# Patient Record
Sex: FEMALE | Race: BLACK OR AFRICAN AMERICAN | NOT HISPANIC OR LATINO | Employment: OTHER | URBAN - NONMETROPOLITAN AREA
[De-identification: names, ages, dates, MRNs, and addresses within clinical notes are randomized per-mention and may not be internally consistent; named-entity substitution may affect disease eponyms.]

---

## 2020-07-02 ENCOUNTER — LAB REQUISITION (OUTPATIENT)
Dept: LAB | Facility: HOSPITAL | Age: 70
End: 2020-07-02

## 2020-07-02 DIAGNOSIS — H44.30: ICD-10-CM

## 2020-07-02 DIAGNOSIS — I10 ESSENTIAL (PRIMARY) HYPERTENSION: ICD-10-CM

## 2020-07-02 DIAGNOSIS — E11.9 TYPE 2 DIABETES MELLITUS WITHOUT COMPLICATIONS (HCC): ICD-10-CM

## 2020-07-02 DIAGNOSIS — F41.1 GENERALIZED ANXIETY DISORDER: ICD-10-CM

## 2020-07-02 LAB
ALBUMIN SERPL-MCNC: 2.6 G/DL (ref 3.5–5.2)
ALBUMIN/GLOB SERPL: 0.4 G/DL
ALP SERPL-CCNC: 157 U/L (ref 39–117)
ALT SERPL W P-5'-P-CCNC: 165 U/L (ref 1–33)
ANION GAP SERPL CALCULATED.3IONS-SCNC: 9 MMOL/L (ref 5–15)
AST SERPL-CCNC: 209 U/L (ref 1–32)
BASOPHILS # BLD AUTO: 0.02 10*3/MM3 (ref 0–0.2)
BASOPHILS NFR BLD AUTO: 0.4 % (ref 0–1.5)
BILIRUB SERPL-MCNC: 0.8 MG/DL (ref 0.2–1.2)
BUN SERPL-MCNC: 12 MG/DL (ref 8–23)
BUN/CREAT SERPL: 13.3 (ref 7–25)
CALCIUM SPEC-SCNC: 9.4 MG/DL (ref 8.6–10.5)
CHLORIDE SERPL-SCNC: 105 MMOL/L (ref 98–107)
CHOLEST SERPL-MCNC: 118 MG/DL (ref 0–200)
CO2 SERPL-SCNC: 23 MMOL/L (ref 22–29)
CREAT SERPL-MCNC: 0.9 MG/DL (ref 0.57–1)
DEPRECATED RDW RBC AUTO: 52.6 FL (ref 37–54)
EOSINOPHIL # BLD AUTO: 0.11 10*3/MM3 (ref 0–0.4)
EOSINOPHIL NFR BLD AUTO: 2.2 % (ref 0.3–6.2)
ERYTHROCYTE [DISTWIDTH] IN BLOOD BY AUTOMATED COUNT: 14.8 % (ref 12.3–15.4)
GFR SERPL CREATININE-BSD FRML MDRD: 62 ML/MIN/1.73
GFR SERPL CREATININE-BSD FRML MDRD: 75 ML/MIN/1.73
GLOBULIN UR ELPH-MCNC: 5.9 GM/DL
GLUCOSE SERPL-MCNC: 158 MG/DL (ref 65–99)
HBA1C MFR BLD: 8.3 % (ref 4.8–5.6)
HCT VFR BLD AUTO: 43.1 % (ref 34–46.6)
HDLC SERPL-MCNC: 47 MG/DL (ref 40–60)
HGB BLD-MCNC: 13.9 G/DL (ref 12–15.9)
IMM GRANULOCYTES # BLD AUTO: 0 10*3/MM3 (ref 0–0.05)
IMM GRANULOCYTES NFR BLD AUTO: 0 % (ref 0–0.5)
LARGE PLATELETS: NORMAL
LDLC SERPL CALC-MCNC: 56 MG/DL (ref 0–100)
LDLC/HDLC SERPL: 1.2 {RATIO}
LYMPHOCYTES # BLD AUTO: 1.83 10*3/MM3 (ref 0.7–3.1)
LYMPHOCYTES NFR BLD AUTO: 36.6 % (ref 19.6–45.3)
MCH RBC QN AUTO: 30.8 PG (ref 26.6–33)
MCHC RBC AUTO-ENTMCNC: 32.3 G/DL (ref 31.5–35.7)
MCV RBC AUTO: 95.4 FL (ref 79–97)
MONOCYTES # BLD AUTO: 0.45 10*3/MM3 (ref 0.1–0.9)
MONOCYTES NFR BLD AUTO: 9 % (ref 5–12)
NEUTROPHILS NFR BLD AUTO: 2.59 10*3/MM3 (ref 1.7–7)
NEUTROPHILS NFR BLD AUTO: 51.8 % (ref 42.7–76)
NRBC BLD AUTO-RTO: 0 /100 WBC (ref 0–0.2)
PLATELET # BLD AUTO: 81 10*3/MM3 (ref 140–450)
PMV BLD AUTO: 11.5 FL (ref 6–12)
POTASSIUM SERPL-SCNC: 3.5 MMOL/L (ref 3.5–5.2)
PROT SERPL-MCNC: 8.5 G/DL (ref 6–8.5)
RBC # BLD AUTO: 4.52 10*6/MM3 (ref 3.77–5.28)
RBC MORPH BLD: NORMAL
SMALL PLATELETS BLD QL SMEAR: NORMAL
SODIUM SERPL-SCNC: 137 MMOL/L (ref 136–145)
TRIGL SERPL-MCNC: 73 MG/DL (ref 0–150)
TSH SERPL DL<=0.05 MIU/L-ACNC: 1.3 UIU/ML (ref 0.27–4.2)
VLDLC SERPL-MCNC: 14.6 MG/DL
WBC # BLD AUTO: 5 10*3/MM3 (ref 3.4–10.8)
WBC MORPH BLD: NORMAL

## 2020-07-02 PROCEDURE — 85007 BL SMEAR W/DIFF WBC COUNT: CPT | Performed by: PHYSICAL MEDICINE & REHABILITATION

## 2020-07-02 PROCEDURE — 83036 HEMOGLOBIN GLYCOSYLATED A1C: CPT | Performed by: PHYSICAL MEDICINE & REHABILITATION

## 2020-07-02 PROCEDURE — 85025 COMPLETE CBC W/AUTO DIFF WBC: CPT | Performed by: PHYSICAL MEDICINE & REHABILITATION

## 2020-07-02 PROCEDURE — 84443 ASSAY THYROID STIM HORMONE: CPT | Performed by: PHYSICAL MEDICINE & REHABILITATION

## 2020-07-02 PROCEDURE — 80061 LIPID PANEL: CPT | Performed by: PHYSICAL MEDICINE & REHABILITATION

## 2020-07-02 PROCEDURE — 80053 COMPREHEN METABOLIC PANEL: CPT | Performed by: PHYSICAL MEDICINE & REHABILITATION

## 2020-07-23 ENCOUNTER — LAB REQUISITION (OUTPATIENT)
Dept: LAB | Facility: HOSPITAL | Age: 70
End: 2020-07-23

## 2020-07-23 DIAGNOSIS — I10 ESSENTIAL (PRIMARY) HYPERTENSION: ICD-10-CM

## 2020-07-23 DIAGNOSIS — E11.36 TYPE 2 DIABETES MELLITUS WITH DIABETIC CATARACT (HCC): ICD-10-CM

## 2020-07-23 LAB
ALBUMIN SERPL-MCNC: 2.6 G/DL (ref 3.5–5.2)
ALBUMIN/GLOB SERPL: 0.5 G/DL
ALP SERPL-CCNC: 145 U/L (ref 39–117)
ALT SERPL W P-5'-P-CCNC: 165 U/L (ref 1–33)
ANION GAP SERPL CALCULATED.3IONS-SCNC: 7 MMOL/L (ref 5–15)
ANISOCYTOSIS BLD QL: NORMAL
AST SERPL-CCNC: 248 U/L (ref 1–32)
BASOPHILS # BLD AUTO: 0.03 10*3/MM3 (ref 0–0.2)
BASOPHILS NFR BLD AUTO: 0.8 % (ref 0–1.5)
BILIRUB SERPL-MCNC: 0.8 MG/DL (ref 0–1.2)
BUN SERPL-MCNC: 10 MG/DL (ref 8–23)
BUN/CREAT SERPL: 10.9 (ref 7–25)
CALCIUM SPEC-SCNC: 9.1 MG/DL (ref 8.6–10.5)
CHLORIDE SERPL-SCNC: 106 MMOL/L (ref 98–107)
CO2 SERPL-SCNC: 26 MMOL/L (ref 22–29)
CREAT SERPL-MCNC: 0.92 MG/DL (ref 0.57–1)
DEPRECATED RDW RBC AUTO: 53.4 FL (ref 37–54)
EOSINOPHIL # BLD AUTO: 0.09 10*3/MM3 (ref 0–0.4)
EOSINOPHIL NFR BLD AUTO: 2.4 % (ref 0.3–6.2)
ERYTHROCYTE [DISTWIDTH] IN BLOOD BY AUTOMATED COUNT: 14.8 % (ref 12.3–15.4)
GFR SERPL CREATININE-BSD FRML MDRD: 61 ML/MIN/1.73
GFR SERPL CREATININE-BSD FRML MDRD: 74 ML/MIN/1.73
GLOBULIN UR ELPH-MCNC: 5.6 GM/DL
GLUCOSE SERPL-MCNC: 147 MG/DL (ref 65–99)
HCT VFR BLD AUTO: 42.3 % (ref 34–46.6)
HGB BLD-MCNC: 13.6 G/DL (ref 12–15.9)
IMM GRANULOCYTES # BLD AUTO: 0.01 10*3/MM3 (ref 0–0.05)
IMM GRANULOCYTES NFR BLD AUTO: 0.3 % (ref 0–0.5)
LYMPHOCYTES # BLD AUTO: 1.29 10*3/MM3 (ref 0.7–3.1)
LYMPHOCYTES NFR BLD AUTO: 33.8 % (ref 19.6–45.3)
MCH RBC QN AUTO: 31.4 PG (ref 26.6–33)
MCHC RBC AUTO-ENTMCNC: 32.2 G/DL (ref 31.5–35.7)
MCV RBC AUTO: 97.7 FL (ref 79–97)
MONOCYTES # BLD AUTO: 0.38 10*3/MM3 (ref 0.1–0.9)
MONOCYTES NFR BLD AUTO: 9.9 % (ref 5–12)
NEUTROPHILS NFR BLD AUTO: 2.02 10*3/MM3 (ref 1.7–7)
NEUTROPHILS NFR BLD AUTO: 52.8 % (ref 42.7–76)
NRBC BLD AUTO-RTO: 0 /100 WBC (ref 0–0.2)
PLATELET # BLD AUTO: 73 10*3/MM3 (ref 140–450)
PMV BLD AUTO: 11.6 FL (ref 6–12)
POTASSIUM SERPL-SCNC: 3.4 MMOL/L (ref 3.5–5.2)
PROT SERPL-MCNC: 8.2 G/DL (ref 6–8.5)
RBC # BLD AUTO: 4.33 10*6/MM3 (ref 3.77–5.28)
RBC MORPH BLD: NORMAL
SMALL PLATELETS BLD QL SMEAR: NORMAL
SODIUM SERPL-SCNC: 139 MMOL/L (ref 136–145)
WBC # BLD AUTO: 3.82 10*3/MM3 (ref 3.4–10.8)
WBC MORPH BLD: NORMAL

## 2020-07-23 PROCEDURE — 85007 BL SMEAR W/DIFF WBC COUNT: CPT | Performed by: PHYSICAL MEDICINE & REHABILITATION

## 2020-07-23 PROCEDURE — 85025 COMPLETE CBC W/AUTO DIFF WBC: CPT | Performed by: PHYSICAL MEDICINE & REHABILITATION

## 2020-07-23 PROCEDURE — 80053 COMPREHEN METABOLIC PANEL: CPT | Performed by: PHYSICAL MEDICINE & REHABILITATION

## 2020-08-28 ENCOUNTER — ANESTHESIA EVENT (OUTPATIENT)
Dept: PERIOP | Facility: HOSPITAL | Age: 70
End: 2020-08-28

## 2020-08-28 ENCOUNTER — APPOINTMENT (OUTPATIENT)
Dept: CT IMAGING | Facility: HOSPITAL | Age: 70
End: 2020-08-28

## 2020-08-28 ENCOUNTER — HOSPITAL ENCOUNTER (INPATIENT)
Facility: HOSPITAL | Age: 70
LOS: 6 days | End: 2020-09-03
Attending: FAMILY MEDICINE | Admitting: INTERNAL MEDICINE

## 2020-08-28 ENCOUNTER — ANESTHESIA (OUTPATIENT)
Dept: PERIOP | Facility: HOSPITAL | Age: 70
End: 2020-08-28

## 2020-08-28 DIAGNOSIS — K92.2 ACUTE GI BLEEDING: Primary | ICD-10-CM

## 2020-08-28 DIAGNOSIS — Z74.09 IMPAIRED FUNCTIONAL MOBILITY, BALANCE, GAIT, AND ENDURANCE: ICD-10-CM

## 2020-08-28 DIAGNOSIS — Z74.09 IMPAIRED MOBILITY AND ACTIVITIES OF DAILY LIVING: ICD-10-CM

## 2020-08-28 DIAGNOSIS — K92.2 GI BLEED: ICD-10-CM

## 2020-08-28 DIAGNOSIS — Z78.9 IMPAIRED MOBILITY AND ACTIVITIES OF DAILY LIVING: ICD-10-CM

## 2020-08-28 PROBLEM — R41.82 ALTERED MENTAL STATUS, UNSPECIFIED: Status: ACTIVE | Noted: 2020-08-28

## 2020-08-28 LAB
ABO GROUP BLD: NORMAL
ALBUMIN SERPL-MCNC: 2.3 G/DL (ref 3.5–5.2)
ALBUMIN/GLOB SERPL: 0.5 G/DL
ALP SERPL-CCNC: 138 U/L (ref 39–117)
ALT SERPL W P-5'-P-CCNC: 124 U/L (ref 1–33)
ANION GAP SERPL CALCULATED.3IONS-SCNC: 16 MMOL/L (ref 5–15)
AST SERPL-CCNC: 142 U/L (ref 1–32)
BASOPHILS # BLD AUTO: 0.05 10*3/MM3 (ref 0–0.2)
BASOPHILS NFR BLD AUTO: 0.5 % (ref 0–1.5)
BILIRUB SERPL-MCNC: 0.7 MG/DL (ref 0–1.2)
BLD GP AB SCN SERPL QL: NEGATIVE
BUN SERPL-MCNC: 22 MG/DL (ref 8–23)
BUN/CREAT SERPL: 21.6 (ref 7–25)
CALCIUM SPEC-SCNC: 9 MG/DL (ref 8.6–10.5)
CHLORIDE SERPL-SCNC: 114 MMOL/L (ref 98–107)
CO2 SERPL-SCNC: 17 MMOL/L (ref 22–29)
CREAT SERPL-MCNC: 1.02 MG/DL (ref 0.57–1)
D-LACTATE SERPL-SCNC: 5.2 MMOL/L (ref 0.5–2)
D-LACTATE SERPL-SCNC: 6.8 MMOL/L (ref 0.5–2)
DEPRECATED RDW RBC AUTO: 55.1 FL (ref 37–54)
EOSINOPHIL # BLD AUTO: 0.02 10*3/MM3 (ref 0–0.4)
EOSINOPHIL NFR BLD AUTO: 0.2 % (ref 0.3–6.2)
ERYTHROCYTE [DISTWIDTH] IN BLOOD BY AUTOMATED COUNT: 15.4 % (ref 12.3–15.4)
GFR SERPL CREATININE-BSD FRML MDRD: 54 ML/MIN/1.73
GFR SERPL CREATININE-BSD FRML MDRD: 65 ML/MIN/1.73
GLOBULIN UR ELPH-MCNC: 5.1 GM/DL
GLUCOSE BLDC GLUCOMTR-MCNC: 158 MG/DL (ref 70–130)
GLUCOSE SERPL-MCNC: 171 MG/DL (ref 65–99)
HBA1C MFR BLD: 7.8 % (ref 4.8–5.6)
HCT VFR BLD AUTO: 30.6 % (ref 34–46.6)
HCT VFR BLD AUTO: 33.7 % (ref 34–46.6)
HGB BLD-MCNC: 10.9 G/DL (ref 12–15.9)
HGB BLD-MCNC: 9.9 G/DL (ref 12–15.9)
HOLD SPECIMEN: NORMAL
HOLD SPECIMEN: NORMAL
IMM GRANULOCYTES # BLD AUTO: 0.03 10*3/MM3 (ref 0–0.05)
IMM GRANULOCYTES NFR BLD AUTO: 0.3 % (ref 0–0.5)
INR PPP: 1.43 (ref 0.8–1.2)
LACTATE HOLD SPECIMEN: NORMAL
LYMPHOCYTES # BLD AUTO: 1.11 10*3/MM3 (ref 0.7–3.1)
LYMPHOCYTES NFR BLD AUTO: 12 % (ref 19.6–45.3)
Lab: NORMAL
MCH RBC QN AUTO: 31.9 PG (ref 26.6–33)
MCHC RBC AUTO-ENTMCNC: 32.3 G/DL (ref 31.5–35.7)
MCV RBC AUTO: 98.5 FL (ref 79–97)
MONOCYTES # BLD AUTO: 0.5 10*3/MM3 (ref 0.1–0.9)
MONOCYTES NFR BLD AUTO: 5.4 % (ref 5–12)
NEUTROPHILS NFR BLD AUTO: 7.53 10*3/MM3 (ref 1.7–7)
NEUTROPHILS NFR BLD AUTO: 81.6 % (ref 42.7–76)
NRBC BLD AUTO-RTO: 0 /100 WBC (ref 0–0.2)
PLATELET # BLD AUTO: 100 10*3/MM3 (ref 140–450)
PMV BLD AUTO: 11.8 FL (ref 6–12)
POTASSIUM SERPL-SCNC: 4.4 MMOL/L (ref 3.5–5.2)
PROT SERPL-MCNC: 7.4 G/DL (ref 6–8.5)
PROTHROMBIN TIME: 18.2 SECONDS (ref 11.1–15.3)
RBC # BLD AUTO: 3.42 10*6/MM3 (ref 3.77–5.28)
RH BLD: NEGATIVE
SODIUM SERPL-SCNC: 147 MMOL/L (ref 136–145)
T&S EXPIRATION DATE: NORMAL
WBC # BLD AUTO: 9.24 10*3/MM3 (ref 3.4–10.8)
WHOLE BLOOD HOLD SPECIMEN: NORMAL
WHOLE BLOOD HOLD SPECIMEN: NORMAL

## 2020-08-28 PROCEDURE — 86923 COMPATIBILITY TEST ELECTRIC: CPT

## 2020-08-28 PROCEDURE — 83036 HEMOGLOBIN GLYCOSYLATED A1C: CPT | Performed by: INTERNAL MEDICINE

## 2020-08-28 PROCEDURE — 88305 TISSUE EXAM BY PATHOLOGIST: CPT

## 2020-08-28 PROCEDURE — 85014 HEMATOCRIT: CPT | Performed by: FAMILY MEDICINE

## 2020-08-28 PROCEDURE — 99285 EMERGENCY DEPT VISIT HI MDM: CPT

## 2020-08-28 PROCEDURE — 86901 BLOOD TYPING SEROLOGIC RH(D): CPT

## 2020-08-28 PROCEDURE — 85018 HEMOGLOBIN: CPT | Performed by: FAMILY MEDICINE

## 2020-08-28 PROCEDURE — 25010000002 PIPERACILLIN SOD-TAZOBACTAM PER 1 G: Performed by: INTERNAL MEDICINE

## 2020-08-28 PROCEDURE — G0378 HOSPITAL OBSERVATION PER HR: HCPCS

## 2020-08-28 PROCEDURE — 86901 BLOOD TYPING SEROLOGIC RH(D): CPT | Performed by: FAMILY MEDICINE

## 2020-08-28 PROCEDURE — 0DB98ZX EXCISION OF DUODENUM, VIA NATURAL OR ARTIFICIAL OPENING ENDOSCOPIC, DIAGNOSTIC: ICD-10-PCS | Performed by: INTERNAL MEDICINE

## 2020-08-28 PROCEDURE — 87635 SARS-COV-2 COVID-19 AMP PRB: CPT | Performed by: FAMILY MEDICINE

## 2020-08-28 PROCEDURE — 86850 RBC ANTIBODY SCREEN: CPT | Performed by: FAMILY MEDICINE

## 2020-08-28 PROCEDURE — 86900 BLOOD TYPING SEROLOGIC ABO: CPT

## 2020-08-28 PROCEDURE — 85610 PROTHROMBIN TIME: CPT | Performed by: FAMILY MEDICINE

## 2020-08-28 PROCEDURE — 82962 GLUCOSE BLOOD TEST: CPT

## 2020-08-28 PROCEDURE — 43239 EGD BIOPSY SINGLE/MULTIPLE: CPT | Performed by: INTERNAL MEDICINE

## 2020-08-28 PROCEDURE — 36415 COLL VENOUS BLD VENIPUNCTURE: CPT | Performed by: INTERNAL MEDICINE

## 2020-08-28 PROCEDURE — 83605 ASSAY OF LACTIC ACID: CPT | Performed by: INTERNAL MEDICINE

## 2020-08-28 PROCEDURE — 25010000002 PHENYLEPHRINE PER 1 ML: Performed by: NURSE ANESTHETIST, CERTIFIED REGISTERED

## 2020-08-28 PROCEDURE — 87040 BLOOD CULTURE FOR BACTERIA: CPT | Performed by: FAMILY MEDICINE

## 2020-08-28 PROCEDURE — 80053 COMPREHEN METABOLIC PANEL: CPT | Performed by: FAMILY MEDICINE

## 2020-08-28 PROCEDURE — 25010000002 PIPERACILLIN SOD-TAZOBACTAM PER 1 G: Performed by: FAMILY MEDICINE

## 2020-08-28 PROCEDURE — 99222 1ST HOSP IP/OBS MODERATE 55: CPT | Performed by: INTERNAL MEDICINE

## 2020-08-28 PROCEDURE — 86900 BLOOD TYPING SEROLOGIC ABO: CPT | Performed by: FAMILY MEDICINE

## 2020-08-28 PROCEDURE — 70450 CT HEAD/BRAIN W/O DYE: CPT

## 2020-08-28 PROCEDURE — 85025 COMPLETE CBC W/AUTO DIFF WBC: CPT | Performed by: FAMILY MEDICINE

## 2020-08-28 PROCEDURE — 25010000002 PROPOFOL 10 MG/ML EMULSION: Performed by: NURSE ANESTHETIST, CERTIFIED REGISTERED

## 2020-08-28 PROCEDURE — 83605 ASSAY OF LACTIC ACID: CPT | Performed by: FAMILY MEDICINE

## 2020-08-28 RX ORDER — LIDOCAINE HYDROCHLORIDE 20 MG/ML
INJECTION, SOLUTION EPIDURAL; INFILTRATION; INTRACAUDAL; PERINEURAL AS NEEDED
Status: DISCONTINUED | OUTPATIENT
Start: 2020-08-28 | End: 2020-08-28 | Stop reason: SURG

## 2020-08-28 RX ORDER — SODIUM CHLORIDE 0.9 % (FLUSH) 0.9 %
10 SYRINGE (ML) INJECTION EVERY 12 HOURS SCHEDULED
Status: DISCONTINUED | OUTPATIENT
Start: 2020-08-28 | End: 2020-09-03 | Stop reason: HOSPADM

## 2020-08-28 RX ORDER — ACETAMINOPHEN 325 MG/1
650 TABLET ORAL EVERY 4 HOURS PRN
Status: DISCONTINUED | OUTPATIENT
Start: 2020-08-28 | End: 2020-09-03 | Stop reason: HOSPADM

## 2020-08-28 RX ORDER — ONDANSETRON 2 MG/ML
4 INJECTION INTRAMUSCULAR; INTRAVENOUS EVERY 6 HOURS PRN
Status: DISCONTINUED | OUTPATIENT
Start: 2020-08-28 | End: 2020-09-03 | Stop reason: HOSPADM

## 2020-08-28 RX ORDER — PANTOPRAZOLE SODIUM 40 MG/10ML
40 INJECTION, POWDER, LYOPHILIZED, FOR SOLUTION INTRAVENOUS
Status: DISCONTINUED | OUTPATIENT
Start: 2020-08-29 | End: 2020-08-31

## 2020-08-28 RX ORDER — QUETIAPINE FUMARATE 50 MG/1
50 TABLET, FILM COATED ORAL DAILY
COMMUNITY
End: 2020-09-03 | Stop reason: HOSPADM

## 2020-08-28 RX ORDER — PANTOPRAZOLE SODIUM 40 MG/10ML
80 INJECTION, POWDER, LYOPHILIZED, FOR SOLUTION INTRAVENOUS ONCE
Status: COMPLETED | OUTPATIENT
Start: 2020-08-28 | End: 2020-08-28

## 2020-08-28 RX ORDER — DEXTROSE AND SODIUM CHLORIDE 5; .45 G/100ML; G/100ML
30 INJECTION, SOLUTION INTRAVENOUS CONTINUOUS PRN
Status: CANCELLED | OUTPATIENT
Start: 2020-08-28

## 2020-08-28 RX ORDER — ESCITALOPRAM OXALATE 20 MG/1
20 TABLET ORAL DAILY
COMMUNITY
End: 2020-09-24 | Stop reason: HOSPADM

## 2020-08-28 RX ORDER — VASOPRESSIN 20 U/ML
INJECTION PARENTERAL AS NEEDED
Status: DISCONTINUED | OUTPATIENT
Start: 2020-08-28 | End: 2020-08-28 | Stop reason: SURG

## 2020-08-28 RX ORDER — PROPOFOL 10 MG/ML
VIAL (ML) INTRAVENOUS AS NEEDED
Status: DISCONTINUED | OUTPATIENT
Start: 2020-08-28 | End: 2020-08-28 | Stop reason: SURG

## 2020-08-28 RX ORDER — DEXTROSE MONOHYDRATE 25 G/50ML
25 INJECTION, SOLUTION INTRAVENOUS
Status: DISCONTINUED | OUTPATIENT
Start: 2020-08-28 | End: 2020-09-03 | Stop reason: HOSPADM

## 2020-08-28 RX ORDER — NALOXONE HCL 0.4 MG/ML
0.4 VIAL (ML) INJECTION
Status: DISCONTINUED | OUTPATIENT
Start: 2020-08-28 | End: 2020-09-03 | Stop reason: HOSPADM

## 2020-08-28 RX ORDER — MORPHINE SULFATE 2 MG/ML
1 INJECTION, SOLUTION INTRAMUSCULAR; INTRAVENOUS EVERY 4 HOURS PRN
Status: DISCONTINUED | OUTPATIENT
Start: 2020-08-28 | End: 2020-09-03 | Stop reason: HOSPADM

## 2020-08-28 RX ORDER — SODIUM CHLORIDE 0.9 % (FLUSH) 0.9 %
10 SYRINGE (ML) INJECTION AS NEEDED
Status: DISCONTINUED | OUTPATIENT
Start: 2020-08-28 | End: 2020-09-03 | Stop reason: HOSPADM

## 2020-08-28 RX ORDER — NICOTINE POLACRILEX 4 MG
15 LOZENGE BUCCAL
Status: DISCONTINUED | OUTPATIENT
Start: 2020-08-28 | End: 2020-09-03 | Stop reason: HOSPADM

## 2020-08-28 RX ORDER — DEXTROSE AND SODIUM CHLORIDE 5; .45 G/100ML; G/100ML
30 INJECTION, SOLUTION INTRAVENOUS CONTINUOUS PRN
Status: DISCONTINUED | OUTPATIENT
Start: 2020-08-28 | End: 2020-09-03 | Stop reason: HOSPADM

## 2020-08-28 RX ORDER — SODIUM CHLORIDE 9 MG/ML
150 INJECTION, SOLUTION INTRAVENOUS CONTINUOUS
Status: DISCONTINUED | OUTPATIENT
Start: 2020-08-28 | End: 2020-08-31

## 2020-08-28 RX ORDER — SODIUM CHLORIDE 9 MG/ML
INJECTION, SOLUTION INTRAVENOUS CONTINUOUS PRN
Status: DISCONTINUED | OUTPATIENT
Start: 2020-08-28 | End: 2020-08-28 | Stop reason: SURG

## 2020-08-28 RX ORDER — EMPAGLIFLOZIN 10 MG/1
10 TABLET, FILM COATED ORAL DAILY
COMMUNITY
End: 2020-09-24 | Stop reason: HOSPADM

## 2020-08-28 RX ORDER — GLIPIZIDE AND METFORMIN HCL 2.5; 5 MG/1; MG/1
1 TABLET, FILM COATED ORAL
Status: ON HOLD | COMMUNITY
End: 2021-01-01

## 2020-08-28 RX ADMIN — PHENYLEPHRINE HYDROCHLORIDE 200 MCG: 10 INJECTION INTRAVENOUS at 17:37

## 2020-08-28 RX ADMIN — PHENYLEPHRINE HYDROCHLORIDE 100 MCG: 10 INJECTION INTRAVENOUS at 17:30

## 2020-08-28 RX ADMIN — POLYETHYLENE GLYCOL 3350, SODIUM SULFATE ANHYDROUS, SODIUM BICARBONATE, SODIUM CHLORIDE, POTASSIUM CHLORIDE 4000 ML: 236; 22.74; 6.74; 5.86; 2.97 POWDER, FOR SOLUTION ORAL at 19:19

## 2020-08-28 RX ADMIN — PROPOFOL 50 MG: 10 INJECTION, EMULSION INTRAVENOUS at 17:25

## 2020-08-28 RX ADMIN — PHENYLEPHRINE HYDROCHLORIDE 200 MCG: 10 INJECTION INTRAVENOUS at 17:35

## 2020-08-28 RX ADMIN — SODIUM CHLORIDE: 900 INJECTION, SOLUTION INTRAVENOUS at 17:22

## 2020-08-28 RX ADMIN — LIDOCAINE HYDROCHLORIDE 100 MG: 20 INJECTION, SOLUTION EPIDURAL; INFILTRATION; INTRACAUDAL; PERINEURAL at 17:25

## 2020-08-28 RX ADMIN — PROPOFOL 50 MG: 10 INJECTION, EMULSION INTRAVENOUS at 17:29

## 2020-08-28 RX ADMIN — PHENYLEPHRINE HYDROCHLORIDE 100 MCG: 10 INJECTION INTRAVENOUS at 17:33

## 2020-08-28 RX ADMIN — PHENYLEPHRINE HYDROCHLORIDE 200 MCG: 10 INJECTION INTRAVENOUS at 17:39

## 2020-08-28 RX ADMIN — PHENYLEPHRINE HYDROCHLORIDE 100 MCG: 10 INJECTION INTRAVENOUS at 17:31

## 2020-08-28 RX ADMIN — PIPERACILLIN SODIUM AND TAZOBACTAM SODIUM 3.38 G: 3; .375 INJECTION, POWDER, LYOPHILIZED, FOR SOLUTION INTRAVENOUS at 21:53

## 2020-08-28 RX ADMIN — PANTOPRAZOLE SODIUM 80 MG: 40 INJECTION, POWDER, FOR SOLUTION INTRAVENOUS at 16:30

## 2020-08-28 RX ADMIN — SODIUM CHLORIDE 150 ML/HR: 9 INJECTION, SOLUTION INTRAVENOUS at 21:53

## 2020-08-28 RX ADMIN — SODIUM CHLORIDE 150 ML/HR: 9 INJECTION, SOLUTION INTRAVENOUS at 19:19

## 2020-08-28 RX ADMIN — PHENYLEPHRINE HYDROCHLORIDE 100 MCG: 10 INJECTION INTRAVENOUS at 17:28

## 2020-08-28 RX ADMIN — VASOPRESSIN 2 UNITS: 20 INJECTION INTRAVENOUS at 17:42

## 2020-08-28 NOTE — ANESTHESIA PREPROCEDURE EVALUATION
Anesthesia Evaluation     no history of anesthetic complications:  NPO Solid Status: Waived due to emergency  NPO Liquid Status: Waived due to emergency           Airway   Dental      Pulmonary    (+) decreased breath sounds,   (-) COPD, asthma, sleep apnea, not a smoker  Cardiovascular   Exercise tolerance: poor (<4 METS)    Rhythm: regular  Rate: abnormal    (+) hypertension, dysrhythmias Tachycardia,   (-) angina, cardiac stents, CABG, DVT, hyperlipidemia      Neuro/Psych  (+) dementia,     (-) seizures, TIA, CVA, psychiatric history  GI/Hepatic/Renal/Endo    (+)  GI bleeding upper active bleeding, diabetes mellitus ([id number]) type 2 poorly controlled,   (-) GERD, hepatitis, liver disease, no renal disease, no thyroid disorder    Musculoskeletal     (+) arthralgias,   Abdominal    Substance History   (-) alcohol use, drug use     OB/GYN          Other   arthritis,      (-) history of cancer  ROS/Med Hx Other: hgb dropped from 13.6 to 10.9  Lives at home,  visits regularly  Found down in a pool of blood  Blind and currently nonverbal      Phys Exam Other: NG in place and not bleed seen   believes it is a lower GI bleed over an upper                Anesthesia Plan    ASA 4 - emergent     general and MAC   (Ioana sandy is  and discussed case over the phone at 4:20pm)  intravenous induction     Anesthetic plan, all risks, benefits, and alternatives have been provided, discussed and informed consent has been obtained with: patient.

## 2020-08-28 NOTE — ANESTHESIA POSTPROCEDURE EVALUATION
Patient: Natacha Gandhi    Procedure Summary     Date:  08/28/20 Room / Location:  [facility] OR 06 / [facility] OR    Anesthesia Start:  1722 Anesthesia Stop:  1746    Procedure:  ESOPHAGOGASTRODUODENOSCOPY (N/A ) Diagnosis:       GI bleed      (GI bleed [K92.2])    Surgeon:  Ge Gorman MD Provider:  Saw Schneider MD    Anesthesia Type:  general, MAC ASA Status:  4 - Emergent          Anesthesia Type: general, MAC    Vitals  No vitals data found for the desired time range.          Post Anesthesia Care and Evaluation    Patient location during evaluation: ICU  Patient participation: complete - patient cannot participate  Level of consciousness: obtunded/minimal responses  Pain score: 0  Pain management: adequate  Airway patency: patent  Anesthetic complications: No anesthetic complications  PONV Status: none  Cardiovascular status: acceptable  Respiratory status: acceptable, face mask and spontaneous ventilation  Hydration status: acceptable

## 2020-08-29 ENCOUNTER — ANESTHESIA (OUTPATIENT)
Dept: PERIOP | Facility: HOSPITAL | Age: 70
End: 2020-08-29

## 2020-08-29 ENCOUNTER — ANESTHESIA EVENT (OUTPATIENT)
Dept: PERIOP | Facility: HOSPITAL | Age: 70
End: 2020-08-29

## 2020-08-29 LAB
ANION GAP SERPL CALCULATED.3IONS-SCNC: 12 MMOL/L (ref 5–15)
BASOPHILS # BLD AUTO: 0.03 10*3/MM3 (ref 0–0.2)
BASOPHILS NFR BLD AUTO: 0.3 % (ref 0–1.5)
BILIRUB UR QL STRIP: NEGATIVE
BUN SERPL-MCNC: 23 MG/DL (ref 8–23)
BUN/CREAT SERPL: 22.8 (ref 7–25)
CALCIUM SPEC-SCNC: 8 MG/DL (ref 8.6–10.5)
CHLORIDE SERPL-SCNC: 119 MMOL/L (ref 98–107)
CLARITY UR: CLEAR
CO2 SERPL-SCNC: 19 MMOL/L (ref 22–29)
COLOR UR: YELLOW
CREAT SERPL-MCNC: 1.01 MG/DL (ref 0.57–1)
DEPRECATED RDW RBC AUTO: 55.7 FL (ref 37–54)
EOSINOPHIL # BLD AUTO: 0.01 10*3/MM3 (ref 0–0.4)
EOSINOPHIL NFR BLD AUTO: 0.1 % (ref 0.3–6.2)
ERYTHROCYTE [DISTWIDTH] IN BLOOD BY AUTOMATED COUNT: 15.7 % (ref 12.3–15.4)
GFR SERPL CREATININE-BSD FRML MDRD: 66 ML/MIN/1.73
GLUCOSE BLDC GLUCOMTR-MCNC: 119 MG/DL (ref 70–130)
GLUCOSE BLDC GLUCOMTR-MCNC: 134 MG/DL (ref 70–130)
GLUCOSE BLDC GLUCOMTR-MCNC: 136 MG/DL (ref 70–130)
GLUCOSE BLDC GLUCOMTR-MCNC: 141 MG/DL (ref 70–130)
GLUCOSE SERPL-MCNC: 142 MG/DL (ref 65–99)
GLUCOSE UR STRIP-MCNC: NEGATIVE MG/DL
HCT VFR BLD AUTO: 26.1 % (ref 34–46.6)
HGB BLD-MCNC: 8.5 G/DL (ref 12–15.9)
HGB UR QL STRIP.AUTO: NEGATIVE
IMM GRANULOCYTES # BLD AUTO: 0.04 10*3/MM3 (ref 0–0.05)
IMM GRANULOCYTES NFR BLD AUTO: 0.4 % (ref 0–0.5)
KETONES UR QL STRIP: NEGATIVE
LEUKOCYTE ESTERASE UR QL STRIP.AUTO: NEGATIVE
LYMPHOCYTES # BLD AUTO: 2.11 10*3/MM3 (ref 0.7–3.1)
LYMPHOCYTES NFR BLD AUTO: 19.4 % (ref 19.6–45.3)
MCH RBC QN AUTO: 32.1 PG (ref 26.6–33)
MCHC RBC AUTO-ENTMCNC: 32.6 G/DL (ref 31.5–35.7)
MCV RBC AUTO: 98.5 FL (ref 79–97)
MONOCYTES # BLD AUTO: 0.75 10*3/MM3 (ref 0.1–0.9)
MONOCYTES NFR BLD AUTO: 6.9 % (ref 5–12)
NEUTROPHILS NFR BLD AUTO: 7.92 10*3/MM3 (ref 1.7–7)
NEUTROPHILS NFR BLD AUTO: 72.9 % (ref 42.7–76)
NITRITE UR QL STRIP: NEGATIVE
NRBC BLD AUTO-RTO: 0 /100 WBC (ref 0–0.2)
PH UR STRIP.AUTO: 7 [PH] (ref 5–9)
PLATELET # BLD AUTO: 79 10*3/MM3 (ref 140–450)
PMV BLD AUTO: 12.1 FL (ref 6–12)
POTASSIUM SERPL-SCNC: 4.2 MMOL/L (ref 3.5–5.2)
PROT UR QL STRIP: NEGATIVE
RBC # BLD AUTO: 2.65 10*6/MM3 (ref 3.77–5.28)
SARS-COV-2 N GENE RESP QL NAA+PROBE: NOT DETECTED
SODIUM SERPL-SCNC: 150 MMOL/L (ref 136–145)
SP GR UR STRIP: 1.02 (ref 1–1.03)
UROBILINOGEN UR QL STRIP: NORMAL
WBC # BLD AUTO: 10.86 10*3/MM3 (ref 3.4–10.8)

## 2020-08-29 PROCEDURE — 25010000002 PIPERACILLIN SOD-TAZOBACTAM PER 1 G: Performed by: INTERNAL MEDICINE

## 2020-08-29 PROCEDURE — 81003 URINALYSIS AUTO W/O SCOPE: CPT | Performed by: INTERNAL MEDICINE

## 2020-08-29 PROCEDURE — 88305 TISSUE EXAM BY PATHOLOGIST: CPT

## 2020-08-29 PROCEDURE — 85025 COMPLETE CBC W/AUTO DIFF WBC: CPT | Performed by: INTERNAL MEDICINE

## 2020-08-29 PROCEDURE — 25010000002 PROPOFOL 10 MG/ML EMULSION: Performed by: NURSE ANESTHETIST, CERTIFIED REGISTERED

## 2020-08-29 PROCEDURE — 0DBC8ZX EXCISION OF ILEOCECAL VALVE, VIA NATURAL OR ARTIFICIAL OPENING ENDOSCOPIC, DIAGNOSTIC: ICD-10-PCS | Performed by: INTERNAL MEDICINE

## 2020-08-29 PROCEDURE — G0378 HOSPITAL OBSERVATION PER HR: HCPCS

## 2020-08-29 PROCEDURE — 80048 BASIC METABOLIC PNL TOTAL CA: CPT | Performed by: INTERNAL MEDICINE

## 2020-08-29 PROCEDURE — 82962 GLUCOSE BLOOD TEST: CPT

## 2020-08-29 PROCEDURE — 45380 COLONOSCOPY AND BIOPSY: CPT | Performed by: INTERNAL MEDICINE

## 2020-08-29 PROCEDURE — 0DBK8ZX EXCISION OF ASCENDING COLON, VIA NATURAL OR ARTIFICIAL OPENING ENDOSCOPIC, DIAGNOSTIC: ICD-10-PCS | Performed by: INTERNAL MEDICINE

## 2020-08-29 PROCEDURE — 45385 COLONOSCOPY W/LESION REMOVAL: CPT | Performed by: INTERNAL MEDICINE

## 2020-08-29 RX ORDER — MEPERIDINE HYDROCHLORIDE 25 MG/ML
12.5 INJECTION INTRAMUSCULAR; INTRAVENOUS; SUBCUTANEOUS
Status: DISCONTINUED | OUTPATIENT
Start: 2020-08-29 | End: 2020-08-29 | Stop reason: HOSPADM

## 2020-08-29 RX ORDER — PROPOFOL 10 MG/ML
VIAL (ML) INTRAVENOUS AS NEEDED
Status: DISCONTINUED | OUTPATIENT
Start: 2020-08-29 | End: 2020-08-29 | Stop reason: SURG

## 2020-08-29 RX ADMIN — PROPOFOL 20 MG: 10 INJECTION, EMULSION INTRAVENOUS at 13:40

## 2020-08-29 RX ADMIN — PIPERACILLIN SODIUM AND TAZOBACTAM SODIUM 3.38 G: 3; .375 INJECTION, POWDER, LYOPHILIZED, FOR SOLUTION INTRAVENOUS at 21:07

## 2020-08-29 RX ADMIN — PIPERACILLIN SODIUM AND TAZOBACTAM SODIUM 3.38 G: 3; .375 INJECTION, POWDER, LYOPHILIZED, FOR SOLUTION INTRAVENOUS at 06:22

## 2020-08-29 RX ADMIN — PANTOPRAZOLE SODIUM 40 MG: 40 INJECTION, POWDER, FOR SOLUTION INTRAVENOUS at 06:46

## 2020-08-29 RX ADMIN — SODIUM CHLORIDE 150 ML/HR: 9 INJECTION, SOLUTION INTRAVENOUS at 21:07

## 2020-08-29 RX ADMIN — SODIUM CHLORIDE, PRESERVATIVE FREE 10 ML: 5 INJECTION INTRAVENOUS at 21:04

## 2020-08-29 RX ADMIN — PROPOFOL 50 MG: 10 INJECTION, EMULSION INTRAVENOUS at 13:38

## 2020-08-29 RX ADMIN — SODIUM CHLORIDE, PRESERVATIVE FREE 10 ML: 5 INJECTION INTRAVENOUS at 09:22

## 2020-08-29 RX ADMIN — SODIUM CHLORIDE 150 ML/HR: 9 INJECTION, SOLUTION INTRAVENOUS at 04:41

## 2020-08-29 NOTE — ANESTHESIA POSTPROCEDURE EVALUATION
Patient: Natacha Gandhi    Procedure Summary     Date:  08/29/20 Room / Location:  [facility] OR 06 / [facility] OR    Anesthesia Start:  1333 Anesthesia Stop:  1356    Procedure:  COLONOSCOPY (N/A ) Diagnosis:       Acute GI bleeding      (Acute GI bleeding [K92.2])    Surgeon:  Ge Gorman MD Provider:  Saw Schneider MD    Anesthesia Type:  MAC ASA Status:  3 - Emergent          Anesthesia Type: MAC    Vitals  No vitals data found for the desired time range.          Post Anesthesia Care and Evaluation    Patient location during evaluation: ICU  Patient participation: waiting for patient participation  Level of consciousness: responsive to verbal stimuli (at baseline)  Pain management: adequate  Airway patency: patent  Anesthetic complications: No anesthetic complications    Cardiovascular status: acceptable  Respiratory status: acceptable  Hydration status: acceptable

## 2020-08-29 NOTE — ANESTHESIA PREPROCEDURE EVALUATION
Anesthesia Evaluation     no history of anesthetic complications:  NPO Solid Status: > 8 hours  NPO Liquid Status: > 8 hours           Airway   Dental          Pulmonary    (+) decreased breath sounds,   (-) COPD, asthma, sleep apnea, not a smoker  Cardiovascular   Exercise tolerance: poor (<4 METS)    Rhythm: regular  Rate: normal    (+) hypertension, dysrhythmias Tachycardia,   (-) angina, cardiac stents, CABG, DVT, hyperlipidemia      Neuro/Psych  (+) dementia,     (-) seizures, TIA, CVA, psychiatric history  GI/Hepatic/Renal/Endo    (+)  GI bleeding lower , diabetes mellitus ([id number]) type 2 well controlled,   (-) GERD, hepatitis, liver disease, no renal disease, no thyroid disorder    Musculoskeletal     (+) arthralgias,   Abdominal    Substance History   (-) alcohol use, drug use     OB/GYN          Other   arthritis,      (-) history of cancer  ROS/Med Hx Other: hgb dropped from 13.6 to 10.9 to 8.5  Lives at home,  visits regularly  Found down in a pool of blood  Blind and currently nonverbal      Phys Exam Other: NG in place and not bleed seen   believes it is a lower GI bleed over an upper                  Anesthesia Plan    ASA 3 - emergent     MAC   (Ioana sandy is  and discussed case over the phone at 4:20pm for EGC  Brother in the room and he is aware of risks)  intravenous induction     Anesthetic plan, all risks, benefits, and alternatives have been provided, discussed and informed consent has been obtained with: sibling.

## 2020-08-30 ENCOUNTER — APPOINTMENT (OUTPATIENT)
Dept: MRI IMAGING | Facility: HOSPITAL | Age: 70
End: 2020-08-30

## 2020-08-30 LAB
ANION GAP SERPL CALCULATED.3IONS-SCNC: 9 MMOL/L (ref 5–15)
BASOPHILS # BLD AUTO: 0.04 10*3/MM3 (ref 0–0.2)
BASOPHILS # BLD AUTO: 0.05 10*3/MM3 (ref 0–0.2)
BASOPHILS NFR BLD AUTO: 0.6 % (ref 0–1.5)
BASOPHILS NFR BLD AUTO: 0.7 % (ref 0–1.5)
BUN SERPL-MCNC: 13 MG/DL (ref 8–23)
BUN/CREAT SERPL: 16 (ref 7–25)
CALCIUM SPEC-SCNC: 7.7 MG/DL (ref 8.6–10.5)
CHLORIDE SERPL-SCNC: 118 MMOL/L (ref 98–107)
CO2 SERPL-SCNC: 20 MMOL/L (ref 22–29)
CREAT SERPL-MCNC: 0.81 MG/DL (ref 0.57–1)
DEPRECATED RDW RBC AUTO: 53.1 FL (ref 37–54)
DEPRECATED RDW RBC AUTO: 56.2 FL (ref 37–54)
EOSINOPHIL # BLD AUTO: 0.06 10*3/MM3 (ref 0–0.4)
EOSINOPHIL # BLD AUTO: 0.08 10*3/MM3 (ref 0–0.4)
EOSINOPHIL NFR BLD AUTO: 0.8 % (ref 0.3–6.2)
EOSINOPHIL NFR BLD AUTO: 1.1 % (ref 0.3–6.2)
ERYTHROCYTE [DISTWIDTH] IN BLOOD BY AUTOMATED COUNT: 15.3 % (ref 12.3–15.4)
ERYTHROCYTE [DISTWIDTH] IN BLOOD BY AUTOMATED COUNT: 15.7 % (ref 12.3–15.4)
GFR SERPL CREATININE-BSD FRML MDRD: 85 ML/MIN/1.73
GLUCOSE BLDC GLUCOMTR-MCNC: 103 MG/DL (ref 70–130)
GLUCOSE BLDC GLUCOMTR-MCNC: 109 MG/DL (ref 70–130)
GLUCOSE BLDC GLUCOMTR-MCNC: 121 MG/DL (ref 70–130)
GLUCOSE BLDC GLUCOMTR-MCNC: 134 MG/DL (ref 70–130)
GLUCOSE SERPL-MCNC: 133 MG/DL (ref 65–99)
HCT VFR BLD AUTO: 23.9 % (ref 34–46.6)
HCT VFR BLD AUTO: 27.6 % (ref 34–46.6)
HGB BLD-MCNC: 7.9 G/DL (ref 12–15.9)
HGB BLD-MCNC: 9.3 G/DL (ref 12–15.9)
IMM GRANULOCYTES # BLD AUTO: 0.03 10*3/MM3 (ref 0–0.05)
IMM GRANULOCYTES # BLD AUTO: 0.04 10*3/MM3 (ref 0–0.05)
IMM GRANULOCYTES NFR BLD AUTO: 0.4 % (ref 0–0.5)
IMM GRANULOCYTES NFR BLD AUTO: 0.6 % (ref 0–0.5)
LYMPHOCYTES # BLD AUTO: 1.92 10*3/MM3 (ref 0.7–3.1)
LYMPHOCYTES # BLD AUTO: 2.28 10*3/MM3 (ref 0.7–3.1)
LYMPHOCYTES NFR BLD AUTO: 26.1 % (ref 19.6–45.3)
LYMPHOCYTES NFR BLD AUTO: 32.1 % (ref 19.6–45.3)
MCH RBC QN AUTO: 32.5 PG (ref 26.6–33)
MCH RBC QN AUTO: 32.5 PG (ref 26.6–33)
MCHC RBC AUTO-ENTMCNC: 33.1 G/DL (ref 31.5–35.7)
MCHC RBC AUTO-ENTMCNC: 33.7 G/DL (ref 31.5–35.7)
MCV RBC AUTO: 96.5 FL (ref 79–97)
MCV RBC AUTO: 98.4 FL (ref 79–97)
MONOCYTES # BLD AUTO: 0.47 10*3/MM3 (ref 0.1–0.9)
MONOCYTES # BLD AUTO: 0.49 10*3/MM3 (ref 0.1–0.9)
MONOCYTES NFR BLD AUTO: 6.4 % (ref 5–12)
MONOCYTES NFR BLD AUTO: 6.9 % (ref 5–12)
NEUTROPHILS NFR BLD AUTO: 4.18 10*3/MM3 (ref 1.7–7)
NEUTROPHILS NFR BLD AUTO: 4.83 10*3/MM3 (ref 1.7–7)
NEUTROPHILS NFR BLD AUTO: 58.7 % (ref 42.7–76)
NEUTROPHILS NFR BLD AUTO: 65.6 % (ref 42.7–76)
NRBC BLD AUTO-RTO: 0 /100 WBC (ref 0–0.2)
NRBC BLD AUTO-RTO: 0 /100 WBC (ref 0–0.2)
PLATELET # BLD AUTO: 66 10*3/MM3 (ref 140–450)
PLATELET # BLD AUTO: 88 10*3/MM3 (ref 140–450)
PMV BLD AUTO: 10.9 FL (ref 6–12)
PMV BLD AUTO: 11.8 FL (ref 6–12)
POTASSIUM SERPL-SCNC: 3.2 MMOL/L (ref 3.5–5.2)
POTASSIUM SERPL-SCNC: 3.3 MMOL/L (ref 3.5–5.2)
RBC # BLD AUTO: 2.43 10*6/MM3 (ref 3.77–5.28)
RBC # BLD AUTO: 2.86 10*6/MM3 (ref 3.77–5.28)
RBC MORPH BLD: NORMAL
SMALL PLATELETS BLD QL SMEAR: NORMAL
SODIUM SERPL-SCNC: 147 MMOL/L (ref 136–145)
WBC # BLD AUTO: 7.11 10*3/MM3 (ref 3.4–10.8)
WBC # BLD AUTO: 7.36 10*3/MM3 (ref 3.4–10.8)
WBC MORPH BLD: NORMAL

## 2020-08-30 PROCEDURE — 99232 SBSQ HOSP IP/OBS MODERATE 35: CPT | Performed by: INTERNAL MEDICINE

## 2020-08-30 PROCEDURE — 25010000002 PIPERACILLIN SOD-TAZOBACTAM PER 1 G: Performed by: INTERNAL MEDICINE

## 2020-08-30 PROCEDURE — 82962 GLUCOSE BLOOD TEST: CPT

## 2020-08-30 PROCEDURE — 70551 MRI BRAIN STEM W/O DYE: CPT

## 2020-08-30 PROCEDURE — 25010000003 POTASSIUM CHLORIDE 10 MEQ/100ML SOLUTION: Performed by: HOSPITALIST

## 2020-08-30 PROCEDURE — 80048 BASIC METABOLIC PNL TOTAL CA: CPT | Performed by: INTERNAL MEDICINE

## 2020-08-30 PROCEDURE — 85007 BL SMEAR W/DIFF WBC COUNT: CPT | Performed by: INTERNAL MEDICINE

## 2020-08-30 PROCEDURE — 85025 COMPLETE CBC W/AUTO DIFF WBC: CPT | Performed by: INTERNAL MEDICINE

## 2020-08-30 PROCEDURE — 84132 ASSAY OF SERUM POTASSIUM: CPT | Performed by: FAMILY MEDICINE

## 2020-08-30 RX ORDER — POTASSIUM CHLORIDE 1.5 G/1.77G
40 POWDER, FOR SOLUTION ORAL AS NEEDED
Status: DISCONTINUED | OUTPATIENT
Start: 2020-08-30 | End: 2020-08-31 | Stop reason: SDUPTHER

## 2020-08-30 RX ORDER — POTASSIUM CHLORIDE 750 MG/1
40 CAPSULE, EXTENDED RELEASE ORAL AS NEEDED
Status: DISCONTINUED | OUTPATIENT
Start: 2020-08-30 | End: 2020-08-31 | Stop reason: SDUPTHER

## 2020-08-30 RX ORDER — POTASSIUM CHLORIDE 7.45 MG/ML
10 INJECTION INTRAVENOUS
Status: DISCONTINUED | OUTPATIENT
Start: 2020-08-30 | End: 2020-08-31 | Stop reason: SDUPTHER

## 2020-08-30 RX ADMIN — POTASSIUM CHLORIDE 10 MEQ: 7.46 INJECTION, SOLUTION INTRAVENOUS at 17:31

## 2020-08-30 RX ADMIN — POTASSIUM CHLORIDE 10 MEQ: 7.46 INJECTION, SOLUTION INTRAVENOUS at 08:47

## 2020-08-30 RX ADMIN — POTASSIUM CHLORIDE 10 MEQ: 7.46 INJECTION, SOLUTION INTRAVENOUS at 18:44

## 2020-08-30 RX ADMIN — POTASSIUM CHLORIDE 10 MEQ: 7.46 INJECTION, SOLUTION INTRAVENOUS at 06:33

## 2020-08-30 RX ADMIN — PIPERACILLIN SODIUM AND TAZOBACTAM SODIUM 3.38 G: 3; .375 INJECTION, POWDER, LYOPHILIZED, FOR SOLUTION INTRAVENOUS at 05:36

## 2020-08-30 RX ADMIN — PIPERACILLIN SODIUM AND TAZOBACTAM SODIUM 3.38 G: 3; .375 INJECTION, POWDER, LYOPHILIZED, FOR SOLUTION INTRAVENOUS at 21:43

## 2020-08-30 RX ADMIN — POTASSIUM CHLORIDE 10 MEQ: 7.46 INJECTION, SOLUTION INTRAVENOUS at 07:49

## 2020-08-30 RX ADMIN — SODIUM CHLORIDE 150 ML/HR: 9 INJECTION, SOLUTION INTRAVENOUS at 12:20

## 2020-08-30 RX ADMIN — PIPERACILLIN SODIUM AND TAZOBACTAM SODIUM 3.38 G: 3; .375 INJECTION, POWDER, LYOPHILIZED, FOR SOLUTION INTRAVENOUS at 14:33

## 2020-08-30 RX ADMIN — SODIUM CHLORIDE 150 ML/HR: 9 INJECTION, SOLUTION INTRAVENOUS at 19:51

## 2020-08-30 RX ADMIN — PANTOPRAZOLE SODIUM 40 MG: 40 INJECTION, POWDER, FOR SOLUTION INTRAVENOUS at 17:27

## 2020-08-30 RX ADMIN — SODIUM CHLORIDE 150 ML/HR: 9 INJECTION, SOLUTION INTRAVENOUS at 04:30

## 2020-08-30 RX ADMIN — SODIUM CHLORIDE, PRESERVATIVE FREE 10 ML: 5 INJECTION INTRAVENOUS at 20:18

## 2020-08-30 RX ADMIN — SODIUM CHLORIDE 150 ML/HR: 9 INJECTION, SOLUTION INTRAVENOUS at 06:34

## 2020-08-30 RX ADMIN — POTASSIUM CHLORIDE 10 MEQ: 7.46 INJECTION, SOLUTION INTRAVENOUS at 11:29

## 2020-08-30 RX ADMIN — PANTOPRAZOLE SODIUM 40 MG: 40 INJECTION, POWDER, FOR SOLUTION INTRAVENOUS at 06:34

## 2020-08-31 LAB
ANION GAP SERPL CALCULATED.3IONS-SCNC: 7 MMOL/L (ref 5–15)
BASOPHILS # BLD AUTO: 0.04 10*3/MM3 (ref 0–0.2)
BASOPHILS NFR BLD AUTO: 0.6 % (ref 0–1.5)
BH BB BLOOD EXPIRATION DATE: NORMAL
BH BB BLOOD EXPIRATION DATE: NORMAL
BH BB BLOOD TYPE BARCODE: 600
BH BB BLOOD TYPE BARCODE: NORMAL
BH BB DISPENSE STATUS: NORMAL
BH BB DISPENSE STATUS: NORMAL
BH BB PRODUCT CODE: NORMAL
BH BB PRODUCT CODE: NORMAL
BH BB UNIT NUMBER: NORMAL
BH BB UNIT NUMBER: NORMAL
BUN SERPL-MCNC: 9 MG/DL (ref 8–23)
BUN/CREAT SERPL: 12.2 (ref 7–25)
CALCIUM SPEC-SCNC: 8 MG/DL (ref 8.6–10.5)
CHLORIDE SERPL-SCNC: 111 MMOL/L (ref 98–107)
CO2 SERPL-SCNC: 22 MMOL/L (ref 22–29)
CREAT SERPL-MCNC: 0.74 MG/DL (ref 0.57–1)
CROSSMATCH INTERPRETATION: NORMAL
CROSSMATCH INTERPRETATION: NORMAL
DEPRECATED RDW RBC AUTO: 51.9 FL (ref 37–54)
EOSINOPHIL # BLD AUTO: 0.1 10*3/MM3 (ref 0–0.4)
EOSINOPHIL NFR BLD AUTO: 1.6 % (ref 0.3–6.2)
ERYTHROCYTE [DISTWIDTH] IN BLOOD BY AUTOMATED COUNT: 15.2 % (ref 12.3–15.4)
GFR SERPL CREATININE-BSD FRML MDRD: 95 ML/MIN/1.73
GLUCOSE BLDC GLUCOMTR-MCNC: 102 MG/DL (ref 70–130)
GLUCOSE BLDC GLUCOMTR-MCNC: 176 MG/DL (ref 70–130)
GLUCOSE BLDC GLUCOMTR-MCNC: 217 MG/DL (ref 70–130)
GLUCOSE SERPL-MCNC: 108 MG/DL (ref 65–99)
HCT VFR BLD AUTO: 24.8 % (ref 34–46.6)
HGB BLD-MCNC: 8.3 G/DL (ref 12–15.9)
IMM GRANULOCYTES # BLD AUTO: 0.04 10*3/MM3 (ref 0–0.05)
IMM GRANULOCYTES NFR BLD AUTO: 0.6 % (ref 0–0.5)
LYMPHOCYTES # BLD AUTO: 1.93 10*3/MM3 (ref 0.7–3.1)
LYMPHOCYTES NFR BLD AUTO: 30.8 % (ref 19.6–45.3)
MCH RBC QN AUTO: 32.2 PG (ref 26.6–33)
MCHC RBC AUTO-ENTMCNC: 33.5 G/DL (ref 31.5–35.7)
MCV RBC AUTO: 96.1 FL (ref 79–97)
MONOCYTES # BLD AUTO: 0.42 10*3/MM3 (ref 0.1–0.9)
MONOCYTES NFR BLD AUTO: 6.7 % (ref 5–12)
NEUTROPHILS NFR BLD AUTO: 3.74 10*3/MM3 (ref 1.7–7)
NEUTROPHILS NFR BLD AUTO: 59.7 % (ref 42.7–76)
NRBC BLD AUTO-RTO: 0 /100 WBC (ref 0–0.2)
PLATELET # BLD AUTO: 75 10*3/MM3 (ref 140–450)
PMV BLD AUTO: 11.5 FL (ref 6–12)
POTASSIUM SERPL-SCNC: 3.1 MMOL/L (ref 3.5–5.2)
POTASSIUM SERPL-SCNC: 3.8 MMOL/L (ref 3.5–5.2)
RBC # BLD AUTO: 2.58 10*6/MM3 (ref 3.77–5.28)
SODIUM SERPL-SCNC: 140 MMOL/L (ref 136–145)
UNIT  ABO: NORMAL
UNIT  ABO: NORMAL
UNIT  RH: NORMAL
UNIT  RH: NORMAL
WBC # BLD AUTO: 6.27 10*3/MM3 (ref 3.4–10.8)

## 2020-08-31 PROCEDURE — 82962 GLUCOSE BLOOD TEST: CPT

## 2020-08-31 PROCEDURE — 84132 ASSAY OF SERUM POTASSIUM: CPT | Performed by: FAMILY MEDICINE

## 2020-08-31 PROCEDURE — 97166 OT EVAL MOD COMPLEX 45 MIN: CPT

## 2020-08-31 PROCEDURE — 80048 BASIC METABOLIC PNL TOTAL CA: CPT | Performed by: INTERNAL MEDICINE

## 2020-08-31 PROCEDURE — 25010000002 PIPERACILLIN SOD-TAZOBACTAM PER 1 G: Performed by: INTERNAL MEDICINE

## 2020-08-31 PROCEDURE — 85025 COMPLETE CBC W/AUTO DIFF WBC: CPT | Performed by: INTERNAL MEDICINE

## 2020-08-31 PROCEDURE — 97162 PT EVAL MOD COMPLEX 30 MIN: CPT

## 2020-08-31 PROCEDURE — 99232 SBSQ HOSP IP/OBS MODERATE 35: CPT | Performed by: INTERNAL MEDICINE

## 2020-08-31 RX ORDER — POTASSIUM CHLORIDE 1.5 G/1.77G
40 POWDER, FOR SOLUTION ORAL AS NEEDED
Status: DISCONTINUED | OUTPATIENT
Start: 2020-08-31 | End: 2020-09-03 | Stop reason: HOSPADM

## 2020-08-31 RX ORDER — QUETIAPINE FUMARATE 25 MG/1
50 TABLET, FILM COATED ORAL DAILY
Status: DISCONTINUED | OUTPATIENT
Start: 2020-08-31 | End: 2020-09-02

## 2020-08-31 RX ORDER — POTASSIUM CHLORIDE 750 MG/1
40 CAPSULE, EXTENDED RELEASE ORAL AS NEEDED
Status: DISCONTINUED | OUTPATIENT
Start: 2020-08-31 | End: 2020-09-03 | Stop reason: HOSPADM

## 2020-08-31 RX ORDER — ESCITALOPRAM OXALATE 10 MG/1
20 TABLET ORAL DAILY
Status: DISCONTINUED | OUTPATIENT
Start: 2020-08-31 | End: 2020-09-03 | Stop reason: HOSPADM

## 2020-08-31 RX ORDER — PANTOPRAZOLE SODIUM 40 MG/1
40 TABLET, DELAYED RELEASE ORAL
Status: DISCONTINUED | OUTPATIENT
Start: 2020-09-01 | End: 2020-09-03 | Stop reason: HOSPADM

## 2020-08-31 RX ORDER — POTASSIUM CHLORIDE 7.45 MG/ML
10 INJECTION INTRAVENOUS
Status: DISCONTINUED | OUTPATIENT
Start: 2020-08-31 | End: 2020-09-03 | Stop reason: HOSPADM

## 2020-08-31 RX ADMIN — POTASSIUM CHLORIDE 40 MEQ: 1.5 POWDER, FOR SOLUTION ORAL at 05:35

## 2020-08-31 RX ADMIN — PIPERACILLIN SODIUM AND TAZOBACTAM SODIUM 3.38 G: 3; .375 INJECTION, POWDER, LYOPHILIZED, FOR SOLUTION INTRAVENOUS at 15:15

## 2020-08-31 RX ADMIN — QUETIAPINE FUMARATE 50 MG: 25 TABLET ORAL at 15:15

## 2020-08-31 RX ADMIN — SODIUM CHLORIDE, PRESERVATIVE FREE 10 ML: 5 INJECTION INTRAVENOUS at 08:45

## 2020-08-31 RX ADMIN — PIPERACILLIN SODIUM AND TAZOBACTAM SODIUM 3.38 G: 3; .375 INJECTION, POWDER, LYOPHILIZED, FOR SOLUTION INTRAVENOUS at 05:30

## 2020-08-31 RX ADMIN — SODIUM CHLORIDE 150 ML/HR: 9 INJECTION, SOLUTION INTRAVENOUS at 10:26

## 2020-08-31 RX ADMIN — ESCITALOPRAM OXALATE 20 MG: 10 TABLET ORAL at 15:15

## 2020-08-31 RX ADMIN — PANTOPRAZOLE SODIUM 40 MG: 40 INJECTION, POWDER, FOR SOLUTION INTRAVENOUS at 10:13

## 2020-08-31 RX ADMIN — POTASSIUM CHLORIDE 40 MEQ: 1.5 POWDER, FOR SOLUTION ORAL at 10:24

## 2020-08-31 RX ADMIN — SODIUM CHLORIDE 150 ML/HR: 9 INJECTION, SOLUTION INTRAVENOUS at 03:03

## 2020-08-31 RX ADMIN — SODIUM CHLORIDE, PRESERVATIVE FREE 10 ML: 5 INJECTION INTRAVENOUS at 20:31

## 2020-09-01 ENCOUNTER — APPOINTMENT (OUTPATIENT)
Dept: ONCOLOGY | Facility: CLINIC | Age: 70
End: 2020-09-01

## 2020-09-01 ENCOUNTER — APPOINTMENT (OUTPATIENT)
Dept: ONCOLOGY | Facility: HOSPITAL | Age: 70
End: 2020-09-01

## 2020-09-01 LAB
ANION GAP SERPL CALCULATED.3IONS-SCNC: 7 MMOL/L (ref 5–15)
BASOPHILS # BLD AUTO: 0.03 10*3/MM3 (ref 0–0.2)
BASOPHILS NFR BLD AUTO: 0.6 % (ref 0–1.5)
BUN SERPL-MCNC: 13 MG/DL (ref 8–23)
BUN/CREAT SERPL: 15.1 (ref 7–25)
CALCIUM SPEC-SCNC: 8.3 MG/DL (ref 8.6–10.5)
CHLORIDE SERPL-SCNC: 112 MMOL/L (ref 98–107)
CO2 SERPL-SCNC: 22 MMOL/L (ref 22–29)
CREAT SERPL-MCNC: 0.86 MG/DL (ref 0.57–1)
DEPRECATED RDW RBC AUTO: 54 FL (ref 37–54)
EOSINOPHIL # BLD AUTO: 0.08 10*3/MM3 (ref 0–0.4)
EOSINOPHIL NFR BLD AUTO: 1.5 % (ref 0.3–6.2)
ERYTHROCYTE [DISTWIDTH] IN BLOOD BY AUTOMATED COUNT: 15.2 % (ref 12.3–15.4)
GFR SERPL CREATININE-BSD FRML MDRD: 80 ML/MIN/1.73
GLUCOSE BLDC GLUCOMTR-MCNC: 128 MG/DL (ref 70–130)
GLUCOSE BLDC GLUCOMTR-MCNC: 143 MG/DL (ref 70–130)
GLUCOSE BLDC GLUCOMTR-MCNC: 201 MG/DL (ref 70–130)
GLUCOSE SERPL-MCNC: 152 MG/DL (ref 65–99)
HCT VFR BLD AUTO: 24.5 % (ref 34–46.6)
HGB BLD-MCNC: 7.8 G/DL (ref 12–15.9)
IMM GRANULOCYTES # BLD AUTO: 0.02 10*3/MM3 (ref 0–0.05)
IMM GRANULOCYTES NFR BLD AUTO: 0.4 % (ref 0–0.5)
LYMPHOCYTES # BLD AUTO: 1.59 10*3/MM3 (ref 0.7–3.1)
LYMPHOCYTES NFR BLD AUTO: 30.6 % (ref 19.6–45.3)
MCH RBC QN AUTO: 31.5 PG (ref 26.6–33)
MCHC RBC AUTO-ENTMCNC: 31.8 G/DL (ref 31.5–35.7)
MCV RBC AUTO: 98.8 FL (ref 79–97)
MONOCYTES # BLD AUTO: 0.35 10*3/MM3 (ref 0.1–0.9)
MONOCYTES NFR BLD AUTO: 6.7 % (ref 5–12)
NEUTROPHILS NFR BLD AUTO: 3.12 10*3/MM3 (ref 1.7–7)
NEUTROPHILS NFR BLD AUTO: 60.2 % (ref 42.7–76)
NRBC BLD AUTO-RTO: 0 /100 WBC (ref 0–0.2)
PLATELET # BLD AUTO: 79 10*3/MM3 (ref 140–450)
PMV BLD AUTO: 11.6 FL (ref 6–12)
POTASSIUM SERPL-SCNC: 3.2 MMOL/L (ref 3.5–5.2)
RBC # BLD AUTO: 2.48 10*6/MM3 (ref 3.77–5.28)
RBC MORPH BLD: NORMAL
SMALL PLATELETS BLD QL SMEAR: NORMAL
SODIUM SERPL-SCNC: 141 MMOL/L (ref 136–145)
WBC # BLD AUTO: 5.19 10*3/MM3 (ref 3.4–10.8)
WBC MORPH BLD: NORMAL

## 2020-09-01 PROCEDURE — 80048 BASIC METABOLIC PNL TOTAL CA: CPT | Performed by: INTERNAL MEDICINE

## 2020-09-01 PROCEDURE — 85025 COMPLETE CBC W/AUTO DIFF WBC: CPT | Performed by: INTERNAL MEDICINE

## 2020-09-01 PROCEDURE — 99232 SBSQ HOSP IP/OBS MODERATE 35: CPT | Performed by: INTERNAL MEDICINE

## 2020-09-01 PROCEDURE — 85007 BL SMEAR W/DIFF WBC COUNT: CPT | Performed by: INTERNAL MEDICINE

## 2020-09-01 PROCEDURE — 82962 GLUCOSE BLOOD TEST: CPT

## 2020-09-01 RX ADMIN — POTASSIUM CHLORIDE 40 MEQ: 1.5 POWDER, FOR SOLUTION ORAL at 08:15

## 2020-09-01 RX ADMIN — QUETIAPINE FUMARATE 50 MG: 25 TABLET ORAL at 08:07

## 2020-09-01 RX ADMIN — ESCITALOPRAM OXALATE 20 MG: 10 TABLET ORAL at 08:07

## 2020-09-01 RX ADMIN — SODIUM CHLORIDE, PRESERVATIVE FREE 10 ML: 5 INJECTION INTRAVENOUS at 08:08

## 2020-09-01 RX ADMIN — PANTOPRAZOLE SODIUM 40 MG: 40 TABLET, DELAYED RELEASE ORAL at 06:46

## 2020-09-01 RX ADMIN — POTASSIUM CHLORIDE 40 MEQ: 1.5 POWDER, FOR SOLUTION ORAL at 13:22

## 2020-09-01 NOTE — PROGRESS NOTES
[facility] Medicine Services  INPATIENT PROGRESS NOTE    Length of Stay: 4  Date of Admission: 8/28/2020  Primary Care Physician: Alisia Ramirez APRN    Subjective   Chief Complaint: Diarrhea.    HPI:    She is a 70 y.o. female who lives alone in assisted living arrangement, legally blind with history of type 2 diabetes mellitus, osteoarthritis and suspected schizophrenia or bipolar disorder who was admitted for GI bleed.     She is status post EGD and colonoscopy by Dr. Gorman with the following results.     Colonoscopy:   - Erythematous mucosa at the ileocecal valve. Biopsied.  - A single non-bleeding colonic angioectasia.  - Diverticulosis in the sigmoid colon and in the descending colon.  - Non-bleeding internal hemorrhoids.  - One 10 mm polyp in the ascending colon, removed with a cold snare. Resected and retrieved.    EGD:  - Acute gastritis.  - A few duodenal polyps. Biopsied.    9/1/2020: Patient states she has had increased diarrhea overnight.  Potassium is 3.2 this a.m.    Review of Systems   Constitutional: Negative for activity change and fatigue.   HENT: Negative for ear pain and sore throat.    Eyes: Negative for pain and discharge.   Respiratory: Negative for cough and shortness of breath.    Cardiovascular: Negative for chest pain and palpitations.   Gastrointestinal: Positive for diarrhea. Negative for abdominal pain and nausea.   Endocrine: Negative for cold intolerance and heat intolerance.   Genitourinary: Negative for difficulty urinating and dysuria.   Musculoskeletal: Negative for arthralgias and gait problem.   Skin: Negative for color change and rash.   Neurological: Negative for dizziness and weakness.   Psychiatric/Behavioral: Negative for agitation and confusion.        Objective    Temp:  [96.7 °F (35.9 °C)-97.6 °F (36.4 °C)] 97.1 °F (36.2 °C)  Heart Rate:  [] 88  Resp:  [18-20] 18  BP: ()/(55-69) 114/66    Physical Exam      Constitutional: She appears well-developed and well-nourished.   HENT:   Head: Normocephalic and atraumatic.   Eyes: Pupils are equal, round, and reactive to light. EOM are normal.   Patient is blind.    Neck: Normal range of motion. Neck supple.   Cardiovascular: Normal rate and regular rhythm.   Pulmonary/Chest: Effort normal and breath sounds normal.   Abdominal: Soft. Bowel sounds are normal.   Musculoskeletal: Normal range of motion.   Neurological: She is alert.   Skin: Skin is warm and dry.   Psychiatric: She has a normal mood and affect. Her behavior is normal.       Results Review:  I have reviewed the labs, radiology results, and diagnostic studies.    Laboratory Data:   Results from last 7 days   Lab Units 09/01/20  0537 08/31/20  1608 08/31/20  0349  08/30/20  0319  08/28/20  1314   SODIUM mmol/L 141  --  140  --  147*   < > 147*   POTASSIUM mmol/L 3.2* 3.8 3.1*   < > 3.2*   < > 4.4   CHLORIDE mmol/L 112*  --  111*  --  118*   < > 114*   CO2 mmol/L 22.0  --  22.0  --  20.0*   < > 17.0*   BUN mg/dL 13  --  9  --  13   < > 22   CREATININE mg/dL 0.86  --  0.74  --  0.81   < > 1.02*   GLUCOSE mg/dL 152*  --  108*  --  133*   < > 171*   CALCIUM mg/dL 8.3*  --  8.0*  --  7.7*   < > 9.0   BILIRUBIN mg/dL  --   --   --   --   --   --  0.7   ALK PHOS U/L  --   --   --   --   --   --  138*   ALT (SGPT) U/L  --   --   --   --   --   --  124*   AST (SGOT) U/L  --   --   --   --   --   --  142*   ANION GAP mmol/L 7.0  --  7.0  --  9.0   < > 16.0*    < > = values in this interval not displayed.     Estimated Creatinine Clearance: 62.4 mL/min (by C-G formula based on SCr of 0.86 mg/dL).          Results from last 7 days   Lab Units 09/01/20  0537 08/31/20  0349 08/30/20  1629 08/30/20  0319 08/29/20  0603   WBC 10*3/mm3 5.19 6.27 7.36 7.11 10.86*   HEMOGLOBIN g/dL 7.8* 8.3* 9.3* 7.9* 8.5*   HEMATOCRIT % 24.5* 24.8* 27.6* 23.9* 26.1*   PLATELETS 10*3/mm3 79* 75* 88* 66* 79*     Results from last 7 days   Lab Units  08/28/20  1314   INR  1.43*       Culture Data:   No results found for: BLOODCX  No results found for: URINECX  No results found for: RESPCX  No results found for: WOUNDCX  No results found for: STOOLCX  No components found for: BODYFLD    Radiology Data:   Imaging Results (Last 24 Hours)     ** No results found for the last 24 hours. **          I have reviewed the patient's current medications.     Assessment/Plan     Active Hospital Problems    Diagnosis POA   • Acute GI bleeding [K92.2] Yes   • Altered mental status, unspecified [R41.82] Yes       Plan:    1.  Acute blood loss anemia (secondary to GI bleed): Patient's hemoglobin is stable.  EGD showed acute gastritis and colonoscopy showed erythematous mucosa at ileocecal valve and some diverticulosis. Continue PPI, monitor hemoglobin and transfuse if the need arises.  GI is following.   2.  Acute metabolic encephalopathy: Resolved as patient's mental status is back to baseline.  3.  Hypokalemia: Replete potassium and monitor levels.  4.  Chronic thrombocytopenia: Continue to monitor and consult hematologist if the need arises.  She follows up with Dr. Baltazar as outpatient.  5.  Type 2 diabetes mellitus: Stable.  Continue Accu-Cheks and sliding scale insulin.  6. History of bipolar disorder/schizophrenia: Continue home medications.  7.  Deconditioning: Consult PT and OT.      Discharge Planning: I expect patient to be discharged to home in 1-2 days.      This document has been electronically signed by CLAIRE Tan on September 1, 2020 15:15

## 2020-09-01 NOTE — PLAN OF CARE
Problem: Patient Care Overview  Goal: Plan of Care Review  Outcome: Ongoing (interventions implemented as appropriate)  Flowsheets (Taken 9/1/2020 0807)  Plan of Care Reviewed With: patient  Note:   Pts vss. Alert and oriented but delusional and paranoid- c/o hearing people talk about her and walls in her apartment hearing things ie dogs barking. Brother Junior requested case management consult r/t dc placement and her living alone as well as POA inquiry. Incontinent of bowel and bladder. Potassium protocol lab draw at 1700 refused per pt. Sn was able to bathe pts back side and change linens but she refused top change and gown change. Will continue to monitor.

## 2020-09-01 NOTE — SIGNIFICANT NOTE
09/01/20 1300   Rehab Treatment   Reason Treatment Not Performed other (see comments)  (Pt confused and agitated this date. Nsg deferred tx @ this time.)

## 2020-09-01 NOTE — SIGNIFICANT NOTE
09/01/20 1547   Rehab Treatment   Discipline physical therapy assistant   Reason Treatment Not Performed patient/family declined treatment  (pt declined gt/eob/oob to chair-nsg made aware)

## 2020-09-01 NOTE — PLAN OF CARE
Problem: Patient Care Overview  Goal: Plan of Care Review  Outcome: Ongoing (interventions implemented as appropriate)  Flowsheets  Taken 9/1/2020 0025  Progress: no change  Outcome Summary: Pt answering orientation questions correctly, but continues to be confused at times, yelling out, and agitated at times. Pt states she has no pain at this time. Will continue to monitor.  Taken 8/31/2020 2030  Plan of Care Reviewed With: patient

## 2020-09-02 PROBLEM — F20.0 SCHIZOPHRENIA, PARANOID TYPE (HCC): Status: ACTIVE | Noted: 2020-09-02

## 2020-09-02 LAB
ANION GAP SERPL CALCULATED.3IONS-SCNC: 10 MMOL/L (ref 5–15)
BACTERIA SPEC AEROBE CULT: NORMAL
BACTERIA SPEC AEROBE CULT: NORMAL
BASOPHILS # BLD AUTO: 0.02 10*3/MM3 (ref 0–0.2)
BASOPHILS NFR BLD AUTO: 0.3 % (ref 0–1.5)
BUN SERPL-MCNC: 9 MG/DL (ref 8–23)
BUN/CREAT SERPL: 11.8 (ref 7–25)
CALCIUM SPEC-SCNC: 8.3 MG/DL (ref 8.6–10.5)
CHLORIDE SERPL-SCNC: 106 MMOL/L (ref 98–107)
CO2 SERPL-SCNC: 24 MMOL/L (ref 22–29)
CREAT SERPL-MCNC: 0.76 MG/DL (ref 0.57–1)
DEPRECATED RDW RBC AUTO: 52.9 FL (ref 37–54)
EOSINOPHIL # BLD AUTO: 0.1 10*3/MM3 (ref 0–0.4)
EOSINOPHIL NFR BLD AUTO: 1.6 % (ref 0.3–6.2)
ERYTHROCYTE [DISTWIDTH] IN BLOOD BY AUTOMATED COUNT: 15.6 % (ref 12.3–15.4)
GFR SERPL CREATININE-BSD FRML MDRD: 92 ML/MIN/1.73
GLUCOSE BLDC GLUCOMTR-MCNC: 132 MG/DL (ref 70–130)
GLUCOSE BLDC GLUCOMTR-MCNC: 174 MG/DL (ref 70–130)
GLUCOSE BLDC GLUCOMTR-MCNC: 178 MG/DL (ref 70–130)
GLUCOSE SERPL-MCNC: 147 MG/DL (ref 65–99)
HCT VFR BLD AUTO: 26.5 % (ref 34–46.6)
HGB BLD-MCNC: 8.7 G/DL (ref 12–15.9)
IMM GRANULOCYTES # BLD AUTO: 0.02 10*3/MM3 (ref 0–0.05)
IMM GRANULOCYTES NFR BLD AUTO: 0.3 % (ref 0–0.5)
LAB AP CASE REPORT: NORMAL
LAB AP CASE REPORT: NORMAL
LYMPHOCYTES # BLD AUTO: 2.16 10*3/MM3 (ref 0.7–3.1)
LYMPHOCYTES NFR BLD AUTO: 34.7 % (ref 19.6–45.3)
MCH RBC QN AUTO: 32.2 PG (ref 26.6–33)
MCHC RBC AUTO-ENTMCNC: 32.8 G/DL (ref 31.5–35.7)
MCV RBC AUTO: 98.1 FL (ref 79–97)
MONOCYTES # BLD AUTO: 0.41 10*3/MM3 (ref 0.1–0.9)
MONOCYTES NFR BLD AUTO: 6.6 % (ref 5–12)
NEUTROPHILS NFR BLD AUTO: 3.52 10*3/MM3 (ref 1.7–7)
NEUTROPHILS NFR BLD AUTO: 56.5 % (ref 42.7–76)
NRBC BLD AUTO-RTO: 0 /100 WBC (ref 0–0.2)
PATH REPORT.FINAL DX SPEC: NORMAL
PATH REPORT.FINAL DX SPEC: NORMAL
PLATELET # BLD AUTO: 110 10*3/MM3 (ref 140–450)
PMV BLD AUTO: 11.2 FL (ref 6–12)
POTASSIUM SERPL-SCNC: 3.7 MMOL/L (ref 3.5–5.2)
RBC # BLD AUTO: 2.7 10*6/MM3 (ref 3.77–5.28)
SODIUM SERPL-SCNC: 140 MMOL/L (ref 136–145)
WBC # BLD AUTO: 6.23 10*3/MM3 (ref 3.4–10.8)

## 2020-09-02 PROCEDURE — 97110 THERAPEUTIC EXERCISES: CPT

## 2020-09-02 PROCEDURE — 80048 BASIC METABOLIC PNL TOTAL CA: CPT | Performed by: INTERNAL MEDICINE

## 2020-09-02 PROCEDURE — 97116 GAIT TRAINING THERAPY: CPT

## 2020-09-02 PROCEDURE — 97535 SELF CARE MNGMENT TRAINING: CPT

## 2020-09-02 PROCEDURE — 82962 GLUCOSE BLOOD TEST: CPT

## 2020-09-02 PROCEDURE — 85025 COMPLETE CBC W/AUTO DIFF WBC: CPT | Performed by: INTERNAL MEDICINE

## 2020-09-02 PROCEDURE — 99222 1ST HOSP IP/OBS MODERATE 55: CPT | Performed by: PSYCHIATRY & NEUROLOGY

## 2020-09-02 PROCEDURE — 99232 SBSQ HOSP IP/OBS MODERATE 35: CPT | Performed by: INTERNAL MEDICINE

## 2020-09-02 PROCEDURE — 97530 THERAPEUTIC ACTIVITIES: CPT

## 2020-09-02 RX ORDER — RISPERIDONE 1 MG/1
1 TABLET ORAL NIGHTLY
Status: DISCONTINUED | OUTPATIENT
Start: 2020-09-02 | End: 2020-09-03 | Stop reason: HOSPADM

## 2020-09-02 RX ORDER — RISPERIDONE 1 MG/1
1 TABLET ORAL NIGHTLY PRN
Status: DISCONTINUED | OUTPATIENT
Start: 2020-09-02 | End: 2020-09-03 | Stop reason: HOSPADM

## 2020-09-02 RX ADMIN — SODIUM CHLORIDE, PRESERVATIVE FREE 10 ML: 5 INJECTION INTRAVENOUS at 21:35

## 2020-09-02 RX ADMIN — ESCITALOPRAM OXALATE 20 MG: 10 TABLET ORAL at 08:38

## 2020-09-02 RX ADMIN — SODIUM CHLORIDE, PRESERVATIVE FREE 10 ML: 5 INJECTION INTRAVENOUS at 08:41

## 2020-09-02 RX ADMIN — QUETIAPINE FUMARATE 50 MG: 25 TABLET ORAL at 08:39

## 2020-09-02 RX ADMIN — RISPERIDONE 1 MG: 1 TABLET ORAL at 21:35

## 2020-09-02 RX ADMIN — PANTOPRAZOLE SODIUM 40 MG: 40 TABLET, DELAYED RELEASE ORAL at 05:50

## 2020-09-02 NOTE — THERAPY TREATMENT NOTE
Acute Care - Occupational Therapy Treatment Note  [facility]     Patient Name: Natacha Gandhi  : 1950  MRN: [id number]  Today's Date: 2020  Onset of Illness/Injury or Date of Surgery: 20  Date of Referral to OT: 20       Admit Date: 2020       ICD-10-CM ICD-9-CM   1. Acute GI bleeding K92.2 578.9   2. GI bleed K92.2 578.9   3. Impaired mobility and activities of daily living Z74.09 V49.89    Z78.9    4. Impaired functional mobility, balance, gait, and endurance Z74.09 V49.89     Patient Active Problem List   Diagnosis   • Acute GI bleeding   • Altered mental status, unspecified     History reviewed. No pertinent past medical history.  History reviewed. No pertinent surgical history.    Therapy Treatment    Rehabilitation Treatment Summary     Row Name 20 09             Treatment Time/Intention    Discipline  occupational therapy assistant  -BB      Document Type  therapy note (daily note)  -BB      Subjective Information  no complaints  -BB      Mode of Treatment  individual therapy;occupational therapy  -BB      Total Minutes, Occupational Therapy Treatment  40  -BB      Therapy Frequency (OT Eval)  other (see comments) 5-7 days per week  -BB      Patient Effort  good  -BB      Existing Precautions/Restrictions  fall  -BB      Recorded by [BB] Mare Reed COTA/L 20 1111      Row Name 20 0900             Vital Signs    Pretreatment Heart Rate (beats/min)  100  -BB      Posttreatment Heart Rate (beats/min)  102  -BB      Pre SpO2 (%)  98  -BB      O2 Delivery Pre Treatment  room air  -BB      Post SpO2 (%)  97  -BB      O2 Delivery Post Treatment  room air  -BB      Pre Patient Position  -- long sitting  -BB      Post Patient Position  -- long sitting  -BB      Recorded by [BB] Mare Reed COTA/L 20 1111      Row Name 20 0900             Cognitive Assessment/Intervention- PT/OT    Orientation Status (Cognition)  oriented  to;person;place;situation  -BB      Cognitive Function (Cognitive)  safety deficit;memory deficit  -BB      Safety Deficit (Cognitive)  moderate deficit  -BB      Personal Safety Interventions  fall prevention program maintained;gait belt;nonskid shoes/slippers when out of bed;supervised activity  -BB      Recorded by [BB] Mare Reed COTA/L 09/02/20 1111      Row Name 09/02/20 0900             Bed Mobility Assessment/Treatment    Rolling Left [place] (Bed Mobility)  contact guard  -BB      Rolling Right [place] (Bed Mobility)  contact guard  -BB      Assistive Device (Bed Mobility)  bed rails  -BB      Comment (Bed Mobility)  -- Pt defers EOB/OOB this am  -BB      Recorded by [BB] Mare Reed COTA/L 09/02/20 1111      Row Name 09/02/20 0900             Bathing Assessment/Intervention    Bathing [place] Level  upper body;lower body;set up;supervision;verbal cues  -BB      Bathing Position  sitting up in bed  -BB      Recorded by [BB] Mare Reed COTA/L 09/02/20 1111      Row Name 09/02/20 0900             Upper Body Dressing Assessment/Training    Upper Body Dressing [place] Level  doff;don;contact guard assist;verbal cues  -BB      Upper Body Dressing Position  sitting up in bed  -BB      Recorded by [BB] Mare Reed COTA/L 09/02/20 1111      Row Name 09/02/20 0900             Lower Body Dressing Assessment/Training    Lower Body Dressing [place] Level  doff;don;socks;supervision;set up  -BB      Lower Body Dressing Position  sitting up in bed  -BB      Recorded by [BB] Mare Reed COTA/L 09/02/20 1111      Row Name 09/02/20 0900             Grooming Assessment/Training    [place] Level (Grooming)  wash face, hands;set up;supervision  -BB      Grooming Position  sitting up in bed  -BB      Recorded by [BB] Mare Reed COTA/L 09/02/20 1111      Row Name 09/02/20 0900             Positioning and Restraints    Pre-Treatment Position   in bed  -BB      Post Treatment Position  bed  -BB      In Bed  fowlers;encouraged to call for assist;call light within reach;exit alarm on  -BB      Recorded by [BB] Mare Reed COTA/L 09/02/20 1111      Row Name 09/02/20 0900             Pain Scale: Numbers Pre/Post-Treatment    Pain Scale: Numbers, Pretreatment  0/10 - no pain  -BB      Pain Scale: Numbers, Post-Treatment  0/10 - no pain  -BB      Recorded by [BB] Mare Reed COTA/L 09/02/20 1111      Row Name 09/02/20 0900             Plan of Care Review    Plan of Care Reviewed With  patient  -BB      Progress  no change  -BB      Outcome Summary  Pt defers EOB/OOB this am. Pt completed ADL while sitting up in bed. Pt supervision for UB/LB ADL  Pt with  all needs within reach. Pt may beneift from continued OT services.  -BB      Recorded by [BB] Mare Reed COTA/L 09/02/20 1111      Row Name 09/02/20 0900             Outcome Summary/Treatment Plan (OT)    Daily Summary of Progress (OT)  progress toward functional goals is good  -BB      Plan for Continued Treatment (OT)  continue OT POC  -BB      Anticipated Discharge Disposition (OT)  anticipate therapy at next level of care  -BB      Recorded by [MELECIO] Mare Reed COTA/WINSTON 09/02/20 1111        User Key  (r) = Recorded By, (t) = Taken By, (c) = Cosigned By    Initials Name Effective Dates Discipline    BB Mare Reed COTA/L 03/07/18 -  OT           Rehab Goal Summary     Row Name 09/02/20 0900             Occupational Therapy Goals    Transfer Goal Selection (OT)  transfer, OT goal 1  -BB      Bathing Goal Selection (OT)  bathing, OT goal 1  -BB      Dressing Goal Selection (OT)  dressing, OT goal 1  -BB      Toileting Goal Selection (OT)  toileting, OT goal 1  -BB      Strength Goal Selection (OT)  strength, OT goal 1  -BB      Activity Tolerance Goal Selection (OT)  activity tolerance, OT goal 1  -BB         Transfer Goal 1 (OT)    Activity/Assistive Device (Transfer  Goal 1, OT)  transfers, all  -BB      [place] Level/Cues Needed (Transfer Goal 1, OT)  standby assist;verbal cues required;tactile cues required;set-up required  -BB      Time Frame (Transfer Goal 1, OT)  long term goal (LTG);by discharge  -BB      Progress/Outcome (Transfer Goal 1, OT)  goal not met  -BB         Bathing Goal 1 (OT)    Activity/Assistive Device (Bathing Goal 1, OT)  lower body bathing  -BB      [place] Level/Cues Needed (Bathing Goal 1, OT)  contact guard assist;verbal cues required;tactile cues required;set-up required  -BB      Time Frame (Bathing Goal 1, OT)  long term goal (LTG);by discharge  -BB      Progress/Outcomes (Bathing Goal 1, OT)  goal met  -BB         Dressing Goal 1 (OT)    Activity/Assistive Device (Dressing Goal 1, OT)  lower body dressing  -BB      [place]/Cues Needed (Dressing Goal 1, OT)  contact guard assist;verbal cues required;tactile cues required;set-up required  -BB      Time Frame (Dressing Goal 1, OT)  long term goal (LTG);by discharge  -BB      Progress/Outcome (Dressing Goal 1, OT)  goal not met  -BB         Toileting Goal 1 (OT)    Activity/Device (Toileting Goal 1, OT)  toileting skills, all  -BB      [place] Level/Cues Needed (Toileting Goal 1, OT)  contact guard assist;verbal cues required;tactile cues required;set-up required  -BB      Time Frame (Toileting Goal 1, OT)  long term goal (LTG);by discharge  -BB      Progress/Outcome (Toileting Goal 1, OT)  goal not met  -BB         Strength Goal 1 (OT)    Strength Goal 1 (OT)  Pt to demo compliance/understanding of HEP to build strength  -BB      Time Frame (Strength Goal 1, OT)  long term goal (LTG);by discharge  -BB      Progress/Outcome (Strength Goal 1, OT)  goal not met  -BB          Activity Tolerance Goal 1 (OT)    Activity Level (Endurance Goal 1, OT)  -- 15 min functional activity with proper EC  -BB      Time Frame (Activity Tolerance Goal 1, OT)  long term goal (LTG);by discharge  -BB       Progress/Outcome (Activity Tolerance Goal 1, OT)  goal not met  -MELECIO        User Key  (r) = Recorded By, (t) = Taken By, (c) = Cosigned By    Initials Name Provider Type Discipline    Mare Westbrook COTA/L Occupational Therapy Assistant OT        Occupational Therapy Education                 Title: PT OT SLP Therapies (In Progress)     Topic: Occupational Therapy (In Progress)     Point: ADL training (In Progress)     Description:   Instruct learner(s) on proper safety adaptation and remediation techniques during self care or transfers.   Instruct in proper use of assistive devices.              Learning Progress Summary           Patient Acceptance, E, NR by MELECIO at 9/2/2020 1112                   Point: Home exercise program (Not Started)     Description:   Instruct learner(s) on appropriate technique for monitoring, assisting and/or progressing therapeutic exercises/activities.              Learner Progress:   Not documented in this visit.          Point: Precautions (Done)     Description:   Instruct learner(s) on prescribed precautions during self-care and functional transfers.              Learning Progress Summary           Patient Acceptance, E, VU by  at 8/31/2020 1435    Comment:  Pt educated on role of OT, d/c recs, and POC                   Point: Body mechanics (Not Started)     Description:   Instruct learner(s) on proper positioning and spine alignment during self-care, functional mobility activities and/or exercises.              Learner Progress:   Not documented in this visit.                      User Key     Initials Effective Dates Name Provider Type Discipline    MELECIO 03/07/18 -  Mare Reed COTA/L Occupational Therapy Assistant JAUN ARAUJO 09/10/19 -  Diego Pelaez OT Occupational Therapist OT                OT Recommendation and Plan  Outcome Summary/Treatment Plan (OT)  Daily Summary of Progress (OT): progress toward functional goals is good  Plan for Continued Treatment  (OT): continue OT POC  Anticipated Discharge Disposition (OT): anticipate therapy at next level of care  Therapy Frequency (OT Eval): other (see comments)(5-7 days per week)  Daily Summary of Progress (OT): progress toward functional goals is good  Plan of Care Review  Plan of Care Reviewed With: patient  Plan of Care Reviewed With: patient  Outcome Summary: Pt defers EOB/OOB this am. Pt completed ADL while sitting up in bed. Pt supervision for UB/LB ADL  Pt with  all needs within reach. Pt may beneift from continued OT services.  Outcome Measures     Row Name 09/02/20 0900 08/31/20 1430          How much help from another is currently needed...    Putting on and taking off regular lower body clothing?  2  -BB  2  -JH     Bathing (including washing, rinsing, and drying)  3  -BB  2  -JH     Toileting (which includes using toilet bed pan or urinal)  2  -BB  2  -JH     Putting on and taking off regular upper body clothing  3  -BB  3  -JH     Taking care of personal grooming (such as brushing teeth)  3  -BB  3  -JH     Eating meals  4  -BB  4  -JH     AM-PAC 6 Clicks Score (OT)  17  -BB  16  -JH        Functional Assessment    Outcome Measure Options  --  AM-PAC 6 Clicks Daily Activity (OT)  -       User Key  (r) = Recorded By, (t) = Taken By, (c) = Cosigned By    Initials Name Provider Type    Mare Westbrook COTA/L Occupational Therapy Assistant     Diego Pelaez, OT Occupational Therapist           Time Calculation:   Time Calculation- OT     Row Name 09/02/20 1113             Time Calculation- OT    OT Start Time  0900  -      OT Stop Time  0940  -      OT Time Calculation (min)  40 min  -      Total Timed Code Minutes- OT  40 minute(s)  -      OT Received On  09/02/20  -        User Key  (r) = Recorded By, (t) = Taken By, (c) = Cosigned By    Initials Name Provider Type    Mare Westbrook COTA/L Occupational Therapy Assistant        Therapy Charges for Today     Code Description  Service Date Service Provider Modifiers Qty    [id number] HC OT SELF CARE/MGMT/TRAIN EA 15 MIN 9/2/2020 Mare Reed, JOSE/L  3               JOSE Lares/WINSTON  9/2/2020

## 2020-09-02 NOTE — CONSULTS
"Inpatient Consult to Psychiatry    9/2/2020    Referring Provider: CLAIRE Dick  Reason for Consultation: paranoia    Source of History:  chart review, the patient and patient's brother    HPI:    Patient is a 70 y.o. female who presents with paranoia. Onset of symptoms was gradual starting several months ago.  Symptoms have been present on an increasingly more frequent basis. Symptoms are associated with anxiety, insomnia and auditory hallucinations and paranoia.  Symptoms are aggravated by medication non-compliance.   Patient's symptoms occur in the context of long history of treatment by [facility] for mental health issues.    She notes that she was brought to the ED ambulance due to \"passing out.\"  Patient was apparently found unresponsive on the floor of her room surrounded by bloody stool.  She was found by her [facility] .  Her brother reports she has been going to [facility] for years and has had a  for years.    Patient notes that people are after her and she hears voices of \"real human beings.\"  Her brother who is present reports that she has been medication non-compliant b/c she believes there are people out to kill her.  She is also hearing voices as well.  She endorse the belief that there are individuals who are trying to kill her.  She denies medication compliance.  When asked what medications she is taking, she notes \"diet pills\" and then changes to medications for her diabetes.  Her brother reports that she has been worse in the last 6-7 months.    Nursing reports poor sleep last night with constant responding to auditory hallucinations and paranoia.  She thought there was someone outside her window.    Psychiatric Review Of Systems:  anxiety, hallucinations and Pertinent items are noted in HPI.    History  Past psychiatric history:    Psychiatric Hospitalizations: Patient has had no prior hospitalizations.    Suicide Attempts: Patient has had no prior suicide " "attempts.    Prior Treatment and Medications Tried: She has lexapro 20mg daily and seroquel 50mg qhs on PTA med list and she states that she is taking it.    History of violence or legal issues: Denies DUI, drug charges, assaults.  She notes charge for bad checks in the past.    Social History:    Substance Abuse: She denies any A&D history.    Marriages: once for \"pretty good while.\"  Current Relationships:   Children: one son    Abuse/Trauma: she denies    Education: 11th grade   Occupation: on disability  Living Situation: alone    Firearms Access: none per brother    Social History     Socioeconomic History   • Marital status: Legally      Spouse name: Not on file   • Number of children: Not on file   • Years of education: Not on file   • Highest education level: Not on file   Tobacco Use   • Smoking status: Former Smoker   • Smokeless tobacco: Never Used   • Tobacco comment: \" a little bit a long time ago\"   Substance and Sexual Activity   • Alcohol use: Not Currently         Family History:    Family History   Problem Relation Age of Onset   • Dementia Mother    • Suicide Attempts Neg Hx        Further details: family history of ETOH use.    Past Medical and Surgical History:    History reviewed. No pertinent past medical history.  History reviewed. No pertinent surgical history.  Allergies:  Patient has no known allergies.  Medications Prior to Admission   Medication Sig Dispense Refill Last Dose   • Empagliflozin (Jardiance) 10 MG tablet Take 10 mg by mouth Daily.   Past Week at Unknown time   • escitalopram (LEXAPRO) 20 MG tablet Take 20 mg by mouth Daily.   Past Week at Unknown time   • glipiZIDE-metFORMIN (METAGLIP) 2.5-500 MG per tablet Take 1 tablet by mouth 2 (Two) Times a Day Before Meals.   Past Week at Unknown time   • QUEtiapine (SEROquel) 50 MG tablet Take 50 mg by mouth Daily.   Past Week at Unknown time       Medical Review Of Systems:  Reviewed review of systems from  [place], " "CLAIRE's progress note from today.    Agree with ROS as noted with any relevant updates:    Constitutional: Negative for activity change and fatigue.   HENT: Negative for ear pain and sore throat.    Eyes: Negative for pain and discharge.   Respiratory: Negative for cough and shortness of breath.    Cardiovascular: Negative for chest pain and palpitations.   Gastrointestinal: Positive for diarrhea. Negative for abdominal pain and nausea.   Endocrine: Negative for cold intolerance and heat intolerance.   Genitourinary: Negative for difficulty urinating and dysuria.   Musculoskeletal: Negative for arthralgias and gait problem.   Skin: Negative for color change and rash.   Neurological: Negative for dizziness and weakness.   Psychiatric/Behavioral: Negative for agitation and confusion.     All other systems reviewed and are negative.    Objective     Vital Signs    Temp:  [97.9 °F (36.6 °C)-98.1 °F (36.7 °C)] 97.9 °F (36.6 °C)  Heart Rate:  [79-94] 92  Resp:  [18] 18  BP: (114-145)/(60-81) 114/60    Physical Exam:   General Appearance: alert, appears stated age and cooperative,  Hygiene:   good  Gait & Station: deferred  Musculoskeletal: No tremors or abnormal involuntary movements    Mental Status Exam:   Cooperation:  Cooperative  Eye Contact:  legally blind, looked in my direction when I spoke  Psychomotor Behavior:  Appropriate  Mood: \"Fine\"  Affect:  constricted  Speech:  Normal  Thought Process:  Coherent  Associations: Goal Directed  Thought Content:     Paranoid   Suicidal:  None   Homicidal:  None   Hallucinations:  Auditory   Delusion:  Paranoid  Cognitive Functioning:   Consciousness: awake and alert   Orientation:  Person, Place, Time and Situation   Attention: normal Concentration: Normal   Language:  Intact Vocabulary: Average   Short Term Memory: Intact   Long Term Memory: Intact   Fund of Knowledge: Below Average  Reliability:  adequate  Insight:  limited  Judgement:  Impaired  Impulse Control:  " diminished    Diagnostic Data:    Lab Results (last 72 hours)     Procedure Component Value Units Date/Time    Blood Culture - Blood, Arm, Right [[id number]] Collected:  08/28/20 1535    Specimen:  Blood from Arm, Right Updated:  09/02/20 1515     Blood Culture No growth at 5 days    POC Glucose Once [[id number]]  (Abnormal) Collected:  09/02/20 1222    Specimen:  Blood Updated:  09/02/20 1406     Glucose 174 mg/dL      Comment: : [id number] DAYA CHRYSTALMeter ID: [id number]       POC Glucose Once [[id number]]  (Abnormal) Collected:  09/02/20 0740    Specimen:  Blood Updated:  09/02/20 1405     Glucose 132 mg/dL      Comment: : [id number] DAYA CHRYSTALMeter ID: [id number]       Tissue Pathology Exam [[id number]] Collected:  08/29/20 1351    Specimen:  Tissue from Large Intestine; Tissue from Large Intestine, Right / Ascending Colon Updated:  09/02/20 0935     Case Report --     Surgical Pathology Report                         Case: [id number]                                  Authorizing Provider:  Ge Gorman MD      Collected:           08/29/2020 01:51 PM          Ordering Location:     [facility]             Received:            08/31/2020 07:20 AM                                 [place]                                                              Pathologist:           Miguel Angel Maurice MD                                                           Specimens:   1) - Large Intestine, ileocecal valve bx                                                            2) - Large Intestine, Right / Ascending Colon, polyp; cold snare                            Final Diagnosis --     See Scanned Report        Tissue Pathology Exam [[id number]] Collected:  08/28/20 1729    Specimen:  Tissue from Small Intestine, Duodenum Updated:  09/02/20 0927     Case Report --     Surgical Pathology Report                         Case: [id number]                                  Authorizing Provider:   Ge Gorman MD      Collected:           08/28/2020 05:29 PM          Ordering Location:     [facility]             Received:            08/31/2020 07:17 AM                                 [place]                                                              Pathologist:           Miguel Angel Maurice MD                                                           Specimen:    Small Intestine, Duodenum, bx                                                               Final Diagnosis --     See Scanned Report        Basic Metabolic Panel [[id number]]  (Abnormal) Collected:  09/02/20 0546    Specimen:  Blood Updated:  09/02/20 0636     Glucose 147 mg/dL      BUN 9 mg/dL      Creatinine 0.76 mg/dL      Sodium 140 mmol/L      Potassium 3.7 mmol/L      Chloride 106 mmol/L      CO2 24.0 mmol/L      Calcium 8.3 mg/dL      eGFR  African Amer 92 mL/min/1.73      BUN/Creatinine Ratio 11.8     Anion Gap 10.0 mmol/L     Narrative:       GFR Normal >60  Chronic Kidney Disease <60  Kidney Failure <15      CBC & Differential [[id number]] Collected:  09/02/20 0546    Specimen:  Blood Updated:  09/02/20 0618    Narrative:       The following orders were created for panel order CBC & Differential.  Procedure                               Abnormality         Status                     ---------                               -----------         ------                     CBC Auto Differential[[id number]]        Abnormal            Final result                 Please view results for these tests on the individual orders.    CBC Auto Differential [[id number]]  (Abnormal) Collected:  09/02/20 0546    Specimen:  Blood Updated:  09/02/20 0618     WBC 6.23 10*3/mm3      RBC 2.70 10*6/mm3      Hemoglobin 8.7 g/dL      Hematocrit 26.5 %      MCV 98.1 fL      MCH 32.2 pg      MCHC 32.8 g/dL      RDW 15.6 %      RDW-SD 52.9 fl      MPV 11.2 fL      Platelets 110 10*3/mm3      Neutrophil % 56.5 %      Lymphocyte % 34.7 %      Monocyte % 6.6  %      Eosinophil % 1.6 %      Basophil % 0.3 %      Immature Grans % 0.3 %      Neutrophils, Absolute 3.52 10*3/mm3      Lymphocytes, Absolute 2.16 10*3/mm3      Monocytes, Absolute 0.41 10*3/mm3      Eosinophils, Absolute 0.10 10*3/mm3      Basophils, Absolute 0.02 10*3/mm3      Immature Grans, Absolute 0.02 10*3/mm3      nRBC 0.0 /100 WBC     POC Glucose Once [[id number]]  (Normal) Collected:  09/01/20 1723    Specimen:  Blood Updated:  09/01/20 1751     Glucose 128 mg/dL      Comment: : [id number] DAYA CHRYSTALMeter ID: [id number]       POC Glucose Once [[id number]]  (Abnormal) Collected:  09/01/20 1149    Specimen:  Blood Updated:  09/01/20 1731     Glucose 201 mg/dL      Comment: : [id number] DAYA CHRYSTALMeter ID: [id number]       POC Glucose Once [[id number]]  (Abnormal) Collected:  09/01/20 0804    Specimen:  Blood Updated:  09/01/20 1731     Glucose 143 mg/dL      Comment: : [id number] DAYA CHRYSTALMeter ID: [id number]       Blood Culture - Blood, Arm, Right [[id number]] Collected:  08/28/20 1535    Specimen:  Blood from Arm, Right Updated:  09/01/20 1615     Blood Culture No growth at 4 days    Scan Slide [[id number]] Collected:  09/01/20 0537    Specimen:  Blood Updated:  09/01/20 0656     RBC Morphology Normal     WBC Morphology Normal     Platelet Estimate Decreased    Basic Metabolic Panel [[id number]]  (Abnormal) Collected:  09/01/20 0537    Specimen:  Blood Updated:  09/01/20 0654     Glucose 152 mg/dL      BUN 13 mg/dL      Creatinine 0.86 mg/dL      Sodium 141 mmol/L      Potassium 3.2 mmol/L      Chloride 112 mmol/L      CO2 22.0 mmol/L      Calcium 8.3 mg/dL      eGFR  African Amer 80 mL/min/1.73      BUN/Creatinine Ratio 15.1     Anion Gap 7.0 mmol/L     Narrative:       GFR Normal >60  Chronic Kidney Disease <60  Kidney Failure <15      CBC & Differential [[id number]] Collected:  09/01/20 0537    Specimen:  Blood Updated:  09/01/20 0633    Narrative:       The  following orders were created for panel order CBC & Differential.  Procedure                               Abnormality         Status                     ---------                               -----------         ------                     CBC Auto Differential[[id number]]        Abnormal            Final result                 Please view results for these tests on the individual orders.    CBC Auto Differential [[id number]]  (Abnormal) Collected:  09/01/20 0537    Specimen:  Blood Updated:  09/01/20 0633     WBC 5.19 10*3/mm3      RBC 2.48 10*6/mm3      Hemoglobin 7.8 g/dL      Hematocrit 24.5 %      MCV 98.8 fL      MCH 31.5 pg      MCHC 31.8 g/dL      RDW 15.2 %      RDW-SD 54.0 fl      MPV 11.6 fL      Platelets 79 10*3/mm3      Neutrophil % 60.2 %      Lymphocyte % 30.6 %      Monocyte % 6.7 %      Eosinophil % 1.5 %      Basophil % 0.6 %      Immature Grans % 0.4 %      Neutrophils, Absolute 3.12 10*3/mm3      Lymphocytes, Absolute 1.59 10*3/mm3      Monocytes, Absolute 0.35 10*3/mm3      Eosinophils, Absolute 0.08 10*3/mm3      Basophils, Absolute 0.03 10*3/mm3      Immature Grans, Absolute 0.02 10*3/mm3      nRBC 0.0 /100 WBC     POC Glucose Once [[id number]]  (Abnormal) Collected:  08/31/20 1923    Specimen:  Blood Updated:  08/31/20 1947     Glucose 176 mg/dL      Comment: Result Not ConfirmedOperator: [id number] OUMOU PRAKASH ANNMeter ID: [id number]       POC Glucose Once [[id number]]  (Abnormal) Collected:  08/31/20 1701    Specimen:  Blood Updated:  08/31/20 1831     Glucose 217 mg/dL      Comment: RN NotifiedOperator: [id number] OUMOU PRAKASH ANNMeter ID: [id number]       Potassium [[id number]]  (Normal) Collected:  08/31/20 1608    Specimen:  Blood Updated:  08/31/20 1638     Potassium 3.8 mmol/L     POC Glucose Once [[id number]]  (Normal) Collected:  08/31/20 0531    Specimen:  Blood Updated:  08/31/20 0550     Glucose 102 mg/dL      Comment: : [id number] BENJAMIN RAMIREZFERMeter ID: [id number]        CBC & Differential [[id number]] Collected:  08/31/20 0349    Specimen:  Blood Updated:  08/31/20 0443    Narrative:       The following orders were created for panel order CBC & Differential.  Procedure                               Abnormality         Status                     ---------                               -----------         ------                     CBC Auto Differential[[id number]]        Abnormal            Final result                 Please view results for these tests on the individual orders.    CBC Auto Differential [[id number]]  (Abnormal) Collected:  08/31/20 0349    Specimen:  Blood Updated:  08/31/20 0443     WBC 6.27 10*3/mm3      RBC 2.58 10*6/mm3      Hemoglobin 8.3 g/dL      Hematocrit 24.8 %      MCV 96.1 fL      MCH 32.2 pg      MCHC 33.5 g/dL      RDW 15.2 %      RDW-SD 51.9 fl      MPV 11.5 fL      Platelets 75 10*3/mm3      Neutrophil % 59.7 %      Lymphocyte % 30.8 %      Monocyte % 6.7 %      Eosinophil % 1.6 %      Basophil % 0.6 %      Immature Grans % 0.6 %      Neutrophils, Absolute 3.74 10*3/mm3      Lymphocytes, Absolute 1.93 10*3/mm3      Monocytes, Absolute 0.42 10*3/mm3      Eosinophils, Absolute 0.10 10*3/mm3      Basophils, Absolute 0.04 10*3/mm3      Immature Grans, Absolute 0.04 10*3/mm3      nRBC 0.0 /100 WBC     Basic Metabolic Panel [[id number]]  (Abnormal) Collected:  08/31/20 0349    Specimen:  Blood Updated:  08/31/20 0436     Glucose 108 mg/dL      BUN 9 mg/dL      Creatinine 0.74 mg/dL      Sodium 140 mmol/L      Potassium 3.1 mmol/L      Chloride 111 mmol/L      CO2 22.0 mmol/L      Calcium 8.0 mg/dL      eGFR  African Amer 95 mL/min/1.73      BUN/Creatinine Ratio 12.2     Anion Gap 7.0 mmol/L     Narrative:       GFR Normal >60  Chronic Kidney Disease <60  Kidney Failure <15      POC Glucose Once [[id number]]  (Normal) Collected:  08/30/20 2020    Specimen:  Blood Updated:  08/30/20 2040     Glucose 103 mg/dL      Comment: : [id number] DESTINEE  BRITTANYAMeter ID: [id number]       POC Glucose Once [[id number]]  (Normal) Collected:  08/30/20 1721    Specimen:  Blood Updated:  08/30/20 1733     Glucose 109 mg/dL      Comment: : [id number] STANTON Dinh ID: [id number]       Potassium [[id number]]  (Abnormal) Collected:  08/30/20 1629    Specimen:  Blood Updated:  08/30/20 1643     Potassium 3.3 mmol/L     CBC & Differential [[id number]] Collected:  08/30/20 1629    Specimen:  Blood Updated:  08/30/20 1636    Narrative:       The following orders were created for panel order CBC & Differential.  Procedure                               Abnormality         Status                     ---------                               -----------         ------                     CBC Auto Differential[[id number]]        Abnormal            Final result                 Please view results for these tests on the individual orders.    CBC Auto Differential [[id number]]  (Abnormal) Collected:  08/30/20 1629    Specimen:  Blood Updated:  08/30/20 1636     WBC 7.36 10*3/mm3      RBC 2.86 10*6/mm3      Hemoglobin 9.3 g/dL      Hematocrit 27.6 %      MCV 96.5 fL      MCH 32.5 pg      MCHC 33.7 g/dL      RDW 15.3 %      RDW-SD 53.1 fl      MPV 10.9 fL      Platelets 88 10*3/mm3      Neutrophil % 65.6 %      Lymphocyte % 26.1 %      Monocyte % 6.4 %      Eosinophil % 0.8 %      Basophil % 0.7 %      Immature Grans % 0.4 %      Neutrophils, Absolute 4.83 10*3/mm3      Lymphocytes, Absolute 1.92 10*3/mm3      Monocytes, Absolute 0.47 10*3/mm3      Eosinophils, Absolute 0.06 10*3/mm3      Basophils, Absolute 0.05 10*3/mm3      Immature Grans, Absolute 0.03 10*3/mm3      nRBC 0.0 /100 WBC         Imaging Results (Last 72 Hours)     ** No results found for the last 72 hours. **          Assessment/Plan       Acute GI bleeding    Altered mental status, unspecified    Schizophrenia, paranoid type (CMS/[facility])      Recommendations:    Based on the history of long term treatment at  [facility] with case management and her present psychosis, her likely diagnosis is schizophrenia.    Will start risperdal 1mg qhs and 1mg qhs prn for paranoia and psychosis.    Patient may need admission to the [facility] once medically cleared.  Will assess based on progression on the medical floor.    Thank you for allowing me to participate in the care of this patient.  Please contact me with any further questions or concerns.    Family Consultation Note  --Total Time: 10 minutes  --Participants: Patient's brother, myself   --Participation: Active  --Contributions: Contributed significantly to discussion  --Focus of Encounter: History and treatment planning  --Intervention Type: Supportive and educational  --Notes: I provided reflective listening, supportive therapy, reflection, and allowed them to express emotions (e.g. Frustration, anxiety) in the course of the interaction.  Discussed patient's history, treatment considerations, disposition planning.    --DX: as above  --Plan: as above.   --Reactions: Positive reaction    Ellie Raza MD  09/02/20  15:42

## 2020-09-02 NOTE — THERAPY TREATMENT NOTE
"Acute Care - Physical Therapy Treatment Note  [facility]     Patient Name: Natacha Gandhi  : 1950  MRN: [id number]  Today's Date: 2020  Onset of Illness/Injury or Date of Surgery: 20          Admit Date: 2020    Visit Dx:    ICD-10-CM ICD-9-CM   1. Acute GI bleeding K92.2 578.9   2. GI bleed K92.2 578.9   3. Impaired mobility and activities of daily living Z74.09 V49.89    Z78.9    4. Impaired functional mobility, balance, gait, and endurance Z74.09 V49.89     Patient Active Problem List   Diagnosis   • Acute GI bleeding   • Altered mental status, unspecified       Therapy Treatment    Rehabilitation Treatment Summary     Row Name 20 14220 0900          Treatment Time/Intention    Discipline  physical therapy assistant  -TA  occupational therapy assistant  -BB     Document Type  therapy note (daily note)  -TA  therapy note (daily note)  -BB     Subjective Information  complains of \"voices\"  -TA  no complaints  -BB     Mode of Treatment  co-treatment;occupational therapy;physical therapy  -TA  individual therapy;occupational therapy  -BB     Therapy Frequency (PT Clinical Impression)  other (see comments) 6-11x/wk  -TA  --     Total Minutes, Occupational Therapy Treatment  --  40  -BB     Therapy Frequency (OT Eval)  --  other (see comments) 5-7 days per week  -BB     Patient Effort  --  good  -BB     Existing Precautions/Restrictions  fall  -TA  fall  -BB     Recorded by [TA] Maude Aguilar, KEIRA 20 1533 [BB] Mare Reed, JOSE/L 20 1111     Row Name 20 14220 0900          Vital Signs    Pre Systolic BP Rehab  131  -TA  --     Pre Treatment Diastolic BP  61  -TA  --     Post Systolic BP Rehab  90  -TA  --     Post Treatment Diastolic BP  54  -TA  --     Pretreatment Heart Rate (beats/min)  97  -TA  100  -BB     Posttreatment Heart Rate (beats/min)  106  -TA  102  -BB     Pre SpO2 (%)  94  -TA  98  -BB     O2 Delivery Pre Treatment  room air  " -TA  room air  -BB     Post SpO2 (%)  93  -TA  97  -BB     O2 Delivery Post Treatment  room air  -TA  room air  -BB     Pre Patient Position  Supine  -TA  -- long sitting  -BB     Intra Patient Position  Sitting  -TA  --     Post Patient Position  Supine  -TA  -- long sitting  -BB     Recorded by [TA] Maude Aguilar, PTA 09/02/20 1533 [BB] Mare Reed COTA/L 09/02/20 1111     Row Name 09/02/20 1420 09/02/20 0900          Cognitive Assessment/Intervention- PT/OT    Orientation Status (Cognition)  person;place;situation able to state correct year but went through the months from Jan-Sept  -TA  oriented to;person;place;situation  -BB     Cognitive Function (Cognitive)  --  safety deficit;memory deficit  -BB     Safety Deficit (Cognitive)  --  moderate deficit  -BB     Personal Safety Interventions  fall prevention program maintained;nonskid shoes/slippers when out of bed;supervised activity  -TA  fall prevention program maintained;gait belt;nonskid shoes/slippers when out of bed;supervised activity  -BB     Recorded by [TA] Maude Aguilar, PTA 09/02/20 1533 [BB] Mare Reed COTA/L 09/02/20 1111     Row Name 09/02/20 1420             Safety Issues, Functional Mobility    Impairments Affecting Function (Mobility)  balance;cognition;coordination;motor control;visual/perceptual;strength  -TA      Recorded by [TA] Maude Aguilar, PTA 09/02/20 1533      Row Name 09/02/20 1420 09/02/20 0900          Bed Mobility Assessment/Treatment    Bed Mobility Assessment/Treatment  sit-supine;supine-sit;scooting/bridging  -TA  --     Rolling Left [place] (Bed Mobility)  --  contact guard  -BB     Rolling Right [place] (Bed Mobility)  --  contact guard  -BB     Scooting/Bridging [place] (Bed Mobility)  contact guard  -TA  --     Supine-Sit [place] (Bed Mobility)  contact guard  -TA  --     Sit-Supine [place] (Bed Mobility)  contact guard  -TA  --     Assistive Device (Bed Mobility)  head of  bed elevated  -TA  bed rails  -BB     Comment (Bed Mobility)  pt sat @ EOB  15 minutes with SBA post gait   -TA  -- Pt defers EOB/OOB this am  -BB     Recorded by [TA] Maude Aguilar, PTA 09/02/20 1533 [BB] Mare Reed COTA/L 09/02/20 1111     Row Name 09/02/20 1420             Transfer Assessment/Treatment    Transfer Assessment/Treatment  sit-stand transfer;stand-sit transfer  -TA      Recorded by [TA] Maude Aguilar, PTA 09/02/20 1533      Row Name 09/02/20 1420             Sit-Stand Transfer    Sit-Stand [place] (Transfers)  contact guard  -TA      Assistive Device (Sit-Stand Transfers)  -- HHA  -TA      Recorded by [TA] Maude Aguilar, PTA 09/02/20 1533      Row Name 09/02/20 1420             Stand-Sit Transfer    Stand-Sit [place] (Transfers)  contact guard  -TA      Recorded by [TA] Maude Aguilar, PTA 09/02/20 1533      Row Name 09/02/20 1420             Gait/Stairs Assessment/Training    Gait/Stairs Assessment/Training  gait/ambulation independence;distance ambulated  -TA      [place] Level (Gait)  minimum assist (75% patient effort);1 person assist  -TA      Assistive Device (Gait)  other (see comments) HHA  -TA      Distance in Feet (Gait)  20`  -TA      Deviations/Abnormal Patterns (Gait)  base of support, narrow;stride length decreased  -TA      Recorded by [TA] Maude Aguilar, PTA 09/02/20 1533      Row Name 09/02/20 0900             Bathing Assessment/Intervention    Bathing [place] Level  upper body;lower body;set up;supervision;verbal cues  -BB      Bathing Position  sitting up in bed  -BB      Recorded by [BB] Mare Reed COTA/L 09/02/20 1111      Row Name 09/02/20 0900             Upper Body Dressing Assessment/Training    Upper Body Dressing [place] Level  doff;don;contact guard assist;verbal cues  -BB      Upper Body Dressing Position  sitting up in bed  -BB      Recorded by [BB] Mare Reed COTA/L 09/02/20 1111      Row Name 09/02/20 0900              Lower Body Dressing Assessment/Training    Lower Body Dressing [place] Level  doff;don;socks;supervision;set up  -BB      Lower Body Dressing Position  sitting up in bed  -BB      Recorded by [BB] Mare Reed COTA/L 09/02/20 1111      Row Name 09/02/20 0900             Grooming Assessment/Training    [place] Level (Grooming)  wash face, hands;set up;supervision  -BB      Grooming Position  sitting up in bed  -BB      Recorded by [BB] Mare Reed COTA/L 09/02/20 1111      Row Name 09/02/20 1420             Therapeutic Exercise    Lower Extremity (Therapeutic Exercise)  LAQ (long arc quad), bilateral  -TA      Lower Extremity Range of Motion (Therapeutic Exercise)  hip abduction/adduction, bilateral;ankle dorsiflexion/plantar flexion, bilateral  -TA      Exercise Type (Therapeutic Exercise)  AROM (active range of motion)  -TA      Position (Therapeutic Exercise)  seated  -TA      Sets/Reps (Therapeutic Exercise)  15  -TA      Expected Outcome (Therapeutic Exercise)  facilitate normal movement patterns;improve functional stability;improve motor control;increase active range of motion  -TA      Recorded by [TA] Maude Aguilar, PTA 09/02/20 1533      Row Name 09/02/20 1420 09/02/20 0900          Positioning and Restraints    Pre-Treatment Position  in bed  -TA  in bed  -BB     Post Treatment Position  bed  -TA  bed  -BB     In Bed  supine;call light within reach;exit alarm on  -TA  fowlers;encouraged to call for assist;call light within reach;exit alarm on  -BB     Recorded by [TA] Maude Aguilar, PTA 09/02/20 1533 [BB] Mare Reed COTA/L 09/02/20 1111     Row Name 09/02/20 1420 09/02/20 0900          Pain Scale: Numbers Pre/Post-Treatment    Pain Scale: Numbers, Pretreatment  0/10 - no pain  -TA  0/10 - no pain  -BB     Pain Scale: Numbers, Post-Treatment  0/10 - no pain  -TA  0/10 - no pain  -BB     Recorded by [TA] Maude Aguilar, PTA 09/02/20 1533 [BB] Brianna  JOSE Gillespie/L 09/02/20 1111     Row Name 09/02/20 1420             Sensory Assessment/Intervention    Sensory General Assessment  no sensation deficits identified  -TA      Recorded by [TA] Maude Aguilar, PTA 09/02/20 1533      Row Name 09/02/20 0900             Plan of Care Review    Plan of Care Reviewed With  patient  -BB      Progress  no change  -BB      Outcome Summary  Pt defers EOB/OOB this am. Pt completed ADL while sitting up in bed. Pt supervision for UB/LB ADL  Pt with  all needs within reach. Pt may beneift from continued OT services.  -BB      Recorded by [BB] Mare Reed COTA/L 09/02/20 1111      Row Name 09/02/20 0900             Outcome Summary/Treatment Plan (OT)    Daily Summary of Progress (OT)  progress toward functional goals is good  -BB      Plan for Continued Treatment (OT)  continue OT POC  -BB      Anticipated Discharge Disposition (OT)  anticipate therapy at next level of care  -BB      Recorded by [BB] Mare Reed COTA/L 09/02/20 1111      Row Name 09/02/20 1420             Outcome Summary/Treatment Plan (PT)    Daily Summary of Progress (PT)  progress towards functional goals is fair  -TA      Plan for Continued Treatment (PT)  continue  -TA      Anticipated Discharge Disposition (PT)  home with 24/7 care;home with home health  -TA      Recorded by [TA] Maude Aguilar, PTA 09/02/20 1533        User Key  (r) = Recorded By, (t) = Taken By, (c) = Cosigned By    Initials Name Effective Dates Discipline    TA Maude Aguilar, PTA 03/07/18 -  PT    BB Mare Reed COTA/L 03/07/18 -  OT               Rehab Goal Summary     Row Name 09/02/20 1420 09/02/20 0900          Transfer Goal 1 (PT)    Activity/Assistive Device (Transfer Goal 1, PT)  sit-to-stand/stand-to-sit  -TA  --     [place] Level/Cues Needed (Transfer Goal 1, PT)  independent  -TA  --     Time Frame (Transfer Goal 1, PT)  long term goal (LTG);by discharge  -TA  --     Progress/Outcome (Transfer  Goal 1, PT)  goal not met  -TA  --        Gait Training Goal 1 (PT)    Activity/Assistive Device (Gait Training Goal 1, PT)  gait (walking locomotion);decrease fall risk;improve balance and speed  -TA  --     [place] Level (Gait Training Goal 1, PT)  independent  -TA  --     Distance (Gait Goal 1, PT)  150'  -TA  --     Time Frame (Gait Training Goal 1, PT)  long term goal (LTG);by discharge  -TA  --     Barriers (Gait Training Goal 1, PT)  significant visual deficit; poor safety awareness in unfamiliar environments  -TA  --     Progress/Outcome (Gait Training Goal 1, PT)  goal not met  -TA  --        Stairs Goal 1 (PT)    Activity/Assistive Device (Stairs Goal 1, PT)  ascending stairs;descending stairs  -TA  --     [place] Level/Cues Needed (Stairs Goal 1, PT)  supervision required  -TA  --     Number of Stairs (Stairs Goal 1, PT)  1  -TA  --     Time Frame (Stairs Goal 1, PT)  long term goal (LTG);by discharge  -TA  --     Barriers (Stairs Goal 1, PT)  significant visual deficit; poor safety awareness in unfamiliar environments  -TA  --     Progress/Outcome (Stairs Goal 1, PT)  goal not met  -TA  --        Occupational Therapy Goals    Transfer Goal Selection (OT)  --  transfer, OT goal 1  -BB     Bathing Goal Selection (OT)  --  bathing, OT goal 1  -BB     Dressing Goal Selection (OT)  --  dressing, OT goal 1  -BB     Toileting Goal Selection (OT)  --  toileting, OT goal 1  -BB     Strength Goal Selection (OT)  --  strength, OT goal 1  -BB     Activity Tolerance Goal Selection (OT)  --  activity tolerance, OT goal 1  -BB        Transfer Goal 1 (OT)    Activity/Assistive Device (Transfer Goal 1, OT)  --  transfers, all  -BB     [place] Level/Cues Needed (Transfer Goal 1, OT)  --  standby assist;verbal cues required;tactile cues required;set-up required  -BB     Time Frame (Transfer Goal 1, OT)  --  long term goal (LTG);by discharge  -BB     Progress/Outcome (Transfer Goal 1, OT)  --  goal not met   -BB        Bathing Goal 1 (OT)    Activity/Assistive Device (Bathing Goal 1, OT)  --  lower body bathing  -BB     [place] Level/Cues Needed (Bathing Goal 1, OT)  --  contact guard assist;verbal cues required;tactile cues required;set-up required  -BB     Time Frame (Bathing Goal 1, OT)  --  long term goal (LTG);by discharge  -BB     Progress/Outcomes (Bathing Goal 1, OT)  --  goal met  -BB        Dressing Goal 1 (OT)    Activity/Assistive Device (Dressing Goal 1, OT)  --  lower body dressing  -BB     [place]/Cues Needed (Dressing Goal 1, OT)  --  contact guard assist;verbal cues required;tactile cues required;set-up required  -BB     Time Frame (Dressing Goal 1, OT)  --  long term goal (LTG);by discharge  -BB     Progress/Outcome (Dressing Goal 1, OT)  --  goal not met  -BB        Toileting Goal 1 (OT)    Activity/Device (Toileting Goal 1, OT)  --  toileting skills, all  -BB     [place] Level/Cues Needed (Toileting Goal 1, OT)  --  contact guard assist;verbal cues required;tactile cues required;set-up required  -BB     Time Frame (Toileting Goal 1, OT)  --  long term goal (LTG);by discharge  -BB     Progress/Outcome (Toileting Goal 1, OT)  --  goal not met  -BB        Strength Goal 1 (OT)    Strength Goal 1 (OT)  --  Pt to demo compliance/understanding of HEP to build strength  -BB     Time Frame (Strength Goal 1, OT)  --  long term goal (LTG);by discharge  -BB     Progress/Outcome (Strength Goal 1, OT)  --  goal not met  -BB         Activity Tolerance Goal 1 (OT)    Activity Level (Endurance Goal 1, OT)  --  -- 15 min functional activity with proper EC  -BB     Time Frame (Activity Tolerance Goal 1, OT)  --  long term goal (LTG);by discharge  -BB     Progress/Outcome (Activity Tolerance Goal 1, OT)  --  goal not met  -BB       User Key  (r) = Recorded By, (t) = Taken By, (c) = Cosigned By    Initials Name Provider Type Discipline    Maude Marsh, PTA Physical Therapy Assistant PT    BB  Mare Reed COTA/L Occupational Therapy Assistant OT          Physical Therapy Education                 Title: PT OT SLP Therapies (In Progress)     Topic: Physical Therapy (In Progress)     Point: Mobility training (Done)     Description:   Instruct learner(s) on safety and technique for assisting patient out of bed, chair or wheelchair.  Instruct in the proper use of assistive devices, such as walker, crutches, cane or brace.              Patient Friendly Description:   It's important to get you on your feet again, but we need to do so in a way that is safe for you. Falling has serious consequences, and your personal safety is the most important thing of all.        When it's time to get out of bed, one of us or a family member will sit next to you on the bed to give you support.     If your doctor or nurse tells you to use a walker, crutches, a cane, or a brace, be sure you use it every time you get out of bed, even if you think you don't need it.    Learning Progress Summary           Patient Acceptance, E, VU by KERRY at 8/31/2020 1543    Comment:  PT POC and prognosis; safety prec/awareness                   Point: Home exercise program (Not Started)     Description:   Instruct learner(s) on appropriate technique for monitoring, assisting and/or progressing patient with therapeutic exercises and activities.              Learner Progress:   Not documented in this visit.          Point: Body mechanics (Not Started)     Description:   Instruct learner(s) on proper positioning and spine alignment for patient and/or caregiver during mobility tasks and/or exercises.              Learner Progress:   Not documented in this visit.          Point: Precautions (Done)     Description:   Instruct learner(s) on prescribed precautions during mobility and gait tasks              Learning Progress Summary           Patient Acceptance, E, VU by KW at 8/31/2020 1543    Comment:  PT POC and prognosis; safety prec/awareness                                User Key     Initials Effective Dates Name Provider Type Discipline    KW 08/09/20 -  Jeet Mortensen, PT Physical Therapist PT                PT Recommendation and Plan  Anticipated Discharge Disposition (PT): home with 24/7 care, home with home health  Therapy Frequency (PT Clinical Impression): other (see comments)(6-11x/wk)  Outcome Summary/Treatment Plan (PT)  Daily Summary of Progress (PT): progress towards functional goals is fair  Plan for Continued Treatment (PT): continue  Anticipated Discharge Disposition (PT): home with 24/7 care, home with home health  Outcome Summary: pt sup<>sit with SBA, sit<>stand with CGA, pt ambulated 20` with HHA (min assist) of 1. pt would benefit from 24/7 care & continued PT services @ d/C  Outcome Measures     Row Name 09/02/20 1500 09/02/20 0900 08/31/20 1430       How much help from another person do you currently need...    Turning from your back to your side while in flat bed without using bedrails?  3  -TA  --  --    Moving from lying on back to sitting on the side of a flat bed without bedrails?  3  -TA  --  --    Moving to and from a bed to a chair (including a wheelchair)?  3  -TA  --  --    Standing up from a chair using your arms (e.g., wheelchair, bedside chair)?  3  -TA  --  --    Climbing 3-5 steps with a railing?  3  -TA  --  --    To walk in hospital room?  3  -TA  --  --    AM-PAC 6 Clicks Score (PT)  18  -TA  --  --       How much help from another is currently needed...    Putting on and taking off regular lower body clothing?  --  2  -BB  2  -JH    Bathing (including washing, rinsing, and drying)  --  3  -BB  2  -JH    Toileting (which includes using toilet bed pan or urinal)  --  2  -BB  2  -JH    Putting on and taking off regular upper body clothing  --  3  -BB  3  -JH    Taking care of personal grooming (such as brushing teeth)  --  3  -BB  3  -JH    Eating meals  --  4  -BB  4  -JH    AM-PAC 6 Clicks Score (OT)  --  17  -BB   16  -       Functional Assessment    Outcome Measure Options  AM-PAC 6 Clicks Basic Mobility (PT)  -TA  --  AM-PAC 6 Clicks Daily Activity (OT)  -      User Key  (r) = Recorded By, (t) = Taken By, (c) = Cosigned By    Initials Name Provider Type    Maude Marsh PTA Physical Therapy Assistant     Mare Reed, GARCIA/L Occupational Therapy Assistant     Diego Pelaez, OT Occupational Therapist         Time Calculation:   PT Charges     Row Name 09/02/20 1536             Time Calculation    Start Time  1420  -TA      Stop Time  1458  -TA      Time Calculation (min)  38 min  -TA      PT Received On  09/02/20  -TA         Time Calculation- PT    Total Timed Code Minutes- PT  38 minute(s)  -TA        User Key  (r) = Recorded By, (t) = Taken By, (c) = Cosigned By    Initials Name Provider Type    Maude Marsh PTA Physical Therapy Assistant        Therapy Charges for Today     Code Description Service Date Service Provider Modifiers Qty    45824673234 HC GAIT TRAINING EA 15 MIN 9/2/2020 Maude Aguilar, PTA GP 1    99591138560 HC PT THERAPEUTIC ACT EA 15 MIN 9/2/2020 Maude Aguilar, PTA GP 1    43800528008 HC PT THER PROC EA 15 MIN 9/2/2020 Maude Aguilar, PTA GP 1          PT G-Codes  Outcome Measure Options: AM-PAC 6 Clicks Basic Mobility (PT)  AM-PAC 6 Clicks Score (PT): 18  AM-PAC 6 Clicks Score (OT): 17    Maude Aguilar PTA  9/2/2020

## 2020-09-02 NOTE — PLAN OF CARE
Problem: Patient Care Overview  Goal: Plan of Care Review  Outcome: Ongoing (interventions implemented as appropriate)  Flowsheets  Taken 9/2/2020 1112  Progress: no change  Plan of Care Reviewed With: patient  Taken 9/2/2020 0900  Outcome Summary: Pt defers EOB/OOB this am. Pt completed ADL while sitting up in bed. Pt supervision for UB/LB ADL  Pt with  all needs within reach. Pt may beneift from continued OT services.

## 2020-09-02 NOTE — PLAN OF CARE
"  Problem: Patient Care Overview  Goal: Plan of Care Review  Outcome: Ongoing (interventions implemented as appropriate)  Flowsheets (Taken 9/2/2020 1112 by Mare Reed, JOSE/L)  Plan of Care Reviewed With: patient  Note:   Pt vss. Voiding, stool more formed today-incontinent of both. Alert and oriented but continuously complains of \"hearing voices and animals in the room.\" in patient psych eval completed this day-brother at bedside for eval. Will continue to monitor.     "

## 2020-09-02 NOTE — PROGRESS NOTES
SUBJECTIVE:   9/1/2020  Chief Complaint:     Subjective      Patient is awake and alert today.  No further rectal bleeding or melena.  No GI complaints.  Tolerating diet.  Awaiting placement.  WBC 5.19.  Hemoglobin 7.8    History:  History reviewed. No pertinent past medical history.  History reviewed. No pertinent surgical history.  Family History   Family history unknown: Yes     Social History     Tobacco Use   • Smoking status: Unknown If Ever Smoked   • Smokeless tobacco: Never Used   Substance Use Topics   • Alcohol use: Not Currently   • Drug use: Not on file     Medications Prior to Admission   Medication Sig Dispense Refill Last Dose   • Empagliflozin (Jardiance) 10 MG tablet Take 10 mg by mouth Daily.   Past Week at Unknown time   • escitalopram (LEXAPRO) 20 MG tablet Take 20 mg by mouth Daily.   Past Week at Unknown time   • glipiZIDE-metFORMIN (METAGLIP) 2.5-500 MG per tablet Take 1 tablet by mouth 2 (Two) Times a Day Before Meals.   Past Week at Unknown time   • QUEtiapine (SEROquel) 50 MG tablet Take 50 mg by mouth Daily.   Past Week at Unknown time     Allergies:  Patient has no known allergies.     CURRENT MEDICATIONS/OBJECTIVE/VS/PE:     Current Medications:     Current Facility-Administered Medications   Medication Dose Route Frequency Provider Last Rate Last Dose   • acetaminophen (TYLENOL) tablet 650 mg  650 mg Oral Q4H PRN Harish Chawla MD       • dextrose (D50W) 25 g/ 50mL Intravenous Solution 25 g  25 g Intravenous Q15 Min PRN Harish Chawla MD       • dextrose (GLUTOSE) oral gel 15 g  15 g Oral Q15 Min PRN Harish Chawla MD       • dextrose 5 % and sodium chloride 0.45 % infusion  30 mL/hr Intravenous Continuous PRN Harish Chawla MD       • escitalopram (LEXAPRO) tablet 20 mg  20 mg Oral Daily Harish Chawla MD   20 mg at 09/01/20 0807   • glucagon (human recombinant) (GLUCAGEN DIAGNOSTIC) injection 1 mg  1 mg Subcutaneous Q15 Min PRN Harish Chawla MD        • morphine injection 1 mg  1 mg Intravenous Q4H PRN Harish Chawla MD        And   • naloxone (NARCAN) injection 0.4 mg  0.4 mg Intravenous Q5 Min PRN Harish Chawla MD       • ondansetron (ZOFRAN) injection 4 mg  4 mg Intravenous Q6H PRN Harish Chawla MD       • pantoprazole (PROTONIX) EC tablet 40 mg  40 mg Oral Q AM Harish Chawla MD   40 mg at 09/01/20 0646   • potassium chloride (MICRO-K) CR capsule 40 mEq  40 mEq Oral PRN Harish Chawla MD        Or   • potassium chloride (KLOR-CON) packet 40 mEq  40 mEq Oral PRN Harish Chawla MD   40 mEq at 09/01/20 1322    Or   • potassium chloride 10 mEq in 100 mL IVPB  10 mEq Intravenous Q1H PRN Harish Chawla MD       • QUEtiapine (SEROquel) tablet 50 mg  50 mg Oral Daily Harish Chawla MD   50 mg at 09/01/20 0807   • sodium chloride 0.9 % flush 10 mL  10 mL Intravenous PRN Harish Chawla MD       • sodium chloride 0.9 % flush 10 mL  10 mL Intravenous Q12H Harish Chawla MD   10 mL at 09/01/20 0808   • sodium chloride 0.9 % flush 10 mL  10 mL Intravenous PRN Harish Chawla MD           Objective     Review of Systems:   Review of Systems   Constitutional: Negative for chills, fatigue, fever and unexpected weight change.   HENT: Negative for congestion, ear discharge, hearing loss, nosebleeds and sore throat.    Eyes: Negative for pain, discharge and redness.   Respiratory: Negative for cough, chest tightness, shortness of breath and wheezing.    Cardiovascular: Negative for chest pain and palpitations.   Gastrointestinal: Negative for abdominal distention, abdominal pain, blood in stool, constipation, diarrhea, nausea and vomiting.   Endocrine: Negative for cold intolerance, polydipsia, polyphagia and polyuria.   Genitourinary: Negative for dysuria, flank pain, frequency, hematuria and urgency.   Musculoskeletal: Negative for arthralgias, back pain, joint swelling and myalgias.   Skin: Negative for color  change, pallor and rash.   Neurological: Negative for tremors, seizures, syncope, weakness and headaches.   Hematological: Negative for adenopathy. Does not bruise/bleed easily.   Psychiatric/Behavioral: Negative for behavioral problems, confusion, dysphoric mood, hallucinations and suicidal ideas. The patient is not nervous/anxious.        Physical Exam:   Temp:  [97.1 °F (36.2 °C)-98.1 °F (36.7 °C)] 98.1 °F (36.7 °C)  Heart Rate:  [79-98] 83  Resp:  [18-20] 18  BP: (114-145)/(65-81) 137/81     Physical Exam:  General Appearance:    Alert, cooperative, in no acute distress   Head:    Normocephalic, without obvious abnormality, atraumatic   Eyes:            Lids and lashes normal, conjunctivae and sclerae normal, no   icterus, no pallor, corneas clear, PERRLA   Ears:    Ears appear intact with no abnormalities noted   Throat:   No oral lesions, no thrush, oral mucosa moist   Neck:   No adenopathy, supple, trachea midline, no thyromegaly, no     carotid bruit, no JVD   Back:     No kyphosis present, no scoliosis present, no skin lesions,       erythema or scars, no tenderness to percussion or                   palpation,   range of motion normal   Lungs:     Clear to auscultation,respirations regular, even and                   unlabored    Heart:    Regular rhythm and normal rate, normal S1 and S2, no            murmur, no gallop, no rub, no click   Breast Exam:    Deferred   Abdomen:     Normal bowel sounds, no masses, no organomegaly, soft        nontender, nondistended, no guarding, no rebound                 tenderness   Genitalia:    Deferred   Extremities:   Moves all extremities well, no edema, no cyanosis, no              redness   Pulses:   Pulses palpable and equal bilaterally   Skin:   No bleeding, bruising or rash   Lymph nodes:   No palpable adenopathy   Neurologic:   Cranial nerves 2 - 12 grossly intact, sensation intact, DTR        present and equal bilaterally      Results Review:     Lab Results  (last 24 hours)     Procedure Component Value Units Date/Time    POC Glucose Once [[id number]]  (Normal) Collected:  09/01/20 1723    Specimen:  Blood Updated:  09/01/20 1751     Glucose 128 mg/dL      Comment: : [id number] DAYA CHRYSTALMeter ID: [id number]       POC Glucose Once [[id number]]  (Abnormal) Collected:  09/01/20 1149    Specimen:  Blood Updated:  09/01/20 1731     Glucose 201 mg/dL      Comment: : [id number] DAYA CHRYSTALMeter ID: [id number]       POC Glucose Once [[id number]]  (Abnormal) Collected:  09/01/20 0804    Specimen:  Blood Updated:  09/01/20 1731     Glucose 143 mg/dL      Comment: : [id number] DAYA CHRYSTALMeter ID: [id number]       Blood Culture - Blood, Arm, Right [[id number]] Collected:  08/28/20 1535    Specimen:  Blood from Arm, Right Updated:  09/01/20 1615     Blood Culture No growth at 4 days    Blood Culture - Blood, Arm, Right [[id number]] Collected:  08/28/20 1535    Specimen:  Blood from Arm, Right Updated:  09/01/20 1515     Blood Culture No growth at 4 days    Scan Slide [[id number]] Collected:  09/01/20 0537    Specimen:  Blood Updated:  09/01/20 0656     RBC Morphology Normal     WBC Morphology Normal     Platelet Estimate Decreased    Basic Metabolic Panel [[id number]]  (Abnormal) Collected:  09/01/20 0537    Specimen:  Blood Updated:  09/01/20 0654     Glucose 152 mg/dL      BUN 13 mg/dL      Creatinine 0.86 mg/dL      Sodium 141 mmol/L      Potassium 3.2 mmol/L      Chloride 112 mmol/L      CO2 22.0 mmol/L      Calcium 8.3 mg/dL      eGFR  African Amer 80 mL/min/1.73      BUN/Creatinine Ratio 15.1     Anion Gap 7.0 mmol/L     Narrative:       GFR Normal >60  Chronic Kidney Disease <60  Kidney Failure <15      CBC & Differential [[id number]] Collected:  09/01/20 0537    Specimen:  Blood Updated:  09/01/20 0633    Narrative:       The following orders were created for panel order CBC & Differential.  Procedure                                Abnormality         Status                     ---------                               -----------         ------                     CBC Auto Differential[[id number]]        Abnormal            Final result                 Please view results for these tests on the individual orders.    CBC Auto Differential [[id number]]  (Abnormal) Collected:  09/01/20 0537    Specimen:  Blood Updated:  09/01/20 0633     WBC 5.19 10*3/mm3      RBC 2.48 10*6/mm3      Hemoglobin 7.8 g/dL      Hematocrit 24.5 %      MCV 98.8 fL      MCH 31.5 pg      MCHC 31.8 g/dL      RDW 15.2 %      RDW-SD 54.0 fl      MPV 11.6 fL      Platelets 79 10*3/mm3      Neutrophil % 60.2 %      Lymphocyte % 30.6 %      Monocyte % 6.7 %      Eosinophil % 1.5 %      Basophil % 0.6 %      Immature Grans % 0.4 %      Neutrophils, Absolute 3.12 10*3/mm3      Lymphocytes, Absolute 1.59 10*3/mm3      Monocytes, Absolute 0.35 10*3/mm3      Eosinophils, Absolute 0.08 10*3/mm3      Basophils, Absolute 0.03 10*3/mm3      Immature Grans, Absolute 0.02 10*3/mm3      nRBC 0.0 /100 WBC            I reviewed the patient's new clinical results.  I reviewed the patient's new imaging results and agree with the interpretation.     ASSESSMENT/PLAN:   ASSESSMENT: 1.  GI bleeding, resolved. likely due to diverticular or hemorrhoidal hemorrhage.  Could also be due to AVMs.  2.  Acute posthemorrhagic anemia, likely due to redistribution.  No evidence of continued overt GI hemorrhage.  3.  Change in mental status, improving.  PLAN: 1.  Continue PPI  2. Continue p.o. diet  3.  Follow H&H closely and transfuse as needed  4.  Hemorrhoidal banding if rectal bleeding recurs  5.  High-fiber diet upon discharge  6.  Iron supplements upon discharge  7.  Okay to DC in a.m. from GI standpoint if she continues to improve  The risks, benefits, and alternatives of this procedure have been discussed with the patient or the responsible party- the patient understands and agrees to proceed.              Ge Gorman MD  09/01/20  21:54

## 2020-09-02 NOTE — PROGRESS NOTES
"        SUBJECTIVE:   9/2/2020  Chief Complaint:     Subjective      Patient is awake and alert today.  No further rectal bleeding or melena.  No GI complaints.  Tolerating diet.  Awaiting placement.  WBC 6.23.  Hemoglobin 8.7    History:  History reviewed. No pertinent past medical history.  History reviewed. No pertinent surgical history.  Family History   Problem Relation Age of Onset   • Dementia Mother    • Suicide Attempts Neg Hx      Social History     Tobacco Use   • Smoking status: Former Smoker   • Smokeless tobacco: Never Used   • Tobacco comment: \" a little bit a long time ago\"   Substance Use Topics   • Alcohol use: Not Currently   • Drug use: Not on file     Medications Prior to Admission   Medication Sig Dispense Refill Last Dose   • Empagliflozin (Jardiance) 10 MG tablet Take 10 mg by mouth Daily.   Past Week at Unknown time   • escitalopram (LEXAPRO) 20 MG tablet Take 20 mg by mouth Daily.   Past Week at Unknown time   • glipiZIDE-metFORMIN (METAGLIP) 2.5-500 MG per tablet Take 1 tablet by mouth 2 (Two) Times a Day Before Meals.   Past Week at Unknown time   • QUEtiapine (SEROquel) 50 MG tablet Take 50 mg by mouth Daily.   Past Week at Unknown time     Allergies:  Patient has no known allergies.     CURRENT MEDICATIONS/OBJECTIVE/VS/PE:     Current Medications:     Current Facility-Administered Medications   Medication Dose Route Frequency Provider Last Rate Last Dose   • acetaminophen (TYLENOL) tablet 650 mg  650 mg Oral Q4H PRN Harish Chawla MD       • dextrose (D50W) 25 g/ 50mL Intravenous Solution 25 g  25 g Intravenous Q15 Min PRN Harish Chawla MD       • dextrose (GLUTOSE) oral gel 15 g  15 g Oral Q15 Min PRN Harish Chawla MD       • dextrose 5 % and sodium chloride 0.45 % infusion  30 mL/hr Intravenous Continuous PRN Harish Chawla MD       • escitalopram (LEXAPRO) tablet 20 mg  20 mg Oral Daily Harish Chawla MD   20 mg at 09/02/20 0838   • glucagon (human " recombinant) (GLUCAGEN DIAGNOSTIC) injection 1 mg  1 mg Subcutaneous Q15 Min PRN Harish Chawla MD       • morphine injection 1 mg  1 mg Intravenous Q4H PRN Harish Chawla MD        And   • naloxone (NARCAN) injection 0.4 mg  0.4 mg Intravenous Q5 Min PRN Harish Chawla MD       • ondansetron (ZOFRAN) injection 4 mg  4 mg Intravenous Q6H PRN Harish Chawla MD       • pantoprazole (PROTONIX) EC tablet 40 mg  40 mg Oral Q AM Harish Chawla MD   40 mg at 09/02/20 0550   • potassium chloride (MICRO-K) CR capsule 40 mEq  40 mEq Oral PRN Harish Chawla MD        Or   • potassium chloride (KLOR-CON) packet 40 mEq  40 mEq Oral PRN Harish Chawla MD   40 mEq at 09/01/20 1322    Or   • potassium chloride 10 mEq in 100 mL IVPB  10 mEq Intravenous Q1H PRN Harish Chawla MD       • QUEtiapine (SEROquel) tablet 50 mg  50 mg Oral Daily Harish Chawla MD   50 mg at 09/02/20 0839   • sodium chloride 0.9 % flush 10 mL  10 mL Intravenous PRN Harish Chawla MD       • sodium chloride 0.9 % flush 10 mL  10 mL Intravenous Q12H Harish Chawla MD   10 mL at 09/02/20 0841   • sodium chloride 0.9 % flush 10 mL  10 mL Intravenous PRN Harish Chawla MD           Objective     Review of Systems:   Review of Systems   Constitutional: Negative for chills, fatigue, fever and unexpected weight change.   HENT: Negative for congestion, ear discharge, hearing loss, nosebleeds and sore throat.    Eyes: Negative for pain, discharge and redness.   Respiratory: Negative for cough, chest tightness, shortness of breath and wheezing.    Cardiovascular: Negative for chest pain and palpitations.   Gastrointestinal: Negative for abdominal distention, abdominal pain, blood in stool, constipation, diarrhea, nausea and vomiting.   Endocrine: Negative for cold intolerance, polydipsia, polyphagia and polyuria.   Genitourinary: Negative for dysuria, flank pain, frequency, hematuria and urgency.      Musculoskeletal: Negative for arthralgias, back pain, joint swelling and myalgias.   Skin: Negative for color change, pallor and rash.   Neurological: Negative for tremors, seizures, syncope, weakness and headaches.   Hematological: Negative for adenopathy. Does not bruise/bleed easily.   Psychiatric/Behavioral: Negative for behavioral problems, confusion, dysphoric mood, hallucinations and suicidal ideas. The patient is not nervous/anxious.        Physical Exam:   Temp:  [97.9 °F (36.6 °C)] 97.9 °F (36.6 °C)  Heart Rate:  [81-94] 84  Resp:  [18] 18  BP: (114-145)/(60-81) 114/60     Physical Exam:  General Appearance:    Alert, cooperative, in no acute distress   Head:    Normocephalic, without obvious abnormality, atraumatic   Eyes:            Lids and lashes normal, conjunctivae and sclerae normal, no   icterus, no pallor, corneas clear, PERRLA   Ears:    Ears appear intact with no abnormalities noted   Throat:   No oral lesions, no thrush, oral mucosa moist   Neck:   No adenopathy, supple, trachea midline, no thyromegaly, no     carotid bruit, no JVD   Back:     No kyphosis present, no scoliosis present, no skin lesions,       erythema or scars, no tenderness to percussion or                   palpation,   range of motion normal   Lungs:     Clear to auscultation,respirations regular, even and                   unlabored    Heart:    Regular rhythm and normal rate, normal S1 and S2, no            murmur, no gallop, no rub, no click   Breast Exam:    Deferred   Abdomen:     Normal bowel sounds, no masses, no organomegaly, soft        nontender, nondistended, no guarding, no rebound                 tenderness   Genitalia:    Deferred   Extremities:   Moves all extremities well, no edema, no cyanosis, no              redness   Pulses:   Pulses palpable and equal bilaterally   Skin:   No bleeding, bruising or rash   Lymph nodes:   No palpable adenopathy   Neurologic:   Cranial nerves 2 - 12 grossly intact,  sensation intact, DTR        present and equal bilaterally      Results Review:     Lab Results (last 24 hours)     Procedure Component Value Units Date/Time    Blood Culture - Blood, Arm, Right [[id number]] Collected:  08/28/20 1535    Specimen:  Blood from Arm, Right Updated:  09/02/20 1615     Blood Culture No growth at 5 days    Blood Culture - Blood, Arm, Right [[id number]] Collected:  08/28/20 1535    Specimen:  Blood from Arm, Right Updated:  09/02/20 1515     Blood Culture No growth at 5 days    POC Glucose Once [[id number]]  (Abnormal) Collected:  09/02/20 1222    Specimen:  Blood Updated:  09/02/20 1406     Glucose 174 mg/dL      Comment: : [id number] DAYA CHRYSTALMeter ID: [id number]       POC Glucose Once [[id number]]  (Abnormal) Collected:  09/02/20 0740    Specimen:  Blood Updated:  09/02/20 1405     Glucose 132 mg/dL      Comment: : [id number] DAYA CHRYSTALMeter ID: [id number]       Tissue Pathology Exam [[id number]] Collected:  08/29/20 1351    Specimen:  Tissue from Large Intestine; Tissue from Large Intestine, Right / Ascending Colon Updated:  09/02/20 0935     Case Report --     Surgical Pathology Report                         Case: [id number]                                  Authorizing Provider:  Ge Gorman MD      Collected:           08/29/2020 01:51 PM          Ordering Location:     [facility]             Received:            08/31/2020 07:20 AM                                 [place]                                                              Pathologist:           Miguel Angel Maurice MD                                                           Specimens:   1) - Large Intestine, ileocecal valve bx                                                            2) - Large Intestine, Right / Ascending Colon, polyp; cold snare                            Final Diagnosis --     See Scanned Report        Tissue Pathology Exam [[id number]] Collected:  08/28/20 2804     Specimen:  Tissue from Small Intestine, Duodenum Updated:  09/02/20 0927     Case Report --     Surgical Pathology Report                         Case: [id number]                                  Authorizing Provider:  Ge Gorman MD      Collected:           08/28/2020 05:29 PM          Ordering Location:     [facility]             Received:            08/31/2020 07:17 AM                                 [place]                                                              Pathologist:           Miguel Angel Maurice MD                                                           Specimen:    Small Intestine, Duodenum, bx                                                               Final Diagnosis --     See Scanned Report        Basic Metabolic Panel [[id number]]  (Abnormal) Collected:  09/02/20 0546    Specimen:  Blood Updated:  09/02/20 0636     Glucose 147 mg/dL      BUN 9 mg/dL      Creatinine 0.76 mg/dL      Sodium 140 mmol/L      Potassium 3.7 mmol/L      Chloride 106 mmol/L      CO2 24.0 mmol/L      Calcium 8.3 mg/dL      eGFR  African Amer 92 mL/min/1.73      BUN/Creatinine Ratio 11.8     Anion Gap 10.0 mmol/L     Narrative:       GFR Normal >60  Chronic Kidney Disease <60  Kidney Failure <15      CBC & Differential [[id number]] Collected:  09/02/20 0546    Specimen:  Blood Updated:  09/02/20 0618    Narrative:       The following orders were created for panel order CBC & Differential.  Procedure                               Abnormality         Status                     ---------                               -----------         ------                     CBC Auto Differential[[id number]]        Abnormal            Final result                 Please view results for these tests on the individual orders.    CBC Auto Differential [[id number]]  (Abnormal) Collected:  09/02/20 0546    Specimen:  Blood Updated:  09/02/20 0618     WBC 6.23 10*3/mm3      RBC 2.70 10*6/mm3      Hemoglobin 8.7 g/dL       Hematocrit 26.5 %      MCV 98.1 fL      MCH 32.2 pg      MCHC 32.8 g/dL      RDW 15.6 %      RDW-SD 52.9 fl      MPV 11.2 fL      Platelets 110 10*3/mm3      Neutrophil % 56.5 %      Lymphocyte % 34.7 %      Monocyte % 6.6 %      Eosinophil % 1.6 %      Basophil % 0.3 %      Immature Grans % 0.3 %      Neutrophils, Absolute 3.52 10*3/mm3      Lymphocytes, Absolute 2.16 10*3/mm3      Monocytes, Absolute 0.41 10*3/mm3      Eosinophils, Absolute 0.10 10*3/mm3      Basophils, Absolute 0.02 10*3/mm3      Immature Grans, Absolute 0.02 10*3/mm3      nRBC 0.0 /100 WBC     POC Glucose Once [[id number]]  (Normal) Collected:  09/01/20 1723    Specimen:  Blood Updated:  09/01/20 1751     Glucose 128 mg/dL      Comment: : [id number] DAYA CHRYSTALMeter ID: [id number]       POC Glucose Once [[id number]]  (Abnormal) Collected:  09/01/20 1149    Specimen:  Blood Updated:  09/01/20 1731     Glucose 201 mg/dL      Comment: : [id number] DAYA CHRYSTALMeter ID: [id number]       POC Glucose Once [[id number]]  (Abnormal) Collected:  09/01/20 0804    Specimen:  Blood Updated:  09/01/20 1731     Glucose 143 mg/dL      Comment: : [id number] DAYA CHRYSTALMeter ID: [id number]              I reviewed the patient's new clinical results.  I reviewed the patient's new imaging results and agree with the interpretation.     ASSESSMENT/PLAN:   ASSESSMENT: 1.  GI bleeding, resolved. likely due to diverticular or hemorrhoidal hemorrhage.  Could also be due to AVMs.  2.  Acute posthemorrhagic anemia, improving.  likely due to redistribution.  No evidence of continued overt GI hemorrhage.  3.  Change in mental status, improving.  PLAN: 1.  Continue PPI  2. Continue p.o. diet  3.  Follow H&H closely and transfuse as needed  4.  Hemorrhoidal banding if rectal bleeding recurs  5.  High-fiber diet upon discharge  6.  Iron supplements upon discharge  7.  Okay to DC from GI standpoint if she continues to improve  The risks,  benefits, and alternatives of this procedure have been discussed with the patient or the responsible party- the patient understands and agrees to proceed.         eG Gorman MD  09/02/20  16:47

## 2020-09-02 NOTE — PROGRESS NOTES
[facility] Medicine Services  INPATIENT PROGRESS NOTE    Length of Stay: 5  Date of Admission: 8/28/2020  Primary Care Physician: Alisia Ramirez APRN    Subjective   Chief Complaint: Diarrhea.    HPI:    She is a 70 y.o. blind female who lives alone in assisted living arrangement, legally blind with history of type 2 diabetes mellitus, osteoarthritis and suspected schizophrenia or bipolar disorder who was admitted for GI bleed.     She is status post EGD and colonoscopy by Dr. Gorman with the following results.     Colonoscopy:   - Erythematous mucosa at the ileocecal valve. Biopsied.  - A single non-bleeding colonic angioectasia.  - Diverticulosis in the sigmoid colon and in the descending colon.  - Non-bleeding internal hemorrhoids.  - One 10 mm polyp in the ascending colon, removed with a cold snare. Resected and retrieved.    EGD:  - Acute gastritis.  - A few duodenal polyps. Biopsied.    9/1/2020: Patient states she has had increased diarrhea overnight.  Potassium is 3.2 this a.m.    9/2/2020: Family and nursing staff have concerns about the patient's having some delusions and paranoia.  Patient lives home alone and is currently incontinent of bowel and bladder.    Review of Systems   Constitutional: Negative for activity change and fatigue.   HENT: Negative for ear pain and sore throat.    Eyes: Negative for pain and discharge.   Respiratory: Negative for cough and shortness of breath.    Cardiovascular: Negative for chest pain and palpitations.   Gastrointestinal: Positive for diarrhea. Negative for abdominal pain and nausea.   Endocrine: Negative for cold intolerance and heat intolerance.   Genitourinary: Negative for difficulty urinating and dysuria.   Musculoskeletal: Negative for arthralgias and gait problem.   Skin: Negative for color change and rash.   Neurological: Negative for dizziness and weakness.   Psychiatric/Behavioral: Negative for agitation and  confusion.        Objective    Temp:  [97.1 °F (36.2 °C)-98.1 °F (36.7 °C)] 97.9 °F (36.6 °C)  Heart Rate:  [79-94] 81  Resp:  [18] 18  BP: (114-145)/(66-81) 145/81    Physical Exam   Constitutional: She appears well-developed and well-nourished.   HENT:   Head: Normocephalic and atraumatic.   Eyes: Pupils are equal, round, and reactive to light. EOM are normal.   Patient is blind.    Neck: Normal range of motion. Neck supple.   Cardiovascular: Normal rate and regular rhythm.   Pulmonary/Chest: Effort normal and breath sounds normal.   Abdominal: Soft. Bowel sounds are normal.   Musculoskeletal: Normal range of motion.   Neurological: She is alert.   Skin: Skin is warm and dry.   Psychiatric: She has a normal mood and affect. Her behavior is normal.       Results Review:  I have reviewed the labs, radiology results, and diagnostic studies.    Laboratory Data:   Results from last 7 days   Lab Units 09/02/20  0546 09/01/20  0537 08/31/20  1608 08/31/20  0349  08/28/20  1314   SODIUM mmol/L 140 141  --  140   < > 147*   POTASSIUM mmol/L 3.7 3.2* 3.8 3.1*   < > 4.4   CHLORIDE mmol/L 106 112*  --  111*   < > 114*   CO2 mmol/L 24.0 22.0  --  22.0   < > 17.0*   BUN mg/dL 9 13  --  9   < > 22   CREATININE mg/dL 0.76 0.86  --  0.74   < > 1.02*   GLUCOSE mg/dL 147* 152*  --  108*   < > 171*   CALCIUM mg/dL 8.3* 8.3*  --  8.0*   < > 9.0   BILIRUBIN mg/dL  --   --   --   --   --  0.7   ALK PHOS U/L  --   --   --   --   --  138*   ALT (SGPT) U/L  --   --   --   --   --  124*   AST (SGOT) U/L  --   --   --   --   --  142*   ANION GAP mmol/L 10.0 7.0  --  7.0   < > 16.0*    < > = values in this interval not displayed.     Estimated Creatinine Clearance: 66 mL/min (by C-G formula based on SCr of 0.76 mg/dL).          Results from last 7 days   Lab Units 09/02/20  0546 09/01/20  0537 08/31/20  0349 08/30/20  1629 08/30/20  0319   WBC 10*3/mm3 6.23 5.19 6.27 7.36 7.11   HEMOGLOBIN g/dL 8.7* 7.8* 8.3* 9.3* 7.9*   HEMATOCRIT % 26.5*  24.5* 24.8* 27.6* 23.9*   PLATELETS 10*3/mm3 110* 79* 75* 88* 66*     Results from last 7 days   Lab Units 08/28/20  1314   INR  1.43*       Culture Data:   No results found for: BLOODCX  No results found for: URINECX  No results found for: RESPCX  No results found for: WOUNDCX  No results found for: STOOLCX  No components found for: BODYFLD    Radiology Data:   Imaging Results (Last 24 Hours)     ** No results found for the last 24 hours. **          I have reviewed the patient's current medications.     Assessment/Plan     Active Hospital Problems    Diagnosis POA   • Acute GI bleeding [K92.2] Yes   • Altered mental status, unspecified [R41.82] Yes       Plan:    1.  Acute blood loss anemia (secondary to GI bleed): Patient's hemoglobin is stable.  EGD showed acute gastritis and colonoscopy showed erythematous mucosa at ileocecal valve and some diverticulosis. Continue PPI, monitor hemoglobin and transfuse if the need arises.  GI is following.   2.  Acute metabolic encephalopathy: Resolved as patient's mental status is back to baseline.  3.  Hypokalemia: Replete potassium and monitor levels.  4.  Chronic thrombocytopenia: Continue to monitor and consult hematologist if the need arises.  She follows up with Dr. Baltazar as outpatient.  5.  Type 2 diabetes mellitus: Stable.  Continue Accu-Cheks and sliding scale insulin.  6. History of bipolar disorder/schizophrenia: Continue home medications.  Psychiatry consulted secondary to reports of delusions and paranoia.  7.  Deconditioning: Consult PT and OT.      Discharge Planning: I expect patient to be discharged to home in 1-2 days.      This document has been electronically signed by CLAIRE Tan on September 2, 2020 11:12

## 2020-09-02 NOTE — PLAN OF CARE
Problem: Patient Care Overview  Goal: Plan of Care Review  Outcome: Ongoing (interventions implemented as appropriate)  Flowsheets (Taken 9/2/2020 1420)  Outcome Summary: pt sup<>sit with SBA, sit<>stand with CGA, pt ambulated 20` with HHA (min assist) of 1. pt would benefit from 24/7 care & continued PT services @ d/C

## 2020-09-03 ENCOUNTER — HOSPITAL ENCOUNTER (INPATIENT)
Facility: HOSPITAL | Age: 70
LOS: 21 days | Discharge: HOME | End: 2020-09-24
Attending: PSYCHIATRY & NEUROLOGY | Admitting: PSYCHIATRY & NEUROLOGY

## 2020-09-03 VITALS
BODY MASS INDEX: 20.76 KG/M2 | TEMPERATURE: 98.9 F | DIASTOLIC BLOOD PRESSURE: 56 MMHG | SYSTOLIC BLOOD PRESSURE: 111 MMHG | HEIGHT: 68 IN | HEART RATE: 82 BPM | WEIGHT: 137 LBS | RESPIRATION RATE: 18 BRPM | OXYGEN SATURATION: 96 %

## 2020-09-03 DIAGNOSIS — Z78.9 IMPAIRED MOBILITY AND ACTIVITIES OF DAILY LIVING: ICD-10-CM

## 2020-09-03 DIAGNOSIS — R13.11 ORAL PHASE DYSPHAGIA: ICD-10-CM

## 2020-09-03 DIAGNOSIS — Z74.09 IMPAIRED MOBILITY AND ACTIVITIES OF DAILY LIVING: ICD-10-CM

## 2020-09-03 DIAGNOSIS — Z74.09 IMPAIRED FUNCTIONAL MOBILITY, BALANCE, GAIT, AND ENDURANCE: ICD-10-CM

## 2020-09-03 PROBLEM — F29 PSYCHOSIS (HCC): Status: ACTIVE | Noted: 2020-09-03

## 2020-09-03 LAB
ANION GAP SERPL CALCULATED.3IONS-SCNC: 7 MMOL/L (ref 5–15)
BASOPHILS # BLD AUTO: 0.01 10*3/MM3 (ref 0–0.2)
BASOPHILS NFR BLD AUTO: 0.2 % (ref 0–1.5)
BUN SERPL-MCNC: 15 MG/DL (ref 8–23)
BUN/CREAT SERPL: 16.5 (ref 7–25)
CALCIUM SPEC-SCNC: 8.5 MG/DL (ref 8.6–10.5)
CHLORIDE SERPL-SCNC: 107 MMOL/L (ref 98–107)
CO2 SERPL-SCNC: 26 MMOL/L (ref 22–29)
CREAT SERPL-MCNC: 0.91 MG/DL (ref 0.57–1)
DEPRECATED RDW RBC AUTO: 54.8 FL (ref 37–54)
EOSINOPHIL # BLD AUTO: 0.09 10*3/MM3 (ref 0–0.4)
EOSINOPHIL NFR BLD AUTO: 2.2 % (ref 0.3–6.2)
ERYTHROCYTE [DISTWIDTH] IN BLOOD BY AUTOMATED COUNT: 15.8 % (ref 12.3–15.4)
GFR SERPL CREATININE-BSD FRML MDRD: 75 ML/MIN/1.73
GLUCOSE BLDC GLUCOMTR-MCNC: 167 MG/DL (ref 70–130)
GLUCOSE BLDC GLUCOMTR-MCNC: 182 MG/DL (ref 70–130)
GLUCOSE SERPL-MCNC: 135 MG/DL (ref 65–99)
HCT VFR BLD AUTO: 23 % (ref 34–46.6)
HGB BLD-MCNC: 7.5 G/DL (ref 12–15.9)
IMM GRANULOCYTES # BLD AUTO: 0.01 10*3/MM3 (ref 0–0.05)
IMM GRANULOCYTES NFR BLD AUTO: 0.2 % (ref 0–0.5)
LYMPHOCYTES # BLD AUTO: 1.46 10*3/MM3 (ref 0.7–3.1)
LYMPHOCYTES NFR BLD AUTO: 35 % (ref 19.6–45.3)
MCH RBC QN AUTO: 32.2 PG (ref 26.6–33)
MCHC RBC AUTO-ENTMCNC: 32.6 G/DL (ref 31.5–35.7)
MCV RBC AUTO: 98.7 FL (ref 79–97)
MONOCYTES # BLD AUTO: 0.34 10*3/MM3 (ref 0.1–0.9)
MONOCYTES NFR BLD AUTO: 8.2 % (ref 5–12)
NEUTROPHILS NFR BLD AUTO: 2.26 10*3/MM3 (ref 1.7–7)
NEUTROPHILS NFR BLD AUTO: 54.2 % (ref 42.7–76)
NRBC BLD AUTO-RTO: 0 /100 WBC (ref 0–0.2)
PLATELET # BLD AUTO: 90 10*3/MM3 (ref 140–450)
PMV BLD AUTO: 11 FL (ref 6–12)
POTASSIUM SERPL-SCNC: 3.9 MMOL/L (ref 3.5–5.2)
RBC # BLD AUTO: 2.33 10*6/MM3 (ref 3.77–5.28)
SODIUM SERPL-SCNC: 140 MMOL/L (ref 136–145)
WBC # BLD AUTO: 4.17 10*3/MM3 (ref 3.4–10.8)

## 2020-09-03 PROCEDURE — 99232 SBSQ HOSP IP/OBS MODERATE 35: CPT | Performed by: INTERNAL MEDICINE

## 2020-09-03 PROCEDURE — 99232 SBSQ HOSP IP/OBS MODERATE 35: CPT | Performed by: PSYCHIATRY & NEUROLOGY

## 2020-09-03 PROCEDURE — 80048 BASIC METABOLIC PNL TOTAL CA: CPT | Performed by: INTERNAL MEDICINE

## 2020-09-03 PROCEDURE — 82962 GLUCOSE BLOOD TEST: CPT

## 2020-09-03 PROCEDURE — 97535 SELF CARE MNGMENT TRAINING: CPT

## 2020-09-03 PROCEDURE — 85025 COMPLETE CBC W/AUTO DIFF WBC: CPT | Performed by: INTERNAL MEDICINE

## 2020-09-03 RX ORDER — ALUMINA, MAGNESIA, AND SIMETHICONE 2400; 2400; 240 MG/30ML; MG/30ML; MG/30ML
15 SUSPENSION ORAL EVERY 6 HOURS PRN
Status: DISCONTINUED | OUTPATIENT
Start: 2020-09-03 | End: 2020-09-24 | Stop reason: HOSPADM

## 2020-09-03 RX ORDER — RISPERIDONE 1 MG/1
1 TABLET ORAL NIGHTLY PRN
Start: 2020-09-03 | End: 2020-09-24 | Stop reason: HOSPADM

## 2020-09-03 RX ORDER — LOPERAMIDE HYDROCHLORIDE 2 MG/1
2 CAPSULE ORAL
Status: DISCONTINUED | OUTPATIENT
Start: 2020-09-03 | End: 2020-09-24 | Stop reason: HOSPADM

## 2020-09-03 RX ORDER — RISPERIDONE 1 MG/1
1 TABLET ORAL NIGHTLY
Start: 2020-09-03 | End: 2020-09-24 | Stop reason: HOSPADM

## 2020-09-03 RX ORDER — CLONIDINE HYDROCHLORIDE 0.1 MG/1
0.1 TABLET ORAL EVERY 6 HOURS PRN
Status: DISCONTINUED | OUTPATIENT
Start: 2020-09-03 | End: 2020-09-24 | Stop reason: HOSPADM

## 2020-09-03 RX ORDER — PANTOPRAZOLE SODIUM 40 MG/1
40 TABLET, DELAYED RELEASE ORAL DAILY
Qty: 30 TABLET | Refills: 0 | Status: ON HOLD | OUTPATIENT
Start: 2020-09-03 | End: 2020-09-24 | Stop reason: SDUPTHER

## 2020-09-03 RX ORDER — FERROUS SULFATE 325(65) MG
325 TABLET ORAL
Qty: 30 TABLET | Refills: 0 | Status: ON HOLD | OUTPATIENT
Start: 2020-09-03 | End: 2020-09-24 | Stop reason: SDUPTHER

## 2020-09-03 RX ADMIN — SODIUM CHLORIDE, PRESERVATIVE FREE 10 ML: 5 INJECTION INTRAVENOUS at 08:05

## 2020-09-03 RX ADMIN — PANTOPRAZOLE SODIUM 40 MG: 40 TABLET, DELAYED RELEASE ORAL at 05:06

## 2020-09-03 RX ADMIN — ESCITALOPRAM OXALATE 20 MG: 10 TABLET ORAL at 08:04

## 2020-09-03 NOTE — THERAPY TREATMENT NOTE
Acute Care - Occupational Therapy Treatment Note  [facility]     Patient Name: Natacha Gandhi  : 1950  MRN: [id number]  Today's Date: 9/3/2020  Onset of Illness/Injury or Date of Surgery: 20  Date of Referral to OT: 20       Admit Date: 2020       ICD-10-CM ICD-9-CM   1. Acute GI bleeding K92.2 578.9   2. GI bleed K92.2 578.9   3. Impaired mobility and activities of daily living Z74.09 V49.89    Z78.9    4. Impaired functional mobility, balance, gait, and endurance Z74.09 V49.89     Patient Active Problem List   Diagnosis   • Acute GI bleeding   • Altered mental status, unspecified   • Schizophrenia, paranoid type (CMS/HCC)     History reviewed. No pertinent past medical history.  History reviewed. No pertinent surgical history.    Therapy Treatment    Rehabilitation Treatment Summary     Row Name 20 0755             Treatment Time/Intention    Discipline  occupational therapy assistant  -BB      Document Type  therapy note (daily note)  -BB      Subjective Information  complains of voices in her room last night  -BB      Mode of Treatment  individual therapy;occupational therapy  -BB      Total Minutes, Occupational Therapy Treatment  41  -BB      Therapy Frequency (OT Eval)  other (see comments) 5-7 days per week  -BB      Patient Effort  good  -BB      Existing Precautions/Restrictions  fall  -BB      Recorded by [BB] Mare Reed COTA/L 20 1024      Row Name 20 0755             Vital Signs    Pretreatment Heart Rate (beats/min)  98  -BB      Posttreatment Heart Rate (beats/min)  99  -BB      Pre SpO2 (%)  93  -BB      O2 Delivery Pre Treatment  room air  -BB      Post SpO2 (%)  93  -BB      O2 Delivery Post Treatment  room air  -BB      Pre Patient Position  Supine  -BB      Post Patient Position  Supine  -BB      Recorded by [BB] Mare Reed COTA/L 20 1024      Row Name 20 0755             Cognitive Assessment/Intervention- PT/OT     Orientation Status (Cognition)  oriented to;person;situation  -BB      Personal Safety Interventions  fall prevention program maintained;gait belt;nonskid shoes/slippers when out of bed;supervised activity  -BB      Recorded by [BB] Mare Reed COTA/L 09/03/20 1024      Row Name 09/03/20 0755             Bed Mobility Assessment/Treatment    Scooting/Bridging [place] (Bed Mobility)  contact guard  -BB      Supine-Sit [place] (Bed Mobility)  contact guard  -BB      Assistive Device (Bed Mobility)  bed rails;head of bed elevated  -BB      Recorded by [BB] Mare Reed COTA/L 09/03/20 1024      Row Name 09/03/20 0755             Bathing Assessment/Intervention    Bathing [place] Level  upper body;set up;lower body;supervision  -BB      Bathing Position  sitting up in bed  -BB      Recorded by [BB] Mare Reed COTA/L 09/03/20 1029      Row Name 09/03/20 0755             Upper Body Dressing Assessment/Training    Upper Body Dressing [place] Level  doff;don;verbal cues;nonverbal cues (demo/gesture);contact guard assist  -BB      Upper Body Dressing Position  sitting up in bed  -BB      Recorded by [BB] Mare Reed COTA/L 09/03/20 1029      Row Name 09/03/20 0755             Lower Body Dressing Assessment/Training    Lower Body Dressing [place] Level  doff;don;socks;supervision  -BB      Lower Body Dressing Position  sitting up in bed  -BB      Recorded by [BB] Mare Reed COTA/L 09/03/20 1029      Row Name 09/03/20 0755             Grooming Assessment/Training    [place] Level (Grooming)  wash face, hands;set up;supervision  -BB      Grooming Position  edge of bed sitting  -BB      Recorded by [BB] Mare Reed COTA/L 09/03/20 1029      Row Name 09/03/20 0755             Static Sitting Balance    Level of [place] (Unsupported Sitting, Static Balance)  supervision  -BB      Sitting Position (Unsupported Sitting, Static Balance)  sitting  on edge of bed  -BB      Time Able to Maintain Position (Unsupported Sitting, Static Balance)  1 to 2 minutes  -BB      Recorded by [BB] Mare Reed COTA/L 09/03/20 1029      Row Name 09/03/20 0755             Positioning and Restraints    Pre-Treatment Position  in bed  -BB      Post Treatment Position  bed  -BB      In Bed  call light within reach;encouraged to call for assist;exit alarm on;fowlers  -BB      Recorded by [BB] Mare Reed COTA/L 09/03/20 1029      Row Name 09/03/20 0755             Pain Scale: Numbers Pre/Post-Treatment    Pain Scale: Numbers, Pretreatment  0/10 - no pain  -BB      Pain Scale: Numbers, Post-Treatment  0/10 - no pain  -BB      Recorded by [BB] Mare Reed COTA/L 09/03/20 1029      Row Name 09/03/20 0755             Plan of Care Review    Plan of Care Reviewed With  patient  -BB      Progress  no change  -BB      Outcome Summary  Pt supervision for ADL with verbal cues. Pt required set up for breakfast tray. All needs within reach this am.  -BB      Recorded by [BB] Mare Reed COTA/L 09/03/20 1029      Row Name 09/03/20 0755             Outcome Summary/Treatment Plan (OT)    Daily Summary of Progress (OT)  progress toward functional goals as expected  -BB      Plan for Continued Treatment (OT)  continue OT POC  -BB      Anticipated Discharge Disposition (OT)  anticipate therapy at next level of care  -BB      Recorded by [MELECIO] Mare Reed COTA/L 09/03/20 1029        User Key  (r) = Recorded By, (t) = Taken By, (c) = Cosigned By    Initials Name Effective Dates Discipline    BB Mare Reed COTA/L 03/07/18 -  OT           Rehab Goal Summary     Row Name 09/03/20 0755             Occupational Therapy Goals    Transfer Goal Selection (OT)  transfer, OT goal 1  -BB      Bathing Goal Selection (OT)  bathing, OT goal 1  -BB      Dressing Goal Selection (OT)  dressing, OT goal 1  -BB      Toileting Goal Selection (OT)  toileting, OT goal 1   -BB      Strength Goal Selection (OT)  strength, OT goal 1  -BB      Activity Tolerance Goal Selection (OT)  activity tolerance, OT goal 1  -BB         Transfer Goal 1 (OT)    Activity/Assistive Device (Transfer Goal 1, OT)  transfers, all  -BB      [place] Level/Cues Needed (Transfer Goal 1, OT)  standby assist;verbal cues required;tactile cues required;set-up required  -BB      Time Frame (Transfer Goal 1, OT)  long term goal (LTG);by discharge  -BB      Progress/Outcome (Transfer Goal 1, OT)  goal not met  -BB         Bathing Goal 1 (OT)    Activity/Assistive Device (Bathing Goal 1, OT)  lower body bathing  -BB      [place] Level/Cues Needed (Bathing Goal 1, OT)  contact guard assist;verbal cues required;tactile cues required;set-up required  -BB      Time Frame (Bathing Goal 1, OT)  long term goal (LTG);by discharge  -BB      Progress/Outcomes (Bathing Goal 1, OT)  goal met  -BB         Dressing Goal 1 (OT)    Activity/Assistive Device (Dressing Goal 1, OT)  lower body dressing  -BB      [place]/Cues Needed (Dressing Goal 1, OT)  contact guard assist;verbal cues required;tactile cues required;set-up required  -BB      Time Frame (Dressing Goal 1, OT)  long term goal (LTG);by discharge  -BB      Progress/Outcome (Dressing Goal 1, OT)  goal not met  -BB         Toileting Goal 1 (OT)    Activity/Device (Toileting Goal 1, OT)  toileting skills, all  -BB      [place] Level/Cues Needed (Toileting Goal 1, OT)  contact guard assist;verbal cues required;tactile cues required;set-up required  -BB      Time Frame (Toileting Goal 1, OT)  long term goal (LTG);by discharge  -BB      Progress/Outcome (Toileting Goal 1, OT)  goal not met  -BB         Strength Goal 1 (OT)    Strength Goal 1 (OT)  Pt to demo compliance/understanding of HEP to build strength  -BB      Time Frame (Strength Goal 1, OT)  long term goal (LTG);by discharge  -BB      Progress/Outcome (Strength Goal 1, OT)  goal not met  -BB           Activity Tolerance Goal 1 (OT)    Activity Level (Endurance Goal 1, OT)  -- 15 min functional activity with proper EC  -BB      Time Frame (Activity Tolerance Goal 1, OT)  long term goal (LTG);by discharge  -BB      Progress/Outcome (Activity Tolerance Goal 1, OT)  goal met  -BB        User Key  (r) = Recorded By, (t) = Taken By, (c) = Cosigned By    Initials Name Provider Type Discipline    Mare Westbrook COTA/L Occupational Therapy Assistant OT        Occupational Therapy Education                 Title: PT OT SLP Therapies (Resolved)     Topic: Occupational Therapy (Resolved)     Point: ADL training (Resolved)     Description:   Instruct learner(s) on proper safety adaptation and remediation techniques during self care or transfers.   Instruct in proper use of assistive devices.              Learning Progress Summary           Patient Acceptance, E, NR by  at 9/2/2020 1112                   Point: Home exercise program (Resolved)     Description:   Instruct learner(s) on appropriate technique for monitoring, assisting and/or progressing therapeutic exercises/activities.              Learner Progress:   Not documented in this visit.          Point: Precautions (Resolved)     Description:   Instruct learner(s) on prescribed precautions during self-care and functional transfers.              Learning Progress Summary           Patient Acceptance, E, VU by  at 8/31/2020 1435    Comment:  Pt educated on role of OT, d/c recs, and POC                   Point: Body mechanics (Resolved)     Description:   Instruct learner(s) on proper positioning and spine alignment during self-care, functional mobility activities and/or exercises.              Learner Progress:   Not documented in this visit.                      User Key     Initials Effective Dates Name Provider Type Discipline     03/07/18 -  Mare Reed COTA/L Occupational Therapy Assistant OT     09/10/19 -  Diego Pelaez OT Occupational  Therapist OT                OT Recommendation and Plan  Outcome Summary/Treatment Plan (OT)  Daily Summary of Progress (OT): progress toward functional goals as expected  Plan for Continued Treatment (OT): continue OT POC  Anticipated Discharge Disposition (OT): anticipate therapy at next level of care  Therapy Frequency (OT Eval): other (see comments)(5-7 days per week)  Daily Summary of Progress (OT): progress toward functional goals as expected  Plan of Care Review  Plan of Care Reviewed With: patient  Plan of Care Reviewed With: patient  Outcome Summary: Pt supervision for ADL with verbal cues. Pt required set up for breakfast tray. All needs within reach this am.  Outcome Measures     Row Name 09/03/20 0755 09/02/20 1500 09/02/20 0900       How much help from another person do you currently need...    Turning from your back to your side while in flat bed without using bedrails?  --  3  -TA  --    Moving from lying on back to sitting on the side of a flat bed without bedrails?  --  3  -TA  --    Moving to and from a bed to a chair (including a wheelchair)?  --  3  -TA  --    Standing up from a chair using your arms (e.g., wheelchair, bedside chair)?  --  3  -TA  --    Climbing 3-5 steps with a railing?  --  3  -TA  --    To walk in hospital room?  --  3  -TA  --    AM-PAC 6 Clicks Score (PT)  --  18  -TA  --       How much help from another is currently needed...    Putting on and taking off regular lower body clothing?  2  -BB  --  2  -BB    Bathing (including washing, rinsing, and drying)  3  -BB  --  3  -BB    Toileting (which includes using toilet bed pan or urinal)  2  -BB  --  2  -BB    Putting on and taking off regular upper body clothing  3  -BB  --  3  -BB    Taking care of personal grooming (such as brushing teeth)  3  -BB  --  3  -BB    Eating meals  4  -BB  --  4  -BB    AM-PAC 6 Clicks Score (OT)  17  -BB  --  17  -BB       Functional Assessment    Outcome Measure Options  --  AM-PAC 6 Clicks Basic  Mobility (PT)  -TA  --    Row Name 08/31/20 1430             How much help from another is currently needed...    Putting on and taking off regular lower body clothing?  2  -JH      Bathing (including washing, rinsing, and drying)  2  -JH      Toileting (which includes using toilet bed pan or urinal)  2  -JH      Putting on and taking off regular upper body clothing  3  -JH      Taking care of personal grooming (such as brushing teeth)  3  -JH      Eating meals  4  -      AM-[facility] 6 Clicks Score (OT)  16  -         Functional Assessment    Outcome Measure Options  AM-PAC 6 Clicks Daily Activity (OT)  -        User Key  (r) = Recorded By, (t) = Taken By, (c) = Cosigned By    Initials Name Provider Type    TA Maude Aguilar, KEIRA Physical Therapy Assistant    Mare Westbrook COTA/L Occupational Therapy Assistant     Diego Pelaez OT Occupational Therapist           Time Calculation:   Time Calculation- OT     Row Name 09/03/20 1031             Time Calculation- OT    OT Start Time  0755  -BB      OT Stop Time  0836  -      OT Time Calculation (min)  41 min  -      Total Timed Code Minutes- OT  41 minute(s)  -BB      OT Received On  09/03/20  -        User Key  (r) = Recorded By, (t) = Taken By, (c) = Cosigned By    Initials Name Provider Type    Mare Westbrook COTA/L Occupational Therapy Assistant        Therapy Charges for Today     Code Description Service Date Service Provider Modifiers Qty    08374565556 HC OT SELF CARE/MGMT/TRAIN EA 15 MIN 9/2/2020 Mare Reed COTA/L GO 3    09153139101 HC OT SELF CARE/MGMT/TRAIN EA 15 MIN 9/3/2020 Mare Reed COTA/L GO 3               NARDA Lares  9/3/2020

## 2020-09-03 NOTE — NURSING NOTE
Spoke with CLAIRE Bledsoe regarding Hgb of 7.5.  He stated that since patient has no signs of an active bleed and is asymptomatic, will proceed with [facility] admission as planned.  Stated he will follow-up with patient and has spoken with Dr. Hanson regarding this.

## 2020-09-03 NOTE — PROGRESS NOTES
"                                  Psychiatry & Behavioral Health Inpatient Consult Progress Note                                      9/3/2020    Legal: Vol per family    Referring Provider: THERESA Dick    Reason for Consultation & CC: Gross Psychosis       Subjective --  Ms. Natacha Gandhi is a 70 y.o. female who was seen on the 3E unit.      Pt pleasant.  Legally blind.  Psychotically disorganized.  Fewer behaviors overnight.  Benefiting from starting of Risperdal.  Per staff, less yelling, disorganization, though still confused and illogical.      Pt denies any HA/SA/MA.          Objective   Objective --      Vital Signs:  Temp:  [96.9 °F (36.1 °C)-98.9 °F (37.2 °C)] 98.9 °F (37.2 °C)  Heart Rate:  [] 82  Resp:  [18-20] 18  BP: (106-121)/(53-64) 111/56    Physical Exam:   General Appearance: alert, appears stated age and cooperative,  Hygiene:   good  Gait & Station: deferred  Musculoskeletal: No tremors or abnormal involuntary movements     Mental Status Exam:   Cooperation:  Cooperative  Eye Contact:  legally blind, looked in my direction when I spoke  Psychomotor Behavior:  Appropriate  Mood: \"Fine\"  Affect:  constricted  Speech:  Normal  Thought Process:  Coherent  Associations: Goal Directed  Thought Content:                Paranoid              Suicidal:  None              Homicidal:  None              Hallucinations:  Auditory              Delusion:  Paranoid  Cognitive Functioning:              Consciousness: awake and alert              Orientation:  Person, Place, Time and Situation              Attention: normal         Concentration: Normal              Language:  Intact        Vocabulary: Average              Short Term Memory: Intact              Long Term Memory: Intact              Fund of Knowledge: Below Average  Reliability:  adequate  Insight:  limited  Judgement:  Impaired  Impulse Control:  diminished      Diagnostic Data --  Lab Results (last 24 hours)     Procedure Component Value " Units Date/Time    POC Glucose Once [[id number]]  (Abnormal) Collected:  09/03/20 1112    Specimen:  Blood Updated:  09/03/20 1204     Glucose 182 mg/dL      Comment: : [id number] LADY Cantu ID: [id number]       Basic Metabolic Panel [[id number]]  (Abnormal) Collected:  09/03/20 0532    Specimen:  Blood Updated:  09/03/20 0614     Glucose 135 mg/dL      BUN 15 mg/dL      Creatinine 0.91 mg/dL      Sodium 140 mmol/L      Potassium 3.9 mmol/L      Chloride 107 mmol/L      CO2 26.0 mmol/L      Calcium 8.5 mg/dL      eGFR  African Amer 75 mL/min/1.73      BUN/Creatinine Ratio 16.5     Anion Gap 7.0 mmol/L     Narrative:       GFR Normal >60  Chronic Kidney Disease <60  Kidney Failure <15      CBC & Differential [[id number]] Collected:  09/03/20 0532    Specimen:  Blood Updated:  09/03/20 0601    Narrative:       The following orders were created for panel order CBC & Differential.  Procedure                               Abnormality         Status                     ---------                               -----------         ------                     CBC Auto Differential[[id number]]        Abnormal            Final result                 Please view results for these tests on the individual orders.    CBC Auto Differential [[id number]]  (Abnormal) Collected:  09/03/20 0532    Specimen:  Blood Updated:  09/03/20 0601     WBC 4.17 10*3/mm3      RBC 2.33 10*6/mm3      Hemoglobin 7.5 g/dL      Hematocrit 23.0 %      MCV 98.7 fL      MCH 32.2 pg      MCHC 32.6 g/dL      RDW 15.8 %      RDW-SD 54.8 fl      MPV 11.0 fL      Platelets 90 10*3/mm3      Neutrophil % 54.2 %      Lymphocyte % 35.0 %      Monocyte % 8.2 %      Eosinophil % 2.2 %      Basophil % 0.2 %      Immature Grans % 0.2 %      Neutrophils, Absolute 2.26 10*3/mm3      Lymphocytes, Absolute 1.46 10*3/mm3      Monocytes, Absolute 0.34 10*3/mm3      Eosinophils, Absolute 0.09 10*3/mm3      Basophils, Absolute 0.01 10*3/mm3      Immature Grans, Absolute  0.01 10*3/mm3      nRBC 0.0 /100 WBC     POC Glucose Once [[id number]]  (Abnormal) Collected:  09/02/20 1615    Specimen:  Blood Updated:  09/02/20 1924     Glucose 178 mg/dL      Comment: Result Not ConfirmedOperator: [id number] NUBIA Monroy ID: [id number]       Blood Culture - Blood, Arm, Right [[id number]] Collected:  08/28/20 1535    Specimen:  Blood from Arm, Right Updated:  09/02/20 1615     Blood Culture No growth at 5 days    Blood Culture - Blood, Arm, Right [[id number]] Collected:  08/28/20 1535    Specimen:  Blood from Arm, Right Updated:  09/02/20 1515     Blood Culture No growth at 5 days          Imaging Results (Last 24 Hours)     ** No results found for the last 24 hours. **            Medications:   Scheduled Meds:  escitalopram 20 mg Oral Daily   pantoprazole 40 mg Oral Q AM   risperiDONE 1 mg Oral Nightly   sodium chloride 10 mL Intravenous Q12H     Continuous Infusions:  dextrose 5 % and sodium chloride 0.45 % 30 mL/hr     PRN Meds:.•  acetaminophen  •  dextrose  •  dextrose  •  dextrose 5 % and sodium chloride 0.45 %  •  glucagon (human recombinant)  •  Morphine **AND** naloxone  •  ondansetron  •  potassium chloride **OR** potassium chloride **OR** potassium chloride  •  risperiDONE **AND** risperiDONE  •  sodium chloride  •  sodium chloride      Assessment:     Acute GI bleeding    Altered mental status, unspecified    Schizophrenia, paranoid type (CMS/HCC)         DIAGNOSTIC IMPRESSION: Ongoing psychosis, just having initiated pharmacotherapy.  Will benefit from transition to [facility] to further stabilized psychosis, given severity of recent symptoms.       Treatment Plan & Recommendations:  --Cont Risperdal 1mg qhs and 1mg qhs prn for paranoia and psychosis.  --Transfer to [facility]      --> Impression and recommendations discussed with nursing staff and primary team.      Psychiatry sign off.  Thank you for this consult.  Please contact me with any further questions or concerns.     Tyrel DESIR  Justin VERMA MD  Psychiatry & Behavioral Sciences  09/03/20 @ 14:15  Dictated using Dragon.

## 2020-09-03 NOTE — CONSULTS
Adult Nutrition  Assessment    Patient Name:  Natacha Gandhi  YOB: 1950  MRN: [id number]  Admit Date:  8/28/2020    Assessment Date:  9/3/2020    Comments: Follow-up to check nutrition. Meal round at lunch to check intake. Noted lasagna and salad. Due to blindness, ask pt if needed assistance. Pt stated does not need assistance. Ask pt's RN how Ms Gaffney is doing with meals. Ate 100% breakfast. States pt has adapted well to disability--intake is usually good. RN stated that she would check on pt again. Wt 137 lb (bed scale)--trend down continues. Glucose 182/132. Supplementing diet with Boost Glucose Control TID.                         Electronically signed by:  Winsome Bond RD  09/03/20 15:57

## 2020-09-03 NOTE — SIGNIFICANT NOTE
09/03/20 1125   Rehab Treatment   Discipline physical therapy assistant   Reason Treatment Not Performed patient/family declined treatment  (pt requests that PTA return after 1 pm)

## 2020-09-03 NOTE — NURSING NOTE
Patient arrived to the unit with security.  Admitted for psychosis.  She is alert and oriented at this time and follows all commands.  She was transferred from  after GI bleed.  Long history of psychosis

## 2020-09-03 NOTE — DISCHARGE SUMMARY
[facility] Medicine Services  DISCHARGE SUMMARY       Date of Admission: 8/28/2020  Date of Discharge:  9/3/2020  Primary Care Physician: Alisia Ramirez APRN    Presenting Problem/History of Present Illness:  Acute GI bleeding [K92.2]     Final Discharge Diagnoses:    Acute GI bleeding    Altered mental status, unspecified    Schizophrenia, paranoid type (CMS/[facility])      Consults:   Consults     Date and Time Order Name Status Description    9/2/2020 1812 Inpatient Psychiatrist Consult      9/2/2020 0932 Inpatient Psychiatrist Consult Completed           Procedures Performed: Procedure(s):  COLONOSCOPY                Pertinent Test Results:   Ct Head Without Contrast    Result Date: 8/28/2020  PROCEDURE: CT head without contrast REASON FOR EXAM: Decreased alertness This exam was performed according to our departmental dose-optimization program, which includes automated exposure control, adjustment of the mA and/or kV according to patient size and/or use of iterative reconstruction technique. FINDINGS: Axial computer tomography sequential imaging of the head was performed from the vertex to the base of the skull. .Sagittal and coronal reformation was performed . The skull vault is intact. Paranasal sinuses and bilateral mastoid air cells are well aerated. Cerebral and cerebellar parenchymal are normal. Ventricular system and subarachnoid spaces are normal.     Normal CT of the head. Electronically signed by:  Chino Lacy MD  8/28/2020 2:31 PM CDT Workstation: [id number]    Mri Brain Without Contrast    Result Date: 8/30/2020  EXAM: MR BRAIN WITHOUT IV CONTRAST COMPARISONS: CT head 8/28/2020 HISTORY: Encephalopathy, GI hemorrhage, TECHNIQUE: Multiplanar, multisequence MR images were obtained of the brain without the use of intravenous contrast. FINDINGS: No intracranial hemorrhage, mass, mass effect, or midline shift. Brain parenchyma is normal in signal and  "configuration. No restricted diffusion. CSF-containing spaces are symmetric and appropriate in size. Osseous structures are within normal limits. Orbits are unremarkable. Paranasal sinuses and mastoid air cells are clear.     No acute or significant chronic intracranial process. Electronically signed by:  Alba Moon MD  8/30/2020 10:46 AM CDT Workstation: [id number]      HPI/Hospital Course:  The patient is a 70 y.o. female with a history of schizophrenia and DMII who presented to [facility] with bloody stool. She was initiated on PPI and GI was consulted.  EGD revealed gastritis and colonoscopy revealed non-bleeding colonic angioectasia,  Non-bleeding hemorrhoids, diverticulosis, and 10mm poly which was resected.  She demonstrated psychotic symptoms.  CT and MRI of the brain were negative.  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  She was recommended risperdal and admission to [facility]. She has been cleared by GI for discharge.  She will be transferred to Behav[facility].    Condition on Discharge:  Stable    Physical Exam on Discharge:  /56 (BP Location: Right arm, Patient Position: Lying)   Pulse 82   Temp 98.9 °F (37.2 °C) (Oral)   Resp 18   Ht 172.7 cm (68\")   Wt 62.1 kg (137 lb)   LMP  (LMP Unknown)   SpO2 96%   Breastfeeding No   BMI 21.01 kg/m²   Physical Exam   Constitutional: She appears well-developed and well-nourished. No distress.   HENT:   Head: Normocephalic and atraumatic.   Eyes: Conjunctivae are normal.   Cardiovascular: Normal rate, regular rhythm and intact distal pulses.   Pulmonary/Chest: Effort normal and breath sounds normal. No respiratory distress.   Abdominal: Soft. Bowel sounds are normal. She exhibits no distension. There is no tenderness.   Musculoskeletal: She exhibits no edema or deformity.   Neurological: She is alert.   Skin: Skin is warm and dry. She is not diaphoretic.   Vitals reviewed.        Discharge " Disposition:  Psychiatric Hospital or Unit ([facility] [place], [facility] by [facility])    Discharge Medications:     Discharge Medications      New Medications      Instructions Start Date   ferrous sulfate 325 (65 FE) MG tablet  Commonly known as:  FerrouSul   325 mg, Oral, Daily With Breakfast      pantoprazole 40 MG EC tablet  Commonly known as:  PROTONIX   40 mg, Oral, Daily      risperiDONE 1 MG tablet  Commonly known as:  risperDAL   1 mg, Oral, Nightly      risperiDONE 1 MG tablet  Commonly known as:  risperDAL   1 mg, Oral, Nightly PRN         Continue These Medications      Instructions Start Date   escitalopram 20 MG tablet  Commonly known as:  LEXAPRO   20 mg, Oral, Daily      glipiZIDE-metFORMIN 2.5-500 MG per tablet  Commonly known as:  METAGLIP   1 tablet, Oral, 2 Times Daily Before Meals      Jardiance 10 MG tablet  Generic drug:  Empagliflozin   10 mg, Oral, Daily         Stop These Medications    QUEtiapine 50 MG tablet  Commonly known as:  SEROquel            Discharge Diet:   Diet Instructions     Diet: Regular, Consistent Carbohydrate; Thin      Discharge Diet:   Regular  Consistent Carbohydrate       Fluid Consistency:  Thin          Activity at Discharge:   Activity Instructions     Activity as Tolerated            Follow-up Appointments:   Will need PCP and GI follow-ups at discharge  Future Appointments   Date Time Provider Department Center   9/10/2020  8:15 AM Vijay Han MD [facility]   9/10/2020 11:15 AM NURSE  ISIDRO  CLAIRE Mary  09/03/20  14:06    Time: Discharge planning and teaching took greater than 30 minutes.

## 2020-09-03 NOTE — PLAN OF CARE
Pt still w/ hallucinations but less anxious and apprehensive this shift. Tolerated about 3rd of her supper tray. Slept well. VSS, incont. of stool and urine x 1 this shift.   Problem: Patient Care Overview  Goal: Plan of Care Review  Outcome: Ongoing (interventions implemented as appropriate)

## 2020-09-04 PROBLEM — E11.9 DMII (DIABETES MELLITUS, TYPE 2) (HCC): Status: ACTIVE | Noted: 2020-09-04

## 2020-09-04 PROBLEM — D62 ACUTE BLOOD LOSS ANEMIA: Status: ACTIVE | Noted: 2020-09-04

## 2020-09-04 PROBLEM — F20.0 ACUTE EXACERBATION OF CHRONIC PARANOID SCHIZOPHRENIA (HCC): Status: ACTIVE | Noted: 2020-09-03

## 2020-09-04 LAB — GLUCOSE BLDC GLUCOMTR-MCNC: 172 MG/DL (ref 70–130)

## 2020-09-04 PROCEDURE — 97162 PT EVAL MOD COMPLEX 30 MIN: CPT

## 2020-09-04 PROCEDURE — 82962 GLUCOSE BLOOD TEST: CPT

## 2020-09-04 PROCEDURE — 99223 1ST HOSP IP/OBS HIGH 75: CPT | Performed by: PSYCHIATRY & NEUROLOGY

## 2020-09-04 PROCEDURE — 97166 OT EVAL MOD COMPLEX 45 MIN: CPT

## 2020-09-04 PROCEDURE — 63710000001 INSULIN ASPART PER 5 UNITS: Performed by: NURSE PRACTITIONER

## 2020-09-04 RX ORDER — DEXTROSE MONOHYDRATE 25 G/50ML
25 INJECTION, SOLUTION INTRAVENOUS
Status: DISCONTINUED | OUTPATIENT
Start: 2020-09-04 | End: 2020-09-24 | Stop reason: HOSPADM

## 2020-09-04 RX ORDER — FERROUS SULFATE TAB EC 324 MG (65 MG FE EQUIVALENT) 324 (65 FE) MG
324 TABLET DELAYED RESPONSE ORAL
Status: DISCONTINUED | OUTPATIENT
Start: 2020-09-04 | End: 2020-09-24 | Stop reason: HOSPADM

## 2020-09-04 RX ORDER — NICOTINE POLACRILEX 4 MG
15 LOZENGE BUCCAL
Status: DISCONTINUED | OUTPATIENT
Start: 2020-09-04 | End: 2020-09-24 | Stop reason: HOSPADM

## 2020-09-04 RX ORDER — RISPERIDONE 1 MG/1
1 TABLET ORAL NIGHTLY
Status: DISCONTINUED | OUTPATIENT
Start: 2020-09-04 | End: 2020-09-05

## 2020-09-04 RX ORDER — GLIPIZIDE 5 MG/1
2.5 TABLET ORAL
Status: DISCONTINUED | OUTPATIENT
Start: 2020-09-04 | End: 2020-09-10

## 2020-09-04 RX ORDER — LORAZEPAM 1 MG/1
1 TABLET ORAL EVERY 8 HOURS PRN
Status: ACTIVE | OUTPATIENT
Start: 2020-09-04 | End: 2020-09-14

## 2020-09-04 RX ORDER — RISPERIDONE 0.25 MG/1
0.25 TABLET ORAL DAILY
Status: DISCONTINUED | OUTPATIENT
Start: 2020-09-05 | End: 2020-09-07

## 2020-09-04 RX ORDER — LORAZEPAM 2 MG/ML
1 INJECTION INTRAMUSCULAR EVERY 8 HOURS PRN
Status: ACTIVE | OUTPATIENT
Start: 2020-09-04 | End: 2020-09-14

## 2020-09-04 RX ORDER — ESCITALOPRAM OXALATE 10 MG/1
10 TABLET ORAL DAILY
Status: DISCONTINUED | OUTPATIENT
Start: 2020-09-04 | End: 2020-09-24 | Stop reason: HOSPADM

## 2020-09-04 RX ORDER — PANTOPRAZOLE SODIUM 40 MG/1
40 TABLET, DELAYED RELEASE ORAL
Status: DISCONTINUED | OUTPATIENT
Start: 2020-09-04 | End: 2020-09-24 | Stop reason: HOSPADM

## 2020-09-04 RX ADMIN — RISPERIDONE 1 MG: 1 TABLET ORAL at 20:17

## 2020-09-04 RX ADMIN — Medication 2.5 MG: at 17:55

## 2020-09-04 RX ADMIN — FERROUS SULFATE TAB EC 324 MG (65 MG FE EQUIVALENT) 324 MG: 324 (65 FE) TABLET DELAYED RESPONSE at 08:10

## 2020-09-04 RX ADMIN — ESCITALOPRAM OXALATE 10 MG: 10 TABLET ORAL at 08:10

## 2020-09-04 RX ADMIN — METFORMIN HYDROCHLORIDE 500 MG: 500 TABLET ORAL at 17:55

## 2020-09-04 RX ADMIN — PANTOPRAZOLE SODIUM 40 MG: 40 TABLET, DELAYED RELEASE ORAL at 08:10

## 2020-09-04 NOTE — PROGRESS NOTES
"        SUBJECTIVE:   9/3/2020  Chief Complaint:     Subjective      Patient is awake and alert today.  No further rectal bleeding or melena.  No GI complaints.  Tolerating diet.  Awaiting placement.  WBC 4.17.  Hemoglobin 7.5    History:  Past Medical History:   Diagnosis Date   • Depression    • Psychiatric illness    • Schizoaffective disorder (CMS/HCC)      History reviewed. No pertinent surgical history.  Family History   Problem Relation Age of Onset   • Dementia Mother    • No Known Problems Father    • No Known Problems Sister    • No Known Problems Brother    • No Known Problems Maternal Aunt    • No Known Problems Paternal Aunt    • No Known Problems Maternal Uncle    • No Known Problems Paternal Uncle    • No Known Problems Maternal Grandfather    • No Known Problems Maternal Grandmother    • No Known Problems Paternal Grandfather    • No Known Problems Paternal Grandmother    • No Known Problems Cousin    • No Known Problems Other    • Suicide Attempts Neg Hx    • ADD / ADHD Neg Hx    • Alcohol abuse Neg Hx    • Anxiety disorder Neg Hx    • Bipolar disorder Neg Hx    • Depression Neg Hx    • Drug abuse Neg Hx    • OCD Neg Hx    • Paranoid behavior Neg Hx    • Schizophrenia Neg Hx    • Seizures Neg Hx    • Self-Injurious Behavior  Neg Hx      Social History     Tobacco Use   • Smoking status: Former Smoker   • Smokeless tobacco: Never Used   • Tobacco comment: \" a little bit a long time ago\"   Substance Use Topics   • Alcohol use: Not Currently   • Drug use: Never     Medications Prior to Admission   Medication Sig Dispense Refill Last Dose   • Empagliflozin (Jardiance) 10 MG tablet Take 10 mg by mouth Daily.   9/3/2020 at Unknown time   • escitalopram (LEXAPRO) 20 MG tablet Take 20 mg by mouth Daily.   9/3/2020 at Unknown time   • ferrous sulfate (FerrouSul) 325 (65 FE) MG tablet Take 1 tablet by mouth Daily With Breakfast. 30 tablet 0 9/3/2020 at Unknown time   • glipiZIDE-metFORMIN (METAGLIP) 2.5-500 " MG per tablet Take 1 tablet by mouth 2 (Two) Times a Day Before Meals.   9/3/2020 at Unknown time   • pantoprazole (PROTONIX) 40 MG EC tablet Take 1 tablet by mouth Daily. 30 tablet 0 9/3/2020 at Unknown time   • risperiDONE (risperDAL) 1 MG tablet Take 1 tablet by mouth Every Night.   9/2/2020 at Unknown time   • risperiDONE (risperDAL) 1 MG tablet Take 1 tablet by mouth At Night As Needed (severe paranoia or agitation).   9/2/2020 at Unknown time     Allergies:  Patient has no known allergies.     CURRENT MEDICATIONS/OBJECTIVE/VS/PE:     Current Medications:     No current facility-administered medications for this encounter.      No current outpatient medications on file.     Facility-Administered Medications Ordered in Other Encounters   Medication Dose Route Frequency Provider Last Rate Last Dose   • aluminum-magnesium hydroxide-simethicone (MAALOX MAX) 400-400-40 MG/5ML suspension 15 mL  15 mL Oral Q6H PRN Tyrel Anderson II, MD       • cloNIDine (CATAPRES) tablet 0.1 mg  0.1 mg Oral Q6H PRN Tyrel Anderson II, MD       • loperamide (IMODIUM) capsule 2 mg  2 mg Oral Q2H PRN Tyrel Anderson II, MD       • magnesium hydroxide (MILK OF MAGNESIA) suspension 2400 mg/10mL 10 mL  10 mL Oral Daily PRN Tyrel Anderson II, MD           Objective     Review of Systems:   Review of Systems   Constitutional: Negative for chills, fatigue, fever and unexpected weight change.   HENT: Negative for congestion, ear discharge, hearing loss, nosebleeds and sore throat.    Eyes: Negative for pain, discharge and redness.   Respiratory: Negative for cough, chest tightness, shortness of breath and wheezing.    Cardiovascular: Negative for chest pain and palpitations.   Gastrointestinal: Negative for abdominal distention, abdominal pain, blood in stool, constipation, diarrhea, nausea and vomiting.   Endocrine: Negative for cold intolerance, polydipsia, polyphagia and polyuria.   Genitourinary: Negative for  dysuria, flank pain, frequency, hematuria and urgency.   Musculoskeletal: Negative for arthralgias, back pain, joint swelling and myalgias.   Skin: Negative for color change, pallor and rash.   Neurological: Negative for tremors, seizures, syncope, weakness and headaches.   Hematological: Negative for adenopathy. Does not bruise/bleed easily.   Psychiatric/Behavioral: Negative for behavioral problems, confusion, dysphoric mood, hallucinations and suicidal ideas. The patient is not nervous/anxious.        Physical Exam:   Temp:  [96.7 °F (35.9 °C)-99.4 °F (37.4 °C)] 96.7 °F (35.9 °C)  Heart Rate:  [] 95  Resp:  [18-20] 18  BP: (107-145)/(56-67) 114/60     Physical Exam:  General Appearance:    Alert, cooperative, in no acute distress   Head:    Normocephalic, without obvious abnormality, atraumatic   Eyes:            Lids and lashes normal, conjunctivae and sclerae normal, no   icterus, no pallor, corneas clear, PERRLA   Ears:    Ears appear intact with no abnormalities noted   Throat:   No oral lesions, no thrush, oral mucosa moist   Neck:   No adenopathy, supple, trachea midline, no thyromegaly, no     carotid bruit, no JVD   Back:     No kyphosis present, no scoliosis present, no skin lesions,       erythema or scars, no tenderness to percussion or                   palpation,   range of motion normal   Lungs:     Clear to auscultation,respirations regular, even and                   unlabored    Heart:    Regular rhythm and normal rate, normal S1 and S2, no            murmur, no gallop, no rub, no click   Breast Exam:    Deferred   Abdomen:     Normal bowel sounds, no masses, no organomegaly, soft        nontender, nondistended, no guarding, no rebound                 tenderness   Genitalia:    Deferred   Extremities:   Moves all extremities well, no edema, no cyanosis, no              redness   Pulses:   Pulses palpable and equal bilaterally   Skin:   No bleeding, bruising or rash   Lymph nodes:   No  palpable adenopathy   Neurologic:   Cranial nerves 2 - 12 grossly intact, sensation intact, DTR        present and equal bilaterally      Results Review:     Lab Results (last 24 hours)     Procedure Component Value Units Date/Time    POC Glucose Once [[id number]]  (Abnormal) Collected:  09/03/20 1623    Specimen:  Blood Updated:  09/03/20 1634     Glucose 167 mg/dL      Comment: : [id number] LADY SIMENTALMeter ID: [id number]       POC Glucose Once [[id number]]  (Abnormal) Collected:  09/03/20 1112    Specimen:  Blood Updated:  09/03/20 1204     Glucose 182 mg/dL      Comment: : [id number] LADY SIMENTALMeter ID: [id number]       Basic Metabolic Panel [[id number]]  (Abnormal) Collected:  09/03/20 0532    Specimen:  Blood Updated:  09/03/20 0614     Glucose 135 mg/dL      BUN 15 mg/dL      Creatinine 0.91 mg/dL      Sodium 140 mmol/L      Potassium 3.9 mmol/L      Chloride 107 mmol/L      CO2 26.0 mmol/L      Calcium 8.5 mg/dL      eGFR  African Amer 75 mL/min/1.73      BUN/Creatinine Ratio 16.5     Anion Gap 7.0 mmol/L     Narrative:       GFR Normal >60  Chronic Kidney Disease <60  Kidney Failure <15      CBC & Differential [[id number]] Collected:  09/03/20 0532    Specimen:  Blood Updated:  09/03/20 0601    Narrative:       The following orders were created for panel order CBC & Differential.  Procedure                               Abnormality         Status                     ---------                               -----------         ------                     CBC Auto Differential[[id number]]        Abnormal            Final result                 Please view results for these tests on the individual orders.    CBC Auto Differential [[id number]]  (Abnormal) Collected:  09/03/20 0532    Specimen:  Blood Updated:  09/03/20 0601     WBC 4.17 10*3/mm3      RBC 2.33 10*6/mm3      Hemoglobin 7.5 g/dL      Hematocrit 23.0 %      MCV 98.7 fL      MCH 32.2 pg      MCHC 32.6 g/dL      RDW 15.8 %      RDW-SD 54.8  fl      MPV 11.0 fL      Platelets 90 10*3/mm3      Neutrophil % 54.2 %      Lymphocyte % 35.0 %      Monocyte % 8.2 %      Eosinophil % 2.2 %      Basophil % 0.2 %      Immature Grans % 0.2 %      Neutrophils, Absolute 2.26 10*3/mm3      Lymphocytes, Absolute 1.46 10*3/mm3      Monocytes, Absolute 0.34 10*3/mm3      Eosinophils, Absolute 0.09 10*3/mm3      Basophils, Absolute 0.01 10*3/mm3      Immature Grans, Absolute 0.01 10*3/mm3      nRBC 0.0 /100 WBC     POC Glucose Once [[id number]]  (Abnormal) Collected:  09/02/20 1615    Specimen:  Blood Updated:  09/02/20 1924     Glucose 178 mg/dL      Comment: Result Not ConfirmedOperator: [id number] NUBIA DASHMeter ID: [id number]              I reviewed the patient's new clinical results.  I reviewed the patient's new imaging results and agree with the interpretation.     ASSESSMENT/PLAN:   ASSESSMENT: 1.  GI bleeding, resolved. likely due to diverticular or hemorrhoidal hemorrhage.  Could also be due to AVMs.  2.  Acute posthemorrhagic anemia, improving.  likely due to redistribution.  No evidence of continued overt GI hemorrhage.  3.  Change in mental status, improving.  PLAN: 1.  Continue PPI  2. Continue p.o. diet  3.  Follow H&H closely and transfuse as needed  4.  Hemorrhoidal banding if rectal bleeding recurs  5.  High-fiber diet upon discharge  6.  Iron supplements upon discharge  7.  Okay to DC in AM from GI standpoint if she continues to improve  The risks, benefits, and alternatives of this procedure have been discussed with the patient or the responsible party- the patient understands and agrees to proceed.         Ge Gorman MD  09/03/20  23:25

## 2020-09-04 NOTE — PLAN OF CARE
Problem: Patient Care Overview  Goal: Plan of Care Review  Flowsheets (Taken 9/4/2020 1410)  Progress: no change  Plan of Care Reviewed With: patient  Patient Agreement with Plan of Care: agrees  Outcome Summary: Pt has been calm and cooperative with staff. She is taking meds as ordered. Staff assisting with ambulation due to visual disturbance. She is able to make wants and needs known.

## 2020-09-04 NOTE — NURSING NOTE
"Behavior   Note any precipitants to event or behavior   Describe level and action of any aggressive behavior or speech and associated interventions.     Anxiety: Patient denies at this time  Depression: Patient denies at this time  Pain  0  AVH   denies  S/I   no  Plan  no  H/I   no  Plan  no    Affect   euthymic/normal      Note: Pt sitting on bedside during the time of assessment. She is pleasant, friendly, and cooperative with staff. She states she was brought in because she was found unresponsive in her floor. She states \"I can hear people talking out there, asking for water but Im not crazy\". Pt states she can see shadows and light but wasn't able to see me standing in her room. She took her medications as ordered but did fuss about having to take them.       Intervention    PRN medication utilized:  no    Instructed in medication usage and effects  Medications administered as ordered  Encouraged to verbalize needs      Response    Verbalized understanding   Did patient take medications as ordered yes   Did patient interact with assessment?  yes     Plan    Will monitor for safety  Will monitor every 15 minutes as ordered  Will evaluate and promote the plan of care    Last BM:  unknown date  (Please chart in I/O as well)    "

## 2020-09-04 NOTE — PLAN OF CARE
Problem: Patient Care Overview  Goal: Plan of Care Review  Outcome: Ongoing (interventions implemented as appropriate)  Flowsheets (Taken 9/4/2020 6972)  Progress: no change  Plan of Care Reviewed With: patient  Outcome Summary: initial assessment  Note:   Will add soft ground diet restriction.  Encourage intake.  Honor food preferences.

## 2020-09-04 NOTE — THERAPY EVALUATION
Acute Care - Occupational Therapy Initial Evaluation  [facility]     Patient Name: Natacha Gandhi  : 1950  MRN: [id number]  Today's Date: 2020  Onset of Illness/Injury or Date of Surgery: 20  Date of Referral to OT: 20  Referring Physician: Dr. Anderson    Admit Date: 9/3/2020       ICD-10-CM ICD-9-CM   1. Impaired mobility and activities of daily living Z74.09 V49.89    Z78.9      Patient Active Problem List   Diagnosis   • Acute GI bleeding   • Altered mental status, unspecified   • Schizophrenia, paranoid type (CMS/HCC)   • Psychosis (CMS/HCC)     Past Medical History:   Diagnosis Date   • Depression    • Psychiatric illness    • Schizoaffective disorder (CMS/HCC)      History reviewed. No pertinent surgical history.       OT ASSESSMENT FLOWSHEET (last 12 hours)      Occupational Therapy Evaluation     Row Name 20 0936                   OT Evaluation Time/Intention    Subjective Information  no complaints  -AS        Document Type  evaluation  -AS        Mode of Treatment  co-treatment;occupational therapy;physical therapy  -AS        Total Evaluation Minutes, Occupational Therapy  30  -AS        Patient Effort  excellent  -AS           General Information    Patient Profile Reviewed?  yes  -AS        Onset of Illness/Injury or Date of Surgery  20  -AS        Referring Physician  Dr. Anderson  -AS        Patient Observations  alert;cooperative;agree to therapy  -AS        Patient/Family Observations  no family present  -AS        Prior Level of Function  independent:;all household mobility;ADL's (D) for cooking and driving  -AS        Existing Precautions/Restrictions  fall  -AS        Limitations/Impairments  visual legally blind  -AS        Risks Reviewed  patient:;LOB;nausea/vomiting;dizziness;increased discomfort;change in vital signs;increased drainage;lines disloged  -AS        Benefits Reviewed  patient:;improve function;increase independence;increase strength;increase  balance;decrease pain;decrease risk of DVT;improve skin integrity;increase knowledge  -AS           Relationship/Environment    Lives With  alone  -AS           Resource/Environmental Concerns    Current Living Arrangements  home/apartment/condo  -AS           Home Main Entrance    Number of Stairs, Main Entrance  one  -AS           Cognitive Assessment/Intervention- PT/OT    Orientation Status (Cognition)  oriented x 4  -AS        Follows Commands (Cognition)  WNL  -AS        Cognitive Function (Cognitive)  safety deficit  -AS        Personal Safety Interventions  supervised activity;nonskid shoes/slippers when out of bed;fall prevention program maintained  -AS           Safety Issues, Functional Mobility    Safety Issues Affecting Function (Mobility)  insight into deficits/self awareness;judgment;awareness of need for assistance;safety precaution awareness;safety precautions follow-through/compliance  -AS        Impairments Affecting Function (Mobility)  balance;cognition;endurance/activity tolerance  -AS           Bed Mobility Assessment/Treatment    Comment (Bed Mobility)  NT; rec'd seated EOB  -AS           Functional Mobility    Functional Mobility- Ind. Level  minimum assist (75% patient effort);2 person assist required  -AS        Functional Mobility- Device  other (see comments) Hand held assist  -AS        Functional Mobility- Safety Issues  balance decreased during turns  -AS           Transfer Assessment/Treatment    Transfer Assessment/Treatment  sit-stand transfer;stand-sit transfer;toilet transfer  -AS           Sit-Stand Transfer    Sit-Stand [place] (Transfers)  contact guard  -AS        Assistive Device (Sit-Stand Transfers)  other (see comments) HH assist  -AS           Stand-Sit Transfer    Stand-Sit [place] (Transfers)  contact guard  -AS        Assistive Device (Stand-Sit Transfers)  other (see comments) HH assist  -AS           Toilet Transfer    Type (Toilet Transfer)   sit-stand;stand-sit  -AS        [place] Level (Toilet Transfer)  contact guard HH assist  -AS           ADL Assessment/Intervention    BADL Assessment/Intervention  toileting;lower body dressing  -AS           Lower Body Dressing Assessment/Training    Lower Body Dressing [place] Level  don;pants/bottoms;minimum assist (75% patient effort) x4  -AS        Lower Body Dressing Position  edge of bed sitting  -AS           Toileting Assessment/Training    [place] Level (Toileting)  contact guard assist  -AS        Toileting Position  unsupported sitting;unsupported standing  -AS           BADL Safety/Performance    Impairments, BADL Safety/Performance  balance;endurance/activity tolerance;cognition  -AS           General ROM    GENERAL ROM COMMENTS  BUE WFL  -AS           MMT (Manual Muscle Testing)    General MMT Comments  BUE grossly 4+/5 throughout  -AS           Positioning and Restraints    Pre-Treatment Position  in bed  -AS        Post Treatment Position  bed  -AS        In Bed  sitting EOB;encouraged to call for assist;exit alarm on notified RN  -AS           Pain Scale: Numbers Pre/Post-Treatment    Pain Scale: Numbers, Pretreatment  0/10 - no pain  -AS        Pain Scale: Numbers, Post-Treatment  0/10 - no pain  -AS           Plan of Care Review    Plan of Care Reviewed With  patient  -AS           Clinical Impression (OT)    Date of Referral to OT  09/04/20  -AS        OT Diagnosis  impaire mobility and ADLs  -AS        Patient/Family Goals Statement (OT Eval)  return to PLOF  -AS        Criteria for Skilled Therapeutic Interventions Met (OT Eval)  yes;treatment indicated  -AS        Rehab Potential (OT Eval)  good, to achieve stated therapy goals  -AS        Therapy Frequency (OT Eval)  other (see comments) 6-7 days/wk  -AS        Predicted Duration of Therapy Intervention (Therapy Eval)  until goals met or d/c from facility  -AS        Care Plan Review (OT)  evaluation/treatment results  reviewed;care plan/treatment goals reviewed;risks/benefits reviewed;current/potential barriers reviewed;patient/other agree to care plan  -AS        Anticipated Discharge Disposition (OT)  skilled nursing facility;home with 24/7 care  -AS           Planned OT Interventions    Planned Therapy Interventions (OT Eval)  activity tolerance training;BADL retraining;neuromuscular control/coordination retraining;functional balance retraining;patient/caregiver education/training;ROM/therapeutic exercise;strengthening exercise;transfer/mobility retraining;occupation/activity based interventions  -AS           OT Goals    Transfer Goal Selection (OT)  transfer, OT goal 1  -AS        Bathing Goal Selection (OT)  bathing, OT goal 1  -AS        Dressing Goal Selection (OT)  dressing, OT goal 1  -AS        Toileting Goal Selection (OT)  toileting, OT goal 1  -AS        Balance Goal Selection (OT)  balance, OT goal 1  -AS        Additional Documentation  Balance Goal Selection (OT) (Row)  -AS           Transfer Goal 1 (OT)    Activity/Assistive Device (Transfer Goal 1, OT)  sit-to-stand/stand-to-sit;bed-to-chair/chair-to-bed;toilet  -AS        [place] Level/Cues Needed (Transfer Goal 1, OT)  standby assist;verbal cues required;tactile cues required;set-up required  -AS        Time Frame (Transfer Goal 1, OT)  long term goal (LTG);by discharge  -AS        Progress/Outcome (Transfer Goal 1, OT)  goal not met  -AS           Bathing Goal 1 (OT)    Activity/Assistive Device (Bathing Goal 1, OT)  lower body bathing  -AS        [place] Level/Cues Needed (Bathing Goal 1, OT)  verbal cues required;tactile cues required;set-up required;standby assist  -AS        Time Frame (Bathing Goal 1, OT)  long term goal (LTG);by discharge  -AS        Progress/Outcomes (Bathing Goal 1, OT)  goal not met  -AS           Dressing Goal 1 (OT)    Activity/Assistive Device (Dressing Goal 1, OT)  lower body dressing  -AS        [place]/Cues  Needed (Dressing Goal 1, OT)  verbal cues required;tactile cues required;set-up required;standby assist  -AS        Time Frame (Dressing Goal 1, OT)  long term goal (LTG);by discharge  -AS        Progress/Outcome (Dressing Goal 1, OT)  goal not met  -AS           Toileting Goal 1 (OT)    Activity/Device (Toileting Goal 1, OT)  toileting skills, all  -AS        [place] Level/Cues Needed (Toileting Goal 1, OT)  standby assist;verbal cues required;tactile cues required;set-up required  -AS        Time Frame (Toileting Goal 1, OT)  long term goal (LTG);by discharge  -AS        Progress/Outcome (Toileting Goal 1, OT)  goal not met  -AS           Balance Goal 1 (OT)    Activity/Assistive Device (Balance Goal 1, OT)  standing, dynamic  -AS        [place] Level/Cues Needed (Balance Goal 1, OT)  standby assist  -AS        Time Frame (Balance Goal 1, OT)  long term goal (LTG);by discharge  -AS        Progress/Outcomes (Balance Goal 1, OT)  goal not met  -AS           Living Environment    Home Accessibility  stairs to enter home  -AS          User Key  (r) = Recorded By, (t) = Taken By, (c) = Cosigned By    Initials Name Effective Dates    AS Unique Garcia OT 07/05/20 -          Occupational Therapy Education                 Title: PT OT SLP Therapies (In Progress)     Topic: Occupational Therapy (In Progress)     Point: ADL training (Done)     Description:   Instruct learner(s) on proper safety adaptation and remediation techniques during self care or transfers.   Instruct in proper use of assistive devices.              Learning Progress Summary           Patient Acceptance, E, VU,NR by AS at 9/4/2020 1200    Comment:  role of OT, OT POC, ADL training, fall preacutions, bed mobility, transfer training                   Point: Home exercise program (Not Started)     Description:   Instruct learner(s) on appropriate technique for monitoring, assisting and/or progressing therapeutic exercises/activities.               Learner Progress:   Not documented in this visit.          Point: Precautions (Done)     Description:   Instruct learner(s) on prescribed precautions during self-care and functional transfers.              Learning Progress Summary           Patient Acceptance, E, VU,NR by AS at 9/4/2020 4692    Comment:  role of OT, OT POC, ADL training, fall preacutions, bed mobility, transfer training                   Point: Body mechanics (Not Started)     Description:   Instruct learner(s) on proper positioning and spine alignment during self-care, functional mobility activities and/or exercises.              Learner Progress:   Not documented in this visit.                      User Key     Initials Effective Dates Name Provider Type Discipline    AS 07/05/20 -  Unique Garcia OT Occupational Therapist OT                  OT Recommendation and Plan  Outcome Summary/Treatment Plan (OT)  Anticipated Discharge Disposition (OT): skilled nursing facility, home with 24/7 care  Planned Therapy Interventions (OT Eval): activity tolerance training, BADL retraining, neuromuscular control/coordination retraining, functional balance retraining, patient/caregiver education/training, ROM/therapeutic exercise, strengthening exercise, transfer/mobility retraining, occupation/activity based interventions  Therapy Frequency (OT Eval): other (see comments)(6-7 days/wk)  Plan of Care Review  Plan of Care Reviewed With: patient  Plan of Care Reviewed With: patient  Outcome Summary: OT eval completed; co-eval with PT. Pt pleasant and cooperative; A&Ox4. Pt performed toilet transfer with CGA and toileting tasks with min A. Pt performed LBD with CGA.  Pt perform amb with min A (HH A) x2 person due to visual deficits and unsteadiness. Pt presents with decreased balance and mobility limiting independence in ADLs. Pt is legally blind which also poses as barrier especially in unfamiliar environments. Pt would benefit from continued skilled OT  services to address deficits and promote return to highest level of functioning. Rec 24/7 at d/c; may require SNF if pt does not have 24/7 as she lives alone.    Outcome Measures     Row Name 09/04/20 0936             How much help from another is currently needed...    Putting on and taking off regular lower body clothing?  3  -AS      Bathing (including washing, rinsing, and drying)  3  -AS      Toileting (which includes using toilet bed pan or urinal)  3  -AS      Putting on and taking off regular upper body clothing  3  -AS      Taking care of personal grooming (such as brushing teeth)  3  -AS      Eating meals  4  -AS      AM-PAC 6 Clicks Score (OT)  19  -AS         Functional Assessment    Outcome Measure Options  AM-PAC 6 Clicks Daily Activity (OT)  -AS        User Key  (r) = Recorded By, (t) = Taken By, (c) = Cosigned By    Initials Name Provider Type    AS Unique Garcia OT Occupational Therapist          Time Calculation:   Time Calculation- OT     Row Name 09/04/20 1211             Time Calculation- OT    OT Start Time  0936  -AS      OT Stop Time  1006  -AS      OT Time Calculation (min)  30 min  -AS      OT Received On  09/04/20  -AS      OT Goal Re-Cert Due Date  09/17/20  -AS        User Key  (r) = Recorded By, (t) = Taken By, (c) = Cosigned By    Initials Name Provider Type    AS Unique Garcia OT Occupational Therapist        Therapy Charges for Today     Code Description Service Date Service Provider Modifiers Qty    [id number]  OT EVAL MOD COMPLEXITY 2 9/4/2020 Unique Garcia OT GO 1               Unique Garcia OT  9/4/2020

## 2020-09-04 NOTE — PLAN OF CARE
"LCSW met with pt 1:1 and completed psychosocial assessment and PGDRS. Pt presented to her room, dressed in hospital scrubs, alert and oriented x4. Mood is fair, somewhat suspicious, affect is congruent. Pt makes limited eye contact, reports being legally blind. Pt is pleasant and cooperative, engages fairly well in conversation. Re: reason for admission, pt stated \"they found me passed out at my apartment and brought me in.\" pt reportedly was on the medical floor for several days  due to a GI bleed. When asked what led to her psychiatric admission, pt could not give a logical answer. Per notes, pt has a long hx of psychosis and non compliance with medications. Per family, pt has been followed by [facility] for case management, therapy and med management for many years. Pt does acknowledge her involvement with [facility], states that she has someone help her with meals, shopping, and around the house ,as well as med management. Pt denies non compliance with meds, but also only wants to focus on needing medications for her Diabetes. Pt eventually acknowledges that she has been having increased issues at home \"with the voices.\" pt notes that she hears voices \"that other people dont\" and that they have been bothering her with what they say. Pt notes that she does not understand why they have worsened recently. Pt reports most recently hearing them this morning. Pt denies past hospitalizations, denies previous trauma, denies substance abuse hx. Pt is agreeable to tx and willing to engage in individual and group therapy. Plan will be to have MD continue med management. Will follow up and coordinate care with family and [facility]. Tx to meet and develop tx plan. LCSW to follow up accordingly.    Mental Status Exam:    Hygiene:   fair  Cooperation:  Cooperative  Eye Contact:  Downcast  Psychomotor Behavior:  Appropriate  Affect:  Blunted  Speech:  Normal  Thought Progress:  Circum  Thought Content:  Normal  Suicidal:  " "None  Homicidal:  None  Hallucinations:  Auditory  Delusion:  Paranoid  Memory:  Intact  Orientation:  Person, Place, Time and Situation  Reliability:  fair  Insight:  Fair  Judgement:  Fair  Impulse Control:  Fair    Goals for treatment: \"To get my meds straight\"    Prior Hospitalizations / Dates    1.Denies    Childhood History: None provided    Suicide Attempts: Denies    Alcohol: does not drink,  Cannabis: does not use, Methamphetamine: does not use, Opiate: does not use, Cocaine: does not use and Synthetic: does not use    Sexual: heterosexual, Marital Status: single, Living situation: alone and Occupation: on permanent disability    History of physical abuse: no, History of sexual abuse: no and History of verbal/emotional abuse: no    11th grade    Access to firearms: Denies    Past Medical History:   Diagnosis Date    Depression     Psychiatric illness     Schizoaffective disorder (CMS/[facility])              Problem: Overarching Goals (Adult)  Goal: Adheres to Safety Considerations for Self and Others  Intervention: Develop and Maintain Individualized Safety Plan  Flowsheets (Taken 9/4/2020 0807)  Safety Measures: clinical history reviewed; suicide assessment completed  Q4 Suicidal Intent without Specific Plan: no  Previous Attempt to Harm Others: no  Q1 Wished to be Dead (Past Month): no  Q5 Suicide Intent with Specific Plan: no  Q2 Suicidal Thoughts (Past Month): no  Feels Like Hurting Others: no  Q6 Suicide Behavior (Lifetime): no  Q3 Suicidal Thought Method : no  Level of Risk per Screen: screen negative  Within the past 3 Months?: no  Goal: Optimized Coping Skills in Response to Life Stressors  Intervention: Promote Effective Coping Strategies  Flowsheets (Taken 9/4/2020 0807)  Supportive Measures: active listening utilized; verbalization of feelings encouraged; counseling provided; positive reinforcement provided; problem solving facilitated; decision-making supported; goal setting facilitated; self-care " encouraged; self-reflection promoted; self-responsibility promoted  Goal: Develops/Participates in Therapeutic [place] to Support Successful Transition  Intervention: Foster Therapeutic [place]  Flowsheets (Taken 9/4/2020 0807)  Trust Relationship/Rapport: care explained; thoughts/feelings acknowledged; choices provided; emotional support provided; empathic listening provided; questions answered; questions encouraged; reassurance provided  Intervention: Mutually Develop Transition Plan  Flowsheets (Taken 9/4/2020 0807)  Transition Support: community resources reviewed; crisis management plan promoted; follow-up care discussed     Problem: Cognitive Impairment (Psychotic Signs/Symptoms) (Adult)  Intervention: Promote Thought Clarity and Organization  Flowsheets (Taken 9/4/2020 0807)  Mutually Determined Action Steps (Promote Thought Clarity/Organization): participates in memory training; participates in problem resolution; remains focused during activity; follows step-by-step instructions     Problem: Sensory Perception Impairment (Psychotic Signs/Symptoms) (Adult)  Intervention: Minimize and Manage Sensory Impairment  Flowsheets (Taken 9/4/2020 0807)  Mutually Determined Action Steps (Minimize/Manage Sensory Impairment): adheres to medication regimen; shares insight re: need for meds  Sensory Stimulation Regulation: quiet environment promoted     Problem: Psychomotor Movement Impairment (Psychotic Signs/Symptoms) (Adult)  Intervention: Manage Psychomotor Movement  Flowsheets (Taken 9/4/2020 0807)  Mutually Determined Action Steps (Manage Psychomotor Movement): adheres to medication regimen; exhibits decrease in agitation     Problem: Mental State/Mood Impairment (Psychotic Signs/Symptoms) (Adult)  Intervention: Optimize Mental State and Mood  Flowsheets (Taken 9/4/2020 0807)  Mutually Determined Action Steps (Optimize Mental State/Mood): acknowledges progress; identifies thought distortion; identifies personal  treatment goal; verbalizes increased insight     Problem: Gratification/Engagement Impairment (Psychotic Signs/Symptoms) (Adult)  Intervention: Facilitate Re-Engagement and Participation  Flowsheets (Taken 9/4/2020 0807)  Mutually Determined Action Steps (Facilitate Re-Engagement/Participation): identifies symptoms triggers; verbalizes gratifying activity; verbalizes personal treatment goal; identifies future-oriented goal  Supportive Measures: active listening utilized; verbalization of feelings encouraged; counseling provided; positive reinforcement provided; problem solving facilitated; decision-making supported; goal setting facilitated; self-care encouraged; self-reflection promoted; self-responsibility promoted     Problem: Social/Occupational/Functional Impairment (Psychotic Signs/Symptoms) (Adult)  Intervention: Promote Social, Occupational and Functional Ability  Flowsheets (Taken 9/4/2020 0807)  Mutually Determined Action Steps (Promote Social/Occupational/Functional Ability): identifies personal strengths; identifies support resources  Trust Relationship/Rapport: care explained; thoughts/feelings acknowledged; choices provided; emotional support provided; empathic listening provided; questions answered; questions encouraged; reassurance provided

## 2020-09-04 NOTE — THERAPY EVALUATION
Patient Name: Natacha Gandhi  : 1950    MRN: [id number]                              Today's Date: 2020       Admit Date: 9/3/2020    Visit Dx:     ICD-10-CM ICD-9-CM   1. Impaired mobility and activities of daily living Z74.09 V49.89    Z78.9    2. Impaired functional mobility, balance, gait, and endurance Z74.09 V49.89     Patient Active Problem List   Diagnosis   • Acute GI bleeding   • Altered mental status, unspecified   • Schizophrenia, paranoid type (CMS/HCC)   • Psychosis (CMS/HCC)     Past Medical History:   Diagnosis Date   • Depression    • Psychiatric illness    • Schizoaffective disorder (CMS/HCC)      History reviewed. No pertinent surgical history.  General Information     Row Name 20 1328          PT Evaluation Time/Intention    Document Type  evaluation  -KW     Mode of Treatment  co-treatment;occupational therapy;physical therapy  -KW     Row Name 20 1328          General Information    Patient Profile Reviewed?  yes  -KW     Prior Level of Function  independent:;all household mobility;ADL's dependent for cooking and driving  -KW     Existing Precautions/Restrictions  fall blind  -KW     Barriers to Rehab  visual deficit  -KW     Row Name 20 1328          Relationship/Environment    Lives With  alone  -KW     Row Name 20 1328          Resource/Environmental Concerns    Current Living Arrangements  home/apartment/condo  -KW     Row Name 20 1328          Home Main Entrance    Number of Stairs, Main Entrance  one  -KW     Stair Railings, Main Entrance  none  -KW     Row Name 20 1328          Stairs Within Home, Primary    Number of Stairs, Within Home, Primary  none  -KW     Row Name 20 1328          Cognitive Assessment/Intervention- PT/OT    Orientation Status (Cognition)  oriented x 4  -KW     Personal Safety Interventions  fall prevention program maintained;gait belt;nonskid shoes/slippers when out of bed;supervised activity  -KW     Row Name  09/04/20 1328          Safety Issues, Functional Mobility    Safety Issues Affecting Function (Mobility)  impulsivity;safety precautions follow-through/compliance;safety precaution awareness;insight into deficits/self awareness;awareness of need for assistance  -KW     Impairments Affecting Function (Mobility)  balance;cognition;endurance/activity tolerance  -KW       User Key  (r) = Recorded By, (t) = Taken By, (c) = Cosigned By    Initials Name Provider Type    KW Melina Neves PT Physical Therapist        Mobility     Row Name 09/04/20 1329          Bed Mobility Assessment/Treatment    Comment (Bed Mobility)  Pt sitting EOB at beginning of eval and requesting to stay sitting EOB at end of session  -KW     Row Name 09/04/20 1329          Sit-Stand Transfer    Sit-Stand [place] (Transfers)  contact guard  -KW     Assistive Device (Sit-Stand Transfers)  -- B HHA  -KW     Row Name 09/04/20 1329          Gait/Stairs Assessment/Training    Gait/Stairs Assessment/Training  gait/ambulation independence;distance ambulated  -KW     [place] Level (Gait)  minimum assist (75% patient effort);2 person assist  -KW     Assistive Device (Gait)  -- B HHA  -KW     Distance in Feet (Gait)  200ft, 100ft  -KW     Deviations/Abnormal Patterns (Gait)  gait speed decreased;stride length decreased;base of support, narrow  -KW       User Key  (r) = Recorded By, (t) = Taken By, (c) = Cosigned By    Initials Name Provider Type    KW Melina Neves PT Physical Therapist        Obj/Interventions     Row Name 09/04/20 1331          General ROM    GENERAL ROM COMMENTS  BLE AROM WFL   -KW     Row Name 09/04/20 1331          MMT (Manual Muscle Testing)    General MMT Comments  BLE grossly 4+/5  -KW     Row Name 09/04/20 1331          Static Sitting Balance    Level of [place] (Unsupported Sitting, Static Balance)  conditional independence  -KW     Sitting Position (Unsupported Sitting, Static Balance)  sitting on edge of bed  -KW      Time Able to Maintain Position (Unsupported Sitting, Static Balance)  more than 5 minutes  -KW     Row Name 09/04/20 1331          Static Standing Balance    Level of [place] (Supported Standing, Static Balance)  contact guard assist  -KW     Time Able to Maintain Position (Supported Standing, Static Balance)  1 to 2 minutes  -KW     Row Name 09/04/20 1331          Sensory Assessment/Intervention    Sensory General Assessment  no sensation deficits identified BLE light touch assessed  -KW       User Key  (r) = Recorded By, (t) = Taken By, (c) = Cosigned By    Initials Name Provider Type    Melina Valladares, PT Physical Therapist        Goals/Plan     [facility] Name 09/04/20 1333          Bed Mobility Goal 1 (PT)    Activity/Assistive Device (Bed Mobility Goal 1, PT)  sit to supine;supine to sit  -KW     [place] Level/Cues Needed (Bed Mobility Goal 1, PT)  independent  -KW     Time Frame (Bed Mobility Goal 1, PT)  3 days  -KW     Progress/Outcomes (Bed Mobility Goal 1, PT)  goal not met  -KW     [facility] Name 09/04/20 1333          Transfer Goal 1 (PT)    Activity/Assistive Device (Transfer Goal 1, PT)  sit-to-stand/stand-to-sit  -KW     [place] Level/Cues Needed (Transfer Goal 1, PT)  supervision required  -KW     Time Frame (Transfer Goal 1, PT)  3 days  -KW     Progress/Outcome (Transfer Goal 1, PT)  goal not met  -KW     [facility] Name 09/04/20 1333          Gait Training Goal 1 (PT)    Activity/Assistive Device (Gait Training Goal 1, PT)  gait (walking locomotion) LRAD PRN  -KW     [place] Level (Gait Training Goal 1, PT)  supervision required  -KW     Distance (Gait Goal 1, PT)  100ft or more  -KW     Time Frame (Gait Training Goal 1, PT)  5 days  -KW     Progress/Outcome (Gait Training Goal 1, PT)  goal not met  -KW     [facility] Name 09/04/20 1333          Stairs Goal 1 (PT)    Activity/Assistive Device (Stairs Goal 1, PT)  ascending stairs;descending stairs  -KW     [place] Level/Cues Needed (Stairs  Goal 1, PT)  standby assist  -KW     Number of Stairs (Stairs Goal 1, PT)  1 or more  -KW     Time Frame (Stairs Goal 1, PT)  by discharge  -KW     Progress/Outcome (Stairs Goal 1, PT)  goal not met  -KW       User Key  (r) = Recorded By, (t) = Taken By, (c) = Cosigned By    Initials Name Provider Type    Melina Valladares, PT Physical Therapist        Clinical Impression     Row Name 09/04/20 1332          Pain Scale: Numbers Pre/Post-Treatment    Pain Scale: Numbers, Pretreatment  0/10 - no pain  -KW     Pain Scale: Numbers, Post-Treatment  0/10 - no pain  -KW     Row Name 09/04/20 1332          Plan of Care Review    Plan of Care Reviewed With  patient  -KW     Row Name 09/04/20 1332          Physical Therapy Clinical Impression    Criteria for Skilled Interventions Met (PT Clinical Impression)  yes;treatment indicated  -KW     Rehab Potential (PT Clinical Summary)  good, to achieve stated therapy goals  -KW     Row Name 09/04/20 1332          Positioning and Restraints    Pre-Treatment Position  in bed  -KW     Post Treatment Position  bed  -KW     In Bed  notified nsg;sitting EOB;call light within reach;encouraged to call for assist;exit alarm on;side rails up x2  -KW       User Key  (r) = Recorded By, (t) = Taken By, (c) = Cosigned By    Initials Name Provider Type    Melnia Valladares, PT Physical Therapist        Outcome Measures     Row Name 09/04/20 9183          How much help from another person do you currently need...    Turning from your back to your side while in flat bed without using bedrails?  3  -KW     Moving from lying on back to sitting on the side of a flat bed without bedrails?  3  -KW     Moving to and from a bed to a chair (including a wheelchair)?  3  -KW     Standing up from a chair using your arms (e.g., wheelchair, bedside chair)?  3  -KW     Climbing 3-5 steps with a railing?  3  -KW     To walk in hospital room?  3  -KW     AM-PAC 6 Clicks Score (PT)  18  -KW     Row Name 09/04/20 2806           Functional Assessment    Outcome Measure Options  AM-PAC 6 Clicks Basic Mobility (PT)  -KERRY       User Key  (r) = Recorded By, (t) = Taken By, (c) = Cosigned By    Initials Name Provider Type    Melina Valladares PT Physical Therapist        Physical Therapy Education                 Title: PT OT SLP Therapies (In Progress)     Topic: Physical Therapy (In Progress)     Point: Mobility training (Done)     Description:   Instruct learner(s) on safety and technique for assisting patient out of bed, chair or wheelchair.  Instruct in the proper use of assistive devices, such as walker, crutches, cane or brace.              Patient Friendly Description:   It's important to get you on your feet again, but we need to do so in a way that is safe for you. Falling has serious consequences, and your personal safety is the most important thing of all.        When it's time to get out of bed, one of us or a family member will sit next to you on the bed to give you support.     If your doctor or nurse tells you to use a walker, crutches, a cane, or a brace, be sure you use it every time you get out of bed, even if you think you don't need it.    Learning Progress Summary           Patient Acceptance, E, VU by KERRY at 9/4/2020 8737    Comment:  Role of PT, POC, use of gait belt                   Point: Home exercise program (Not Started)     Description:   Instruct learner(s) on appropriate technique for monitoring, assisting and/or progressing patient with therapeutic exercises and activities.              Learner Progress:   Not documented in this visit.          Point: Body mechanics (Not Started)     Description:   Instruct learner(s) on proper positioning and spine alignment for patient and/or caregiver during mobility tasks and/or exercises.              Learner Progress:   Not documented in this visit.          Point: Precautions (Done)     Description:   Instruct learner(s) on prescribed precautions during mobility  and gait tasks              Learning Progress Summary           Patient Acceptance, E, VU by KERRY at 9/4/2020 1334    Comment:  Role of PT, POC, use of gait belt                               User Key     Initials Effective Dates Name Provider Type Discipline     08/09/20 -  Melina Neves PT Physical Therapist PT              PT Recommendation and Plan  Planned Therapy Interventions (PT Eval): balance training, bed mobility training, gait training, home exercise program, patient/family education, strengthening, stair training, transfer training  Outcome Summary/Treatment Plan (PT)  Anticipated Discharge Disposition (PT): home with 24/7 care, home with home health, skilled nursing facility  Plan of Care Reviewed With: patient  Outcome Summary: PT evaluation completed as co-eval with OT. Pt pleasant and agreeable to eval. Pt A&Ox4. Pt performing sit<>stand with CGA and HHA. Pt ambulating 200ft and 100ft with CGA and HHA for direction as vision limiting at times and contributing to unsteadiness. Pt would benefit from further skilled PT to increase functional mobility, endurance, strength, balance, saftey, and to progress towards PLOF. Upon d/c from acute care, recommend 24/7 care at d/t; pt may require SNF if pt does not have 24/7 available as pt currently lives alone.     Time Calculation:   PT Charges     Row Name 09/04/20 1340             Time Calculation    Start Time  0926  -KW      Stop Time  1005  -KW      Time Calculation (min)  39 min  -KW      PT Received On  09/04/20  -KW      PT Goal Re-Cert Due Date  09/17/20  -KW        User Key  (r) = Recorded By, (t) = Taken By, (c) = Cosigned By    Initials Name Provider Type    Melina Valladares PT Physical Therapist        Therapy Charges for Today     Code Description Service Date Service Provider Modifiers Qty    01134594958 HC PT EVAL MOD COMPLEXITY 3 9/4/2020 Melina Neves, MARLINE GP 1          PT G-Codes  Outcome Measure Options: AM-PAC 6 Clicks Basic Mobility  (PT)  AM-PAC 6 Clicks Score (PT): 18  AM-PAC 6 Clicks Score (OT): 19    Melina Neves, PT  9/4/2020

## 2020-09-04 NOTE — PLAN OF CARE
Problem: Patient Care Overview  Goal: Plan of Care Review  Outcome: Ongoing (interventions implemented as appropriate)  Flowsheets (Taken 9/4/2020 6866)  Plan of Care Reviewed With: patient  Outcome Summary: PT evaluation completed as co-eval with OT. Pt pleasant and agreeable to eval. Pt A&Ox4. Pt performing sit<>stand with CGA and HHA. Pt ambulating 200ft and 100ft with CGA and HHA for direction as vision limiting at times and contributing to unsteadiness. Pt would benefit from further skilled PT to increase functional mobility, endurance, strength, balance, saftey, and to progress towards PLOF. Upon d/c from acute care, recommend 24/7 care at d/t; pt may require SNF if pt does not have 24/7 available as pt currently lives alone.

## 2020-09-04 NOTE — CONSULTS
Adult Nutrition  Assessment    Patient Name:  Natacha Gandhi  YOB: 1950  MRN: [id number]  Admit Date:  9/3/2020    Assessment Date:  9/4/2020    Comments:  Pt noted to be blind but to have adapted well and not need assistance.  Pt reports pretty good appetite.  Indicated the meals got cold.  Indicates some foods don't go down including beef if it is too big of a piece.  Willing to receive soft food with ground meat.  Voiced food preferences.  Blood glucose is elevated.  Glucotrol prescribed.  Hgb, Hct are elevated.  Ferrous Sulfate prescribed.  Will add soft ground diet restriction and honor food preferences.           Reason for Assessment     Row Name 09/04/20 1416          Reason for Assessment    Reason For Assessment  identified at risk by screening criteria     Identified At Risk by Screening Criteria  difficulty chewing/swallowing             Labs/Tests/Procedures/Meds     Row Name 09/04/20 1417          Labs/Procedures/Meds    Lab Results Reviewed  reviewed, pertinent        Medications    Pertinent Medications Reviewed  reviewed, pertinent           Estimated/Assessed Needs     Row Name 09/04/20 1418          Calculation Measurements    Weight Used For Calculations  55.5 kg (122 lb 5.7 oz)        Estimated/Assessed Needs    Additional Documentation  Calorie Requirements (Group);Fluid Requirements (Group);Lindsay-St. Jeor Equation (Group);Protein Requirements (Group)        Calorie Requirements    Estimated Calorie Requirement (kcal/day)  1370        Lindsay-St. Jeor Equation    RMR (Lindsay-St. Jeor Equation)  1054.125        Protein Requirements    Weight Used For Protein Calculations  55.5 kg (122 lb 5.7 oz)     Est Protein Requirement Amount (gms/kg)  1.2 gm protein     Estimated Protein Requirements (gms/day)  66.6        Fluid Requirements    Estimated Fluid Requirements (mL/day)  1387     RDA Method (mL)  1387         Nutrition Prescription Ordered     Row Name 09/04/20 1422           Nutrition Prescription PO    Current PO Diet  Regular     Common Modifiers  Cardiac;Consistent Carbohydrate         Evaluation of Received Nutrient/Fluid Intake     Row Name 09/04/20 1423          PO Evaluation    Number of Meals  6     % PO Intake  100% - 4x, 755 - 1x, 50% - 1x               Electronically signed by:  Suzette Steiner RD  09/04/20 14:25

## 2020-09-04 NOTE — PLAN OF CARE
Problem: Patient Care Overview  Goal: Plan of Care Review  Outcome: Ongoing (interventions implemented as appropriate)  Flowsheets (Taken 9/4/2020 0312)  Progress: no change  Patient Agreement with Plan of Care: agrees  Outcome Summary: No changes this shift. No behaviors noted. Appeared to sleep well.

## 2020-09-04 NOTE — PLAN OF CARE
Problem: Patient Care Overview  Goal: Plan of Care Review  Flowsheets (Taken 9/4/2020 120)  Plan of Care Reviewed With: patient  Outcome Summary: OT eval completed; co-eval with PT. Pt pleasant and cooperative; A&Ox4. Pt performed toilet transfer with CGA and toileting tasks with min A. Pt performed LBD with CGA.  Pt perform amb with min A (HH A) x2 person due to visual deficits and unsteadiness. Pt presents with decreased balance and mobility limiting independence in ADLs. Pt is legally blind which also poses as barrier especially in unfamiliar environments. Pt would benefit from continued skilled OT services to address deficits and promote return to highest level of functioning. Rec 24/7 at d/c; may require SNF if pt does not have 24/7 as she lives alone.

## 2020-09-04 NOTE — CONSULTS
[facility] Medicine Consult  Inpatient Hospitalist Consult  Consult performed by: Cas Houston APRN  Consult ordered by: Tyrel Anderson II, MD          Date of Admission: 9/3/2020  Date of Consult: 09/04/20    Primary Care Physician: Alisia Ramirez APRN  Referring Physician:  Tyrel Anderson*      Chief Complaint/Reason for Consultation: Medical co-management, psychosis    HPI: This is a 70 year old female with a history of schizophrenia and DMII who is admitted to [facility] for psychosis.  She was recently admitted for GI bleeding, likely secondary to bleeding diverticulum.  She reports no further bleeding episodes.     Past Medical History:   Past Medical History:   Diagnosis Date   • Depression    • Psychiatric illness    • Schizoaffective disorder (CMS/HCC)        Past Surgical History: History reviewed. No pertinent surgical history.    Family History:   Family History   Problem Relation Age of Onset   • Dementia Mother    • No Known Problems Father    • No Known Problems Sister    • No Known Problems Brother    • No Known Problems Maternal Aunt    • No Known Problems Paternal Aunt    • No Known Problems Maternal Uncle    • No Known Problems Paternal Uncle    • No Known Problems Maternal Grandfather    • No Known Problems Maternal Grandmother    • No Known Problems Paternal Grandfather    • No Known Problems Paternal Grandmother    • No Known Problems Cousin    • No Known Problems Other    • Suicide Attempts Neg Hx    • ADD / ADHD Neg Hx    • Alcohol abuse Neg Hx    • Anxiety disorder Neg Hx    • Bipolar disorder Neg Hx    • Depression Neg Hx    • Drug abuse Neg Hx    • OCD Neg Hx    • Paranoid behavior Neg Hx    • Schizophrenia Neg Hx    • Seizures Neg Hx    • Self-Injurious Behavior  Neg Hx        Social History:   Social History     Socioeconomic History   • Marital status: Legally      Spouse name: Not on file   • Number of  "children: Not on file   • Years of education: Not on file   • Highest education level: Not on file   Tobacco Use   • Smoking status: Former Smoker   • Smokeless tobacco: Never Used   • Tobacco comment: \" a little bit a long time ago\"   Substance and Sexual Activity   • Alcohol use: Not Currently   • Drug use: Never   • Sexual activity: Not Currently       Allergies: No Known Allergies    Medications:   Current Facility-Administered Medications:   •  aluminum-magnesium hydroxide-simethicone (MAALOX MAX) 400-400-40 MG/5ML suspension 15 mL, 15 mL, Oral, Q6H PRN, Tyrel Anderson II, MD  •  cloNIDine (CATAPRES) tablet 0.1 mg, 0.1 mg, Oral, Q6H PRN, Tyrel Anderson II, MD  •  dextrose (D50W) 25 g/ 50mL Intravenous Solution 25 g, 25 g, Intravenous, Q15 Min PRN, Cas Houston APRN  •  dextrose (GLUTOSE) oral gel 15 g, 15 g, Oral, Q15 Min PRN, Cas Houston APRN  •  escitalopram (LEXAPRO) tablet 10 mg, 10 mg, Oral, Daily, Tyrel Anderson II, MD, 10 mg at 09/04/20 0810  •  ferrous sulfate EC tablet 324 mg, 324 mg, Oral, Daily With Breakfast, Tyrel Anderson II, MD, 324 mg at 09/04/20 0810  •  glipizide (GLUCOTROL) 2.5 mg, metFORMIN (GLUCOPHAGE) 500 mg for METAGLIP 2.5-250, , Oral, BID Justin FRANKS Ronald Reagan II, MD  •  glucagon (human recombinant) (GLUCAGEN DIAGNOSTIC) injection 1 mg, 1 mg, Subcutaneous, Q15 Min PRN, Cas Houston APRN  •  insulin aspart (novoLOG) injection 0-7 Units, 0-7 Units, Subcutaneous, TID Rosemarie FRANKS Mark Andrew, APRN  •  loperamide (IMODIUM) capsule 2 mg, 2 mg, Oral, Q2H PRN, Tyrel Anderson II, MD  •  LORazepam (ATIVAN) tablet 1 mg, 1 mg, Oral, Q8H PRN **OR** LORazepam (ATIVAN) injection 1 mg, 1 mg, Intramuscular, Q8H PRN, Tyrel Anderson II, MD  •  magnesium hydroxide (MILK OF MAGNESIA) suspension 2400 mg/10mL 10 mL, 10 mL, Oral, Daily PRN, Tyrel Anderson II, MD  •  pantoprazole (PROTONIX) EC tablet 40 mg, 40 mg, " Oral, Q AM, Tyrel Anderson II, MD, 40 mg at 09/04/20 0810  •  risperiDONE (risperDAL) tablet 1 mg, 1 mg, Oral, Nightly, Tyrel Anderson II, MD    Review of Systems:  Review of Systems   Constitutional: Negative for appetite change, chills, fatigue and fever.   HENT: Negative for congestion.    Eyes: Negative for visual disturbance.        Reports legal blindness   Respiratory: Negative for cough, chest tightness, shortness of breath and wheezing.    Cardiovascular: Negative for chest pain, palpitations and leg swelling.   Gastrointestinal: Negative for abdominal distention, abdominal pain, blood in stool, diarrhea, nausea and vomiting.   Genitourinary: Negative for difficulty urinating and dysuria.   Musculoskeletal: Negative for arthralgias, back pain, myalgias and neck pain.   Skin: Negative for rash and wound.   Neurological: Negative for dizziness, syncope, weakness, light-headedness, numbness and headaches.   All other systems reviewed and are negative.     Otherwise complete ROS is negative except as mentioned above.    Physical Exam:   Temp:  [96.7 °F (35.9 °C)-99.4 °F (37.4 °C)] 98.3 °F (36.8 °C)  Heart Rate:  [82-95] 82  Resp:  [18] 18  BP: (114-145)/(58-67) 120/58  Physical Exam   Constitutional: She is oriented to person, place, and time. She appears well-developed and well-nourished.   HENT:   Head: Normocephalic and atraumatic.   Eyes: Conjunctivae and EOM are normal.   Neck: Normal range of motion. Neck supple.   Cardiovascular: Normal rate, regular rhythm, normal heart sounds and intact distal pulses. Exam reveals no gallop and no friction rub.   No murmur heard.  Pulmonary/Chest: Effort normal and breath sounds normal. No respiratory distress. She has no wheezes. She has no rales.   Abdominal: Soft. Bowel sounds are normal. She exhibits no distension. There is no tenderness.   Musculoskeletal: Normal range of motion. She exhibits no edema.   Neurological: She is alert and oriented to  person, place, and time. She displays no atrophy and no tremor. She exhibits normal muscle tone. She displays no seizure activity. Coordination and gait normal.   CN I: Sense of smell intact  CN II: Reports being legally blind  CN III,IV,VI: extraocular movements intact  CN V: [place] strength and sensation in all three divisions intact  CN VII: Smile and eyelid closure symmetrical  CN VIII: Hearing intact  CN IX and X: Voice and palate movement intact  CN XI: Shoulder shrug intact  CN XII: Tongue protrusion and movement intact     Skin: Skin is warm and dry. No erythema.         Results Reviewed:  I have personally reviewed current lab, radiology, and data and agree with results.  Lab Results (last 24 hours)     ** No results found for the last 24 hours. **        Imaging Results (Last 24 Hours)     ** No results found for the last 24 hours. **          Assessment:    Psychosis (CMS/[facility])    Acute blood loss anemia    DMII (diabetes mellitus, type 2) (CMS/[facility])            Recommendations:  1. Psychiatric management per primary team  2. Recent GI bleeding, most likely secondary to bleeding diverticulum which has resolved.  Continue PPI. Continue PO ferrous sulfate.  Monitor for recurrent bleeding.  H&H in AM.  3. Glucose control: metformin-glipizide, SSI 0-7 units  4. Will continue to follow for now    I discussed the patients findings and my recommendations with: Dr. Justin Houston, APRSHANI  09/04/20  13:56

## 2020-09-05 LAB
25(OH)D3 SERPL-MCNC: 11.5 NG/ML (ref 30–100)
FOLATE SERPL-MCNC: 9.86 NG/ML (ref 4.78–24.2)
GLUCOSE BLDC GLUCOMTR-MCNC: 148 MG/DL (ref 70–130)
GLUCOSE BLDC GLUCOMTR-MCNC: 212 MG/DL (ref 70–130)
GLUCOSE BLDC GLUCOMTR-MCNC: 224 MG/DL (ref 70–130)
GLUCOSE BLDC GLUCOMTR-MCNC: 62 MG/DL (ref 70–130)
GLUCOSE BLDC GLUCOMTR-MCNC: 86 MG/DL (ref 70–130)
HCT VFR BLD AUTO: 23.3 % (ref 34–46.6)
HGB BLD-MCNC: 7.5 G/DL (ref 12–15.9)
HIV1+2 AB SER QL: NORMAL
RPR SER QL: NORMAL
TSH SERPL DL<=0.05 MIU/L-ACNC: 1.03 UIU/ML (ref 0.27–4.2)
VIT B12 BLD-MCNC: 1716 PG/ML (ref 211–946)

## 2020-09-05 PROCEDURE — 82607 VITAMIN B-12: CPT | Performed by: PSYCHIATRY & NEUROLOGY

## 2020-09-05 PROCEDURE — 99232 SBSQ HOSP IP/OBS MODERATE 35: CPT | Performed by: PSYCHIATRY & NEUROLOGY

## 2020-09-05 PROCEDURE — G0432 EIA HIV-1/HIV-2 SCREEN: HCPCS | Performed by: PSYCHIATRY & NEUROLOGY

## 2020-09-05 PROCEDURE — 97110 THERAPEUTIC EXERCISES: CPT

## 2020-09-05 PROCEDURE — 82746 ASSAY OF FOLIC ACID SERUM: CPT | Performed by: PSYCHIATRY & NEUROLOGY

## 2020-09-05 PROCEDURE — 85018 HEMOGLOBIN: CPT | Performed by: NURSE PRACTITIONER

## 2020-09-05 PROCEDURE — 97116 GAIT TRAINING THERAPY: CPT

## 2020-09-05 PROCEDURE — 84443 ASSAY THYROID STIM HORMONE: CPT | Performed by: PSYCHIATRY & NEUROLOGY

## 2020-09-05 PROCEDURE — 82962 GLUCOSE BLOOD TEST: CPT

## 2020-09-05 PROCEDURE — 86592 SYPHILIS TEST NON-TREP QUAL: CPT | Performed by: PSYCHIATRY & NEUROLOGY

## 2020-09-05 PROCEDURE — 82306 VITAMIN D 25 HYDROXY: CPT | Performed by: PSYCHIATRY & NEUROLOGY

## 2020-09-05 PROCEDURE — 85014 HEMATOCRIT: CPT | Performed by: NURSE PRACTITIONER

## 2020-09-05 RX ADMIN — PANTOPRAZOLE SODIUM 40 MG: 40 TABLET, DELAYED RELEASE ORAL at 08:54

## 2020-09-05 RX ADMIN — DEXTROSE 15 G: 15 GEL ORAL at 07:14

## 2020-09-05 RX ADMIN — RISPERIDONE 1.5 MG: 1 TABLET ORAL at 20:22

## 2020-09-05 RX ADMIN — Medication 2.5 MG: at 17:15

## 2020-09-05 RX ADMIN — RISPERIDONE 0.25 MG: 0.25 TABLET ORAL at 08:54

## 2020-09-05 RX ADMIN — ESCITALOPRAM OXALATE 10 MG: 10 TABLET ORAL at 08:55

## 2020-09-05 RX ADMIN — FERROUS SULFATE TAB EC 324 MG (65 MG FE EQUIVALENT) 324 MG: 324 (65 FE) TABLET DELAYED RESPONSE at 08:55

## 2020-09-05 RX ADMIN — METFORMIN HYDROCHLORIDE 500 MG: 500 TABLET ORAL at 17:15

## 2020-09-05 NOTE — PROGRESS NOTES
[facility] Medicine Services  INPATIENT PROGRESS NOTE    Length of Stay: 2  Date of Admission: 9/3/2020  Primary Care Physician: Alisia Ramirez APRN    Subjective   Chief Complaint: Medical co-management, psychosis  HPI:  70 year old female with a history of schizophrenia and DMII who is admitted to [facility] for psychosis.  She was recently admitted for GI bleeding, likely secondary to bleeding diverticulum.  She reports no further bleeding episodes. H&H is stable    Review of Systems   Constitutional: Negative for chills and fever.   Respiratory: Negative for shortness of breath.    Cardiovascular: Negative for chest pain and palpitations.   Gastrointestinal: Negative for abdominal pain, blood in stool, diarrhea, nausea and vomiting.        All pertinent negatives and positives are as above. All other systems have been reviewed and are negative unless otherwise stated.     Objective    Temp:  [98.2 °F (36.8 °C)-99.8 °F (37.7 °C)] 98.2 °F (36.8 °C)  Heart Rate:  [79-90] 90  Resp:  [18] 18  BP: (113-132)/(52-60) 113/52    Physical Exam   Constitutional: She appears well-developed and well-nourished. No distress.   HENT:   Head: Normocephalic and atraumatic.   Eyes: Conjunctivae are normal.   Cardiovascular: Normal rate and regular rhythm.   Pulmonary/Chest: Effort normal and breath sounds normal. No respiratory distress.   Abdominal: Soft. Bowel sounds are normal. She exhibits no distension. There is no tenderness.   Musculoskeletal: She exhibits no edema.   Neurological: She is alert.   Skin: Skin is warm and dry. She is not diaphoretic.   Vitals reviewed.        Results Review:  I have reviewed the labs, radiology results, and diagnostic studies.    Laboratory Data:   Results from last 7 days   Lab Units 09/03/20  0532 09/02/20  0546 09/01/20  0537   SODIUM mmol/L 140 140 141   POTASSIUM mmol/L 3.9 3.7 3.2*   CHLORIDE mmol/L 107 106 112*   CO2 mmol/L 26.0 24.0 22.0   BUN  mg/dL 15 9 13   CREATININE mg/dL 0.91 0.76 0.86   GLUCOSE mg/dL 135* 147* 152*   CALCIUM mg/dL 8.5* 8.3* 8.3*   ANION GAP mmol/L 7.0 10.0 7.0     Estimated Creatinine Clearance: 51.8 mL/min (by C-G formula based on SCr of 0.91 mg/dL).          Results from last 7 days   Lab Units 09/05/20  0818 09/03/20  0532 09/02/20  0546 09/01/20  0537 08/31/20  0349 08/30/20  1629   WBC 10*3/mm3  --  4.17 6.23 5.19 6.27 7.36   HEMOGLOBIN g/dL 7.5* 7.5* 8.7* 7.8* 8.3* 9.3*   HEMATOCRIT % 23.3* 23.0* 26.5* 24.5* 24.8* 27.6*   PLATELETS 10*3/mm3  --  90* 110* 79* 75* 88*           Culture Data:   No results found for: BLOODCX  No results found for: URINECX  No results found for: RESPCX  No results found for: WOUNDCX  No results found for: STOOLCX  No components found for: BODYFLD    Radiology Data:   Imaging Results (Last 24 Hours)     ** No results found for the last 24 hours. **          I have reviewed the patient's current medications.     Assessment/Plan     Active Hospital Problems    Diagnosis   • **Acute exacerbation of chronic paranoid schizophrenia (CMS/[facility])   • Acute blood loss anemia     Due to recent GI bleed     • DMII (diabetes mellitus, type 2) (CMS/[facility])   • Schizophrenia, paranoid type (CMS/[facility])       Plan:     H&H stable, no indication for transfusion  Continue PPI  Continue ferrous sulfate  Glucose control: metformin-glipizide, SSI 0-7 units    Will sign off.  Please do not hesitate to call with questions or changes in the patients condition. Thank you.          This document has been electronically signed by CLAIRE Lama on September 5, 2020 14:32

## 2020-09-05 NOTE — NURSING NOTE
Behavior   Note any precipitants to event or behavior   Describe level and action of any aggressive behavior or speech and associated interventions.     Anxiety: Patient denies at this time  Depression: Patient denies at this time  Pain      AVH   denies  S/I   no  Plan  no  H/I   no  Plan  no    Affect   euthymic/normal      Note: Pt in bed at the time of assessment. She is pleasant, laughing, and cooperative with staff. She denies avh,si, hi. She is taking meds as ordered. Noted to have a blood sugar of 62 this am. Asymptomatic. She states this is odd for her to have low blood sugar but she hasnt been eating sweets like she does at home. Dextrose gel given with a result of 86. Will continue to monitor.      Intervention    PRN medication utilized:  no    Instructed in medication usage and effects  Medications administered as ordered  Encouraged to verbalize needs      Response    Verbalized understanding   Did patient take medications as ordered yes   Did patient interact with assessment?  yes     Plan    Will monitor for safety  Will monitor every 15 minutes as ordered  Will evaluate and promote the plan of care    Last BM:  unknown date  (Please chart in I/O as well)

## 2020-09-05 NOTE — PLAN OF CARE
Problem: Patient Care Overview  Goal: Plan of Care Review  Outcome: Ongoing (interventions implemented as appropriate)  Flowsheets (Taken 9/5/2020 0253)  Progress: improving  Plan of Care Reviewed With: patient  Patient Agreement with Plan of Care: agrees  Outcome Summary: No falls, slept throughut the night, no behaviors noted.

## 2020-09-05 NOTE — PROGRESS NOTES
"Psychiatry Progress Note   9/5/2020      HD: #2  Legal: Vol    Chief Complaint: Gross Psychosis, Severe Hallucinations and Gross Disorganization      Subjective --  Ms. Natacha Gandhi is a 70 y.o. female who was seen on the Geriatric unit.      Patient is pleasant this morning.  She engaged well.  She denies any active hallucinations.  No overt responding to internal stimuli.  Disorganization is improving.  Overall psychosis is less severe and less impairing than before, still constant.  Improved with medicines and hospital stay.  She denies any headaches, stomachaches, or muscle aches.  Denies feeling dizzy or lightheaded..    No behavioral issues overnight.  Initially reluctant with medicine but took scheduled medications overnight.        Objective   Objective --      Vital Signs:  Temp:  [98.3 °F (36.8 °C)-99.8 °F (37.7 °C)] 99.8 °F (37.7 °C)  Heart Rate:  [79-82] 79  Resp:  [18] 18  BP: (120-132)/(58-60) 132/60    Patient Vitals for the past 24 hrs:   BP Temp Temp src Pulse Resp SpO2   09/04/20 2100 132/60 99.8 °F (37.7 °C) Tympanic 79 18 99 %   09/04/20 0900 120/58 98.3 °F (36.8 °C) Tympanic 82 18 97 %        Physical Exam:   -General Appearance:  normal general appearance and in no apparent distress  -Hygiene:  Adequate   -Gait & Station:  Blank multiple: Deferred, in bed  -Musculoskeletal:  No tremors or abnormal involuntary movements and No Cog [place] or Rigidity and No atrophy noted  -Pulm: unlaboured    Mental Status Exam:   --Cooperation:  Cooperative  --Eye Contact:  Non-sustained  --Psychomotor Behavior:  Slow  --Mood:  \"All right\"  --Affect:  blunted  --Speech:  Slow and Soft  --Thought Process:  Dyscognitive, Sluggish and Disorganized  --Associations: Circumstantial  --Themes:  None overt  --Thought Content:                --Mood congruent              --Suicidal:  Denies               --Homicidal:  Denies              --Hallucinations:  Auditory              --Delusion:  Cannot rule out " Paranoia  --Cognitive Functioning:              -Consciousness: awake and alert              -Orientation:  Person, Place and Time              -Attention:  Distractible                         -Concentration:  Distractible              -Language:  Average based on interaction; Intact              -Vocabulary:  Below Average based on interaction; Word Finding Difficulties              -Short Term Memory: Deficits              -Long Term Memory: Intact              -Fund of Knowledge:  Below Average based on interaction              -Abstraction:  [place]  --Reliability:  fair  --Insight:  Diminished  --Judgment:  Impaired  --Impulse Control:  Impaired      Lab Results (last 24 hours)     Procedure Component Value Units Date/Time    POC Glucose Once [[id number]]  (Abnormal) Collected:  09/05/20 0708    Specimen:  Blood Updated:  09/05/20 0719     Glucose 62 mg/dL      Comment: : [id number] ERIC CHAMBERLAINMeter ID: [id number]       POC Glucose Once [[id number]]  (Abnormal) Collected:  09/04/20 1652    Specimen:  Blood Updated:  09/04/20 1706     Glucose 172 mg/dL      Comment: RN NotifiedOperator: [id number] FLORENTINO ALISONMeter ID: [id number]             Imaging Results (Last 24 Hours)     ** No results found for the last 24 hours. **            Medications:   Scheduled Meds:  escitalopram 10 mg Oral Daily   ferrous sulfate 324 mg Oral Daily With Breakfast   glipizide 2.5 mg Oral BID AC   And      metFORMIN 500 mg Oral BID AC   insulin aspart 0-7 Units Subcutaneous TID AC   pantoprazole 40 mg Oral Q AM   risperiDONE 0.25 mg Oral Daily   risperiDONE 1 mg Oral Nightly     Continuous Infusions:   PRN Meds:.•  aluminum-magnesium hydroxide-simethicone  •  cloNIDine  •  dextrose  •  dextrose  •  glucagon (human recombinant)  •  loperamide  •  LORazepam **OR** LORazepam  •  magnesium hydroxide      Assessment:     Acute exacerbation of chronic paranoid schizophrenia (CMS/HCC)    Schizophrenia, paranoid type  (CMS/[facility])    Acute blood loss anemia    DMII (diabetes mellitus, type 2) (CMS/[facility])      --> IMPRESSION: Ongoing psychosis, slowly improving.  Tolerating medications, will plan to further optimize anti-dopaminergic therapy given psychosis.      Treatment Plan:  1) Will continue care for the patient on the behavioral health unit at [facility] to ensure patient safety given severity of presenting symptoms, need for further improvement and maintenance of improvement prior to discharge.    2) Will continue to provide treatment with the unit milieu, activities, therapies and psychopharmacological management.    3) Patient to be placed on or continued on  Q15 minute checks  and Fall precautions.    4) Pertinent Concurrent Non-Psychiatric Medical Issues:   --Type 2 diabetes: Continue glipizide and Metformin twice daily, sliding scale insulin  -GERD/acute blood loss anemia: Continue Protonix and ferrous sulfate    5) Will order following labs: None    6) Behavioral Health Treatment Planning:  --Increase Risperdal to 0.25 mg daily as well as 1.5 mg nightly for psychosis    7) Psychotherapy provided: Supportive, for less than 10 minutes.    --> Dispostion Planning: To home versus [facility] after further improvement stabilization    Treatment plan and medication risks and benefits discussed with: Patient and Treatment Team.    Tyrel Anderson II, MD  09/05/20 @ 07:40  Dictated using Dragon.

## 2020-09-05 NOTE — PLAN OF CARE
Problem: Patient Care Overview  Goal: Plan of Care Review  Flowsheets (Taken 9/5/2020 7743)  Progress: improving  Plan of Care Reviewed With: patient  Patient Agreement with Plan of Care: agrees

## 2020-09-05 NOTE — H&P
"Psychiatric & Behavioral Health History & Physical  9/4/2020    --> Source of History: chart review and the patient; staff    --> Chief Complaint: Gross Psychosis and Gross Disorganization      History of Present Illness:  Ms. Natacha Gandhi is a 70 y.o. female with a concurrent neuropsychiatric history notable for schizophrenia.    Seen on the hospitalist service for psychosis by Dr. BOYLE:  Patient is a 70 y.o. female who presents with paranoia. Onset of symptoms was gradual starting several months ago.  Symptoms have been present on an increasingly more frequent basis. Symptoms are associated with anxiety, insomnia and auditory hallucinations and paranoia.  Symptoms are aggravated by medication non-compliance.   Patient's symptoms occur in the context of long history of treatment by [facility] for mental health issues.     She notes that she was brought to the ED ambulance due to \"passing out.\"  Patient was apparently found unresponsive on the floor of her room surrounded by bloody stool.  She was found by her [facility] .  Her brother reports she has been going to [facility] for years and has had a  for years.     Patient notes that people are after her and she hears voices of \"real human beings.\"  Her brother who is present reports that she has been medication non-compliant b/c she believes there are people out to kill her.  She is also hearing voices as well.  She endorse the belief that there are individuals who are trying to kill her.  She denies medication compliance.  When asked what medications she is taking, she notes \"diet pills\" and then changes to medications for her diabetes.  Her brother reports that she has been worse in the last 6-7 months.     Nursing reports poor sleep last night with constant responding to auditory hallucinations and paranoia.  She thought there was someone outside her window.      Today, pt is pleasant.  Remains disorganized.  Fewer behaviors overnight.  " "Benefiting from starting of Risperdal.  Per staff, less yelling, disorganization, though still confused and illogical.        Presents with psychosis. Onset of symptoms was gradual starting some days ago.  Symptoms have been present on an increasingly more frequent basis. Symptoms are associated with agitation, irritability and medical illness.  Symptoms are aggravated by problems with health.   Symptoms improve with Rispderal.  Patient's symptom severity is severe.  Patient's symptoms occur in the context of chronic illness.    Per LEEANN Birmingham's note:  Pt presented to her room, dressed in hospital scrubs, alert and oriented x4. Mood is fair, somewhat suspicious, affect is congruent. Pt makes limited eye contact, reports being legally blind. Pt is pleasant and cooperative, engages fairly well in conversation. Re: reason for admission, pt stated \"they found me passed out at my apartment and brought me in.\" pt reportedly was on the medical floor for several days  due to a GI bleed. When asked what led to her psychiatric admission, pt could not give a logical answer. Per notes, pt has a long hx of psychosis and non compliance with medications. Per family, pt has been followed by [facility] for case management, therapy and med management for many years. Pt does acknowledge her involvement with [facility], states that she has someone help her with meals, shopping, and around the house ,as well as med management. Pt denies non compliance with meds, but also only wants to focus on needing medications for her Diabetes. Pt eventually acknowledges that she has been having increased issues at home \"with the voices.\" pt notes that she hears voices \"that other people dont\" and that they have been bothering her with what they say. Pt notes that she does not understand why they have worsened recently. Pt reports most recently hearing them this morning. Pt denies past hospitalizations, denies previous trauma, denies substance abuse " "hx. Pt is agreeable to tx and willing to engage in individual and group therapy.      Psychiatric Review Of Systems:  -Disorganization, psychosis      Concurrent Psychiatric History:  --Past neuropsychiatric history: psychosis, schizophrenia    --Psychiatric Hospitalizations:   Denies any prior hospitalizations.    --Suicide Attempts:   Denies any prior suicide attempts.    --Firearm Access: denies    --Prior Treatment and Medications Tried:   --She has lexapro 20mg daily and seroquel 50mg qhs on PTA med list and she states that she is taking it.    --History of violence or legal issues:   Reports significant legal charges regarding hx bad checks.  Denies current pending legal charges.    -Abuse/Trauma/Neglect/Exploitation: denied      Substance Use:   --Nicotine: denies   --Caffeine: denies   --EtOH: denies   --THC: denies   --Illicits: denies      Social History:  --> Marriages: once for \"pretty good while.\"  Current Relationships:   Children: one son     Abuse/Trauma: she denies     Education: 11th grade   Occupation: on disability  Living Situation: alone    Social History     Socioeconomic History   • Marital status: Legally      Spouse name: Not on file   • Number of children: Not on file   • Years of education: Not on file   • Highest education level: Not on file   Tobacco Use   • Smoking status: Former Smoker   • Smokeless tobacco: Never Used   • Tobacco comment: \" a little bit a long time ago\"   Substance and Sexual Activity   • Alcohol use: Not Currently   • Drug use: Never   • Sexual activity: Not Currently         Family History:  Family History   Problem Relation Age of Onset   • Dementia Mother    • No Known Problems Father    • No Known Problems Sister    • No Known Problems Brother    • No Known Problems Maternal Aunt    • No Known Problems Paternal Aunt    • No Known Problems Maternal Uncle    • No Known Problems Paternal Uncle    • No Known Problems Maternal Grandfather    • No " Known Problems Maternal Grandmother    • No Known Problems Paternal Grandfather    • No Known Problems Paternal Grandmother    • No Known Problems Cousin    • No Known Problems Other    • Suicide Attempts Neg Hx    • ADD / ADHD Neg Hx    • Alcohol abuse Neg Hx    • Anxiety disorder Neg Hx    • Bipolar disorder Neg Hx    • Depression Neg Hx    • Drug abuse Neg Hx    • OCD Neg Hx    • Paranoid behavior Neg Hx    • Schizophrenia Neg Hx    • Seizures Neg Hx    • Self-Injurious Behavior  Neg Hx      -->Further details: Family Suicides: denied      Past Medical and Surgical History:  Past Medical History:   Diagnosis Date   • Depression    • Psychiatric illness    • Schizoaffective disorder (CMS/HCC)      --> Seizure Hx: denied  --> Head Injury w/ LOC: denied      Past Surgical History:   Procedure Laterality Date   • COLONOSCOPY N/A 8/29/2020    Procedure: COLONOSCOPY;  Surgeon: Ge Gorman MD;  Location: [facility];  Service: Gastroenterology;  Laterality: N/A;   • ENDOSCOPY N/A 8/28/2020    Procedure: ESOPHAGOGASTRODUODENOSCOPY;  Surgeon: Ge Gorman MD;  Location: [facility];  Service: Gastroenterology;  Laterality: N/A;       Allergies:  Patient has no known allergies.    Medications Prior to Admission   Medication Sig Dispense Refill Last Dose   • Empagliflozin (Jardiance) 10 MG tablet Take 10 mg by mouth Daily.   9/3/2020 at Unknown time   • escitalopram (LEXAPRO) 20 MG tablet Take 20 mg by mouth Daily.   9/3/2020 at Unknown time   • ferrous sulfate (FerrouSul) 325 (65 FE) MG tablet Take 1 tablet by mouth Daily With Breakfast. 30 tablet 0 9/3/2020 at Unknown time   • glipiZIDE-metFORMIN (METAGLIP) 2.5-500 MG per tablet Take 1 tablet by mouth 2 (Two) Times a Day Before Meals.   9/3/2020 at Unknown time   • pantoprazole (PROTONIX) 40 MG EC tablet Take 1 tablet by mouth Daily. 30 tablet 0 9/3/2020 at Unknown time   • risperiDONE (risperDAL) 1 MG tablet Take 1 tablet by mouth Every Night.   9/3/2020 at  "Unknown time   • risperiDONE (risperDAL) 1 MG tablet Take 1 tablet by mouth At Night As Needed (severe paranoia or agitation).   Unknown at Unknown time     --> Reviewed; Risperdal started on hospital service      Medical Review Of Systems:  Reviewed review of systems from NP Rosemarie note from today.. Reviewed with additions made:  Constitutional: Negative for appetite change, chills, fatigue and fever.   HENT: Negative for congestion.    Eyes: Negative for visual disturbance.        Reports legal blindness   Respiratory: Negative for cough, chest tightness, shortness of breath and wheezing.    Cardiovascular: Negative for chest pain, palpitations and leg swelling.   Gastrointestinal: Negative for abdominal distention, abdominal pain, blood in stool, diarrhea, nausea and vomiting.   Genitourinary: Negative for difficulty urinating and dysuria.   Musculoskeletal: Negative for arthralgias, back pain, myalgias and neck pain.   Skin: Negative for rash and wound.   Neurological: Negative for dizziness, syncope, weakness, light-headedness, numbness and headaches.   Psychiatric: Psychosis; negative for HI; others as above.         Objective   Objective --    Vital Signs:  Temp:  [98.3 °F (36.8 °C)] 98.3 °F (36.8 °C)  Heart Rate:  [82] 82  Resp:  [18] 18  BP: (120)/(58) 120/58    Physical Exam:   -General Appearance:  normal general appearance and in no apparent distress  -Hygiene:  Adequate   -Gait & Station:  Blank multiple: Deferred, in bed  -Musculoskeletal:  No tremors or abnormal involuntary movements and No Cog Axis or Rigidity and No atrophy noted  -Pulm: unlaboured    Mental Status Exam:   --Cooperation:  Cooperative  --Eye Contact:  Non-sustained  --Psychomotor Behavior:  Slow  --Mood:  \"OK\"  --Affect:  blunted  --Speech:  Slow and Soft  --Thought Process:  Dyscognitive, Sluggish and Disorganized  --Associations: Circumstantial  --Themes:  None overt  --Thought Content:     --Mood congruent   --Suicidal:  " Denies    --Homicidal:  Denies   --Hallucinations:  Auditory   --Delusion:  Cannot rule out Paranoia  --Cognitive Functioning:   -Consciousness: awake and alert   -Orientation:  Person, Place and Time   -Attention:  Distractible    -Concentration:  Distractible   -Language:  Average based on interaction; Intact   -Vocabulary:  Below Average based on interaction; Word Finding Difficulties   -Short Term Memory: Deficits   -Long Term Memory: Intact   -Fund of Knowledge:  Below Average based on interaction   -Abstraction:  [place]  --Reliability:  fair  --Insight:  Diminished  --Judgment:  Impaired  --Impulse Control:  Impaired      Diagnostic Data:      --> Lab Work  Recent Results (from the past 72 hour(s))   Basic Metabolic Panel    Collection Time: 09/02/20  5:46 AM   Result Value Ref Range    Glucose 147 (H) 65 - 99 mg/dL    BUN 9 8 - 23 mg/dL    Creatinine 0.76 0.57 - 1.00 mg/dL    Sodium 140 136 - 145 mmol/L    Potassium 3.7 3.5 - 5.2 mmol/L    Chloride 106 98 - 107 mmol/L    CO2 24.0 22.0 - 29.0 mmol/L    Calcium 8.3 (L) 8.6 - 10.5 mg/dL    eGFR  African Amer 92 >60 mL/min/1.73    BUN/Creatinine Ratio 11.8 7.0 - 25.0    Anion Gap 10.0 5.0 - 15.0 mmol/L   CBC Auto Differential    Collection Time: 09/02/20  5:46 AM   Result Value Ref Range    WBC 6.23 3.40 - 10.80 10*3/mm3    RBC 2.70 (L) 3.77 - 5.28 10*6/mm3    Hemoglobin 8.7 (L) 12.0 - 15.9 g/dL    Hematocrit 26.5 (L) 34.0 - 46.6 %    MCV 98.1 (H) 79.0 - 97.0 fL    MCH 32.2 26.6 - 33.0 pg    MCHC 32.8 31.5 - 35.7 g/dL    RDW 15.6 (H) 12.3 - 15.4 %    RDW-SD 52.9 37.0 - 54.0 fl    MPV 11.2 6.0 - 12.0 fL    Platelets 110 (L) 140 - 450 10*3/mm3    Neutrophil % 56.5 42.7 - 76.0 %    Lymphocyte % 34.7 19.6 - 45.3 %    Monocyte % 6.6 5.0 - 12.0 %    Eosinophil % 1.6 0.3 - 6.2 %    Basophil % 0.3 0.0 - 1.5 %    Immature Grans % 0.3 0.0 - 0.5 %    Neutrophils, Absolute 3.52 1.70 - 7.00 10*3/mm3    Lymphocytes, Absolute 2.16 0.70 - 3.10 10*3/mm3    Monocytes, Absolute  0.41 0.10 - 0.90 10*3/mm3    Eosinophils, Absolute 0.10 0.00 - 0.40 10*3/mm3    Basophils, Absolute 0.02 0.00 - 0.20 10*3/mm3    Immature Grans, Absolute 0.02 0.00 - 0.05 10*3/mm3    nRBC 0.0 0.0 - 0.2 /100 WBC   POC Glucose Once    Collection Time: 09/02/20  7:40 AM   Result Value Ref Range    Glucose 132 (H) 70 - 130 mg/dL   POC Glucose Once    Collection Time: 09/02/20 12:22 PM   Result Value Ref Range    Glucose 174 (H) 70 - 130 mg/dL   POC Glucose Once    Collection Time: 09/02/20  4:15 PM   Result Value Ref Range    Glucose 178 (H) 70 - 130 mg/dL   Basic Metabolic Panel    Collection Time: 09/03/20  5:32 AM   Result Value Ref Range    Glucose 135 (H) 65 - 99 mg/dL    BUN 15 8 - 23 mg/dL    Creatinine 0.91 0.57 - 1.00 mg/dL    Sodium 140 136 - 145 mmol/L    Potassium 3.9 3.5 - 5.2 mmol/L    Chloride 107 98 - 107 mmol/L    CO2 26.0 22.0 - 29.0 mmol/L    Calcium 8.5 (L) 8.6 - 10.5 mg/dL    eGFR  African Amer 75 >60 mL/min/1.73    BUN/Creatinine Ratio 16.5 7.0 - 25.0    Anion Gap 7.0 5.0 - 15.0 mmol/L   CBC Auto Differential    Collection Time: 09/03/20  5:32 AM   Result Value Ref Range    WBC 4.17 3.40 - 10.80 10*3/mm3    RBC 2.33 (L) 3.77 - 5.28 10*6/mm3    Hemoglobin 7.5 (L) 12.0 - 15.9 g/dL    Hematocrit 23.0 (L) 34.0 - 46.6 %    MCV 98.7 (H) 79.0 - 97.0 fL    MCH 32.2 26.6 - 33.0 pg    MCHC 32.6 31.5 - 35.7 g/dL    RDW 15.8 (H) 12.3 - 15.4 %    RDW-SD 54.8 (H) 37.0 - 54.0 fl    MPV 11.0 6.0 - 12.0 fL    Platelets 90 (L) 140 - 450 10*3/mm3    Neutrophil % 54.2 42.7 - 76.0 %    Lymphocyte % 35.0 19.6 - 45.3 %    Monocyte % 8.2 5.0 - 12.0 %    Eosinophil % 2.2 0.3 - 6.2 %    Basophil % 0.2 0.0 - 1.5 %    Immature Grans % 0.2 0.0 - 0.5 %    Neutrophils, Absolute 2.26 1.70 - 7.00 10*3/mm3    Lymphocytes, Absolute 1.46 0.70 - 3.10 10*3/mm3    Monocytes, Absolute 0.34 0.10 - 0.90 10*3/mm3    Eosinophils, Absolute 0.09 0.00 - 0.40 10*3/mm3    Basophils, Absolute 0.01 0.00 - 0.20 10*3/mm3    Immature Grans, Absolute  0.01 0.00 - 0.05 10*3/mm3    nRBC 0.0 0.0 - 0.2 /100 WBC   POC Glucose Once    Collection Time: 09/03/20 11:12 AM   Result Value Ref Range    Glucose 182 (H) 70 - 130 mg/dL   POC Glucose Once    Collection Time: 09/03/20  4:23 PM   Result Value Ref Range    Glucose 167 (H) 70 - 130 mg/dL   POC Glucose Once    Collection Time: 09/04/20  4:52 PM   Result Value Ref Range    Glucose 172 (H) 70 - 130 mg/dL       Ct Head Without Contrast    Result Date: 8/28/2020  Narrative: PROCEDURE: CT head without contrast REASON FOR EXAM: Decreased alertness This exam was performed according to our departmental dose-optimization program, which includes automated exposure control, adjustment of the mA and/or kV according to patient size and/or use of iterative reconstruction technique. FINDINGS: Axial computer tomography sequential imaging of the head was performed from the vertex to the base of the skull. .Sagittal and coronal reformation was performed . The skull vault is intact. Paranasal sinuses and bilateral mastoid air cells are well aerated. Cerebral and cerebellar parenchymal are normal. Ventricular system and subarachnoid spaces are normal.     Impression: Normal CT of the head. Electronically signed by:  Chino Lacy MD  8/28/2020 2:31 PM CDT Workstation: [id number]    Mri Brain Without Contrast    Result Date: 8/30/2020  Narrative: EXAM: MR BRAIN WITHOUT IV CONTRAST COMPARISONS: CT head 8/28/2020 HISTORY: Encephalopathy, GI hemorrhage, TECHNIQUE: Multiplanar, multisequence MR images were obtained of the brain without the use of intravenous contrast. FINDINGS: No intracranial hemorrhage, mass, mass effect, or midline shift. Brain parenchyma is normal in signal and configuration. No restricted diffusion. CSF-containing spaces are symmetric and appropriate in size. Osseous structures are within normal limits. Orbits are unremarkable. Paranasal sinuses and mastoid air cells are clear.     Impression: No acute or significant  chronic intracranial process. Electronically signed by:  Alba Moon MD  8/30/2020 10:46 AM CDT Workstation: [id number]      No results found for: GLUF     Lab Results   Component Value Date    HGBA1C 7.80 (H) 08/28/2020       Lab Results   Component Value Date    CHOL 118 07/02/2020    TRIG 73 07/02/2020    HDL 47 07/02/2020    LDL 56 07/02/2020    VLDL 14.6 07/02/2020    LDLHDL 1.20 07/02/2020        TSH   Date Value Ref Range Status   07/02/2020 1.300 0.270 - 4.200 uIU/mL Final       No results found for: HKXN60IH, PPIEQDQS60, FOLATE    No results found for: IRON, TIBC, FERRITIN      Assessment/Plan   --Patient Strengths: ability for insight, communication skills, supportive family/friends   --Patient Barriers: low self esteem, marital/family conflict      --Diagnostic Impression: Ms. Gandhi  is a 70 y.o. female admitted for gross psychosis, constituting an imminent risk of harm to self, given the severity of her presenting symptoms, necessitating further inpt stabilization and treatment.     Moving forward, will plan to optimize anti d2 therapy to target disorganization and hallucinations.            Assessment:  --  Acute exacerbation of chronic paranoid schizophrenia (CMS/HCC)    Schizophrenia, paranoid type (CMS/HCC)    Acute blood loss anemia    DMII (diabetes mellitus, type 2) (CMS/HCC)        Treatment Plan:  1) Will admit patient to the behavioral health unit at [facility], geriatric behavioral health unit, as a voluntary admission to ensure patient safety given imminent risk status and need for emergent inpatient stabilization and treatment.    2) Patient will be provided treatment with the unit milieu, activities, therapies and psychopharmacological management.    3) Patient placed on  Q15 minute checks and Fall precautions.    4) Hospitalist consulted for assistance in management of medical comorbidities.    5) Will order following labs:   --RPR, HIV, TSH, Folate, B12,  Vitamin D to evaluate for any contributing etiologies.   --[place] lipids & glucose to establish baseline for any anti-d2 agent therapy    6) Will restart patient on the following psychiatric home meds:   --Risperdal started on hospitalist service.  Cont Lexapro.     7) Will make the following medication changes, and proceed with the following treatment planning:   --Increase Risperdal to 1mg qHS & 0.25mg daily for psychosis  --Cont Lexapro, 10 mg    8) Will begin discharge planning as appropriate for patient.    9) Psychotherapy provided: supportive for < 10 min.     All questions answered for the patient.  Treatment plan and medication risks and benefits discussed with: Patient and tx team.      Estimated Length of Stay: 1 week  Prognosis: fair    Tyrel Anderson II, MD  09/04/20 @ 19:41  Dictated using Dragon.

## 2020-09-05 NOTE — NURSING NOTE
"Behavior   Note any precipitants to event or behavior   Describe level and action of any aggressive behavior or speech and associated interventions.     Anxiety: Patient denies at this time  Depression: Patient denies at this time  Pain  0  AVH   denies  S/I   no  Plan  no  H/I   no  Plan  no    Affect   euthymic/normal      Note: Pt sitting on bedside during the time of assessment. She is pleasant, friendly, and cooperative with staff. She was medication compliant, no behaviors noted she denies AVH,, When I tool her the night medication she stated\"more pills\" but did take them, Able to make needs known. Will continue to monitor, no falls.      Intervention    PRN medication utilized:  no    Instructed in medication usage and effects  Medications administered as ordered  Encouraged to verbalize needs      Response    Verbalized understanding   Did patient take medications as ordered yes   Did patient interact with assessment?  yes     Plan    Will monitor for safety  Will monitor every 15 minutes as ordered  Will evaluate and promote the plan of care    Last BM:  unknown date  (Please chart in I/O as well)    "

## 2020-09-05 NOTE — THERAPY TREATMENT NOTE
Acute Care - Physical Therapy Treatment Note  [facility] [place]     Patient Name: Natacha Gandhi  : 1950  MRN: [id number]  Today's Date: 2020  Onset of Illness/Injury or Date of Surgery: 20     Referring Physician: Dr. Anderson    Admit Date: 9/3/2020    Visit Dx:    ICD-10-CM ICD-9-CM   1. Impaired mobility and activities of daily living Z74.09 V49.89    Z78.9    2. Impaired functional mobility, balance, gait, and endurance Z74.09 V49.89     Patient Active Problem List   Diagnosis   • Acute GI bleeding   • Altered mental status, unspecified   • Schizophrenia, paranoid type (CMS/HCC)   • Acute exacerbation of chronic paranoid schizophrenia (CMS/HCC)   • Acute blood loss anemia   • DMII (diabetes mellitus, type 2) (CMS/HCC)       Therapy Treatment    Rehabilitation Treatment Summary     Row Name 2035             Treatment Time/Intention    Discipline  physical therapy assistant  -CA      Document Type  therapy note (daily note)  -CA      Subjective Information  no complaints  -CA      Patient Effort  excellent  -CA      Recorded by [CA] Jt Frausto, PTA 20 1030      Row Name 2035             Vital Signs    Pre Patient Position  Supine  -CA      Intra Patient Position  Standing  -CA      Post Patient Position  Supine  -CA      Recorded by [CA] Jt Frausto, PTA 20 1337      Row Name 20             Cognitive Assessment/Intervention- PT/OT    Orientation Status (Cognition)  oriented x 4  -CA      Follows Commands (Cognition)  WNL  -CA      Cognitive Function (Cognitive)  safety deficit  -CA      Memory Deficit (Cognitive)  moderate deficit  -CA      Personal Safety Interventions  fall prevention program maintained;gait belt;muscle strengthening facilitated;nonskid shoes/slippers when out of bed  -CA      Recorded by [CA] Jt Frausto, PTA 20 1337      Row Name 2035             Bed Mobility Assessment/Treatment    Bed Mobility Assessment/Treatment   supine-sit;sit-supine  -CA      Supine-Sit [place] (Bed Mobility)  supervision;contact guard  -CA      Sit-Supine [place] (Bed Mobility)  supervision  -CA      Recorded by [CA] Jt Frausto, PTA 09/05/20 1337      Row Name 09/05/20 0935             Transfer Assessment/Treatment    Transfer Assessment/Treatment  sit-stand transfer;stand-sit transfer  -CA      Recorded by [CA] Jt Frausto, PTA 09/05/20 1337      Row Name 09/05/20 0935             Sit-Stand Transfer    Sit-Stand [place] (Transfers)  contact guard  -CA      Assistive Device (Sit-Stand Transfers)  other (see comments) HHA  -CA      Recorded by [CA] Jt Frausto, PTA 09/05/20 1337      Row Name 09/05/20 0935             Stand-Sit Transfer    Stand-Sit [place] (Transfers)  contact guard  -CA      Assistive Device (Stand-Sit Transfers)  other (see comments) HHA  -CA      Recorded by [CA] Jt Frausto, PTA 09/05/20 1337      Row Name 09/05/20 0935             Gait/Stairs Assessment/Training    Gait/Stairs Assessment/Training  gait/ambulation assistive device  -CA      [place] Level (Gait)  contact guard  -CA      Assistive Device (Gait)  other (see comments) HHA  -CA      Distance in Feet (Gait)  200'  -CA      Pattern (Gait)  step-through  -CA      Deviations/Abnormal Patterns (Gait)  gait speed decreased;base of support, narrow  -CA      Recorded by [CA] Jt Frausto, PTA 09/05/20 1337      Row Name 09/05/20 0935             Therapeutic Exercise    Therapeutic Exercise  seated, lower extremities  -CA      Recorded by [CA] Jt Frausto, PTA 09/05/20 1337      Row Name 09/05/20 0935             Lower Extremity Seated Therapeutic Exercise    Performed, Seated Lower Extremity (Therapeutic Exercise)  hip flexion/extension;ankle dorsiflexion/plantarflexion;LAQ (long arc quad), knee extension  -CA      Exercise Type, Seated Lower Extremity (Therapeutic Exercise)  AROM (active range of motion)  -CA      Sets/Reps Detail, Seated  Lower Extremity (Therapeutic Exercise)  15x1  -CA      Recorded by [CA] Jt Frausto, PTA 09/05/20 1337      Row Name 09/05/20 0935             Positioning and Restraints    Pre-Treatment Position  in bed  -CA      Post Treatment Position  bed  -CA      In Bed  supine;encouraged to call for assist  -CA      Recorded by [CA] Jt Frausto, PTA 09/05/20 1337      Row Name 09/05/20 0935             Pain Scale: Numbers Pre/Post-Treatment    Pain Scale: Numbers, Pretreatment  0/10 - no pain  -CA      Pain Scale: Numbers, Post-Treatment  0/10 - no pain  -CA      Recorded by [CA] Jt Frausto, PTA 09/05/20 1337        User Key  (r) = Recorded By, (t) = Taken By, (c) = Cosigned By    Initials Name Effective Dates Discipline    CA Jt Frausto, PTA 03/07/18 -  PT               Rehab Goal Summary     Row Name 09/05/20 0935             Bed Mobility Goal 1 (PT)    Activity/Assistive Device (Bed Mobility Goal 1, PT)  sit to supine;supine to sit  -CA      [place] Level/Cues Needed (Bed Mobility Goal 1, PT)  independent  -CA      Time Frame (Bed Mobility Goal 1, PT)  3 days  -CA      Progress/Outcomes (Bed Mobility Goal 1, PT)  goal not met  -CA         Transfer Goal 1 (PT)    Activity/Assistive Device (Transfer Goal 1, PT)  sit-to-stand/stand-to-sit  -CA      [place] Level/Cues Needed (Transfer Goal 1, PT)  supervision required  -CA      Time Frame (Transfer Goal 1, PT)  3 days  -CA      Progress/Outcome (Transfer Goal 1, PT)  goal not met  -CA         Gait Training Goal 1 (PT)    Activity/Assistive Device (Gait Training Goal 1, PT)  gait (walking locomotion) LRAD PRN  -CA      [place] Level (Gait Training Goal 1, PT)  supervision required  -CA      Distance (Gait Goal 1, PT)  100ft or more  -CA      Time Frame (Gait Training Goal 1, PT)  5 days  -CA      Progress/Outcome (Gait Training Goal 1, PT)  goal not met  -CA         Stairs Goal 1 (PT)    Activity/Assistive Device (Stairs Goal 1, PT)  ascending  stairs;descending stairs  -CA      [place] Level/Cues Needed (Stairs Goal 1, PT)  standby assist  -CA      Number of Stairs (Stairs Goal 1, PT)  1 or more  -CA      Time Frame (Stairs Goal 1, PT)  by discharge  -CA      Progress/Outcome (Stairs Goal 1, PT)  goal not met  -CA        User Key  (r) = Recorded By, (t) = Taken By, (c) = Cosigned By    Initials Name Provider Type Discipline    CA Jt Frausto, PTA Physical Therapy Assistant PT          Physical Therapy Education                 Title: PT OT SLP Therapies (In Progress)     Topic: Physical Therapy (In Progress)     Point: Mobility training (Done)     Description:   Instruct learner(s) on safety and technique for assisting patient out of bed, chair or wheelchair.  Instruct in the proper use of assistive devices, such as walker, crutches, cane or brace.              Patient Friendly Description:   It's important to get you on your feet again, but we need to do so in a way that is safe for you. Falling has serious consequences, and your personal safety is the most important thing of all.        When it's time to get out of bed, one of us or a family member will sit next to you on the bed to give you support.     If your doctor or nurse tells you to use a walker, crutches, a cane, or a brace, be sure you use it every time you get out of bed, even if you think you don't need it.    Learning Progress Summary           Patient Acceptance, E, VU by KERRY at 9/4/2020 7343    Comment:  Role of PT, POC, use of gait belt                   Point: Home exercise program (Not Started)     Description:   Instruct learner(s) on appropriate technique for monitoring, assisting and/or progressing patient with therapeutic exercises and activities.              Learner Progress:   Not documented in this visit.          Point: Body mechanics (Not Started)     Description:   Instruct learner(s) on proper positioning and spine alignment for patient and/or caregiver during  mobility tasks and/or exercises.              Learner Progress:   Not documented in this visit.          Point: Precautions (Done)     Description:   Instruct learner(s) on prescribed precautions during mobility and gait tasks              Learning Progress Summary           Patient Acceptance, OC VU by KERRY at 9/4/2020 7651    Comment:  Role of PT, POC, use of gait belt                               User Key     Initials Effective Dates Name Provider Type Discipline    KERRY 08/09/20 -  Melina Neves, PT Physical Therapist PT                PT Recommendation and Plan     Plan of Care Reviewed With: patient  Progress: improving  Outcome Summary: pt. had sup./CGA bed mobility and sit to stand with CGA.  pt. amb. 200' CGA with HHA.  pt. performed seated B LE therex to work on strengthning.  Outcome Measures     Row Name 09/05/20 0935 09/04/20 0936          How much help from another person do you currently need...    Turning from your back to your side while in flat bed without using bedrails?  3  -CA  --     Moving from lying on back to sitting on the side of a flat bed without bedrails?  3  -CA  --     Moving to and from a bed to a chair (including a wheelchair)?  3  -CA  --     Standing up from a chair using your arms (e.g., wheelchair, bedside chair)?  3  -CA  --     Climbing 3-5 steps with a railing?  3  -CA  --     To walk in hospital room?  3  -CA  --     AM-PAC 6 Clicks Score (PT)  18  -CA  --        How much help from another is currently needed...    Putting on and taking off regular lower body clothing?  --  3  -AS     Bathing (including washing, rinsing, and drying)  --  3  -AS     Toileting (which includes using toilet bed pan or urinal)  --  3  -AS     Putting on and taking off regular upper body clothing  --  3  -AS     Taking care of personal grooming (such as brushing teeth)  --  3  -AS     Eating meals  --  4  -AS     AM-PAC 6 Clicks Score (OT)  --  19  -AS        Functional Assessment    Outcome Measure  Options  AM-PAC 6 Clicks Basic Mobility (PT)  -CA  AM-PAC 6 Clicks Daily Activity (OT)  -AS       User Key  (r) = Recorded By, (t) = Taken By, (c) = Cosigned By    Initials Name Provider Type    Jt Ventura PTA Physical Therapy Assistant    AS Unique Garcia OT Occupational Therapist         Time Calculation:   PT Charges     Row Name 09/05/20 1030             Time Calculation    Start Time  0935  -CA      Stop Time  0959  -CA      Time Calculation (min)  24 min  -CA      PT Received On  09/05/20  -CA         Time Calculation- PT    Total Timed Code Minutes- PT  24 minute(s)  -CA        User Key  (r) = Recorded By, (t) = Taken By, (c) = Cosigned By    Initials Name Provider Type    Jt Ventura PTA Physical Therapy Assistant        Therapy Charges for Today     Code Description Service Date Service Provider Modifiers Qty    06515546134 HC GAIT TRAINING EA 15 MIN 9/5/2020 Jt Frausto, PTA GP 1    27717036485 HC PT THER PROC EA 15 MIN 9/5/2020 Jt Frausto PTA GP 1          PT G-Codes  Outcome Measure Options: AM-PAC 6 Clicks Basic Mobility (PT)  AM-PAC 6 Clicks Score (PT): 18  AM-PAC 6 Clicks Score (OT): 19    Jt Frausto PTA  9/5/2020

## 2020-09-05 NOTE — PLAN OF CARE
Problem: Patient Care Overview  Goal: Plan of Care Review  Outcome: Ongoing (interventions implemented as appropriate)  Flowsheets (Taken 9/5/2020 1340)  Progress: improving  Plan of Care Reviewed With: patient  Patient Agreement with Plan of Care: agrees  Outcome Summary: pt. had sup./CGA bed mobility and sit to stand with CGA.  pt. amb. 200' CGA with HHA.  pt. performed seated B LE therex to work on strengthning.

## 2020-09-06 LAB
GLUCOSE BLDC GLUCOMTR-MCNC: 115 MG/DL (ref 70–130)
GLUCOSE BLDC GLUCOMTR-MCNC: 149 MG/DL (ref 70–130)
GLUCOSE BLDC GLUCOMTR-MCNC: 153 MG/DL (ref 70–130)
GLUCOSE BLDC GLUCOMTR-MCNC: 180 MG/DL (ref 70–130)

## 2020-09-06 PROCEDURE — 82962 GLUCOSE BLOOD TEST: CPT

## 2020-09-06 PROCEDURE — 99232 SBSQ HOSP IP/OBS MODERATE 35: CPT | Performed by: PSYCHIATRY & NEUROLOGY

## 2020-09-06 RX ADMIN — PANTOPRAZOLE SODIUM 40 MG: 40 TABLET, DELAYED RELEASE ORAL at 08:22

## 2020-09-06 RX ADMIN — RISPERIDONE 1.5 MG: 1 TABLET ORAL at 20:20

## 2020-09-06 RX ADMIN — RISPERIDONE 0.25 MG: 0.25 TABLET ORAL at 08:22

## 2020-09-06 RX ADMIN — FERROUS SULFATE TAB EC 324 MG (65 MG FE EQUIVALENT) 324 MG: 324 (65 FE) TABLET DELAYED RESPONSE at 08:21

## 2020-09-06 RX ADMIN — ESCITALOPRAM OXALATE 10 MG: 10 TABLET ORAL at 08:21

## 2020-09-06 RX ADMIN — METFORMIN HYDROCHLORIDE 500 MG: 500 TABLET ORAL at 08:21

## 2020-09-06 RX ADMIN — Medication 2.5 MG: at 08:22

## 2020-09-06 RX ADMIN — Medication 2.5 MG: at 17:10

## 2020-09-06 RX ADMIN — METFORMIN HYDROCHLORIDE 500 MG: 500 TABLET ORAL at 17:10

## 2020-09-06 NOTE — NURSING NOTE
"Behavior   Note any precipitants to event or behavior   Describe level and action of any aggressive behavior or speech and associated interventions.     Anxiety: Patient denies at this time  Depression: Patient denies at this time  Pain  0  AVH   denies  S/I   no  Plan  no  H/I   no  Plan  no    Affect   euthymic/normal      Note: Pt sitting on bedside during the time of assessment. She is pleasant, friendly, and cooperative with staff. She was medication compliant, no behaviors noted she denies AVH,, When I took her the night medication she stated\"more pills again, I take pills all the time\" but did take them, Able to make needs known. Will continue to monitor, no falls.      Intervention    PRN medication utilized:  no    Instructed in medication usage and effects  Medications administered as ordered  Encouraged to verbalize needs      Response    Verbalized understanding   Did patient take medications as ordered yes   Did patient interact with assessment?  yes     Plan    Will monitor for safety  Will monitor every 15 minutes as ordered  Will evaluate and promote the plan of care    Last BM:  unknown date  (Please chart in I/O as well)    "

## 2020-09-06 NOTE — PLAN OF CARE
Problem: Patient Care Overview  Goal: Plan of Care Review  Outcome: Ongoing (interventions implemented as appropriate)  Flowsheets (Taken 9/6/2020 0253)  Plan of Care Reviewed With: patient  Patient Agreement with Plan of Care: agrees  Outcome Summary: Patient slept throughout the night, no falls, medication compliant, denies AV

## 2020-09-06 NOTE — PROGRESS NOTES
"Psychiatry Progress Note   9/6/2020      HD: #3  Legal: Vol    Chief Complaint: Gross Psychosis, Severe Hallucinations and Gross Disorganization      Subjective --  Ms. Natacha Gandhi is a 70 y.o. female who was seen on the Geriatric unit.      Patient is pleasant this morning.  She engaged well.  She denies any active hallucinations.      Not overtly responding to internal stimuli.      Disorganization is improving.  Psychosis improving: less severe and less impairing than before, less constant today.      Improved with medicines and hospital stay.  She denies any headaches, stomachaches, or muscle aches.  Denies feeling dizzy or lightheaded..    No behavioral issues overnight.           Objective   Objective --      Vital Signs:  Temp:  [98.4 °F (36.9 °C)-99.2 °F (37.3 °C)] 99.2 °F (37.3 °C)  Heart Rate:  [81-86] 86  Resp:  [18] 18  BP: (102-105)/(60-62) 102/62    Patient Vitals for the past 24 hrs:   BP Temp Temp src Pulse Resp SpO2   09/06/20 0700 102/62 99.2 °F (37.3 °C) Tympanic 86 18 96 %   09/05/20 1900 105/60 98.4 °F (36.9 °C) Tympanic 81 18 98 %        Physical Exam:   -General Appearance:  normal general appearance and in no apparent distress  -Hygiene:  Adequate   -Gait & Station:  Blank multiple: Deferred, in bed  -Musculoskeletal:  No tremors or abnormal involuntary movements and No Cog [place] or Rigidity and No atrophy noted  -Pulm: unlaboured    Mental Status Exam:   --Cooperation:  Cooperative  --Eye Contact:  Non-sustained (legally blind)  --Psychomotor Behavior:  Slow   --Mood:  \"Good\"  --Affect:  blunted  --Speech:  Slow and Soft  --Thought Process:  Dyscognitive, Sluggish and Disorganized  --Associations: Circumstantial  --Themes:  None overt  --Thought Content:                --Mood congruent              --Suicidal:  Denies               --Homicidal:  Denies              --Hallucinations:  Denies but cannot r/o Auditory              --Delusion:  None overt  --Cognitive Functioning:          "     -Consciousness: awake and alert              -Orientation:  Person, Place and Time              -Attention:  Distractible                         -Concentration:  Distractible              -Language:  Average based on interaction; Intact              -Vocabulary:  Below Average based on interaction; Word Finding Difficulties              -Short Term Memory: Deficits              -Long Term Memory: Intact              -Fund of Knowledge:  Below Average based on interaction              -Abstraction:  [place]  --Reliability:  fair  --Insight:  Diminished  --Judgment:  Impaired  --Impulse Control:  Impaired      Lab Results (last 24 hours)     Procedure Component Value Units Date/Time    POC Glucose Once [[id number]]  (Abnormal) Collected:  09/06/20 1646    Specimen:  Blood Updated:  09/06/20 1703     Glucose 149 mg/dL      Comment: RN NotifiedOperator: [id number] JAYMIE FRAZIERLINMeter ID: [id number]       POC Glucose Once [[id number]]  (Abnormal) Collected:  09/06/20 1130    Specimen:  Blood Updated:  09/06/20 1145     Glucose 153 mg/dL      Comment: RN NotifiedOperator: [id number] JAYMIE LARAYTLINMeter ID: [id number]       POC Glucose Once [[id number]]  (Normal) Collected:  09/06/20 0629    Specimen:  Blood Updated:  09/06/20 0700     Glucose 115 mg/dL      Comment: RN NotifiedOperator: [id number] MARY Bourgeois ID: [id number]       RPR [[id number]]  (Normal) Collected:  09/05/20 0818    Specimen:  Blood Updated:  09/05/20 2034     RPR Non-Reactive    POC Glucose Once [[id number]]  (Abnormal) Collected:  09/05/20 1957    Specimen:  Blood Updated:  09/05/20 2010     Glucose 224 mg/dL      Comment: RN NotifiedOperator: [id number] MARY MORENOMetchucky ID: [id number]             Imaging Results (Last 24 Hours)     ** No results found for the last 24 hours. **            Medications:   Scheduled Meds:    escitalopram 10 mg Oral Daily   ferrous sulfate 324 mg Oral Daily With Breakfast   glipizide 2.5 mg Oral BID AC    And      metFORMIN 500 mg Oral BID AC   insulin aspart 0-7 Units Subcutaneous TID AC   pantoprazole 40 mg Oral Q AM   risperiDONE 0.25 mg Oral Daily   risperiDONE 1.5 mg Oral Nightly     Continuous Infusions:   PRN Meds:.•  aluminum-magnesium hydroxide-simethicone  •  cloNIDine  •  dextrose  •  dextrose  •  glucagon (human recombinant)  •  loperamide  •  LORazepam **OR** LORazepam  •  magnesium hydroxide      Assessment:     Acute exacerbation of chronic paranoid schizophrenia (CMS/HCC)    Schizophrenia, paranoid type (CMS/HCC)    Acute blood loss anemia    DMII (diabetes mellitus, type 2) (CMS/HCC)      --> IMPRESSION: Ongoing psychosis, slowly improving.  Tolerating medications, will plan to further optimize anti-dopaminergic therapy given psychosis.      Treatment Plan:  1) Will continue care for the patient on the behavioral health unit at [facility] to ensure patient safety given severity of presenting symptoms, need for further improvement and maintenance of improvement prior to discharge.    2) Will continue to provide treatment with the unit milieu, activities, therapies and psychopharmacological management.    3) Patient to be placed on or continued on  Q15 minute checks  and Fall precautions.    4) Pertinent Concurrent Non-Psychiatric Medical Issues:   --Type 2 diabetes: Continue glipizide and Metformin twice daily, sliding scale insulin  -GERD/acute blood loss anemia: Continue Protonix and ferrous sulfate    5) Will order following labs: None    6) Behavioral Health Treatment Planning:  --Cont Risperdal 0.25 mg daily as well as 1.5 mg nightly for psychosis   --First full dosing today    7) Psychotherapy provided: Supportive, for less than 10 minutes.    --> Dispostion Planning: To home [facility] after further improvement stabilization    Treatment plan and medication risks and benefits discussed with: Patient and Treatment Team.    Tyrel Anderson II, MD  09/06/20 @ 17:20  Dictated  using Dragon.

## 2020-09-06 NOTE — PLAN OF CARE
"  Problem: Patient Care Overview  Goal: Plan of Care Review  Outcome: Ongoing (interventions implemented as appropriate)  Flowsheets (Taken 9/6/2020 1619)  Progress: improving  Plan of Care Reviewed With: patient  Patient Agreement with Plan of Care: agrees  Outcome Summary: pt is alert and oriented x 3, calm and compliant.  pt has been withdrawn to room but states she \"likes it that way.\"  pt has been asking for needs and allows assistance from staff.  pt denies any problems, denies avh and pain, when asked if she hears voices pt states,\"yeah, those girls coming in here checking on me,\" then chuckles, and when clarifying that pt is not hearing any other voices, pt states,\"no nothing else today.\"     "

## 2020-09-06 NOTE — NURSING NOTE
Behavior   Note any precipitants to event or behavior   Describe level and action of any aggressive behavior or speech and associated interventions.     Anxiety: Patient denies at this time  Depression: Patient denies at this time  Pain  0  AVH   no  S/I   no  Plan  no  H/I   no  Plan  no    Affect   blunted      Note:pt seen in personal room, pleasant, alert.  Pt likes to remain in personal room, does not interact with peers.  Pt is accepting of assistance from staff.  Pt has taken medication whole in water and feeds self with meal setup.  Pt has been able to go to bathroom with assist and has not been incontinent this shift.  Pt denies all problems including pain.      Intervention    PRN medication utilized:  no    Instructed in medication usage and effects  Medications administered as ordered  Encouraged to verbalize needs      Response    Verbalized understanding   Did patient take medications as ordered yes   Did patient interact with assessment?  yes     Plan    Will monitor for safety  Will monitor every 15 minutes as ordered  Will evaluate and promote the plan of care    Last BM:  unknown date  (Please chart in I/O as well)

## 2020-09-07 LAB
GLUCOSE BLDC GLUCOMTR-MCNC: 105 MG/DL (ref 70–130)
GLUCOSE BLDC GLUCOMTR-MCNC: 142 MG/DL (ref 70–130)
GLUCOSE BLDC GLUCOMTR-MCNC: 76 MG/DL (ref 70–130)
GLUCOSE BLDC GLUCOMTR-MCNC: 77 MG/DL (ref 70–130)

## 2020-09-07 PROCEDURE — 97110 THERAPEUTIC EXERCISES: CPT

## 2020-09-07 PROCEDURE — 99232 SBSQ HOSP IP/OBS MODERATE 35: CPT | Performed by: PSYCHIATRY & NEUROLOGY

## 2020-09-07 PROCEDURE — 82962 GLUCOSE BLOOD TEST: CPT

## 2020-09-07 RX ORDER — RISPERIDONE 0.5 MG/1
0.5 TABLET ORAL DAILY
Status: DISCONTINUED | OUTPATIENT
Start: 2020-09-08 | End: 2020-09-10

## 2020-09-07 RX ADMIN — Medication 2.5 MG: at 17:05

## 2020-09-07 RX ADMIN — RISPERIDONE 1.5 MG: 1 TABLET ORAL at 20:14

## 2020-09-07 RX ADMIN — METFORMIN HYDROCHLORIDE 500 MG: 500 TABLET ORAL at 08:18

## 2020-09-07 RX ADMIN — Medication 2.5 MG: at 08:30

## 2020-09-07 RX ADMIN — METFORMIN HYDROCHLORIDE 500 MG: 500 TABLET ORAL at 17:05

## 2020-09-07 RX ADMIN — FERROUS SULFATE TAB EC 324 MG (65 MG FE EQUIVALENT) 324 MG: 324 (65 FE) TABLET DELAYED RESPONSE at 08:18

## 2020-09-07 RX ADMIN — PANTOPRAZOLE SODIUM 40 MG: 40 TABLET, DELAYED RELEASE ORAL at 08:18

## 2020-09-07 NOTE — PLAN OF CARE
Problem: Patient Care Overview  Goal: Plan of Care Review  Outcome: Ongoing (interventions implemented as appropriate)  Flowsheets (Taken 9/7/2020 0257)  Progress: improving  Plan of Care Reviewed With: patient  Patient Agreement with Plan of Care: agrees  Outcome Summary: Denies SI/HI/AVH, slept through the night

## 2020-09-07 NOTE — PROGRESS NOTES
"Psychiatry Progress Note   9/7/2020      HD: #4  Legal: Vol    Chief Complaint: Gross Psychosis, Severe Hallucinations and Gross Disorganization      Subjective --  Ms. Natacha Gandhi is a 70 y.o. female who was seen on the Geriatric unit.      Patient is pleasant this morning.  She engaged well.  She denies any active hallucinations.      Not responding to internal stimuli.  No overt paranoia.    Disorganization continues to improve.  Psychosis improving: less severe and less impairing than before, less constant today.      Improved with medicines and hospital stay.  She denies any headaches, stomachaches, or muscle aches.  Denies feeling dizzy or lightheaded..    No behavioral issues overnight.   Tolerating medications well.        Objective   Objective --      Vital Signs:  Temp:  [98.6 °F (37 °C)-98.9 °F (37.2 °C)] 98.6 °F (37 °C)  Heart Rate:  [83-86] 86  Resp:  [16-18] 16  BP: ()/(56-57) 92/56    Patient Vitals for the past 24 hrs:   BP Temp Temp src Pulse Resp SpO2   09/07/20 0700 92/56 98.6 °F (37 °C) Tympanic 86 16 96 %   09/06/20 1930 117/57 98.9 °F (37.2 °C) Tympanic 83 18 97 %        Physical Exam:   -General Appearance:  normal general appearance and in no apparent distress  -Hygiene:  Adequate   -Gait & Station:  Blank multiple: Deferred, in bed  -Musculoskeletal:  No tremors or abnormal involuntary movements and No Cog Center or Rigidity and No atrophy noted  -Pulm: unlaboured    Mental Status Exam:   --Cooperation:  Cooperative  --Eye Contact:  Non-sustained (legally blind)  --Psychomotor Behavior:  Slow   --Mood:  \"Good\"  --Affect:  blunted, improving  --Speech:  Slow and Soft  --Thought Process:   Less sluggish and more organized  --Associations:  More goal-directed today  --Themes:  None overt  --Thought Content:                --Mood congruent              --Suicidal:  Denies               --Homicidal:  Denies              --Hallucinations:  Denies and none overt              --Delusion: "  None overt  --Cognitive Functioning:              -Consciousness: awake and alert              -Orientation:  Person, Place and Time              -Attention:  Distractible                         -Concentration:  Distractible              -Language:  Average based on interaction; Intact              -Vocabulary:  Below Average based on interaction; Word Finding Difficulties              -Short Term Memory: Deficits              -Long Term Memory: Intact              -Fund of Knowledge:  Below Average based on interaction              -Abstraction:  [place]  --Reliability:  fair  --Insight:  Diminished  --Judgment:  Impaired and improving  --Impulse Control:  Impaired and improving      Lab Results (last 24 hours)     Procedure Component Value Units Date/Time    POC Glucose Once [[id number]]  (Normal) Collected:  09/07/20 1627    Specimen:  Blood Updated:  09/07/20 1645     Glucose 105 mg/dL      Comment: : [id number] FLORENTINO LEACHSONMeter ID: [id number]       POC Glucose Once [[id number]]  (Normal) Collected:  09/07/20 1119    Specimen:  Blood Updated:  09/07/20 1145     Glucose 76 mg/dL      Comment: RN NotifiedOperator: [id number] NOY AMBERMeter ID: [id number]       POC Glucose Once [[id number]]  (Normal) Collected:  09/07/20 0545    Specimen:  Blood Updated:  09/07/20 0602     Glucose 77 mg/dL      Comment: : [id number] SHALOM CORBETTMeter ID: [id number]       POC Glucose Once [[id number]]  (Abnormal) Collected:  09/06/20 1933    Specimen:  Blood Updated:  09/06/20 1959     Glucose 180 mg/dL      Comment: RN NotifiedOperator: [id number] SHALOM CORBETTMeter ID: [id number]             Imaging Results (Last 24 Hours)     ** No results found for the last 24 hours. **            Medications:   Scheduled Meds:    escitalopram 10 mg Oral Daily   ferrous sulfate 324 mg Oral Daily With Breakfast   glipizide 2.5 mg Oral BID AC   And      metFORMIN 500 mg Oral BID AC   insulin aspart 0-7 Units  Subcutaneous TID AC   pantoprazole 40 mg Oral Q AM   risperiDONE 0.25 mg Oral Daily   risperiDONE 1.5 mg Oral Nightly     Continuous Infusions:   PRN Meds:.•  aluminum-magnesium hydroxide-simethicone  •  cloNIDine  •  dextrose  •  dextrose  •  glucagon (human recombinant)  •  loperamide  •  LORazepam **OR** LORazepam  •  magnesium hydroxide      Assessment:     Acute exacerbation of chronic paranoid schizophrenia (CMS/HCC)    Schizophrenia, paranoid type (CMS/HCC)    Acute blood loss anemia    DMII (diabetes mellitus, type 2) (CMS/HCC)      --> IMPRESSION: Ongoing psychosis, slowly improving.  Tolerating medications.      Treatment Plan:  1) Will continue care for the patient on the behavioral health unit at [facility] to ensure patient safety given severity of presenting symptoms, need for further improvement and maintenance of improvement prior to discharge.    2) Will continue to provide treatment with the unit milieu, activities, therapies and psychopharmacological management.    3) Patient to be placed on or continued on  Q15 minute checks  and Fall precautions.    4) Pertinent Concurrent Non-Psychiatric Medical Issues:   --Type 2 diabetes: Continue glipizide and Metformin twice daily, sliding scale insulin  -GERD/acute blood loss anemia: Continue Protonix and ferrous sulfate    5) Will order following labs: None    6) Behavioral Health Treatment Planning:  --Increase risperidone to 0.5 mg daily  -Continue Risperdal 1.5 mg nightly for psychosis    7) Psychotherapy provided: Supportive, for less than 10 minutes.    --> Dispostion Planning: To home versus [facility] after further improvement stabilization, likely in the next 2 to 3 days, pending planning.    Treatment plan and medication risks and benefits discussed with: Patient and Treatment Team.    Tyrel Anderson II, MD  09/07/20 @ 17:51  Dictated using Dragon.

## 2020-09-07 NOTE — PLAN OF CARE
Problem: Patient Care Overview  Goal: Plan of Care Review  Flowsheets (Taken 9/7/2020 6528)  Progress: no change  Plan of Care Reviewed With: patient  Patient Agreement with Plan of Care: agrees  Outcome Summary: Pt has been compliant and on task. Compliant with taking medication.

## 2020-09-07 NOTE — SIGNIFICANT NOTE
"   09/07/20 1441   Rehab Treatment   Discipline physical therapy assistant   Reason Treatment Not Performed patient/family declined treatment  (Pt declined PT after much motivation stating \" I dont feel right today. Maybe tomorrow\")     "

## 2020-09-07 NOTE — NURSING NOTE
Behavior   Note any precipitants to event or behavior   Describe level and action of any aggressive behavior or speech and associated interventions.     Anxiety: Patient denies at this time  Depression: Patient denies at this time  Pain  0  AVH   denies  S/I   no  Plan  no  H/I   no  Plan  no    Affect   euthymic/normal      Note: Pt sitting on bedside during the time of assessment. She is pleasant, friendly, and cooperative with staff. She was medication compliant, no behaviors noted she denies AVH. Will continue to monitor, no falls.      Intervention    PRN medication utilized:  no    Instructed in medication usage and effects  Medications administered as ordered  Encouraged to verbalize needs      Response    Verbalized understanding   Did patient take medications as ordered yes   Did patient interact with assessment?  yes     Plan    Will monitor for safety  Will monitor every 15 minutes as ordered  Will evaluate and promote the plan of care    Last BM:  unknown date  (Please chart in I/O as well)

## 2020-09-07 NOTE — PLAN OF CARE
Problem: Patient Care Overview  Goal: Plan of Care Review  Outcome: Ongoing (interventions implemented as appropriate)  Flowsheets  Taken 9/7/2020 1131  Progress: improving  Plan of Care Reviewed With: patient  Taken 9/7/2020 0870  Outcome Summary: Pt supervision for supine<>sit EOB. While sitting EOB pt completed B UE exercises with good tolerance. Pt CGA for sit<>stand with HHA. Pt with no complaints this am.

## 2020-09-07 NOTE — THERAPY TREATMENT NOTE
Acute Care - Occupational Therapy Treatment Note  [facility]     Patient Name: Natacha Gandhi  : 1950  MRN: [id number]  Today's Date: 2020  Onset of Illness/Injury or Date of Surgery: 20  Date of Referral to OT: 20  Referring Physician: Dr. Anderson    Admit Date: 9/3/2020       ICD-10-CM ICD-9-CM   1. Impaired mobility and activities of daily living Z74.09 V49.89    Z78.9    2. Impaired functional mobility, balance, gait, and endurance Z74.09 V49.89     Patient Active Problem List   Diagnosis   • Acute GI bleeding   • Altered mental status, unspecified   • Schizophrenia, paranoid type (CMS/HCC)   • Acute exacerbation of chronic paranoid schizophrenia (CMS/HCC)   • Acute blood loss anemia   • DMII (diabetes mellitus, type 2) (CMS/HCC)     Past Medical History:   Diagnosis Date   • Depression    • Psychiatric illness    • Schizoaffective disorder (CMS/HCC)      Past Surgical History:   Procedure Laterality Date   • COLONOSCOPY N/A 2020    Procedure: COLONOSCOPY;  Surgeon: Ge Gorman MD;  Location: [facility];  Service: Gastroenterology;  Laterality: N/A;   • ENDOSCOPY N/A 2020    Procedure: ESOPHAGOGASTRODUODENOSCOPY;  Surgeon: Ge Gorman MD;  Location: [facility];  Service: Gastroenterology;  Laterality: N/A;       Therapy Treatment    Rehabilitation Treatment Summary     Row Name 20 0846             Treatment Time/Intention    Discipline  occupational therapy assistant  -BB      Document Type  therapy note (daily note)  -BB      Subjective Information  no complaints  -BB      Mode of Treatment  individual therapy;occupational therapy  -BB      Patient/Family Observations  no family present  -BB      Total Minutes, Occupational Therapy Treatment  24  -BB      Therapy Frequency (OT Eval)  other (see comments) 5-7 days per week  -BB      Patient Effort  excellent  -BB      Existing Precautions/Restrictions  fall blind  -BB      Recorded by [BB] Brianna  ARI GillespieA/L 09/07/20 1128      Row Name 09/07/20 0846             Vital Signs    Pre Patient Position  Supine  -BB      Post Patient Position  Supine  -BB      Recorded by [BB] Mare Reed COTA/L 09/07/20 1128      Row Name 09/07/20 0846             Cognitive Assessment/Intervention- PT/OT    Orientation Status (Cognition)  oriented x 4  -BB      Follows Commands (Cognition)  WNL  -BB      Personal Safety Interventions  fall prevention program maintained;gait belt;muscle strengthening facilitated;nonskid shoes/slippers when out of bed  -BB      Recorded by [BB] Mare Reed COTA/L 09/07/20 1128      Row Name 09/07/20 0846             Bed Mobility Assessment/Treatment    Supine-Sit [place] (Bed Mobility)  supervision  -BB      Sit-Supine [place] (Bed Mobility)  supervision  -BB      Assistive Device (Bed Mobility)  bed rails  -BB      Recorded by [BB] Mare Reed COTA/L 09/07/20 1128      Row Name 09/07/20 0846             Sit-Stand Transfer    Sit-Stand [place] (Transfers)  contact guard  -BB      Assistive Device (Sit-Stand Transfers)  other (see comments) HHA  -BB      Recorded by [BB] Mare Reed COTA/L 09/07/20 1128      Row Name 09/07/20 0846             Stand-Sit Transfer    Stand-Sit [place] (Transfers)  contact guard  -BB      Assistive Device (Stand-Sit Transfers)  other (see comments) HHA  -BB      Recorded by [BB] Mare Reed COTA/L 09/07/20 1128      Row Name 09/07/20 0846             Therapeutic Exercise    Upper Extremity Range of Motion (Therapeutic Exercise)  shoulder flexion/extension, bilateral;shoulder horizontal abduction/adduction, bilateral;elbow flexion/extension, bilateral;forearm supination/pronation, bilateral;wrist flexion/extension, bilateral B UE chest press  -BB      Hand (Therapeutic Exercise)  finger flexion/extension, bilateral  -BB      Exercise Type (Therapeutic Exercise)  AROM (active range of motion)  -BB       Position (Therapeutic Exercise)  seated  -BB      Sets/Reps (Therapeutic Exercise)  2x15  -BB      Expected Outcome (Therapeutic Exercise)  improve functional tolerance, self-care activity;improve performance, transfer skills  -BB      Recorded by [BB] Mare Reed COTA/L 09/07/20 1128      Row Name 09/07/20 0846             Positioning and Restraints    Pre-Treatment Position  in bed  -BB      Post Treatment Position  bed  -BB      In Bed  supine;call light within reach;encouraged to call for assist;exit alarm on  -BB      Recorded by [BB] Mare Reed COTA/L 09/07/20 1128      Row Name 09/07/20 0846             Pain Scale: Numbers Pre/Post-Treatment    Pain Scale: Numbers, Pretreatment  0/10 - no pain  -BB      Pain Scale: Numbers, Post-Treatment  0/10 - no pain  -BB      Recorded by [BB] Mare Reed COTA/L 09/07/20 1128      Row Name 09/07/20 0846             Plan of Care Review    Plan of Care Reviewed With  patient  -BB      Progress  improving  -BB      Outcome Summary  Pt supervision for supine<>sit EOB. While sitting EOB pt completed B UE exercises with good tolerance. Pt CGA for sit<>stand with HHA. Pt with no complaints this am.  -BB      Recorded by [MELECIO] Mare Reed COTA/L 09/07/20 1128      Row Name 09/07/20 0846             Outcome Summary/Treatment Plan (OT)    Daily Summary of Progress (OT)  progress toward functional goals as expected  -BB      Plan for Continued Treatment (OT)  continue OT POC  -BB      Anticipated Discharge Disposition (OT)  home with 24/7 care;home with assist;skilled nursing facility  -BB      Recorded by [BB] Mare Reed COTA/L 09/07/20 1128        User Key  (r) = Recorded By, (t) = Taken By, (c) = Cosigned By    Initials Name Effective Dates Discipline    BB Mare Reed COTA/L 03/07/18 -  OT           Rehab Goal Summary     Row Name 09/07/20 0846             Occupational Therapy Goals    Transfer Goal Selection (OT)  transfer,  OT goal 1  -BB      Bathing Goal Selection (OT)  bathing, OT goal 1  -BB      Dressing Goal Selection (OT)  dressing, OT goal 1  -BB      Toileting Goal Selection (OT)  toileting, OT goal 1  -BB      Balance Goal Selection (OT)  balance, OT goal 1  -BB         Transfer Goal 1 (OT)    Activity/Assistive Device (Transfer Goal 1, OT)  sit-to-stand/stand-to-sit;bed-to-chair/chair-to-bed;toilet  -BB      [place] Level/Cues Needed (Transfer Goal 1, OT)  standby assist;verbal cues required;tactile cues required;set-up required  -BB      Time Frame (Transfer Goal 1, OT)  long term goal (LTG);by discharge  -BB      Progress/Outcome (Transfer Goal 1, OT)  goal not met  -BB         Bathing Goal 1 (OT)    Activity/Assistive Device (Bathing Goal 1, OT)  lower body bathing  -BB      [place] Level/Cues Needed (Bathing Goal 1, OT)  verbal cues required;tactile cues required;set-up required;standby assist  -BB      Time Frame (Bathing Goal 1, OT)  long term goal (LTG);by discharge  -BB      Progress/Outcomes (Bathing Goal 1, OT)  goal not met  -BB         Dressing Goal 1 (OT)    Activity/Assistive Device (Dressing Goal 1, OT)  lower body dressing  -BB      [place]/Cues Needed (Dressing Goal 1, OT)  verbal cues required;tactile cues required;set-up required;standby assist  -BB      Time Frame (Dressing Goal 1, OT)  long term goal (LTG);by discharge  -BB      Progress/Outcome (Dressing Goal 1, OT)  goal not met  -BB         Toileting Goal 1 (OT)    Activity/Device (Toileting Goal 1, OT)  toileting skills, all  -BB      [place] Level/Cues Needed (Toileting Goal 1, OT)  standby assist;verbal cues required;tactile cues required;set-up required  -BB      Time Frame (Toileting Goal 1, OT)  long term goal (LTG);by discharge  -BB      Progress/Outcome (Toileting Goal 1, OT)  goal not met  -BB         Balance Goal 1 (OT)    Activity/Assistive Device (Balance Goal 1, OT)  standing, dynamic  -BB      [place] Level/Cues  Needed (Balance Goal 1, OT)  standby assist  -BB      Time Frame (Balance Goal 1, OT)  long term goal (LTG);by discharge  -BB      Progress/Outcomes (Balance Goal 1, OT)  goal not met  -BB        User Key  (r) = Recorded By, (t) = Taken By, (c) = Cosigned By    Initials Name Provider Type Discipline    Mare Westbrook COTA/L Occupational Therapy Assistant OT        Occupational Therapy Education                 Title: PT OT SLP Therapies (In Progress)     Topic: Occupational Therapy (In Progress)     Point: ADL training (Done)     Description:   Instruct learner(s) on proper safety adaptation and remediation techniques during self care or transfers.   Instruct in proper use of assistive devices.              Learning Progress Summary           Patient Acceptance, E, VU,NR by AS at 9/4/2020 1205    Comment:  role of OT, OT POC, ADL training, fall preacutions, bed mobility, transfer training                   Point: Home exercise program (Not Started)     Description:   Instruct learner(s) on appropriate technique for monitoring, assisting and/or progressing therapeutic exercises/activities.              Learner Progress:   Not documented in this visit.          Point: Precautions (Done)     Description:   Instruct learner(s) on prescribed precautions during self-care and functional transfers.              Learning Progress Summary           Patient Acceptance, E, VU,NR by AS at 9/4/2020 1205    Comment:  role of OT, OT POC, ADL training, fall preacutions, bed mobility, transfer training                   Point: Body mechanics (In Progress)     Description:   Instruct learner(s) on proper positioning and spine alignment during self-care, functional mobility activities and/or exercises.              Learning Progress Summary           Patient Acceptance, E, NR by MELECIO at 9/7/2020 1131                               User Key     Initials Effective Dates Name Provider Type Discipline    MELECIO 03/07/18 -  Brianna  NARDA Gillespie Occupational Therapy Assistant OT    AS 07/05/20 -  Unique Garcia OT Occupational Therapist OT                OT Recommendation and Plan  Outcome Summary/Treatment Plan (OT)  Daily Summary of Progress (OT): progress toward functional goals as expected  Plan for Continued Treatment (OT): continue OT POC  Anticipated Discharge Disposition (OT): home with 24/7 care, home with assist, skilled nursing facility  Therapy Frequency (OT Eval): other (see comments)(5-7 days per week)  Daily Summary of Progress (OT): progress toward functional goals as expected  Plan of Care Review  Plan of Care Reviewed With: patient  Plan of Care Reviewed With: patient  Outcome Summary: Pt supervision for supine<>sit EOB. While sitting EOB pt completed B UE exercises with good tolerance. Pt CGA for sit<>stand with HHA. Pt with no complaints this am.  Outcome Measures     Row Name 09/07/20 0846 09/05/20 0935          How much help from another person do you currently need...    Turning from your back to your side while in flat bed without using bedrails?  --  3  -CA     Moving from lying on back to sitting on the side of a flat bed without bedrails?  --  3  -CA     Moving to and from a bed to a chair (including a wheelchair)?  --  3  -CA     Standing up from a chair using your arms (e.g., wheelchair, bedside chair)?  --  3  -CA     Climbing 3-5 steps with a railing?  --  3  -CA     To walk in hospital room?  --  3  -CA     AM-PAC 6 Clicks Score (PT)  --  18  -CA        How much help from another is currently needed...    Putting on and taking off regular lower body clothing?  3  -BB  --     Bathing (including washing, rinsing, and drying)  3  -BB  --     Toileting (which includes using toilet bed pan or urinal)  3  -BB  --     Putting on and taking off regular upper body clothing  3  -BB  --     Taking care of personal grooming (such as brushing teeth)  3  -BB  --     Eating meals  4  -BB  --     AM-PAC 6 Clicks Score  (OT)  19  -BB  --        Functional Assessment    Outcome Measure Options  --  AM-PAC 6 Clicks Basic Mobility (PT)  -CA       User Key  (r) = Recorded By, (t) = Taken By, (c) = Cosigned By    Initials Name Provider Type    CA Jt Frausto, PTA Physical Therapy Assistant    Mare Westbrook COTA/L Occupational Therapy Assistant           Time Calculation:   Time Calculation- OT     Row Name 09/07/20 1132             Time Calculation- OT    OT Start Time  0846  -BB      OT Stop Time  0910  -BB      OT Time Calculation (min)  24 min  -BB      Total Timed Code Minutes- OT  24 minute(s)  -BB      OT Received On  09/07/20  -BB        User Key  (r) = Recorded By, (t) = Taken By, (c) = Cosigned By    Initials Name Provider Type    Mare Westbrook COTA/L Occupational Therapy Assistant        Therapy Charges for Today     Code Description Service Date Service Provider Modifiers Qty    12065708115 HC OT THER PROC EA 15 MIN 9/7/2020 Mare Reed COTA/L GO 2               NARDA Lares  9/7/2020

## 2020-09-07 NOTE — NURSING NOTE
Behavior   Note any precipitants to event or behavior   Describe level and action of any aggressive behavior or speech and associated interventions.     Anxiety: Patient denies at this time  Depression: Patient denies at this time  Pain  0  AVH   no  S/I   no  Plan  no  H/I   no  Plan  no    Affect   euthymic/normal      Note:Pt is alert, oriented x3 verbal and ambulatory with assistance. Took medication as scheduled. Reports she is feeling good this morning but still doesn't know why she has to be in the hospital. Pt laughing and in a good mood. Denies any needs at this time. Will continue to monitor and provide a safe environment.       Intervention    PRN medication utilized:  no    Instructed in medication usage and effects  Medications administered as ordered  Encouraged to verbalize needs      Response    Verbalized understanding   Did patient take medications as ordered yes   Did patient interact with assessment?  yes     Plan    Will monitor for safety  Will monitor every 15 minutes as ordered  Will evaluate and promote the plan of care    Last BM:  unknown date  (Please chart in I/O as well)

## 2020-09-08 LAB
GLUCOSE BLDC GLUCOMTR-MCNC: 153 MG/DL (ref 70–130)
GLUCOSE BLDC GLUCOMTR-MCNC: 164 MG/DL (ref 70–130)
GLUCOSE BLDC GLUCOMTR-MCNC: 164 MG/DL (ref 70–130)
GLUCOSE BLDC GLUCOMTR-MCNC: 97 MG/DL (ref 70–130)

## 2020-09-08 PROCEDURE — 97535 SELF CARE MNGMENT TRAINING: CPT

## 2020-09-08 PROCEDURE — 99232 SBSQ HOSP IP/OBS MODERATE 35: CPT | Performed by: PSYCHIATRY & NEUROLOGY

## 2020-09-08 PROCEDURE — 97110 THERAPEUTIC EXERCISES: CPT

## 2020-09-08 PROCEDURE — 82962 GLUCOSE BLOOD TEST: CPT

## 2020-09-08 RX ADMIN — RISPERIDONE 1.5 MG: 1 TABLET ORAL at 20:35

## 2020-09-08 RX ADMIN — RISPERIDONE 0.5 MG: 0.5 TABLET ORAL at 09:01

## 2020-09-08 RX ADMIN — METFORMIN HYDROCHLORIDE 500 MG: 500 TABLET ORAL at 18:05

## 2020-09-08 RX ADMIN — PANTOPRAZOLE SODIUM 40 MG: 40 TABLET, DELAYED RELEASE ORAL at 06:23

## 2020-09-08 RX ADMIN — Medication 2.5 MG: at 18:05

## 2020-09-08 RX ADMIN — Medication 2.5 MG: at 09:00

## 2020-09-08 RX ADMIN — FERROUS SULFATE TAB EC 324 MG (65 MG FE EQUIVALENT) 324 MG: 324 (65 FE) TABLET DELAYED RESPONSE at 09:01

## 2020-09-08 RX ADMIN — ESCITALOPRAM OXALATE 10 MG: 10 TABLET ORAL at 09:01

## 2020-09-08 RX ADMIN — METFORMIN HYDROCHLORIDE 500 MG: 500 TABLET ORAL at 09:00

## 2020-09-08 NOTE — SIGNIFICANT NOTE
09/08/20 1444   Rehab Treatment   Discipline physical therapy assistant   Reason Treatment Not Performed patient/family declined treatment  (Pt declined tx stating she has done enough therapy today and wants to rest.  Pt is okay with checking back tomorrow.)

## 2020-09-08 NOTE — PLAN OF CARE
Problem: Patient Care Overview  Goal: Plan of Care Review  Outcome: Ongoing (interventions implemented as appropriate)  Flowsheets  Taken 9/8/2020 4367  Progress: improving  Outcome Summary: Patient is alert and pleasant this morning. Patient answers questions appropriately. Patient took medications independently and without difficulty. Patient up to bathroom with assist x 1.  Taken 9/8/2020 1300  Plan of Care Reviewed With: patient  Patient Agreement with Plan of Care: agrees

## 2020-09-08 NOTE — PLAN OF CARE
Problem: Patient Care Overview  Goal: Plan of Care Review  Outcome: Ongoing (interventions implemented as appropriate)  Flowsheets  Taken 9/8/2020 1047  Progress: improving  Plan of Care Reviewed With: patient  Taken 9/8/2020 0814  Outcome Summary: Pt CGA for bed<>toilet with HHA. Pt states she had a abath last night and defers ADL this am. Pt completed B UE exercises while sitting EOB. Pt tolerated OT tx well this date.

## 2020-09-08 NOTE — NURSING NOTE
Patient is alert and pleasant this morning. Patient answers questions appropriately. Patient took medications independently and without difficulty. Patient up to bathroom with assist x 1.

## 2020-09-08 NOTE — THERAPY TREATMENT NOTE
Acute Care - Occupational Therapy Treatment Note  [facility]     Patient Name: Natacha Gandhi  : 1950  MRN: [id number]  Today's Date: 2020  Onset of Illness/Injury or Date of Surgery: 20  Date of Referral to OT: 20  Referring Physician: Dr. Anderson    Admit Date: 9/3/2020       ICD-10-CM ICD-9-CM   1. Impaired mobility and activities of daily living Z74.09 V49.89    Z78.9    2. Impaired functional mobility, balance, gait, and endurance Z74.09 V49.89     Patient Active Problem List   Diagnosis   • Acute GI bleeding   • Altered mental status, unspecified   • Schizophrenia, paranoid type (CMS/HCC)   • Acute exacerbation of chronic paranoid schizophrenia (CMS/HCC)   • Acute blood loss anemia   • DMII (diabetes mellitus, type 2) (CMS/HCC)     Past Medical History:   Diagnosis Date   • Depression    • Psychiatric illness    • Schizoaffective disorder (CMS/HCC)      Past Surgical History:   Procedure Laterality Date   • COLONOSCOPY N/A 2020    Procedure: COLONOSCOPY;  Surgeon: Ge Gorman MD;  Location: [facility];  Service: Gastroenterology;  Laterality: N/A;   • ENDOSCOPY N/A 2020    Procedure: ESOPHAGOGASTRODUODENOSCOPY;  Surgeon: Ge Gorman MD;  Location: [facility];  Service: Gastroenterology;  Laterality: N/A;       Therapy Treatment    Rehabilitation Treatment Summary     Row Name 20 0814             Treatment Time/Intention    Discipline  occupational therapy assistant  -BB      Document Type  therapy note (daily note)  -BB      Subjective Information  no complaints  -BB      Mode of Treatment  individual therapy;occupational therapy  -BB      Patient/Family Observations  no family present,pt supine in bed  -BB      Total Minutes, Occupational Therapy Treatment  24  -BB      Therapy Frequency (OT Eval)  other (see comments) 5-7 days per week  -BB      Patient Effort  excellent  -BB      Existing Precautions/Restrictions  fall  -BB      Recorded by [BB]  Mare Reed GARCIA/L 09/08/20 1045      Row Name 09/08/20 0814             Vital Signs    Pre Patient Position  Supine  -BB      Post Patient Position  Supine  -BB      Recorded by [BB] Mare Reed GARCIA/L 09/08/20 1045      Row Name 09/08/20 0814             Cognitive Assessment/Intervention- PT/OT    Orientation Status (Cognition)  oriented x 4  -BB      Follows Commands (Cognition)  WNL  -BB      Personal Safety Interventions  fall prevention program maintained;gait belt;muscle strengthening facilitated;nonskid shoes/slippers when out of bed  -BB      Recorded by [BB] Mare Reed COTA/L 09/08/20 1045      Row Name 09/08/20 0814             Bed Mobility Assessment/Treatment    Supine-Sit [place] (Bed Mobility)  independent  -BB      Sit-Supine [place] (Bed Mobility)  independent  -BB      Assistive Device (Bed Mobility)  bed rails  -BB      Recorded by [BB] Mare Reed COTA/L 09/08/20 1045      Row Name 09/08/20 0814             Sit-Stand Transfer    Sit-Stand [place] (Transfers)  contact guard  -BB      Assistive Device (Sit-Stand Transfers)  other (see comments) HHA  -BB      Recorded by [BB] Mare Reed COTA/L 09/08/20 1045      Row Name 09/08/20 0814             Stand-Sit Transfer    Stand-Sit [place] (Transfers)  contact guard  -BB      Assistive Device (Stand-Sit Transfers)  other (see comments) HH assist  -BB      Recorded by [BB] Mare Reed COTA/L 09/08/20 1045      Row Name 09/08/20 0814             Toilet Transfer    Type (Toilet Transfer)  sit-stand;stand-sit  -BB      [place] Level (Toilet Transfer)  contact guard HH assist  -BB      Recorded by [BB] Mare Reed COTA/L 09/08/20 1045      Row Name 09/08/20 0814             ADL Assessment/Intervention    Additional Documentation  -- Pt states she had a bath last night  -BB      Recorded by [BB] Mare Reed COTA/L 09/08/20 1045      Row Name 09/08/20 0814              Grooming Assessment/Training    [place] Level (Grooming)  wash face, hands;independent  -BB      Grooming Position  sitting up in bed  -BB      Recorded by [BB] Mare Reed COTA/L 09/08/20 1045      Row Name 09/08/20 0814             Toileting Assessment/Training    [place] Level (Toileting)  contact guard assist  -BB      Toileting Position  unsupported sitting;supported standing  -BB      Recorded by [BB] Mare Reed COTA/L 09/08/20 1045      Row Name 09/08/20 0814             Therapeutic Exercise    Upper Extremity Range of Motion (Therapeutic Exercise)  shoulder flexion/extension, bilateral;shoulder horizontal abduction/adduction, bilateral;elbow flexion/extension, bilateral;forearm supination/pronation, bilateral;wrist flexion/extension, bilateral  -BB      Hand (Therapeutic Exercise)  finger flexion/extension, bilateral  -BB      Exercise Type (Therapeutic Exercise)  AROM (active range of motion)  -BB      Position (Therapeutic Exercise)  seated  -BB      Sets/Reps (Therapeutic Exercise)  1x20  -BB      Expected Outcome (Therapeutic Exercise)  improve functional stability;improve object manipulation, self-care activity;improve performance, transfer skills  -BB      Recorded by [BB] Mare Reed COTA/L 09/08/20 1045      Row Name 09/08/20 0814             Static Sitting Balance    Level of [place] (Unsupported Sitting, Static Balance)  independent  -BB      Sitting Position (Unsupported Sitting, Static Balance)  sitting on edge of bed  -BB      Time Able to Maintain Position (Unsupported Sitting, Static Balance)  more than 5 minutes  -BB      Recorded by [BB] Mare Reed COTA/L 09/08/20 1045      Row Name 09/08/20 0814             Static Standing Balance    Level of [place] (Supported Standing, Static Balance)  contact guard assist  -BB      Time Able to Maintain Position (Supported Standing, Static Balance)  1 to 2 minutes  -BB      Recorded by [BB]  Mare Reed COTA/L 09/08/20 1046      Row Name 09/08/20 0814             Positioning and Restraints    Pre-Treatment Position  in bed  -BB      Post Treatment Position  bed  -BB      In Bed  supine;call light within reach;encouraged to call for assist;exit alarm on  -BB      Recorded by [BB] Mare Reed COTA/L 09/08/20 1045      Row Name 09/08/20 0814             Pain Scale: Numbers Pre/Post-Treatment    Pain Scale: Numbers, Pretreatment  0/10 - no pain  -BB      Pain Scale: Numbers, Post-Treatment  0/10 - no pain  -BB      Recorded by [BB] Mare Reed COTA/L 09/08/20 1045      Row Name 09/08/20 0814             Plan of Care Review    Plan of Care Reviewed With  patient  -BB      Progress  improving  -BB      Outcome Summary  Pt CGA for bed<>toilet with HHA. Pt states she had a abath last night and defers ADL this am. Pt completed B UE exercises while sitting EOB. Pt tolerated OT tx well this date.  -BB      Recorded by [BB] Mare Reed COTA/L 09/08/20 1045      Row Name 09/08/20 0814             Outcome Summary/Treatment Plan (OT)    Daily Summary of Progress (OT)  progress toward functional goals is good  -BB      Plan for Continued Treatment (OT)  continue OT POC  -BB      Anticipated Discharge Disposition (OT)  home with 24/7 care;home with assist;skilled nursing facility  -BB      Recorded by [BB] Mare Reed COTA/WINSTON 09/08/20 1045        User Key  (r) = Recorded By, (t) = Taken By, (c) = Cosigned By    Initials Name Effective Dates Discipline    BB Mare Reed COTA/L 03/07/18 -  OT           Rehab Goal Summary     Row Name 09/08/20 0814             Occupational Therapy Goals    Transfer Goal Selection (OT)  transfer, OT goal 1  -BB      Bathing Goal Selection (OT)  bathing, OT goal 1  -BB      Dressing Goal Selection (OT)  dressing, OT goal 1  -BB      Toileting Goal Selection (OT)  toileting, OT goal 1  -BB      Balance Goal Selection (OT)  balance, OT goal  1  -BB         Transfer Goal 1 (OT)    Activity/Assistive Device (Transfer Goal 1, OT)  sit-to-stand/stand-to-sit;bed-to-chair/chair-to-bed;toilet  -BB      [place] Level/Cues Needed (Transfer Goal 1, OT)  standby assist;verbal cues required;tactile cues required;set-up required  -BB      Time Frame (Transfer Goal 1, OT)  long term goal (LTG);by discharge  -BB      Progress/Outcome (Transfer Goal 1, OT)  goal not met  -BB         Bathing Goal 1 (OT)    Activity/Assistive Device (Bathing Goal 1, OT)  lower body bathing  -BB      [place] Level/Cues Needed (Bathing Goal 1, OT)  verbal cues required;tactile cues required;set-up required;standby assist  -BB      Time Frame (Bathing Goal 1, OT)  long term goal (LTG);by discharge  -BB      Progress/Outcomes (Bathing Goal 1, OT)  goal not met  -BB         Dressing Goal 1 (OT)    Activity/Assistive Device (Dressing Goal 1, OT)  lower body dressing  -BB      [place]/Cues Needed (Dressing Goal 1, OT)  verbal cues required;tactile cues required;set-up required;standby assist  -BB      Time Frame (Dressing Goal 1, OT)  long term goal (LTG);by discharge  -BB      Progress/Outcome (Dressing Goal 1, OT)  goal not met  -BB         Toileting Goal 1 (OT)    Activity/Device (Toileting Goal 1, OT)  toileting skills, all  -BB      [place] Level/Cues Needed (Toileting Goal 1, OT)  standby assist;verbal cues required;tactile cues required;set-up required  -BB      Time Frame (Toileting Goal 1, OT)  long term goal (LTG);by discharge  -BB      Progress/Outcome (Toileting Goal 1, OT)  goal not met  -BB         Balance Goal 1 (OT)    Activity/Assistive Device (Balance Goal 1, OT)  standing, dynamic  -BB      [place] Level/Cues Needed (Balance Goal 1, OT)  standby assist  -BB      Time Frame (Balance Goal 1, OT)  long term goal (LTG);by discharge  -BB      Progress/Outcomes (Balance Goal 1, OT)  goal not met  -BB        User Key  (r) = Recorded By, (t) = Taken By, (c) =  Cosigned By    Initials Name Provider Type Discipline    Mare Westrbook COTA/L Occupational Therapy Assistant OT        Occupational Therapy Education                 Title: PT OT SLP Therapies (In Progress)     Topic: Occupational Therapy (In Progress)     Point: ADL training (Done)     Description:   Instruct learner(s) on proper safety adaptation and remediation techniques during self care or transfers.   Instruct in proper use of assistive devices.              Learning Progress Summary           Patient Acceptance, E, VU by BB at 9/8/2020 1047    Acceptance, E, VU,NR by AS at 9/4/2020 1205    Comment:  role of OT, OT POC, ADL training, fall preacutions, bed mobility, transfer training                   Point: Home exercise program (Not Started)     Description:   Instruct learner(s) on appropriate technique for monitoring, assisting and/or progressing therapeutic exercises/activities.              Learner Progress:   Not documented in this visit.          Point: Precautions (Done)     Description:   Instruct learner(s) on prescribed precautions during self-care and functional transfers.              Learning Progress Summary           Patient Acceptance, E, VU,NR by AS at 9/4/2020 1205    Comment:  role of OT, OT POC, ADL training, fall preacutions, bed mobility, transfer training                   Point: Body mechanics (Done)     Description:   Instruct learner(s) on proper positioning and spine alignment during self-care, functional mobility activities and/or exercises.              Learning Progress Summary           Patient Acceptance, E, VU by BB at 9/8/2020 1047    Acceptance, E, NR by BB at 9/7/2020 1131                               User Key     Initials Effective Dates Name Provider Type Discipline    MELECIO 03/07/18 -  Mare Reed COTA/L Occupational Therapy Assistant OT    AS 07/05/20 -  Unique Garcia OT Occupational Therapist OT                OT Recommendation and Plan  Outcome  Summary/Treatment Plan (OT)  Daily Summary of Progress (OT): progress toward functional goals is good  Plan for Continued Treatment (OT): continue OT POC  Anticipated Discharge Disposition (OT): home with 24/7 care, home with assist, skilled nursing facility  Therapy Frequency (OT Eval): other (see comments)(5-7 days per week)  Daily Summary of Progress (OT): progress toward functional goals is good  Plan of Care Review  Plan of Care Reviewed With: patient  Plan of Care Reviewed With: patient  Outcome Summary: Pt CGA for bed<>toilet with HHA. Pt states she had a abath last night and defers ADL this am. Pt completed B UE exercises while sitting EOB. Pt tolerated OT tx well this date.  Outcome Measures     Row Name 09/08/20 0814 09/07/20 0846          How much help from another is currently needed...    Putting on and taking off regular lower body clothing?  3  -BB  3  -BB     Bathing (including washing, rinsing, and drying)  3  -BB  3  -BB     Toileting (which includes using toilet bed pan or urinal)  3  -BB  3  -BB     Putting on and taking off regular upper body clothing  3  -BB  3  -BB     Taking care of personal grooming (such as brushing teeth)  4  -BB  3  -BB     Eating meals  4  -BB  4  -BB     AM-PAC 6 Clicks Score (OT)  20  -BB  19  -BB       User Key  (r) = Recorded By, (t) = Taken By, (c) = Cosigned By    Initials Name Provider Type    Mare Westbrook COTA/L Occupational Therapy Assistant           Time Calculation:   Time Calculation- OT     Row Name 09/08/20 1048             Time Calculation- OT    OT Start Time  0814  -BB      OT Stop Time  0838  -BB      OT Time Calculation (min)  24 min  -BB      Total Timed Code Minutes- OT  24 minute(s)  -BB      OT Received On  09/08/20  -BB        User Key  (r) = Recorded By, (t) = Taken By, (c) = Cosigned By    Initials Name Provider Type    Mare Westbrook COTA/L Occupational Therapy Assistant        Therapy Charges for Today     Code Description  Service Date Service Provider Modifiers Qty    83994301840 HC OT THER PROC EA 15 MIN 9/7/2020 Mare Reed GARCIA/L GO 2    22339378372 HC OT SELF CARE/MGMT/TRAIN EA 15 MIN 9/8/2020 Mare Reed COTA/L GO 1    93086012999 HC OT THER PROC EA 15 MIN 9/8/2020 Mare Reed, GARCIA/L GO 1               JOSE Lares/WINSTON  9/8/2020

## 2020-09-08 NOTE — PROGRESS NOTES
9/8/2020    Chief Complaint: Acute exacerbation of chronic paranoid schizophrenia.    Subjective:  Patient is a 70 y.o. female who was seen in her room today on the [facility]. Pt reports hearing men's voices from behind the wall. Pt reports she is having difficultly sleeping due to the voices and the popping noises. Pt reports she has been up most of the night one man was talking about her yellow socks and wants her out of the room. Pt denies SI/HI/AVH thoughts or action. Pt reports she is ready to go home she does remember how she ended up here. Pt is able to verbalize date, year, time, and place. Pt reports she plans to go how after discharge.  Objective     Vital Signs    Temp:  [98.4 °F (36.9 °C)-98.6 °F (37 °C)] 98.6 °F (37 °C)  Heart Rate:  [89-90] 89  Resp:  [16-18] 18  BP: (108-113)/(53-55) 108/53    Physical Exam:   General Appearance: alert, appears stated age and cooperative,  Hygiene:   good  Gait & Station: Currently Lying in bed  Musculoskeletal: None    Mental Status Exam:   Cooperation:  Suspicious  Eye Contact:  Downcast  Psychomotor Behavior:  Appropriate  Mood: Worried  Affect:  mood-congruent  Speech:  Normal  Thought Process:  Coherent  Associations: Disorganized  Thought Content:     Normal   Suicidal:  None   Homicidal:  None   Hallucinations:  Auditory   Delusion:  None  Cognitive Functioning:   Orientation:  Person, Place, Time and Situation  Reliability:  fair  Insight:  Fair  Judgement:  Fair  Impulse Control:  Fair    Lab Results (last 24 hours)     Procedure Component Value Units Date/Time    POC Glucose Once [[id number]]  (Abnormal) Collected:  09/08/20 1204    Specimen:  Blood Updated:  09/08/20 1217     Glucose 164 mg/dL      Comment: RN NotifiedOperator: [id number] BEBETO CAITLINMeter ID: [id number]       POC Glucose Once [[id number]]  (Normal) Collected:  09/08/20 0545    Specimen:  Blood Updated:  09/08/20 0602     Glucose 97 mg/dL      Comment: RN NotifiedOperator: [id number] SHALOM  CHELLEMeter ID: [id number]       POC Glucose Once [[id number]]  (Abnormal) Collected:  09/07/20 1936    Specimen:  Blood Updated:  09/07/20 1951     Glucose 142 mg/dL      Comment: RN NotifiedOperator: [id number] SHALOM CORBETTMeter ID: [id number]       POC Glucose Once [[id number]]  (Normal) Collected:  09/07/20 1627    Specimen:  Blood Updated:  09/07/20 1645     Glucose 105 mg/dL      Comment: : [id number] GOOD ALISONMeter ID: [id number]           Imaging Results (Last 24 Hours)     ** No results found for the last 24 hours. **          Medicine:   Current Facility-Administered Medications:   •  aluminum-magnesium hydroxide-simethicone (MAALOX MAX) 400-400-40 MG/5ML suspension 15 mL, 15 mL, Oral, Q6H PRN, Tyrel Anderson II, MD  •  cloNIDine (CATAPRES) tablet 0.1 mg, 0.1 mg, Oral, Q6H PRN, Tyrel Anderson II, MD  •  dextrose (D50W) 25 g/ 50mL Intravenous Solution 25 g, 25 g, Intravenous, Q15 Min PRN, Cas Houston APRN  •  dextrose (GLUTOSE) oral gel 15 g, 15 g, Oral, Q15 Min PRN, Cas Houston APRN, 15 g at 09/05/20 0714  •  escitalopram (LEXAPRO) tablet 10 mg, 10 mg, Oral, Daily, Tyrel Anderson II, MD, 10 mg at 09/08/20 0901  •  ferrous sulfate EC tablet 324 mg, 324 mg, Oral, Daily With Breakfast, Tyrel Anderson II, MD, 324 mg at 09/08/20 0901  •  glipizide (GLUCOTROL) half tablet 2.5 mg, 2.5 mg, Oral, BID AC, 2.5 mg at 09/08/20 0900 **AND** metFORMIN (GLUCOPHAGE) tablet 500 mg, 500 mg, Oral, BID AC, Tyrel Anderson II, MD, 500 mg at 09/08/20 0900  •  glucagon (human recombinant) (GLUCAGEN DIAGNOSTIC) injection 1 mg, 1 mg, Subcutaneous, Q15 Min PRN, Cas Houston APRN  •  insulin aspart (novoLOG) injection 0-7 Units, 0-7 Units, Subcutaneous, TID AC, Cas Houston APRN  •  loperamide (IMODIUM) capsule 2 mg, 2 mg, Oral, Q2H PRN, Tyrel Anderson II, MD  •  LORazepam (ATIVAN) tablet 1 mg, 1 mg, Oral, Q8H PRN **OR**  LORazepam (ATIVAN) injection 1 mg, 1 mg, Intramuscular, Q8H PRN, Tyrel Anderson II, MD  •  magnesium hydroxide (MILK OF MAGNESIA) suspension 2400 mg/10mL 10 mL, 10 mL, Oral, Daily PRN, Tyrel Anderson II, MD  •  pantoprazole (PROTONIX) EC tablet 40 mg, 40 mg, Oral, Q AM, Tyrel Anderson II, MD, 40 mg at 09/08/20 0623  •  risperiDONE (risperDAL) tablet 0.5 mg, 0.5 mg, Oral, Daily, Tyrel Anderson II, MD, 0.5 mg at 09/08/20 0901  •  risperiDONE (risperDAL) tablet 1.5 mg, 1.5 mg, Oral, Nightly, Tyrel Anderson II, MD, 1.5 mg at 09/07/20 2014    Diagnoses/Assessment:     Acute exacerbation of chronic paranoid schizophrenia (CMS/HCC)    Schizophrenia, paranoid type (CMS/HCC)    Acute blood loss anemia    DMII (diabetes mellitus, type 2) (CMS/HCC)      Treatment Plan:    1) Will continue care for the patient on the behavioral health unit at [facility] to ensure patient safety.  2) Will continue to provide treatment with the unit milieu, activities, therapies and psychopharmacological management.  3) Patient to be placed on or continued on  Q15 minute checks  and Fall precautions.  4) Pertinent medical issues: Hearing voices  5) Will order following labs: None  6) Will make the following medication changes:  --Cont Risperdal 0.5mg Q day  -- Continue Risperdal 1.5mg po Q hs.  --Cont lexapro 10 mg daily   7) Will continue discharge planning as appropriate for patient.  8) Psychotherapy provided for less than 16 minutes.    Treatment plan and medication risks and benefits discussed with: Patient    CLAIRE Yen  09/08/20  13:13

## 2020-09-08 NOTE — PLAN OF CARE
Problem: Patient Care Overview  Goal: Plan of Care Review  Outcome: Ongoing (interventions implemented as appropriate)  Flowsheets (Taken 9/8/2020 9088)  Progress: improving  Plan of Care Reviewed With: patient  Patient Agreement with Plan of Care: agrees  Outcome Summary: Has slept throughout the nightm medication compliant, able to make needs known and no falls

## 2020-09-09 LAB
GLUCOSE BLDC GLUCOMTR-MCNC: 117 MG/DL (ref 70–130)
GLUCOSE BLDC GLUCOMTR-MCNC: 58 MG/DL (ref 70–130)
GLUCOSE BLDC GLUCOMTR-MCNC: 68 MG/DL (ref 70–130)
GLUCOSE BLDC GLUCOMTR-MCNC: 73 MG/DL (ref 70–130)
GLUCOSE BLDC GLUCOMTR-MCNC: 97 MG/DL (ref 70–130)

## 2020-09-09 PROCEDURE — 82962 GLUCOSE BLOOD TEST: CPT

## 2020-09-09 PROCEDURE — 97535 SELF CARE MNGMENT TRAINING: CPT

## 2020-09-09 PROCEDURE — 99232 SBSQ HOSP IP/OBS MODERATE 35: CPT | Performed by: PSYCHIATRY & NEUROLOGY

## 2020-09-09 PROCEDURE — 97110 THERAPEUTIC EXERCISES: CPT

## 2020-09-09 RX ADMIN — FERROUS SULFATE TAB EC 324 MG (65 MG FE EQUIVALENT) 324 MG: 324 (65 FE) TABLET DELAYED RESPONSE at 08:49

## 2020-09-09 RX ADMIN — ESCITALOPRAM OXALATE 10 MG: 10 TABLET ORAL at 08:49

## 2020-09-09 RX ADMIN — Medication 2.5 MG: at 08:49

## 2020-09-09 RX ADMIN — METFORMIN HYDROCHLORIDE 500 MG: 500 TABLET ORAL at 08:49

## 2020-09-09 RX ADMIN — PANTOPRAZOLE SODIUM 40 MG: 40 TABLET, DELAYED RELEASE ORAL at 05:31

## 2020-09-09 RX ADMIN — RISPERIDONE 0.5 MG: 0.5 TABLET ORAL at 08:49

## 2020-09-09 RX ADMIN — RISPERIDONE 1.5 MG: 1 TABLET ORAL at 20:26

## 2020-09-09 RX ADMIN — METFORMIN HYDROCHLORIDE 500 MG: 500 TABLET ORAL at 17:12

## 2020-09-09 NOTE — THERAPY TREATMENT NOTE
Acute Care - Occupational Therapy Treatment Note  [facility]     Patient Name: Natacha Gandhi  : 1950  MRN: [id number]  Today's Date: 2020  Onset of Illness/Injury or Date of Surgery: 20  Date of Referral to OT: 20  Referring Physician: Dr. Anderson    Admit Date: 9/3/2020       ICD-10-CM ICD-9-CM   1. Impaired mobility and activities of daily living Z74.09 V49.89    Z78.9    2. Impaired functional mobility, balance, gait, and endurance Z74.09 V49.89     Patient Active Problem List   Diagnosis   • Acute GI bleeding   • Altered mental status, unspecified   • Schizophrenia, paranoid type (CMS/HCC)   • Acute exacerbation of chronic paranoid schizophrenia (CMS/HCC)   • Acute blood loss anemia   • DMII (diabetes mellitus, type 2) (CMS/HCC)     Past Medical History:   Diagnosis Date   • Depression    • Psychiatric illness    • Schizoaffective disorder (CMS/HCC)      Past Surgical History:   Procedure Laterality Date   • COLONOSCOPY N/A 2020    Procedure: COLONOSCOPY;  Surgeon: Ge Gorman MD;  Location: [facility];  Service: Gastroenterology;  Laterality: N/A;   • ENDOSCOPY N/A 2020    Procedure: ESOPHAGOGASTRODUODENOSCOPY;  Surgeon: Ge Gorman MD;  Location: [facility];  Service: Gastroenterology;  Laterality: N/A;       Therapy Treatment    Rehabilitation Treatment Summary     Row Name 20 0803             Treatment Time/Intention    Discipline  occupational therapy assistant  -BB      Document Type  therapy note (daily note)  -BB      Subjective Information  no complaints  -BB      Mode of Treatment  individual therapy;occupational therapy  -BB      Patient/Family Observations  no family present  -BB      Total Minutes, Occupational Therapy Treatment  25  -BB      Therapy Frequency (OT Eval)  other (see comments) 5-7 days per week  -BB      Patient Effort  excellent  -BB      Existing Precautions/Restrictions  fall  -BB      Recorded by [BB] Mare Reed,  GARCIA/L 09/09/20 1059      Row Name 09/09/20 0803             Vital Signs    Pre Patient Position  Sitting  -BB      Post Patient Position  Sitting  -BB      Recorded by [BB] Mare Reed COTA/L 09/09/20 1059      Row Name 09/09/20 0803             Cognitive Assessment/Intervention- PT/OT    Orientation Status (Cognition)  oriented x 4  -BB      Follows Commands (Cognition)  WNL  -BB      Personal Safety Interventions  fall prevention program maintained;gait belt;nonskid shoes/slippers when out of bed;supervised activity  -BB      Recorded by [BB] Mare Reed COTA/L 09/09/20 1059      Row Name 09/09/20 0803             Sit-Stand Transfer    Sit-Stand [place] (Transfers)  contact guard  -BB      Assistive Device (Sit-Stand Transfers)  other (see comments) HHA  -BB      Recorded by [BB] Mare Reed COTA/L 09/09/20 1059      Row Name 09/09/20 0803             Stand-Sit Transfer    Stand-Sit [place] (Transfers)  contact guard  -BB      Assistive Device (Stand-Sit Transfers)  other (see comments) HHA  -BB      Recorded by [BB] Mare Reed COTA/L 09/09/20 1059      Row Name 09/09/20 0803             Toilet Transfer    Type (Toilet Transfer)  sit-stand;stand-sit  -BB      [place] Level (Toilet Transfer)  contact guard HHA  -BB      Recorded by [BB] Mare Reed COTA/L 09/09/20 1059      Row Name 09/09/20 0803             Bathing Assessment/Intervention    Bathing [place] Level  lower body;supervision  -BB      Bathing Position  edge of bed sitting  -BB      Recorded by [BB] Mare Reed COTA/L 09/09/20 1059      Row Name 09/09/20 0803             Lower Body Dressing Assessment/Training    Lower Body Dressing [place] Level  doff;don;pants/bottoms;minimum assist (75% patient effort);socks;supervision  -BB      Lower Body Dressing Position  edge of bed sitting  -BB      Recorded by [BB] Mare Reed COTA/L 09/09/20 1059      Row Name 09/09/20  0803             Grooming Assessment/Training    [place] Level (Grooming)  wash face, hands;independent  -BB      Grooming Position  edge of bed sitting  -BB      Recorded by [BB] Mare Reed COTA/L 09/09/20 1059      Row Name 09/09/20 0803             Toileting Assessment/Training    [place] Level (Toileting)  adjust/manage clothing;perform perineal hygiene;contact guard assist  -BB      Toileting Position  unsupported sitting;supported standing  -BB      Recorded by [BB] Mare Reed COTA/L 09/09/20 1059      Row Name 09/09/20 0803             Static Sitting Balance    Level of [place] (Unsupported Sitting, Static Balance)  independent  -BB      Sitting Position (Unsupported Sitting, Static Balance)  sitting on edge of bed  -BB      Time Able to Maintain Position (Unsupported Sitting, Static Balance)  more than 5 minutes  -BB      Recorded by [BB] Mare Reed COTA/L 09/09/20 1059      Row Name 09/09/20 0803             Positioning and Restraints    Pre-Treatment Position  in bed  -BB      Post Treatment Position  bed  -BB      In Bed  sitting EOB;call light within reach;encouraged to call for assist;exit alarm on  -BB      Recorded by [BB] Mare Reed COTA/L 09/09/20 1059      Row Name 09/09/20 0803             Pain Scale: Numbers Pre/Post-Treatment    Pain Scale: Numbers, Pretreatment  0/10 - no pain  -BB      Pain Scale: Numbers, Post-Treatment  0/10 - no pain  -BB      Recorded by [BB] Mare Reed COTA/L 09/09/20 1059      Row Name 09/09/20 0803             Plan of Care Review    Plan of Care Reviewed With  patient  -BB      Progress  improving  -BB      Outcome Summary  Pt sitting EOB doffing/donning pants with Min A and supervision for doffing/donning socks. Pt is Independnent for grooming tasks. Pt CGA for bed<>toilet t/f with HHA.No complaints this am.  -BB      Recorded by [BB] Mare Reed COTA/L 09/09/20 1059      Row Name 09/09/20 0803              Outcome Summary/Treatment Plan (OT)    Daily Summary of Progress (OT)  progress toward functional goals is good  -BB      Plan for Continued Treatment (OT)  continue OT POC  -BB      Anticipated Discharge Disposition (OT)  home with 24/7 care;home with assist;home with home health  -BB      Recorded by [MELECIO] Mare Reed COTA/L 09/09/20 1059        User Key  (r) = Recorded By, (t) = Taken By, (c) = Cosigned By    Initials Name Effective Dates Discipline    BB Mare Reed COTA/L 03/07/18 -  OT           Rehab Goal Summary     Row Name 09/09/20 0803             Occupational Therapy Goals    Transfer Goal Selection (OT)  transfer, OT goal 1  -BB      Bathing Goal Selection (OT)  bathing, OT goal 1  -BB      Dressing Goal Selection (OT)  dressing, OT goal 1  -BB      Toileting Goal Selection (OT)  toileting, OT goal 1  -BB      Balance Goal Selection (OT)  balance, OT goal 1  -BB         Transfer Goal 1 (OT)    Activity/Assistive Device (Transfer Goal 1, OT)  sit-to-stand/stand-to-sit;bed-to-chair/chair-to-bed;toilet  -BB      [place] Level/Cues Needed (Transfer Goal 1, OT)  standby assist;verbal cues required;tactile cues required;set-up required  -BB      Time Frame (Transfer Goal 1, OT)  long term goal (LTG);by discharge  -BB      Progress/Outcome (Transfer Goal 1, OT)  goal not met  -BB         Bathing Goal 1 (OT)    Activity/Assistive Device (Bathing Goal 1, OT)  lower body bathing  -BB      [place] Level/Cues Needed (Bathing Goal 1, OT)  verbal cues required;tactile cues required;set-up required;standby assist  -BB      Time Frame (Bathing Goal 1, OT)  long term goal (LTG);by discharge  -BB      Progress/Outcomes (Bathing Goal 1, OT)  goal met  -BB         Dressing Goal 1 (OT)    Activity/Assistive Device (Dressing Goal 1, OT)  lower body dressing  -BB      [place]/Cues Needed (Dressing Goal 1, OT)  verbal cues required;tactile cues required;set-up required;standby assist   -BB      Time Frame (Dressing Goal 1, OT)  long term goal (LTG);by discharge  -BB      Progress/Outcome (Dressing Goal 1, OT)  goal not met  -BB         Toileting Goal 1 (OT)    Activity/Device (Toileting Goal 1, OT)  toileting skills, all  -BB      [place] Level/Cues Needed (Toileting Goal 1, OT)  standby assist;verbal cues required;tactile cues required;set-up required  -BB      Time Frame (Toileting Goal 1, OT)  long term goal (LTG);by discharge  -BB      Progress/Outcome (Toileting Goal 1, OT)  goal not met  -BB         Balance Goal 1 (OT)    Activity/Assistive Device (Balance Goal 1, OT)  standing, dynamic  -BB      [place] Level/Cues Needed (Balance Goal 1, OT)  standby assist  -BB      Time Frame (Balance Goal 1, OT)  long term goal (LTG);by discharge  -BB      Progress/Outcomes (Balance Goal 1, OT)  goal not met  -BB        User Key  (r) = Recorded By, (t) = Taken By, (c) = Cosigned By    Initials Name Provider Type Discipline    Mare Westbrook COTA/L Occupational Therapy Assistant OT        Occupational Therapy Education                 Title: PT OT SLP Therapies (In Progress)     Topic: Occupational Therapy (In Progress)     Point: ADL training (Done)     Description:   Instruct learner(s) on proper safety adaptation and remediation techniques during self care or transfers.   Instruct in proper use of assistive devices.              Learning Progress Summary           Patient Acceptance, E, VU by BB at 9/9/2020 1101    Acceptance, E, VU by BB at 9/8/2020 1047    Acceptance, E, VU,NR by AS at 9/4/2020 1205    Comment:  role of OT, OT POC, ADL training, fall preacutions, bed mobility, transfer training                   Point: Home exercise program (Not Started)     Description:   Instruct learner(s) on appropriate technique for monitoring, assisting and/or progressing therapeutic exercises/activities.              Learner Progress:   Not documented in this visit.          Point:  Precautions (Done)     Description:   Instruct learner(s) on prescribed precautions during self-care and functional transfers.              Learning Progress Summary           Patient Acceptance, E,TB, VU by KM at 9/9/2020 0424    Acceptance, E, VU,NR by AS at 9/4/2020 1205    Comment:  role of OT, OT POC, ADL training, fall preacutions, bed mobility, transfer training                   Point: Body mechanics (Done)     Description:   Instruct learner(s) on proper positioning and spine alignment during self-care, functional mobility activities and/or exercises.              Learning Progress Summary           Patient Acceptance, E, VU by BB at 9/8/2020 1047    Acceptance, E, NR by BB at 9/7/2020 1131                               User Key     Initials Effective Dates Name Provider Type Discipline     10/17/16 -  Madison Irwin, RN Registered Nurse Nurse    MELECIO 03/07/18 -  Mare Reed COTA/L Occupational Therapy Assistant OT    AS 07/05/20 -  Unique Garcia, OT Occupational Therapist OT                OT Recommendation and Plan  Outcome Summary/Treatment Plan (OT)  Daily Summary of Progress (OT): progress toward functional goals is good  Plan for Continued Treatment (OT): continue OT POC  Anticipated Discharge Disposition (OT): home with 24/7 care, home with assist, home with home health  Therapy Frequency (OT Eval): other (see comments)(5-7 days per week)  Daily Summary of Progress (OT): progress toward functional goals is good  Plan of Care Review  Plan of Care Reviewed With: patient  Plan of Care Reviewed With: patient  Outcome Summary: Pt sitting EOB doffing/donning pants with Min A and supervision for doffing/donning socks. Pt is Independnent for grooming tasks. Pt CGA for bed<>toilet t/f with HHA.No complaints this am.  Outcome Measures     Row Name 09/09/20 0803 09/08/20 0814 09/07/20 0846       How much help from another is currently needed...    Putting on and taking off regular lower body  clothing?  3  -BB  3  -BB  3  -BB    Bathing (including washing, rinsing, and drying)  3  -BB  3  -BB  3  -BB    Toileting (which includes using toilet bed pan or urinal)  3  -BB  3  -BB  3  -BB    Putting on and taking off regular upper body clothing  3  -BB  3  -BB  3  -BB    Taking care of personal grooming (such as brushing teeth)  4  -BB  4  -BB  3  -BB    Eating meals  4  -BB  4  -BB  4  -BB    AM-PAC 6 Clicks Score (OT)  20  -BB  20  -BB  19  -BB      User Key  (r) = Recorded By, (t) = Taken By, (c) = Cosigned By    Initials Name Provider Type    Mare Westbrook COTA/L Occupational Therapy Assistant           Time Calculation:   Time Calculation- OT     Row Name 09/09/20 1102             Time Calculation- OT    OT Start Time  0803  -BB      OT Stop Time  0828  -BB      OT Time Calculation (min)  25 min  -BB      Total Timed Code Minutes- OT  25 minute(s)  -BB      OT Received On  09/09/20  -BB        User Key  (r) = Recorded By, (t) = Taken By, (c) = Cosigned By    Initials Name Provider Type    Mare Westbrook COTA/L Occupational Therapy Assistant        Therapy Charges for Today     Code Description Service Date Service Provider Modifiers Qty    11887480112 HC OT SELF CARE/MGMT/TRAIN EA 15 MIN 9/8/2020 Mare Reed COTA/L GO 1    46174143243 HC OT THER PROC EA 15 MIN 9/8/2020 Mare Reed COTA/L GO 1    14729979424 HC OT SELF CARE/MGMT/TRAIN EA 15 MIN 9/9/2020 Mare Reed COTA/L GO 2               NARDA Lares  9/9/2020

## 2020-09-09 NOTE — NURSING NOTE
Behavior   Note any precipitants to event or behavior   Describe level and action of any aggressive behavior or speech and associated interventions.     Anxiety: Patient denies at this time  Depression: Patient denies at this time  Pain  0  AVH   no  S/I   no  Plan  no  H/I   no  Plan  no    Affect   euthymic/normal      Note: Pt is alert, oriented et cooperative at this time. She denies any S/I, H/I, or hallucinations et says that she doesn't want people to think that she's crazy et that she passed out at home et hit her head. She says that's why she is here. Pt is cooperative with medication administration et care. She prefers to take her pills one at a time et needs cueing as to where objects are near her et will often stick out her hand for staff to hand her items. Will monitor.       Intervention    PRN medication utilized:  no    Instructed in medication usage and effects  Medications administered as ordered  Encouraged to verbalize needs      Response    Verbalized understanding   Did patient take medications as ordered yes   Did patient interact with assessment?  yes     Plan    Will monitor for safety  Will monitor every 15 minutes as ordered  Will evaluate and promote the plan of care    Last BM:  unknown date  (Please chart in I/O as well)

## 2020-09-09 NOTE — THERAPY TREATMENT NOTE
Acute Care - Physical Therapy Treatment Note  [facility]     Patient Name: Natacha Gandhi  : 1950  MRN: [id number]  Today's Date: 2020  Onset of Illness/Injury or Date of Surgery: 20     Referring Physician: Dr. Anderson    Admit Date: 9/3/2020    Visit Dx:    ICD-10-CM ICD-9-CM   1. Impaired mobility and activities of daily living Z74.09 V49.89    Z78.9    2. Impaired functional mobility, balance, gait, and endurance Z74.09 V49.89     Patient Active Problem List   Diagnosis   • Acute GI bleeding   • Altered mental status, unspecified   • Schizophrenia, paranoid type (CMS/HCC)   • Acute exacerbation of chronic paranoid schizophrenia (CMS/HCC)   • Acute blood loss anemia   • DMII (diabetes mellitus, type 2) (CMS/HCC)       Therapy Treatment    Rehabilitation Treatment Summary     Row Name 20 1031 20 0803          Treatment Time/Intention    Discipline  physical therapy assistant  -TW  occupational therapy assistant  -BB     Document Type  therapy note (daily note)  -TW  therapy note (daily note)  -BB     Subjective Information  no complaints  -TW  no complaints  -BB     Mode of Treatment  physical therapy;individual therapy  -TW  individual therapy;occupational therapy  -BB     Patient/Family Observations  No family present.  -TW  no family present  -BB     Total Minutes, Occupational Therapy Treatment  --  25  -BB     Therapy Frequency (OT Eval)  --  other (see comments) 5-7 days per week  -BB     Patient Effort  fair  -TW  excellent  -BB     Existing Precautions/Restrictions  fall  -TW  fall  -BB     Recorded by [TW] Remigio Mi PTA 20 160 [BB] Mare Reed COTA/L 20 1059     Row Name 20 1031 20 0803          Vital Signs    Pre Patient Position  Supine  -TW  Sitting  -BB     Intra Patient Position  Supine  -TW  --     Post Patient Position  Supine  -TW  Sitting  -BB     Recorded by [TW] Remigio Mi PTA 20 160 [BB] Brianna  ARI GillespieA/L 09/09/20 1059     Row Name 09/09/20 1031 09/09/20 0803          Cognitive Assessment/Intervention- PT/OT    Orientation Status (Cognition)  unable/difficult to assess Pt was too lethargic to answer questions clearly.  -TW  oriented x 4  -BB     Follows Commands (Cognition)  --  WNL  -BB     Personal Safety Interventions  --  fall prevention program maintained;gait belt;nonskid shoes/slippers when out of bed;supervised activity  -BB     Recorded by [TW] Remigio Mi, PTA 09/09/20 1607 [BB] Mare Reed COTA/L 09/09/20 1059     Row Name 09/09/20 1031             Bed Mobility Assessment/Treatment    Comment (Bed Mobility)  Not performed due to pt too lethargic to follow commands well.  -TW      Recorded by [TW] Remigio Mi, PTA 09/09/20 1607      Row Name 09/09/20 0803             Sit-Stand Transfer    Sit-Stand Drew (Transfers)  contact guard  -BB      Assistive Device (Sit-Stand Transfers)  other (see comments) HHA  -BB      Recorded by [BB] Mare Reed COTA/L 09/09/20 1059      Row Name 09/09/20 0803             Stand-Sit Transfer    Stand-Sit Drew (Transfers)  contact guard  -BB      Assistive Device (Stand-Sit Transfers)  other (see comments) HHA  -BB      Recorded by [BB] Mare Reed COTA/L 09/09/20 1059      Row Name 09/09/20 0803             Toilet Transfer    Type (Toilet Transfer)  sit-stand;stand-sit  -BB      Drew Level (Toilet Transfer)  contact guard HHA  -BB      Recorded by [BB] Mare Reed COTA/L 09/09/20 1059      Row Name 09/09/20 0803             Bathing Assessment/Intervention    Bathing Drew Level  lower body;supervision  -BB      Bathing Position  edge of bed sitting  -BB      Recorded by [BB] Mare Reed COTA/L 09/09/20 1059      Row Name 09/09/20 0803             Lower Body Dressing Assessment/Training    Lower Body Dressing Drew Level  doff;don;pants/bottoms;minimum assist (75%  patient effort);socks;supervision  -BB      Lower Body Dressing Position  edge of bed sitting  -BB      Recorded by [BB] Mare Reed COTA/L 09/09/20 1059      Row Name 09/09/20 0803             Grooming Assessment/Training    [place] Level (Grooming)  wash face, hands;independent  -BB      Grooming Position  edge of bed sitting  -BB      Recorded by [BB] Mare Reed COTA/L 09/09/20 1059      Row Name 09/09/20 0803             Toileting Assessment/Training    [place] Level (Toileting)  adjust/manage clothing;perform perineal hygiene;contact guard assist  -BB      Toileting Position  unsupported sitting;supported standing  -BB      Recorded by [BB] Mare Reed COTA/L 09/09/20 1059      Row Name 09/09/20 1031             Therapeutic Exercise    Lower Extremity (Therapeutic Exercise)  gastroc stretch, bilateral;hamstring stretch, bilateral;heel slides, bilateral  -TW      Lower Extremity Range of Motion (Therapeutic Exercise)  hip flexion/extension, bilateral;hip abduction/adduction, bilateral;hip internal/external rotation, bilateral;ankle dorsiflexion/plantar flexion, bilateral  -TW      Exercise Type (Therapeutic Exercise)  PROM (passive range of motion);AAROM (active assistive range of motion)  -TW      Position (Therapeutic Exercise)  supine  -TW      Sets/Reps (Therapeutic Exercise)  1/20  -TW      Comment (Therapeutic Exercise)  Pt when instructed would assist with LE movement but would quickly fall back asleep and require VCs to assist with movement/ex's again.   -TW      Recorded by [TW] Remigio Mi, KEIRA 09/09/20 1607      Row Name 09/09/20 0803             Static Sitting Balance    Level of [place] (Unsupported Sitting, Static Balance)  independent  -BB      Sitting Position (Unsupported Sitting, Static Balance)  sitting on edge of bed  -BB      Time Able to Maintain Position (Unsupported Sitting, Static Balance)  more than 5 minutes  -BB      Recorded by [BB]  Mare Reed COTA/L 09/09/20 1059      Row Name 09/09/20 1031 09/09/20 0803          Positioning and Restraints    Pre-Treatment Position  in bed  -TW  in bed  -BB     Post Treatment Position  bed  -TW  bed  -BB     In Bed  supine;call light within reach;encouraged to call for assist;exit alarm on  -TW  sitting EOB;call light within reach;encouraged to call for assist;exit alarm on  -BB     Recorded by [TW] Remigio Mi, PTA 09/09/20 1607 [BB] Mare Reed COTA/L 09/09/20 1059     Row Name 09/09/20 0803             Pain Scale: Numbers Pre/Post-Treatment    Pain Scale: Numbers, Pretreatment  0/10 - no pain  -BB      Pain Scale: Numbers, Post-Treatment  0/10 - no pain  -BB      Recorded by [BB] Mare Reed COTA/L 09/09/20 1059      Row Name 09/09/20 0803             Plan of Care Review    Plan of Care Reviewed With  patient  -BB      Progress  improving  -BB      Outcome Summary  Pt sitting EOB doffing/donning pants with Min A and supervision for doffing/donning socks. Pt is Independnent for grooming tasks. Pt CGA for bed<>toilet t/f with HHA.No complaints this am.  -BB      Recorded by [BB] Mare Reed COTA/L 09/09/20 1059      Row Name 09/09/20 0803             Outcome Summary/Treatment Plan (OT)    Daily Summary of Progress (OT)  progress toward functional goals is good  -BB      Plan for Continued Treatment (OT)  continue OT POC  -BB      Anticipated Discharge Disposition (OT)  home with 24/7 care;home with assist;home with home health  -BB      Recorded by [BB] Mare Reed COTA/L 09/09/20 1059      Row Name 09/09/20 1031             Outcome Summary/Treatment Plan (PT)    Daily Summary of Progress (PT)  progress toward functional goals is gradual  -TW      Plan for Continued Treatment (PT)  Cont  -TW      Anticipated Discharge Disposition (PT)  anticipate therapy at next level of care  -TW      Recorded by [TW] Remigio Mi, KEIRA 09/09/20 1607        User Key   (r) = Recorded By, (t) = Taken By, (c) = Cosigned By    Initials Name Effective Dates Discipline    TW Remigio Mi, KEIRA 03/07/18 -  PT    BB Mare Reed, JOSE/L 03/07/18 -  OT               Rehab Goal Summary     Row Name 09/09/20 1031 09/09/20 0803          Bed Mobility Goal 1 (PT)    Activity/Assistive Device (Bed Mobility Goal 1, PT)  sit to supine;supine to sit  -TW  --     [place] Level/Cues Needed (Bed Mobility Goal 1, PT)  independent  -TW  --     Time Frame (Bed Mobility Goal 1, PT)  3 days  -TW  --     Progress/Outcomes (Bed Mobility Goal 1, PT)  goal not met  -TW  --        Transfer Goal 1 (PT)    Activity/Assistive Device (Transfer Goal 1, PT)  sit-to-stand/stand-to-sit  -TW  --     [place]as Level/Cues Needed (Transfer Goal 1, PT)  supervision required  -TW  --     Time Frame (Transfer Goal 1, PT)  3 days  -TW  --     Progress/Outcome (Transfer Goal 1, PT)  goal not met  -TW  --        Gait Training Goal 1 (PT)    Activity/Assistive Device (Gait Training Goal 1, PT)  gait (walking locomotion) LRAD PRN  -TW  --     Lucas Level (Gait Training Goal 1, PT)  supervision required  -TW  --     Distance (Gait Goal 1, PT)  100ft or more  -TW  --     Time Frame (Gait Training Goal 1, PT)  5 days  -TW  --     Progress/Outcome (Gait Training Goal 1, PT)  goal not met  -TW  --        Stairs Goal 1 (PT)    Activity/Assistive Device (Stairs Goal 1, PT)  ascending stairs;descending stairs  -TW  --     Lucas Level/Cues Needed (Stairs Goal 1, PT)  standby assist  -TW  --     Number of Stairs (Stairs Goal 1, PT)  1 or more  -TW  --     Time Frame (Stairs Goal 1, PT)  by discharge  -TW  --     Progress/Outcome (Stairs Goal 1, PT)  goal not met  -TW  --        Occupational Therapy Goals    Transfer Goal Selection (OT)  --  transfer, OT goal 1  -BB     Bathing Goal Selection (OT)  --  bathing, OT goal 1  -BB     Dressing Goal Selection (OT)  --  dressing, OT goal 1  -BB     Toileting  Goal Selection (OT)  --  toileting, OT goal 1  -BB     Balance Goal Selection (OT)  --  balance, OT goal 1  -BB        Transfer Goal 1 (OT)    Activity/Assistive Device (Transfer Goal 1, OT)  --  sit-to-stand/stand-to-sit;bed-to-chair/chair-to-bed;toilet  -BB     [place] Level/Cues Needed (Transfer Goal 1, OT)  --  standby assist;verbal cues required;tactile cues required;set-up required  -BB     Time Frame (Transfer Goal 1, OT)  --  long term goal (LTG);by discharge  -BB     Progress/Outcome (Transfer Goal 1, OT)  --  goal not met  -BB        Bathing Goal 1 (OT)    Activity/Assistive Device (Bathing Goal 1, OT)  --  lower body bathing  -BB     [place] Level/Cues Needed (Bathing Goal 1, OT)  --  verbal cues required;tactile cues required;set-up required;standby assist  -BB     Time Frame (Bathing Goal 1, OT)  --  long term goal (LTG);by discharge  -BB     Progress/Outcomes (Bathing Goal 1, OT)  --  goal met  -BB        Dressing Goal 1 (OT)    Activity/Assistive Device (Dressing Goal 1, OT)  --  lower body dressing  -BB     [place]/Cues Needed (Dressing Goal 1, OT)  --  verbal cues required;tactile cues required;set-up required;standby assist  -BB     Time Frame (Dressing Goal 1, OT)  --  long term goal (LTG);by discharge  -BB     Progress/Outcome (Dressing Goal 1, OT)  --  goal not met  -BB        Toileting Goal 1 (OT)    Activity/Device (Toileting Goal 1, OT)  --  toileting skills, all  -BB     [place] Level/Cues Needed (Toileting Goal 1, OT)  --  standby assist;verbal cues required;tactile cues required;set-up required  -BB     Time Frame (Toileting Goal 1, OT)  --  long term goal (LTG);by discharge  -BB     Progress/Outcome (Toileting Goal 1, OT)  --  goal not met  -BB        Balance Goal 1 (OT)    Activity/Assistive Device (Balance Goal 1, OT)  --  standing, dynamic  -BB     [place] Level/Cues Needed (Balance Goal 1, OT)  --  standby assist  -BB     Time Frame (Balance Goal 1, OT)  --   long term goal (LTG);by discharge  -BB     Progress/Outcomes (Balance Goal 1, OT)  --  goal not met  -BB       User Key  (r) = Recorded By, (t) = Taken By, (c) = Cosigned By    Initials Name Provider Type Discipline    TW Remigio Mi, PTA Physical Therapy Assistant PT    BB Mare Reed, JOSE/L Occupational Therapy Assistant OT          Physical Therapy Education                 Title: PT OT SLP Therapies (In Progress)     Topic: Physical Therapy (In Progress)     Point: Mobility training (Done)     Description:   Instruct learner(s) on safety and technique for assisting patient out of bed, chair or wheelchair.  Instruct in the proper use of assistive devices, such as walker, crutches, cane or brace.              Patient Friendly Description:   It's important to get you on your feet again, but we need to do so in a way that is safe for you. Falling has serious consequences, and your personal safety is the most important thing of all.        When it's time to get out of bed, one of us or a family member will sit next to you on the bed to give you support.     If your doctor or nurse tells you to use a walker, crutches, a cane, or a brace, be sure you use it every time you get out of bed, even if you think you don't need it.    Learning Progress Summary           Patient Acceptance, BALDEMAR RENAE by KERRY at 9/4/2020 4286    Comment:  Role of PT, POC, use of gait belt                   Point: Home exercise program (Not Started)     Description:   Instruct learner(s) on appropriate technique for monitoring, assisting and/or progressing patient with therapeutic exercises and activities.              Learner Progress:   Not documented in this visit.          Point: Body mechanics (Not Started)     Description:   Instruct learner(s) on proper positioning and spine alignment for patient and/or caregiver during mobility tasks and/or exercises.              Learner Progress:   Not documented in this visit.           Point: Precautions (Done)     Description:   Instruct learner(s) on prescribed precautions during mobility and gait tasks              Learning Progress Summary           Patient Acceptance, E,TB, VU by PRAMOD at 9/9/2020 0424    Acceptance, E, VU by KERRY at 9/4/2020 1334    Comment:  Role of PT, POC, use of gait belt                               User Key     Initials Effective Dates Name Provider Type Discipline    PRAMOD 10/17/16 -  Madison Irwin RN Registered Nurse Nurse    KERRY 08/09/20 -  Melina Neves, PT Physical Therapist PT                PT Recommendation and Plan  Anticipated Discharge Disposition (PT): anticipate therapy at next level of care  Outcome Summary/Treatment Plan (PT)  Daily Summary of Progress (PT): progress toward functional goals is gradual  Plan for Continued Treatment (PT): Cont  Anticipated Discharge Disposition (PT): anticipate therapy at next level of care  Plan of Care Reviewed With: patient  Progress: no change  Outcome Summary: Pt very lethargic but easily awakened. Pt would perform AAROM with B LE but would quickly fall asleep during cont reps. Pt would cont awaken to name but fall asleep again after just a few reps of ex's. Pt would cont to benefit from therapy upon DC.  Outcome Measures     Row Name 09/09/20 1031 09/09/20 0803 09/08/20 0814       How much help from another person do you currently need...    Turning from your back to your side while in flat bed without using bedrails?  3  -TW  --  --    Moving from lying on back to sitting on the side of a flat bed without bedrails?  3  -TW  --  --    Moving to and from a bed to a chair (including a wheelchair)?  3  -TW  --  --    Standing up from a chair using your arms (e.g., wheelchair, bedside chair)?  3  -TW  --  --    Climbing 3-5 steps with a railing?  3  -TW  --  --    To walk in hospital room?  3  -TW  --  --    AM-PAC 6 Clicks Score (PT)  18  -TW  --  --       How much help from another is currently needed...    Putting on and  taking off regular lower body clothing?  --  3  -BB  3  -BB    Bathing (including washing, rinsing, and drying)  --  3  -BB  3  -BB    Toileting (which includes using toilet bed pan or urinal)  --  3  -BB  3  -BB    Putting on and taking off regular upper body clothing  --  3  -BB  3  -BB    Taking care of personal grooming (such as brushing teeth)  --  4  -BB  4  -BB    Eating meals  --  4  -BB  4  -BB    AM-PAC 6 Clicks Score (OT)  --  20  -BB  20  -BB    Row Name 09/07/20 0846             How much help from another is currently needed...    Putting on and taking off regular lower body clothing?  3  -BB      Bathing (including washing, rinsing, and drying)  3  -BB      Toileting (which includes using toilet bed pan or urinal)  3  -BB      Putting on and taking off regular upper body clothing  3  -BB      Taking care of personal grooming (such as brushing teeth)  3  -BB      Eating meals  4  -BB      AM-PAC 6 Clicks Score (OT)  19  -BB        User Key  (r) = Recorded By, (t) = Taken By, (c) = Cosigned By    Initials Name Provider Type    TW Remigio Mi PTA Physical Therapy Assistant    BB Mare Reed COTA/L Occupational Therapy Assistant         Time Calculation:   PT Charges     Row Name 09/09/20 1610             Time Calculation    Start Time  1031  -TW      Stop Time  1047  -TW      Time Calculation (min)  16 min  -TW      PT Received On  09/09/20  -TW      PT Goal Re-Cert Due Date  09/17/20  -TW         Time Calculation- PT    Total Timed Code Minutes- PT  16 minute(s)  -TW        User Key  (r) = Recorded By, (t) = Taken By, (c) = Cosigned By    Initials Name Provider Type    TW Remigio Mi PTA Physical Therapy Assistant        Therapy Charges for Today     Code Description Service Date Service Provider Modifiers Qty    [id number] HC PT THER PROC EA 15 MIN 9/9/2020 Remigio Mi PTA GP 1          PT G-Codes  Outcome Measure Options: AM-PAC 6 Clicks Basic Mobility (PT)  AM-PAC  6 Clicks Score (PT): 18  AM-PAC 6 Clicks Score (OT): 20    Remigio Mi, PTA  9/9/2020

## 2020-09-09 NOTE — CONSULTS
Adult Nutrition  Assessment    Patient Name:  Natacha Gandhi  YOB: 1950  MRN: [id number]  Admit Date:  9/3/2020    Assessment Date:  9/9/2020    Comments:  RN requested a divided plate to assist pt with eating as pt is blind.  Will contact Food and Nutrition.  Intake 100% - 5x, 75% - 1x, 0% - 2x plus 100% for 1 snack.  Blood glucose has been low and elevated.  Glucophage, sliding scale novolog prescribed.  Will maintain pt on prescribed diet.    Reason for Assessment     Row Name 09/09/20 1845          Reason for Assessment    Reason For Assessment  follow-up protocol             Labs/Tests/Procedures/Meds     Row Name 09/09/20 1845          Labs/Procedures/Meds    Lab Results Reviewed  reviewed, pertinent        Medications    Pertinent Medications Reviewed  reviewed, pertinent             Nutrition Prescription Ordered     Row Name 09/09/20 1845          Nutrition Prescription PO    Current PO Diet  Soft Texture     Texture  Ground     Common Modifiers  Cardiac;Consistent Carbohydrate         Evaluation of Received Nutrient/Fluid Intake     Row Name 09/09/20 1846          PO Evaluation    Number of Meals  8 plus 1 snack     % PO Intake  100% - 5x, 75% - 1x, 0% - 2x plus 100% for 1 snack               Electronically signed by:  Suzette Steiner RD  09/09/20 18:48

## 2020-09-09 NOTE — PLAN OF CARE
Problem: Patient Care Overview  Goal: Plan of Care Review  Outcome: Ongoing (interventions implemented as appropriate)  Flowsheets  Taken 9/9/2020 1101  Progress: improving  Plan of Care Reviewed With: patient  Taken 9/9/2020 0803  Outcome Summary: Pt sitting EOB doffing/donning pants with Min A and supervision for doffing/donning socks. Pt is Independnent for grooming tasks. Pt CGA for bed<>toilet t/f with HHA.No complaints this am.

## 2020-09-09 NOTE — NURSING NOTE
Behavior   Note any precipitants to event or behavior   Describe level and action of any aggressive behavior or speech and associated interventions.     Anxiety: Patient denies at this time  Depression: Patient denies at this time  Pain  0  AVH   denies  S/I   no  Plan  no  H/I   no  Plan  no    Affect   euthymic/normal      Note: Pt sitting on bedside during the time of assessment. She is pleasant, friendly, and cooperative with staff. She was medication compliant, no behaviors noted. She is assist x1 up to the bathroom. BM this evening. Pt alert. VSS. No c/o unrelieved pain or altered comfort this shift.   Intervention    PRN medication utilized:  no    Instructed in medication usage and effects  Medications administered as ordered  Encouraged to verbalize needs      Response    Verbalized understanding   Did patient take medications as ordered yes   Did patient interact with assessment?  yes     Plan    Will monitor for safety  Will monitor every 15 minutes as ordered  Will evaluate and promote the plan of care    Last BM:  unknown date  (Please chart in I/O as well)

## 2020-09-09 NOTE — PLAN OF CARE
Problem: Patient Care Overview  Goal: Plan of Care Review  Outcome: Ongoing (interventions implemented as appropriate)  Flowsheets  Taken 9/8/2020 1734 by Lilian Alexandre RN  Progress: improving  Taken 9/8/2020 2300 by Madison Irwin, RN  Plan of Care Reviewed With: patient  Patient Agreement with Plan of Care: agrees  Taken 9/9/2020 2708 by Madison Irwin, RN  Outcome Summary: Pt sitting on bedside during the time of assessment. She is pleasant, friendly, and cooperative with staff. She was medication compliant, no behaviors noted. She is assist x1 up to the bathroom. BM this evening. Pt alert. VSS. No c/o unrelieved pain or altered comfort this shift.

## 2020-09-09 NOTE — NURSING NOTE
Pts blood sugar noted to be 58 before supper with pt refusing to eat due to having to eat in dinning area. Pt agreed to eat if assisted with meal in her room. She ate approx. 60% et drank 2 oj's et one apple juice. Blood sugar rechecked approx. 1 hr after et noted to be 68.Diabetic meds held. Per staff, pt was c/o headache before supper. Pt became tearful while eating supper et listening to linus, et said that she misses her family et that most of her family are .

## 2020-09-09 NOTE — PLAN OF CARE
Problem: Patient Care Overview  Goal: Plan of Care Review  Outcome: Ongoing (interventions implemented as appropriate)  Flowsheets (Taken 9/9/2020 1031)  Progress: no change  Plan of Care Reviewed With: patient  Outcome Summary: Pt very lethargic but easily awakened. Pt would perform AAROM with B LE but would quickly fall asleep during cont reps. Pt would cont awaken to name but fall asleep again after just a few reps of ex's. Pt would cont to benefit from therapy upon DC.

## 2020-09-09 NOTE — PLAN OF CARE
Problem: Patient Care Overview  Goal: Plan of Care Review  Outcome: Ongoing (interventions implemented as appropriate)  Flowsheets (Taken 9/9/2020 1527)  Progress: no change  Plan of Care Reviewed With: patient  Patient Agreement with Plan of Care: agrees  Outcome Summary: Pt is alert et pleasant. Has been cooperative with medication administration et care this day. Denies any discomfort. Denies any S/I or hallucinations.

## 2020-09-09 NOTE — PROGRESS NOTES
"9/9/2020    Chief Complaint: psychosis and hallucinations    Subjective:  Patient is a 70 y.o. female that is inpatient on the [facility] today she is seen in her room, she was eating breakfast. Pt was appropriate, she did complain about her food being cold and no coffee, signee heated up food and she said \"now don't forget my coffee\"  She was laughing.    Pt is cooperative with treatment, she is taking meds as ordered. Pt worked with therapy today.    Pt reports that she does sleep well, although there are times that she is hearing a dell voice. She has not reported that today and I wonder if it is due to a peer that was discharged yesterday that wondered in the rooms often  Otherwise, pt is doing well, no AE reported with medications.   Objective     Vital Signs    Temp:  [98.2 °F (36.8 °C)-99.7 °F (37.6 °C)] 98.2 °F (36.8 °C)  Heart Rate:  [86-88] 86  Resp:  [18] 18  BP: ()/(57-60) 100/57    Physical Exam:   General Appearance: alert, appears stated age and cooperative,  Hygiene:   fair  Gait & Station: Needs Assistance  Musculoskeletal: No tremors or abnormal involuntary movements    Mental Status Exam:   Cooperation:  Cooperative  Eye Contact:  blind  Psychomotor Behavior:  Slow  Mood: \"Fine\"  Affect:  mood-congruent  Speech:  Normal  Thought Process:  Coherent and Appropriately abstract  Associations: Goal Directed  Thought Content:     Mood congruent   Suicidal:  None   Homicidal:  None   Hallucinations:  Auditory   Delusion:  None  Cognitive Functioning:   Consciousness: awake and alert  Reliability:  fair  Insight:  Fair  Judgement:  Impaired  Impulse Control:  Impaired    Lab Results (last 24 hours)     Procedure Component Value Units Date/Time    POC Glucose Once [[id number]]  (Normal) Collected:  09/09/20 1225    Specimen:  Blood Updated:  09/09/20 1252     Glucose 97 mg/dL      Comment: : [id number] GREGORY SELBYRUPALILYMeter ID: [id number]       POC Glucose Once [[id number]]  (Normal) Collected: "  09/09/20 0610    Specimen:  Blood Updated:  09/09/20 0623     Glucose 73 mg/dL      Comment: : [id number] SHALOM CORBETTMeter ID: [id number]       POC Glucose Once [[id number]]  (Abnormal) Collected:  09/08/20 1929    Specimen:  Blood Updated:  09/08/20 2003     Glucose 153 mg/dL      Comment: Result Not ConfirmedOperator: [id number] SHALOM CORBETTMeter ID: [id number]       POC Glucose Once [[id number]]  (Abnormal) Collected:  09/08/20 1650    Specimen:  Blood Updated:  09/08/20 1715     Glucose 164 mg/dL      Comment: RN NotifiedOperator: [id number] JACKSON Rickser ID: [id number]           Imaging Results (Last 24 Hours)     ** No results found for the last 24 hours. **          Medicine:   Current Facility-Administered Medications:   •  aluminum-magnesium hydroxide-simethicone (MAALOX MAX) 400-400-40 MG/5ML suspension 15 mL, 15 mL, Oral, Q6H PRN, Tyrel Anderson II, MD  •  cloNIDine (CATAPRES) tablet 0.1 mg, 0.1 mg, Oral, Q6H PRN, Tyrel Anderson II, MD  •  dextrose (D50W) 25 g/ 50mL Intravenous Solution 25 g, 25 g, Intravenous, Q15 Min PRN, Cas Houston APRN  •  dextrose (GLUTOSE) oral gel 15 g, 15 g, Oral, Q15 Min PRN, Cas Houston APRN, 15 g at 09/05/20 0714  •  escitalopram (LEXAPRO) tablet 10 mg, 10 mg, Oral, Daily, Tyrel Anderson II, MD, 10 mg at 09/09/20 0849  •  ferrous sulfate EC tablet 324 mg, 324 mg, Oral, Daily With Breakfast, Tyrel Anderson II, MD, 324 mg at 09/09/20 0849  •  glipizide (GLUCOTROL) half tablet 2.5 mg, 2.5 mg, Oral, BID AC, 2.5 mg at 09/09/20 0849 **AND** metFORMIN (GLUCOPHAGE) tablet 500 mg, 500 mg, Oral, BID AC, Justin, Tyrel Abhijit II, MD, 500 mg at 09/09/20 0849  •  glucagon (human recombinant) (GLUCAGEN DIAGNOSTIC) injection 1 mg, 1 mg, Subcutaneous, Q15 Min PRN, Cas Houston, CLAIRE  •  insulin aspart (novoLOG) injection 0-7 Units, 0-7 Units, Subcutaneous, TID Rosemarie FRANKS Mark Andrew, CLAIRE  •   loperamide (IMODIUM) capsule 2 mg, 2 mg, Oral, Q2H PRN, Tyrel Anderson II, MD  •  LORazepam (ATIVAN) tablet 1 mg, 1 mg, Oral, Q8H PRN **OR** LORazepam (ATIVAN) injection 1 mg, 1 mg, Intramuscular, Q8H PRN, Tyrel Anderson II, MD  •  magnesium hydroxide (MILK OF MAGNESIA) suspension 2400 mg/10mL 10 mL, 10 mL, Oral, Daily PRN, Tyrel Anderson II, MD  •  pantoprazole (PROTONIX) EC tablet 40 mg, 40 mg, Oral, Q AM, Tyrel Anderson II, MD, 40 mg at 09/09/20 0531  •  risperiDONE (risperDAL) tablet 0.5 mg, 0.5 mg, Oral, Daily, Tyrel Anderson II, MD, 0.5 mg at 09/09/20 0849  •  risperiDONE (risperDAL) tablet 1.5 mg, 1.5 mg, Oral, Nightly, Tyrel Anderson II, MD, 1.5 mg at 09/08/20 2035    Diagnoses/Assessment:     Acute exacerbation of chronic paranoid schizophrenia (CMS/HCC)    Schizophrenia, paranoid type (CMS/HCC)    Acute blood loss anemia    DMII (diabetes mellitus, type 2) (CMS/HCC)      Treatment Plan:    1) Will continue care for the patient on the behavioral health unit at [facility] to ensure patient safety.  2) Will continue to provide treatment with the unit milieu, activities, therapies and psychopharmacological management.  3) Patient to be placed on or continued on  Q15 minute checks  and Fall precautions.  4) Pertinent medical issues:   --Type 2 diabetes: Continue glipizide and Metformin twice daily, sliding scale insulin  -GERD/acute blood loss anemia: Continue Protonix and ferrous sulfate  5) Will order following labs: none  6) Will make the following medication changes:   --Cont Risperdal 0.5mg Q day  -- Continue Risperdal 1.5mg po Q hs.  --Cont lexapro 10 mg daily   7) Will continue discharge planning as appropriate for patient.  8) Psychotherapy provided for less than 16 minutes.    Treatment plan and medication risks and benefits discussed with: Patient and treatment team    Elena Dave CLAIRE  09/09/20  13:56

## 2020-09-10 LAB
GLUCOSE BLDC GLUCOMTR-MCNC: 241 MG/DL (ref 70–130)
GLUCOSE BLDC GLUCOMTR-MCNC: 265 MG/DL (ref 70–130)
GLUCOSE BLDC GLUCOMTR-MCNC: 272 MG/DL (ref 70–130)
GLUCOSE BLDC GLUCOMTR-MCNC: 282 MG/DL (ref 70–130)
GLUCOSE BLDC GLUCOMTR-MCNC: 76 MG/DL (ref 70–130)

## 2020-09-10 PROCEDURE — 97110 THERAPEUTIC EXERCISES: CPT

## 2020-09-10 PROCEDURE — 99233 SBSQ HOSP IP/OBS HIGH 50: CPT | Performed by: PSYCHIATRY & NEUROLOGY

## 2020-09-10 PROCEDURE — 82962 GLUCOSE BLOOD TEST: CPT

## 2020-09-10 PROCEDURE — 97535 SELF CARE MNGMENT TRAINING: CPT

## 2020-09-10 PROCEDURE — 97116 GAIT TRAINING THERAPY: CPT

## 2020-09-10 RX ORDER — GLIPIZIDE 5 MG/1
5 TABLET ORAL
Status: DISCONTINUED | OUTPATIENT
Start: 2020-09-11 | End: 2020-09-14

## 2020-09-10 RX ORDER — HALOPERIDOL 2 MG/1
2 TABLET ORAL 2 TIMES DAILY
Status: DISCONTINUED | OUTPATIENT
Start: 2020-09-10 | End: 2020-09-14

## 2020-09-10 RX ADMIN — HALOPERIDOL 2 MG: 2 TABLET ORAL at 20:53

## 2020-09-10 RX ADMIN — PANTOPRAZOLE SODIUM 40 MG: 40 TABLET, DELAYED RELEASE ORAL at 06:07

## 2020-09-10 RX ADMIN — ESCITALOPRAM OXALATE 10 MG: 10 TABLET ORAL at 08:07

## 2020-09-10 RX ADMIN — Medication 2.5 MG: at 07:22

## 2020-09-10 RX ADMIN — RISPERIDONE 0.5 MG: 0.5 TABLET ORAL at 08:06

## 2020-09-10 RX ADMIN — FERROUS SULFATE TAB EC 324 MG (65 MG FE EQUIVALENT) 324 MG: 324 (65 FE) TABLET DELAYED RESPONSE at 07:23

## 2020-09-10 RX ADMIN — METFORMIN HYDROCHLORIDE 500 MG: 500 TABLET ORAL at 16:55

## 2020-09-10 RX ADMIN — METFORMIN HYDROCHLORIDE 500 MG: 500 TABLET ORAL at 07:22

## 2020-09-10 RX ADMIN — Medication 2.5 MG: at 16:55

## 2020-09-10 NOTE — THERAPY TREATMENT NOTE
Acute Care - Physical Therapy Treatment Note  [facility]     Patient Name: Natacha Gandhi  : 1950  MRN: [id number]  Today's Date: 9/10/2020  Onset of Illness/Injury or Date of Surgery: 20     Referring Physician: Dr. Anderson    Admit Date: 9/3/2020    Visit Dx:    ICD-10-CM ICD-9-CM   1. Impaired mobility and activities of daily living Z74.09 V49.89    Z78.9    2. Impaired functional mobility, balance, gait, and endurance Z74.09 V49.89     Patient Active Problem List   Diagnosis   • Acute GI bleeding   • Altered mental status, unspecified   • Schizophrenia, paranoid type (CMS/HCC)   • Acute exacerbation of chronic paranoid schizophrenia (CMS/HCC)   • Acute blood loss anemia   • DMII (diabetes mellitus, type 2) (CMS/HCC)       Therapy Treatment    Rehabilitation Treatment Summary     Row Name 09/10/20 1008             Treatment Time/Intention    Discipline  physical therapy assistant  -JA      Document Type  therapy note (daily note)  -JA      Subjective Information  no complaints  -JA      Mode of Treatment  physical therapy;individual therapy  -JA      Patient/Family Observations  Pt. supine no family present   -JA      Patient Effort  adequate  -JA      Existing Precautions/Restrictions  fall legally blind   -JA2      Recorded by [JA] Juan Miguel Pryor, PTA 09/10/20 1033  [JA2] Juan Miguel Pryor, PTA 09/10/20 1040      Row Name 09/10/20 1008             Vital Signs    Pre Systolic BP Rehab  104  -JA      Pre Treatment Diastolic BP  50  -JA      Post Systolic BP Rehab  110  -JA      Post Treatment Diastolic BP  56  -JA      Pretreatment Heart Rate (beats/min)  94  -JA      Posttreatment Heart Rate (beats/min)  95  -JA      Pre Patient Position  Supine  -JA      Intra Patient Position  Standing  -JA      Post Patient Position  Supine  -JA      Recorded by [JA] Juan Miguel Pryor, PTA 09/10/20 1040      Row Name 09/10/20 1008             Cognitive Assessment/Intervention- PT/OT    Orientation  Status (Cognition)  oriented to;person  -JA      Follows Commands (Cognition)  initiation impaired;physical/tactile prompts required;repetition of directions required;verbal cues/prompting required  -JA      Recorded by [JA] Juan Miguel Pryor, PTA 09/10/20 1040      Row Name 09/10/20 1008             Bed Mobility Assessment/Treatment    Supine-Sit [place] (Bed Mobility)  conditional independence  -JA      Sit-Supine [place] (Bed Mobility)  conditional independence  -JA      Comment (Bed Mobility)  HOB up & bedrails sup-sit, HOB down with bedrail sit-sup  -JA      Recorded by [JA] Juan Miguel Pryor, PTA 09/10/20 1040      Row Name 09/10/20 1008             Sit-Stand Transfer    Sit-Stand [place] (Transfers)  contact guard  -JA      Assistive Device (Sit-Stand Transfers)  other (see comments) HHA   -JA      Recorded by [JA] Juan Miguel Pryor, PTA 09/10/20 1040      Row Name 09/10/20 1008             Stand-Sit Transfer    Stand-Sit [place] (Transfers)  contact guard  -JA      Assistive Device (Stand-Sit Transfers)  other (see comments) HHA  -JA      Recorded by [JA] Juan Miguel Pryor, PTA 09/10/20 1040      Row Name 09/10/20 1008             Gait/Stairs Assessment/Training    [place] Level (Gait)  contact guard  -JA      Assistive Device (Gait)  other (see comments) HHA  -JA      Distance in Feet (Gait)  150'  -JA      Pattern (Gait)  step-through  -JA      Deviations/Abnormal Patterns (Gait)  base of support, narrow;gait speed decreased;stride length decreased  -JA      Comment (Gait/Stairs)  Pt. required encouragement to perform gt. pt. cont'd to report had amb. earlier in a.m. with nsg.   -JA      Recorded by [JA] Juan Miguel Pryor, PTA 09/10/20 1040      Row Name 09/10/20 1008             Lower Extremity Seated Therapeutic Exercise    Performed, Seated Lower Extremity (Therapeutic Exercise)  hip flexion/extension;hip abduction/adduction;LAQ (long arc quad), knee extension  -JA       Exercise Type, Seated Lower Extremity (Therapeutic Exercise)  AROM (active range of motion);isometric contraction, static  -JA      Sets/Reps Detail, Seated Lower Extremity (Therapeutic Exercise)  x20  -JA      Recorded by [JA] Juan Miguel Pryor, PTA 09/10/20 1040      Row Name 09/10/20 1008             Static Sitting Balance    Level of [place] (Unsupported Sitting, Static Balance)  independent  -JA      Sitting Position (Unsupported Sitting, Static Balance)  sitting on edge of bed  -JA      Time Able to Maintain Position (Unsupported Sitting, Static Balance)  2 to 3 minutes  -JA      Recorded by [JA] Juan Miguel Pryor, PTA 09/10/20 1040      Row Name 09/10/20 1008             Static Standing Balance    Level of [place] (Supported Standing, Static Balance)  contact guard assist  -JA      Recorded by [ANNA] Juan Miguel Pryor, PTA 09/10/20 1040      Row Name 09/10/20 1008             Positioning and Restraints    Pre-Treatment Position  in bed  -JA      Post Treatment Position  bed  -JA      In Bed  supine;call light within reach;encouraged to call for assist;exit alarm on  -JA      Recorded by [JA] Juan Miguel Pryor, PTA 09/10/20 1040      Row Name 09/10/20 1008             Pain Scale: Numbers Pre/Post-Treatment    Pain Scale: Numbers, Pretreatment  0/10 - no pain  -JA      Pain Scale: Numbers, Post-Treatment  0/10 - no pain  -JA      Recorded by [JA] Juan Miguel Pryor, PTA 09/10/20 1040      Row Name 09/10/20 1008             Outcome Summary/Treatment Plan (PT)    Daily Summary of Progress (PT)  progress toward functional goals as expected  -JA      Plan for Continued Treatment (PT)  Cont. to encourage mobility   -JA      Anticipated Discharge Disposition (PT)  anticipate therapy at next level of care  -JA2      Recorded by [JA] Juan Miguel Pryor, PTA 09/10/20 1040  [JA2] Juan Miguel Pryor, [facility] 09/10/20 1033        User Key  (r) = Recorded By, (t) = Taken By, (c) = Cosigned By    Initials  Name Effective Dates Discipline    Juan Miguel Horvath PTA 03/07/18 -  PT               Rehab Goal Summary     Row Name 09/10/20 1008             Bed Mobility Goal 1 (PT)    Activity/Assistive Device (Bed Mobility Goal 1, PT)  sit to supine;supine to sit  -JA      [place] Level/Cues Needed (Bed Mobility Goal 1, PT)  independent  -JA      Time Frame (Bed Mobility Goal 1, PT)  3 days  -JA      Progress/Outcomes (Bed Mobility Goal 1, PT)  goal not met  -JA         Transfer Goal 1 (PT)    Activity/Assistive Device (Transfer Goal 1, PT)  sit-to-stand/stand-to-sit  -JA      [place] Level/Cues Needed (Transfer Goal 1, PT)  supervision required  -JA      Time Frame (Transfer Goal 1, PT)  3 days  -JA      Progress/Outcome (Transfer Goal 1, PT)  goal not met  -JA         Gait Training Goal 1 (PT)    Activity/Assistive Device (Gait Training Goal 1, PT)  gait (walking locomotion) LRAD PRN  -JA      [place] Level (Gait Training Goal 1, PT)  supervision required  -JA      Distance (Gait Goal 1, PT)  100ft or more  -JA      Time Frame (Gait Training Goal 1, PT)  5 days  -JA      Progress/Outcome (Gait Training Goal 1, PT)  goal not met  -JA         Stairs Goal 1 (PT)    Activity/Assistive Device (Stairs Goal 1, PT)  ascending stairs;descending stairs  -JA      [place] Level/Cues Needed (Stairs Goal 1, PT)  standby assist  -JA      Number of Stairs (Stairs Goal 1, PT)  1 or more  -JA      Time Frame (Stairs Goal 1, PT)  by discharge  -JA      Progress/Outcome (Stairs Goal 1, PT)  goal not met  -JA        User Key  (r) = Recorded By, (t) = Taken By, (c) = Cosigned By    Initials Name Provider Type Discipline    Juan Miguel Horvath, KEIRA Physical Therapy Assistant PT          Physical Therapy Education                 Title: PT OT SLP Therapies (In Progress)     Topic: Physical Therapy (In Progress)     Point: Mobility training (Done)     Description:   Instruct learner(s) on safety and technique for  assisting patient out of bed, chair or wheelchair.  Instruct in the proper use of assistive devices, such as walker, crutches, cane or brace.              Patient Friendly Description:   It's important to get you on your feet again, but we need to do so in a way that is safe for you. Falling has serious consequences, and your personal safety is the most important thing of all.        When it's time to get out of bed, one of us or a family member will sit next to you on the bed to give you support.     If your doctor or nurse tells you to use a walker, crutches, a cane, or a brace, be sure you use it every time you get out of bed, even if you think you don't need it.    Learning Progress Summary           Patient Acceptance, E, VU by KERRY at 9/4/2020 1334    Comment:  Role of PT, POC, use of gait belt                   Point: Home exercise program (Not Started)     Description:   Instruct learner(s) on appropriate technique for monitoring, assisting and/or progressing patient with therapeutic exercises and activities.              Learner Progress:   Not documented in this visit.          Point: Body mechanics (Not Started)     Description:   Instruct learner(s) on proper positioning and spine alignment for patient and/or caregiver during mobility tasks and/or exercises.              Learner Progress:   Not documented in this visit.          Point: Precautions (Done)     Description:   Instruct learner(s) on prescribed precautions during mobility and gait tasks              Learning Progress Summary           Patient Acceptance, E,TB, VU by PRAMOD at 9/9/2020 0424    Acceptance, E, VU by KERRY at 9/4/2020 1334    Comment:  Role of PT, POC, use of gait belt                               User Key     Initials Effective Dates Name Provider Type Discipline    PRAMOD 10/17/16 -  Madison Irwin, RN Registered Nurse Nurse    KERRY 08/09/20 -  Melina Neves PT Physical Therapist PT                PT Recommendation and Plan  Anticipated  Discharge Disposition (PT): anticipate therapy at next level of care  Outcome Summary/Treatment Plan (PT)  Daily Summary of Progress (PT): progress toward functional goals as expected  Plan for Continued Treatment (PT): Cont. to encourage mobility   Anticipated Discharge Disposition (PT): anticipate therapy at next level of care  Plan of Care Reviewed With: patient  Progress: improving  Outcome Summary: Pt. asleep upon arrival, but with nsg. assist pt. aroused quickly and agreeable to treatment. Pt. intially not agreeable to gt, but with encouragement pt. agreeable this a.m. Pt. transferred sup-sit-sup cond. independent, sit-stand-sit CGA HHA, amb. 190' w/o AD CGA HHA at R, pt. performed x20 reps seated LE therex at EOB this a.m. Cont. to mobilize as pt. able  Outcome Measures     Row Name 09/10/20 1008 09/09/20 1031 09/09/20 0803       How much help from another person do you currently need...    Turning from your back to your side while in flat bed without using bedrails?  3  -JA  3  -TW  --    Moving from lying on back to sitting on the side of a flat bed without bedrails?  3  -JA  3  -TW  --    Moving to and from a bed to a chair (including a wheelchair)?  3  -JA  3  -TW  --    Standing up from a chair using your arms (e.g., wheelchair, bedside chair)?  3  -JA  3  -TW  --    Climbing 3-5 steps with a railing?  3  -JA  3  -TW  --    To walk in hospital room?  3  -JA  3  -TW  --    AM-PAC 6 Clicks Score (PT)  18  -JA  18  -TW  --       How much help from another is currently needed...    Putting on and taking off regular lower body clothing?  --  --  3  -BB    Bathing (including washing, rinsing, and drying)  --  --  3  -BB    Toileting (which includes using toilet bed pan or urinal)  --  --  3  -BB    Putting on and taking off regular upper body clothing  --  --  3  -BB    Taking care of personal grooming (such as brushing teeth)  --  --  4  -BB    Eating meals  --  --  4  -BB    AM-PAC 6 Clicks Score (OT)  --   --  20  -BB       Functional Assessment    Outcome Measure Options  AM-PAC 6 Clicks Basic Mobility (PT)  -ANNA  --  --    Row Name 09/08/20 0814             How much help from another is currently needed...    Putting on and taking off regular lower body clothing?  3  -BB      Bathing (including washing, rinsing, and drying)  3  -BB      Toileting (which includes using toilet bed pan or urinal)  3  -BB      Putting on and taking off regular upper body clothing  3  -BB      Taking care of personal grooming (such as brushing teeth)  4  -BB      Eating meals  4  -BB      AM-PAC 6 Clicks Score (OT)  20  -BB        User Key  (r) = Recorded By, (t) = Taken By, (c) = Cosigned By    Initials Name Provider Type    Juan Miguel Horvath PTA Physical Therapy Assistant    TW Remigio Mi, KEIRA Physical Therapy Assistant    BB Mare Reed COTA/L Occupational Therapy Assistant         Time Calculation:   PT Charges     Row Name 09/10/20 1043             Time Calculation    Start Time  1008  -JA      Stop Time  1032  -JA      Time Calculation (min)  24 min  -JA         Time Calculation- PT    Total Timed Code Minutes- PT  24 minute(s)  -ANNA        User Key  (r) = Recorded By, (t) = Taken By, (c) = Cosigned By    Initials Name Provider Type    Juan Miguel Horvath PTA Physical Therapy Assistant        Therapy Charges for Today     Code Description Service Date Service Provider Modifiers Qty    04044509021 HC GAIT TRAINING EA 15 MIN 9/10/2020 Juan Miguel Pryor, PTA GP 1    [id number] HC PT THER PROC EA 15 MIN 9/10/2020 Juan Miguel Pryor PTA GP 1          PT G-Codes  Outcome Measure Options: AM-PAC 6 Clicks Basic Mobility (PT)  AM-PAC 6 Clicks Score (PT): 18  AM-PAC 6 Clicks Score (OT): 20    Juan Miguel Pryor PTA  9/10/2020

## 2020-09-10 NOTE — NURSING NOTE
"Pts blood sugar noted to be 272 before supper with pt refusing to take insulin. Dr. BOYLE here et visited with pt. Pt insisted on not taking insulin et says that she has her own way of getting it down. Dr. BOYLE informed her that diet would have to be changed if she continued to refuse insulin. Pt stated \"I don't eat that stuff anyway\". Pt says that she is ok with diet change et will not take insulin. New orders received for diet change.   "

## 2020-09-10 NOTE — THERAPY TREATMENT NOTE
Acute Care - Occupational Therapy Treatment Note  [facility]     Patient Name: Natacha Gandhi  : 1950  MRN: [id number]  Today's Date: 9/10/2020  Onset of Illness/Injury or Date of Surgery: 20  Date of Referral to OT: 20  Referring Physician: Dr. Anderson    Admit Date: 9/3/2020       ICD-10-CM ICD-9-CM   1. Impaired mobility and activities of daily living Z74.09 V49.89    Z78.9    2. Impaired functional mobility, balance, gait, and endurance Z74.09 V49.89     Patient Active Problem List   Diagnosis   • Acute GI bleeding   • Altered mental status, unspecified   • Schizophrenia, paranoid type (CMS/HCC)   • Acute exacerbation of chronic paranoid schizophrenia (CMS/HCC)   • Acute blood loss anemia   • DMII (diabetes mellitus, type 2) (CMS/HCC)     Past Medical History:   Diagnosis Date   • Depression    • Psychiatric illness    • Schizoaffective disorder (CMS/HCC)      Past Surgical History:   Procedure Laterality Date   • COLONOSCOPY N/A 2020    Procedure: COLONOSCOPY;  Surgeon: Ge Gorman MD;  Location: [facility];  Service: Gastroenterology;  Laterality: N/A;   • ENDOSCOPY N/A 2020    Procedure: ESOPHAGOGASTRODUODENOSCOPY;  Surgeon: Ge Gorman MD;  Location: [facility];  Service: Gastroenterology;  Laterality: N/A;       Therapy Treatment    Rehabilitation Treatment Summary     Row Name 09/10/20 1105 09/10/20 1008          Treatment Time/Intention    Discipline  occupational therapy assistant  -BB  physical therapy assistant  -JA     Document Type  therapy note (daily note)  -BB  therapy note (daily note)  -JA     Subjective Information  no complaints  -BB  no complaints  -JA     Mode of Treatment  individual therapy;occupational therapy  -BB  physical therapy;individual therapy  -JA     Patient/Family Observations  pt supine no family present  -BB  Pt. supine no family present   -JA     Total Minutes, Occupational Therapy Treatment  25  -BB  --     Therapy Frequency  (OT Eval)  other (see comments) 5-7 days per week  -BB  --     Patient Effort  good  -BB  adequate  -JA     Existing Precautions/Restrictions  fall  -BB  fall legally blind   -JA2     Recorded by [BB] Mare Reed GARCIA/L 09/10/20 1335 [JA] Juan Miguel Pryor, PTA 09/10/20 1033  [JA2] Juan Miguel Pryor, PTA 09/10/20 1040     Row Name 09/10/20 1105 09/10/20 1008          Vital Signs    Pre Systolic BP Rehab  --  104  -JA     Pre Treatment Diastolic BP  --  50  -JA     Post Systolic BP Rehab  --  110  -JA     Post Treatment Diastolic BP  --  56  -JA     Pretreatment Heart Rate (beats/min)  93  -BB  94  -JA     Posttreatment Heart Rate (beats/min)  94  -BB  95  -JA     Pre SpO2 (%)  94  -BB  --     O2 Delivery Pre Treatment  room air  -BB  --     Post SpO2 (%)  93  -BB  --     O2 Delivery Post Treatment  room air  -BB  --     Pre Patient Position  Supine  -BB  Supine  -JA     Intra Patient Position  --  Standing  -JA     Post Patient Position  Supine  -BB  Supine  -JA     Recorded by [BB] Mare Reed GARCIA/L 09/10/20 1335 [JA] Juan Miguel Pryor, PTA 09/10/20 1040     Row Name 09/10/20 1105 09/10/20 1008          Cognitive Assessment/Intervention- PT/OT    Orientation Status (Cognition)  oriented to;person;place  -BB  oriented to;person  -JA     Follows Commands (Cognition)  --  initiation impaired;physical/tactile prompts required;repetition of directions required;verbal cues/prompting required  -JA     Recorded by [BB] Mare Reed GARCIA/L 09/10/20 1335 [JA] Juan Miguel Pryor, PTA 09/10/20 1040     Row Name 09/10/20 1105 09/10/20 1008          Bed Mobility Assessment/Treatment    Supine-Sit [place] (Bed Mobility)  conditional independence  -BB  conditional independence  -JA     Sit-Supine [place] (Bed Mobility)  conditional independence  -BB  conditional independence  -JA     Assistive Device (Bed Mobility)  bed rails  -BB  --     Comment (Bed Mobility)  --  HOB up & bedrails  sup-sit, HOB down with bedrail sit-sup  -JA     Recorded by [BB] Mare Reed GARCIA/L 09/10/20 1335 [JA] Juan Miguel Pryor, PTA 09/10/20 1040     Row Name 09/10/20 1105 09/10/20 1008          Sit-Stand Transfer    Sit-Stand [place] (Transfers)  contact guard  -BB  contact guard  -JA     Assistive Device (Sit-Stand Transfers)  other (see comments) HHA  -BB  other (see comments) HHA   -JA     Recorded by [BB] Mare Reed GARCIA/L 09/10/20 1335 [JA] Juan Miguel Pryor, PTA 09/10/20 1040     Row Name 09/10/20 1105 09/10/20 1008          Stand-Sit Transfer    Stand-Sit [place] (Transfers)  contact guard  -BB  contact guard  -JA     Assistive Device (Stand-Sit Transfers)  other (see comments) HHA  -BB  other (see comments) HHA  -JA     Recorded by [BB] Mare Reed GARCIA/L 09/10/20 1335 [JA] Juan Miguel Pryor, PTA 09/10/20 1040     Row Name 09/10/20 1105             Toilet Transfer    Type (Toilet Transfer)  sit-stand;stand-sit  -BB      [place] Level (Toilet Transfer)  contact guard HHA  -BB      Recorded by [BB] Mare Reed GARCIA/L 09/10/20 1335      Row Name 09/10/20 1008             Gait/Stairs Assessment/Training    [place] Level (Gait)  contact guard  -JA      Assistive Device (Gait)  other (see comments) HHA  -JA      Distance in Feet (Gait)  150'  -JA      Pattern (Gait)  step-through  -JA      Deviations/Abnormal Patterns (Gait)  base of support, narrow;gait speed decreased;stride length decreased  -JA      Comment (Gait/Stairs)  Pt. required encouragement to perform gt. pt. cont'd to report had amb. earlier in a.m. with nsg.   -JA      Recorded by [JA] Juan Miguel Pryor, PTA 09/10/20 1040      Row Name 09/10/20 1105             Lower Body Dressing Assessment/Training    Lower Body Dressing [place] Level  don;socks;supervision  -BB      Lower Body Dressing Position  edge of bed sitting  -BB      Recorded by [BB] Mare Reed COTA/L 09/10/20 0892       Row Name 09/10/20 1105             Grooming Assessment/Training    [place] Level (Grooming)  wash face, hands;independent  -BB      Grooming Position  edge of bed sitting  -BB      Recorded by [BB] Mare Reed COTA/L 09/10/20 1335      Row Name 09/10/20 1105             Toileting Assessment/Training    [place] Level (Toileting)  adjust/manage clothing;perform perineal hygiene;contact guard assist  -BB      Toileting Position  unsupported sitting  -BB      Recorded by [BB] Mare Reed COTA/L 09/10/20 1335      Row Name 09/10/20 1008             Lower Extremity Seated Therapeutic Exercise    Performed, Seated Lower Extremity (Therapeutic Exercise)  hip flexion/extension;hip abduction/adduction;LAQ (long arc quad), knee extension  -JA      Exercise Type, Seated Lower Extremity (Therapeutic Exercise)  AROM (active range of motion);isometric contraction, static  -JA      Sets/Reps Detail, Seated Lower Extremity (Therapeutic Exercise)  x20  -JA      Recorded by [JA] Juan Miguel Pryor PTA 09/10/20 1040      Row Name 09/10/20 1105             Therapeutic Exercise    Upper Extremity Range of Motion (Therapeutic Exercise)  shoulder flexion/extension, bilateral;elbow flexion/extension, bilateral;wrist flexion/extension, bilateral  -BB      Exercise Type (Therapeutic Exercise)  AROM (active range of motion)  -BB      Position (Therapeutic Exercise)  seated  -BB      Sets/Reps (Therapeutic Exercise)  1x20  -BB      Expected Outcome (Therapeutic Exercise)  improve performance, transfer skills;improve performance, gait skills;improve functional tolerance, self-care activity  -BB      Recorded by [BB] Mare Reed COTA/L 09/10/20 1335      Row Name 09/10/20 1105 09/10/20 1008          Static Sitting Balance    Level of [place] (Unsupported Sitting, Static Balance)  independent  -BB  independent  -JA     Sitting Position (Unsupported Sitting, Static Balance)  sitting on edge of bed   -BB  sitting on edge of bed  -JA     Time Able to Maintain Position (Unsupported Sitting, Static Balance)  more than 5 minutes  -BB  2 to 3 minutes  -JA     Recorded by [BB] Mare Reed COTA/[place] 09/10/20 1335 [JA] Juan Miguel Pryor, PTA 09/10/20 1040     Row Name 09/10/20 1008             Static Standing Balance    Level of [place] (Supported Standing, Static Balance)  contact guard assist  -JA      Recorded by [JA] Juan Miguel Pryor, PTA 09/10/20 1040      Row Name 09/10/20 1105 09/10/20 1008          Positioning and Restraints    Pre-Treatment Position  in bed  -BB  in bed  -JA     Post Treatment Position  bed  -BB  bed  -JA     In Bed  supine;call light within reach;encouraged to call for assist;exit alarm on  -BB  supine;call light within reach;encouraged to call for assist;exit alarm on  -JA     Recorded by [BB] Mare Reed COTA/[place] 09/10/20 1335 [JA] Juan Miguel Pryor, PTA 09/10/20 1040     Row Name 09/10/20 1105 09/10/20 1008          Pain Scale: Numbers Pre/Post-Treatment    Pain Scale: Numbers, Pretreatment  0/10 - no pain  -BB  0/10 - no pain  -JA     Pain Scale: Numbers, Post-Treatment  0/10 - no pain  -BB  0/10 - no pain  -JA     Recorded by [BB] Mare Reed COTA/[place]INSTON 09/10/20 1335 [JA] Juan Miguel Pryor, PTA 09/10/20 1040     Row Name 09/10/20 1105             Plan of Care Review    Plan of Care Reviewed With  patient  -BB      Progress  improving  -BB      Outcome Summary  Pt CGA for bed<>toilet t/f with HHA. while sitting EOB pt performed B UE exercises with good tolerance. No new goals met this tx.  -BB      Recorded by [BB] Mare Reed COTA/L 09/10/20 1335      Row Name 09/10/20 1105             Outcome Summary/Treatment Plan (OT)    Daily Summary of Progress (OT)  progress toward functional goals is good  -BB      Plan for Continued Treatment (OT)  continue OP POC  -BB      Anticipated Discharge Disposition (OT)  anticipate therapy at next level of care   -BB      Recorded by [BB] Mare Reed COTA/L 09/10/20 1335      Row Name 09/10/20 1008             Outcome Summary/Treatment Plan (PT)    Daily Summary of Progress (PT)  progress toward functional goals as expected  -JA      Plan for Continued Treatment (PT)  Cont. to encourage mobility   -JA      Anticipated Discharge Disposition (PT)  anticipate therapy at next level of care  -JA2      Recorded by [JA] Juan Miguel Pryor, PTA 09/10/20 1040  [JA2] Juan Miguel Pryor, PTA 09/10/20 1033        User Key  (r) = Recorded By, (t) = Taken By, (c) = Cosigned By    Initials Name Effective Dates Discipline    JA Juan Miguel Pryor, PTA 03/07/18 -  PT    BB Mare Reed COTA/L 03/07/18 -  OT           Rehab Goal Summary     Row Name 09/10/20 1105 09/10/20 1008          Bed Mobility Goal 1 (PT)    Activity/Assistive Device (Bed Mobility Goal 1, PT)  --  sit to supine;supine to sit  -JA     [place] Level/Cues Needed (Bed Mobility Goal 1, PT)  --  independent  -JA     Time Frame (Bed Mobility Goal 1, PT)  --  3 days  -JA     Progress/Outcomes (Bed Mobility Goal 1, PT)  --  goal not met  -JA        Transfer Goal 1 (PT)    Activity/Assistive Device (Transfer Goal 1, PT)  --  sit-to-stand/stand-to-sit  -JA     [place] Level/Cues Needed (Transfer Goal 1, PT)  --  supervision required  -JA     Time Frame (Transfer Goal 1, PT)  --  3 days  -JA     Progress/Outcome (Transfer Goal 1, PT)  --  goal not met  -JA        Gait Training Goal 1 (PT)    Activity/Assistive Device (Gait Training Goal 1, PT)  --  gait (walking locomotion) LRAD PRN  -JA     [place] Level (Gait Training Goal 1, PT)  --  supervision required  -JA     Distance (Gait Goal 1, PT)  --  100ft or more  -JA     Time Frame (Gait Training Goal 1, PT)  --  5 days  -JA     Progress/Outcome (Gait Training Goal 1, PT)  --  goal not met  -JA        Stairs Goal 1 (PT)    Activity/Assistive Device (Stairs Goal 1, PT)  --  ascending  stairs;descending stairs  -     [place] Level/Cues Needed (Stairs Goal 1, PT)  --  standby assist  -     Number of Stairs (Stairs Goal 1, PT)  --  1 or more  -     Time Frame (Stairs Goal 1, PT)  --  by discharge  -     Progress/Outcome (Stairs Goal 1, PT)  --  goal not met  -        Occupational Therapy Goals    Transfer Goal Selection (OT)  transfer, OT goal 1  -BB  --     Bathing Goal Selection (OT)  bathing, OT goal 1  -BB  --     Dressing Goal Selection (OT)  dressing, OT goal 1  -BB  --     Toileting Goal Selection (OT)  toileting, OT goal 1  -BB  --     Balance Goal Selection (OT)  balance, OT goal 1  -BB  --        Transfer Goal 1 (OT)    Activity/Assistive Device (Transfer Goal 1, OT)  sit-to-stand/stand-to-sit;bed-to-chair/chair-to-bed;toilet  -BB  --     [place] Level/Cues Needed (Transfer Goal 1, OT)  standby assist;verbal cues required;tactile cues required;set-up required  -BB  --     Time Frame (Transfer Goal 1, OT)  long term goal (LTG);by discharge  -BB  --     Progress/Outcome (Transfer Goal 1, OT)  goal not met  -BB  --        Bathing Goal 1 (OT)    Activity/Assistive Device (Bathing Goal 1, OT)  lower body bathing  -BB  --     [place] Level/Cues Needed (Bathing Goal 1, OT)  verbal cues required;tactile cues required;set-up required;standby assist  -BB  --     Time Frame (Bathing Goal 1, OT)  long term goal (LTG);by discharge  -BB  --     Progress/Outcomes (Bathing Goal 1, OT)  goal met  -BB  --        Dressing Goal 1 (OT)    Activity/Assistive Device (Dressing Goal 1, OT)  lower body dressing  -BB  --     [place]/Cues Needed (Dressing Goal 1, OT)  verbal cues required;tactile cues required;set-up required;standby assist  -BB  --     Time Frame (Dressing Goal 1, OT)  long term goal (LTG);by discharge  -BB  --     Progress/Outcome (Dressing Goal 1, OT)  goal not met  -BB  --        Toileting Goal 1 (OT)    Activity/Device (Toileting Goal 1, OT)  toileting skills, all   -BB  --     [place] Level/Cues Needed (Toileting Goal 1, OT)  standby assist;verbal cues required;tactile cues required;set-up required  -BB  --     Time Frame (Toileting Goal 1, OT)  long term goal (LTG);by discharge  -BB  --     Progress/Outcome (Toileting Goal 1, OT)  goal not met  -BB  --        Balance Goal 1 (OT)    Activity/Assistive Device (Balance Goal 1, OT)  standing, dynamic  -BB  --     [place] Level/Cues Needed (Balance Goal 1, OT)  standby assist  -BB  --     Time Frame (Balance Goal 1, OT)  long term goal (LTG);by discharge  -BB  --     Progress/Outcomes (Balance Goal 1, OT)  goal not met  -BB  --       User Key  (r) = Recorded By, (t) = Taken By, (c) = Cosigned By    Initials Name Provider Type Discipline    Juan Miguel Horvath, KEIRA Physical Therapy Assistant PT    Mare Westbrook, JOSE/L Occupational Therapy Assistant OT        Occupational Therapy Education                 Title: PT OT SLP Therapies (In Progress)     Topic: Occupational Therapy (In Progress)     Point: ADL training (Done)     Description:   Instruct learner(s) on proper safety adaptation and remediation techniques during self care or transfers.   Instruct in proper use of assistive devices.              Learning Progress Summary           Patient Acceptance, E, VU by BB at 9/10/2020 1336    Acceptance, E, VU by BB at 9/9/2020 1101    Acceptance, E, VU by BB at 9/8/2020 1047    Acceptance, E, VU,NR by AS at 9/4/2020 1205    Comment:  role of OT, OT POC, ADL training, fall preacutions, bed mobility, transfer training                   Point: Home exercise program (Not Started)     Description:   Instruct learner(s) on appropriate technique for monitoring, assisting and/or progressing therapeutic exercises/activities.              Learner Progress:   Not documented in this visit.          Point: Precautions (Done)     Description:   Instruct learner(s) on prescribed precautions during self-care and functional  transfers.              Learning Progress Summary           Patient Acceptance, E,TB, VU by KM at 9/9/2020 0424    Acceptance, E, VU,NR by AS at 9/4/2020 1205    Comment:  role of OT, OT POC, ADL training, fall preacutions, bed mobility, transfer training                   Point: Body mechanics (Done)     Description:   Instruct learner(s) on proper positioning and spine alignment during self-care, functional mobility activities and/or exercises.              Learning Progress Summary           Patient Acceptance, E, VU by BB at 9/8/2020 1047    Acceptance, E, NR by BB at 9/7/2020 1131                               User Key     Initials Effective Dates Name Provider Type Discipline    KM 10/17/16 -  Madison Irwin, RN Registered Nurse Nurse    BB 03/07/18 -  Mare Reed COTA/L Occupational Therapy Assistant OT    AS 07/05/20 -  Unique Garcia OT Occupational Therapist OT                OT Recommendation and Plan  Outcome Summary/Treatment Plan (OT)  Daily Summary of Progress (OT): progress toward functional goals is good  Plan for Continued Treatment (OT): continue OP POC  Anticipated Discharge Disposition (OT): anticipate therapy at next level of care  Therapy Frequency (OT Eval): other (see comments)(5-7 days per week)  Daily Summary of Progress (OT): progress toward functional goals is good  Plan of Care Review  Plan of Care Reviewed With: patient  Plan of Care Reviewed With: patient  Outcome Summary: Pt CGA for bed<>toilet t/f with HHA. while sitting EOB pt performed B UE exercises with good tolerance. No new goals met this tx.  Outcome Measures     Row Name 09/10/20 1105 09/10/20 1008 09/09/20 1031       How much help from another person do you currently need...    Turning from your back to your side while in flat bed without using bedrails?  3  -BB  3  -JA  3  -TW    Moving from lying on back to sitting on the side of a flat bed without bedrails?  3  -BB  3  -JA  3  -TW    Moving to and from a bed  to a chair (including a wheelchair)?  3  -BB  3  -JA  3  -TW    Standing up from a chair using your arms (e.g., wheelchair, bedside chair)?  3  -BB  3  -JA  3  -TW    Climbing 3-5 steps with a railing?  3  -BB  3  -JA  3  -TW    To walk in hospital room?  3  -BB  3  -JA  3  -TW    AM-PAC 6 Clicks Score (PT)  18  -BB  18  -JA  18  -TW       Functional Assessment    Outcome Measure Options  --  AM-PAC 6 Clicks Basic Mobility (PT)  -JA  --    Row Name 09/09/20 0803 09/08/20 0814          How much help from another is currently needed...    Putting on and taking off regular lower body clothing?  3  -BB  3  -BB     Bathing (including washing, rinsing, and drying)  3  -BB  3  -BB     Toileting (which includes using toilet bed pan or urinal)  3  -BB  3  -BB     Putting on and taking off regular upper body clothing  3  -BB  3  -BB     Taking care of personal grooming (such as brushing teeth)  4  -BB  4  -BB     Eating meals  4  -BB  4  -BB     AM-PAC 6 Clicks Score (OT)  20  -BB  20  -BB       User Key  (r) = Recorded By, (t) = Taken By, (c) = Cosigned By    Initials Name Provider Type    Juan Miguel Horvath, PTA Physical Therapy Assistant    TW Remigio Mi, PTA Physical Therapy Assistant    BB Mare Reed COTA/L Occupational Therapy Assistant           Time Calculation:   Time Calculation- OT     Row Name 09/10/20 1337             Time Calculation- OT    OT Start Time  1105  -BB      OT Stop Time  1130  -BB      OT Time Calculation (min)  25 min  -BB      Total Timed Code Minutes- OT  25 minute(s)  -BB      OT Received On  09/10/20  -BB        User Key  (r) = Recorded By, (t) = Taken By, (c) = Cosigned By    Initials Name Provider Type    Mare Westbrook COTA/L Occupational Therapy Assistant        Therapy Charges for Today     Code Description Service Date Service Provider Modifiers Qty    47225044689 HC OT SELF CARE/MGMT/TRAIN EA 15 MIN 9/9/2020 Mare Reed COTA/L GO 2    [id number]  HC OT SELF CARE/MGMT/TRAIN EA 15 MIN 9/10/2020 Mare Reed, JOSE/L GO 1    90328220043 HC OT THER PROC EA 15 MIN 9/10/2020 Mare Reed COTA/L GO 1               JOSE Lares/WINSTON  9/10/2020

## 2020-09-10 NOTE — PLAN OF CARE
Problem: Patient Care Overview  Goal: Plan of Care Review  Outcome: Ongoing (interventions implemented as appropriate)  Flowsheets (Taken 9/10/2020 1008)  Progress: improving  Plan of Care Reviewed With: patient  Patient Agreement with Plan of Care: agrees  Outcome Summary: Pt. asleep upon arrival, but with nsg. assist pt. aroused quickly and agreeable to treatment. Pt. intially not agreeable to gt, but with encouragement pt. agreeable this a.m. Pt. transferred sup-sit-sup cond. independent, sit-stand-sit CGA HHA, amb. 190' w/o AD CGA HHA at R, pt. performed x20 reps seated LE therex at EOB this a.m. Cont. to mobilize as pt. able

## 2020-09-10 NOTE — PROGRESS NOTES
"Psychiatry Progress Note    9/10/2020    Legal Status: Voluntary    Chief Complaint: psychosis    Subjective:  Patient is a 70 y.o. female who was hospitalized for psychosis.    Patient's paranoia has been improving but she has been refusing her sliding scale insulin today despite her blood sugar being in the high 200's.  She states the blood sugar will come down and she does not need any insulin.  In reviewing her blood sugars, they have been much more elevated today than they have been previously.  Per nurse Andres she has been out of her room and has been eating more today.  In discussions with the patient, she complains that the blood sugar is elevated for various reason that are irrational but does complain that her food is the problem.  She agrees to taking a low carb diet without bread, pasta or rice.  She does not express any other delusional or paranoid thoughts today.    Patient has been compliant with her oral medications.    Objective     Vital Signs    Vitals:    09/08/20 2100 09/09/20 0900 09/09/20 1900 09/10/20 0700   BP: 98/60 100/57 124/60 98/52   BP Location: Left arm Right arm Left arm Right arm   Patient Position: Lying Lying Lying Lying   Pulse: 88 86 92 88   Resp: 18 18 16 18   Temp: 99.7 °F (37.6 °C) 98.2 °F (36.8 °C) 97.1 °F (36.2 °C) 98.2 °F (36.8 °C)   TempSrc: Tympanic Tympanic Tympanic Tympanic   SpO2: 97% 96% 97% 95%   Weight:       Height:           Physical Exam:   General Appearance: alert, appears stated age and cooperative,  Hygiene:   fair  Gait & Station: needs assistance  Musculoskeletal: No tremors or abnormal involuntary movements    Mental Status Exam:   Cooperation:  Cooperative  Eye Contact:  blind  Psychomotor Behavior:  Appropriate  Mood: \"Fine\"  Affect:  mood-congruent  Speech:  Normal  Thought Process:  some lack of linear thinking  Associations: Circumstantial  Thought Content:     Mood congruent   Suicidal:  None   Homicidal:  None   Hallucinations:  Not demonstrated " today   Delusion:  None expressed  Cognitive Functioning:   Consciousness: awake and alert  Reliability:  fair  Insight:  limited  Judgement:  Impaired  Impulse Control:  Impaired    Lab Results:  Lab Results (last 24 hours)     Procedure Component Value Units Date/Time    POC Glucose Once [[id number]]  (Abnormal) Collected:  09/10/20 1641    Specimen:  Blood Updated:  09/10/20 1658     Glucose 272 mg/dL     POC Glucose Once [[id number]]  (Abnormal) Collected:  09/10/20 1325    Specimen:  Blood Updated:  09/10/20 1348     Glucose 241 mg/dL     POC Glucose Once [[id number]]  (Abnormal) Collected:  09/10/20 1130    Specimen:  Blood Updated:  09/10/20 1145     Glucose 265 mg/dL     POC Glucose Once [[id number]]  (Normal) Collected:  09/10/20 0602    Specimen:  Blood Updated:  09/10/20 0623     Glucose 76 mg/dL     POC Glucose Once [[id number]]  (Normal) Collected:  09/09/20 1924    Specimen:  Blood Updated:  09/09/20 1948     Glucose 117 mg/dL     POC Glucose Once [[id number]]  (Abnormal) Collected:  09/09/20 1819    Specimen:  Blood Updated:  09/09/20 1830     Glucose 68 mg/dL     POC Glucose Once [[id number]]  (Abnormal) Collected:  09/09/20 1714    Specimen:  Blood Updated:  09/09/20 1745     Glucose 58 mg/dL           Radiology Results:  Imaging Results (Last 24 Hours)     ** No results found for the last 24 hours. **          Medicine:   Current Facility-Administered Medications:   •  aluminum-magnesium hydroxide-simethicone (MAALOX MAX) 400-400-40 MG/5ML suspension 15 mL, 15 mL, Oral, Q6H PRN, Tyrel Anderson II, MD  •  cloNIDine (CATAPRES) tablet 0.1 mg, 0.1 mg, Oral, Q6H PRN, Tyrel Anderson II, MD  •  dextrose (D50W) 25 g/ 50mL Intravenous Solution 25 g, 25 g, Intravenous, Q15 Min PRN, Cas Houston APRN  •  dextrose (GLUTOSE) oral gel 15 g, 15 g, Oral, Q15 Min PRN, Cas Houston APRN, 15 g at 09/05/20 0714  •  escitalopram (LEXAPRO) tablet 10 mg, 10 mg, Oral, Daily, Tyrel Anderson  Abhijit VERMA MD, 10 mg at 09/10/20 0807  •  ferrous sulfate EC tablet 324 mg, 324 mg, Oral, Daily With Breakfast, Tyrel Anderson II, MD, 324 mg at 09/10/20 0723  •  glipizide (GLUCOTROL) half tablet 2.5 mg, 2.5 mg, Oral, BID AC, 2.5 mg at 09/10/20 1655 **AND** metFORMIN (GLUCOPHAGE) tablet 500 mg, 500 mg, Oral, BID AC, Tyrel Anderson II, MD, 500 mg at 09/10/20 1655  •  glucagon (human recombinant) (GLUCAGEN DIAGNOSTIC) injection 1 mg, 1 mg, Subcutaneous, Q15 Min PRN, Cas Houston APRN  •  insulin aspart (novoLOG) injection 0-7 Units, 0-7 Units, Subcutaneous, TID TINY, Cas Houston APRN, Stopped at 09/10/20 1227  •  loperamide (IMODIUM) capsule 2 mg, 2 mg, Oral, Q2H PRN, Tyrel Anderson II, MD  •  LORazepam (ATIVAN) tablet 1 mg, 1 mg, Oral, Q8H PRN **OR** LORazepam (ATIVAN) injection 1 mg, 1 mg, Intramuscular, Q8H PRN, Tyrel Anderson II, MD  •  magnesium hydroxide (MILK OF MAGNESIA) suspension 2400 mg/10mL 10 mL, 10 mL, Oral, Daily PRN, Tyrel Anderson II, MD  •  pantoprazole (PROTONIX) EC tablet 40 mg, 40 mg, Oral, Q AM, Tyrel Anderson II, MD, 40 mg at 09/10/20 0607  •  risperiDONE (risperDAL) tablet 0.5 mg, 0.5 mg, Oral, Daily, Tyrel Anderson II, MD, 0.5 mg at 09/10/20 0806  •  risperiDONE (risperDAL) tablet 1.5 mg, 1.5 mg, Oral, Nightly, Tyrel Anderson II, MD, 1.5 mg at 09/09/20 2026    Diagnoses/Assessment:     Acute exacerbation of chronic paranoid schizophrenia (CMS/HCC)    Schizophrenia, paranoid type (CMS/HCC)    Acute blood loss anemia    DMII (diabetes mellitus, type 2) (CMS/[facility])    Impression: psychosis interfering with diabetes management leading to worsening blood sugar control.    Treatment Plan:    1) Will continue care for the patient on the behavioral health unit at [facility] to ensure patient safety.  2) Will continue to provide treatment with the unit milieu, activities, therapies and psychopharmacological  management.  3) Patient to be placed on or continued on  Q15 minute checks  and Fall precautions.  4) Pertinent medical issues:   --Type 2 diabetes: Change to low carb diet.  Increase glipizide to 5mg bid and Metformin to 850mg twice daily, will continue to offer sliding scale insulin  -GERD/acute blood loss anemia: Continue Protonix and ferrous sulfate  5) Will order following labs: reviewed  6) Will make the following medication changes:   --Stop risperdal due to elevated blood sugar  --Start haldol 2mg bid.  --Cont lexapro 10 mg daily   7) Will continue discharge planning as appropriate for patient.    Treatment plan and medication risks and benefits discussed with: Patient and Treatment Team    Ellie Raza MD  09/10/20 at 17:12

## 2020-09-10 NOTE — NURSING NOTE
Pt became agitated when informed that it was time for her insulin. She stated multiple times that she did not want or need any insulin et that if she began to take it here she would have to take at home et she didn't want to do that. Dr. BOYLE spoke to pt et agreed for blood sugar to be rechecked in one hr et if greater than 300 further orders would be needed. Blood sugar rechecked et noted to be 241. Pt is currently in common area eating lunch.

## 2020-09-10 NOTE — PROGRESS NOTES
LCSW called and spoke with pt's brother re: status and progress towards tx goals. Brother voiced concern re: pt returning home. Brother notes that pt does have a  and  that visits her weekly, but he is concerned about the lack of help she has at home. Brother voiced interest in pt going to a facility, likely SNF at time of dc. Brother reports pt is her own guardian and he understands that she makes her own decisions. Brother says he is agreeable to filing for guardianship if needed and recommended. LCSW to discuss with treatment team and call brother back tomorrow am.

## 2020-09-10 NOTE — PLAN OF CARE
Problem: Patient Care Overview  Goal: Plan of Care Review  Outcome: Ongoing (interventions implemented as appropriate)  Flowsheets  Taken 9/10/2020 1336  Progress: no change  Plan of Care Reviewed With: patient  Patient Agreement with Plan of Care: agrees  Taken 9/10/2020 1105  Outcome Summary: Pt CGA for bed<>toilet t/f with HHA. while sitting EOB pt performed B UE exercises with good tolerance. No new goals met this tx.
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
## 2020-09-10 NOTE — PLAN OF CARE
Problem: Patient Care Overview  Goal: Plan of Care Review  Outcome: Ongoing (interventions implemented as appropriate)  Flowsheets (Taken 9/9/2020 1904)  Progress: no change  Plan of Care Reviewed With: other (see comments) (RN)  Outcome Summary: Intake 100% - 5x, 75% - 1x, 0% - 2x plus 100% for 1 snack.  Note:   Maintain pt on prescribed diet.  Encourage intake.

## 2020-09-10 NOTE — PLAN OF CARE
Problem: Patient Care Overview  Goal: Plan of Care Review  Outcome: Ongoing (interventions implemented as appropriate)  Flowsheets  Taken 9/9/2020 1904 by Suzette Steiner RD  Progress: no change  Plan of Care Reviewed With: other (see comments) (RN)  Taken 9/9/2020 1525 by Alisia Campos, RN  Patient Agreement with Plan of Care: agrees  Taken 9/10/2020 0339 by Madison Irwin RN  Outcome Summary: Pt is alert, oriented et cooperative at this time. She denies any S/I, H/I, or hallucinations. She told nursing staff that she doesn't like to eat in the dining room or in front of people and if she is forced she will not eat. Will continue to monitor. Pt asleep this evening but woke up to take evening medications.

## 2020-09-10 NOTE — NURSING NOTE
Behavior   Note any precipitants to event or behavior   Describe level and action of any aggressive behavior or speech and associated interventions.     Anxiety: Patient denies at this time  Depression: Patient denies at this time  Pain  0  AVH   no  S/I   no  Plan  no  H/I   no  Plan  no    Affect   euthymic/normal      Note: Pt is alert, oriented et cooperative at this time. She denies any S/I, H/I, or hallucinations. She told nursing staff that she doesn't like to eat in the dining room or in front of people and if she is forced she will not eat. Will continue to monitor. Pt asleep this evening but woke up to take evening medications.       Intervention    PRN medication utilized:  no    Instructed in medication usage and effects  Medications administered as ordered  Encouraged to verbalize needs      Response    Verbalized understanding   Did patient take medications as ordered yes   Did patient interact with assessment?  yes     Plan    Will monitor for safety  Will monitor every 15 minutes as ordered  Will evaluate and promote the plan of care    Last BM:  unknown date  (Please chart in I/O as well)

## 2020-09-11 LAB
GLUCOSE BLDC GLUCOMTR-MCNC: 142 MG/DL (ref 70–130)
GLUCOSE BLDC GLUCOMTR-MCNC: 176 MG/DL (ref 70–130)
GLUCOSE BLDC GLUCOMTR-MCNC: 180 MG/DL (ref 70–130)
GLUCOSE BLDC GLUCOMTR-MCNC: 83 MG/DL (ref 70–130)

## 2020-09-11 PROCEDURE — 97116 GAIT TRAINING THERAPY: CPT

## 2020-09-11 PROCEDURE — 97530 THERAPEUTIC ACTIVITIES: CPT

## 2020-09-11 PROCEDURE — 97110 THERAPEUTIC EXERCISES: CPT

## 2020-09-11 PROCEDURE — 99232 SBSQ HOSP IP/OBS MODERATE 35: CPT | Performed by: PSYCHIATRY & NEUROLOGY

## 2020-09-11 PROCEDURE — 82962 GLUCOSE BLOOD TEST: CPT

## 2020-09-11 PROCEDURE — 97535 SELF CARE MNGMENT TRAINING: CPT

## 2020-09-11 RX ADMIN — GLIPIZIDE 5 MG: 5 TABLET ORAL at 17:19

## 2020-09-11 RX ADMIN — HALOPERIDOL 2 MG: 2 TABLET ORAL at 20:26

## 2020-09-11 RX ADMIN — METFORMIN HYDROCHLORIDE 850 MG: 850 TABLET ORAL at 08:13

## 2020-09-11 RX ADMIN — GLIPIZIDE 5 MG: 5 TABLET ORAL at 08:13

## 2020-09-11 RX ADMIN — METFORMIN HYDROCHLORIDE 850 MG: 850 TABLET ORAL at 17:19

## 2020-09-11 RX ADMIN — ESCITALOPRAM OXALATE 10 MG: 10 TABLET ORAL at 08:13

## 2020-09-11 RX ADMIN — HALOPERIDOL 2 MG: 2 TABLET ORAL at 08:13

## 2020-09-11 RX ADMIN — FERROUS SULFATE TAB EC 324 MG (65 MG FE EQUIVALENT) 324 MG: 324 (65 FE) TABLET DELAYED RESPONSE at 08:13

## 2020-09-11 RX ADMIN — PANTOPRAZOLE SODIUM 40 MG: 40 TABLET, DELAYED RELEASE ORAL at 08:13

## 2020-09-11 NOTE — PLAN OF CARE
Problem: Patient Care Overview  Goal: Plan of Care Review  Outcome: Ongoing (interventions implemented as appropriate)  Flowsheets  Taken 9/10/2020 1508 by Alisia Campos, RN  Progress: no change  Taken 9/11/2020 0103 by Madison Irwin, RN  Plan of Care Reviewed With: patient  Patient Agreement with Plan of Care: agrees  Taken 9/11/2020 0411 by Madison Irwin, RN  Outcome Summary: Pt is alert and oriented. She c/o hearing voices coming from bathroom but nursing explained she is hearing an echo from nursing station and dining area.  She is cooperative with medication administration this evening. She denies any S/I, H/I or hallucinations.  Pt is able to recall recent happenings et conversations in full detail.   No behaviors reports. Will monitor.  No c/o unrelieved pain or altered comfort this shift. Pt resting well over night.

## 2020-09-11 NOTE — PLAN OF CARE
Problem: Patient Care Overview  Goal: Plan of Care Review  Outcome: Ongoing (interventions implemented as appropriate)  Flowsheets (Taken 9/11/2020 1440)  Progress: improving  Plan of Care Reviewed With: patient  Patient Agreement with Plan of Care: agrees

## 2020-09-11 NOTE — NURSING NOTE
Behavior   Note any precipitants to event or behavior   Describe level and action of any aggressive behavior or speech and associated interventions.     Anxiety: Excess Worry  Depression: insomnia  Pain  0  AVH   no  S/I   no  Plan  no  H/I   no  Plan  no    Affect   euthymic/normal      Note: Patient in room on assessment. She is alert and oriented. She is calm, cooperative and compliant with medications. Encouraged open communication with staff. Patient agreeable to makes needs known. No acute S/S of distress.       Intervention    PRN medication utilized:  no    Instructed in medication usage and effects  Medications administered as ordered  Encouraged to verbalize needs      Response    Verbalized understanding   Did patient take medications as ordered yes   Did patient interact with assessment?  yes     Plan    Will monitor for safety  Will monitor every 15 minutes as ordered  Will evaluate and promote the plan of care    Last BM:  unknown date  (Please chart in I/O as well)

## 2020-09-11 NOTE — PLAN OF CARE
Problem: Patient Care Overview  Goal: Plan of Care Review  Outcome: Ongoing (interventions implemented as appropriate)  Flowsheets (Taken 9/11/2020 7979)  Outcome Summary: Pt SBA for UB/LB bathing, SBA UB dressing and CGA LB dressing/toileting 2* to standing. Pt VSS and pt pleasant entire tx.

## 2020-09-11 NOTE — PLAN OF CARE
Problem: Patient Care Overview  Goal: Plan of Care Review  Outcome: Ongoing (interventions implemented as appropriate)  Flowsheets (Taken 9/11/2020 1015)  Progress: improving  Plan of Care Reviewed With: patient  Patient Agreement with Plan of Care: agrees  Outcome Summary: Pt. with good effort this a.m. Pt. transferred sup-sit-sup independent, sit-stand-sit CGA, Pt. amb. 200' with CGA HHAx1 with assist with direction due to visual deficits, pt. performed x20 reps seated LE therex EOB. Cont. to mobilize

## 2020-09-11 NOTE — NURSING NOTE
Behavior   Note any precipitants to event or behavior   Describe level and action of any aggressive behavior or speech and associated interventions.     Anxiety: Excess Worry  Depression: insomnia  Pain  0  AVH   no  S/I   no  Plan  no  H/I   no  Plan  no    Affect   euthymic/normal      Note: Pt is alert and oriented. She c/o hearing voices coming from bathroom but nursing explained she is hearing an echo from nursing station and dining area.  She is cooperative with medication administration this evening. She denies any S/I, H/I or hallucinations.  Pt is able to recall recent happenings et conversations in full detail.   No behaviors reports. Will monitor.  No c/o unrelieved pain or altered comfort this shift. Pt resting well over night.   Intervention    PRN medication utilized:  no    Instructed in medication usage and effects  Medications administered as ordered  Encouraged to verbalize needs      Response    Verbalized understanding   Did patient take medications as ordered yes   Did patient interact with assessment?  yes     Plan    Will monitor for safety  Will monitor every 15 minutes as ordered  Will evaluate and promote the plan of care    Last BM:  unknown date  (Please chart in I/O as well)

## 2020-09-11 NOTE — THERAPY TREATMENT NOTE
Acute Care - Physical Therapy Treatment Note  [facility]     Patient Name: Natacha Gandhi  : 1950  MRN: [id number]  Today's Date: 2020  Onset of Illness/Injury or Date of Surgery: 20     Referring Physician: Dr. Anderson    Admit Date: 9/3/2020    Visit Dx:    ICD-10-CM ICD-9-CM   1. Impaired mobility and activities of daily living Z74.09 V49.89    Z78.9    2. Impaired functional mobility, balance, gait, and endurance Z74.09 V49.89     Patient Active Problem List   Diagnosis   • Acute GI bleeding   • Altered mental status, unspecified   • Schizophrenia, paranoid type (CMS/HCC)   • Acute exacerbation of chronic paranoid schizophrenia (CMS/HCC)   • Acute blood loss anemia   • DMII (diabetes mellitus, type 2) (CMS/HCC)       Therapy Treatment    Rehabilitation Treatment Summary     Row Name 20 1015             Treatment Time/Intention    Discipline  physical therapy assistant  -JA      Document Type  therapy note (daily note)  -JA      Subjective Information  no complaints  -JA      Mode of Treatment  physical therapy;individual therapy  -JA      Patient/Family Observations  Pt. sitting EOB   -JA      Therapy Frequency (PT Clinical Impression)  other (see comments) 6-11x/wk  -JA      Patient Effort  good  -JA      Existing Precautions/Restrictions  fall legally blind   -JA      Recorded by [JA] Juan Miguel Pryor, PTA 20 1252      Row Name 20 1015             Vital Signs    Pre Systolic BP Rehab  141  -JA      Pre Treatment Diastolic BP  62  -JA      Pretreatment Heart Rate (beats/min)  87  -JA      Pre Patient Position  Sitting  -JA      Intra Patient Position  Standing  -JA      Post Patient Position  Supine  -JA      Recorded by [JA] Juan Miguel Pryor, PTA 20 1252      Row Name 20 1015             Cognitive Assessment/Intervention- PT/OT    Orientation Status (Cognition)  oriented to;person  -JA      Follows Commands (Cognition)  initiation  impaired;physical/tactile prompts required;repetition of directions required;verbal cues/prompting required  -JA      Recorded by [JA] Juan Miguel Pryor, PTA 09/11/20 1252      Row Name 09/11/20 1015             Bed Mobility Assessment/Treatment    Supine-Sit [place] (Bed Mobility)  conditional independence  -JA      Sit-Supine [place] (Bed Mobility)  conditional independence  -JA      Recorded by [JA] Juan Miguel Pryor, PTA 09/11/20 1252      Row Name 09/11/20 1015             Sit-Stand Transfer    Sit-Stand [place] (Transfers)  contact guard  -JA      Assistive Device (Sit-Stand Transfers)  other (see comments) HHA   -JA      Recorded by [JA] Juan Miguel Pryor, PTA 09/11/20 1252      Row Name 09/11/20 1015             Stand-Sit Transfer    Stand-Sit [place] (Transfers)  contact guard  -JA      Assistive Device (Stand-Sit Transfers)  other (see comments) HHA  -JA      Recorded by [JA] Juan Miguel Pryor, PTA 09/11/20 1252      Row Name 09/11/20 1015             Gait/Stairs Assessment/Training    [place] Level (Gait)  contact guard  -JA      Assistive Device (Gait)  other (see comments) HHA  -JA      Distance in Feet (Gait)  200'  -JA      Pattern (Gait)  step-through  -JA      Deviations/Abnormal Patterns (Gait)  base of support, narrow;gait speed decreased;stride length decreased  -JA      Comment (Gait/Stairs)  constant v.c.'s for direction due to lack of vision   -JA      Recorded by [JA] Juan Miguel Pryor, PTA 09/11/20 1252      Row Name 09/11/20 1015             Lower Extremity Seated Therapeutic Exercise    Performed, Seated Lower Extremity (Therapeutic Exercise)  hip flexion/extension;hip abduction/adduction;ankle dorsiflexion/plantarflexion;LAQ (long arc quad), knee extension  -JA      Exercise Type, Seated Lower Extremity (Therapeutic Exercise)  AROM (active range of motion)  -JA      Sets/Reps Detail, Seated Lower Extremity (Therapeutic Exercise)  x20  -JA      Recorded  by [JA] Juan Miguel Pryor, PTA 09/11/20 1252      Row Name 09/11/20 1015             Positioning and Restraints    Pre-Treatment Position  in bed  -JA      Post Treatment Position  bed  -JA      Recorded by [JA] Juan Miguel Pryor, PTA 09/11/20 1252      Row Name 09/11/20 1015             Pain Scale: Numbers Pre/Post-Treatment    Pain Scale: Numbers, Pretreatment  0/10 - no pain  -JA      Pain Scale: Numbers, Post-Treatment  0/10 - no pain  -JA      Recorded by [JA] Juan Miguel Pryor, PTA 09/11/20 1252      Row Name 09/11/20 1015             Outcome Summary/Treatment Plan (PT)    Daily Summary of Progress (PT)  progress toward functional goals as expected  -      Plan for Continued Treatment (PT)  Cont. to encourage mobility   -JA      Anticipated Discharge Disposition (PT)  anticipate therapy at next level of care  -JA      Recorded by [ANNA] Juan Miguel Pryor, [facility] 09/11/20 1252        User Key  (r) = Recorded By, (t) = Taken By, (c) = Cosigned By    Initials Name Effective Dates Discipline     Juan Miguel Pryor, PTA 03/07/18 -  PT               Rehab Goal Summary     Row Name 09/11/20 1015             Bed Mobility Goal 1 (PT)    Activity/Assistive Device (Bed Mobility Goal 1, PT)  sit to supine;supine to sit  -      [place] Level/Cues Needed (Bed Mobility Goal 1, PT)  independent  -      Time Frame (Bed Mobility Goal 1, PT)  3 days  -JA      Progress/Outcomes (Bed Mobility Goal 1, PT)  (S) goal met  -JA         Transfer Goal 1 (PT)    Activity/Assistive Device (Transfer Goal 1, PT)  sit-to-stand/stand-to-sit  -JA      [place] Level/Cues Needed (Transfer Goal 1, PT)  supervision required  -JA      Time Frame (Transfer Goal 1, PT)  3 days  -JA      Progress/Outcome (Transfer Goal 1, PT)  goal not met  -JA         Gait Training Goal 1 (PT)    Activity/Assistive Device (Gait Training Goal 1, PT)  gait (walking locomotion) LRAD PRN  -JA      [place] Level (Gait Training Goal 1, PT)   supervision required  -JA      Distance (Gait Goal 1, PT)  100ft or more  -JA      Time Frame (Gait Training Goal 1, PT)  5 days  -JA      Progress/Outcome (Gait Training Goal 1, PT)  goal not met  -JA         Stairs Goal 1 (PT)    Activity/Assistive Device (Stairs Goal 1, PT)  ascending stairs;descending stairs  -JA      [place] Level/Cues Needed (Stairs Goal 1, PT)  standby assist  -JA      Number of Stairs (Stairs Goal 1, PT)  1 or more  -JA      Time Frame (Stairs Goal 1, PT)  by discharge  -JA      Progress/Outcome (Stairs Goal 1, PT)  goal not met  -JA        User Key  (r) = Recorded By, (t) = Taken By, (c) = Cosigned By    Initials Name Provider Type Discipline    Juan Miguel Horvath PTA Physical Therapy Assistant PT          Physical Therapy Education                 Title: PT OT SLP Therapies (In Progress)     Topic: Physical Therapy (In Progress)     Point: Mobility training (Done)     Description:   Instruct learner(s) on safety and technique for assisting patient out of bed, chair or wheelchair.  Instruct in the proper use of assistive devices, such as walker, crutches, cane or brace.              Patient Friendly Description:   It's important to get you on your feet again, but we need to do so in a way that is safe for you. Falling has serious consequences, and your personal safety is the most important thing of all.        When it's time to get out of bed, one of us or a family member will sit next to you on the bed to give you support.     If your doctor or nurse tells you to use a walker, crutches, a cane, or a brace, be sure you use it every time you get out of bed, even if you think you don't need it.    Learning Progress Summary           Patient Acceptance, E, VU by KERRY at 9/4/2020 3368    Comment:  Role of PT, POC, use of gait belt                   Point: Home exercise program (Not Started)     Description:   Instruct learner(s) on appropriate technique for monitoring, assisting and/or  progressing patient with therapeutic exercises and activities.              Learner Progress:   Not documented in this visit.          Point: Body mechanics (Not Started)     Description:   Instruct learner(s) on proper positioning and spine alignment for patient and/or caregiver during mobility tasks and/or exercises.              Learner Progress:   Not documented in this visit.          Point: Precautions (Done)     Description:   Instruct learner(s) on prescribed precautions during mobility and gait tasks              Learning Progress Summary           Patient Acceptance, E,TB, VU by PRAMOD at 9/9/2020 0424    Acceptance, E, VU by KERRY at 9/4/2020 1334    Comment:  Role of PT, POC, use of gait belt                               User Key     Initials Effective Dates Name Provider Type Discipline    PRAMOD 10/17/16 -  Madison Irwin, RN Registered Nurse Nurse    KERRY 08/09/20 -  Melina Neves, PT Physical Therapist PT                PT Recommendation and Plan  Anticipated Discharge Disposition (PT): anticipate therapy at next level of care  Therapy Frequency (PT Clinical Impression): other (see comments)(6-11x/wk)  Outcome Summary/Treatment Plan (PT)  Daily Summary of Progress (PT): progress toward functional goals as expected  Plan for Continued Treatment (PT): Cont. to encourage mobility   Anticipated Discharge Disposition (PT): anticipate therapy at next level of care  Plan of Care Reviewed With: patient  Progress: improving  Outcome Summary: Pt. with good effort this a.m. Pt. transferred sup-sit-sup independent, sit-stand-sit CGA, Pt. amb. 200' with CGA HHAx1 with assist with direction due to visual deficits, pt. performed x20 reps seated LE therex EOB. Cont. to mobilize  Outcome Measures     Row Name 09/11/20 1015 09/10/20 1105 09/10/20 1008       How much help from another person do you currently need...    Turning from your back to your side while in flat bed without using bedrails?  4  -JA  3  -BB  3  -JA    Moving  from lying on back to sitting on the side of a flat bed without bedrails?  4  -JA  3  -BB  3  -JA    Moving to and from a bed to a chair (including a wheelchair)?  3  -JA  3  -BB  3  -JA    Standing up from a chair using your arms (e.g., wheelchair, bedside chair)?  3  -JA  3  -BB  3  -JA    Climbing 3-5 steps with a railing?  3  -JA  3  -BB  3  -JA    To walk in hospital room?  3  -JA  3  -BB  3  -JA    AM-PAC 6 Clicks Score (PT)  20  -JA  18  -BB  18  -JA       Functional Assessment    Outcome Measure Options  AM-PAC 6 Clicks Basic Mobility (PT)  -  --  AM-PAC 6 Clicks Basic Mobility (PT)  -    Row Name 09/09/20 1031 09/09/20 0803          How much help from another person do you currently need...    Turning from your back to your side while in flat bed without using bedrails?  3  -TW  --     Moving from lying on back to sitting on the side of a flat bed without bedrails?  3  -TW  --     Moving to and from a bed to a chair (including a wheelchair)?  3  -TW  --     Standing up from a chair using your arms (e.g., wheelchair, bedside chair)?  3  -TW  --     Climbing 3-5 steps with a railing?  3  -TW  --     To walk in hospital room?  3  -TW  --     AM-PAC 6 Clicks Score (PT)  18  -TW  --        How much help from another is currently needed...    Putting on and taking off regular lower body clothing?  --  3  -BB     Bathing (including washing, rinsing, and drying)  --  3  -BB     Toileting (which includes using toilet bed pan or urinal)  --  3  -BB     Putting on and taking off regular upper body clothing  --  3  -BB     Taking care of personal grooming (such as brushing teeth)  --  4  -BB     Eating meals  --  4  -BB     AM-PAC 6 Clicks Score (OT)  --  20  -BB       User Key  (r) = Recorded By, (t) = Taken By, (c) = Cosigned By    Initials Name Provider Type    Juan Miguel Horvath, PTA Physical Therapy Assistant    TW Remigio Mi, PTA Physical Therapy Assistant    BB Mare Reed, JOSE/L  Occupational Therapy Assistant         Time Calculation:   PT Charges     Row Name 09/11/20 1301             Time Calculation    Start Time  1015  -ANNA      Stop Time  1055  -ANNA      Time Calculation (min)  40 min  -ANNA         Time Calculation- PT    Total Timed Code Minutes- PT  40 minute(s)  -ANNA        User Key  (r) = Recorded By, (t) = Taken By, (c) = Cosigned By    Initials Name Provider Type    Juan Miguel Horvath PTA Physical Therapy Assistant        Therapy Charges for Today     Code Description Service Date Service Provider Modifiers Qty    92172109280 HC GAIT TRAINING EA 15 MIN 9/10/2020 Juan Miguel Pryor, PTA GP 1    20682101978 HC PT THER PROC EA 15 MIN 9/10/2020 Juan Miguel Pryor, PTA GP 1    36348128533 HC GAIT TRAINING EA 15 MIN 9/11/2020 Juan Miguel Pryor, PTA GP 1    13488980124 HC PT THERAPEUTIC ACT EA 15 MIN 9/11/2020 Juan Miguel Pryor, PTA GP 1    38945710829 HC PT THER PROC EA 15 MIN 9/11/2020 Juan Miguel Pryor, PTA GP 1          PT G-Codes  Outcome Measure Options: AM-PAC 6 Clicks Basic Mobility (PT)  AM-PAC 6 Clicks Score (PT): 20  AM-PAC 6 Clicks Score (OT): 20    Juan Miguel Pryor PTA  9/11/2020

## 2020-09-11 NOTE — THERAPY TREATMENT NOTE
Acute Care - Occupational Therapy Treatment Note  [facility]     Patient Name: Natacha Gandhi  : 1950  MRN: [id number]  Today's Date: 2020  Onset of Illness/Injury or Date of Surgery: 20  Date of Referral to OT: 20  Referring Physician: Dr. Anderson    Admit Date: 9/3/2020       ICD-10-CM ICD-9-CM   1. Impaired mobility and activities of daily living Z74.09 V49.89    Z78.9    2. Impaired functional mobility, balance, gait, and endurance Z74.09 V49.89     Patient Active Problem List   Diagnosis   • Acute GI bleeding   • Altered mental status, unspecified   • Schizophrenia, paranoid type (CMS/HCC)   • Acute exacerbation of chronic paranoid schizophrenia (CMS/HCC)   • Acute blood loss anemia   • DMII (diabetes mellitus, type 2) (CMS/HCC)     Past Medical History:   Diagnosis Date   • Depression    • Psychiatric illness    • Schizoaffective disorder (CMS/HCC)      Past Surgical History:   Procedure Laterality Date   • COLONOSCOPY N/A 2020    Procedure: COLONOSCOPY;  Surgeon: Ge Gorman MD;  Location: [facility];  Service: Gastroenterology;  Laterality: N/A;   • ENDOSCOPY N/A 2020    Procedure: ESOPHAGOGASTRODUODENOSCOPY;  Surgeon: Ge Gorman MD;  Location: [facility];  Service: Gastroenterology;  Laterality: N/A;       Therapy Treatment    Rehabilitation Treatment Summary     Row Name 20 1335 20 1015          Treatment Time/Intention    Discipline  occupational therapy assistant  -TO  physical therapy assistant  -ANNA     Document Type  therapy note (daily note)  -TO  therapy note (daily note)  -JA     Subjective Information  --  no complaints  -JA     Mode of Treatment  individual therapy;occupational therapy  -TO  physical therapy;individual therapy  -JA     Patient/Family Observations  --  Pt. sitting EOB   -JA     Therapy Frequency (PT Clinical Impression)  --  other (see comments) 6-11x/wk  -JA     Therapy Frequency (OT Eval)  other (see comments) 5-7  days per week  -TO  --     Patient Effort  excellent  -TO  good  -JA     Existing Precautions/Restrictions  fall  -TO  fall legally blind   -JA     Recorded by [TO] Neri Ramirez COTA/L 09/11/20 1504 [JA] Juan Miguel Pryor, PTA 09/11/20 1252     Row Name 09/11/20 1335 09/11/20 1015          Vital Signs    Pre Systolic BP Rehab  122  -TO  141  -JA     Pre Treatment Diastolic BP  64  -TO  62  -JA     Pretreatment Heart Rate (beats/min)  88  -TO  87  -JA     Intratreatment Heart Rate (beats/min)  93  -TO  --     Pre SpO2 (%)  98  -TO  --     O2 Delivery Pre Treatment  room air  -TO  --     Post SpO2 (%)  96  -TO  --     O2 Delivery Post Treatment  room air  -TO  --     Pre Patient Position  Sitting  -TO  Sitting  -JA     Intra Patient Position  Sitting  -TO  Standing  -JA     Post Patient Position  Sitting  -TO  Supine  -JA     Recorded by [TO] Nrei Ramirez COTA/L 09/11/20 1504 [JA] Juan Miguel Pryor, PTA 09/11/20 1252     Row Name 09/11/20 1335 09/11/20 1015          Cognitive Assessment/Intervention- PT/OT    Orientation Status (Cognition)  oriented x 4  -TO  oriented to;person  -JA     Follows Commands (Cognition)  WNL  -TO  initiation impaired;physical/tactile prompts required;repetition of directions required;verbal cues/prompting required  -JA     Recorded by [TO] Neri Ramirez COTA/L 09/11/20 1504 [JA] Juan Miguel Pryor, PTA 09/11/20 1252     Row Name 09/11/20 1335 09/11/20 1015          Bed Mobility Assessment/Treatment    Supine-Sit [place] (Bed Mobility)  conditional independence  -TO  conditional independence  -JA     Sit-Supine [place] (Bed Mobility)  conditional independence  -TO  conditional independence  -JA     Recorded by [TO] Neri Ramirez GARCIA/L 09/11/20 1504 [JA] Juan Miguel Pryor, PTA 09/11/20 1252     Row Name 09/11/20 1335             Functional Mobility    Functional Mobility- Ind. Level  contact guard assist HHA 2* to vision  -TO      Functional Mobility-Distance  (Feet)  40  -TO      Functional Mobility- Safety Issues  -- decreased vision  -TO      Recorded by [TO] Neri Ramirez GARCIA/L 09/11/20 1504      Row Name 09/11/20 1335             Transfer Assessment/Treatment    Transfer Assessment/Treatment  bed-chair transfer;toilet transfer  -TO      Recorded by [TO] Neri Ramirez GARCIA/L 09/11/20 1504      Row Name 09/11/20 1335 09/11/20 1015          Sit-Stand Transfer    Sit-Stand [place] (Transfers)  conditional independence  -TO  contact guard  -JA     Assistive Device (Sit-Stand Transfers)  other (see comments) HHA  -TO  other (see comments) HHA   -JA     Recorded by [TO] Neri Ramirez GARCIA/L 09/11/20 1504 [JA] Juan Miguel Pryor, PTA 09/11/20 1252     Row Name 09/11/20 1335 09/11/20 1015          Stand-Sit Transfer    Stand-Sit [place] (Transfers)  contact guard;conditional independence  -TO  contact guard  -JA     Assistive Device (Stand-Sit Transfers)  other (see comments) HH assist  -TO  other (see comments) HHA  -JA     Recorded by [TO] Neri Ramirez GARCIA/L 09/11/20 1504 [JA] Juan Miguel Pryor, PTA 09/11/20 1252     Row Name 09/11/20 1335             Toilet Transfer    Type (Toilet Transfer)  sit-stand;stand-sit  -TO      [place] Level (Toilet Transfer)  contact guard HHA  -TO      Assistive Device (Toilet Transfer)  -- VCS for orientation to position in room  -TO      Recorded by [TO] Neri Ramirez GARCIA/L 09/11/20 1504      Row Name 09/11/20 1015             Gait/Stairs Assessment/Training    [place] Level (Gait)  contact guard  -JA      Assistive Device (Gait)  other (see comments) HHA  -JA      Distance in Feet (Gait)  200'  -JA      Pattern (Gait)  step-through  -JA      Deviations/Abnormal Patterns (Gait)  base of support, narrow;gait speed decreased;stride length decreased  -JA      Comment (Gait/Stairs)  constant v.c.'s for direction due to lack of vision   -JA      Recorded by [JA] Juan Miguel Pryor, PTA 09/11/20 1253       Row Name 09/11/20 1335             ADL Assessment/Intervention    BADL Assessment/Intervention  grooming  -TO      Recorded by [TO] Neri Ramirez GARCIA/L 09/11/20 1504      Row Name 09/11/20 1335             Bathing Assessment/Intervention    Bathing [place] Level  bathing skills;lower body;upper body;standby assist  -TO      Bathing Position  edge of bed sitting  -TO      Recorded by [TO] Neri Ramirez GARCIA/L 09/11/20 1504      Row Name 09/11/20 1335             Upper Body Dressing Assessment/Training    Upper Body Dressing [place] Level  upper body dressing skills;doff;don;pull-over garment;set up  -TO      Recorded by [TO] Neri Ramirez GARCIA/L 09/11/20 1504      Row Name 09/11/20 1335             Lower Body Dressing Assessment/Training    Lower Body Dressing [place] Level  lower body dressing skills;doff;don;pants/bottoms;socks;supervision;contact guard assist  -TO      Recorded by [TO] Neri Ramirez GARCIA/L 09/11/20 1504      Row Name 09/11/20 1335             Grooming Assessment/Training    [place] Level (Grooming)  grooming skills;hair care, combing/brushing;supervision  -TO      Grooming Position  edge of bed sitting  -TO      Recorded by [TO] Neri Ramirez GARCIA/L 09/11/20 1504      Row Name 09/11/20 1335             Toileting Assessment/Training    [place] Level (Toileting)  contact guard assist;set up to mariah pants  -TO      Toileting Position  other (see comments) toilet in room  -TO      Recorded by [TO] Neri Ramirez COTA/L 09/11/20 1504      Row Name 09/11/20 1335             BADL Safety/Performance    Impairments, BADL Safety/Performance  visual/perceptual  -TO      Recorded by [TO] Neri Ramirez GARCIA/L 09/11/20 1504      Row Name 09/11/20 1015             Lower Extremity Seated Therapeutic Exercise    Performed, Seated Lower Extremity (Therapeutic Exercise)  hip flexion/extension;hip abduction/adduction;ankle dorsiflexion/plantarflexion;LAQ (long arc quad),  knee extension  -JA      Exercise Type, Seated Lower Extremity (Therapeutic Exercise)  AROM (active range of motion)  -JA      Sets/Reps Detail, Seated Lower Extremity (Therapeutic Exercise)  x20  -JA      Recorded by [JA] Juan Miguel Pryor, PTA 09/11/20 1252      Row Name 09/11/20 1335             Static Sitting Balance    Level of [place] (Unsupported Sitting, Static Balance)  independent  -TO      Sitting Position (Unsupported Sitting, Static Balance)  sitting on edge of bed  -TO      Time Able to Maintain Position (Unsupported Sitting, Static Balance)  other (see comments) > 1 hour  -TO      Recorded by [TO] Neri Ramirez COTA/L 09/11/20 1504      Row Name 09/11/20 1335 09/11/20 1015          Positioning and Restraints    Pre-Treatment Position  sitting in chair/recliner  -TO  in bed  -JA     Post Treatment Position  bed  -TO  bed  -JA     In Bed  exit alarm on;encouraged to call for assist  -TO  --     Recorded by [TO] Neri Ramirez GARCIA/L 09/11/20 1504 [JA] Juan Miguel Pryor, PTA 09/11/20 1252     Row Name 09/11/20 1335 09/11/20 1015          Pain Scale: Numbers Pre/Post-Treatment    Pain Scale: Numbers, Pretreatment  0/10 - no pain  -TO  0/10 - no pain  -JA     Pain Scale: Numbers, Post-Treatment  0/10 - no pain  -TO  0/10 - no pain  -JA     Recorded by [TO] Neri Ramirez COTA/L 09/11/20 1504 [JA] Juan Miguel Pryor, PTA 09/11/20 1252     Row Name 09/11/20 1335             Outcome Summary/Treatment Plan (OT)    Daily Summary of Progress (OT)  progress toward functional goals as expected  -TO      Barriers to Overall Progress (OT)  visual deficits:balance  -TO      Anticipated Discharge Disposition (OT)  anticipate therapy at next level of care  -TO      Recorded by [TO] Neri Ramirez GARCIA/L 09/11/20 1504      Row Name 09/11/20 1015             Outcome Summary/Treatment Plan (PT)    Daily Summary of Progress (PT)  progress toward functional goals as expected  -JA      Plan for Continued  Treatment (PT)  Cont. to encourage mobility   -JA      Anticipated Discharge Disposition (PT)  anticipate therapy at next level of care  -JA      Recorded by [JA] Juan Miguel Pryor, PTA 09/11/20 1252        User Key  (r) = Recorded By, (t) = Taken By, (c) = Cosigned By    Initials Name Effective Dates Discipline    Juan Miguel Horvath, PTA 03/07/18 -  PT    TO Neri Ramirez GARCIA/L 03/07/18 -  OT           Rehab Goal Summary     Row Name 09/11/20 1335 09/11/20 1015          Bed Mobility Goal 1 (PT)    Activity/Assistive Device (Bed Mobility Goal 1, PT)  --  sit to supine;supine to sit  -JA     [place] Level/Cues Needed (Bed Mobility Goal 1, PT)  --  independent  -JA     Time Frame (Bed Mobility Goal 1, PT)  --  3 days  -JA     Progress/Outcomes (Bed Mobility Goal 1, PT)  --  (S) goal met  -JA        Transfer Goal 1 (PT)    Activity/Assistive Device (Transfer Goal 1, PT)  --  sit-to-stand/stand-to-sit  -JA     [place] Level/Cues Needed (Transfer Goal 1, PT)  --  supervision required  -JA     Time Frame (Transfer Goal 1, PT)  --  3 days  -JA     Progress/Outcome (Transfer Goal 1, PT)  --  goal not met  -JA        Gait Training Goal 1 (PT)    Activity/Assistive Device (Gait Training Goal 1, PT)  --  gait (walking locomotion) LRAD PRN  -JA     [place] Level (Gait Training Goal 1, PT)  --  supervision required  -JA     Distance (Gait Goal 1, PT)  --  100ft or more  -JA     Time Frame (Gait Training Goal 1, PT)  --  5 days  -JA     Progress/Outcome (Gait Training Goal 1, PT)  --  goal not met  -JA        Stairs Goal 1 (PT)    Activity/Assistive Device (Stairs Goal 1, PT)  --  ascending stairs;descending stairs  -JA     [place] Level/Cues Needed (Stairs Goal 1, PT)  --  standby assist  -JA     Number of Stairs (Stairs Goal 1, PT)  --  1 or more  -JA     Time Frame (Stairs Goal 1, PT)  --  by discharge  -JA     Progress/Outcome (Stairs Goal 1, PT)  --  goal not met  -JA        Occupational Therapy  Goals    Transfer Goal Selection (OT)  transfer, OT goal 1  -TO  --     Bathing Goal Selection (OT)  bathing, OT goal 1  -TO  --     Dressing Goal Selection (OT)  dressing, OT goal 1  -TO  --     Toileting Goal Selection (OT)  toileting, OT goal 1  -TO  --     Balance Goal Selection (OT)  balance, OT goal 1  -TO  --        Transfer Goal 1 (OT)    Activity/Assistive Device (Transfer Goal 1, OT)  sit-to-stand/stand-to-sit;bed-to-chair/chair-to-bed;toilet  -TO  --     [place] Level/Cues Needed (Transfer Goal 1, OT)  standby assist;verbal cues required;tactile cues required;set-up required  -TO  --     Time Frame (Transfer Goal 1, OT)  long term goal (LTG);by discharge  -TO  --     Progress/Outcome (Transfer Goal 1, OT)  (S) goal met  -TO  --        Bathing Goal 1 (OT)    Activity/Assistive Device (Bathing Goal 1, OT)  lower body bathing  -TO  --     [place] Level/Cues Needed (Bathing Goal 1, OT)  verbal cues required;tactile cues required;set-up required;standby assist  -TO  --     Time Frame (Bathing Goal 1, OT)  long term goal (LTG);by discharge  -TO  --     Progress/Outcomes (Bathing Goal 1, OT)  goal met  -TO  --        Dressing Goal 1 (OT)    Activity/Assistive Device (Dressing Goal 1, OT)  lower body dressing  -TO  --     [place]/Cues Needed (Dressing Goal 1, OT)  verbal cues required;tactile cues required;set-up required;standby assist  -TO  --     Time Frame (Dressing Goal 1, OT)  long term goal (LTG);by discharge  -TO  --     Progress/Outcome (Dressing Goal 1, OT)  goal not met  -TO  --        Toileting Goal 1 (OT)    Activity/Device (Toileting Goal 1, OT)  toileting skills, all  -TO  --     [place] Level/Cues Needed (Toileting Goal 1, OT)  standby assist;verbal cues required;tactile cues required;set-up required  -TO  --     Time Frame (Toileting Goal 1, OT)  long term goal (LTG);by discharge  -TO  --     Progress/Outcome (Toileting Goal 1, OT)  goal partially met  -TO  --        Balance  Goal 1 (OT)    Activity/Assistive Device (Balance Goal 1, OT)  standing, dynamic  -TO  --     [place] Level/Cues Needed (Balance Goal 1, OT)  standby assist  -TO  --     Time Frame (Balance Goal 1, OT)  long term goal (LTG);by discharge  -TO  --     Progress/Outcomes (Balance Goal 1, OT)  goal not met  -TO  --       User Key  (r) = Recorded By, (t) = Taken By, (c) = Cosigned By    Initials Name Provider Type Discipline    Juan Miguel Horvath, PTA Physical Therapy Assistant PT    TO Neri Ramirez COTA/L Occupational Therapy Assistant OT        Occupational Therapy Education                 Title: PT OT SLP Therapies (In Progress)     Topic: Occupational Therapy (In Progress)     Point: ADL training (Done)     Description:   Instruct learner(s) on proper safety adaptation and remediation techniques during self care or transfers.   Instruct in proper use of assistive devices.              Learning Progress Summary           Patient Acceptance, E, VU by BB at 9/10/2020 1336    Acceptance, E, VU by BB at 9/9/2020 1101    Acceptance, E, VU by BB at 9/8/2020 1047    Acceptance, E, VU,NR by AS at 9/4/2020 1205    Comment:  role of OT, OT POC, ADL training, fall preacutions, bed mobility, transfer training                   Point: Home exercise program (Not Started)     Description:   Instruct learner(s) on appropriate technique for monitoring, assisting and/or progressing therapeutic exercises/activities.              Learner Progress:   Not documented in this visit.          Point: Precautions (Done)     Description:   Instruct learner(s) on prescribed precautions during self-care and functional transfers.              Learning Progress Summary           Patient Acceptance, E,TB, VU by KM at 9/9/2020 0424    Acceptance, E, VU,NR by AS at 9/4/2020 1205    Comment:  role of OT, OT POC, ADL training, fall preacutions, bed mobility, transfer training                   Point: Body mechanics (Done)     Description:    Instruct learner(s) on proper positioning and spine alignment during self-care, functional mobility activities and/or exercises.              Learning Progress Summary           Patient Acceptance, E, VU by BB at 9/8/2020 1047    Acceptance, E, NR by BB at 9/7/2020 1131                               User Key     Initials Effective Dates Name Provider Type Discipline     10/17/16 -  Madison Irwin, RN Registered Nurse Nurse    MELECIO 03/07/18 -  Mare Reed COTA/L Occupational Therapy Assistant OT    AS 07/05/20 -  Unique Garcia, OT Occupational Therapist OT                OT Recommendation and Plan  Outcome Summary/Treatment Plan (OT)  Daily Summary of Progress (OT): progress toward functional goals as expected  Barriers to Overall Progress (OT): visual deficits:balance  Anticipated Discharge Disposition (OT): anticipate therapy at next level of care  Therapy Frequency (OT Eval): other (see comments)(5-7 days per week)  Daily Summary of Progress (OT): progress toward functional goals as expected  Outcome Summary: Pt SBA for UB/LB bathing, SBA UB dressing and CGA LB dressing/toileting 2* to standing. Pt VSS and pt pleasant entire tx.  Outcome Measures     Row Name 09/11/20 1335 09/11/20 1015 09/10/20 1105       How much help from another person do you currently need...    Turning from your back to your side while in flat bed without using bedrails?  --  4  -JA  3  -BB    Moving from lying on back to sitting on the side of a flat bed without bedrails?  --  4  -JA  3  -BB    Moving to and from a bed to a chair (including a wheelchair)?  --  3  -JA  3  -BB    Standing up from a chair using your arms (e.g., wheelchair, bedside chair)?  --  3  -JA  3  -BB    Climbing 3-5 steps with a railing?  --  3  -JA  3  -BB    To walk in hospital room?  --  3  -JA  3  -BB    AM-PAC 6 Clicks Score (PT)  --  20  -JA  18  -BB       How much help from another is currently needed...    Putting on and taking off regular lower  body clothing?  4  -TO  --  --    Bathing (including washing, rinsing, and drying)  3  -TO  --  --    Toileting (which includes using toilet bed pan or urinal)  3  -TO  --  --    Putting on and taking off regular upper body clothing  4  -TO  --  --    Taking care of personal grooming (such as brushing teeth)  3  -TO  --  --    Eating meals  4  -TO  --  --    AM-PAC 6 Clicks Score (OT)  21  -TO  --  --       Functional Assessment    Outcome Measure Options  --  AM-[facility] 6 Clicks Basic Mobility (PT)  -  --    Row Name 09/10/20 1008 09/09/20 1031 09/09/20 0803       How much help from another person do you currently need...    Turning from your back to your side while in flat bed without using bedrails?  3  -  3  -TW  --    Moving from lying on back to sitting on the side of a flat bed without bedrails?  3  -  3  -TW  --    Moving to and from a bed to a chair (including a wheelchair)?  3  -  3  -TW  --    Standing up from a chair using your arms (e.g., wheelchair, bedside chair)?  3  -  3  -TW  --    Climbing 3-5 steps with a railing?  3  -  3  -TW  --    To walk in hospital room?  3  -  3  -TW  --    AM-PAC 6 Clicks Score (PT)  18  -JA  18  -TW  --       How much help from another is currently needed...    Putting on and taking off regular lower body clothing?  --  --  3  -BB    Bathing (including washing, rinsing, and drying)  --  --  3  -BB    Toileting (which includes using toilet bed pan or urinal)  --  --  3  -BB    Putting on and taking off regular upper body clothing  --  --  3  -BB    Taking care of personal grooming (such as brushing teeth)  --  --  4  -BB    Eating meals  --  --  4  -BB    AM-PAC 6 Clicks Score (OT)  --  --  20  -BB       Functional Assessment    Outcome Measure Options  AM-[facility] 6 Clicks Basic Mobility (PT)  -ANNA  --  --      User Key  (r) = Recorded By, (t) = Taken By, (c) = Cosigned By    Initials Name Provider Type    Juan Miguel Horvath, PTA Physical Therapy Assistant    TW  Remigio Mi, PTA Physical Therapy Assistant    BB Mare Reed, GARCIA/L Occupational Therapy Assistant    TO Neri Ramirez COTA/L Occupational Therapy Assistant           Time Calculation:   Time Calculation- OT     Row Name 09/11/20 1511             Time Calculation- OT    OT Start Time  1335  -TO      OT Stop Time  1445  -TO      OT Time Calculation (min)  70 min  -TO      Total Timed Code Minutes- OT  70 minute(s)  -TO      OT Received On  09/11/20  -TO        User Key  (r) = Recorded By, (t) = Taken By, (c) = Cosigned By    Initials Name Provider Type    TO Neri Ramirez COTA/L Occupational Therapy Assistant        Therapy Charges for Today     Code Description Service Date Service Provider Modifiers Qty    [id number] HC OT SELF CARE/MGMT/TRAIN EA 15 MIN 9/11/2020 Neri Ramirez COTA/L GO 5               NARDA Zambrano  9/11/2020

## 2020-09-11 NOTE — PLAN OF CARE
Problem: Patient Care Overview  Goal: Plan of Care Review  9/11/2020 0424 by Madison Irwin, RN  Flowsheets (Taken 9/11/2020 0411)  Outcome Summary: Pt is alert and oriented. She c/o hearing voices coming from bathroom but nursing explained she is hearing an echo from nursing station and dining area.  She is cooperative with medication administration this evening. She denies any S/I, H/I or hallucinations.  Pt is able to recall recent happenings et conversations in full detail.   No behaviors reports. Will monitor.  No c/o unrelieved pain or altered comfort this shift. Pt resting well over night.

## 2020-09-12 LAB
GLUCOSE BLDC GLUCOMTR-MCNC: 138 MG/DL (ref 70–130)
GLUCOSE BLDC GLUCOMTR-MCNC: 184 MG/DL (ref 70–130)
GLUCOSE BLDC GLUCOMTR-MCNC: 90 MG/DL (ref 70–130)

## 2020-09-12 PROCEDURE — 97116 GAIT TRAINING THERAPY: CPT

## 2020-09-12 PROCEDURE — 99233 SBSQ HOSP IP/OBS HIGH 50: CPT | Performed by: PSYCHIATRY & NEUROLOGY

## 2020-09-12 PROCEDURE — 82962 GLUCOSE BLOOD TEST: CPT

## 2020-09-12 PROCEDURE — 97530 THERAPEUTIC ACTIVITIES: CPT

## 2020-09-12 RX ADMIN — FERROUS SULFATE TAB EC 324 MG (65 MG FE EQUIVALENT) 324 MG: 324 (65 FE) TABLET DELAYED RESPONSE at 09:06

## 2020-09-12 RX ADMIN — GLIPIZIDE 5 MG: 5 TABLET ORAL at 17:49

## 2020-09-12 RX ADMIN — ESCITALOPRAM OXALATE 10 MG: 10 TABLET ORAL at 09:06

## 2020-09-12 RX ADMIN — HALOPERIDOL 2 MG: 2 TABLET ORAL at 20:07

## 2020-09-12 RX ADMIN — METFORMIN HYDROCHLORIDE 850 MG: 850 TABLET ORAL at 09:06

## 2020-09-12 RX ADMIN — HALOPERIDOL 2 MG: 2 TABLET ORAL at 09:06

## 2020-09-12 RX ADMIN — METFORMIN HYDROCHLORIDE 850 MG: 850 TABLET ORAL at 17:49

## 2020-09-12 RX ADMIN — GLIPIZIDE 5 MG: 5 TABLET ORAL at 09:06

## 2020-09-12 RX ADMIN — PANTOPRAZOLE SODIUM 40 MG: 40 TABLET, DELAYED RELEASE ORAL at 06:40

## 2020-09-12 NOTE — PROGRESS NOTES
"Psychiatry Progress Note    9/12/2020    Legal Status: Voluntary    Chief Complaint: psychosis    Subjective:  Patient is a 70 y.o. female who was hospitalized for psychosis.    Patient had AVH issues last night and became upset and demanded the phone to call the .    She is able to superficially engage in discussion about placement.  She would like to go home.  She notes that she has home instead that comes in to make her meals.  She notes that she has packaged meals that she microwaves on days that home instead is not coming into the home.  She is not able to clearly report how she manages her meds but it appears that she takes them from the bottle herself.    Blood glucose levels stable.    From nurse Li's note:  Patient appears irritable and is raising voice stating \"give me the phone, I'm calling the  right now these people wont quit talking and let me rest\", while pointing across the room to a non existing person. Patient does not elaborate on what the voices are saying, but repeatedly states \"Im tired of it make it stop, the  is going to come\".    Objective     Vital Signs    Vitals:    09/11/20 0215 09/11/20 0753 09/11/20 2100 09/12/20 0747   BP: 107/57 101/58 130/61 131/61   BP Location: Right arm Left arm Right arm Right arm   Patient Position: Lying Lying Sitting Sitting   Pulse: 93 88 95 92   Resp: 18 18 18 18   Temp: 97.5 °F (36.4 °C) 97.2 °F (36.2 °C) 99 °F (37.2 °C) 98.3 °F (36.8 °C)   TempSrc: Tympanic Tympanic Tympanic Tympanic   SpO2: 98% 96% 99% 98%   Weight:       Height:           Physical Exam:   General Appearance: alert, appears stated age and cooperative,  Hygiene:   fair  Gait & Station: needs assistance  Musculoskeletal: No tremors or abnormal involuntary movements    Mental Status Exam:   Cooperation:  Cooperative  Eye Contact:  blind  Psychomotor Behavior:  Appropriate  Mood: \"Fine\"  Affect:  mood-congruent  Speech:  Normal  Thought Process:  some lack of linear " thinking  Associations: Circumstantial  Thought Content:     Mood congruent   Suicidal:  None   Homicidal:  None   Hallucinations:  Auditory and Visual last night   Delusion:  Paranoid last night  Cognitive Functioning:   Consciousness: awake and alert  Reliability:  fair  Insight:  limited  Judgement:  Impaired  Impulse Control:  Impaired    Lab Results:  Lab Results (last 24 hours)     Procedure Component Value Units Date/Time    POC Glucose Once [[id number]]  (Abnormal) Collected: 09/12/20 1202    Specimen: Blood Updated: 09/12/20 1214     Glucose 184 mg/dL     POC Glucose Once [[id number]]  (Normal) Collected: 09/12/20 0643    Specimen: Blood Updated: 09/12/20 0701     Glucose 90 mg/dL     POC Glucose Once [[id number]]  (Abnormal) Collected: 09/11/20 1929    Specimen: Blood Updated: 09/11/20 1945     Glucose 142 mg/dL     POC Glucose Once [[id number]]  (Abnormal) Collected: 09/11/20 1629    Specimen: Blood Updated: 09/11/20 1654     Glucose 180 mg/dL           Radiology Results:  Imaging Results (Last 24 Hours)     ** No results found for the last 24 hours. **          Medicine:   Current Facility-Administered Medications:   •  aluminum-magnesium hydroxide-simethicone (MAALOX MAX) 400-400-40 MG/5ML suspension 15 mL, 15 mL, Oral, Q6H PRN, Tyrel Anderson II, MD  •  cloNIDine (CATAPRES) tablet 0.1 mg, 0.1 mg, Oral, Q6H PRN, Tyrel Anderson II, MD  •  dextrose (D50W) 25 g/ 50mL Intravenous Solution 25 g, 25 g, Intravenous, Q15 Min PRN, Cas Houston APRN  •  dextrose (GLUTOSE) oral gel 15 g, 15 g, Oral, Q15 Min PRN, Cas Houston APRN, 15 g at 09/05/20 0714  •  escitalopram (LEXAPRO) tablet 10 mg, 10 mg, Oral, Daily, Tyrel Anderson II, MD, 10 mg at 09/12/20 0906  •  ferrous sulfate EC tablet 324 mg, 324 mg, Oral, Daily With Breakfast, Tyrel Anderson II, MD, 324 mg at 09/12/20 0906  •  glipizide (GLUCOTROL) tablet 5 mg, 5 mg, Oral, BID AC, 5 mg at 09/12/20 0906 **AND**  metFORMIN (GLUCOPHAGE) tablet 850 mg, 850 mg, Oral, BID TINY, Ellie Raza MD, 850 mg at 09/12/20 0906  •  glucagon (human recombinant) (GLUCAGEN DIAGNOSTIC) injection 1 mg, 1 mg, Subcutaneous, Q15 Min PRN, Cas Houston APRN  •  haloperidol (HALDOL) tablet 2 mg, 2 mg, Oral, BID, Ellie Raza MD, 2 mg at 09/12/20 0906  •  insulin aspart (novoLOG) injection 0-7 Units, 0-7 Units, Subcutaneous, TID Rosemarie FRANKS Mark Andrew, APRN, Stopped at 09/10/20 1227  •  loperamide (IMODIUM) capsule 2 mg, 2 mg, Oral, Q2H PRN, Tyrel Anderson II, MD  •  LORazepam (ATIVAN) tablet 1 mg, 1 mg, Oral, Q8H PRN **OR** LORazepam (ATIVAN) injection 1 mg, 1 mg, Intramuscular, Q8H PRN, Tyrel Anderson II, MD  •  magnesium hydroxide (MILK OF MAGNESIA) suspension 2400 mg/10mL 10 mL, 10 mL, Oral, Daily PRN, Tyrel Anderson II, MD  •  pantoprazole (PROTONIX) EC tablet 40 mg, 40 mg, Oral, Q AM, Tyrel Anderson II, MD, 40 mg at 09/12/20 0640    Diagnoses/Assessment:     Acute exacerbation of chronic paranoid schizophrenia (CMS/HCC)    Schizophrenia, paranoid type (CMS/HCC)    Acute blood loss anemia    DMII (diabetes mellitus, type 2) (CMS/HCC)    Impression: Worsening paranoia and psychosis.    Treatment Plan:    1) Will continue care for the patient on the behavioral health unit at [facility] to ensure patient safety.  2) Will continue to provide treatment with the unit milieu, activities, therapies and psychopharmacological management.  3) Patient to be placed on or continued on  Q15 minute checks  and Fall precautions.  4) Pertinent medical issues:   --Type 2 diabetes: Cont low carb diet.  Cont glipizide 5mg bid and Metformin 850mg twice daily, will continue to offer sliding scale insulin  -GERD/acute blood loss anemia: Continue Protonix and ferrous sulfate  5) Will order following labs: reviewed  6) Will make the following medication changes:   --Stop haldol and restart  risperal at 2mg qpm dinner.  --Cont lexapro 10 mg daily   7) Will continue discharge planning as appropriate for patient.    Treatment plan and medication risks and benefits discussed with: Patient and Treatment Team    Ellie Raza MD  09/12/20 at 12:20 CDT

## 2020-09-12 NOTE — PLAN OF CARE
Problem: Patient Care Overview  Goal: Plan of Care Review  Outcome: Ongoing, Progressing  Flowsheets (Taken 9/12/2020 0953)  Progress: improving  Plan of Care Reviewed With: patient  Patient Agreement with Plan of Care: agrees  Outcome Summary: Pt. CGA for transfers, independent with bed mobility, amb. 400' with HHA with constant v.c.'s for direction due to legally blind. Cont. to mobilize

## 2020-09-12 NOTE — NURSING NOTE
"Behavior   Note any precipitants to event or behavior   Describe level and action of any aggressive behavior or speech and associated interventions.     Anxiety: Excess Worry and Restless/Edgy  Depression: difficulty concentrating  Pain  0  AVH   yes   S/I   no  Plan  no  H/I   no  Plan  no    Affect   increased in intensity      Note: Patient sitting in bed at time of assessment. Patient appears irritable and is raising voice stating \"give me the phone, I'm calling the  right now these people wont quit talking and let me rest\", while pointing across the room to a non existing person. Patient does not elaborate on what the voices are saying, but repeatedly states \"Im tired of it make it stop, the  is going to come\". Patient calmed down and nurse was able to assist her to bathroom and change brief. Patient took scheduled HS med without difficulty. Repositioned in bed and will continue to monitor.       Intervention    PRN medication utilized:  no    Instructed in medication usage and effects  Medications administered as ordered  Encouraged to verbalize needs      Response    Verbalized understanding   Did patient take medications as ordered yes   Did patient interact with assessment?  yes     Plan    Will monitor for safety  Will monitor every 15 minutes as ordered  Will evaluate and promote the plan of care    Last BM:  unknown date  (Please chart in I/O as well)  "

## 2020-09-12 NOTE — THERAPY TREATMENT NOTE
Acute Care - Physical Therapy Treatment Note  [facility]     Patient Name: Natacha Gandhi  : 1950  MRN: [id number]  Today's Date: 2020   Onset of Illness/Injury or Date of Surgery: 20       PT Assessment (last 12 hours)      PT Evaluation and Treatment     Row Name 20 0953          Physical Therapy Time and Intention    Subjective Information  no complaints  -     Document Type  therapy note (daily note)  -     Mode of Treatment  physical therapy;individual therapy  -     Patient Effort  good  -     Row Name 20 0953          General Information    Existing Precautions/Restrictions  fall legally blind   -     Row Name 2053          Cognition    Orientation Status (Cognition)  oriented to;person  -     Follows Commands (Cognition)  initiation impaired;physical/tactile prompts required;repetition of directions required;verbal cues/prompting required  -[facility] Name 20 09          Pain Scale: Numbers Pre/Post-Treatment    Pretreatment Pain Rating  0/10 - no pain  -     Posttreatment Pain Rating  0/10 - no pain  -[facility] Name 2053          Bed Mobility    Scooting/Bridging [place] (Bed Mobility)  independent  -     Supine-Sit [place] (Bed Mobility)  independent  -     Sit-Supine [place] (Bed Mobility)  independent  -     Comment (Bed Mobility)  HOB down no bedrails  -     Row Name 2053          Transfers    Sit-Stand [place] (Transfers)  contact guard  -     Stand-Sit [place] (Transfers)  contact [facility]  -     [place] Level (Toilet Transfer)  contact guard  -     Assistive Device (Toilet Transfer)  other (see comments) A  -     Row Name 2053          Sit-Stand Transfer    Assistive Device (Sit-Stand Transfers)  other (see comments) [facility] Name 2053          Stand-Sit Transfer    Assistive Device (Stand-Sit Transfers)  other (see comments) [facility]  Name 09/12/20 0953          Toilet Transfer    Type (Toilet Transfer)  sit-stand;stand-sit  -JA     Row Name 09/12/20 0953          Gait/Stairs (Locomotion)    [place] Level (Gait)  contact guard  -     Assistive Device (Gait)  other (see comments) HHA  -JA     Distance in Feet (Gait)  400'  -JA     Pattern (Gait)  step-through  -JA     Deviations/Abnormal Patterns (Gait)  base of support, narrow;gait speed decreased;stride length decreased  -JA     Comment (Gait/Stairs)  constant v.c.'s for direction, pt. very guarded with gt.   -JA     Row Name 09/12/20 0953          Vital Signs    Pre Systolic BP Rehab  131  -JA     Pre Treatment Diastolic BP  60  -JA     Pretreatment Heart Rate (beats/min)  93  -JA     Pre Patient Position  Supine  -JA     Intra Patient Position  Standing  -JA     Post Patient Position  Supine  -JA     Row Name 09/12/20 0953          Bed Mobility Goal 1 (PT)    Activity/Assistive Device (Bed Mobility Goal 1, PT)  sit to supine;supine to sit  -JA     [place] Level/Cues Needed (Bed Mobility Goal 1, PT)  independent  -JA     Time Frame (Bed Mobility Goal 1, PT)  3 days  -JA     Progress/Outcomes (Bed Mobility Goal 1, PT)  (S) goal met  -     Row Name 09/12/20 0953          Transfer Goal 1 (PT)    Activity/Assistive Device (Transfer Goal 1, PT)  sit-to-stand/stand-to-sit  -JA     [place] Level/Cues Needed (Transfer Goal 1, PT)  supervision required  -JA     Time Frame (Transfer Goal 1, PT)  3 days  -JA     Progress/Outcome (Transfer Goal 1, PT)  goal not met  -     Row Name 09/12/20 0953          Gait Training Goal 1 (PT)    Activity/Assistive Device (Gait Training Goal 1, PT)  gait (walking locomotion) LRAD PRN  -JA     [place] Level (Gait Training Goal 1, PT)  supervision required  -JA     Distance (Gait Training Goal 1, PT)  100ft or more  -JA     Time Frame (Gait Training Goal 1, PT)  5 days  -JA     Progress/Outcome (Gait Training Goal 1, PT)  goal not met  -     Row  Name 09/12/20 0953          Stairs Goal 1 (PT)    Activity/Assistive Device (Stairs Goal 1, PT)  ascending stairs;descending stairs  -JA     [place] Level/Cues Needed (Stairs Goal 1, PT)  standby assist  -JA     Number of Stairs (Stairs Goal 1, PT)  1 or more  -     Time Frame (Stairs Goal 1, PT)  by discharge  -JA     Progress/Outcome (Stairs Goal 1, PT)  goal not met  -JA       User Key  (r) = Recorded By, (t) = Taken By, (c) = Cosigned By    Initials Name Provider Type    Juan Miguel Horvath, PTA Physical Therapy Assistant        Physical Therapy Education                 Title: PT OT SLP Therapies (In Progress)     Topic: Physical Therapy (In Progress)     Point: Mobility training (Done)     Learning Progress Summary           Patient Acceptance, E, VU by KW at 9/4/2020 1334    Comment: Role of PT, POC, use of gait belt                   Point: Home exercise program (Not Started)     Learner Progress:  Not documented in this visit.          Point: Body mechanics (Not Started)     Learner Progress:  Not documented in this visit.          Point: Precautions (Done)     Learning Progress Summary           Patient Acceptance, E,TB, VU by PRAMOD at 9/9/2020 0424    Acceptance, E, VU by KERRY at 9/4/2020 1334    Comment: Role of PT, POC, use of gait belt                               User Key     Initials Effective Dates Name Provider Type Discipline    PRAMOD 10/17/16 -  Madison Irwin, RN Registered Nurse Nurse    KERRY 08/09/20 -  Melina Neves, MARLINE Physical Therapist PT              PT Recommendation and Plan  Anticipated Discharge Disposition (PT): anticipate therapy at next level of care  Therapy Frequency (PT): other (see comments)(6-11x/wk)  Progress Summary (PT)  Progress Toward Functional Goals (PT): progress toward functional goals as expected  Plan of Care Reviewed With: patient  Progress: improving  Outcome Summary: Pt. CGA for transfers, independent with bed mobility, amb. 400' with HHA with constant v.c.'s  for direction due to legally blind. Cont. to mobilize  Outcome Measures     Row Name 09/12/20 0953 09/11/20 1335 09/11/20 1015       How much help from another person do you currently need...    Turning from your back to your side while in flat bed without using bedrails?  4  -JA  --  4  -JA    Moving from lying on back to sitting on the side of a flat bed without bedrails?  4  -JA  --  4  -JA    Moving to and from a bed to a chair (including a wheelchair)?  3  -JA  --  3  -JA    Standing up from a chair using your arms (e.g., wheelchair, bedside chair)?  3  -JA  --  3  -JA    Climbing 3-5 steps with a railing?  3  -JA  --  3  -JA    To walk in hospital room?  3  -JA  --  3  -JA    AM-PAC 6 Clicks Score (PT)  20  -JA  --  20  -JA       How much help from another is currently needed...    Putting on and taking off regular lower body clothing?  --  4  -TO  --    Bathing (including washing, rinsing, and drying)  --  3  -TO  --    Toileting (which includes using toilet bed pan or urinal)  --  3  -TO  --    Putting on and taking off regular upper body clothing  --  4  -TO  --    Taking care of personal grooming (such as brushing teeth)  --  3  -TO  --    Eating meals  --  4  -TO  --    AM-PAC 6 Clicks Score (OT)  --  21  -TO  --       Functional Assessment    Outcome Measure Options  AM-PAC 6 Clicks Basic Mobility (PT)  -JA  --  AM-PAC 6 Clicks Basic Mobility (PT)  -JA    Row Name 09/10/20 1105 09/10/20 1008          How much help from another person do you currently need...    Turning from your back to your side while in flat bed without using bedrails?  3  -BB  3  -JA     Moving from lying on back to sitting on the side of a flat bed without bedrails?  3  -BB  3  -JA     Moving to and from a bed to a chair (including a wheelchair)?  3  -BB  3  -JA     Standing up from a chair using your arms (e.g., wheelchair, bedside chair)?  3  -BB  3  -JA     Climbing 3-5 steps with a railing?  3  -BB  3  -JA     To walk in hospital  room?  3  -BB  3  -JA     AM-PAC 6 Clicks Score (PT)  18  -BB  18  -JA        Functional Assessment    Outcome Measure Options  --  AM-PAC 6 Clicks Basic Mobility (PT)  -JA       User Key  (r) = Recorded By, (t) = Taken By, (c) = Cosigned By    Initials Name Provider Type    Juan Miguel Horvath PTA Physical Therapy Assistant    Mare Westbrook, GARCIA/L Occupational Therapy Assistant    TO Neri Ramirez GARCIA/L Occupational Therapy Assistant           Time Calculation:   PT Charges     Row Name 09/12/20 1152             Time Calculation    Start Time  0953  -ANNA      Stop Time  1025  -ANNA      Time Calculation (min)  32 min  -ANNA         Time Calculation- PT    Total Timed Code Minutes- PT  32 minute(s)  -ANNA        User Key  (r) = Recorded By, (t) = Taken By, (c) = Cosigned By    Initials Name Provider Type    Juan Miguel Horvath PTA Physical Therapy Assistant        Therapy Charges for Today     Code Description Service Date Service Provider Modifiers Qty    11832641019 HC GAIT TRAINING EA 15 MIN 9/11/2020 Juan Miguel Pryor, PTA GP 1    98033841853 HC PT THERAPEUTIC ACT EA 15 MIN 9/11/2020 Juan Miguel Pryor, PTA GP 1    49853816394 HC PT THER PROC EA 15 MIN 9/11/2020 Juan Miguel Pryor, PTA GP 1    01562309021 HC GAIT TRAINING EA 15 MIN 9/12/2020 Juan Miguel Pryor, PTA GP 1    97780256692 HC PT THERAPEUTIC ACT EA 15 MIN 9/12/2020 Juan Miguel Pryor, PTA GP 1          PT G-Codes  Outcome Measure Options: AM-PAC 6 Clicks Basic Mobility (PT)  AM-PAC 6 Clicks Score (PT): 20  AM-PAC 6 Clicks Score (OT): 21    Juan Miguel Pryor PTA  9/12/2020

## 2020-09-12 NOTE — PLAN OF CARE
Problem: Patient Care Overview  Goal: Plan of Care Review  9/12/2020 0540 by Monisha Jefferson, RN  Outcome: Ongoing, Progressing  Flowsheets  Taken 9/12/2020 0540 by Monisha Jefferson, RN  Progress: improving  Outcome Summary: No behaviors noted this shift. Patient rested well.  Taken 9/11/2020 2144 by Monisha Jefferson, RN  Plan of Care Reviewed With: (RN)   other (see comments)   patient  Taken 9/11/2020 1440 by Jenna Snowden RN  Patient Agreement with Plan of Care: agrees   Goal Outcome Evaluation:  Plan of Care Reviewed With: other (see comments), patient(RN)  Progress: improving  Outcome Summary: No behaviors noted this shift. Patient rested well.

## 2020-09-12 NOTE — PROGRESS NOTES
"Psychiatry Progress Note    9/11/2020    Legal Status: Voluntary    Chief Complaint: psychosis    Subjective:  Patient is a 70 y.o. female who was hospitalized for psychosis.    Patient's paranoia has been improving but she cont to refuse her sliding scale insulin.  She has been compliant with all oral meds including the haldol.    She is able to superficially engage in discussion about placement.  She would like to go home but did not become upset when told that brother recommended that she go to a nursing home.    Blood glucose levels improved over yesterday.    Objective     Vital Signs    Vitals:    09/10/20 0700 09/11/20 0215 09/11/20 0753 09/11/20 2100   BP: 98/52 107/57 101/58 130/61   BP Location: Right arm Right arm Left arm Right arm   Patient Position: Lying Lying Lying Sitting   Pulse: 88 93 88 95   Resp: 18 18 18 18   Temp: 98.2 °F (36.8 °C) 97.5 °F (36.4 °C) 97.2 °F (36.2 °C) 99 °F (37.2 °C)   TempSrc: Tympanic Tympanic Tympanic Tympanic   SpO2: 95% 98% 96% 99%   Weight:       Height:           Physical Exam:   General Appearance: alert, appears stated age and cooperative,  Hygiene:   fair  Gait & Station: needs assistance  Musculoskeletal: No tremors or abnormal involuntary movements    Mental Status Exam:   Cooperation:  Cooperative  Eye Contact:  blind  Psychomotor Behavior:  Appropriate  Mood: \"Fine\"  Affect:  mood-congruent  Speech:  Normal  Thought Process:  some lack of linear thinking  Associations: Circumstantial  Thought Content:     Mood congruent   Suicidal:  None   Homicidal:  None   Hallucinations:  Not demonstrated today   Delusion:  None expressed  Cognitive Functioning:   Consciousness: awake and alert  Reliability:  fair  Insight:  limited  Judgement:  Impaired  Impulse Control:  Impaired    Lab Results:  Lab Results (last 24 hours)     Procedure Component Value Units Date/Time    POC Glucose Once [[id number]]  (Abnormal) Collected:  09/11/20 1929    Specimen:  Blood Updated:  09/11/20 " 1945     Glucose 142 mg/dL     POC Glucose Once [[id number]]  (Abnormal) Collected:  09/11/20 1629    Specimen:  Blood Updated:  09/11/20 1654     Glucose 180 mg/dL     POC Glucose Once [[id number]]  (Abnormal) Collected:  09/11/20 1125    Specimen:  Blood Updated:  09/11/20 1147     Glucose 176 mg/dL     POC Glucose Once [[id number]]  (Normal) Collected:  09/11/20 0620    Specimen:  Blood Updated:  09/11/20 0636     Glucose 83 mg/dL           Radiology Results:  Imaging Results (Last 24 Hours)     ** No results found for the last 24 hours. **          Medicine:   Current Facility-Administered Medications:   •  aluminum-magnesium hydroxide-simethicone (MAALOX MAX) 400-400-40 MG/5ML suspension 15 mL, 15 mL, Oral, Q6H PRN, Tyrel Anderson II, MD  •  cloNIDine (CATAPRES) tablet 0.1 mg, 0.1 mg, Oral, Q6H PRN, Tyrel Anderson II, MD  •  dextrose (D50W) 25 g/ 50mL Intravenous Solution 25 g, 25 g, Intravenous, Q15 Min PRN, Cas Houston APRN  •  dextrose (GLUTOSE) oral gel 15 g, 15 g, Oral, Q15 Min PRN, Cas Houston APRN, 15 g at 09/05/20 0714  •  escitalopram (LEXAPRO) tablet 10 mg, 10 mg, Oral, Daily, Tyrel Anderson II, MD, 10 mg at 09/11/20 0813  •  ferrous sulfate EC tablet 324 mg, 324 mg, Oral, Daily With Breakfast, Tyrel Anderson II, MD, 324 mg at 09/11/20 0813  •  glipizide (GLUCOTROL) tablet 5 mg, 5 mg, Oral, BID AC, 5 mg at 09/11/20 1719 **AND** metFORMIN (GLUCOPHAGE) tablet 850 mg, 850 mg, Oral, BID AC, Ellie Raza MD, 850 mg at 09/11/20 1719  •  glucagon (human recombinant) (GLUCAGEN DIAGNOSTIC) injection 1 mg, 1 mg, Subcutaneous, Q15 Min PRN, Cas Houston APRN  •  haloperidol (HALDOL) tablet 2 mg, 2 mg, Oral, BID, Ellie Raza MD, 2 mg at 09/11/20 2026  •  insulin aspart (novoLOG) injection 0-7 Units, 0-7 Units, Subcutaneous, TID AC, Cas Houston APRN, Stopped at 09/10/20 1227  •  loperamide (IMODIUM) capsule 2 mg, 2 mg,  Oral, Q2H PRN, Tyrel Anderson II, MD  •  LORazepam (ATIVAN) tablet 1 mg, 1 mg, Oral, Q8H PRN **OR** LORazepam (ATIVAN) injection 1 mg, 1 mg, Intramuscular, Q8H PRN, Tyrel Anderson II, MD  •  magnesium hydroxide (MILK OF MAGNESIA) suspension 2400 mg/10mL 10 mL, 10 mL, Oral, Daily PRN, Tyrel Anderson II, MD  •  pantoprazole (PROTONIX) EC tablet 40 mg, 40 mg, Oral, Q AM, Tyrel Anderson II, MD, 40 mg at 09/11/20 0813    Diagnoses/Assessment:     Acute exacerbation of chronic paranoid schizophrenia (CMS/HCC)    Schizophrenia, paranoid type (CMS/HCC)    Acute blood loss anemia    DMII (diabetes mellitus, type 2) (CMS/HCC)      Treatment Plan:    1) Will continue care for the patient on the behavioral health unit at [facility] to ensure patient safety.  2) Will continue to provide treatment with the unit milieu, activities, therapies and psychopharmacological management.  3) Patient to be placed on or continued on  Q15 minute checks  and Fall precautions.  4) Pertinent medical issues:   --Type 2 diabetes: Cont low carb diet.  Cont glipizide 5mg bid and Metformin 850mg twice daily, will continue to offer sliding scale insulin  -GERD/acute blood loss anemia: Continue Protonix and ferrous sulfate  5) Will order following labs: reviewed  6) Will make the following medication changes:   --Cont haldol 2mg bid.  --Cont lexapro 10 mg daily   7) Will continue discharge planning as appropriate for patient.    Treatment plan and medication risks and benefits discussed with: Patient and Treatment Team    Ellie Raza MD  09/11/20 at 22:51

## 2020-09-12 NOTE — PLAN OF CARE
Problem: Patient Care Overview  Goal: Plan of Care Review  Outcome: Ongoing, Progressing  Flowsheets (Taken 9/12/2020 1430)  Progress: improving  Plan of Care Reviewed With: patient  Patient Agreement with Plan of Care: agrees   Goal Outcome Evaluation:  Plan of Care Reviewed With: patient  Progress: improving  Outcome Summary: Pt. CGA for transfers, independent with bed mobility, amb. 400' with HHA with constant v.c.'s for direction due to legally blind. Cont. to mobilize

## 2020-09-13 LAB — GLUCOSE BLDC GLUCOMTR-MCNC: 59 MG/DL (ref 70–130)

## 2020-09-13 PROCEDURE — 97535 SELF CARE MNGMENT TRAINING: CPT

## 2020-09-13 PROCEDURE — 97110 THERAPEUTIC EXERCISES: CPT

## 2020-09-13 PROCEDURE — 82962 GLUCOSE BLOOD TEST: CPT

## 2020-09-13 PROCEDURE — 99232 SBSQ HOSP IP/OBS MODERATE 35: CPT | Performed by: PSYCHIATRY & NEUROLOGY

## 2020-09-13 RX ADMIN — HALOPERIDOL 2 MG: 2 TABLET ORAL at 09:19

## 2020-09-13 RX ADMIN — GLIPIZIDE 5 MG: 5 TABLET ORAL at 09:19

## 2020-09-13 RX ADMIN — METFORMIN HYDROCHLORIDE 850 MG: 850 TABLET ORAL at 09:19

## 2020-09-13 RX ADMIN — METFORMIN HYDROCHLORIDE 850 MG: 850 TABLET ORAL at 17:34

## 2020-09-13 RX ADMIN — FERROUS SULFATE TAB EC 324 MG (65 MG FE EQUIVALENT) 324 MG: 324 (65 FE) TABLET DELAYED RESPONSE at 09:19

## 2020-09-13 RX ADMIN — GLIPIZIDE 5 MG: 5 TABLET ORAL at 17:34

## 2020-09-13 RX ADMIN — ESCITALOPRAM OXALATE 10 MG: 10 TABLET ORAL at 09:19

## 2020-09-13 RX ADMIN — HALOPERIDOL 2 MG: 2 TABLET ORAL at 20:23

## 2020-09-13 RX ADMIN — PANTOPRAZOLE SODIUM 40 MG: 40 TABLET, DELAYED RELEASE ORAL at 05:35

## 2020-09-13 NOTE — PROGRESS NOTES
"Psychiatry Progress Note    9/13/2020    Legal Status: Voluntary    Chief Complaint: psychosis    Subjective:  Patient is a 70 y.o. female who was hospitalized for psychosis.    Patient denies any AVH or paranoia last night or this morning.  Nurse notes do not report any psychosis.  She cont to refuse her sliding scale insulin.  She has been compliant with all oral meds including the risperdal.    She is able to superficially engage in discussion about placement.  She continues to want to go home.    Blood glucose levels stable.    Called and spoke with Bahman Pittman, her brother.  He expresses concerns about her going back home due to her blindness, cognitive deficits, paranoia and medication non-compliance.  She in phone calls with him expressed her frustration with having to take so many medications.  He is concerned that she will go home and stop taking medications.  He is willing to pursue guardianship.    Objective     Vital Signs    Vitals:    09/11/20 2100 09/12/20 0747 09/12/20 2124 09/13/20 0900   BP: 130/61 131/61 113/56 105/55   BP Location: Right arm Right arm Left arm Left arm   Patient Position: Sitting Sitting Lying Sitting   Pulse: 95 92 83 85   Resp: 18 18 18 18   Temp: 99 °F (37.2 °C) 98.3 °F (36.8 °C) 97.3 °F (36.3 °C) 97.4 °F (36.3 °C)   TempSrc: Tympanic Tympanic Tympanic Tympanic   SpO2: 99% 98% 97% 98%   Weight:       Height:           Physical Exam:   General Appearance: alert, appears stated age and cooperative,  Hygiene:   fair  Gait & Station: needs assistance  Musculoskeletal: No tremors or abnormal involuntary movements    Mental Status Exam:   Cooperation:  Cooperative  Eye Contact:  blind  Psychomotor Behavior:  Appropriate  Mood: \"Fine\"  Affect:  mood-congruent  Speech:  Normal  Thought Process:  some lack of linear thinking  Associations: Circumstantial  Thought Content:     Mood congruent   Suicidal:  None   Homicidal:  None   Hallucinations:  Not demonstrated today   Delusion:  " None expressed  Cognitive Functioning:   Consciousness: awake and alert  Reliability:  fair  Insight:  limited  Judgement:  Impaired  Impulse Control:  Impaired    Lab Results:  Lab Results (last 24 hours)     Procedure Component Value Units Date/Time    POC Glucose Once [[id number]]  (Abnormal) Collected: 09/13/20 0554    Specimen: Blood Updated: 09/13/20 0611     Glucose 59 mg/dL     POC Glucose Once [[id number]]  (Abnormal) Collected: 09/12/20 1955    Specimen: Blood Updated: 09/12/20 2007     Glucose 138 mg/dL     POC Glucose Once [[id number]]  (Abnormal) Collected: 09/12/20 1202    Specimen: Blood Updated: 09/12/20 1214     Glucose 184 mg/dL           Radiology Results:  Imaging Results (Last 24 Hours)     ** No results found for the last 24 hours. **          Medicine:   Current Facility-Administered Medications:   •  aluminum-magnesium hydroxide-simethicone (MAALOX MAX) 400-400-40 MG/5ML suspension 15 mL, 15 mL, Oral, Q6H PRN, Tyrel Anderson II, MD  •  cloNIDine (CATAPRES) tablet 0.1 mg, 0.1 mg, Oral, Q6H PRN, Tyrel Anderson II, MD  •  dextrose (D50W) 25 g/ 50mL Intravenous Solution 25 g, 25 g, Intravenous, Q15 Min PRN, Cas Houston APRN  •  dextrose (GLUTOSE) oral gel 15 g, 15 g, Oral, Q15 Min PRN, Cas Houston APRN, 15 g at 09/05/20 0714  •  escitalopram (LEXAPRO) tablet 10 mg, 10 mg, Oral, Daily, Tyrel Anderson II, MD, 10 mg at 09/13/20 0919  •  ferrous sulfate EC tablet 324 mg, 324 mg, Oral, Daily With Breakfast, Tyrel Anderson II, MD, 324 mg at 09/13/20 0919  •  glipizide (GLUCOTROL) tablet 5 mg, 5 mg, Oral, BID AC, 5 mg at 09/13/20 0919 **AND** metFORMIN (GLUCOPHAGE) tablet 850 mg, 850 mg, Oral, BID AC, Ellie Raza MD, 850 mg at 09/13/20 0919  •  glucagon (human recombinant) (GLUCAGEN DIAGNOSTIC) injection 1 mg, 1 mg, Subcutaneous, Q15 Min PRN, Cas Houston, APRN  •  haloperidol (HALDOL) tablet 2 mg, 2 mg, Oral, BID, Luz Marina  MD Ellie, 2 mg at 09/13/20 0919  •  insulin aspart (novoLOG) injection 0-7 Units, 0-7 Units, Subcutaneous, TID ACRosemarie Mark Andrew, APRN, Stopped at 09/10/20 1227  •  loperamide (IMODIUM) capsule 2 mg, 2 mg, Oral, Q2H PRN, Tyrel Anderson II, MD  •  LORazepam (ATIVAN) tablet 1 mg, 1 mg, Oral, Q8H PRN **OR** LORazepam (ATIVAN) injection 1 mg, 1 mg, Intramuscular, Q8H PRN, Tyrel Anderson II, MD  •  magnesium hydroxide (MILK OF MAGNESIA) suspension 2400 mg/10mL 10 mL, 10 mL, Oral, Daily PRN, Tyrel Anderson II, MD  •  pantoprazole (PROTONIX) EC tablet 40 mg, 40 mg, Oral, Q AM, Tyrel Anderson II, MD, 40 mg at 09/13/20 0535    Diagnoses/Assessment:     Acute exacerbation of chronic paranoid schizophrenia (CMS/HCC)    Schizophrenia, paranoid type (CMS/HCC)    Acute blood loss anemia    DMII (diabetes mellitus, type 2) (CMS/[facility])      Treatment Plan:    1) Will continue care for the patient on the behavioral health unit at [facility] to ensure patient safety.  2) Will continue to provide treatment with the unit milieu, activities, therapies and psychopharmacological management.  3) Patient to be placed on or continued on  Q15 minute checks  and Fall precautions.  4) Pertinent medical issues:   --Type 2 diabetes: Cont low carb diet.  Cont glipizide 5mg bid and Metformin 850mg twice daily, will continue to offer sliding scale insulin  -GERD/acute blood loss anemia: Continue Protonix and ferrous sulfate  5) Will order following labs: reviewed  6) Will make the following medication changes:   --Cont risperal at 2mg qpm dinner  --Cont lexapro 10 mg daily   7) Will continue discharge planning as appropriate for patient.    Treatment plan and medication risks and benefits discussed with: Patient and Treatment Team    Family Consultation Note  --Total Time: 15 minutes  --Participants: Patient's brother, myself   --Participation: Active  --Contributions: Contributed  significantly to discussion  --Focus of Encounter: History and treatment planning  --Intervention Type: Supportive and educational  --Notes: I provided reflective listening, supportive therapy, reflection, and allowed them to express emotions (e.g. Frustration, anxiety) in the course of the interaction.  Discussed patient's history, treatment considerations, disposition planning.  See above for further details.  --DX: as above  --Plan: Continue to work on targeting symptoms.  Work with family on disposition planning.   --Reactions: Positive reaction    Ellie Raza MD  09/13/20 at 11:48 CDT

## 2020-09-13 NOTE — NURSING NOTE
Behavior   Note any precipitants to event or behavior   Describe level and action of any aggressive behavior or speech and associated interventions.     Anxiety: Excess Worry and Easily fatigued  Depression: fatigue  Pain  0  AVH   no  S/I   no  Plan  no  H/I   no  Plan  no    Affect   euthymic/normal      Note:Patient sleeping when signee entered room. Patient alert and oriented.  Pt cooperative and compliant with medications.      Intervention    PRN medication utilized:  no    Instructed in medication usage and effects  Medications administered as ordered  Encouraged to verbalize needs      Response    Verbalized understanding   Did patient take medications as ordered yes   Did patient interact with assessment?  yes     Plan    Will monitor for safety  Will monitor every 15 minutes as ordered  Will evaluate and promote the plan of care    Last BM:   (Please chart in I/O as well)

## 2020-09-13 NOTE — PLAN OF CARE
Problem: Patient Care Overview  Goal: Plan of Care Review  Outcome: Ongoing, Progressing  Flowsheets  Taken 9/13/2020 0331  Progress: improving  Plan of Care Reviewed With: patient  Outcome Summary: Patient cooperative and compliant with medications. No fall thus far during shift. Pt educated on importance of changing position frequently to prevent skin breakdown.  Taken 9/12/2020 1921  Plan of Care Reviewed With: patient  Patient Agreement with Plan of Care: agrees   Goal Outcome Evaluation:  Plan of Care Reviewed With: patient  Progress: improving  Outcome Summary: Patient cooperative and compliant with medications. No fall thus far during shift. Pt educated on importance of changing position frequently to prevent skin breakdown.

## 2020-09-13 NOTE — PLAN OF CARE
Goal Outcome Evaluation:  Plan of Care Reviewed With: patient  Progress: improving  Outcome Summary: Patient cooperative and compliant with medications. No fall thus far during shift. Pt educated on importance of changing position frequently to prevent skin breakdown.

## 2020-09-13 NOTE — PLAN OF CARE
Problem: Adult Behavioral Health Plan of Care  Goal: Plan of Care Review  Recent Flowsheet Documentation  Taken 9/13/2020 1550 by Neri Ramirez, JOSE/L  Progress: improving  Plan of Care Reviewed With: patient  Outcome Summary: Pt performed fx'al mobility EOB<>toilet CGA /HHA 2* to visual deficits. Pt SBA for toiletingand doffing/donning brief/pants/socka and morgan hygiene.Pt performed b UE ther ex 1 lb x 2x 1 reps with good tolerance. Pt pleasant and cooperative throughout tx.

## 2020-09-13 NOTE — THERAPY TREATMENT NOTE
Acute Care - Occupational Therapy Treatment Note  [facility]     Patient Name: Natacha Gandhi  : 1950  MRN: [id number]  Today's Date: 2020  Onset of Illness/Injury or Date of Surgery: 20  Date of Referral to OT: 20  Referring Physician: Dr. Anderson    Admit Date: 9/3/2020       ICD-10-CM ICD-9-CM   1. Impaired mobility and activities of daily living  Z74.09 V49.89    Z78.9    2. Impaired functional mobility, balance, gait, and endurance  Z74.09 V49.89     Patient Active Problem List   Diagnosis   • Acute GI bleeding   • Altered mental status, unspecified   • Schizophrenia, paranoid type (CMS/HCC)   • Acute exacerbation of chronic paranoid schizophrenia (CMS/HCC)   • Acute blood loss anemia   • DMII (diabetes mellitus, type 2) (CMS/HCC)     Past Medical History:   Diagnosis Date   • Depression    • Psychiatric illness    • Schizoaffective disorder (CMS/HCC)      Past Surgical History:   Procedure Laterality Date   • COLONOSCOPY N/A 2020    Procedure: COLONOSCOPY;  Surgeon: Ge Gorman MD;  Location: [facility];  Service: Gastroenterology;  Laterality: N/A;   • ENDOSCOPY N/A 2020    Procedure: ESOPHAGOGASTRODUODENOSCOPY;  Surgeon: Ge Gorman MD;  Location: [facility];  Service: Gastroenterology;  Laterality: N/A;          OT ASSESSMENT FLOWSHEET (last 12 hours)      OT Evaluation and Treatment     Row Name 20 1550                   OT Time and Intention    Document Type  therapy note (daily note)  -TO        Mode of Treatment  individual therapy;occupational therapy  -TO        Patient Effort  excellent  -TO           General Information    General Observations of Patient  asleep upon arrival  -TO        Existing Precautions/Restrictions  fall  -TO           Cognition    Orientation Status (Cognition)  oriented x 4  -TO        Follows Commands (Cognition)  WNL  -TO           Pain Scale: Numbers Pre/Post-Treatment    Pretreatment Pain Rating  0/10 - no pain   -TO        Posttreatment Pain Rating  0/10 - no pain  -TO           Bed Mobility    Bed Mobility  bed mobility (all) activities  -TO        All Activities, [place] (Bed Mobility)  independent  -TO           Functional Mobility    Functional Mobility- Ind. Level  contact guard assist HHA 2* to vision  -TO        Functional Mobility-Distance (Feet)  12  -TO        Functional Mobility- Safety Issues  other (see comments) visual deficits  -TO           Transfer Assessment/Treatment    Transfers  toilet transfer  -TO           Transfers    Stand-Sit [place] (Transfers)  independent  -TO        [place] Level (Toilet Transfer)  contact guard HHA  -TO        Assistive Device (Toilet Transfer)  -- VCS for orientation to position in room  -TO           Sit-Stand Transfer    Assistive Device (Sit-Stand Transfers)  other (see comments) HHA  -TO           Stand-Sit Transfer    Assistive Device (Stand-Sit Transfers)  --  -TO           Toilet Transfer    Type (Toilet Transfer)  sit-stand;stand-sit  -TO           Safety Issues, Functional Mobility    Safety Issues Affecting Function (Mobility)  -- visual deficits/unfamilair environment  -TO        Impairments Affecting Function (Mobility)  other (see comments) vision  -TO           Motor Skills    Therapeutic Exercise  shoulder;elbow/forearm;wrist  -TO           Shoulder (Therapeutic Exercise)    Shoulder (Therapeutic Exercise)  --  -TO        Shoulder Strengthening (Therapeutic Exercise)  bilateral;flexion;extension;external rotation;internal rotation;1 lb free weight;10 repetitions;2 sets prot/ret  -TO           Elbow/Forearm (Therapeutic Exercise)    Elbow/Forearm (Therapeutic Exercise)  strengthening exercise  -TO        Elbow/Forearm Strengthening (Therapeutic Exercise)  bilateral;flexion;extension;1 lb free weight;sitting;2 sets;10 repetitions  -TO           Wrist (Therapeutic Exercise)    Wrist (Therapeutic Exercise)  strengthening exercise  -TO        Wrist  Strengthening (Therapeutic Exercise)  bilateral;flexion;1 lb free weight;10 repetitions;2 sets  -TO           Bathing Assessment/Intervention    [place] Level (Bathing)  --  -TO        Position (Bathing)  --  -TO           Upper Body Dressing Assessment/Training    [place] Level (Upper Body Dressing)  --  -TO           Lower Body Dressing Assessment/Training    [place] Level (Lower Body Dressing)  lower body dressing skills;doff;don;pants/bottoms;socks;supervision  -TO           Grooming Assessment/Training    [place] Level (Grooming)  grooming skills;hair care, combing/brushing;supervision  -TO        Position (Grooming)  edge of bed sitting  -TO           Toileting Assessment/Training    [place] Level (Toileting)  standby assist to mariah pants  -TO        Position (Toileting)  other (see comments) toilet in room  -TO           Plan of Care Review    Plan of Care Reviewed With  patient  -TO        Progress  improving  -TO        Outcome Summary  Pt performed fx'al mobility EOB<>toilet CGA /HHA 2* to visual deficits. Pt SBA for toiletingand doffing/donning brief/pants/socka and morgan hygiene.Pt performed b UE ther ex 1 lb x 2x 1 reps with good tolerance. Pt pleasant and cooperative throughout tx.  -TO           Vital Signs    Pretreatment Heart Rate (beats/min)  74  -TO        Posttreatment Heart Rate (beats/min)  79  -TO        Pre SpO2 (%)  97  -TO        O2 Delivery Pre Treatment  room air  -TO        Post SpO2 (%)  97  -TO        O2 Delivery Post Treatment  room air  -TO        Pre Patient Position  Supine  -TO        Intra Patient Position  Sitting  -TO        Post Patient Position  Supine  -TO           OT Goals    Transfer Goal Selection (OT)  transfer, OT goal 1  -TO        Bathing Goal Selection (OT)  bathing, OT goal 1  -TO        Dressing Goal Selection (OT)  dressing, OT goal 1  -TO        Toileting Goal Selection (OT)  toileting, OT goal 1  -TO        Balance Goal Selection (OT)   balance, OT goal 1  -TO           Transfer Goal 1 (OT)    Activity/Assistive Device (Transfer Goal 1, OT)  sit-to-stand/stand-to-sit;bed-to-chair/chair-to-bed;toilet  -TO        [place] Level/Cues Needed (Transfer Goal 1, OT)  standby assist;verbal cues required;tactile cues required;set-up required  -TO        Time Frame (Transfer Goal 1, OT)  long term goal (LTG);by discharge  -TO        Progress/Outcome (Transfer Goal 1, OT)  (S) goal met  -TO           Bathing Goal 1 (OT)    Activity/Device (Bathing Goal 1, OT)  lower body bathing  -TO        [place] Level/Cues Needed (Bathing Goal 1, OT)  verbal cues required;tactile cues required;set-up required;standby assist  -TO        Time Frame (Bathing Goal 1, OT)  long term goal (LTG);by discharge  -TO        Progress/Outcomes (Bathing Goal 1, OT)  (S) goal met  -TO           Dressing Goal 1 (OT)    Activity/Device (Dressing Goal 1, OT)  lower body dressing  -TO        [place]/Cues Needed (Dressing Goal 1, OT)  verbal cues required;tactile cues required;set-up required;standby assist  -TO        Time Frame (Dressing Goal 1, OT)  long term goal (LTG);by discharge  -TO        Progress/Outcome (Dressing Goal 1, OT)  (S) goal met  -TO           Toileting Goal 1 (OT)    Activity/Device (Toileting Goal 1, OT)  toileting skills, all  -TO        [place] Level/Cues Needed (Toileting Goal 1, OT)  standby assist;verbal cues required;tactile cues required;set-up required  -TO        Time Frame (Toileting Goal 1, OT)  long term goal (LTG);by discharge  -TO        Progress/Outcome (Toileting Goal 1, OT)  (S) goal met  -TO           Balance Goal 1 (OT)    Activity/Assistive Device (Balance Goal 1, OT)  standing, dynamic  -TO        [place] Level/Cues Needed (Balance Goal 1, OT)  standby assist  -TO        Time Frame (Balance Goal 1, OT)  long term goal (LTG);by discharge  -TO        Progress/Outcomes (Balance Goal 1, OT)  goal not met  -TO           Therapy  Assessment/Plan (OT)    Therapy Frequency (OT)  other (see comments) 5-7 days per week  -TO           Progress Summary (OT)    Progress Toward Functional Goals (OT)  progress toward functional goals as expected  -TO          User Key  (r) = Recorded By, (t) = Taken By, (c) = Cosigned By    Initials Name Effective Dates    TO Neri Ramirez COTA/L 03/07/18 -          Occupational Therapy Education                 Title: PT OT SLP Therapies (In Progress)     Topic: Occupational Therapy (In Progress)     Point: ADL training (Done)     Description:   Instruct learner(s) on proper safety adaptation and remediation techniques during self care or transfers.   Instruct in proper use of assistive devices.              Learning Progress Summary           Patient Acceptance, E, VU by BB at 9/10/2020 1336    Acceptance, E, VU by BB at 9/9/2020 1101    Acceptance, E, VU by BB at 9/8/2020 1047    Acceptance, E, VU,NR by AS at 9/4/2020 1205    Comment: role of OT, OT POC, ADL training, fall preacutions, bed mobility, transfer training                   Point: Home exercise program (Not Started)     Description:   Instruct learner(s) on appropriate technique for monitoring, assisting and/or progressing therapeutic exercises/activities.              Learner Progress:  Not documented in this visit.          Point: Precautions (Done)     Description:   Instruct learner(s) on prescribed precautions during self-care and functional transfers.              Learning Progress Summary           Patient Acceptance, E,TB, VU by KM at 9/9/2020 0424    Acceptance, E, VU,NR by AS at 9/4/2020 1205    Comment: role of OT, OT POC, ADL training, fall preacutions, bed mobility, transfer training                   Point: Body mechanics (Done)     Description:   Instruct learner(s) on proper positioning and spine alignment during self-care, functional mobility activities and/or exercises.              Learning Progress Summary           Patient  Acceptance, E, VU by BB at 9/8/2020 1047    Acceptance, E, NR by MELECIO at 9/7/2020 1131                               User Key     Initials Effective Dates Name Provider Type Discipline     10/17/16 -  Madison Irwin, RN Registered Nurse Nurse    MELECIO 03/07/18 -  Mare Reed COTA/L Occupational Therapy Assistant OT    AS 07/05/20 -  Unique Garcia, OT Occupational Therapist OT                  OT Recommendation and Plan  Therapy Frequency (OT): other (see comments)(5-7 days per week)  Progress Toward Functional Goals (OT): progress toward functional goals as expected  Plan of Care Review  Plan of Care Reviewed With: patient  Progress: improving  Outcome Summary: Pt performed fx'al mobility EOB<>toilet CGA /HHA 2* to visual deficits. Pt SBA for toiletingand doffing/donning brief/pants/socka and morgan hygiene.Pt performed b UE ther ex 1 lb x 2x 1 reps with good tolerance. Pt pleasant and cooperative throughout tx.  Plan of Care Reviewed With: patient  Outcome Summary: Pt performed fx'al mobility EOB<>toilet CGA /HHA 2* to visual deficits. Pt SBA for toiletingand doffing/donning brief/pants/socka and morgan hygiene.Pt performed b UE ther ex 1 lb x 2x 1 reps with good tolerance. Pt pleasant and cooperative throughout tx.    Outcome Measures     Row Name 09/13/20 1550 09/12/20 0953 09/11/20 1335       How much help from another person do you currently need...    Turning from your back to your side while in flat bed without using bedrails?  --  4  -JA  --    Moving from lying on back to sitting on the side of a flat bed without bedrails?  --  4  -JA  --    Moving to and from a bed to a chair (including a wheelchair)?  --  3  -JA  --    Standing up from a chair using your arms (e.g., wheelchair, bedside chair)?  --  3  -JA  --    Climbing 3-5 steps with a railing?  --  3  -JA  --    To walk in hospital room?  --  3  -JA  --    AM-PAC 6 Clicks Score (PT)  --  20  -JA  --       How much help from another is currently  needed...    Putting on and taking off regular lower body clothing?  4  -TO  --  4  -TO    Bathing (including washing, rinsing, and drying)  4  -TO  --  3  -TO    Toileting (which includes using toilet bed pan or urinal)  4  -TO  --  3  -TO    Putting on and taking off regular upper body clothing  4  -TO  --  4  -TO    Taking care of personal grooming (such as brushing teeth)  4  -TO  --  3  -TO    Eating meals  4  -TO  --  4  -TO    AM-PAC 6 Clicks Score (OT)  24  -TO  --  21  -TO       Functional Assessment    Outcome Measure Options  --  AM-PAC 6 Clicks Basic Mobility (PT)  -  --    Row Name 09/11/20 1015             How much help from another person do you currently need...    Turning from your back to your side while in flat bed without using bedrails?  4  -JA      Moving from lying on back to sitting on the side of a flat bed without bedrails?  4  -JA      Moving to and from a bed to a chair (including a wheelchair)?  3  -JA      Standing up from a chair using your arms (e.g., wheelchair, bedside chair)?  3  -JA      Climbing 3-5 steps with a railing?  3  -JA      To walk in hospital room?  3  -JA      AM-PAC 6 Clicks Score (PT)  20  -JA         Functional Assessment    Outcome Measure Options  AM-[facility] 6 Clicks Basic Mobility (PT)  -        User Key  (r) = Recorded By, (t) = Taken By, (c) = Cosigned By    Initials Name Provider Type    Juan Miguel Horvath PTA Physical Therapy Assistant    TO Neri Ramirez COTA/L Occupational Therapy Assistant          Time Calculation:   Time Calculation- OT     Row Name 09/13/20 1704             Time Calculation- OT    OT Start Time  1550  -TO      OT Stop Time  1645  -TO      OT Time Calculation (min)  55 min  -TO      Total Timed Code Minutes- OT  55 minute(s)  -TO      OT Received On  09/13/20  -TO        User Key  (r) = Recorded By, (t) = Taken By, (c) = Cosigned By    Initials Name Provider Type    TO Neri Ramirez COTA/L Occupational Therapy Assistant         Therapy Charges for Today     Code Description Service Date Service Provider Modifiers Qty    42714546374 HC OT SELF CARE/MGMT/TRAIN EA 15 MIN 9/13/2020 Neri Ramirez, GARCIA/L GO 2    61626296440 HC OT THER PROC EA 15 MIN 9/13/2020 Neri Ramirez, GARCIA/L GO 2               Neri Ramirez GARCIA/WINSTON  9/13/2020

## 2020-09-14 LAB
GLUCOSE BLDC GLUCOMTR-MCNC: 108 MG/DL (ref 70–130)
GLUCOSE BLDC GLUCOMTR-MCNC: 52 MG/DL (ref 70–130)
GLUCOSE BLDC GLUCOMTR-MCNC: 55 MG/DL (ref 70–130)
GLUCOSE BLDC GLUCOMTR-MCNC: 66 MG/DL (ref 70–130)
GLUCOSE BLDC GLUCOMTR-MCNC: 66 MG/DL (ref 70–130)
GLUCOSE BLDC GLUCOMTR-MCNC: 93 MG/DL (ref 70–130)
GLUCOSE BLDC GLUCOMTR-MCNC: 99 MG/DL (ref 70–130)
GLUCOSE BLDC GLUCOMTR-MCNC: 99 MG/DL (ref 70–130)

## 2020-09-14 PROCEDURE — 97150 GROUP THERAPEUTIC PROCEDURES: CPT

## 2020-09-14 PROCEDURE — 97530 THERAPEUTIC ACTIVITIES: CPT

## 2020-09-14 PROCEDURE — 82962 GLUCOSE BLOOD TEST: CPT

## 2020-09-14 PROCEDURE — 97116 GAIT TRAINING THERAPY: CPT

## 2020-09-14 PROCEDURE — 99232 SBSQ HOSP IP/OBS MODERATE 35: CPT | Performed by: PSYCHIATRY & NEUROLOGY

## 2020-09-14 RX ORDER — RISPERIDONE 1 MG/1
2 TABLET ORAL
Status: DISCONTINUED | OUTPATIENT
Start: 2020-09-14 | End: 2020-09-14

## 2020-09-14 RX ORDER — GLIPIZIDE 5 MG/1
2.5 TABLET ORAL
Status: DISCONTINUED | OUTPATIENT
Start: 2020-09-15 | End: 2020-09-15

## 2020-09-14 RX ORDER — RISPERIDONE 1 MG/1
2 TABLET ORAL
Status: DISCONTINUED | OUTPATIENT
Start: 2020-09-15 | End: 2020-09-19

## 2020-09-14 RX ADMIN — GLIPIZIDE 5 MG: 5 TABLET ORAL at 08:25

## 2020-09-14 RX ADMIN — ESCITALOPRAM OXALATE 10 MG: 10 TABLET ORAL at 08:25

## 2020-09-14 RX ADMIN — PANTOPRAZOLE SODIUM 40 MG: 40 TABLET, DELAYED RELEASE ORAL at 05:19

## 2020-09-14 RX ADMIN — HALOPERIDOL 2 MG: 2 TABLET ORAL at 20:47

## 2020-09-14 RX ADMIN — METFORMIN HYDROCHLORIDE 850 MG: 850 TABLET ORAL at 08:26

## 2020-09-14 RX ADMIN — FERROUS SULFATE TAB EC 324 MG (65 MG FE EQUIVALENT) 324 MG: 324 (65 FE) TABLET DELAYED RESPONSE at 08:25

## 2020-09-14 RX ADMIN — GLIPIZIDE 5 MG: 5 TABLET ORAL at 17:09

## 2020-09-14 RX ADMIN — METFORMIN HYDROCHLORIDE 850 MG: 850 TABLET ORAL at 17:09

## 2020-09-14 RX ADMIN — HALOPERIDOL 2 MG: 2 TABLET ORAL at 08:25

## 2020-09-14 NOTE — PLAN OF CARE
Goal Outcome Evaluation:  Plan of Care Reviewed With: patient  Progress: improving  Outcome Summary: Patient cooperative and comliant with medications. Patient denies S/I, H/I and AVH. No falls thus far during shift. Patient alert and oriented.

## 2020-09-14 NOTE — PLAN OF CARE
Problem: Patient Care Overview  Goal: Plan of Care Review  Outcome: Ongoing, Progressing  Flowsheets  Taken 9/14/2020 6488  Plan of Care Reviewed With: patient  Outcome Summary: OT Treatment completed on this date. Pt pleasant and agreeable to therapy - pt sitting EOB eating meal upon entry to room. Bed Mobility: Independent Grooming: Supervision with set-up Ther Ex: Pt completed ther ex while sitting EOB 2 x 12 to improve strenght needed for the completion of ADL's - pt becoming fatigued quickly requiring increased time between sets with increased manual resistance applied. Pt would continue to benefit from continued skilled OT to address functional deficits.  Taken 9/14/2020 0818  Plan of Care Reviewed With: patient

## 2020-09-14 NOTE — NURSING NOTE
"Patient woke up to go to the bathroom and reported sweating and feeling \"weird.\" Patient blood sugar 52. Patient given Cola and pack of peanut butter crackers. Patient rechecked at 04:05 and blood sugar was 66. Patient then given juice and rechecked at 04:36 blood sugar was 55. Patient given Boost drink and rechecked at 05:15 and blood sugar was 93. Patient reports feeling much better.  "

## 2020-09-14 NOTE — PLAN OF CARE
Goal Outcome Evaluation:  Plan of Care Reviewed With: patient  Progress: no change  Outcome Summary: Pt is in bed at this time. She has been reporting auditory halluncinations this shift. She is taking meds as ordered with expressing agitation in taking so many pills.

## 2020-09-14 NOTE — NURSING NOTE
"Pt walking with physical therapy. She complains that she doesn't want to be out in the hallway because people are laughing and making fun of her. Writer tells her that no one is talking about her and she says \"Yes they are. Don't you hear them?. Pt walked once around the unit but asked to sit down. She did eat lunch in the dinning room but did not eat more than a few bites. Will continue to monitor.    "

## 2020-09-14 NOTE — PLAN OF CARE
Problem: Patient Care Overview  Goal: Plan of Care Review  9/14/2020 0314 by Jyotsna Samuel RN  Outcome: Ongoing, Progressing  Flowsheets  Taken 9/14/2020 0314  Progress: improving  Plan of Care Reviewed With: patient  Patient Agreement with Plan of Care: agrees  Taken 9/14/2020 0310  Outcome Summary: Patient cooperative and comliant with medications. Patient denies S/I, H/I and AVH. No falls thus far during shift. Patient alert and oriented.   Goal Outcome Evaluation:  Plan of Care Reviewed With: patient  Progress: improving  Outcome Summary: Patient cooperative and comliant with medications. Patient denies S/I, H/I and AVH. No falls thus far during shift. Patient alert and oriented.

## 2020-09-14 NOTE — THERAPY TREATMENT NOTE
Acute Care - Physical Therapy Treatment Note  [facility]     Patient Name: Natacha Gandhi  : 1950  MRN: [id number]  Today's Date: 2020   Onset of Illness/Injury or Date of Surgery: 20       PT Assessment (last 12 hours)      PT Evaluation and Treatment     Row Name 20 1056          Physical Therapy Time and Intention    Subjective Information  no complaints  -TW     Document Type  therapy note (daily note)  -TW     Mode of Treatment  physical therapy;individual therapy  -TW     Patient Effort  good  -TW     Row Name 20 1056          General Information    Existing Precautions/Restrictions  fall legally blind   -     Row Name 20 1056          Cognition    Orientation Status (Cognition)  oriented to;person  -TW     Follows Commands (Cognition)  initiation impaired;physical/tactile prompts required;repetition of directions required;verbal cues/prompting required  -TW     Personal Safety Interventions  fall prevention program maintained;gait belt;nonskid shoes/slippers when out of bed  -TW     Row Name 20 1056          Pain Scale: Numbers Pre/Post-Treatment    Pretreatment Pain Rating  0/10 - no pain  -TW     Posttreatment Pain Rating  0/10 - no pain  -TW     Row Name 20 1056          Bed Mobility    Scooting/Bridging [place] (Bed Mobility)  independent  -TW     Supine-Sit [place] (Bed Mobility)  independent  -TW     Sit-Supine [place] (Bed Mobility)  independent  -TW     Row Name 20 1056          Transfers    Sit-Stand [place] (Transfers)  contact guard  -TW     Stand-Sit [place] (Transfers)  contact guard  -TW     [place] Level (Toilet Transfer)  contact guard  -TW     Assistive Device (Toilet Transfer)  other (see comments) HHA  -TW     Row Name 20 1056          Sit-Stand Transfer    Assistive Device (Sit-Stand Transfers)  other (see comments) A   -     Row Name 20 1056          Stand-Sit Transfer    Assistive  Device (Stand-Sit Transfers)  other (see comments) HHA  -TW     Row Name 09/14/20 1056          Toilet Transfer    Type (Toilet Transfer)  sit-stand;stand-sit  -TW     Row Name 09/14/20 1056          Gait/Stairs (Locomotion)    Gait/Stairs Locomotion  gait/ambulation independence  -TW     [place] Level (Gait)  contact guard  -TW     Assistive Device (Gait)  other (see comments) HHA  -TW     Distance in Feet (Gait)  444ft  -TW     Pattern (Gait)  step-through  -TW     Deviations/Abnormal Patterns (Gait)  base of support, narrow;gait speed decreased;stride length decreased  -TW     Row Name 09/14/20 1056          Plan of Care Review    Plan of Care Reviewed With  patient  -TW     Progress  improving  -TW     Outcome Summary  Pt cont to show improvment with t/f and gait. Pt t/f sup to sit ind and stood with CGA to go to bathroom. Pt stood with CGA and amb with HHA for 444ft and sat in chair in common area upon Completion of tx. Pt would cont to benefit from therapy upon DC.  -TW     Row Name 09/14/20 1056          Vital Signs    Pretreatment Heart Rate (beats/min)  86  -TW     Posttreatment Heart Rate (beats/min)  82  -TW     Pre SpO2 (%)  97  -TW     O2 Delivery Pre Treatment  room air  -TW     Post SpO2 (%)  98  -TW     Pre Patient Position  Supine  -TW     Intra Patient Position  Standing  -TW     Post Patient Position  Sitting  -TW     Row Name 09/14/20 1056          Bed Mobility Goal 1 (PT)    Activity/Assistive Device (Bed Mobility Goal 1, PT)  sit to supine;supine to sit  -TW     [place] Level/Cues Needed (Bed Mobility Goal 1, PT)  independent  -TW     Time Frame (Bed Mobility Goal 1, PT)  3 days  -TW     Progress/Outcomes (Bed Mobility Goal 1, PT)  (S) goal met  -TW     Row Name 09/14/20 1056          Transfer Goal 1 (PT)    Activity/Assistive Device (Transfer Goal 1, PT)  sit-to-stand/stand-to-sit  -TW     [place] Level/Cues Needed (Transfer Goal 1, PT)  supervision required  -TW     Time Frame  (Transfer Goal 1, PT)  3 days  -TW     Progress/Outcome (Transfer Goal 1, PT)  goal not met  -TW     Row Name 09/14/20 1056          Gait Training Goal 1 (PT)    Activity/Assistive Device (Gait Training Goal 1, PT)  gait (walking locomotion) LRAD PRN  -TW     [place] Level (Gait Training Goal 1, PT)  supervision required  -TW     Distance (Gait Training Goal 1, PT)  100ft or more  -TW     Time Frame (Gait Training Goal 1, PT)  5 days  -TW     Progress/Outcome (Gait Training Goal 1, PT)  goal not met  -TW     Row Name 09/14/20 1056          Stairs Goal 1 (PT)    Activity/Assistive Device (Stairs Goal 1, PT)  ascending stairs;descending stairs  -TW     [place] Level/Cues Needed (Stairs Goal 1, PT)  standby assist  -TW     Number of Stairs (Stairs Goal 1, PT)  1 or more  -TW     Time Frame (Stairs Goal 1, PT)  by discharge  -TW     Progress/Outcome (Stairs Goal 1, PT)  goal not met  -TW     Row Name 09/14/20 1056          Positioning and Restraints    Pre-Treatment Position  in bed  -TW     Post Treatment Position  chair  -TW     In Chair  sitting;patient within staff view  -     Row Name 09/14/20 1056          Therapy Assessment/Plan (PT)    Rehab Potential (PT)  good, to achieve stated therapy goals  -     Row Name 09/14/20 1056          Progress Summary (PT)    Progress Toward Functional Goals (PT)  progress toward functional goals is good  -TW       User Key  (r) = Recorded By, (t) = Taken By, (c) = Cosigned By    Initials Name Provider Type    TW Remigio Mi, PTA Physical Therapy Assistant        Physical Therapy Education                 Title: PT OT SLP Therapies (In Progress)     Topic: Physical Therapy (In Progress)     Point: Mobility training (Done)     Learning Progress Summary           Patient Acceptance, E, VU by KERRY at 9/4/2020 4820    Comment: Role of PT, POC, use of gait belt                   Point: Home exercise program (Not Started)     Learner Progress:  Not documented in this  visit.          Point: Body mechanics (Not Started)     Learner Progress:  Not documented in this visit.          Point: Precautions (Done)     Learning Progress Summary           Patient Acceptance, E,TB, VU by PRAMOD at 9/9/2020 0424    Acceptance, E, VU by KERRY at 9/4/2020 1334    Comment: Role of PT, POC, use of gait belt                               User Key     Initials Effective Dates Name Provider Type Discipline    PRAMDO 10/17/16 -  Madison Irwin RN Registered Nurse Nurse    KERRY 08/09/20 -  Melina Neves, PT Physical Therapist PT              PT Recommendation and Plan  Anticipated Discharge Disposition (PT): home with /7 care, home with home health, skilled nursing facility  Therapy Frequency (PT): other (see comments)(6-11x/wk)  Progress Summary (PT)  Progress Toward Functional Goals (PT): progress toward functional goals is good  Plan of Care Reviewed With: patient  Progress: improving  Outcome Summary: Pt cont to show improvment with t/f and gait. Pt t/f sup to sit ind and stood with CGA to go to bathroom. Pt stood with CGA and amb with HHA for 444ft and sat in chair in common area upon Completion of tx. Pt would cont to benefit from therapy upon DC.  Outcome Measures     Row Name 09/14/20 1056 09/14/20 0818 09/13/20 1550       How much help from another person do you currently need...    Turning from your back to your side while in flat bed without using bedrails?  4  -TW  --  --    Moving from lying on back to sitting on the side of a flat bed without bedrails?  4  -TW  --  --    Moving to and from a bed to a chair (including a wheelchair)?  3  -TW  --  --    Standing up from a chair using your arms (e.g., wheelchair, bedside chair)?  3  -TW  --  --    Climbing 3-5 steps with a railing?  3  -TW  --  --    To walk in hospital room?  3  -TW  --  --    AM-PAC 6 Clicks Score (PT)  20  -TW  --  --       How much help from another is currently needed...    Putting on and taking off regular lower body clothing?   --  4  -  4  -TO    Bathing (including washing, rinsing, and drying)  --  4  -  4  -TO    Toileting (which includes using toilet bed pan or urinal)  --  4  -  4  -TO    Putting on and taking off regular upper body clothing  --  4  -  4  -TO    Taking care of personal grooming (such as brushing teeth)  --  4  -  4  -TO    Eating meals  --  4  -  4  -TO    AM-PAC 6 Clicks Score (OT)  --  24  -  24  -TO       Functional Assessment    Outcome Measure Options  --  AM-[facility] 6 Clicks Daily Activity (OT)  -  --    Row Name 09/12/20 0953             How much help from another person do you currently need...    Turning from your back to your side while in flat bed without using bedrails?  4  -JA      Moving from lying on back to sitting on the side of a flat bed without bedrails?  4  -JA      Moving to and from a bed to a chair (including a wheelchair)?  3  -JA      Standing up from a chair using your arms (e.g., wheelchair, bedside chair)?  3  -JA      Climbing 3-5 steps with a railing?  3  -JA      To walk in hospital room?  3  -JA      AM-[facility] 6 Clicks Score (PT)  20  -         Functional Assessment    Outcome Measure Options  AM-[facility] 6 Clicks Basic Mobility (PT)  -        User Key  (r) = Recorded By, (t) = Taken By, (c) = Cosigned By    Initials Name Provider Type    Juan Miguel Horvath, KEIRA Physical Therapy Assistant    Remigio Herzog, KEIRA Physical Therapy Assistant    Neri Rg COTA/L Occupational Therapy Assistant     Diego Pelaez, OT Occupational Therapist           Time Calculation:   PT Charges     Row Name 09/14/20 1609             Time Calculation    Start Time  1056  -TW      Stop Time  1120  -TW      Time Calculation (min)  24 min  -TW      PT Received On  09/14/20  -TW         Time Calculation- PT    Total Timed Code Minutes- PT  24 minute(s)  -TW        User Key  (r) = Recorded By, (t) = Taken By, (c) = Cosigned By    Initials Name Provider Type    Remigio Herzog  KEIRA MONTERO Physical Therapy Assistant        Therapy Charges for Today     Code Description Service Date Service Provider Modifiers Qty    83482128312 HC PT THERAPEUTIC ACT EA 15 MIN 9/14/2020 Remigio Mi, KEIRA GP 1    74279353416 HC GAIT TRAINING EA 15 MIN 9/14/2020 Remigio Mi, KEIRA GP 1          PT G-Codes  Outcome Measure Options: AM-PAC 6 Clicks Daily Activity (OT)  AM-PAC 6 Clicks Score (PT): 20  AM-PAC 6 Clicks Score (OT): 24    Remigio Mi PTA  9/14/2020

## 2020-09-14 NOTE — PLAN OF CARE
Problem: Adult Behavioral Health Plan of Care  Goal: Plan of Care Review  Recent Flowsheet Documentation  Taken 9/14/2020 1056 by Remigio Mi, PTA  Progress: improving  Plan of Care Reviewed With: patient  Outcome Summary: Pt cont to show improvment with t/f and gait. Pt t/f sup to sit ind and stood with CGA to go to bathroom. Pt stood with CGA and amb with HHA for 444ft and sat in chair in common area upon Completion of tx. Pt would cont to benefit from therapy upon DC.

## 2020-09-14 NOTE — NURSING NOTE
Behavior   Note any precipitants to event or behavior   Describe level and action of any aggressive behavior or speech and associated interventions.     Anxiety: Excess Worry and Easily fatigued  Depression: fatigue  Pain  0  AVH   no  S/I   no  Plan  no  H/I   no  Plan  no    Affect   euthymic/normal      Note: Patient alert and oriented.  Pt pleasant and cooperative. No complaints of pain or discomfort at this time.      Intervention    PRN medication utilized:  no    Instructed in medication usage and effects  Medications administered as ordered  Encouraged to verbalize needs      Response    Verbalized understanding   Did patient take medications as ordered yes   Did patient interact with assessment?  yes     Plan    Will monitor for safety  Will monitor every 15 minutes as ordered  Will evaluate and promote the plan of care    Last BM:   (Please chart in I/O as well)

## 2020-09-14 NOTE — NURSING NOTE
Pt is having difficulty swallowing medications whole. Pt needs large amounts of water to get the pills to go down. Pt is not eating as much due to ability to chew foods. Pt has missing teeth and the teeth present are chipped and broken. New order for speech eval.

## 2020-09-14 NOTE — THERAPY TREATMENT NOTE
Acute Care - Occupational Therapy Treatment Note  [facility]     Patient Name: Natacha Gandhi  : 1950  MRN: [id number]  Today's Date: 2020  Onset of Illness/Injury or Date of Surgery: 20  Date of Referral to OT: 20  Referring Physician: Dr. Anderson    Admit Date: 9/3/2020       ICD-10-CM ICD-9-CM   1. Impaired mobility and activities of daily living  Z74.09 V49.89    Z78.9    2. Impaired functional mobility, balance, gait, and endurance  Z74.09 V49.89     Patient Active Problem List   Diagnosis   • Acute GI bleeding   • Altered mental status, unspecified   • Schizophrenia, paranoid type (CMS/HCC)   • Acute exacerbation of chronic paranoid schizophrenia (CMS/HCC)   • Acute blood loss anemia   • DMII (diabetes mellitus, type 2) (CMS/HCC)     Past Medical History:   Diagnosis Date   • Depression    • Psychiatric illness    • Schizoaffective disorder (CMS/HCC)      Past Surgical History:   Procedure Laterality Date   • COLONOSCOPY N/A 2020    Procedure: COLONOSCOPY;  Surgeon: Ge Gorman MD;  Location: [facility];  Service: Gastroenterology;  Laterality: N/A;   • ENDOSCOPY N/A 2020    Procedure: ESOPHAGOGASTRODUODENOSCOPY;  Surgeon: Ge Gorman MD;  Location: [facility];  Service: Gastroenterology;  Laterality: N/A;          OT ASSESSMENT FLOWSHEET (last 12 hours)      OT Evaluation and Treatment     Row Name 20 0818                   OT Time and Intention    Subjective Information  no complaints  -        Document Type  therapy note (daily note)  -        Mode of Treatment  individual therapy;occupational therapy  -        Patient Effort  good  -           General Information    Patient Profile Reviewed  yes  -        Existing Precautions/Restrictions  fall  -        Limitations/Impairments  visual  -        Risks Reviewed  patient:;LOB;nausea/vomiting;dizziness;increased discomfort;change in vital signs;increased drainage;lines disloged  -         Benefits Reviewed  patient:;improve function;increase independence;increase strength;increase balance;decrease pain;decrease risk of DVT;improve skin integrity;increase knowledge  [facility]        Barriers to Rehab  visual deficit  -           Living Environment    Current Living Arrangements  home/apartment/condo  -        Home Accessibility  stairs to enter home  -        Lives With  alone  -           Home Main Entrance    Number of Stairs, Main Entrance  one  -        Stair Railings, Main Entrance  none  -           Cognition    Orientation Status (Cognition)  oriented x 4  -        Follows Commands (Cognition)  WNL  -        Cognitive Function (Cognitive)  safety deficit  -        Memory Deficit (Cognitive)  moderate deficit  -        Safety Deficit (Cognitive)  moderate deficit  -        Personal Safety Interventions  fall prevention program maintained;gait belt;muscle strengthening facilitated;nonskid shoes/slippers when out of bed;supervised activity  [facility]           Pain Assessment    Additional Documentation  Pain Scale: Numbers Pre/Post-Treatment (Group)  [facility]           Pain Scale: Numbers Pre/Post-Treatment    Pretreatment Pain Rating  0/10 - no pain  [facility]        Posttreatment Pain Rating  0/10 - no pain  [facility]           Bed Mobility    Sit-Supine [place] (Bed Mobility)  independent  -           Motor Skills    Therapeutic Exercise  shoulder;elbow/forearm;wrist  -           Shoulder (Therapeutic Exercise)    Shoulder (Therapeutic Exercise)  strengthening exercise  -        Shoulder Strengthening (Therapeutic Exercise)  bilateral;flexion;scapular stabilization  -           Elbow/Forearm (Therapeutic Exercise)    Elbow/Forearm (Therapeutic Exercise)  strengthening exercise  -        Elbow/Forearm Strengthening (Therapeutic Exercise)  bilateral;flexion;extension  -           Wrist (Therapeutic Exercise)    Wrist (Therapeutic Exercise)  strengthening exercise  -        Wrist  Strengthening (Therapeutic Exercise)  bilateral;flexion;extension  -           Activities of Daily Living    BADL Assessment/Intervention  feeding  -           Self-Feeding Assessment/Training    [place] Level (Feeding)  feeding skills;nonverbal cues (demo/gesture);verbal cues;supervision;set up  -        Position (Self-Feeding)  edge of bed sitting  -           BADL Safety/Performance    Impairments, BADL Safety/Performance  visual/perceptual  -           Plan of Care Review    Plan of Care Reviewed With  patient  -           Vital Signs    Pre Systolic BP Rehab  112  -JH        Pre Treatment Diastolic BP  64  -JH        Post Systolic BP Rehab  129  -JH        Post Treatment Diastolic BP  62  -JH        Pretreatment Heart Rate (beats/min)  84  -JH        Posttreatment Heart Rate (beats/min)  86  -JH        Pre SpO2 (%)  98  -        O2 Delivery Pre Treatment  room air  -        Post SpO2 (%)  98  -        O2 Delivery Post Treatment  room air  -        Pre Patient Position  Sitting  -        Post Patient Position  Sitting  -          User Key  (r) = Recorded By, (t) = Taken By, (c) = Cosigned By    Initials Name Effective Dates     Diego Pelaez OT 09/10/19 -          Occupational Therapy Education                 Title: PT OT SLP Therapies (In Progress)     Topic: Occupational Therapy (In Progress)     Point: ADL training (Done)     Description:   Instruct learner(s) on proper safety adaptation and remediation techniques during self care or transfers.   Instruct in proper use of assistive devices.              Learning Progress Summary           Patient Acceptance, E, VU by BB at 9/10/2020 1336    Acceptance, E, VU by BB at 9/9/2020 1101    Acceptance, E, VU by BB at 9/8/2020 1047    Acceptance, E, VU,NR by AS at 9/4/2020 1205    Comment: role of OT, OT POC, ADL training, fall preacutions, bed mobility, transfer training                   Point: Home exercise program (Not Started)      Description:   Instruct learner(s) on appropriate technique for monitoring, assisting and/or progressing therapeutic exercises/activities.              Learner Progress:  Not documented in this visit.          Point: Precautions (Done)     Description:   Instruct learner(s) on prescribed precautions during self-care and functional transfers.              Learning Progress Summary           Patient Acceptance, E,TB, VU by KM at 9/9/2020 0424    Acceptance, E, VU,NR by AS at 9/4/2020 1205    Comment: role of OT, OT POC, ADL training, fall preacutions, bed mobility, transfer training                   Point: Body mechanics (Done)     Description:   Instruct learner(s) on proper positioning and spine alignment during self-care, functional mobility activities and/or exercises.              Learning Progress Summary           Patient Acceptance, E, VU by BB at 9/8/2020 1047    Acceptance, E, NR by BB at 9/7/2020 1131                               User Key     Initials Effective Dates Name Provider Type Discipline     10/17/16 -  Madison Irwin RN Registered Nurse Nurse    BB 03/07/18 -  Mare Reed COTA/L Occupational Therapy Assistant OT    AS 07/05/20 -  Unique Garcia OT Occupational Therapist OT                  OT Recommendation and Plan     Plan of Care Review  Plan of Care Reviewed With: patient  Outcome Summary: OT Treatment completed on this date. Pt pleasant and agreeable to therapy - pt sitting EOB eating meal upon entry to room. Bed Mobility: Independent Grooming: Supervision with set-up Ther Ex: Pt completed ther ex while sitting EOB 2 x 12 to improve strenght needed for the completion of ADL's - pt becoming fatigued quickly requiring increased time between sets with increased manual resistance applied. Pt would continue to benefit from continued skilled OT to address functional deficits.  Plan of Care Reviewed With: patient  Outcome Summary: OT Treatment completed on this date. Pt pleasant  and agreeable to therapy - pt sitting EOB eating meal upon entry to room. Bed Mobility: Independent Grooming: Supervision with set-up Ther Ex: Pt completed ther ex while sitting EOB 2 x 12 to improve strenght needed for the completion of ADL's - pt becoming fatigued quickly requiring increased time between sets with increased manual resistance applied. Pt would continue to benefit from continued skilled OT to address functional deficits.    Outcome Measures     Row Name 09/14/20 0818 09/13/20 1550 09/12/20 0953       How much help from another person do you currently need...    Turning from your back to your side while in flat bed without using bedrails?  --  --  4  -JA    Moving from lying on back to sitting on the side of a flat bed without bedrails?  --  --  4  -JA    Moving to and from a bed to a chair (including a wheelchair)?  --  --  3  -JA    Standing up from a chair using your arms (e.g., wheelchair, bedside chair)?  --  --  3  -JA    Climbing 3-5 steps with a railing?  --  --  3  -JA    To walk in hospital room?  --  --  3  -JA    AM-PAC 6 Clicks Score (PT)  --  --  20  -JA       How much help from another is currently needed...    Putting on and taking off regular lower body clothing?  4  -  4  -TO  --    Bathing (including washing, rinsing, and drying)  4  -  4  -TO  --    Toileting (which includes using toilet bed pan or urinal)  4  -  4  -TO  --    Putting on and taking off regular upper body clothing  4  -  4  -TO  --    Taking care of personal grooming (such as brushing teeth)  4  -  4  -TO  --    Eating meals  4  -  4  -TO  --    AM-PAC 6 Clicks Score (OT)  24  -  24  -TO  --       Functional Assessment    Outcome Measure Options  AM-PAC 6 Clicks Daily Activity (OT)  -  --  AM-PAC 6 Clicks Basic Mobility (PT)  -      User Key  (r) = Recorded By, (t) = Taken By, (c) = Cosigned By    Initials Name Provider Type    Juan Miguel Horvath PTA Physical Therapy Assistant    KING Ramirez  NARDA Milligan Occupational Therapy Assistant     Diego Pelaez OT Occupational Therapist          Time Calculation:   Time Calculation- OT     Row Name 09/14/20 1458             Time Calculation-     OT Start Time  0818  -      OT Stop Time  0900  -      OT Time Calculation (min)  42 min  -      OT Received On  09/14/20  -        User Key  (r) = Recorded By, (t) = Taken By, (c) = Cosigned By    Initials Name Provider Type     Diego Pelaez OT Occupational Therapist        Therapy Charges for Today     Code Description Service Date Service Provider Modifiers Qty    [id number]  OT THERAPEUTIC ACT EA 15 MIN 9/14/2020 Diego Pelaez OT GO 2    [id number]  OT THER PROC GROUP 9/14/2020 Diego Pelaez OT GO 1               Diego Pelaez OT  9/14/2020

## 2020-09-14 NOTE — NURSING NOTE
"Pt is in bed at the time of assessment. She is alert to self and situation. She is able to make wants and needs known. She participated with ot this am. She says she heard voices last night but states it was not hallucinations. She states she heard people saying \"I'm tired, I'm tired\". She had difficulty swallowing medications this morning. Large pills had to be crushed in pudding. No issues noted with meals. Will ensure Pt eats in the dining room for observation.   "

## 2020-09-15 LAB
GLUCOSE BLDC GLUCOMTR-MCNC: 164 MG/DL (ref 70–130)
GLUCOSE BLDC GLUCOMTR-MCNC: 167 MG/DL (ref 70–130)
GLUCOSE BLDC GLUCOMTR-MCNC: 228 MG/DL (ref 70–130)
GLUCOSE BLDC GLUCOMTR-MCNC: 39 MG/DL (ref 70–130)
GLUCOSE BLDC GLUCOMTR-MCNC: 53 MG/DL (ref 70–130)
GLUCOSE BLDC GLUCOMTR-MCNC: 62 MG/DL (ref 70–130)
GLUCOSE BLDC GLUCOMTR-MCNC: 74 MG/DL (ref 70–130)

## 2020-09-15 PROCEDURE — 97535 SELF CARE MNGMENT TRAINING: CPT

## 2020-09-15 PROCEDURE — 82962 GLUCOSE BLOOD TEST: CPT

## 2020-09-15 PROCEDURE — 97530 THERAPEUTIC ACTIVITIES: CPT

## 2020-09-15 PROCEDURE — 99232 SBSQ HOSP IP/OBS MODERATE 35: CPT | Performed by: PSYCHIATRY & NEUROLOGY

## 2020-09-15 PROCEDURE — 97116 GAIT TRAINING THERAPY: CPT

## 2020-09-15 PROCEDURE — 92610 EVALUATE SWALLOWING FUNCTION: CPT | Performed by: SPEECH-LANGUAGE PATHOLOGIST

## 2020-09-15 RX ADMIN — PANTOPRAZOLE SODIUM 40 MG: 40 TABLET, DELAYED RELEASE ORAL at 13:35

## 2020-09-15 RX ADMIN — RISPERIDONE 2 MG: 1 TABLET ORAL at 17:28

## 2020-09-15 RX ADMIN — FERROUS SULFATE TAB EC 324 MG (65 MG FE EQUIVALENT) 324 MG: 324 (65 FE) TABLET DELAYED RESPONSE at 13:35

## 2020-09-15 RX ADMIN — ESCITALOPRAM OXALATE 10 MG: 10 TABLET ORAL at 13:36

## 2020-09-15 RX ADMIN — DEXTROSE 15 G: 15 GEL ORAL at 05:56

## 2020-09-15 NOTE — PROGRESS NOTES
LCSW attempted to contact brotherBahman, re: guardianship and to advise him that we have letter available. Voicemail was left requesting return call.

## 2020-09-15 NOTE — PLAN OF CARE
Problem: Patient Care Overview  Goal: Plan of Care Review  Recent Flowsheet Documentation  Taken 9/15/2020 1124 by Remigio Mi, PTA  Progress: improving  Outcome Summary: Pt agreeable to therapy. Pt stood and amb in adame with HHA for 400ft using CGA with VCs needed for directing pt due to her blindness. Pt would cont to benefit from therapy upon DC.

## 2020-09-15 NOTE — PLAN OF CARE
Problem: Patient Care Overview  Goal: Plan of Care Review  Outcome: Ongoing, Progressing  Flowsheets  Taken 9/15/2020 1440  Consent Given to Review Plan with: pt perf well with OT cont toward all goals  pt perf adl and standing at sink to wash hands toileting all with cca sba and tf out to nursing station tables to sit  Plan of Care Reviewed With: patient  Patient Agreement with Plan of Care: agrees  Taken 9/15/2020 1023  Progress: improving

## 2020-09-15 NOTE — PLAN OF CARE
Goal Outcome Evaluation:  Plan of Care Reviewed With: patient  Progress: improving  Outcome Summary: No behaviors this shift. Has slept well.

## 2020-09-15 NOTE — NURSING NOTE
"Behavior   Note any precipitants to event or behavior   Describe level and action of any aggressive behavior or speech and associated interventions.     Anxiety: Patient denies at this time  Depression: Patient denies at this time  Pain  0  AVH   denies  S/I   no  Plan  no  H/I   no  Plan  no    Affect   mood-congruent      Note: Patient resting in bed. After tech group patient refused nurse group, states that she is tired and ready to go to sleep. Patient is calm/cooperative and pleasant with staff. Denies AVH at this time, stating \"I only hear yall when you come in here\". Patient took HS med in pudding without difficulty. Snack given to patient, 1 cup of pudding and chocolate milkshake. Blood sugar 66 before snack. Will recheck during the night due to it dropping last night. Assisted patient to bathroom and changed brief. Will continue to monitor.       Intervention    PRN medication utilized:  no    Instructed in medication usage and effects  Medications administered as ordered  Encouraged to verbalize needs      Response    Verbalized understanding   Did patient take medications as ordered yes   Did patient interact with assessment?  yes     Plan    Will monitor for safety  Will monitor every 15 minutes as ordered  Will evaluate and promote the plan of care    Last BM:  unknown date  (Please chart in I/O as well)  "

## 2020-09-15 NOTE — DISCHARGE PLACEMENT REQUEST
"Natacha Gandhi (70 y.o. Female)     Date of Birth Social Security Number Address Home Phone MRN    1950  [facility] [address]  [facility] [address] [phone] [id number]    Hoahaoism Marital Status          None Legally        Admission Date Admission Type Admitting Provider Attending Provider Department, Room/Bed    9/3/20 Urgent Tyrel Anderson II, MD Abubucker, Shabeer, MD [facility] SENIOR PSYCH, 669/1    Discharge Date Discharge Disposition Discharge Destination                       Attending Provider: Ellie Raza MD    Allergies: No Known Allergies    Isolation: None   Infection: None   Code Status: CPR    Ht: 160 cm (63\")   Wt: 55.5 kg (122 lb 6.4 oz)    Admission Cmt: None   Principal Problem: Acute exacerbation of chronic paranoid schizophrenia (CMS/[facility]) [F20.0]                 Active Insurance as of 9/3/2020     Primary Coverage     Payor Plan Insurance Group Employer/Plan Group    MEDICARE MEDICARE A & B      Payor Plan Address Payor Plan Phone Number Payor Plan Fax Number Effective Dates    [address] [phone]  2/1/2015 - None Entered    [facility] [id number]       Subscriber Name Subscriber Birth Date Member ID       NATACHA GANDHI 1950 [id number]           Secondary Coverage     Payor Plan Insurance Group Employer/Plan Group    [place]      Payor Plan Address Payor Plan Phone Number Payor Plan Fax Number Effective Dates    [address] [phone]  7/9/2020 - None Entered    [place] [address]       Subscriber Name Subscriber Birth Date Member ID       NATACHA GANDHI 1950 [id number]                 Emergency Contacts      (Rel.) Home Phone Work Phone Mobile Phone    Connor Gandhi -- -- [phone]    Bahman Pittman (Brother) -- -- [phone]    PRABHJOT DIANA () [phone] -- [phone]    [employer] (Other) [phone] -- [phone]            Emergency Contact " "Information     Name Relation Home Work Mobile    Connor Gandhi    [phone]    Bahman Pittman Brother   [phone]    PRABHJOT DIANA  [phone]    [employer] Other [phone]          Insurance Information                MEDICARE/MEDICARE A & B Phone: [phone]    Subscriber: Natacha Gandhi Subscriber#: [id number]    Group#:  Precert#:         [place] Phone: [phone]    Subscriber: Natacha Gandhi Subscriber#: [id number]    Group#:  Precert#:              History & Physical      Tyrel Anderson II, MD at 09/04/20 1941          Psychiatric & Behavioral Health History & Physical  9/4/2020    --> Source of History: chart review and the patient; staff    --> Chief Complaint: Gross Psychosis and Gross Disorganization      History of Present Illness:  Ms. Natacha Gandhi is a 70 y.o. female with a concurrent neuropsychiatric history notable for schizophrenia.    Seen on the hospitalist service for psychosis by Dr. BOYLE:  Patient is a 70 y.o. female who presents with paranoia. Onset of symptoms was gradual starting several months ago.  Symptoms have been present on an increasingly more frequent basis. Symptoms are associated with anxiety, insomnia and auditory hallucinations and paranoia.  Symptoms are aggravated by medication non-compliance.   Patient's symptoms occur in the context of long history of treatment by [facility] for mental health issues.     She notes that she was brought to the ED ambulance due to \"passing out.\"  Patient was apparently found unresponsive on the floor of her room surrounded by bloody stool.  She was found by her [facility] .  Her brother reports she has been going to [facility] for years and has had a  for years.     Patient notes that people are after her and she hears voices of \"real human beings.\"  Her brother who is present reports that she has been medication " "non-compliant b/c she believes there are people out to kill her.  She is also hearing voices as well.  She endorse the belief that there are individuals who are trying to kill her.  She denies medication compliance.  When asked what medications she is taking, she notes \"diet pills\" and then changes to medications for her diabetes.  Her brother reports that she has been worse in the last 6-7 months.     Nursing reports poor sleep last night with constant responding to auditory hallucinations and paranoia.  She thought there was someone outside her window.      Today, pt is pleasant.  Remains disorganized.  Fewer behaviors overnight.  Benefiting from starting of Risperdal.  Per staff, less yelling, disorganization, though still confused and illogical.        Presents with psychosis. Onset of symptoms was gradual starting some days ago.  Symptoms have been present on an increasingly more frequent basis. Symptoms are associated with agitation, irritability and medical illness.  Symptoms are aggravated by problems with health.   Symptoms improve with Rispderal.  Patient's symptom severity is severe.  Patient's symptoms occur in the context of chronic illness.    Per LEEANN Birmingham's note:  Pt presented to her room, dressed in hospital scrubs, alert and oriented x4. Mood is fair, somewhat suspicious, affect is congruent. Pt makes limited eye contact, reports being legally blind. Pt is pleasant and cooperative, engages fairly well in conversation. Re: reason for admission, pt stated \"they found me passed out at my apartment and brought me in.\" pt reportedly was on the medical floor for several days  due to a GI bleed. When asked what led to her psychiatric admission, pt could not give a logical answer. Per notes, pt has a long hx of psychosis and non compliance with medications. Per family, pt has been followed by [facility] for case management, therapy and med management for many years. Pt does acknowledge her involvement with " "[facility], states that she has someone help her with meals, shopping, and around the house ,as well as med management. Pt denies non compliance with meds, but also only wants to focus on needing medications for her Diabetes. Pt eventually acknowledges that she has been having increased issues at home \"with the voices.\" pt notes that she hears voices \"that other people dont\" and that they have been bothering her with what they say. Pt notes that she does not understand why they have worsened recently. Pt reports most recently hearing them this morning. Pt denies past hospitalizations, denies previous trauma, denies substance abuse hx. Pt is agreeable to tx and willing to engage in individual and group therapy.      Psychiatric Review Of Systems:  -Disorganization, psychosis      Concurrent Psychiatric History:  --Past neuropsychiatric history: psychosis, schizophrenia    --Psychiatric Hospitalizations:   Denies any prior hospitalizations.    --Suicide Attempts:   Denies any prior suicide attempts.    --Firearm Access: denies    --Prior Treatment and Medications Tried:   --She has lexapro 20mg daily and seroquel 50mg qhs on PTA med list and she states that she is taking it.    --History of violence or legal issues:   Reports significant legal charges regarding hx bad checks.  Denies current pending legal charges.    -Abuse/Trauma/Neglect/Exploitation: denied      Substance Use:   --Nicotine: denies   --Caffeine: denies   --EtOH: denies   --THC: denies   --Illicits: denies      Social History:  --> Marriages: once for \"pretty good while.\"  Current Relationships:   Children: one son     Abuse/Trauma: she denies     Education: 11th grade   Occupation: on disability  Living Situation: alone    Social History     Socioeconomic History   • Marital status: Legally      Spouse name: Not on file   • Number of children: Not on file   • Years of education: Not on file   • Highest education level: Not on file " "  Tobacco Use   • Smoking status: Former Smoker   • Smokeless tobacco: Never Used   • Tobacco comment: \" a little bit a long time ago\"   Substance and Sexual Activity   • Alcohol use: Not Currently   • Drug use: Never   • Sexual activity: Not Currently         Family History:  Family History   Problem Relation Age of Onset   • Dementia Mother    • No Known Problems Father    • No Known Problems Sister    • No Known Problems Brother    • No Known Problems Maternal Aunt    • No Known Problems Paternal Aunt    • No Known Problems Maternal Uncle    • No Known Problems Paternal Uncle    • No Known Problems Maternal Grandfather    • No Known Problems Maternal Grandmother    • No Known Problems Paternal Grandfather    • No Known Problems Paternal Grandmother    • No Known Problems Cousin    • No Known Problems Other    • Suicide Attempts Neg Hx    • ADD / ADHD Neg Hx    • Alcohol abuse Neg Hx    • Anxiety disorder Neg Hx    • Bipolar disorder Neg Hx    • Depression Neg Hx    • Drug abuse Neg Hx    • OCD Neg Hx    • Paranoid behavior Neg Hx    • Schizophrenia Neg Hx    • Seizures Neg Hx    • Self-Injurious Behavior  Neg Hx      -->Further details: Family Suicides: denied      Past Medical and Surgical History:  Past Medical History:   Diagnosis Date   • Depression    • Psychiatric illness    • Schizoaffective disorder (CMS/HCC)      --> Seizure Hx: denied  --> Head Injury w/ LOC: denied      Past Surgical History:   Procedure Laterality Date   • COLONOSCOPY N/A 8/29/2020    Procedure: COLONOSCOPY;  Surgeon: Ge Gorman MD;  Location: [facility];  Service: Gastroenterology;  Laterality: N/A;   • ENDOSCOPY N/A 8/28/2020    Procedure: ESOPHAGOGASTRODUODENOSCOPY;  Surgeon: Ge Gorman MD;  Location: [facility];  Service: Gastroenterology;  Laterality: N/A;       Allergies:  Patient has no known allergies.    Medications Prior to Admission   Medication Sig Dispense Refill Last Dose   • Empagliflozin (Jardiance) 10 MG " tablet Take 10 mg by mouth Daily.   9/3/2020 at Unknown time   • escitalopram (LEXAPRO) 20 MG tablet Take 20 mg by mouth Daily.   9/3/2020 at Unknown time   • ferrous sulfate (FerrouSul) 325 (65 FE) MG tablet Take 1 tablet by mouth Daily With Breakfast. 30 tablet 0 9/3/2020 at Unknown time   • glipiZIDE-metFORMIN (METAGLIP) 2.5-500 MG per tablet Take 1 tablet by mouth 2 (Two) Times a Day Before Meals.   9/3/2020 at Unknown time   • pantoprazole (PROTONIX) 40 MG EC tablet Take 1 tablet by mouth Daily. 30 tablet 0 9/3/2020 at Unknown time   • risperiDONE (risperDAL) 1 MG tablet Take 1 tablet by mouth Every Night.   9/3/2020 at Unknown time   • risperiDONE (risperDAL) 1 MG tablet Take 1 tablet by mouth At Night As Needed (severe paranoia or agitation).   Unknown at Unknown time     --> Reviewed; Risperdal started on hospital service      Medical Review Of Systems:  Reviewed review of systems from NP Rosemarie note from today.. Reviewed with additions made:  Constitutional: Negative for appetite change, chills, fatigue and fever.   HENT: Negative for congestion.    Eyes: Negative for visual disturbance.        Reports legal blindness   Respiratory: Negative for cough, chest tightness, shortness of breath and wheezing.    Cardiovascular: Negative for chest pain, palpitations and leg swelling.   Gastrointestinal: Negative for abdominal distention, abdominal pain, blood in stool, diarrhea, nausea and vomiting.   Genitourinary: Negative for difficulty urinating and dysuria.   Musculoskeletal: Negative for arthralgias, back pain, myalgias and neck pain.   Skin: Negative for rash and wound.   Neurological: Negative for dizziness, syncope, weakness, light-headedness, numbness and headaches.   Psychiatric: Psychosis; negative for HI; others as above.         Objective   Objective --    Vital Signs:  Temp:  [98.3 °F (36.8 °C)] 98.3 °F (36.8 °C)  Heart Rate:  [82] 82  Resp:  [18] 18  BP: (120)/(58) 120/58    Physical Exam:  "  -General Appearance:  normal general appearance and in no apparent distress  -Hygiene:  Adequate   -Gait & Station:  Blank multiple: Deferred, in bed  -Musculoskeletal:  No tremors or abnormal involuntary movements and No Cog [place] or Rigidity and No atrophy noted  -Pulm: unlaboured    Mental Status Exam:   --Cooperation:  Cooperative  --Eye Contact:  Non-sustained  --Psychomotor Behavior:  Slow  --Mood:  \"OK\"  --Affect:  blunted  --Speech:  Slow and Soft  --Thought Process:  Dyscognitive, Sluggish and Disorganized  --Associations: Circumstantial  --Themes:  None overt  --Thought Content:     --Mood congruent   --Suicidal:  Denies    --Homicidal:  Denies   --Hallucinations:  Auditory   --Delusion:  Cannot rule out Paranoia  --Cognitive Functioning:   -Consciousness: awake and alert   -Orientation:  Person, Place and Time   -Attention:  Distractible    -Concentration:  Distractible   -Language:  Average based on interaction; Intact   -Vocabulary:  Below Average based on interaction; Word Finding Difficulties   -Short Term Memory: Deficits   -Long Term Memory: Intact   -Fund of Knowledge:  Below Average based on interaction   -Abstraction:  [place]  --Reliability:  fair  --Insight:  Diminished  --Judgment:  Impaired  --Impulse Control:  Impaired      Diagnostic Data:      --> Lab Work  Recent Results (from the past 72 hour(s))   Basic Metabolic Panel    Collection Time: 09/02/20  5:46 AM   Result Value Ref Range    Glucose 147 (H) 65 - 99 mg/dL    BUN 9 8 - 23 mg/dL    Creatinine 0.76 0.57 - 1.00 mg/dL    Sodium 140 136 - 145 mmol/L    Potassium 3.7 3.5 - 5.2 mmol/L    Chloride 106 98 - 107 mmol/L    CO2 24.0 22.0 - 29.0 mmol/L    Calcium 8.3 (L) 8.6 - 10.5 mg/dL    eGFR  African Amer 92 >60 mL/min/1.73    BUN/Creatinine Ratio 11.8 7.0 - 25.0    Anion Gap 10.0 5.0 - 15.0 mmol/L   CBC Auto Differential    Collection Time: 09/02/20  5:46 AM   Result Value Ref Range    WBC 6.23 3.40 - 10.80 10*3/mm3    RBC 2.70 (L) " 3.77 - 5.28 10*6/mm3    Hemoglobin 8.7 (L) 12.0 - 15.9 g/dL    Hematocrit 26.5 (L) 34.0 - 46.6 %    MCV 98.1 (H) 79.0 - 97.0 fL    MCH 32.2 26.6 - 33.0 pg    MCHC 32.8 31.5 - 35.7 g/dL    RDW 15.6 (H) 12.3 - 15.4 %    RDW-SD 52.9 37.0 - 54.0 fl    MPV 11.2 6.0 - 12.0 fL    Platelets 110 (L) 140 - 450 10*3/mm3    Neutrophil % 56.5 42.7 - 76.0 %    Lymphocyte % 34.7 19.6 - 45.3 %    Monocyte % 6.6 5.0 - 12.0 %    Eosinophil % 1.6 0.3 - 6.2 %    Basophil % 0.3 0.0 - 1.5 %    Immature Grans % 0.3 0.0 - 0.5 %    Neutrophils, Absolute 3.52 1.70 - 7.00 10*3/mm3    Lymphocytes, Absolute 2.16 0.70 - 3.10 10*3/mm3    Monocytes, Absolute 0.41 0.10 - 0.90 10*3/mm3    Eosinophils, Absolute 0.10 0.00 - 0.40 10*3/mm3    Basophils, Absolute 0.02 0.00 - 0.20 10*3/mm3    Immature Grans, Absolute 0.02 0.00 - 0.05 10*3/mm3    nRBC 0.0 0.0 - 0.2 /100 WBC   POC Glucose Once    Collection Time: 09/02/20  7:40 AM   Result Value Ref Range    Glucose 132 (H) 70 - 130 mg/dL   POC Glucose Once    Collection Time: 09/02/20 12:22 PM   Result Value Ref Range    Glucose 174 (H) 70 - 130 mg/dL   POC Glucose Once    Collection Time: 09/02/20  4:15 PM   Result Value Ref Range    Glucose 178 (H) 70 - 130 mg/dL   Basic Metabolic Panel    Collection Time: 09/03/20  5:32 AM   Result Value Ref Range    Glucose 135 (H) 65 - 99 mg/dL    BUN 15 8 - 23 mg/dL    Creatinine 0.91 0.57 - 1.00 mg/dL    Sodium 140 136 - 145 mmol/L    Potassium 3.9 3.5 - 5.2 mmol/L    Chloride 107 98 - 107 mmol/L    CO2 26.0 22.0 - 29.0 mmol/L    Calcium 8.5 (L) 8.6 - 10.5 mg/dL    eGFR  African Amer 75 >60 mL/min/1.73    BUN/Creatinine Ratio 16.5 7.0 - 25.0    Anion Gap 7.0 5.0 - 15.0 mmol/L   CBC Auto Differential    Collection Time: 09/03/20  5:32 AM   Result Value Ref Range    WBC 4.17 3.40 - 10.80 10*3/mm3    RBC 2.33 (L) 3.77 - 5.28 10*6/mm3    Hemoglobin 7.5 (L) 12.0 - 15.9 g/dL    Hematocrit 23.0 (L) 34.0 - 46.6 %    MCV 98.7 (H) 79.0 - 97.0 fL    MCH 32.2 26.6 - 33.0 pg     MCHC 32.6 31.5 - 35.7 g/dL    RDW 15.8 (H) 12.3 - 15.4 %    RDW-SD 54.8 (H) 37.0 - 54.0 fl    MPV 11.0 6.0 - 12.0 fL    Platelets 90 (L) 140 - 450 10*3/mm3    Neutrophil % 54.2 42.7 - 76.0 %    Lymphocyte % 35.0 19.6 - 45.3 %    Monocyte % 8.2 5.0 - 12.0 %    Eosinophil % 2.2 0.3 - 6.2 %    Basophil % 0.2 0.0 - 1.5 %    Immature Grans % 0.2 0.0 - 0.5 %    Neutrophils, Absolute 2.26 1.70 - 7.00 10*3/mm3    Lymphocytes, Absolute 1.46 0.70 - 3.10 10*3/mm3    Monocytes, Absolute 0.34 0.10 - 0.90 10*3/mm3    Eosinophils, Absolute 0.09 0.00 - 0.40 10*3/mm3    Basophils, Absolute 0.01 0.00 - 0.20 10*3/mm3    Immature Grans, Absolute 0.01 0.00 - 0.05 10*3/mm3    nRBC 0.0 0.0 - 0.2 /100 WBC   POC Glucose Once    Collection Time: 09/03/20 11:12 AM   Result Value Ref Range    Glucose 182 (H) 70 - 130 mg/dL   POC Glucose Once    Collection Time: 09/03/20  4:23 PM   Result Value Ref Range    Glucose 167 (H) 70 - 130 mg/dL   POC Glucose Once    Collection Time: 09/04/20  4:52 PM   Result Value Ref Range    Glucose 172 (H) 70 - 130 mg/dL       Ct Head Without Contrast    Result Date: 8/28/2020  Narrative: PROCEDURE: CT head without contrast REASON FOR EXAM: Decreased alertness This exam was performed according to our departmental dose-optimization program, which includes automated exposure control, adjustment of the mA and/or kV according to patient size and/or use of iterative reconstruction technique. FINDINGS: Axial computer tomography sequential imaging of the head was performed from the vertex to the base of the skull. .Sagittal and coronal reformation was performed . The skull vault is intact. Paranasal sinuses and bilateral mastoid air cells are well aerated. Cerebral and cerebellar parenchymal are normal. Ventricular system and subarachnoid spaces are normal.     Impression: Normal CT of the head. Electronically signed by:  Chino Lacy MD  8/28/2020 2:31 PM CDT Workstation: [id number]    Mri Brain Without  Contrast    Result Date: 8/30/2020  Narrative: EXAM: MR BRAIN WITHOUT IV CONTRAST COMPARISONS: CT head 8/28/2020 HISTORY: Encephalopathy, GI hemorrhage, TECHNIQUE: Multiplanar, multisequence MR images were obtained of the brain without the use of intravenous contrast. FINDINGS: No intracranial hemorrhage, mass, mass effect, or midline shift. Brain parenchyma is normal in signal and configuration. No restricted diffusion. CSF-containing spaces are symmetric and appropriate in size. Osseous structures are within normal limits. Orbits are unremarkable. Paranasal sinuses and mastoid air cells are clear.     Impression: No acute or significant chronic intracranial process. Electronically signed by:  Alba Moon MD  8/30/2020 10:46 AM CDT Workstation: [id number]      No results found for: GLUF     Lab Results   Component Value Date    HGBA1C 7.80 (H) 08/28/2020       Lab Results   Component Value Date    CHOL 118 07/02/2020    TRIG 73 07/02/2020    HDL 47 07/02/2020    LDL 56 07/02/2020    VLDL 14.6 07/02/2020    LDLHDL 1.20 07/02/2020        TSH   Date Value Ref Range Status   07/02/2020 1.300 0.270 - 4.200 uIU/mL Final       No results found for: ZCKU05KR, EXTGJQVC13, FOLATE    No results found for: IRON, TIBC, FERRITIN      Assessment/Plan   --Patient Strengths: ability for insight, communication skills, supportive family/friends   --Patient Barriers: low self esteem, marital/family conflict      --Diagnostic Impression: Ms. Gandhi  is a 70 y.o. female admitted for gross psychosis, constituting an imminent risk of harm to self, given the severity of her presenting symptoms, necessitating further inpt stabilization and treatment.     Moving forward, will plan to optimize anti d2 therapy to target disorganization and hallucinations.            Assessment:  --  Acute exacerbation of chronic paranoid schizophrenia (CMS/HCC)    Schizophrenia, paranoid type (CMS/HCC)    Acute blood loss anemia    DMII (diabetes  mellitus, type 2) (CMS/[facility])        Treatment Plan:  1) Will admit patient to the behavioral health unit at [facility], geriatric behavioral health unit, as a voluntary admission to ensure patient safety given imminent risk status and need for emergent inpatient stabilization and treatment.    2) Patient will be provided treatment with the unit milieu, activities, therapies and psychopharmacological management.    3) Patient placed on  Q15 minute checks and Fall precautions.    4) Hospitalist consulted for assistance in management of medical comorbidities.    5) Will order following labs:   --RPR, HIV, TSH, Folate, B12, Vitamin D to evaluate for any contributing etiologies.   --Fastling lipids & glucose to establish baseline for any anti-d2 agent therapy    6) Will restart patient on the following psychiatric home meds:   --Risperdal started on hospitalist service.  Cont Lexapro.     7) Will make the following medication changes, and proceed with the following treatment planning:   --Increase Risperdal to 1mg qHS & 0.25mg daily for psychosis  --Cont Lexapro, 10 mg    8) Will begin discharge planning as appropriate for patient.    9) Psychotherapy provided: supportive for < 10 min.     All questions answered for the patient.  Treatment plan and medication risks and benefits discussed with: Patient and tx team.      Estimated Length of Stay: 1 week  Prognosis: fair    Tyrel Anderson II, MD  09/04/20 @ 19:41  Dictated using Dragon.      Electronically signed by Tyrel Anderson II, MD at 09/04/20 2000         Current Facility-Administered Medications   Medication Dose Route Frequency Provider Last Rate Last Dose   • aluminum-magnesium hydroxide-simethicone (MAALOX MAX) 400-400-40 MG/5ML suspension 15 mL  15 mL Oral Q6H PRN Tyrel Anderson II, MD       • cloNIDine (CATAPRES) tablet 0.1 mg  0.1 mg Oral Q6H PRN Tyrel Anderson II, MD       • dextrose (D50W) 25 g/ 50mL Intravenous Solution  25 g  25 g Intravenous Q15 Min PRN Cas Houston APRN       • dextrose (GLUTOSE) oral gel 15 g  15 g Oral Q15 Min PRN Cas Houston APRN   15 g at 09/15/20 0556   • escitalopram (LEXAPRO) tablet 10 mg  10 mg Oral Daily Tyrel Anderson II, MD   10 mg at 09/15/20 1336   • ferrous sulfate EC tablet 324 mg  324 mg Oral Daily With Breakfast Tyrel Anderson II, MD   324 mg at 09/15/20 1335   • glucagon (human recombinant) (GLUCAGEN DIAGNOSTIC) injection 1 mg  1 mg Subcutaneous Q15 Min PRN Cas Houston APRN       • insulin aspart (novoLOG) injection 0-7 Units  0-7 Units Subcutaneous TID AC Cas Houston APRN   Stopped at 09/10/20 1227   • loperamide (IMODIUM) capsule 2 mg  2 mg Oral Q2H PRN Tyrel Anderson II, MD       • magnesium hydroxide (MILK OF MAGNESIA) suspension 2400 mg/10mL 10 mL  10 mL Oral Daily PRN Tyrel Anderson II, MD       • pantoprazole (PROTONIX) EC tablet 40 mg  40 mg Oral Q AM Tyrel Anderson II, MD   40 mg at 09/15/20 1335   • risperiDONE (risperDAL) tablet 2 mg  2 mg Oral Daily With Dinner Ellie Raza MD            Physician Progress Notes (last 72 hours) (Notes from 09/12/20 1443 through 09/15/20 1443)      Ellie Raza MD at 09/15/20 1309          Psychiatry Progress Note    9/15/2020    Legal Status: Voluntary    Chief Complaint: psychosis    Subjective:  Patient is a 70 y.o. female who was hospitalized for psychosis.    Patient denies any AVH or paranoia and nurse did not note any since yesterday night.   She has been med compliant to her oral medications.    Blood sugar was low last night again.  Hospitalist called.  Held the oral hypoglycemics for now.  Changed diet back to consistent carbs.    Objective     Vital Signs    Vitals:    09/14/20 0010 09/14/20 0733 09/14/20 1900 09/15/20 0800   BP: 134/63 114/70 147/78 100/56   BP Location: Left arm Right arm Right arm Right arm   Patient Position: Lying  "Lying Lying Lying   Pulse: 83 94 90 83   Resp: 18 18 18 18   Temp: 98.2 °F (36.8 °C) 98.1 °F (36.7 °C) 98.7 °F (37.1 °C) 98.1 °F (36.7 °C)   TempSrc: Tympanic Tympanic Tympanic Tympanic   SpO2: 97% 96% 97% 98%   Weight:       Height:           Physical Exam:   General Appearance: alert, appears stated age and cooperative,  Hygiene:   fair  Gait & Station: needs assistance  Musculoskeletal: No tremors or abnormal involuntary movements    Mental Status Exam:   Cooperation:  Cooperative  Eye Contact:  blind  Psychomotor Behavior:  Appropriate  Mood: \"Fine\"  Affect:  mood-congruent  Speech:  Normal  Thought Process:  some lack of linear thinking  Associations: Circumstantial  Thought Content:     Mood congruent   Suicidal:  None   Homicidal:  None   Hallucinations:  Not demonstrated today   Delusion:  Unable to demonstrate  Cognitive Functioning:   Consciousness: awake and alert  Reliability:  fair  Insight:  limited  Judgement:  Impaired  Impulse Control:  Impaired    Lab Results:  Lab Results (last 24 hours)     Procedure Component Value Units Date/Time    POC Glucose Once [[id number]]  (Abnormal) Collected: 09/15/20 1121    Specimen: Blood Updated: 09/15/20 1136     Glucose 167 mg/dL     POC Glucose Once [[id number]]  (Abnormal) Collected: 09/15/20 0629    Specimen: Blood Updated: 09/15/20 0641     Glucose 62 mg/dL     POC Glucose Once [[id number]]  (Abnormal) Collected: 09/15/20 0613    Specimen: Blood Updated: 09/15/20 0641     Glucose 53 mg/dL     POC Glucose Once [[id number]]  (Normal) Collected: 09/14/20 2348    Specimen: Blood Updated: 09/15/20 0000     Glucose 74 mg/dL     POC Glucose Once [[id number]]  (Abnormal) Collected: 09/14/20 1930    Specimen: Blood Updated: 09/14/20 1943     Glucose 66 mg/dL     POC Glucose Once [[id number]]  (Normal) Collected: 09/14/20 1651    Specimen: Blood Updated: 09/14/20 1703     Glucose 99 mg/dL           Radiology Results:  Imaging Results (Last 24 Hours)     ** No results found " for the last 24 hours. **          Medicine:   Current Facility-Administered Medications:   •  aluminum-magnesium hydroxide-simethicone (MAALOX MAX) 400-400-40 MG/5ML suspension 15 mL, 15 mL, Oral, Q6H PRN, Tyrel Anderson II, MD  •  cloNIDine (CATAPRES) tablet 0.1 mg, 0.1 mg, Oral, Q6H PRN, Tyrel Anderson II, MD  •  dextrose (D50W) 25 g/ 50mL Intravenous Solution 25 g, 25 g, Intravenous, Q15 Min PRN, Cas Houston APRN  •  dextrose (GLUTOSE) oral gel 15 g, 15 g, Oral, Q15 Min PRN, Cas Houston APRN, 15 g at 09/15/20 0556  •  escitalopram (LEXAPRO) tablet 10 mg, 10 mg, Oral, Daily, Tyrel Anderson II, MD, 10 mg at 09/14/20 0825  •  ferrous sulfate EC tablet 324 mg, 324 mg, Oral, Daily With Breakfast, Tyrel Anderson II, MD, 324 mg at 09/14/20 0825  •  glucagon (human recombinant) (GLUCAGEN DIAGNOSTIC) injection 1 mg, 1 mg, Subcutaneous, Q15 Min PRN, Cas Houston APRN  •  insulin aspart (novoLOG) injection 0-7 Units, 0-7 Units, Subcutaneous, TID AC, Cas Houston APRN, Stopped at 09/10/20 1227  •  loperamide (IMODIUM) capsule 2 mg, 2 mg, Oral, Q2H PRN, Tyrel Anderson II, MD  •  magnesium hydroxide (MILK OF MAGNESIA) suspension 2400 mg/10mL 10 mL, 10 mL, Oral, Daily PRN, Tyrel Anderson II, MD  •  pantoprazole (PROTONIX) EC tablet 40 mg, 40 mg, Oral, Q AM, Tyrel Anderson II, MD, 40 mg at 09/14/20 0519  •  risperiDONE (risperDAL) tablet 2 mg, 2 mg, Oral, Daily With Dinner, Ellie Raza MD    Diagnoses/Assessment:     Acute exacerbation of chronic paranoid schizophrenia (CMS/HCC)    Schizophrenia, paranoid type (CMS/[facility])    Acute blood loss anemia    DMII (diabetes mellitus, type 2) (CMS/[facility])      Treatment Plan:    1) Will continue care for the patient on the behavioral health unit at [facility] to ensure patient safety.  2) Will continue to provide treatment with the unit milieu, activities, therapies  and psychopharmacological management.  3) Patient to be placed on or continued on  Q15 minute checks  and Fall precautions.  4) Pertinent medical issues:   --Type 2 diabetes: Consistent carb diet.  Hold glipizide and Metformin, will continue to offer sliding scale insulin as appropriate  -GERD/acute blood loss anemia: Continue Protonix and ferrous sulfate  5) Will order following labs: reviewed  6) Will make the following medication changes:   --Cont risperdal 2mg qpm dinner.  --Cont lexapro 10 mg daily   7) Will continue discharge planning as appropriate for patient.  Wrote guardianship letter for patient's brother.  Plan for SNF placement if emergency guardianship received.    Treatment plan and medication risks and benefits discussed with: Patient and Family    Ellie Raza MD  09/15/20 at 13:09 CDT    Electronically signed by Ellie Raza MD at 09/15/20 1314     Ellie Raza MD at 09/14/20 2709          Psychiatry Progress Note    9/14/2020    Legal Status: Voluntary    Chief Complaint: psychosis    Subjective:  Patient is a 70 y.o. female who was hospitalized for psychosis.    Patient denies any AVH or paranoia but nurse notes indicate that she has been having some AH and some paranoia.  She has been med compliant but has been complaining of having so many medications.     Blood sugar was low last night.    Objective     Vital Signs    Vitals:    09/13/20 0900 09/14/20 0010 09/14/20 0733 09/14/20 1900   BP: 105/55 134/63 114/70 147/78   BP Location: Left arm Left arm Right arm Right arm   Patient Position: Sitting Lying Lying Lying   Pulse: 85 83 94 90   Resp: 18 18 18 18   Temp: 97.4 °F (36.3 °C) 98.2 °F (36.8 °C) 98.1 °F (36.7 °C) 98.7 °F (37.1 °C)   TempSrc: Tympanic Tympanic Tympanic Tympanic   SpO2: 98% 97% 96% 97%   Weight:       Height:           Physical Exam:   General Appearance: alert, appears stated age and cooperative,  Hygiene:   fair  Gait & Station: needs  "assistance  Musculoskeletal: No tremors or abnormal involuntary movements    Mental Status Exam:   Cooperation:  Cooperative  Eye Contact:  blind  Psychomotor Behavior:  Appropriate  Mood: \"Fine\"  Affect:  mood-congruent  Speech:  Normal  Thought Process:  some lack of linear thinking  Associations: Circumstantial  Thought Content:     Mood congruent   Suicidal:  None   Homicidal:  None   Hallucinations:  Auditory per nurse notes   Delusion:  Paranoid per nurse notes  Cognitive Functioning:   Consciousness: awake and alert  Reliability:  fair  Insight:  limited  Judgement:  Impaired  Impulse Control:  Impaired    Lab Results:  Lab Results (last 24 hours)     Procedure Component Value Units Date/Time    POC Glucose Once [[id number]]  (Abnormal) Collected: 09/14/20 1930    Specimen: Blood Updated: 09/14/20 1943     Glucose 66 mg/dL     POC Glucose Once [[id number]]  (Normal) Collected: 09/14/20 1651    Specimen: Blood Updated: 09/14/20 1703     Glucose 99 mg/dL     POC Glucose Once [[id number]]  (Normal) Collected: 09/14/20 1146    Specimen: Blood Updated: 09/14/20 1203     Glucose 108 mg/dL     POC Glucose Once [[id number]]  (Normal) Collected: 09/14/20 0608    Specimen: Blood Updated: 09/14/20 0621     Glucose 99 mg/dL     POC Glucose Once [[id number]]  (Normal) Collected: 09/14/20 0515    Specimen: Blood Updated: 09/14/20 0527     Glucose 93 mg/dL     POC Glucose Once [[id number]]  (Abnormal) Collected: 09/14/20 0436    Specimen: Blood Updated: 09/14/20 0449     Glucose 55 mg/dL     POC Glucose Once [[id number]]  (Abnormal) Collected: 09/14/20 0405    Specimen: Blood Updated: 09/14/20 0417     Glucose 66 mg/dL     POC Glucose Once [[id number]]  (Abnormal) Collected: 09/14/20 0325    Specimen: Blood Updated: 09/14/20 0336     Glucose 52 mg/dL           Radiology Results:  Imaging Results (Last 24 Hours)     ** No results found for the last 24 hours. **          Medicine:   Current Facility-Administered Medications:   •  " aluminum-magnesium hydroxide-simethicone (MAALOX MAX) 400-400-40 MG/5ML suspension 15 mL, 15 mL, Oral, Q6H PRN, Tyrel Anderson II, MD  •  cloNIDine (CATAPRES) tablet 0.1 mg, 0.1 mg, Oral, Q6H PRN, Tyrel Anderson II, MD  •  dextrose (D50W) 25 g/ 50mL Intravenous Solution 25 g, 25 g, Intravenous, Q15 Min PRN, Cas Houston APRN  •  dextrose (GLUTOSE) oral gel 15 g, 15 g, Oral, Q15 Min PRN, Cas Houston APRN, 15 g at 09/05/20 0714  •  escitalopram (LEXAPRO) tablet 10 mg, 10 mg, Oral, Daily, Tyrel Anderson II, MD, 10 mg at 09/14/20 0825  •  ferrous sulfate EC tablet 324 mg, 324 mg, Oral, Daily With Breakfast, Tyrel Anderson II, MD, 324 mg at 09/14/20 0825  •  glipizide (GLUCOTROL) tablet 5 mg, 5 mg, Oral, BID AC, 5 mg at 09/14/20 1709 **AND** metFORMIN (GLUCOPHAGE) tablet 850 mg, 850 mg, Oral, BID AC, Ellie Raza MD, 850 mg at 09/14/20 1709  •  glucagon (human recombinant) (GLUCAGEN DIAGNOSTIC) injection 1 mg, 1 mg, Subcutaneous, Q15 Min PRN, Cas Houston APRN  •  haloperidol (HALDOL) tablet 2 mg, 2 mg, Oral, BID, Ellie Raza MD, 2 mg at 09/14/20 2047  •  insulin aspart (novoLOG) injection 0-7 Units, 0-7 Units, Subcutaneous, TID TINY, Cas Houston APRN, Stopped at 09/10/20 1227  •  loperamide (IMODIUM) capsule 2 mg, 2 mg, Oral, Q2H PRN, Tyrel Anderson II, MD  •  magnesium hydroxide (MILK OF MAGNESIA) suspension 2400 mg/10mL 10 mL, 10 mL, Oral, Daily PRN, Tyrel Anderson II, MD  •  pantoprazole (PROTONIX) EC tablet 40 mg, 40 mg, Oral, Q AM, Tyrel Anderson II, MD, 40 mg at 09/14/20 0519    Diagnoses/Assessment:     Acute exacerbation of chronic paranoid schizophrenia (CMS/HCC)    Schizophrenia, paranoid type (CMS/HCC)    Acute blood loss anemia    DMII (diabetes mellitus, type 2) (CMS/HCC)      Treatment Plan:    1) Will continue care for the patient on the behavioral health unit at [facility]  to ensure patient safety.  2) Will continue to provide treatment with the unit milieu, activities, therapies and psychopharmacological management.  3) Patient to be placed on or continued on  Q15 minute checks  and Fall precautions.  4) Pertinent medical issues:   --Type 2 diabetes: Cont low carb diet.  Decrease glipizide to 2.5mg bid and Metformin 850mg twice daily, will continue to offer sliding scale insulin  -GERD/acute blood loss anemia: Continue Protonix and ferrous sulfate  5) Will order following labs: reviewed  6) Will make the following medication changes:   --Haldol was not changed to risperdal as indicated day before yesterday.  Will change to risperdal 2mg qpm dinner.  --Cont lexapro 10 mg daily   7) Will continue discharge planning as appropriate for patient.  Will write guardianship letter for patient's brother.    Treatment plan and medication risks and benefits discussed with: Patient and Family    Ellie Raza MD  09/14/20 at 22:29 CDT    Electronically signed by Ellie Raza MD at 09/14/20 3557     Ellie Raza MD at 09/13/20 1148          Psychiatry Progress Note    9/13/2020    Legal Status: Voluntary    Chief Complaint: psychosis    Subjective:  Patient is a 70 y.o. female who was hospitalized for psychosis.    Patient denies any AVH or paranoia last night or this morning.  Nurse notes do not report any psychosis.  She cont to refuse her sliding scale insulin.  She has been compliant with all oral meds including the risperdal.    She is able to superficially engage in discussion about placement.  She continues to want to go home.    Blood glucose levels stable.    Called and spoke with Bahman Pittman, her brother.  He expresses concerns about her going back home due to her blindness, cognitive deficits, paranoia and medication non-compliance.  She in phone calls with him expressed her frustration with having to take so many medications.  He is concerned that she will go home  "and stop taking medications.  He is willing to pursue guardianship.    Objective     Vital Signs    Vitals:    09/11/20 2100 09/12/20 0747 09/12/20 2124 09/13/20 0900   BP: 130/61 131/61 113/56 105/55   BP Location: Right arm Right arm Left arm Left arm   Patient Position: Sitting Sitting Lying Sitting   Pulse: 95 92 83 85   Resp: 18 18 18 18   Temp: 99 °F (37.2 °C) 98.3 °F (36.8 °C) 97.3 °F (36.3 °C) 97.4 °F (36.3 °C)   TempSrc: Tympanic Tympanic Tympanic Tympanic   SpO2: 99% 98% 97% 98%   Weight:       Height:           Physical Exam:   General Appearance: alert, appears stated age and cooperative,  Hygiene:   fair  Gait & Station: needs assistance  Musculoskeletal: No tremors or abnormal involuntary movements    Mental Status Exam:   Cooperation:  Cooperative  Eye Contact:  blind  Psychomotor Behavior:  Appropriate  Mood: \"Fine\"  Affect:  mood-congruent  Speech:  Normal  Thought Process:  some lack of linear thinking  Associations: Circumstantial  Thought Content:     Mood congruent   Suicidal:  None   Homicidal:  None   Hallucinations:  Not demonstrated today   Delusion:  None expressed  Cognitive Functioning:   Consciousness: awake and alert  Reliability:  fair  Insight:  limited  Judgement:  Impaired  Impulse Control:  Impaired    Lab Results:  Lab Results (last 24 hours)     Procedure Component Value Units Date/Time    POC Glucose Once [[id number]]  (Abnormal) Collected: 09/13/20 0554    Specimen: Blood Updated: 09/13/20 0611     Glucose 59 mg/dL     POC Glucose Once [[id number]]  (Abnormal) Collected: 09/12/20 1955    Specimen: Blood Updated: 09/12/20 2007     Glucose 138 mg/dL     POC Glucose Once [[id number]]  (Abnormal) Collected: 09/12/20 1202    Specimen: Blood Updated: 09/12/20 1214     Glucose 184 mg/dL           Radiology Results:  Imaging Results (Last 24 Hours)     ** No results found for the last 24 hours. **          Medicine:   Current Facility-Administered Medications:   •  aluminum-magnesium " hydroxide-simethicone (MAALOX MAX) 400-400-40 MG/5ML suspension 15 mL, 15 mL, Oral, Q6H PRN, Tyrel Anderson II, MD  •  cloNIDine (CATAPRES) tablet 0.1 mg, 0.1 mg, Oral, Q6H PRN, Tyrel Anderson II, MD  •  dextrose (D50W) 25 g/ 50mL Intravenous Solution 25 g, 25 g, Intravenous, Q15 Min PRN, Cas Houston APRN  •  dextrose (GLUTOSE) oral gel 15 g, 15 g, Oral, Q15 Min PRN, Cas Houston APRN, 15 g at 09/05/20 0714  •  escitalopram (LEXAPRO) tablet 10 mg, 10 mg, Oral, Daily, Tyrel Anderson II, MD, 10 mg at 09/13/20 0919  •  ferrous sulfate EC tablet 324 mg, 324 mg, Oral, Daily With Breakfast, Tyrel Anderson II, MD, 324 mg at 09/13/20 0919  •  glipizide (GLUCOTROL) tablet 5 mg, 5 mg, Oral, BID AC, 5 mg at 09/13/20 0919 **AND** metFORMIN (GLUCOPHAGE) tablet 850 mg, 850 mg, Oral, BID AC, Ellie Raza MD, 850 mg at 09/13/20 0919  •  glucagon (human recombinant) (GLUCAGEN DIAGNOSTIC) injection 1 mg, 1 mg, Subcutaneous, Q15 Min PRN, Cas Houston APRN  •  haloperidol (HALDOL) tablet 2 mg, 2 mg, Oral, BID, Ellie Raza MD, 2 mg at 09/13/20 0919  •  insulin aspart (novoLOG) injection 0-7 Units, 0-7 Units, Subcutaneous, TID TINY, Cas Houston APRN, Stopped at 09/10/20 1227  •  loperamide (IMODIUM) capsule 2 mg, 2 mg, Oral, Q2H PRN, Tyrel Anderson II, MD  •  LORazepam (ATIVAN) tablet 1 mg, 1 mg, Oral, Q8H PRN **OR** LORazepam (ATIVAN) injection 1 mg, 1 mg, Intramuscular, Q8H PRN, Tyrel Anderson II, MD  •  magnesium hydroxide (MILK OF MAGNESIA) suspension 2400 mg/10mL 10 mL, 10 mL, Oral, Daily PRN, Tyrel Anderson II, MD  •  pantoprazole (PROTONIX) EC tablet 40 mg, 40 mg, Oral, Q AM, Tyrel Anderson II, MD, 40 mg at 09/13/20 0535    Diagnoses/Assessment:     Acute exacerbation of chronic paranoid schizophrenia (CMS/HCC)    Schizophrenia, paranoid type (CMS/HCC)    Acute blood loss anemia    DMII (diabetes mellitus,  type 2) (CMS/[facility])      Treatment Plan:    1) Will continue care for the patient on the behavioral health unit at [facility] to ensure patient safety.  2) Will continue to provide treatment with the unit milieu, activities, therapies and psychopharmacological management.  3) Patient to be placed on or continued on  Q15 minute checks  and Fall precautions.  4) Pertinent medical issues:   --Type 2 diabetes: Cont low carb diet.  Cont glipizide 5mg bid and Metformin 850mg twice daily, will continue to offer sliding scale insulin  -GERD/acute blood loss anemia: Continue Protonix and ferrous sulfate  5) Will order following labs: reviewed  6) Will make the following medication changes:   --Cont risperal at 2mg qpm dinner  --Cont lexapro 10 mg daily   7) Will continue discharge planning as appropriate for patient.    Treatment plan and medication risks and benefits discussed with: Patient and Treatment Team    Family Consultation Note  --Total Time: 15 minutes  --Participants: Patient's brother, myself   --Participation: Active  --Contributions: Contributed significantly to discussion  --Focus of Encounter: History and treatment planning  --Intervention Type: Supportive and educational  --Notes: I provided reflective listening, supportive therapy, reflection, and allowed them to express emotions (e.g. Frustration, anxiety) in the course of the interaction.  Discussed patient's history, treatment considerations, disposition planning.  See above for further details.  --DX: as above  --Plan: Continue to work on targeting symptoms.  Work with family on disposition planning.   --Reactions: Positive reaction    Ellie Raza MD  09/13/20 at 11:48 CDT    Electronically signed by Ellie Raza MD at 09/13/20 1224          Physical Therapy Notes (most recent note)      Remigio Mi, KEIRA at 09/15/20 1330  Version 1 of 1         Problem: Patient Care Overview  Goal: Plan of Care Review  Recent  Flowsheet Documentation  Taken 9/15/2020 1124 by Remigio Mi PTA  Progress: improving  Outcome Summary: Pt agreeable to therapy. Pt stood and amb in adame with HHA for 400ft using CGA with VCs needed for directing pt due to her blindness. Pt would cont to benefit from therapy upon DC.       Electronically signed by Remigio Mi PTA at 09/15/20 1330          Occupational Therapy Notes (most recent note)      Diego Pelaez, OT at 20 1502          Acute Care - Occupational Therapy Treatment Note  [facility]     Patient Name: Natacha Gandhi  : 1950  MRN: [id number]  Today's Date: 2020  Onset of Illness/Injury or Date of Surgery: 20  Date of Referral to OT: 20  Referring Physician: Dr. Anderson    Admit Date: 9/3/2020       ICD-10-CM ICD-9-CM   1. Impaired mobility and activities of daily living  Z74.09 V49.89    Z78.9    2. Impaired functional mobility, balance, gait, and endurance  Z74.09 V49.89     Patient Active Problem List   Diagnosis   • Acute GI bleeding   • Altered mental status, unspecified   • Schizophrenia, paranoid type (CMS/HCC)   • Acute exacerbation of chronic paranoid schizophrenia (CMS/HCC)   • Acute blood loss anemia   • DMII (diabetes mellitus, type 2) (CMS/HCC)     Past Medical History:   Diagnosis Date   • Depression    • Psychiatric illness    • Schizoaffective disorder (CMS/HCC)      Past Surgical History:   Procedure Laterality Date   • COLONOSCOPY N/A 2020    Procedure: COLONOSCOPY;  Surgeon: Ge Gorman MD;  Location: [facility];  Service: Gastroenterology;  Laterality: N/A;   • ENDOSCOPY N/A 2020    Procedure: ESOPHAGOGASTRODUODENOSCOPY;  Surgeon: Ge Gorman MD;  Location: [facility];  Service: Gastroenterology;  Laterality: N/A;          OT ASSESSMENT FLOWSHEET (last 12 hours)      OT Evaluation and Treatment     Row Name 20 0818                   OT Time and Intention    Subjective Information  no complaints  -         Document Type  therapy note (daily note)  -        Mode of Treatment  individual therapy;occupational therapy  -        Patient Effort  good  -           General Information    Patient Profile Reviewed  yes  -        Existing Precautions/Restrictions  fall  -        Limitations/Impairments  visual  -        Risks Reviewed  patient:;LOB;nausea/vomiting;dizziness;increased discomfort;change in vital signs;increased drainage;lines disloged  -        Benefits Reviewed  patient:;improve function;increase independence;increase strength;increase balance;decrease pain;decrease risk of DVT;improve skin integrity;increase knowledge  -        Barriers to Rehab  visual deficit  -           Living Environment    Current Living Arrangements  home/apartment/condo  -        Home Accessibility  stairs to enter home  -        Lives With  alone  -           Home Main Entrance    Number of Stairs, Main Entrance  one  -        Stair Railings, Main Entrance  none  -           Cognition    Orientation Status (Cognition)  oriented x 4  -        Follows Commands (Cognition)  WNL  -        Cognitive Function (Cognitive)  safety deficit  -        Memory Deficit (Cognitive)  moderate deficit  -        Safety Deficit (Cognitive)  moderate deficit  -        Personal Safety Interventions  fall prevention program maintained;gait belt;muscle strengthening facilitated;nonskid shoes/slippers when out of bed;supervised activity  -           Pain Assessment    Additional Documentation  Pain Scale: Numbers Pre/Post-Treatment (Group)  -           Pain Scale: Numbers Pre/Post-Treatment    Pretreatment Pain Rating  0/10 - no pain  -        Posttreatment Pain Rating  0/10 - no pain  -           Bed Mobility    Sit-Supine Venus (Bed Mobility)  independent  -           Motor Skills    Therapeutic Exercise  shoulder;elbow/forearm;wrist  -           Shoulder (Therapeutic Exercise)    Shoulder  (Therapeutic Exercise)  strengthening exercise  -        Shoulder Strengthening (Therapeutic Exercise)  bilateral;flexion;scapular stabilization  -           Elbow/Forearm (Therapeutic Exercise)    Elbow/Forearm (Therapeutic Exercise)  strengthening exercise  -        Elbow/Forearm Strengthening (Therapeutic Exercise)  bilateral;flexion;extension  -           Wrist (Therapeutic Exercise)    Wrist (Therapeutic Exercise)  strengthening exercise  -        Wrist Strengthening (Therapeutic Exercise)  bilateral;flexion;extension  -           Activities of Daily Living    BADL Assessment/Intervention  feeding  -           Self-Feeding Assessment/Training    [place] Level (Feeding)  feeding skills;nonverbal cues (demo/gesture);verbal cues;supervision;set up  -        Position (Self-Feeding)  edge of bed sitting  -           BADL Safety/Performance    Impairments, BADL Safety/Performance  visual/perceptual  -           Plan of Care Review    Plan of Care Reviewed With  patient  -           Vital Signs    Pre Systolic BP Rehab  112  -JH        Pre Treatment Diastolic BP  64  -JH        Post Systolic BP Rehab  129  -JH        Post Treatment Diastolic BP  62  -JH        Pretreatment Heart Rate (beats/min)  84  -JH        Posttreatment Heart Rate (beats/min)  86  -JH        Pre SpO2 (%)  98  -JH        O2 Delivery Pre Treatment  room air  -        Post SpO2 (%)  98  -JH        O2 Delivery Post Treatment  room air  -        Pre Patient Position  Sitting  -        Post Patient Position  Sitting  -          User Key  (r) = Recorded By, (t) = Taken By, (c) = Cosigned By    Initials Name Effective Dates    Diego Washington OT 09/10/19 -          Occupational Therapy Education                 Title: PT OT SLP Therapies (In Progress)     Topic: Occupational Therapy (In Progress)     Point: ADL training (Done)     Description:   Instruct learner(s) on proper safety adaptation and remediation  techniques during self care or transfers.   Instruct in proper use of assistive devices.              Learning Progress Summary           Patient Acceptance, E, VU by BB at 9/10/2020 1336    Acceptance, E, VU by BB at 9/9/2020 1101    Acceptance, E, VU by BB at 9/8/2020 1047    Acceptance, E, VU,NR by AS at 9/4/2020 1205    Comment: role of OT, OT POC, ADL training, fall preacutions, bed mobility, transfer training                   Point: Home exercise program (Not Started)     Description:   Instruct learner(s) on appropriate technique for monitoring, assisting and/or progressing therapeutic exercises/activities.              Learner Progress:  Not documented in this visit.          Point: Precautions (Done)     Description:   Instruct learner(s) on prescribed precautions during self-care and functional transfers.              Learning Progress Summary           Patient Acceptance, E,TB, VU by KM at 9/9/2020 0424    Acceptance, E, VU,NR by AS at 9/4/2020 1205    Comment: role of OT, OT POC, ADL training, fall preacutions, bed mobility, transfer training                   Point: Body mechanics (Done)     Description:   Instruct learner(s) on proper positioning and spine alignment during self-care, functional mobility activities and/or exercises.              Learning Progress Summary           Patient Acceptance, E, VU by BB at 9/8/2020 1047    Acceptance, E, NR by BB at 9/7/2020 1131                               User Key     Initials Effective Dates Name Provider Type Discipline     10/17/16 -  Madison Irwin, RN Registered Nurse Nurse    MELECIO 03/07/18 -  Mare Reed COTA/L Occupational Therapy Assistant OT    AS 07/05/20 -  Unique Garcia, OT Occupational Therapist OT                  OT Recommendation and Plan     Plan of Care Review  Plan of Care Reviewed With: patient  Outcome Summary: OT Treatment completed on this date. Pt pleasant and agreeable to therapy - pt sitting EOB eating meal upon entry  to room. Bed Mobility: Independent Grooming: Supervision with set-up Ther Ex: Pt completed ther ex while sitting EOB 2 x 12 to improve strenght needed for the completion of ADL's - pt becoming fatigued quickly requiring increased time between sets with increased manual resistance applied. Pt would continue to benefit from continued skilled OT to address functional deficits.  Plan of Care Reviewed With: patient  Outcome Summary: OT Treatment completed on this date. Pt pleasant and agreeable to therapy - pt sitting EOB eating meal upon entry to room. Bed Mobility: Independent Grooming: Supervision with set-up Ther Ex: Pt completed ther ex while sitting EOB 2 x 12 to improve strenght needed for the completion of ADL's - pt becoming fatigued quickly requiring increased time between sets with increased manual resistance applied. Pt would continue to benefit from continued skilled OT to address functional deficits.    Outcome Measures     Row Name 09/14/20 0818 09/13/20 1550 09/12/20 0953       How much help from another person do you currently need...    Turning from your back to your side while in flat bed without using bedrails?  --  --  4  -JA    Moving from lying on back to sitting on the side of a flat bed without bedrails?  --  --  4  -JA    Moving to and from a bed to a chair (including a wheelchair)?  --  --  3  -JA    Standing up from a chair using your arms (e.g., wheelchair, bedside chair)?  --  --  3  -JA    Climbing 3-5 steps with a railing?  --  --  3  -JA    To walk in hospital room?  --  --  3  -JA    AM-PAC 6 Clicks Score (PT)  --  --  20  -JA       How much help from another is currently needed...    Putting on and taking off regular lower body clothing?  4  -JH  4  -TO  --    Bathing (including washing, rinsing, and drying)  4  -JH  4  -TO  --    Toileting (which includes using toilet bed pan or urinal)  4  -JH  4  -TO  --    Putting on and taking off regular upper body clothing  4  -JH  4  -TO  --     Taking care of personal grooming (such as brushing teeth)  4  -  4  -TO  --    Eating meals  4  -  4  -TO  --    AM-PAC 6 Clicks Score (OT)  24  -  24  -TO  --       Functional Assessment    Outcome Measure Options  AM-PAC 6 Clicks Daily Activity (OT)  -  --  AM-PAC 6 Clicks Basic Mobility (PT)  -      User Key  (r) = Recorded By, (t) = Taken By, (c) = Cosigned By    Initials Name Provider Type    Juan Miguel Horvath, KEIRA Physical Therapy Assistant    TO Neri Ramirez COTA/L Occupational Therapy Assistant     Diego Pelaez OT Occupational Therapist          Time Calculation:   Time Calculation- OT     Row Name 09/14/20 1458             Time Calculation-     OT Start Time  0818  -      OT Stop Time  0900  -      OT Time Calculation (min)  42 min  -      OT Received On  09/14/20  -        User Key  (r) = Recorded By, (t) = Taken By, (c) = Cosigned By    Initials Name Provider Type     Diego Pelaez OT Occupational Therapist        Therapy Charges for Today     Code Description Service Date Service Provider Modifiers Qty    58589368601  OT THERAPEUTIC ACT EA 15 MIN 9/14/2020 Diego Pelaez OT GO 2    [id number]  OT THER PROC GROUP 9/14/2020 Diego Pelaez OT GO 1               Diego Pelaez OT  9/14/2020    Electronically signed by Diego Pelaez OT at 09/14/20 0040

## 2020-09-15 NOTE — PLAN OF CARE
Problem: Patient Care Overview  Goal: Plan of Care Review  Outcome: Ongoing, Progressing  Flowsheets  Taken 9/15/2020 1540 by Savanna Parikh, CCC-SLP  Consent Given to Review Plan with:   Swallow evaluation, pt with no s/s of aspiration with any solids  or liquids. Pt reports difficulty chewing  regular solids   pt able to tolerate  [facility] soft and regular liquids. SLP educated pt on choosing softer foods and SLP spoke with RN regarding reading pt the menu and making sure she is able to select soft foods. SLP also spoke with RN regarding placing large pills in applesauce/pudding. No tx recommended. Continue mechanical soft and regular liquids.   Patient Agreement with Plan of Care: agrees  Taken 9/15/2020 1440 by Melissa Barroso, JOSE/L  Plan of Care Reviewed With: patient

## 2020-09-15 NOTE — PROGRESS NOTES
"Psychiatry Progress Note    9/14/2020    Legal Status: Voluntary    Chief Complaint: psychosis    Subjective:  Patient is a 70 y.o. female who was hospitalized for psychosis.    Patient denies any AVH or paranoia but nurse notes indicate that she has been having some AH and some paranoia.  She has been med compliant but has been complaining of having so many medications.     Blood sugar was low last night.    Objective     Vital Signs    Vitals:    09/13/20 0900 09/14/20 0010 09/14/20 0733 09/14/20 1900   BP: 105/55 134/63 114/70 147/78   BP Location: Left arm Left arm Right arm Right arm   Patient Position: Sitting Lying Lying Lying   Pulse: 85 83 94 90   Resp: 18 18 18 18   Temp: 97.4 °F (36.3 °C) 98.2 °F (36.8 °C) 98.1 °F (36.7 °C) 98.7 °F (37.1 °C)   TempSrc: Tympanic Tympanic Tympanic Tympanic   SpO2: 98% 97% 96% 97%   Weight:       Height:           Physical Exam:   General Appearance: alert, appears stated age and cooperative,  Hygiene:   fair  Gait & Station: needs assistance  Musculoskeletal: No tremors or abnormal involuntary movements    Mental Status Exam:   Cooperation:  Cooperative  Eye Contact:  blind  Psychomotor Behavior:  Appropriate  Mood: \"Fine\"  Affect:  mood-congruent  Speech:  Normal  Thought Process:  some lack of linear thinking  Associations: Circumstantial  Thought Content:     Mood congruent   Suicidal:  None   Homicidal:  None   Hallucinations:  Auditory per nurse notes   Delusion:  Paranoid per nurse notes  Cognitive Functioning:   Consciousness: awake and alert  Reliability:  fair  Insight:  limited  Judgement:  Impaired  Impulse Control:  Impaired    Lab Results:  Lab Results (last 24 hours)     Procedure Component Value Units Date/Time    POC Glucose Once [[id number]]  (Abnormal) Collected: 09/14/20 1930    Specimen: Blood Updated: 09/14/20 1943     Glucose 66 mg/dL     POC Glucose Once [[id number]]  (Normal) Collected: 09/14/20 1651    Specimen: Blood Updated: 09/14/20 1703     " Glucose 99 mg/dL     POC Glucose Once [[id number]]  (Normal) Collected: 09/14/20 1146    Specimen: Blood Updated: 09/14/20 1203     Glucose 108 mg/dL     POC Glucose Once [[id number]]  (Normal) Collected: 09/14/20 0608    Specimen: Blood Updated: 09/14/20 0621     Glucose 99 mg/dL     POC Glucose Once [[id number]]  (Normal) Collected: 09/14/20 0515    Specimen: Blood Updated: 09/14/20 0527     Glucose 93 mg/dL     POC Glucose Once [[id number]]  (Abnormal) Collected: 09/14/20 0436    Specimen: Blood Updated: 09/14/20 0449     Glucose 55 mg/dL     POC Glucose Once [[id number]]  (Abnormal) Collected: 09/14/20 0405    Specimen: Blood Updated: 09/14/20 0417     Glucose 66 mg/dL     POC Glucose Once [[id number]]  (Abnormal) Collected: 09/14/20 0325    Specimen: Blood Updated: 09/14/20 0336     Glucose 52 mg/dL           Radiology Results:  Imaging Results (Last 24 Hours)     ** No results found for the last 24 hours. **          Medicine:   Current Facility-Administered Medications:   •  aluminum-magnesium hydroxide-simethicone (MAALOX MAX) 400-400-40 MG/5ML suspension 15 mL, 15 mL, Oral, Q6H PRN, Tyrel Anderson II, MD  •  cloNIDine (CATAPRES) tablet 0.1 mg, 0.1 mg, Oral, Q6H PRN, Tyrel Anderson II, MD  •  dextrose (D50W) 25 g/ 50mL Intravenous Solution 25 g, 25 g, Intravenous, Q15 Min PRN, Cas Houston APRN  •  dextrose (GLUTOSE) oral gel 15 g, 15 g, Oral, Q15 Min PRN, Cas Houston APRN, 15 g at 09/05/20 0714  •  escitalopram (LEXAPRO) tablet 10 mg, 10 mg, Oral, Daily, Tyrel Anderson II, MD, 10 mg at 09/14/20 0825  •  ferrous sulfate EC tablet 324 mg, 324 mg, Oral, Daily With Breakfast, Tyrel Anderson II, MD, 324 mg at 09/14/20 0825  •  glipizide (GLUCOTROL) tablet 5 mg, 5 mg, Oral, BID AC, 5 mg at 09/14/20 1709 **AND** metFORMIN (GLUCOPHAGE) tablet 850 mg, 850 mg, Oral, BID AC, Ellie Raza MD, 850 mg at 09/14/20 1709  •  glucagon (human recombinant) (GLUCAGEN  DIAGNOSTIC) injection 1 mg, 1 mg, Subcutaneous, Q15 Min PRN, Cas Houston APRN  •  haloperidol (HALDOL) tablet 2 mg, 2 mg, Oral, BID, Ellie Raza MD, 2 mg at 09/14/20 2047  •  insulin aspart (novoLOG) injection 0-7 Units, 0-7 Units, Subcutaneous, TID AC, Cas Houston APRN, Stopped at 09/10/20 1227  •  loperamide (IMODIUM) capsule 2 mg, 2 mg, Oral, Q2H PRN, Tyrel Anderson II, MD  •  magnesium hydroxide (MILK OF MAGNESIA) suspension 2400 mg/10mL 10 mL, 10 mL, Oral, Daily PRN, Tyrel Anderson II, MD  •  pantoprazole (PROTONIX) EC tablet 40 mg, 40 mg, Oral, Q AM, Tyrel Anderson II, MD, 40 mg at 09/14/20 0519    Diagnoses/Assessment:     Acute exacerbation of chronic paranoid schizophrenia (CMS/HCC)    Schizophrenia, paranoid type (CMS/HCC)    Acute blood loss anemia    DMII (diabetes mellitus, type 2) (CMS/HCC)      Treatment Plan:    1) Will continue care for the patient on the behavioral health unit at [facility] to ensure patient safety.  2) Will continue to provide treatment with the unit milieu, activities, therapies and psychopharmacological management.  3) Patient to be placed on or continued on  Q15 minute checks  and Fall precautions.  4) Pertinent medical issues:   --Type 2 diabetes: Cont low carb diet.  Decrease glipizide to 2.5mg bid and Metformin 850mg twice daily, will continue to offer sliding scale insulin  -GERD/acute blood loss anemia: Continue Protonix and ferrous sulfate  5) Will order following labs: reviewed  6) Will make the following medication changes:   --Haldol was not changed to risperdal as indicated day before yesterday.  Will change to risperdal 2mg qpm dinner.  --Cont lexapro 10 mg daily   7) Will continue discharge planning as appropriate for patient.  Will write guardianship letter for patient's brother.    Treatment plan and medication risks and benefits discussed with: Patient and Family    Ellie Raza  MD  09/14/20 at 22:29 CDT

## 2020-09-15 NOTE — THERAPY TREATMENT NOTE
Acute Care - Physical Therapy Treatment Note  [facility]     Patient Name: Natacha Gandhi  : 1950  MRN: [id number]  Today's Date: 9/15/2020   Onset of Illness/Injury or Date of Surgery: 20       PT Assessment (last 12 hours)      PT Evaluation and Treatment     Row Name 09/15/20 1124          Physical Therapy Time and Intention    Subjective Information  no complaints  -TW     Document Type  therapy note (daily note)  -TW     Mode of Treatment  physical therapy;individual therapy  -TW     Patient Effort  good  -TW     Row Name 09/15/20 1124          General Information    Patient/Family/Caregiver Comments/Observations  Pt sitting in chair in common area.   -TW     Existing Precautions/Restrictions  fall legally blind   -TW     Row Name 09/15/20 1124          Cognition    Orientation Status (Cognition)  oriented to;person  -TW     Follows Commands (Cognition)  initiation impaired;physical/tactile prompts required;repetition of directions required;verbal cues/prompting required  -TW     Row Name 09/15/20 1124          Pain Scale: Numbers Pre/Post-Treatment    Pretreatment Pain Rating  0/10 - no pain  -TW     Posttreatment Pain Rating  0/10 - no pain  -TW     Row Name 09/15/20 1124          Bed Mobility    All Activities, [place] (Bed Mobility)  not tested  -TW     Scooting/Bridging [place] (Bed Mobility)  --  -TW     Supine-Sit [place] (Bed Mobility)  --  -TW     Sit-Supine [place] (Bed Mobility)  --  -TW     Comment (Bed Mobility)  Pt up in chair and returned to chair at completion of tx.  -TW     Row Name 09/15/20 1124          Transfers    Sit-Stand [place] (Transfers)  contact guard  -TW     Stand-Sit [place] (Transfers)  contact guard  -TW     [place] Level (Toilet Transfer)  contact guard  -TW     Assistive Device (Toilet Transfer)  other (see comments) HHA  -TW     Row Name 09/15/20 1124          Sit-Stand Transfer    Assistive Device (Sit-Stand Transfers)   other (see comments) HHA   -TW     Row Name 09/15/20 1124          Stand-Sit Transfer    Assistive Device (Stand-Sit Transfers)  other (see comments) HHA  -TW     Row Name 09/15/20 1124          Toilet Transfer    Type (Toilet Transfer)  sit-stand;stand-sit  -TW     Row Name 09/15/20 1124          Gait/Stairs (Locomotion)    Gait/Stairs Locomotion  gait/ambulation independence  -TW     [place] Level (Gait)  contact guard  -TW     Assistive Device (Gait)  other (see comments) HHA  -TW     Distance in Feet (Gait)  400ft  -TW     Pattern (Gait)  step-through  -TW     Deviations/Abnormal Patterns (Gait)  base of support, narrow;gait speed decreased;stride length decreased  -TW     Comment (Gait/Stairs)  Constant VCs for direction needed due to blindness.  -TW     Row Name 09/15/20 1124          Plan of Care Review    Progress  improving  -TW     Outcome Summary  Pt agreeable to therapy. Pt stood and amb in adame with HHA for 400ft using CGA with VCs needed for directing pt due to her blindness. Pt would cont to benefit from therapy upon DC.  -TW     Row Name 09/15/20 1124          Vital Signs    Pretreatment Heart Rate (beats/min)  84  -TW     Posttreatment Heart Rate (beats/min)  88  -TW     Pre SpO2 (%)  97  -TW     O2 Delivery Pre Treatment  room air  -TW     Post SpO2 (%)  99  -TW     Pre Patient Position  Sitting  -TW     Intra Patient Position  Standing  -TW     Post Patient Position  Sitting  -TW     Row Name 09/15/20 1124          Bed Mobility Goal 1 (PT)    Activity/Assistive Device (Bed Mobility Goal 1, PT)  sit to supine;supine to sit  -TW     [place] Level/Cues Needed (Bed Mobility Goal 1, PT)  independent  -TW     Time Frame (Bed Mobility Goal 1, PT)  3 days  -TW     Progress/Outcomes (Bed Mobility Goal 1, PT)  goal met  -TW     Row Name 09/15/20 1124          Transfer Goal 1 (PT)    Activity/Assistive Device (Transfer Goal 1, PT)  sit-to-stand/stand-to-sit  -TW     [place] Level/Cues Needed  (Transfer Goal 1, PT)  supervision required  -TW     Time Frame (Transfer Goal 1, PT)  3 days  -TW     Progress/Outcome (Transfer Goal 1, PT)  goal not met  -TW     Row Name 09/15/20 1124          Gait Training Goal 1 (PT)    Activity/Assistive Device (Gait Training Goal 1, PT)  gait (walking locomotion) LRAD PRN  -TW     [place] Level (Gait Training Goal 1, PT)  supervision required  -TW     Distance (Gait Training Goal 1, PT)  100ft or more  -TW     Time Frame (Gait Training Goal 1, PT)  5 days  -TW     Progress/Outcome (Gait Training Goal 1, PT)  goal not met  -TW     Row Name 09/15/20 1124          Stairs Goal 1 (PT)    Activity/Assistive Device (Stairs Goal 1, PT)  ascending stairs;descending stairs  -TW     [place] Level/Cues Needed (Stairs Goal 1, PT)  standby assist  -TW     Number of Stairs (Stairs Goal 1, PT)  1 or more  -TW     Time Frame (Stairs Goal 1, PT)  by discharge  -TW     Progress/Outcome (Stairs Goal 1, PT)  goal not met  -TW     Row Name 09/15/20 1124          Therapy Assessment/Plan (PT)    Rehab Potential (PT)  good, to achieve stated therapy goals  -TW       User Key  (r) = Recorded By, (t) = Taken By, (c) = Cosigned By    Initials Name Provider Type    TW Remigio Mi, PTA Physical Therapy Assistant        Physical Therapy Education                 Title: PT OT SLP Therapies (In Progress)     Topic: Physical Therapy (In Progress)     Point: Mobility training (Done)     Learning Progress Summary           Patient Acceptance, E, VU by KW at 9/4/2020 1334    Comment: Role of PT, POC, use of gait belt                   Point: Home exercise program (Not Started)     Learner Progress:  Not documented in this visit.          Point: Body mechanics (Not Started)     Learner Progress:  Not documented in this visit.          Point: Precautions (Done)     Learning Progress Summary           Patient Acceptance, E,TB, VU by KM at 9/9/2020 0424    Acceptance, E, VU by KW at 9/4/2020 1334     Comment: Role of PT, POC, use of gait belt                               User Key     Initials Effective Dates Name Provider Type Discipline    KM 10/17/16 -  Madison Irwin, RN Registered Nurse Nurse    KW 08/09/20 -  Melina Neves, PT Physical Therapist PT              PT Recommendation and Plan  Anticipated Discharge Disposition (PT): home with 24/7 care, home with home health, skilled nursing facility  Therapy Frequency (PT): other (see comments)(6-11x/wk)  Progress Summary (PT)  Progress Toward Functional Goals (PT): progress toward functional goals is good  Plan of Care Reviewed With: patient  Progress: improving  Outcome Summary: Pt agreeable to therapy. Pt stood and amb in adame with HHA for 400ft using CGA with VCs needed for directing pt due to her blindness. Pt would cont to benefit from therapy upon DC.  Outcome Measures     Row Name 09/15/20 1124 09/14/20 1056 09/14/20 0818       How much help from another person do you currently need...    Turning from your back to your side while in flat bed without using bedrails?  4  -TW  4  -TW  --    Moving from lying on back to sitting on the side of a flat bed without bedrails?  4  -TW  4  -TW  --    Moving to and from a bed to a chair (including a wheelchair)?  3  -TW  3  -TW  --    Standing up from a chair using your arms (e.g., wheelchair, bedside chair)?  3  -TW  3  -TW  --    Climbing 3-5 steps with a railing?  3  -TW  3  -TW  --    To walk in hospital room?  3  -TW  3  -TW  --    AM-PAC 6 Clicks Score (PT)  20  -TW  20  -TW  --       How much help from another is currently needed...    Putting on and taking off regular lower body clothing?  --  --  4  -JH    Bathing (including washing, rinsing, and drying)  --  --  4  -JH    Toileting (which includes using toilet bed pan or urinal)  --  --  4  -JH    Putting on and taking off regular upper body clothing  --  --  4  -JH    Taking care of personal grooming (such as brushing teeth)  --  --  4  -JH     Eating meals  --  --  4  -    AM-PAC 6 Clicks Score (OT)  --  --  24  -       Functional Assessment    Outcome Measure Options  --  --  AM-PAC 6 Clicks Daily Activity (OT)  -    Row Name 09/13/20 5470             How much help from another is currently needed...    Putting on and taking off regular lower body clothing?  4  -TO      Bathing (including washing, rinsing, and drying)  4  -TO      Toileting (which includes using toilet bed pan or urinal)  4  -TO      Putting on and taking off regular upper body clothing  4  -TO      Taking care of personal grooming (such as brushing teeth)  4  -TO      Eating meals  4  -TO      AM-PAC 6 Clicks Score (OT)  24  -TO        User Key  (r) = Recorded By, (t) = Taken By, (c) = Cosigned By    Initials Name Provider Type     Remigio Mi PTA Physical Therapy Assistant    Neri Rg COTA/L Occupational Therapy Assistant     Diego Pelaez, OT Occupational Therapist           Time Calculation:   PT Charges     Row Name 09/15/20 1328             Time Calculation    Start Time  1124  -TW      Stop Time  1148  -TW      Time Calculation (min)  24 min  -TW      PT Received On  09/15/20  -TW         Time Calculation- PT    Total Timed Code Minutes- PT  24 minute(s)  -TW        User Key  (r) = Recorded By, (t) = Taken By, (c) = Cosigned By    Initials Name Provider Type     Remigio Mi PTA Physical Therapy Assistant        Therapy Charges for Today     Code Description Service Date Service Provider Modifiers Qty    16094238150 HC PT THERAPEUTIC ACT EA 15 MIN 9/14/2020 Remigio Mi, PTA GP 1    49087292395 HC GAIT TRAINING EA 15 MIN 9/14/2020 Remigio Mi, PTA GP 1    65211902472 HC GAIT TRAINING EA 15 MIN 9/15/2020 Remigio Mi, PTA GP 1    37356816071 HC PT THERAPEUTIC ACT EA 15 MIN 9/15/2020 Remigio Mi, KEIRA GP 1          PT G-Codes  Outcome Measure Options: AM-PAC 6 Clicks Daily Activity (OT)  AM-PAC 6 Clicks Score (PT):  20  AM-PAC 6 Clicks Score (OT): 24    Remigio Mi, PTA  9/15/2020

## 2020-09-15 NOTE — NURSING NOTE
Call placed to hospitalist service at this time. Call returned by CLAIRE Munoz.  Informed her of patients consistent low blood sugar readings and her refusal to eat meals and snacks. New order received to discontinue Metformin and glipizide at this time.  No adjustments required to patients sliding scale.

## 2020-09-15 NOTE — PROGRESS NOTES
"    [facility] Medicine Services  INPATIENT PROGRESS NOTE    Length of Stay: 12  Date of Admission: 9/3/2020  Primary Care Physician: Alisia Ramirez APRN    Subjective   Chief Complaint: hypoglycemia, anorexia   HPI:  70 year old  female with past medical history of schizophrenia, type 2 DM who is currently admitted to the behavioral health unit related to psychosis after an initial hospital admission for GI bleeding likely secondary to bleeding diverticulum.  Hospitalist team was contacted for assessment today as patient has been having episodes of hypoglycemia on oral diabetes medications including glipizide and metformin.  During today's visit, patient is sitting on side of bed eating a sandwich and soup assisted by speech therapist.  She denies any complaints.  When asked about her eating habits, she reports, \"I don't have teeth\" and \"I can't eat what they send me.\"     Review of Systems   Unable to perform ROS: Psychiatric disorder   Constitutional: Negative for appetite change, chills, fatigue and fever.   Respiratory: Negative for shortness of breath.    Cardiovascular: Negative for chest pain.   Gastrointestinal: Negative for abdominal pain.   Musculoskeletal: Negative for back pain and neck pain.   Psychiatric/Behavioral: Negative for agitation.        All pertinent negatives and positives are as above. All other systems have been reviewed and are negative unless otherwise stated.     Objective    Temp:  [98.1 °F (36.7 °C)-98.7 °F (37.1 °C)] 98.1 °F (36.7 °C)  Heart Rate:  [83-90] 83  Resp:  [18] 18  BP: (100-147)/(56-78) 100/56    Physical Exam  Vitals signs and nursing note reviewed.   Constitutional:       General: She is not in acute distress.     Appearance: She is well-developed and well-nourished. She is not diaphoretic.   HENT:      Head: Normocephalic and atraumatic.      Right Ear: External ear normal.      Left Ear: External ear normal. "      Nose: Nose normal.   Eyes:      General: No scleral icterus.        Right eye: No discharge.         Left eye: No discharge.      Conjunctiva/sclera: Conjunctivae normal.   Neck:      Musculoskeletal: Normal range of motion and neck supple.      Vascular: No JVD.   Cardiovascular:      Rate and Rhythm: Normal rate and regular rhythm.      Pulses: Pulses are palpable.      Heart sounds: Normal heart sounds. No murmur. No friction rub. No gallop.    Pulmonary:      Effort: Pulmonary effort is normal. No respiratory distress.      Breath sounds: Normal breath sounds. No stridor. No wheezing or rales.   Abdominal:      General: Bowel sounds are normal. There is no distension.      Palpations: Abdomen is soft.      Tenderness: There is no abdominal tenderness.   Musculoskeletal: Normal range of motion.         General: No edema.   Skin:     General: Skin is warm and dry.   Neurological:      Mental Status: She is alert.   Psychiatric:         Attention and Perception: Attention normal.         Mood and Affect: Mood and affect normal.         Speech: Speech normal.           Results Review:  I have reviewed the labs, radiology results, and diagnostic studies.    Laboratory Data:         Invalid input(s): JUAN ALBERTO BERNARDO  Estimated Creatinine Clearance: 51.8 mL/min (by C-G formula based on SCr of 0.91 mg/dL).                    Culture Data:   No results found for: BLOODCX  No results found for: URINECX  No results found for: RESPCX  No results found for: WOUNDCX  No results found for: STOOLCX  No components found for: BODYFLD    Radiology Data:   Imaging Results (Last 24 Hours)     ** No results found for the last 24 hours. **          I have reviewed the patient's current medications.     Assessment/Plan     Active Hospital Problems    Diagnosis   • **Acute exacerbation of chronic paranoid schizophrenia (CMS/HCC)   • Acute blood loss anemia     Due to recent GI bleed     • DMII (diabetes mellitus, type 2) (CMS/[facility])    • Schizophrenia, paranoid type (CMS/[facility])       Plan:    1. Schizophrenia with psychosis: defer management to psychiatry.  2. Type 2 DM: will discontinue PO diabetes meds at this time due to episodes of hypoglycemia and unpredictable oral intake.  Speech therapy has seen and diet adjusted to soft foods that patient better tolerates.  FSBS AC and HS with SSI coverage only for now.      Hospitalist team will follow and monitor glucose levels.         This document has been electronically signed by CLAIRE Amaya on September 15, 2020 15:25 CDT

## 2020-09-15 NOTE — PROGRESS NOTES
LCSW spoke with pt's brother and discussed guardianship process. Provided him letter signed by MD recommending guardianship. LCSW advised him to file for EMERGENCY guardianship. Also discussed placement for when guardianship is obtained. Brother prefers [place] or [facility]. Discussed making referrals to each. Consent was provided. Referrals were made. Will follow up accordingly.

## 2020-09-15 NOTE — NURSING NOTE
"FSBS checked, 39, administered whole tube of Glutose oral gel immediately. Patient was alert and oriented, states that she feels \"fine\" and wants to go to bathroom. Had patient eat peanut butter on crackers and drink a chocolate Boost as well. Assisted patient to bathroom. Will follow protocol and recheck BS every 15 min until it reaches 70 and monitor patient closely.   "

## 2020-09-15 NOTE — THERAPY EVALUATION
Acute Care - Speech Language Pathology   Swallow Initial Evaluation/Discharge [facility]     Patient Name: Natacha Gandhi  : 1950  MRN: [id number]  Today's Date: 9/15/2020  Onset of Illness/Injury or Date of Surgery: 20     Referring Physician: Dr. Anderson      Admit Date: 9/3/2020    No tx recommended.   Visit Dx:     ICD-10-CM ICD-9-CM   1. Impaired mobility and activities of daily living  Z74.09 V49.89    Z78.9    2. Impaired functional mobility, balance, gait, and endurance  Z74.09 V49.89   3. Oral phase dysphagia  R13.11 787.21     Patient Active Problem List   Diagnosis   • Acute GI bleeding   • Altered mental status, unspecified   • Schizophrenia, paranoid type (CMS/HCC)   • Acute exacerbation of chronic paranoid schizophrenia (CMS/HCC)   • Acute blood loss anemia   • DMII (diabetes mellitus, type 2) (CMS/HCC)     Past Medical History:   Diagnosis Date   • Depression    • Psychiatric illness    • Schizoaffective disorder (CMS/HCC)      Past Surgical History:   Procedure Laterality Date   • COLONOSCOPY N/A 2020    Procedure: COLONOSCOPY;  Surgeon: Ge Gorman MD;  Location: [facility];  Service: Gastroenterology;  Laterality: N/A;   • ENDOSCOPY N/A 2020    Procedure: ESOPHAGOGASTRODUODENOSCOPY;  Surgeon: Ge Gorman MD;  Location: [facility];  Service: Gastroenterology;  Laterality: N/A;        SWALLOW EVALUATION (last 72 hours)      SLP Adult Swallow Evaluation     Row Name 09/15/20 1222                   Rehab Evaluation    Document Type  evaluation  -EK        Subjective Information  no complaints  -EK        Patient Observations  alert;cooperative;agree to therapy  -EK        Patient/Family/Caregiver Comments/Observations  Sitting edge of bed at noon meal  -EK        Patient Effort  good  -EK           General Information    Patient Profile Reviewed  yes  -EK        Current Method of Nutrition  soft textures;ground;thin liquids  -EK        Precautions/Limitations,  Vision  vision impairment, bilaterally  -EK        Precautions/Limitations, Hearing  WFL  -EK        Prior Level of Function-Communication  WFL  -EK        Prior Level of Function-Swallowing  no diet consistency restrictions Pt just chooses soft meats and foods at home.  -EK           Pain Scale: Numbers Pre/Post-Treatment    Pretreatment Pain Rating  0/10 - no pain  -EK        Posttreatment Pain Rating  0/10 - no pain  -EK           Oral Motor and Function    Dentition Assessment  missing teeth  -EK        Secretion Management  WNL/WFL  -EK        Mucosal Quality  moist, healthy  -EK           General Eating/Swallowing Observations    Respiratory Support Currently in Use  room air  -EK        Eating/Swallowing Skills  self-fed  -EK        Positioning During Eating  upright 90 degree edge of bed  -EK        Utensils Used  spoon;straw  -EK           Clinical Swallow Eval    Oral Prep Phase  WFL  -EK        Oral Transit  WFL  -EK        Oral Residue  WFL  -EK        Pharyngeal Phase  WFL  -EK        Clinical Swallow Evaluation Summary  Swallow evaluation, pt with no s/s of aspiration with any solids  or liquids. Pt reports difficulty chewing  regular solids; pt able to tolerate  mech soft and regular liquids. SLP educated pt on choosing softer foods and SLP spoke with RN regarding reading pt the menu and making sure she is able to select soft foods. SLP also spoke with RN regarding placing large pills in applesauce/pudding. No tx recommended.    -EK          User Key  (r) = Recorded By, (t) = Taken By, (c) = Cosigned By    Initials Name Effective Dates    Savanna García CCC-SLP 07/24/19 -           EDUCATION  The patient has been educated in the following areas:   Dysphagia (Swallowing Impairment).    SLP Recommendation and Plan      Mechanical soft diet and regular liquids.   Please read the menu to the patient so she can select softer food items and food preferences based on lack of dentition.  Also, place pills in applesauce and pudding.          Time Calculation:   Time Calculation- SLP     Row Name 09/15/20 1542             Time Calculation- SLP    SLP Start Time  1222  -EK      SLP Stop Time  1250  -EK      SLP Time Calculation (min)  28 min  -EK      SLP Received On  09/15/20  -EK      SLP Goal Re-Cert Due Date  09/28/20  -EK        User Key  (r) = Recorded By, (t) = Taken By, (c) = Cosigned By    Initials Name Provider Type    Savanna García CCC-SLP Speech and Language Pathologist          Therapy Charges for Today     Code Description Service Date Service Provider Modifiers Qty    92543993131 HC ST EVAL ORAL PHARYNG SWALLOW 2 9/15/2020 Savanna Parikh CCC-SLP GN 1               JESSICA Grey  9/15/2020

## 2020-09-15 NOTE — THERAPY TREATMENT NOTE
Acute Care - Occupational Therapy Treatment Note  [facility]     Patient Name: Natacha Gandhi  : 1950  MRN: [id number]  Today's Date: 9/15/2020  Onset of Illness/Injury or Date of Surgery: 20  Date of Referral to OT: 20  Referring Physician: Dr. Anderson    Admit Date: 9/3/2020       ICD-10-CM ICD-9-CM   1. Impaired mobility and activities of daily living  Z74.09 V49.89    Z78.9    2. Impaired functional mobility, balance, gait, and endurance  Z74.09 V49.89     Patient Active Problem List   Diagnosis   • Acute GI bleeding   • Altered mental status, unspecified   • Schizophrenia, paranoid type (CMS/HCC)   • Acute exacerbation of chronic paranoid schizophrenia (CMS/HCC)   • Acute blood loss anemia   • DMII (diabetes mellitus, type 2) (CMS/HCC)     Past Medical History:   Diagnosis Date   • Depression    • Psychiatric illness    • Schizoaffective disorder (CMS/HCC)      Past Surgical History:   Procedure Laterality Date   • COLONOSCOPY N/A 2020    Procedure: COLONOSCOPY;  Surgeon: Ge Gorman MD;  Location: [facility];  Service: Gastroenterology;  Laterality: N/A;   • ENDOSCOPY N/A 2020    Procedure: ESOPHAGOGASTRODUODENOSCOPY;  Surgeon: Ge Gorman MD;  Location: [facility];  Service: Gastroenterology;  Laterality: N/A;          OT ASSESSMENT FLOWSHEET (last 12 hours)      OT Evaluation and Treatment     Row Name 09/15/20 1023                   OT Time and Intention    Subjective Information  no complaints  -LM        Document Type  therapy note (daily note)  -LM        Mode of Treatment  individual therapy;occupational therapy  -LM        Patient Effort  good  -LM           General Information    Patient Profile Reviewed  yes  -LM        Existing Precautions/Restrictions  fall  -LM        Limitations/Impairments  visual  -LM           Living Environment    Current Living Arrangements  home/apartment/condo  -LM           Cognition    Orientation Status (Cognition)   oriented x 4  -LM        Follows Commands (Cognition)  WNL  -LM        Cognitive Function (Cognitive)  safety deficit  -LM        Memory Deficit (Cognitive)  moderate deficit  -LM           Pain Scale: Numbers Pre/Post-Treatment    Pretreatment Pain Rating  0/10 - no pain  -LM        Posttreatment Pain Rating  0/10 - no pain  -LM           Bed Mobility    Bed Mobility  bed mobility (all) activities  -LM        Scooting/Bridging [place] (Bed Mobility)  independent  -LM        Supine-Sit [place] (Bed Mobility)  independent  -LM        Sit-Supine [place] (Bed Mobility)  independent  -LM           Transfers    Bed-Chair [place] (Transfers)  contact guard  -LM        Stand-Sit [place] (Transfers)  contact guard  -LM           Toilet Transfer    Type (Toilet Transfer)  sit-stand;stand-sit  -LM           Bathing Assessment/Intervention    [place] Level (Bathing)  bathing skills;lower body;upper body;standby assist  -LM           Upper Body Dressing Assessment/Training    [place] Level (Upper Body Dressing)  upper body dressing skills;doff;don;independent  -LM           Lower Body Dressing Assessment/Training    [place] Level (Lower Body Dressing)  doff;lower body dressing skills;don;pants/bottoms;contact guard assist;supervision  -LM           Grooming Assessment/Training    [place] Level (Grooming)  grooming skills;hair care, combing/brushing;wash face, hands;contact guard assist  -LM           Toileting Assessment/Training    [place] Level (Toileting)  toileting skills;adjust/manage clothing;contact guard assist  -LM           Plan of Care Review    Progress  improving  -LM           OT Goals    Transfer Goal Selection (OT)  transfer, OT goal 1  -LM        Bathing Goal Selection (OT)  bathing, OT goal 1  -LM        Dressing Goal Selection (OT)  dressing, OT goal 1  -LM        Toileting Goal Selection (OT)  toileting, OT goal 1  -LM        Balance Goal Selection (OT)   balance, OT goal 1  -LM           Transfer Goal 1 (OT)    Activity/Assistive Device (Transfer Goal 1, OT)  sit-to-stand/stand-to-sit;bed-to-chair/chair-to-bed;toilet  -LM        [place] Level/Cues Needed (Transfer Goal 1, OT)  standby assist;verbal cues required;tactile cues required;set-up required  -LM        Time Frame (Transfer Goal 1, OT)  long term goal (LTG);by discharge  -LM        Progress/Outcome (Transfer Goal 1, OT)  goal met  -LM           Bathing Goal 1 (OT)    Activity/Device (Bathing Goal 1, OT)  lower body bathing  -LM        [place] Level/Cues Needed (Bathing Goal 1, OT)  verbal cues required;tactile cues required;set-up required;standby assist  -LM        Time Frame (Bathing Goal 1, OT)  long term goal (LTG);by discharge  -LM        Progress/Outcomes (Bathing Goal 1, OT)  goal met  -LM           Dressing Goal 1 (OT)    Activity/Device (Dressing Goal 1, OT)  lower body dressing  -LM        [place]/Cues Needed (Dressing Goal 1, OT)  verbal cues required;tactile cues required;set-up required;standby assist  -LM        Time Frame (Dressing Goal 1, OT)  long term goal (LTG);by discharge  -LM        Progress/Outcome (Dressing Goal 1, OT)  goal met  -LM           Toileting Goal 1 (OT)    Activity/Device (Toileting Goal 1, OT)  toileting skills, all  -LM        [place] Level/Cues Needed (Toileting Goal 1, OT)  standby assist;verbal cues required;tactile cues required;set-up required  -LM        Time Frame (Toileting Goal 1, OT)  long term goal (LTG);by discharge  -LM        Progress/Outcome (Toileting Goal 1, OT)  goal met  -LM           Balance Goal 1 (OT)    Activity/Assistive Device (Balance Goal 1, OT)  standing, dynamic  -LM        [place] Level/Cues Needed (Balance Goal 1, OT)  standby assist  -LM        Time Frame (Balance Goal 1, OT)  long term goal (LTG);by discharge  -LM        Progress/Outcomes (Balance Goal 1, OT)  goal not met  -LM          User Key  (r) = Recorded  By, (t) = Taken By, (c) = Cosigned By    Initials Name Effective Dates    Melissa Tellez, JOSE/WINSTON 03/07/18 -          Occupational Therapy Education                 Title: PT OT SLP Therapies (In Progress)     Topic: Occupational Therapy (In Progress)     Point: ADL training (Done)     Description:   Instruct learner(s) on proper safety adaptation and remediation techniques during self care or transfers.   Instruct in proper use of assistive devices.              Learning Progress Summary           Patient Acceptance, E, VU by BB at 9/10/2020 1336    Acceptance, E, VU by BB at 9/9/2020 1101    Acceptance, E, VU by BB at 9/8/2020 1047    Acceptance, E, VU,NR by AS at 9/4/2020 1205    Comment: role of OT, OT POC, ADL training, fall preacutions, bed mobility, transfer training                   Point: Home exercise program (Not Started)     Description:   Instruct learner(s) on appropriate technique for monitoring, assisting and/or progressing therapeutic exercises/activities.              Learner Progress:  Not documented in this visit.          Point: Precautions (Done)     Description:   Instruct learner(s) on prescribed precautions during self-care and functional transfers.              Learning Progress Summary           Patient Acceptance, E,TB, VU by KM at 9/9/2020 0424    Acceptance, E, VU,NR by AS at 9/4/2020 1205    Comment: role of OT, OT POC, ADL training, fall preacutions, bed mobility, transfer training                   Point: Body mechanics (Done)     Description:   Instruct learner(s) on proper positioning and spine alignment during self-care, functional mobility activities and/or exercises.              Learning Progress Summary           Patient Acceptance, E, VU by BB at 9/8/2020 1047    Acceptance, E, NR by BB at 9/7/2020 1131                               User Key     Initials Effective Dates Name Provider Type Discipline    KM 10/17/16 -  Madison Irwin, RN Registered Nurse Nurse    MELECIO  03/07/18 -  Mare Reed COTA/L Occupational Therapy Assistant OT    AS 07/05/20 -  Unique Garcia, OT Occupational Therapist OT                  OT Recommendation and Plan     Plan of Care Review  Plan of Care Reviewed With: patient  Progress: improving  Plan of Care Reviewed With: patient    Outcome Measures     Row Name 09/15/20 1124 09/14/20 1056 09/14/20 0818       How much help from another person do you currently need...    Turning from your back to your side while in flat bed without using bedrails?  4  -TW  4  -TW  --    Moving from lying on back to sitting on the side of a flat bed without bedrails?  4  -TW  4  -TW  --    Moving to and from a bed to a chair (including a wheelchair)?  3  -TW  3  -TW  --    Standing up from a chair using your arms (e.g., wheelchair, bedside chair)?  3  -TW  3  -TW  --    Climbing 3-5 steps with a railing?  3  -TW  3  -TW  --    To walk in hospital room?  3  -TW  3  -TW  --    AM-PAC 6 Clicks Score (PT)  20  -TW  20  -TW  --       How much help from another is currently needed...    Putting on and taking off regular lower body clothing?  --  --  4  -JH    Bathing (including washing, rinsing, and drying)  --  --  4  -JH    Toileting (which includes using toilet bed pan or urinal)  --  --  4  -JH    Putting on and taking off regular upper body clothing  --  --  4  -JH    Taking care of personal grooming (such as brushing teeth)  --  --  4  -JH    Eating meals  --  --  4  -JH    AM-PAC 6 Clicks Score (OT)  --  --  24  -JH       Functional Assessment    Outcome Measure Options  --  --  AM-PAC 6 Clicks Daily Activity (OT)  -    Row Name 09/13/20 1550             How much help from another is currently needed...    Putting on and taking off regular lower body clothing?  4  -TO      Bathing (including washing, rinsing, and drying)  4  -TO      Toileting (which includes using toilet bed pan or urinal)  4  -TO      Putting on and taking off regular upper body clothing  4   -TO      Taking care of personal grooming (such as brushing teeth)  4  -TO      Eating meals  4  -TO      AM-PAC 6 Clicks Score (OT)  24  -TO        User Key  (r) = Recorded By, (t) = Taken By, (c) = Cosigned By    Initials Name Provider Type    TW Remigio Mi, PTA Physical Therapy Assistant    TO Neri Ramirez GARCIA/L Occupational Therapy Assistant    Diego Washington OT Occupational Therapist          Time Calculation:   Time Calculation- OT     Row Name 09/15/20 1411             Time Calculation- OT    OT Start Time  1023  -LM      OT Stop Time  1050  -LM      OT Time Calculation (min)  27 min  -LM      Total Timed Code Minutes- OT  27 minute(s)  -LM      OT Received On  09/15/20  -LM        User Key  (r) = Recorded By, (t) = Taken By, (c) = Cosigned By    Initials Name Provider Type     Melissa Barroso COTA/L Occupational Therapy Assistant        Therapy Charges for Today     Code Description Service Date Service Provider Modifiers Qty    43730047749 HC OT THERAPEUTIC ACT EA 15 MIN 9/15/2020 Melissa Barroso COTA/L GO 1    82222032644 HC OT SELF CARE/MGMT/TRAIN EA 15 MIN 9/15/2020 Melissa Barroso COTA/L GO 1               JOSE Rivers/WINSTON  9/15/2020

## 2020-09-16 LAB
GLUCOSE BLDC GLUCOMTR-MCNC: 180 MG/DL (ref 70–130)
GLUCOSE BLDC GLUCOMTR-MCNC: 201 MG/DL (ref 70–130)
GLUCOSE BLDC GLUCOMTR-MCNC: 258 MG/DL (ref 70–130)
GLUCOSE BLDC GLUCOMTR-MCNC: 54 MG/DL (ref 70–130)
GLUCOSE BLDC GLUCOMTR-MCNC: 86 MG/DL (ref 70–130)

## 2020-09-16 PROCEDURE — 97535 SELF CARE MNGMENT TRAINING: CPT

## 2020-09-16 PROCEDURE — 82962 GLUCOSE BLOOD TEST: CPT

## 2020-09-16 PROCEDURE — 97530 THERAPEUTIC ACTIVITIES: CPT

## 2020-09-16 PROCEDURE — 97116 GAIT TRAINING THERAPY: CPT

## 2020-09-16 PROCEDURE — 97110 THERAPEUTIC EXERCISES: CPT

## 2020-09-16 PROCEDURE — 99232 SBSQ HOSP IP/OBS MODERATE 35: CPT | Performed by: PSYCHIATRY & NEUROLOGY

## 2020-09-16 RX ADMIN — RISPERIDONE 2 MG: 1 TABLET ORAL at 17:01

## 2020-09-16 RX ADMIN — ESCITALOPRAM OXALATE 10 MG: 10 TABLET ORAL at 09:36

## 2020-09-16 RX ADMIN — FERROUS SULFATE TAB EC 324 MG (65 MG FE EQUIVALENT) 324 MG: 324 (65 FE) TABLET DELAYED RESPONSE at 09:36

## 2020-09-16 RX ADMIN — PANTOPRAZOLE SODIUM 40 MG: 40 TABLET, DELAYED RELEASE ORAL at 06:11

## 2020-09-16 NOTE — PLAN OF CARE
Goal Outcome Evaluation:  Plan of Care Reviewed With: other (see comments), patient(nurse)  Progress: improving  Outcome Summary: No changes overnight. No behaviors noted. Appeared to sleep well.

## 2020-09-16 NOTE — PROGRESS NOTES
[facility] Medicine Services  INPATIENT PROGRESS NOTE    Length of Stay: 13  Date of Admission: 9/3/2020  Primary Care Physician: Alisia Ramirez APRN    Subjective   Chief Complaint: hypoglycemia, anorexia   HPI:  70 year old  female with past medical history of schizophrenia, type 2 DM who is currently admitted to the behavioral health unit related to psychosis after an initial hospital admission for GI bleeding likely secondary to bleeding diverticulum.  Hospitalist team is following related to episodes of hypoglycemia.  During today's visit, patient denies complaints.  She had episode of hypoglycemia this morning that she reports was because she slept through breakfast.      Review of Systems   Unable to perform ROS: Psychiatric disorder   Constitutional: Negative for appetite change, chills, fatigue and fever.   Respiratory: Negative for shortness of breath.    Cardiovascular: Negative for chest pain.   Gastrointestinal: Negative for abdominal pain.   Musculoskeletal: Negative for back pain and neck pain.   Psychiatric/Behavioral: Negative for agitation.        All pertinent negatives and positives are as above. All other systems have been reviewed and are negative unless otherwise stated.     Objective    Temp:  [98.1 °F (36.7 °C)-98.5 °F (36.9 °C)] 98.1 °F (36.7 °C)  Heart Rate:  [79-80] 79  Resp:  [18] 18  BP: (102-142)/(55-86) 102/55    Physical Exam  Vitals signs and nursing note reviewed.   Constitutional:       General: She is not in acute distress.     Appearance: She is well-developed and well-nourished. She is not diaphoretic.   HENT:      Head: Normocephalic and atraumatic.      Right Ear: External ear normal.      Left Ear: External ear normal.      Nose: Nose normal.   Eyes:      General: No scleral icterus.        Right eye: No discharge.         Left eye: No discharge.      Conjunctiva/sclera: Conjunctivae normal.   Neck:       Musculoskeletal: Normal range of motion and neck supple.      Vascular: No JVD.   Cardiovascular:      Rate and Rhythm: Normal rate and regular rhythm.      Pulses: Pulses are palpable.      Heart sounds: Normal heart sounds. No murmur. No friction rub. No gallop.    Pulmonary:      Effort: Pulmonary effort is normal. No respiratory distress.      Breath sounds: Normal breath sounds. No stridor. No wheezing or rales.   Abdominal:      General: Bowel sounds are normal. There is no distension.      Palpations: Abdomen is soft.      Tenderness: There is no abdominal tenderness.   Musculoskeletal: Normal range of motion.         General: No edema.   Skin:     General: Skin is warm and dry.   Neurological:      Mental Status: She is alert.   Psychiatric:         Attention and Perception: Attention normal.         Mood and Affect: Mood and affect normal.         Speech: Speech normal.           Results Review:  I have reviewed the labs, radiology results, and diagnostic studies.    Laboratory Data:         Invalid input(s): LABALBU, PROT  Estimated Creatinine Clearance: 51.8 mL/min (by C-G formula based on SCr of 0.91 mg/dL).                    Culture Data:   No results found for: BLOODCX  No results found for: URINECX  No results found for: RESPCX  No results found for: WOUNDCX  No results found for: STOOLCX  No components found for: BODYFLD    Radiology Data:   Imaging Results (Last 24 Hours)     ** No results found for the last 24 hours. **          I have reviewed the patient's current medications.     Assessment/Plan     Active Hospital Problems    Diagnosis   • **Acute exacerbation of chronic paranoid schizophrenia (CMS/HCC)   • Acute blood loss anemia     Due to recent GI bleed     • DMII (diabetes mellitus, type 2) (CMS/[facility])   • Schizophrenia, paranoid type (CMS/HCC)       Plan:    1. Schizophrenia with psychosis: defer management to psychiatry.  2. Type 2 DM: PO diabetes meds are discontinued due to episodes of  hypoglycemia and unpredictable oral intake.  Speech therapy has seen and diet adjusted to soft foods that patient better tolerates.  FSBS AC and HS with SSI coverage only for now.  Nursing instructed to provide frequent snacks.  Glucose gel and glucagon available in the event of hypoglycemia.          This document has been electronically signed by CLAIRE Amaya on September 16, 2020 11:03 CDT

## 2020-09-16 NOTE — NURSING NOTE
Behavior   Note any precipitants to event or behavior   Describe level and action of any aggressive behavior or speech and associated interventions.     Anxiety: Patient denies at this time  Depression: Patient denies at this time  Pain  0  AVH   no  S/I   no  Plan  no  H/I   no  Plan  no    Affect   mood-congruent      Note: Patient resting in bed. Calm and cooperative with staff. Pleasant and smiling during conversation. No HS meds scheduled tonight. . No behaviors noted. Will continue to monitor.       Intervention    PRN medication utilized:  no    Instructed in medication usage and effects  Medications administered as ordered  Encouraged to verbalize needs      Response    Verbalized understanding   Did patient take medications as ordered no HS meds   Did patient interact with assessment?  yes     Plan    Will monitor for safety  Will monitor every 15 minutes as ordered  Will evaluate and promote the plan of care    Last BM:  unknown date  (Please chart in I/O as well)

## 2020-09-16 NOTE — PLAN OF CARE
Problem: Patient Care Overview  Goal: Plan of Care Review  Recent Flowsheet Documentation  Taken 9/16/2020 1345 by Remigio Mi, PTA  Progress: improving  Plan of Care Reviewed With: patient  Outcome Summary: Pt agreeable to therapy. Pt t/f sup to sit with cga and stood with cga. Pt amb to bathroom and able to perform with nsg assist. Pt then stood and amb with cga using HHA for 424ft with good balance noted but pt also used wall railing when available. Pt woudl cont to benefit from therapy upon DC.

## 2020-09-16 NOTE — THERAPY TREATMENT NOTE
Acute Care - Occupational Therapy Treatment Note  [facility]     Patient Name: Natacha Gandhi  : 1950  MRN: [id number]  Today's Date: 2020  Onset of Illness/Injury or Date of Surgery: 20  Date of Referral to OT: 20  Referring Physician: Dr. Anderson    Admit Date: 9/3/2020       ICD-10-CM ICD-9-CM   1. Impaired mobility and activities of daily living  Z74.09 V49.89    Z78.9    2. Impaired functional mobility, balance, gait, and endurance  Z74.09 V49.89   3. Oral phase dysphagia  R13.11 787.21     Patient Active Problem List   Diagnosis   • Acute GI bleeding   • Altered mental status, unspecified   • Schizophrenia, paranoid type (CMS/HCC)   • Acute exacerbation of chronic paranoid schizophrenia (CMS/HCC)   • Acute blood loss anemia   • DMII (diabetes mellitus, type 2) (CMS/HCC)     Past Medical History:   Diagnosis Date   • Depression    • Psychiatric illness    • Schizoaffective disorder (CMS/HCC)      Past Surgical History:   Procedure Laterality Date   • COLONOSCOPY N/A 2020    Procedure: COLONOSCOPY;  Surgeon: Ge Gorman MD;  Location: [facility];  Service: Gastroenterology;  Laterality: N/A;   • ENDOSCOPY N/A 2020    Procedure: ESOPHAGOGASTRODUODENOSCOPY;  Surgeon: Ge Gorman MD;  Location: [facility];  Service: Gastroenterology;  Laterality: N/A;          OT ASSESSMENT FLOWSHEET (last 12 hours)      OT Evaluation and Treatment     Row Name 20 1446                   OT Time and Intention    Subjective Information  no complaints  -LM        Document Type  therapy note (daily note)  -LM        Mode of Treatment  individual therapy;occupational therapy  -LM        Patient Effort  good  -LM           General Information    Patient Profile Reviewed  yes  -LM        Existing Precautions/Restrictions  fall  -LM        Limitations/Impairments  visual  -LM           Cognition    Orientation Status (Cognition)  oriented x 4  -LM        Follows Commands (Cognition)   WNL  -LM        Cognitive Function (Cognitive)  safety deficit  -LM        Memory Deficit (Cognitive)  moderate deficit  -LM           Pain Scale: Numbers Pre/Post-Treatment    Pretreatment Pain Rating  0/10 - no pain  -LM        Posttreatment Pain Rating  0/10 - no pain  -LM           Bed Mobility    Bed Mobility  bed mobility (all) activities  -LM        Scooting/Bridging [place] (Bed Mobility)  independent  -LM        Supine-Sit [place] (Bed Mobility)  independent  -LM        Sit-Supine [place] (Bed Mobility)  independent  -LM           Functional Mobility    Functional Mobility- Ind. Level  contact guard assist  -LM           Transfers    Bed-Chair [place] (Transfers)  contact guard  -LM        Stand-Sit [place] (Transfers)  supervision  -LM        [place] Level (Toilet Transfer)  contact guard  -LM           Toilet Transfer    Type (Toilet Transfer)  sit-stand;stand-sit  -LM           Shoulder (Therapeutic Exercise)    Shoulder (Therapeutic Exercise)  -- tband all ex over head across chest elbow chest press and sc  -LM           Balance    Balance Assessment  sit to stand dynamic balance  -LM        Sit to Stand Dynamic Balance  mild impairment  -LM           Bathing Assessment/Intervention    [place] Level (Bathing)  --  -LM           Upper Body Dressing Assessment/Training    [place] Level (Upper Body Dressing)  --  -LM           Lower Body Dressing Assessment/Training    [place] Level (Lower Body Dressing)  supervision  -LM           Grooming Assessment/Training    [place] Level (Grooming)  grooming skills;hair care, combing/brushing;wash face, hands;contact guard assist  -LM           Toileting Assessment/Training    [place] Level (Toileting)  toileting skills;adjust/manage clothing;contact guard assist  -LM           Plan of Care Review    Plan of Care Reviewed With  patient  -LM           OT Goals    Transfer Goal Selection (OT)  transfer, OT goal 1   -LM        Bathing Goal Selection (OT)  bathing, OT goal 1  -LM        Dressing Goal Selection (OT)  dressing, OT goal 1  -LM        Toileting Goal Selection (OT)  toileting, OT goal 1  -LM        Balance Goal Selection (OT)  balance, OT goal 1  -LM           Transfer Goal 1 (OT)    Activity/Assistive Device (Transfer Goal 1, OT)  sit-to-stand/stand-to-sit;bed-to-chair/chair-to-bed;toilet  -LM        [place] Level/Cues Needed (Transfer Goal 1, OT)  standby assist;verbal cues required;tactile cues required;set-up required  -LM        Time Frame (Transfer Goal 1, OT)  long term goal (LTG);by discharge  -LM        Progress/Outcome (Transfer Goal 1, OT)  goal met  -LM           Bathing Goal 1 (OT)    Activity/Device (Bathing Goal 1, OT)  lower body bathing  -LM        [place] Level/Cues Needed (Bathing Goal 1, OT)  verbal cues required;tactile cues required;set-up required;standby assist  -LM        Time Frame (Bathing Goal 1, OT)  long term goal (LTG);by discharge  -LM        Progress/Outcomes (Bathing Goal 1, OT)  goal met  -LM           Dressing Goal 1 (OT)    Activity/Device (Dressing Goal 1, OT)  lower body dressing  -LM        [place]/Cues Needed (Dressing Goal 1, OT)  verbal cues required;tactile cues required;set-up required;standby assist  -LM        Time Frame (Dressing Goal 1, OT)  long term goal (LTG);by discharge  -LM        Progress/Outcome (Dressing Goal 1, OT)  goal met  -LM           Toileting Goal 1 (OT)    Activity/Device (Toileting Goal 1, OT)  toileting skills, all  -LM        [place] Level/Cues Needed (Toileting Goal 1, OT)  standby assist;verbal cues required;tactile cues required;set-up required  -LM        Time Frame (Toileting Goal 1, OT)  long term goal (LTG);by discharge  -LM        Progress/Outcome (Toileting Goal 1, OT)  goal met  -LM           Balance Goal 1 (OT)    Activity/Assistive Device (Balance Goal 1, OT)  standing, dynamic  -LM        [place] Level/Cues  Needed (Balance Goal 1, OT)  standby assist  -LM        Time Frame (Balance Goal 1, OT)  long term goal (LTG);by discharge  -LM        Progress/Outcomes (Balance Goal 1, OT)  goal not met  -LM          User Key  (r) = Recorded By, (t) = Taken By, (c) = Cosigned By    Initials Name Effective Dates    LM Melissa Barroso R, GARCIA/L 03/07/18 -          Occupational Therapy Education                 Title: PT OT SLP Therapies (In Progress)     Topic: Occupational Therapy (In Progress)     Point: ADL training (Done)     Description:   Instruct learner(s) on proper safety adaptation and remediation techniques during self care or transfers.   Instruct in proper use of assistive devices.              Learning Progress Summary           Patient Acceptance, E, VU by BB at 9/10/2020 1336    Acceptance, E, VU by BB at 9/9/2020 1101    Acceptance, E, VU by BB at 9/8/2020 1047    Acceptance, E, VU,NR by AS at 9/4/2020 1205    Comment: role of OT, OT POC, ADL training, fall preacutions, bed mobility, transfer training                   Point: Home exercise program (Not Started)     Description:   Instruct learner(s) on appropriate technique for monitoring, assisting and/or progressing therapeutic exercises/activities.              Learner Progress:  Not documented in this visit.          Point: Precautions (Done)     Description:   Instruct learner(s) on prescribed precautions during self-care and functional transfers.              Learning Progress Summary           Patient Acceptance, E,TB, VU by KM at 9/9/2020 0424    Acceptance, E, VU,NR by AS at 9/4/2020 1205    Comment: role of OT, OT POC, ADL training, fall preacutions, bed mobility, transfer training                   Point: Body mechanics (Done)     Description:   Instruct learner(s) on proper positioning and spine alignment during self-care, functional mobility activities and/or exercises.              Learning Progress Summary           Patient Acceptance, E, VU by BB at  9/8/2020 1047    Acceptance, E, NR by BB at 9/7/2020 1131                               User Key     Initials Effective Dates Name Provider Type Discipline     10/17/16 -  Madison Irwin, RN Registered Nurse Nurse    BB 03/07/18 -  Mare Reed COTA/L Occupational Therapy Assistant OT    AS 07/05/20 -  Unique Garcia, OT Occupational Therapist OT                  OT Recommendation and Plan     Plan of Care Review  Plan of Care Reviewed With: patient  Progress: improving  Plan of Care Reviewed With: patient    Outcome Measures     Row Name 09/15/20 1124 09/14/20 1056 09/14/20 0818       How much help from another person do you currently need...    Turning from your back to your side while in flat bed without using bedrails?  4  -TW  4  -TW  --    Moving from lying on back to sitting on the side of a flat bed without bedrails?  4  -TW  4  -TW  --    Moving to and from a bed to a chair (including a wheelchair)?  3  -TW  3  -TW  --    Standing up from a chair using your arms (e.g., wheelchair, bedside chair)?  3  -TW  3  -TW  --    Climbing 3-5 steps with a railing?  3  -TW  3  -TW  --    To walk in hospital room?  3  -TW  3  -TW  --    AM-PAC 6 Clicks Score (PT)  20  -TW  20  -TW  --       How much help from another is currently needed...    Putting on and taking off regular lower body clothing?  --  --  4  -JH    Bathing (including washing, rinsing, and drying)  --  --  4  -JH    Toileting (which includes using toilet bed pan or urinal)  --  --  4  -JH    Putting on and taking off regular upper body clothing  --  --  4  -JH    Taking care of personal grooming (such as brushing teeth)  --  --  4  -JH    Eating meals  --  --  4  -JH    AM-PAC 6 Clicks Score (OT)  --  --  24  -JH       Functional Assessment    Outcome Measure Options  --  --  AM-PAC 6 Clicks Daily Activity (OT)  -    Row Name 09/13/20 1550             How much help from another is currently needed...    Putting on and taking off regular  lower body clothing?  4  -TO      Bathing (including washing, rinsing, and drying)  4  -TO      Toileting (which includes using toilet bed pan or urinal)  4  -TO      Putting on and taking off regular upper body clothing  4  -TO      Taking care of personal grooming (such as brushing teeth)  4  -TO      Eating meals  4  -TO      AM-PAC 6 Clicks Score (OT)  24  -TO        User Key  (r) = Recorded By, (t) = Taken By, (c) = Cosigned By    Initials Name Provider Type    TW Remigio Mi, PTA Physical Therapy Assistant    TO Neri Ramirez GARCIA/L Occupational Therapy Assistant    Diego Washington, OT Occupational Therapist          Time Calculation:   Time Calculation- OT     Row Name 09/16/20 1440             Time Calculation- OT    OT Start Time  1000  -LM      OT Stop Time  1055  -LM      OT Time Calculation (min)  55 min  -LM      Total Timed Code Minutes- OT  55 minute(s)  -LM      OT Received On  09/16/20  -LM        User Key  (r) = Recorded By, (t) = Taken By, (c) = Cosigned By    Initials Name Provider Type     Melissa Barroso GARCIA/L Occupational Therapy Assistant        Therapy Charges for Today     Code Description Service Date Service Provider Modifiers Qty    33455070125 HC OT THERAPEUTIC ACT EA 15 MIN 9/15/2020 Melissa Barroso GARCIA/L GO 1    10966489368 HC OT SELF CARE/MGMT/TRAIN EA 15 MIN 9/15/2020 Melissa Barroso GARCIA/L GO 1    13604700860 HC OT THERAPEUTIC ACT EA 15 MIN 9/16/2020 Melissa Barroso GARCIA/L GO 1    18958657462 HC OT SELF CARE/MGMT/TRAIN EA 15 MIN 9/16/2020 Melissa Barroso GARCIA/L GO 1    32247363265 HC OT THER PROC EA 15 MIN 9/16/2020 Melissa Barroso GARCIA/L GO 2               ARI RiversA/WINSTON  9/16/2020

## 2020-09-16 NOTE — PROGRESS NOTES
"9/16/2020    Chief Complaint: psychosis    Subjective:  Patient is a 70 y.o. female that is currently inpatient on the [facility]  Today she is seen in the common area working with therapy and in her room. She is pleasant and alert.  Pt does enjoy conversations, she has not been reporting any AVH to staff.   Pt refused insulin this morning, requests to have PO medications only.   No AE to medications. Sleeps well, eats well.        Objective     Vital Signs    Temp:  [98.1 °F (36.7 °C)-98.5 °F (36.9 °C)] 98.1 °F (36.7 °C)  Heart Rate:  [79-80] 79  Resp:  [18] 18  BP: (102-142)/(55-86) 102/55    Physical Exam:   General Appearance: alert and cooperative,  Hygiene:   fair  Gait & Station: Needs Assistance  Musculoskeletal: No tremors or abnormal involuntary movements    Mental Status Exam:   Cooperation:  Cooperative  Eye Contact:  blind  Psychomotor Behavior:  Appropriate  Mood: \"Fine\"  Affect:  mood-congruent  Speech:  Normal  Thought Process:  Poverty of thought  Associations: Circumstantial  Thought Content:     Mood congruent   Suicidal:  None   Homicidal:  None   Hallucinations:  Not demonstrated today   Delusion:  None  Cognitive Functioning:   Consciousness: awake and alert  Reliability:  fair  Insight:  Poor  Judgement:  Impaired  Impulse Control:  Impaired    Lab Results (last 24 hours)     Procedure Component Value Units Date/Time    POC Glucose Once [[id number]]  (Abnormal) Collected: 09/16/20 1133    Specimen: Blood Updated: 09/16/20 1149     Glucose 180 mg/dL     POC Glucose Once [[id number]]  (Normal) Collected: 09/16/20 0640    Specimen: Blood Updated: 09/16/20 0652     Glucose 86 mg/dL     POC Glucose Once [[id number]]  (Abnormal) Collected: 09/16/20 0605    Specimen: Blood Updated: 09/16/20 0652     Glucose 54 mg/dL     POC Glucose Once [[id number]]  (Abnormal) Collected: 09/15/20 0555    Specimen: Blood Updated: 09/15/20 2001     Glucose 39 mg/dL     POC Glucose Once [[id number]]  (Abnormal) Collected: " 09/15/20 1941    Specimen: Blood Updated: 09/15/20 1953     Glucose 228 mg/dL     POC Glucose Once [[id number]]  (Abnormal) Collected: 09/15/20 1632    Specimen: Blood Updated: 09/15/20 1649     Glucose 164 mg/dL         Imaging Results (Last 24 Hours)     ** No results found for the last 24 hours. **          Medicine:   Current Facility-Administered Medications:   •  aluminum-magnesium hydroxide-simethicone (MAALOX MAX) 400-400-40 MG/5ML suspension 15 mL, 15 mL, Oral, Q6H PRN, Tyrel Anderson II, MD  •  cloNIDine (CATAPRES) tablet 0.1 mg, 0.1 mg, Oral, Q6H PRN, Tyrel Anderson II, MD  •  dextrose (D50W) 25 g/ 50mL Intravenous Solution 25 g, 25 g, Intravenous, Q15 Min PRN, Cas Houston APRN  •  dextrose (GLUTOSE) oral gel 15 g, 15 g, Oral, Q15 Min PRN, Cas Houston APRN, 15 g at 09/15/20 0556  •  escitalopram (LEXAPRO) tablet 10 mg, 10 mg, Oral, Daily, Tyrel Anderson II, MD, 10 mg at 09/16/20 0936  •  ferrous sulfate EC tablet 324 mg, 324 mg, Oral, Daily With Breakfast, Tyrel Anderson II, MD, 324 mg at 09/16/20 0936  •  glucagon (human recombinant) (GLUCAGEN DIAGNOSTIC) injection 1 mg, 1 mg, Subcutaneous, Q15 Min PRN, Cas Houston APRN  •  insulin aspart (novoLOG) injection 0-7 Units, 0-7 Units, Subcutaneous, TID AC, Cas Houston APRN, Stopped at 09/10/20 1227  •  loperamide (IMODIUM) capsule 2 mg, 2 mg, Oral, Q2H PRN, Tyrel Anderson II, MD  •  magnesium hydroxide (MILK OF MAGNESIA) suspension 2400 mg/10mL 10 mL, 10 mL, Oral, Daily PRN, Tyrel Anderson II, MD  •  pantoprazole (PROTONIX) EC tablet 40 mg, 40 mg, Oral, Q AM, Tyrel Anderson II, MD, 40 mg at 09/16/20 0611  •  risperiDONE (risperDAL) tablet 2 mg, 2 mg, Oral, Daily With Dinner, Ellie Raza MD, 2 mg at 09/15/20 3985    Diagnoses/Assessment:     Acute exacerbation of chronic paranoid schizophrenia (CMS/HCC)    Schizophrenia, paranoid type (CMS/HCC)     Acute blood loss anemia    DMII (diabetes mellitus, type 2) (CMS/[facility])      Treatment Plan:    1) Will continue care for the patient on the behavioral health unit at [facility] to ensure patient safety.  2) Will continue to provide treatment with the unit milieu, activities, therapies and psychopharmacological management.  3) Patient to be placed on or continued on  Q15 minute checks  and Fall precautions.  4) Pertinent medical issues:   --Type 2 diabetes: Consistent carb diet.  Hold glipizide and Metformin, will continue to offer sliding scale insulin as appropriate  -GERD/acute blood loss anemia: Continue Protonix and ferrous sulfate  5) Will order following labs: none  6) Will make the following medication changes:   --Cont risperdal 2mg qpm dinner.  --Cont lexapro 10 mg daily   7) Will continue discharge planning as appropriate for patient.  8) Psychotherapy provided for less than 16 minutes.    Treatment plan and medication risks and benefits discussed with: Patient and treatment team    CLAIRE Yen  09/16/20  14:44 CDT

## 2020-09-16 NOTE — THERAPY TREATMENT NOTE
Acute Care - Physical Therapy Treatment Note  [facility]     Patient Name: Natacha Gandhi  : 1950  MRN: [id number]  Today's Date: 2020   Onset of Illness/Injury or Date of Surgery: 20       PT Assessment (last 12 hours)      PT Evaluation and Treatment     Row Name 20 1345          Physical Therapy Time and Intention    Subjective Information  no complaints  -TW     Document Type  therapy note (daily note)  -TW     Mode of Treatment  physical therapy;individual therapy  -TW     Patient Effort  good  -TW     Row Name 20 1345          General Information    Patient/Family/Caregiver Comments/Observations  Pt supine in bed.  -TW     Existing Precautions/Restrictions  fall legally blind   -TW     Row Name 20 1345          Cognition    Orientation Status (Cognition)  oriented x 4  -TW     Follows Commands (Cognition)  initiation impaired;verbal cues/prompting required  -TW     Row Name 20 1345          Pain Scale: Numbers Pre/Post-Treatment    Pretreatment Pain Rating  0/10 - no pain  -TW     Posttreatment Pain Rating  0/10 - no pain  -TW     Row Name 20 1345          Bed Mobility    Rolling Right Sims (Bed Mobility)  contact guard  -TW     Scooting/Bridging Sims (Bed Mobility)  independent  -TW     Supine-Sit Sims (Bed Mobility)  independent  -TW     Sit-Supine Sims (Bed Mobility)  independent  -TW     Row Name 20 1345          Transfers    Sit-Stand Sims (Transfers)  contact guard  -TW     Stand-Sit Sims (Transfers)  contact guard  -TW     Sims Level (Toilet Transfer)  contact guard  -TW     Assistive Device (Toilet Transfer)  other (see comments) HHA  -TW     Row Name 20 1345          Sit-Stand Transfer    Assistive Device (Sit-Stand Transfers)  other (see comments) HHA   -TW     Row Name 20 1345          Stand-Sit Transfer    Assistive Device (Stand-Sit Transfers)  other (see comments) A   -TW     Row Name 09/16/20 1345          Toilet Transfer    Type (Toilet Transfer)  sit-stand;stand-sit  -TW     Row Name 09/16/20 1345          Gait/Stairs (Locomotion)    Gait/Stairs Locomotion  gait/ambulation independence  -TW     Wilbarger Level (Gait)  contact guard  -TW     Assistive Device (Gait)  other (see comments) HHA  -TW     Distance in Feet (Gait)  424ft  -TW     Pattern (Gait)  step-through  -TW     Deviations/Abnormal Patterns (Gait)  base of support, narrow;gait speed decreased;stride length decreased  -TW     Comment (Gait/Stairs)  Pt c/o fatigue at end of gait.  -TW     Row Name 09/16/20 1345          Plan of Care Review    Plan of Care Reviewed With  patient  -TW     Progress  improving  -TW     Outcome Summary  Pt agreeable to therapy. Pt t/f sup to sit with cga and stood with cga. Pt amb to bathroom and able to perform with nsg assist. Pt then stood and amb with cga using HHA for 424ft with good balance noted but pt also used wall railing when available. Pt woudl cont to benefit from therapy upon DC.  -TW     Row Name 09/16/20 1345          Vital Signs    Pretreatment Heart Rate (beats/min)  82  -TW     Posttreatment Heart Rate (beats/min)  88  -TW     Pre SpO2 (%)  96  -TW     O2 Delivery Pre Treatment  room air  -TW     Post SpO2 (%)  98  -TW     Pre Patient Position  Supine  -TW     Intra Patient Position  Standing  -TW     Post Patient Position  Supine  -TW     Row Name 09/16/20 1345          Bed Mobility Goal 1 (PT)    Activity/Assistive Device (Bed Mobility Goal 1, PT)  sit to supine;supine to sit  -TW     Wilbarger Level/Cues Needed (Bed Mobility Goal 1, PT)  independent  -TW     Time Frame (Bed Mobility Goal 1, PT)  3 days  -TW     Progress/Outcomes (Bed Mobility Goal 1, PT)  goal met  -TW     Row Name 09/16/20 1345          Transfer Goal 1 (PT)    Activity/Assistive Device (Transfer Goal 1, PT)  sit-to-stand/stand-to-sit  -TW     Wilbarger Level/Cues Needed (Transfer Goal 1,  PT)  supervision required  -TW     Time Frame (Transfer Goal 1, PT)  3 days  -TW     Progress/Outcome (Transfer Goal 1, PT)  goal not met  -TW     Row Name 09/16/20 1345          Gait Training Goal 1 (PT)    Activity/Assistive Device (Gait Training Goal 1, PT)  gait (walking locomotion) LRAD PRN  -TW     Nye Level (Gait Training Goal 1, PT)  supervision required  -TW     Distance (Gait Training Goal 1, PT)  100ft or more  -TW     Time Frame (Gait Training Goal 1, PT)  5 days  -TW     Progress/Outcome (Gait Training Goal 1, PT)  goal not met  -TW     Row Name 09/16/20 1345          Stairs Goal 1 (PT)    Activity/Assistive Device (Stairs Goal 1, PT)  ascending stairs;descending stairs  -TW     Nye Level/Cues Needed (Stairs Goal 1, PT)  standby assist  -TW     Number of Stairs (Stairs Goal 1, PT)  1 or more  -TW     Time Frame (Stairs Goal 1, PT)  by discharge  -TW     Progress/Outcome (Stairs Goal 1, PT)  goal not met  -TW     Row Name 09/16/20 1345          Therapy Assessment/Plan (PT)    Rehab Potential (PT)  good, to achieve stated therapy goals  -TW     Row Name 09/16/20 1345          Progress Summary (PT)    Progress Toward Functional Goals (PT)  progress toward functional goals is good  -TW       User Key  (r) = Recorded By, (t) = Taken By, (c) = Cosigned By    Initials Name Provider Type    TW Remigio Mi, PTA Physical Therapy Assistant        Physical Therapy Education                 Title: PT OT SLP Therapies (In Progress)     Topic: Physical Therapy (In Progress)     Point: Mobility training (Done)     Learning Progress Summary           Patient Acceptance, E, VU by KERRY at 9/4/2020 4064    Comment: Role of PT, POC, use of gait belt                   Point: Home exercise program (Not Started)     Learner Progress:  Not documented in this visit.          Point: Body mechanics (Not Started)     Learner Progress:  Not documented in this visit.          Point: Precautions (Done)      Learning Progress Summary           Patient Acceptance, E,TB, VU by PRAMOD at 9/9/2020 0424    Acceptance, E, VU by KERRY at 9/4/2020 1334    Comment: Role of PT, POC, use of gait belt                               User Key     Initials Effective Dates Name Provider Type Discipline    PRAMOD 10/17/16 -  Madison Irwin, RN Registered Nurse Nurse    KERRY 08/09/20 -  Melina Neves, PT Physical Therapist PT              PT Recommendation and Plan  Anticipated Discharge Disposition (PT): home with 24/7 care, home with home health, skilled nursing facility  Therapy Frequency (PT): other (see comments)(6-11x/wk)  Progress Summary (PT)  Progress Toward Functional Goals (PT): progress toward functional goals is good  Plan of Care Reviewed With: patient  Progress: improving  Outcome Summary: Pt agreeable to therapy. Pt t/f sup to sit with cga and stood with cga. Pt amb to bathroom and able to perform with nsg assist. Pt then stood and amb with cga using HHA for 424ft with good balance noted but pt also used wall railing when available. Pt woudl cont to benefit from therapy upon DC.  Outcome Measures     Row Name 09/16/20 1345 09/15/20 1124 09/14/20 1056       How much help from another person do you currently need...    Turning from your back to your side while in flat bed without using bedrails?  4  -TW  4  -TW  4  -TW    Moving from lying on back to sitting on the side of a flat bed without bedrails?  4  -TW  4  -TW  4  -TW    Moving to and from a bed to a chair (including a wheelchair)?  3  -TW  3  -TW  3  -TW    Standing up from a chair using your arms (e.g., wheelchair, bedside chair)?  3  -TW  3  -TW  3  -TW    Climbing 3-5 steps with a railing?  3  -TW  3  -TW  3  -TW    To walk in hospital room?  3  -TW  3  -TW  3  -TW    AM-PAC 6 Clicks Score (PT)  20  -TW  20  -TW  20  -TW    Row Name 09/14/20 0818 09/13/20 1550          How much help from another is currently needed...    Putting on and taking off regular lower body clothing?   4  -  4  -TO     Bathing (including washing, rinsing, and drying)  4  -  4  -TO     Toileting (which includes using toilet bed pan or urinal)  4  -  4  -TO     Putting on and taking off regular upper body clothing  4  -  4  -TO     Taking care of personal grooming (such as brushing teeth)  4  -  4  -TO     Eating meals  4  -  4  -TO     AM-PAC 6 Clicks Score (OT)  24  -  24  -TO        Functional Assessment    Outcome Measure Options  AM-PAC 6 Clicks Daily Activity (OT)  -  --       User Key  (r) = Recorded By, (t) = Taken By, (c) = Cosigned By    Initials Name Provider Type     Remigio Mi PTA Physical Therapy Assistant    Neri Rg COTA/L Occupational Therapy Assistant    Diego Washington, JAUN Occupational Therapist           Time Calculation:   PT Charges     Row Name 09/16/20 1535             Time Calculation    Start Time  1345  -TW      Stop Time  1414  -TW      Time Calculation (min)  29 min  -TW      PT Received On  09/16/20  -TW         Time Calculation- PT    Total Timed Code Minutes- PT  29 minute(s)  -TW        User Key  (r) = Recorded By, (t) = Taken By, (c) = Cosigned By    Initials Name Provider Type     Remgiio Mi PTA Physical Therapy Assistant        Therapy Charges for Today     Code Description Service Date Service Provider Modifiers Qty    43071118274 HC GAIT TRAINING EA 15 MIN 9/15/2020 Remigio Mi, PTA GP 1    42952693895 HC PT THERAPEUTIC ACT EA 15 MIN 9/15/2020 Remigio Mi, PTA GP 1    06813472148 HC GAIT TRAINING EA 15 MIN 9/16/2020 Remigio Mi, PTA GP 1    74292517853 HC PT THERAPEUTIC ACT EA 15 MIN 9/16/2020 Remigio Mi, PTA GP 1          PT G-Codes  Outcome Measure Options: AM-PAC 6 Clicks Daily Activity (OT)  AM-PAC 6 Clicks Score (PT): 20  AM-PAC 6 Clicks Score (OT): 24    Remigio Mi PTA  9/16/2020

## 2020-09-16 NOTE — NURSING NOTE
"Behavior   Note any precipitants to event or behavior   Describe level and action of any aggressive behavior or speech and associated interventions.     Anxiety: Patient denies at this time  Depression: Patient denies at this time  Pain  0  AVH   yes   S/I   no  Plan  no  H/I   no  Plan  no    Affect   euthymic/normal      Note: Pt states she hears voices in the walls and all around her.  Denies command hallucinations or suicidal/self harm thoughts.  Pt takes great comfort in her krystyna in God and her smile brightens the room when she speaks of her krystyna in her [employer] [place].  States she voices say \"do you hear that? I hear it too!\".       Intervention    PRN medication utilized:  no    Instructed in medication usage and effects  Medications administered as ordered  Encouraged to verbalize needs      Response    Verbalized understanding   Did patient take medications as ordered no  Did patient interact with assessment?  yes     Plan    Will monitor for safety  Will monitor every 15 minutes as ordered  Will evaluate and promote the plan of care    Last BM:  9/14/2020  (Please chart in I/O as well)    "

## 2020-09-16 NOTE — NURSING NOTE
"Pt refused sliding scale insulin.  Pt states she just wants to take her pills she takes at home.  Explained to pt that those had been discontinued due to her sugar levels going too low & that sliding scale was all she had ordered at this time.  Pt still refused medication, stating \"I just want the pills and my regular food.\".    "

## 2020-09-16 NOTE — PLAN OF CARE
Goal Outcome Evaluation:  Plan of Care Reviewed With: patient  Progress: improving  Outcome Summary: pt calm and cooperative, does state she hears voices coming from walls.  Pt  is blind and stays in her room most of the time.

## 2020-09-16 NOTE — CONSULTS
Adult Nutrition  Assessment    Patient Name:  Natacha Gandhi  YOB: 1950  MRN: [id number]  Admit Date:  9/3/2020    Assessment Date:  9/15/2020    Comments:  Pt reports good appetite.  Voiced food preferences.  Finger foods suggested by staff.  Pt indicated she wanted to continue to receive what she was getting.  Intake 100% - 1x, 75% - 1x, 50% - 3x, 0% - 1x plus 100% for 1 snack.  Staff indicated pt didn't always receive her meal in a divided container.  Pt breakfast menu checked for instructions for divided container.  Blood glucose has been low and elevated.  Sliding scale novolog prescribed.  Pt is free of GI distress.  Will maintain pt on prescribed diet.    Reason for Assessment     Row Name 09/15/20 1912          Reason for Assessment    Reason For Assessment  follow-up protocol             Labs/Tests/Procedures/Meds     Row Name 09/15/20 1913          Labs/Procedures/Meds    Lab Results Reviewed  reviewed, pertinent        Medications    Pertinent Medications Reviewed  reviewed, pertinent             Nutrition Prescription Ordered     Row Name 09/15/20 1913          Nutrition Prescription PO    Current PO Diet  Soft Texture     Texture  Ground     Common Modifiers  Cardiac;Consistent Carbohydrate         Evaluation of Received Nutrient/Fluid Intake     Row Name 09/15/20 1914          PO Evaluation    Number of Meals  6 plus 1 snack     % PO Intake  100% - 1x, 75% - 1x, 50% - 3x, 0% - 1x plus 100% for 1 snack               Electronically signed by:  Suzette Steiner RD  09/15/20 19:17 CDT

## 2020-09-16 NOTE — PLAN OF CARE
Problem: Patient Care Overview  Goal: Plan of Care Review  Outcome: Ongoing, Progressing  Flowsheets  Taken 9/16/2020 1458  Consent Given to Review Plan with: pt perf ther ex b ue and toilet tf with cca bed mob with ind  Plan of Care Reviewed With: patient  Patient Agreement with Plan of Care: agrees  Taken 9/16/2020 1446  Plan of Care Reviewed With: patient

## 2020-09-16 NOTE — PLAN OF CARE
Problem: Patient Care Overview  Goal: Plan of Care Review  Flowsheets (Taken 9/15/2020 1925)  Progress: no change  Plan of Care Reviewed With: (nursing)   patient   other (see comments)  Outcome Summary: Intake 100% - 1x, 75% - 1x, 50% - 3x, 0% - 1x plus 100% for 1 snack.  Maintain pt on prescribed diet.  Encourage intake.

## 2020-09-17 LAB
GLUCOSE BLDC GLUCOMTR-MCNC: 207 MG/DL (ref 70–130)
GLUCOSE BLDC GLUCOMTR-MCNC: 245 MG/DL (ref 70–130)
GLUCOSE BLDC GLUCOMTR-MCNC: 266 MG/DL (ref 70–130)
GLUCOSE BLDC GLUCOMTR-MCNC: 65 MG/DL (ref 70–130)

## 2020-09-17 PROCEDURE — 97530 THERAPEUTIC ACTIVITIES: CPT

## 2020-09-17 PROCEDURE — 82962 GLUCOSE BLOOD TEST: CPT

## 2020-09-17 PROCEDURE — 99232 SBSQ HOSP IP/OBS MODERATE 35: CPT | Performed by: PSYCHIATRY & NEUROLOGY

## 2020-09-17 RX ADMIN — PANTOPRAZOLE SODIUM 40 MG: 40 TABLET, DELAYED RELEASE ORAL at 05:07

## 2020-09-17 RX ADMIN — ESCITALOPRAM OXALATE 10 MG: 10 TABLET ORAL at 08:22

## 2020-09-17 RX ADMIN — FERROUS SULFATE TAB EC 324 MG (65 MG FE EQUIVALENT) 324 MG: 324 (65 FE) TABLET DELAYED RESPONSE at 08:23

## 2020-09-17 RX ADMIN — RISPERIDONE 2 MG: 1 TABLET ORAL at 17:06

## 2020-09-17 NOTE — PLAN OF CARE
Goal Outcome Evaluation:  Plan of Care Reviewed With: patient  Progress: improving  Outcome Summary: OT Re-cert completed on this date. pt pleasant and agreeable to therapy. bed Mobility: Independent Transfers: SBA Grooming: oral hygiene, combing hair, and washing face at sink with SBA LBD: Supervision for don/doff shoes while sitting EOB. Feeding: Set-up with supervision. Pt mostly limited by decreased strength, decreased balance, and decreased safety awareness. Recommend d/c to SNF or home with HHOT and 24/7 supervision as pt is at an increased risks for falls d/t poor vision. Continue skilled OT during hospital stay.    Pt continues to refuse insulin injection stating she will only take pills. No new symptoms this day.

## 2020-09-17 NOTE — PLAN OF CARE
Problem: Patient Care Overview  Goal: Plan of Care Review  Outcome: Ongoing, Progressing  Flowsheets  Taken 9/17/2020 1230  Plan of Care Reviewed With: patient  Outcome Summary: OT Re-cert completed on this date. pt pleasant and agreeable to therapy. bed Mobility: Independent Transfers: SBA Grooming: oral hygiene, combing hair, and washing face at sink with SBA LBD: Supervision for don/doff shoes while sitting EOB. Feeding: Set-up with supervision. Pt mostly limited by decreased strength, decreased balance, and decreased safety awareness. Recommend d/c to SNF or home with HHOT and 24/7 supervision as pt is at an increased risks for falls d/t poor vision. Continue skilled OT during hospital stay.  Taken 9/17/2020 0834  Plan of Care Reviewed With: patient

## 2020-09-17 NOTE — THERAPY PROGRESS REPORT/RE-CERT
Acute Care - Occupational Therapy Progress Note  [facility]     Patient Name: Natacha Gandhi  : 1950  MRN: [id number]  Today's Date: 2020  Onset of Illness/Injury or Date of Surgery: 20  Date of Referral to OT: 20  Referring Physician: Dr. Anderson    Admit Date: 9/3/2020       ICD-10-CM ICD-9-CM   1. Impaired mobility and activities of daily living  Z74.09 V49.89    Z78.9    2. Impaired functional mobility, balance, gait, and endurance  Z74.09 V49.89   3. Oral phase dysphagia  R13.11 787.21     Patient Active Problem List   Diagnosis   • Acute GI bleeding   • Altered mental status, unspecified   • Schizophrenia, paranoid type (CMS/HCC)   • Acute exacerbation of chronic paranoid schizophrenia (CMS/HCC)   • Acute blood loss anemia   • DMII (diabetes mellitus, type 2) (CMS/HCC)     Past Medical History:   Diagnosis Date   • Depression    • Psychiatric illness    • Schizoaffective disorder (CMS/HCC)      Past Surgical History:   Procedure Laterality Date   • COLONOSCOPY N/A 2020    Procedure: COLONOSCOPY;  Surgeon: Ge Gorman MD;  Location: [facility];  Service: Gastroenterology;  Laterality: N/A;   • ENDOSCOPY N/A 2020    Procedure: ESOPHAGOGASTRODUODENOSCOPY;  Surgeon: Ge Gorman MD;  Location: [facility];  Service: Gastroenterology;  Laterality: N/A;          OT ASSESSMENT FLOWSHEET (last 12 hours)      OT Evaluation and Treatment     Row Name 20 0834                   OT Time and Intention    Subjective Information  no complaints  -        Document Type  progress note/recertification  -        Mode of Treatment  individual therapy;occupational therapy  -        Patient Effort  good  -           General Information    Existing Precautions/Restrictions  fall  -        Limitations/Impairments  visual  -           Cognition    Affect/Mental Status (Cognitive)  WFL  -        Orientation Status (Cognition)  oriented x 4  -        Follows Commands  (Cognition)  WFL  -        Cognitive Function (Cognitive)  safety deficit  -        Memory Deficit (Cognitive)  moderate deficit  -        Safety Deficit (Cognitive)  moderate deficit  -        Personal Safety Interventions  fall prevention program maintained;gait belt;muscle strengthening facilitated;nonskid shoes/slippers when out of bed;supervised activity  -           Pain Assessment    Additional Documentation  Pain Scale: Numbers Pre/Post-Treatment (Group)  -           Pain Scale: Numbers Pre/Post-Treatment    Pretreatment Pain Rating  0/10 - no pain  -        Posttreatment Pain Rating  0/10 - no pain  -           Range of Motion Comprehensive    General Range of Motion  no range of motion deficits identified  -           Strength Comprehensive (MMT)    General Manual Muscle Testing (MMT) Assessment  no strength deficits identified  -           Bed Mobility    Supine-Sit [place] (Bed Mobility)  independent  -        Sit-Supine [place] (Bed Mobility)  independent  -           Transfer Assessment/Treatment    Transfers  sit-stand transfer;stand-sit transfer  -           Transfers    Sit-Stand [place] (Transfers)  supervision  -        Stand-Sit [place] (Transfers)  supervision  -           Safety Issues, Functional Mobility    Impairments Affecting Function (Mobility)  balance;coordination;endurance/activity tolerance;pain;postural/trunk control;strength  -           Activities of Daily Living    BADL Assessment/Intervention  grooming;lower body dressing;bathing  -           Bathing Assessment/Intervention    [place] Level (Bathing)  upper body;supervision;set up  -        Position (Bathing)  edge of bed sitting  -           Lower Body Dressing Assessment/Training    [place] Level (Lower Body Dressing)  supervision  -        Position (Lower Body Dressing)  edge of bed sitting  -           Grooming Assessment/Training    [place] Level  (Grooming)  grooming skills;hair care, combing/brushing;wash face, hands;contact guard assist;oral care regimen  -        Position (Grooming)  unsupported standing  -           Self-Feeding Assessment/Training    [place] Level (Feeding)  feeding skills;nonverbal cues (demo/gesture);verbal cues;supervision;set up  -        Position (Self-Feeding)  edge of bed sitting  -           BADL Safety/Performance    Impairments, BADL Safety/Performance  balance;endurance/activity tolerance;visual/perceptual;trunk/postural control;strength  -           Plan of Care Review    Plan of Care Reviewed With  patient  -           Vital Signs    Pre Systolic BP Rehab  96  -JH        Pre Treatment Diastolic BP  55  -JH        Post Systolic BP Rehab  115  -JH        Post Treatment Diastolic BP  54  -JH        Pretreatment Heart Rate (beats/min)  92  -JH        Posttreatment Heart Rate (beats/min)  73  -JH        Pre SpO2 (%)  97  -JH        O2 Delivery Pre Treatment  room air  -        Post SpO2 (%)  97  -JH        O2 Delivery Post Treatment  room air  -        Pre Patient Position  Sitting  -        Post Patient Position  Supine  -          User Key  (r) = Recorded By, (t) = Taken By, (c) = Cosigned By    Initials Name Effective Dates     Diego Pelaez OT 09/10/19 -          Occupational Therapy Education                 Title: PT OT SLP Therapies (In Progress)     Topic: Occupational Therapy (In Progress)     Point: ADL training (Done)     Description:   Instruct learner(s) on proper safety adaptation and remediation techniques during self care or transfers.   Instruct in proper use of assistive devices.              Learning Progress Summary           Patient Acceptance, E, VU by BB at 9/10/2020 1336    Acceptance, E, VU by BB at 9/9/2020 1101    Acceptance, E, VU by BB at 9/8/2020 1047    Acceptance, E, VU,NR by AS at 9/4/2020 1205    Comment: role of OT, OT POC, ADL training, fall preacutions, bed  mobility, transfer training                   Point: Home exercise program (Not Started)     Description:   Instruct learner(s) on appropriate technique for monitoring, assisting and/or progressing therapeutic exercises/activities.              Learner Progress:  Not documented in this visit.          Point: Precautions (Done)     Description:   Instruct learner(s) on prescribed precautions during self-care and functional transfers.              Learning Progress Summary           Patient Acceptance, E,TB, VU by KM at 9/9/2020 0424    Acceptance, E, VU,NR by AS at 9/4/2020 1205    Comment: role of OT, OT POC, ADL training, fall preacutions, bed mobility, transfer training                   Point: Body mechanics (Done)     Description:   Instruct learner(s) on proper positioning and spine alignment during self-care, functional mobility activities and/or exercises.              Learning Progress Summary           Patient Acceptance, E, VU by BB at 9/8/2020 1047    Acceptance, E, NR by BB at 9/7/2020 1131                               User Key     Initials Effective Dates Name Provider Type Discipline     10/17/16 -  Madison Irwin RN Registered Nurse Nurse    BB 03/07/18 -  Mare Reed COTA/L Occupational Therapy Assistant OT    AS 07/05/20 -  Unique Garcia OT Occupational Therapist OT                  OT Recommendation and Plan     Plan of Care Review  Plan of Care Reviewed With: patient  Outcome Summary: OT Re-cert completed on this date. pt pleasant and agreeable to therapy. bed Mobility: Independent Transfers: SBA Grooming: oral hygiene, combing hair, and washing face at sink with SBA LBD: Supervision for don/doff shoes while sitting EOB. Feeding: Set-up with supervision. Pt mostly limited by decreased strength, decreased balance, and decreased safety awareness. Recommend d/c to SNF or home with HHOT and 24/7 supervision as pt is at an increased risks for falls d/t poor vision. Continue skilled OT  during hospital stay.  Plan of Care Reviewed With: patient  Outcome Summary: OT Re-cert completed on this date. pt pleasant and agreeable to therapy. bed Mobility: Independent Transfers: SBA Grooming: oral hygiene, combing hair, and washing face at sink with SBA LBD: Supervision for don/doff shoes while sitting EOB. Feeding: Set-up with supervision. Pt mostly limited by decreased strength, decreased balance, and decreased safety awareness. Recommend d/c to SNF or home with HHOT and 24/7 supervision as pt is at an increased risks for falls d/t poor vision. Continue skilled OT during hospital stay.    Outcome Measures     Row Name 09/17/20 0834 09/16/20 1345 09/15/20 1124       How much help from another person do you currently need...    Turning from your back to your side while in flat bed without using bedrails?  --  4  -TW  4  -TW    Moving from lying on back to sitting on the side of a flat bed without bedrails?  --  4  -TW  4  -TW    Moving to and from a bed to a chair (including a wheelchair)?  --  3  -TW  3  -TW    Standing up from a chair using your arms (e.g., wheelchair, bedside chair)?  --  3  -TW  3  -TW    Climbing 3-5 steps with a railing?  --  3  -TW  3  -TW    To walk in hospital room?  --  3  -TW  3  -TW    AM-PAC 6 Clicks Score (PT)  --  20  -TW  20  -TW       How much help from another is currently needed...    Putting on and taking off regular lower body clothing?  3  -JH  --  --    Bathing (including washing, rinsing, and drying)  3  -JH  --  --    Toileting (which includes using toilet bed pan or urinal)  3  -JH  --  --    Putting on and taking off regular upper body clothing  4  -JH  --  --    Taking care of personal grooming (such as brushing teeth)  4  -JH  --  --    Eating meals  4  -JH  --  --    AM-PAC 6 Clicks Score (OT)  21  -JH  --  --      User Key  (r) = Recorded By, (t) = Taken By, (c) = Cosigned By    Initials Name Provider Type    TW Remigio Mi, PTA Physical Therapy  Assistant     Diego Pelaez OT Occupational Therapist          Time Calculation:   Time Calculation- OT     Row Name 09/17/20 1229             Time Calculation-     OT Start Time  0834  -      OT Stop Time  0900  -      OT Time Calculation (min)  26 min  -      OT Received On  09/17/20  -      OT Goal Re-Cert Due Date  09/30/20  -        User Key  (r) = Recorded By, (t) = Taken By, (c) = Cosigned By    Initials Name Provider Type     Diego Pelaez OT Occupational Therapist        Therapy Charges for Today     Code Description Service Date Service Provider Modifiers Qty    15957420672  OT THERAPEUTIC ACT EA 15 MIN 9/17/2020 Diego Pelaez OT GO 2               Diego Pelaez OT  9/17/2020

## 2020-09-17 NOTE — THERAPY PROGRESS REPORT/RE-CERT
Patient Name: Natacha Gandhi  : 1950    MRN: [id number]                              Today's Date: 2020     Re-Certification  Admit Date: 9/3/2020    Visit Dx:     ICD-10-CM ICD-9-CM   1. Impaired mobility and activities of daily living  Z74.09 V49.89    Z78.9    2. Impaired functional mobility, balance, gait, and endurance  Z74.09 V49.89   3. Oral phase dysphagia  R13.11 787.21     Patient Active Problem List   Diagnosis   • Acute GI bleeding   • Altered mental status, unspecified   • Schizophrenia, paranoid type (CMS/HCC)   • Acute exacerbation of chronic paranoid schizophrenia (CMS/HCC)   • Acute blood loss anemia   • DMII (diabetes mellitus, type 2) (CMS/HCC)     Past Medical History:   Diagnosis Date   • Depression    • Psychiatric illness    • Schizoaffective disorder (CMS/HCC)      Past Surgical History:   Procedure Laterality Date   • COLONOSCOPY N/A 2020    Procedure: COLONOSCOPY;  Surgeon: eG Gorman MD;  Location: [facility];  Service: Gastroenterology;  Laterality: N/A;   • ENDOSCOPY N/A 2020    Procedure: ESOPHAGOGASTRODUODENOSCOPY;  Surgeon: Ge Gorman MD;  Location: [facility];  Service: Gastroenterology;  Laterality: N/A;     General Information     Row Name 20          Physical Therapy Time and Intention    Document Type  progress note/recertification  -KW     Mode of Treatment  individual therapy;physical therapy  -KW     Row Name 20          General Information    Patient Profile Reviewed  yes  -KW     Existing Precautions/Restrictions  fall  -KW     Barriers to Rehab  visual deficit legally blind  -KW     Row Name 20          Cognition    Orientation Status (Cognition)  oriented x 4  -KW     Row Name 20          Safety Issues, Functional Mobility    Impairments Affecting Function (Mobility)  balance;coordination;endurance/activity tolerance;motor control;motor planning  -KW       User Key  (r) = Recorded By, (t) =  Taken By, (c) = Cosigned By    Initials Name Provider Type    KW Jeet Mortensen PT Physical Therapist        Mobility     Row Name 09/17/20 0927          Bed Mobility    Bed Mobility  bed mobility (all) activities  -KW     All Activities, [place] (Bed Mobility)  independent  -KW     Row Name 09/17/20 0927          Sit-Stand Transfer    Sit-Stand [place] (Transfers)  contact guard;1 person assist  -KW     Assistive Device (Sit-Stand Transfers)  other (see comments) HHA  -KW     Row Name 09/17/20 0927          Gait/Stairs (Locomotion)    [place] Level (Gait)  contact guard;1 person assist  -KW     Assistive Device (Gait)  other (see comments) HHA  -KW     Distance in Feet (Gait)  150'  -KW     Deviations/Abnormal Patterns (Gait)  base of support, narrow;gait speed decreased;stride length decreased  -KW     Comment (Gait/Stairs)  VC to increase stride length with good response and much improved gt stability  -KW       User Key  (r) = Recorded By, (t) = Taken By, (c) = Cosigned By    Initials Name Provider Type    KW Jeet Mortensen PT Physical Therapist        Obj/Interventions     Row Name 09/17/20 0927          Range of Motion Comprehensive    General Range of Motion  no range of motion deficits identified  -KW     Row Name 09/17/20 0927          Strength Comprehensive (MMT)    General Manual Muscle Testing (MMT) Assessment  no strength deficits identified  -KW     Row Name 09/17/20 0927          Balance    Balance Assessment  sitting static balance;sitting dynamic balance;standing static balance  -KW     Static Sitting Balance  WFL  -KW     Dynamic Sitting Balance  WFL  -KW     Static Standing Balance  mild impairment  -KW     Balance Interventions  dynamic;sitting  -KW     Comment, Balance  Pt sat EOB for 20 min performing dynamic daily activities with no instability or LOB. Good awareness of surroundings considering visual deficit  -KW     Row Name 09/17/20 0927          Sensory Assessment  (Somatosensory)    Sensory Assessment (Somatosensory)  sensation intact  -KW       User Key  (r) = Recorded By, (t) = Taken By, (c) = Cosigned By    Initials Name Provider Type    KW Jeet Mortensen, PT Physical Therapist        Goals/Plan     Row Name 09/17/20 0927          Bed Mobility Goal 1 (PT)    Activity/Assistive Device (Bed Mobility Goal 1, PT)  sit to supine;supine to sit  -KW     [place] Level/Cues Needed (Bed Mobility Goal 1, PT)  independent  -KW     Time Frame (Bed Mobility Goal 1, PT)  3 days  -KW     Progress/Outcomes (Bed Mobility Goal 1, PT)  (S) goal met  -KW     Row Name 09/17/20 0927          Transfer Goal 1 (PT)    Activity/Assistive Device (Transfer Goal 1, PT)  sit-to-stand/stand-to-sit  -KW     [place] Level/Cues Needed (Transfer Goal 1, PT)  supervision required  -KW     Time Frame (Transfer Goal 1, PT)  3 days  -KW     Progress/Outcome (Transfer Goal 1, PT)  goal not met  -KW     Row Name 09/17/20 0927          Gait Training Goal 1 (PT)    Activity/Assistive Device (Gait Training Goal 1, PT)  gait (walking locomotion) LRAD PRN  -KW     [place] Level (Gait Training Goal 1, PT)  supervision required  -KW     Distance (Gait Training Goal 1, PT)  100ft or more  -KW     Time Frame (Gait Training Goal 1, PT)  5 days  -KW     Progress/Outcome (Gait Training Goal 1, PT)  goal not met  -KW     Row Name 09/17/20 0927          Stairs Goal 1 (PT)    Activity/Assistive Device (Stairs Goal 1, PT)  ascending stairs;descending stairs  -KW     [place] Level/Cues Needed (Stairs Goal 1, PT)  standby assist  -KW     Number of Stairs (Stairs Goal 1, PT)  1 or more  -KW     Time Frame (Stairs Goal 1, PT)  by discharge  -KW     Progress/Outcome (Stairs Goal 1, PT)  goal not met  -KW       User Key  (r) = Recorded By, (t) = Taken By, (c) = Cosigned By    Initials Name Provider Type    KW Jeet Mortensen, PT Physical Therapist        Clinical Impression     Row Name 09/17/20 0927          Pain  Scale: Numbers Pre/Post-Treatment    Pretreatment Pain Rating  0/10 - no pain  -KW     Posttreatment Pain Rating  0/10 - no pain  -KW     Pain Intervention(s)  Ambulation/increased activity;Repositioned  -KW     Row Name 09/17/20 0927          Plan of Care Review    Plan of Care Reviewed With  patient  -KW     Row Name 09/17/20 0927          Therapy Assessment/Plan (PT)    Rehab Potential (PT)  good, to achieve stated therapy goals  -KW     Criteria for Skilled Interventions Met (PT)  yes;skilled treatment is necessary  -KW     Row Name 09/17/20 0927          Vital Signs    Pre Systolic BP Rehab  107  -KW     Pre Treatment Diastolic BP  55  -KW     Pretreatment Heart Rate (beats/min)  85  -KW     Pre SpO2 (%)  98  -KW     O2 Delivery Pre Treatment  room air  -KW     Pre Patient Position  Supine  -KW     Post Patient Position  Sitting  -KW     Row Name 09/17/20 0927          Positioning and Restraints    Pre-Treatment Position  in bed  -KW     Post Treatment Position  bed  -KW     In Bed  notified nsg;sitting;exit alarm on;encouraged to call for assist  -KW       User Key  (r) = Recorded By, (t) = Taken By, (c) = Cosigned By    Initials Name Provider Type    KW Jeet Mortensen, PT Physical Therapist        Outcome Measures     Row Name 09/17/20 0927          How much help from another person do you currently need...    Turning from your back to your side while in flat bed without using bedrails?  4  -KW     Moving from lying on back to sitting on the side of a flat bed without bedrails?  4  -KW     Moving to and from a bed to a chair (including a wheelchair)?  3  -KW     Standing up from a chair using your arms (e.g., wheelchair, bedside chair)?  3  -KW     Climbing 3-5 steps with a railing?  3  -KW     To walk in hospital room?  3  -KW     AM-PAC 6 Clicks Score (PT)  20  -KW     Row Name 09/17/20 0927          Functional Assessment    Outcome Measure Options  AM-PAC 6 Clicks Basic Mobility (PT)  -KW       User Key   (r) = Recorded By, (t) = Taken By, (c) = Cosigned By    Initials Name Provider Type    KW Jeet Mortensen PT Physical Therapist        Physical Therapy Education                 Title: PT OT SLP Therapies (In Progress)     Topic: Physical Therapy (In Progress)     Point: Mobility training (Done)     Learning Progress Summary           Patient Acceptance, E, VU by KW at 9/17/2020 1111    Comment: Cont PT POC, gt mech/stride length    Acceptance, E, VU by KW1 at 9/4/2020 1334    Comment: Role of PT, POC, use of gait belt                   Point: Home exercise program (Not Started)     Learner Progress:  Not documented in this visit.          Point: Body mechanics (Done)     Learning Progress Summary           Patient Acceptance, E, VU by KW at 9/17/2020 1111    Comment: Cont PT POC, gt mech/stride length                   Point: Precautions (Done)     Learning Progress Summary           Patient Acceptance, E, VU by KW at 9/17/2020 1111    Comment: Cont PT POC, gt mech/stride length    Acceptance, E,TB, VU by  at 9/9/2020 0424    Acceptance, E, VU by KW at 9/4/2020 1334    Comment: Role of PT, POC, use of gait belt                               User Key     Initials Effective Dates Name Provider Type Discipline     10/17/16 -  Madison Irwin RN Registered Nurse Nurse    KW1 08/09/20 -  Melina Neves PT Physical Therapist PT     08/09/20 -  Jeet Mortensen PT Physical Therapist PT              PT Recommendation and Plan  Planned Therapy Interventions (PT): balance training, gait training, home exercise program, motor coordination training, neuromuscular re-education, patient/family education, ROM (range of motion), stair training, strengthening, stretching, transfer training  Plan of Care Reviewed With: patient  Outcome Summary: PT re-cert complete. Pt pleasant and agreeable to PT. A&Ox4. Pt independent with bed mobility. CGA and HHA required for sit>stand and ambulation. Pt ambulated 150', decreased stride  length and narrow DAVID. Good respones to VC to increase stride length, improved gt mech and stability noted. Pt sat EOB for 20 min independently performing dynamic daily activities with no instability or LOB. Good awareness of surroundings in her room considering visual deficit (legally blind). Bed mobility goal met. Good progress towards others. Recommend SNF placement upon d/c or 24/7 supervision/assistance as pt previously lived alone. Continue skilled PT progressing as able.     Time Calculation:   PT Charges     Row Name 09/17/20 1116             Time Calculation    Start Time  0927  -KW      Stop Time  0955  -KW      Time Calculation (min)  28 min  -KW      PT Received On  09/17/20  -KW         Time Calculation- PT    Total Timed Code Minutes- PT  28 minute(s)  -KW         Timed Charges    54918 - PT Therapeutic Activity Minutes  28  -KW        User Key  (r) = Recorded By, (t) = Taken By, (c) = Cosigned By    Initials Name Provider Type    Jeet Pereyra PT Physical Therapist        Therapy Charges for Today     Code Description Service Date Service Provider Modifiers Qty    39361804311 HC PT THERAPEUTIC ACT EA 15 MIN 9/17/2020 Jeet Mortensen PT GP 2          PT G-Codes  Outcome Measure Options: AM-PAC 6 Clicks Basic Mobility (PT)  AM-PAC 6 Clicks Score (PT): 20  AM-PAC 6 Clicks Score (OT): 24    Jeet Mortensen PT  9/17/2020

## 2020-09-17 NOTE — NURSING NOTE
Behavior   Note any precipitants to event or behavior   Describe level and action of any aggressive behavior or speech and associated interventions.     Anxiety: Patient denies at this time  Depression: Patient denies at this time  Pain  0  AVH   no  S/I   no  Plan  no  H/I   no  Plan  no    Affect   euthymic/normal      Note: Patient sleeping when signee entered room. Pt pleasant and cooperative. No complaints of pain or discomfort at this time. No night medications scheduled.       Intervention    PRN medication utilized:  no    Instructed in medication usage and effects  Medications administered as ordered  Encouraged to verbalize needs      Response    Verbalized understanding   Did patient take medications as ordered yes   Did patient interact with assessment?  yes     Plan    Will monitor for safety  Will monitor every 15 minutes as ordered  Will evaluate and promote the plan of care    Last BM  (Please chart in I/O as well)

## 2020-09-17 NOTE — NURSING NOTE
Behavior   Note any precipitants to event or behavior   Describe level and action of any aggressive behavior or speech and associated interventions.     Anxiety: Patient denies at this time  Depression: Patient denies at this time  Pain  0  AVH   no  S/I   no  Plan  no  H/I   no  Plan  no    Affect   euthymic/normal      Note: Pt is alert, et cooperative this am with no complaints or concerns voiced. She denies any S/I, H/I or hallucinations. Appetite good. Will monitor.       Intervention    PRN medication utilized:  no    Instructed in medication usage and effects  Medications administered as ordered  Encouraged to verbalize needs      Response    Verbalized understanding   Did patient take medications as ordered yes   Did patient interact with assessment?  yes     Plan    Will monitor for safety  Will monitor every 15 minutes as ordered  Will evaluate and promote the plan of care    Last BM:  unknown date  (Please chart in I/O as well)

## 2020-09-17 NOTE — PLAN OF CARE
Goal Outcome Evaluation:  Plan of Care Reviewed With: patient  Progress: improving  Outcome Summary: Patient cooperative and compliant with medications. No fall thus far during shift.

## 2020-09-17 NOTE — PROGRESS NOTES
9/17/2020    Chief Complaint: Paranoid schizophrenia    Subjective:  Patient is a 70 y.o. female who was seen in her room today. Pt reports she is doing fine. She denies any visual or auditory hallucinations. Pt reports she did here people walking on the floor above her lastnight.Pt denies any buzzing sounds in her head today. Pt is friendly and cooperative. Pt denies thought of SI/HI/AVH.  Objective     Vital Signs    Temp:  [97.2 °F (36.2 °C)] 97.2 °F (36.2 °C)  Heart Rate:  [83-91] 83  Resp:  [18-20] 20  BP: (100-101)/(55-62) 100/62    Physical Exam:   General Appearance: alert, appears stated age and cooperative,  Hygiene:   fair  Gait & Station: Needs Assistance  Musculoskeletal: None    Mental Status Exam:   Cooperation:  Cooperative  Eye Contact:  Poor  Psychomotor Behavior:  Appropriate  Mood: Improving  Affect:  normal  Speech:  Normal  Thought Process:  Coherent  Associations: Circumstantial  Thought Content:     Mood congruent   Suicidal:  None   Homicidal:  None   Hallucinations:  None   Delusion:  None  Cognitive Functioning:   Consciousness: awake and alert and Orientation:  Person and Place  Reliability:  fair  Insight:  Fair  Judgement:  Fair  Impulse Control:  Fair    Lab Results (last 24 hours)     Procedure Component Value Units Date/Time    POC Glucose Once [[id number]]  (Abnormal) Collected: 09/17/20 0606    Specimen: Blood Updated: 09/17/20 0618     Glucose 65 mg/dL     POC Glucose Once [[id number]]  (Abnormal) Collected: 09/16/20 2003    Specimen: Blood Updated: 09/16/20 2017     Glucose 258 mg/dL     POC Glucose Once [[id number]]  (Abnormal) Collected: 09/16/20 1622    Specimen: Blood Updated: 09/16/20 1639     Glucose 201 mg/dL     POC Glucose Once [[id number]]  (Abnormal) Collected: 09/16/20 1133    Specimen: Blood Updated: 09/16/20 1149     Glucose 180 mg/dL         Imaging Results (Last 24 Hours)     ** No results found for the last 24 hours. **          Medicine:   Current  Facility-Administered Medications:   •  aluminum-magnesium hydroxide-simethicone (MAALOX MAX) 400-400-40 MG/5ML suspension 15 mL, 15 mL, Oral, Q6H PRN, Tyrel Anderson II, MD  •  cloNIDine (CATAPRES) tablet 0.1 mg, 0.1 mg, Oral, Q6H PRN, Tyrel Anderson II, MD  •  dextrose (D50W) 25 g/ 50mL Intravenous Solution 25 g, 25 g, Intravenous, Q15 Min PRN, Cas Houston APRN  •  dextrose (GLUTOSE) oral gel 15 g, 15 g, Oral, Q15 Min PRN, Cas Houston APRN, 15 g at 09/15/20 0556  •  escitalopram (LEXAPRO) tablet 10 mg, 10 mg, Oral, Daily, Tyrel Anderson II, MD, 10 mg at 09/17/20 0822  •  ferrous sulfate EC tablet 324 mg, 324 mg, Oral, Daily With Breakfast, Tyrel Anderson II, MD, 324 mg at 09/17/20 0823  •  glucagon (human recombinant) (GLUCAGEN DIAGNOSTIC) injection 1 mg, 1 mg, Subcutaneous, Q15 Min PRN, Cas Houston APRN  •  insulin aspart (novoLOG) injection 0-7 Units, 0-7 Units, Subcutaneous, TID AC, Cas Houston APRN, Stopped at 09/10/20 1227  •  loperamide (IMODIUM) capsule 2 mg, 2 mg, Oral, Q2H PRN, Tyrel Anderson II, MD  •  magnesium hydroxide (MILK OF MAGNESIA) suspension 2400 mg/10mL 10 mL, 10 mL, Oral, Daily PRN, Tyrel Anderson II, MD  •  pantoprazole (PROTONIX) EC tablet 40 mg, 40 mg, Oral, Q AM, Tyrel Anderson II, MD, 40 mg at 09/17/20 0507  •  risperiDONE (risperDAL) tablet 2 mg, 2 mg, Oral, Daily With Dinner, Ellie Raza MD, 2 mg at 09/16/20 1701    Diagnoses/Assessment:     Acute exacerbation of chronic paranoid schizophrenia (CMS/HCC)    Schizophrenia, paranoid type (CMS/[facility])    Acute blood loss anemia    DMII (diabetes mellitus, type 2) (CMS/[facility])      Treatment Plan:    1) Will continue care for the patient on the behavioral health unit at [facility] to ensure patient safety.  2) Will continue to provide treatment with the unit milieu, activities, therapies and psychopharmacological  management.  3) Patient to be placed on or continued on  Q15 minute checks  and Fall precautions.  4) Pertinent medical issues: Diabetes  5) Will order following labs: Poc Glucose 4x a day.  6) Will make the following medication changes:  --Cont Lexapro 10 mg   --Cont Risperdal 2mg po Q day with dinner.  7) Will continue discharge planning as appropriate for patient.  8) Psychotherapy provided for less than 16 minutes.    Treatment plan and medication risks and benefits discussed with: Patient    Elena Dave, CLAIRE  09/17/20  10:06 CDT

## 2020-09-17 NOTE — PLAN OF CARE
Problem: Patient Care Overview  Goal: Plan of Care Review  Outcome: Ongoing, Progressing  Flowsheets (Taken 9/17/2020 0953)  Plan of Care Reviewed With: patient  Outcome Summary: PT re-cert complete. Pt pleasant and agreeable to PT. A&Ox4. Pt independent with bed mobility. CGA and HHA required for sit>stand and ambulation. Pt ambulated 150', decreased stride length and narrow DAVID. Good respones to VC to increase stride length, improved gt mech and stability noted. Pt sat EOB for 20 min independently performing dynamic daily activities with no instability or LOB. Good awareness of surroundings in her room considering visual deficit (legally blind). Bed mobility goal met. Good progress towards others. Recommend SNF placement upon d/c or 24/7 supervision/assistance as pt previously lived alone. Continue skilled PT progressing as able.

## 2020-09-18 LAB
GLUCOSE BLDC GLUCOMTR-MCNC: 201 MG/DL (ref 70–130)
GLUCOSE BLDC GLUCOMTR-MCNC: 227 MG/DL (ref 70–130)
GLUCOSE BLDC GLUCOMTR-MCNC: 262 MG/DL (ref 70–130)
GLUCOSE BLDC GLUCOMTR-MCNC: 89 MG/DL (ref 70–130)

## 2020-09-18 PROCEDURE — 99232 SBSQ HOSP IP/OBS MODERATE 35: CPT | Performed by: PSYCHIATRY & NEUROLOGY

## 2020-09-18 PROCEDURE — 82962 GLUCOSE BLOOD TEST: CPT

## 2020-09-18 PROCEDURE — 99222 1ST HOSP IP/OBS MODERATE 55: CPT | Performed by: PODIATRIST

## 2020-09-18 PROCEDURE — 11721 DEBRIDE NAIL 6 OR MORE: CPT | Performed by: PODIATRIST

## 2020-09-18 PROCEDURE — 97530 THERAPEUTIC ACTIVITIES: CPT

## 2020-09-18 PROCEDURE — 97140 MANUAL THERAPY 1/> REGIONS: CPT

## 2020-09-18 PROCEDURE — 0HBRXZZ EXCISION OF TOE NAIL, EXTERNAL APPROACH: ICD-10-PCS | Performed by: PODIATRIST

## 2020-09-18 PROCEDURE — 97535 SELF CARE MNGMENT TRAINING: CPT

## 2020-09-18 RX ADMIN — ESCITALOPRAM OXALATE 10 MG: 10 TABLET ORAL at 08:30

## 2020-09-18 RX ADMIN — PANTOPRAZOLE SODIUM 40 MG: 40 TABLET, DELAYED RELEASE ORAL at 06:00

## 2020-09-18 RX ADMIN — FERROUS SULFATE TAB EC 324 MG (65 MG FE EQUIVALENT) 324 MG: 324 (65 FE) TABLET DELAYED RESPONSE at 08:30

## 2020-09-18 RX ADMIN — RISPERIDONE 2 MG: 1 TABLET ORAL at 17:18

## 2020-09-18 NOTE — PLAN OF CARE
Problem: Patient Care Overview  Goal: Plan of Care Review  Flowsheets (Taken 9/18/2020 8726)  Progress: improving  Plan of Care Reviewed With: (RN) other (see comments)  Outcome Summary: Intake 100% - 5x, 0% - 2x.  Encourage intake.

## 2020-09-18 NOTE — NURSING NOTE
Behavior   Note any precipitants to event or behavior   Describe level and action of any aggressive behavior or speech and associated interventions.     Anxiety: Patient denies at this time  Depression: Patient denies at this time  Pain  0  AVH   no  S/I   no  Plan  no  H/I   no  Plan  no    Affect   euthymic/normal      Note: Pt is alert and  cooperative  with no complaints or concerns voiced, interacts in appropriate manner, no falls. She denies any S/I, H/I or hallucinations      Intervention    PRN medication utilized:  no    Instructed in medication usage and effects  Medications administered as ordered  Encouraged to verbalize needs      Response    Verbalized understanding   Did patient take medications as ordered yes   Did patient interact with assessment?  yes     Plan    Will monitor for safety  Will monitor every 15 minutes as ordered  Will evaluate and promote the plan of care    Last BM:  unknown date  (Please chart in I/O as well)

## 2020-09-18 NOTE — THERAPY TREATMENT NOTE
Acute Care - Occupational Therapy Treatment Note  [facility]     Patient Name: Natacha Gandhi  : 1950  MRN: [id number]  Today's Date: 2020  Onset of Illness/Injury or Date of Surgery: 20  Date of Referral to OT: 20  Referring Physician: Dr. Anderson    Admit Date: 9/3/2020       ICD-10-CM ICD-9-CM   1. Impaired mobility and activities of daily living  Z74.09 V49.89    Z78.9    2. Impaired functional mobility, balance, gait, and endurance  Z74.09 V49.89   3. Oral phase dysphagia  R13.11 787.21     Patient Active Problem List   Diagnosis   • Acute GI bleeding   • Altered mental status, unspecified   • Schizophrenia, paranoid type (CMS/HCC)   • Acute exacerbation of chronic paranoid schizophrenia (CMS/HCC)   • Acute blood loss anemia   • DMII (diabetes mellitus, type 2) (CMS/HCC)     Past Medical History:   Diagnosis Date   • Depression    • Psychiatric illness    • Schizoaffective disorder (CMS/HCC)      Past Surgical History:   Procedure Laterality Date   • COLONOSCOPY N/A 2020    Procedure: COLONOSCOPY;  Surgeon: Ge Gorman MD;  Location: [facility];  Service: Gastroenterology;  Laterality: N/A;   • ENDOSCOPY N/A 2020    Procedure: ESOPHAGOGASTRODUODENOSCOPY;  Surgeon: Ge Gorman MD;  Location: [facility];  Service: Gastroenterology;  Laterality: N/A;          OT ASSESSMENT FLOWSHEET (last 12 hours)      OT Evaluation and Treatment     Row Name 20 1011                   OT Time and Intention    Subjective Information  no complaints  -RB        Document Type  therapy note (daily note)  -RB        Mode of Treatment  individual therapy;occupational therapy  -RB        Patient Effort  good  -RB           General Information    Patient Profile Reviewed  yes  -RB        Existing Precautions/Restrictions  fall  -RB        Limitations/Impairments  visual  -RB           Cognition    Orientation Status (Cognition)  oriented x 4  -RB        Follows Commands (Cognition)   WNL  -RB        Cognitive Function (Cognitive)  safety deficit  -RB        Memory Deficit (Cognitive)  minimal deficit  -RB           Pain Scale: Numbers Pre/Post-Treatment    Pretreatment Pain Rating  0/10 - no pain  -RB        Posttreatment Pain Rating  0/10 - no pain  -RB           Bed Mobility    Bed Mobility  bed mobility (all) activities  -RB        Scooting/Bridging [place] (Bed Mobility)  independent  -RB        Supine-Sit [place] (Bed Mobility)  independent  -RB        Sit-Supine [place] (Bed Mobility)  independent  -RB           Transfer Assessment/Treatment    Transfers  sit-stand transfer;stand-sit transfer  -RB           Transfers    Sit-Stand [place] (Transfers)  supervision  -RB        Stand-Sit [place] (Transfers)  supervision  -RB           Motor Skills    Therapeutic Exercise  shoulder;elbow/forearm  -RB           Shoulder (Therapeutic Exercise)    Shoulder Strengthening (Therapeutic Exercise)  bilateral;flexion;extension;2 lb free weight;2 sets;10 repetitions  -RB           Elbow/Forearm (Therapeutic Exercise)    Elbow/Forearm (Therapeutic Exercise)  strengthening exercise  -RB        Elbow/Forearm Strengthening (Therapeutic Exercise)  bilateral;flexion;extension;sitting;standing;2 lb free weight;2 sets;10 repetitions  -RB           Balance    Balance Assessment  standing dynamic balance  -RB        Sit to Stand Dynamic Balance  mild impairment  -RB        Static Standing Balance  WFL  -RB           Lower Body Dressing Assessment/Training    [place] Level (Lower Body Dressing)  lower body dressing skills;don;pants/bottoms;supervision;contact guard assist  -RB        Position (Lower Body Dressing)  edge of bed sitting  -RB           Vital Signs    Pre Systolic BP Rehab  80  -RB        Pre Treatment Diastolic BP  44  -RB        Intra Systolic BP Rehab  96  -RB        Intra Treatment Diastolic BP  51  -RB        Post Systolic BP Rehab  106  -RB        Post Treatment  Diastolic BP  57  -RB        Pretreatment Heart Rate (beats/min)  82  -RB        Posttreatment Heart Rate (beats/min)  84  -RB        Pre SpO2 (%)  98  -RB        O2 Delivery Pre Treatment  room air  -RB        Post SpO2 (%)  97  -RB        O2 Delivery Post Treatment  room air  -RB        Pre Patient Position  Supine  -RB        Intra Patient Position  Standing  -RB        Post Patient Position  Supine  -RB           OT Goals    Transfer Goal Selection (OT)  transfer, OT goal 1  -RB        Bathing Goal Selection (OT)  bathing, OT goal 1  -RB        Dressing Goal Selection (OT)  dressing, OT goal 1  -RB        Toileting Goal Selection (OT)  toileting, OT goal 1  -RB        Balance Goal Selection (OT)  balance, OT goal 1  -RB           Transfer Goal 1 (OT)    Activity/Assistive Device (Transfer Goal 1, OT)  sit-to-stand/stand-to-sit;bed-to-chair/chair-to-bed;toilet  -RB        [place] Level/Cues Needed (Transfer Goal 1, OT)  standby assist;verbal cues required;tactile cues required;set-up required  -RB        Time Frame (Transfer Goal 1, OT)  long term goal (LTG);by discharge  -RB        Progress/Outcome (Transfer Goal 1, OT)  goal met  -RB           Bathing Goal 1 (OT)    Activity/Device (Bathing Goal 1, OT)  lower body bathing  -RB        [place] Level/Cues Needed (Bathing Goal 1, OT)  verbal cues required;tactile cues required;set-up required;standby assist  -RB        Time Frame (Bathing Goal 1, OT)  long term goal (LTG);by discharge  -RB        Progress/Outcomes (Bathing Goal 1, OT)  goal met  -RB           Dressing Goal 1 (OT)    Activity/Device (Dressing Goal 1, OT)  lower body dressing  -RB        [place]/Cues Needed (Dressing Goal 1, OT)  verbal cues required;tactile cues required;set-up required;standby assist  -RB        Time Frame (Dressing Goal 1, OT)  long term goal (LTG);by discharge  -RB        Progress/Outcome (Dressing Goal 1, OT)  goal met  -RB           Toileting Goal 1 (OT)     Activity/Device (Toileting Goal 1, OT)  toileting skills, all  -RB        [place] Level/Cues Needed (Toileting Goal 1, OT)  standby assist;verbal cues required;tactile cues required;set-up required  -RB        Time Frame (Toileting Goal 1, OT)  long term goal (LTG);by discharge  -RB        Progress/Outcome (Toileting Goal 1, OT)  goal met  -RB           Balance Goal 1 (OT)    Activity/Assistive Device (Balance Goal 1, OT)  standing, dynamic  -RB        [place] Level/Cues Needed (Balance Goal 1, OT)  standby assist  -RB        Time Frame (Balance Goal 1, OT)  long term goal (LTG);by discharge  -RB        Progress/Outcomes (Balance Goal 1, OT)  goal not met  -RB           Positioning and Restraints    Pre-Treatment Position  in bed  -RB        Post Treatment Position  bed  -RB        In Bed  supine  -RB           Therapy Assessment/Plan (OT)    Therapy Frequency (OT)  other (see comments) 5-7 days/wk  -RB          User Key  (r) = Recorded By, (t) = Taken By, (c) = Cosigned By    Initials Name Effective Dates    RB Tyrel Lopez OT 07/24/19 -          Occupational Therapy Education                 Title: PT OT SLP Therapies (In Progress)     Topic: Occupational Therapy (In Progress)     Point: ADL training (Done)     Description:   Instruct learner(s) on proper safety adaptation and remediation techniques during self care or transfers.   Instruct in proper use of assistive devices.              Learning Progress Summary           Patient Acceptance, E, VU by BB at 9/10/2020 1336    Acceptance, E, VU by BB at 9/9/2020 1101    Acceptance, E, VU by BB at 9/8/2020 1047    Acceptance, E, VU,NR by AS at 9/4/2020 1205    Comment: role of OT, OT POC, ADL training, fall preacutions, bed mobility, transfer training                   Point: Home exercise program (Not Started)     Description:   Instruct learner(s) on appropriate technique for monitoring, assisting and/or progressing therapeutic exercises/activities.               Learner Progress:  Not documented in this visit.          Point: Precautions (Done)     Description:   Instruct learner(s) on prescribed precautions during self-care and functional transfers.              Learning Progress Summary           Patient Acceptance, D, DU by RB at 9/18/2020 1308    Comment: Edu pt on no OOB without assist.    Acceptance, E,TB, VU by KM at 9/9/2020 0424    Acceptance, E, VU,NR by AS at 9/4/2020 1205    Comment: role of OT, OT POC, ADL training, fall preacutions, bed mobility, transfer training                   Point: Body mechanics (Done)     Description:   Instruct learner(s) on proper positioning and spine alignment during self-care, functional mobility activities and/or exercises.              Learning Progress Summary           Patient Acceptance, E, VU by BB at 9/8/2020 1047    Acceptance, E, NR by BB at 9/7/2020 1131                               User Key     Initials Effective Dates Name Provider Type Discipline    LISA 07/24/19 -  Tyrel Lopez, JAUN Occupational Therapist OT     10/17/16 -  Madison Irwin RN Registered Nurse Nurse    MELECIO 03/07/18 -  Mare Reed COTA/L Occupational Therapy Assistant OT    AS 07/05/20 -  Unique Garcia OT Occupational Therapist OT                  OT Recommendation and Plan  Therapy Frequency (OT): other (see comments)(5-7 days/wk)  Plan of Care Review  Plan of Care Reviewed With: patient  Plan of Care Reviewed With: patient    Outcome Measures     Row Name 09/18/20 1011 09/17/20 0834 09/16/20 1345       How much help from another person do you currently need...    Turning from your back to your side while in flat bed without using bedrails?  --  --  4  -TW    Moving from lying on back to sitting on the side of a flat bed without bedrails?  --  --  4  -TW    Moving to and from a bed to a chair (including a wheelchair)?  --  --  3  -TW    Standing up from a chair using your arms (e.g., wheelchair, bedside chair)?  --  --  3  -TW     Climbing 3-5 steps with a railing?  --  --  3  -TW    To walk in hospital room?  --  --  3  -TW    AM-PAC 6 Clicks Score (PT)  --  --  20  -TW       How much help from another is currently needed...    Putting on and taking off regular lower body clothing?  3  -RB  3  -JH  --    Bathing (including washing, rinsing, and drying)  3  -RB  3  -JH  --    Toileting (which includes using toilet bed pan or urinal)  3  -RB  3  -JH  --    Putting on and taking off regular upper body clothing  4  -RB  4  -JH  --    Taking care of personal grooming (such as brushing teeth)  4  -RB  4  -JH  --    Eating meals  4  -RB  4  -JH  --    AM-PAC 6 Clicks Score (OT)  21  -RB  21  -JH  --      User Key  (r) = Recorded By, (t) = Taken By, (c) = Cosigned By    Initials Name Provider Type    RB Tyrel Lopez OT Occupational Therapist    TW Remigio Mi, PTA Physical Therapy Assistant     Diego Pelaez OT Occupational Therapist          Time Calculation:   Time Calculation- OT     Row Name 09/18/20 1312             Time Calculation- OT    OT Start Time  1011  -RB      OT Stop Time  1048  -      OT Time Calculation (min)  37 min  -      OT Received On  09/18/20  -        User Key  (r) = Recorded By, (t) = Taken By, (c) = Cosigned By    Initials Name Provider Type    RB Tyrel Lopez OT Occupational Therapist        Therapy Charges for Today     Code Description Service Date Service Provider Modifiers Qty    [id number]  OT SELF CARE/MGMT/TRAIN EA 15 MIN 9/18/2020 Tyrel Lopez OT GO 1    [id number]  OT MANUAL THERAPY EA 15 MIN 9/18/2020 Tyrel Lopez OT GO 1               Tyrel Lopez OT  9/18/2020

## 2020-09-18 NOTE — PLAN OF CARE
Goal Outcome Evaluation:  Plan of Care Reviewed With: patient  Progress: no change  Outcome Summary: Pt has been calm and cooperative this shift. Pt continues to isolate self and eat meals in room. Pt denies avh.

## 2020-09-18 NOTE — PLAN OF CARE
Goal Outcome Evaluation:  Plan of Care Reviewed With: patient  Progress: improving  Outcome Summary: Patient has slept throughout the night, no hallucination voiced, no falls and no behaviors noted.

## 2020-09-18 NOTE — THERAPY TREATMENT NOTE
Acute Care - Physical Therapy Treatment Note  [facility]     Patient Name: Natacha Gandhi  : 1950  MRN: [id number]  Today's Date: 2020   Onset of Illness/Injury or Date of Surgery: 20       PT Assessment (last 12 hours)      PT Evaluation and Treatment     Row Name 20 1414          Physical Therapy Time and Intention    Subjective Information  no complaints  -KW     Document Type  therapy note (daily note)  -KW     Mode of Treatment  individual therapy;physical therapy  -KW     Patient Effort  good  -KW     Row Name 20 1414          General Information    Patient Profile Reviewed  yes  -KW     Existing Precautions/Restrictions  fall  -KW     Limitations/Impairments  visual  -KW     Row Name 20 1414          Cognition    Orientation Status (Cognition)  oriented x 4  -KW     Follows Commands (Cognition)  WNL  -KW     Cognitive Function (Cognitive)  safety deficit  -KW     Memory Deficit (Cognitive)  minimal deficit  -KW     Row Name 20 1414          Pain Scale: Numbers Pre/Post-Treatment    Pretreatment Pain Rating  0/10 - no pain  -KW     Posttreatment Pain Rating  0/10 - no pain  -KW     Row Name 20 1414          Bed Mobility    Bed Mobility  bed mobility (all) activities  -KW     All Activities, [place] (Bed Mobility)  independent  -KW     Scooting/Bridging [place] (Bed Mobility)  independent  -KW     Supine-Sit [place] (Bed Mobility)  independent  -KW     Sit-Supine [place] (Bed Mobility)  independent  -KW     Row Name 20 1414          Transfers    Transfers  sit-stand transfer;stand-sit transfer  -KW     Sit-Stand [place] (Transfers)  contact guard;1 person assist  -KW     Stand-Sit [place] (Transfers)  supervision  -KW     [place] Level (Toilet Transfer)  contact guard  -KW     Assistive Device (Toilet Transfer)  grab bars/safety frame VCs for orientation in bathroom  -KW     Row Name 20 1414          Sit-Stand  Transfer    Assistive Device (Sit-Stand Transfers)  other (see comments) HHA  -KW     Row Name 09/18/20 1414          Toilet Transfer    Type (Toilet Transfer)  sit-stand;stand-sit  -KW     [facility] Name 09/18/20 1414          Gait/Stairs (Locomotion)    [place] Level (Gait)  contact guard;1 person assist  -KW     Assistive Device (Gait)  other (see comments) HHA  -KW     Distance in Feet (Gait)  200ft  -KW     Deviations/Abnormal Patterns (Gait)  base of support, narrow;gait speed decreased;stride length decreased  -KW     Row Name 09/18/20 1414          Safety Issues, Functional Mobility    Impairments Affecting Function (Mobility)  balance;coordination;endurance/activity tolerance;motor control;motor planning  -KW     [facility] Name 09/18/20 1414          Plan of Care Review    Plan of Care Reviewed With  patient  -[facility] Name 09/18/20 1414          Bed Mobility Goal 1 (PT)    Activity/Assistive Device (Bed Mobility Goal 1, PT)  sit to supine;supine to sit  -KW     [place] Level/Cues Needed (Bed Mobility Goal 1, PT)  independent  -KW     Time Frame (Bed Mobility Goal 1, PT)  3 days  -KW     Progress/Outcomes (Bed Mobility Goal 1, PT)  goal met  -KW     [facility] Name 09/18/20 1414          Transfer Goal 1 (PT)    Activity/Assistive Device (Transfer Goal 1, PT)  sit-to-stand/stand-to-sit  -KW     [place] Level/Cues Needed (Transfer Goal 1, PT)  supervision required  -KW     Time Frame (Transfer Goal 1, PT)  3 days  -KW     Progress/Outcome (Transfer Goal 1, PT)  goal not met  -KW     [facility] Name 09/18/20 1414          Gait Training Goal 1 (PT)    Activity/Assistive Device (Gait Training Goal 1, PT)  gait (walking locomotion) LRAD PRN  -KW     [place] Level (Gait Training Goal 1, PT)  supervision required  -KW     Distance (Gait Training Goal 1, PT)  100ft or more  -KW     Time Frame (Gait Training Goal 1, PT)  5 days  -KW     Progress/Outcome (Gait Training Goal 1, PT)  goal not met  -KW     Row Name 09/18/20  1414          Stairs Goal 1 (PT)    Activity/Assistive Device (Stairs Goal 1, PT)  ascending stairs;descending stairs  -KW     [place] Level/Cues Needed (Stairs Goal 1, PT)  standby assist  -KW     Number of Stairs (Stairs Goal 1, PT)  1 or more  -KW     Time Frame (Stairs Goal 1, PT)  by discharge  -KW     Progress/Outcome (Stairs Goal 1, PT)  goal not met  -KW     Row Name 09/18/20 1414          Positioning and Restraints    Pre-Treatment Position  in bed  -KW     Post Treatment Position  bed  -KW     In Bed  notified nsg;sitting EOB;call light within reach;encouraged to call for assist;exit alarm on;side rails up x2  -KW     Row Name 09/18/20 1414          Therapy Assessment/Plan (PT)    Rehab Potential (PT)  good, to achieve stated therapy goals  -KW     Criteria for Skilled Interventions Met (PT)  yes;skilled treatment is necessary  -KW     Row Name 09/18/20 1414          Progress Summary (PT)    Progress Toward Functional Goals (PT)  progress toward functional goals as expected  -KW       User Key  (r) = Recorded By, (t) = Taken By, (c) = Cosigned By    Initials Name Provider Type    KW Melina Neves, PT Physical Therapist        Physical Therapy Education                 Title: PT OT SLP Therapies (In Progress)     Topic: Physical Therapy (In Progress)     Point: Mobility training (Done)     Learning Progress Summary           Patient Acceptance, E, VU by KW at 9/17/2020 1111    Comment: Cont PT POC, gt mech/stride length    Acceptance, E, VU by KW1 at 9/4/2020 1334    Comment: Role of PT, POC, use of gait belt                   Point: Home exercise program (Not Started)     Learner Progress:  Not documented in this visit.          Point: Body mechanics (Done)     Learning Progress Summary           Patient Acceptance, E, VU by KW at 9/17/2020 1111    Comment: Cont PT POC, gt mech/stride length                   Point: Precautions (Done)     Learning Progress Summary           Patient Acceptance, E, VU  by KW at 9/17/2020 1111    Comment: Cont PT POC, gt mech/stride length    Acceptance, E,TB, VU by KM at 9/9/2020 0424    Acceptance, E, VU by KW1 at 9/4/2020 1334    Comment: Role of PT, POC, use of gait belt                               User Key     Initials Effective Dates Name Provider Type Discipline     10/17/16 -  Madison Irwin RN Registered Nurse Nurse    KW1 08/09/20 -  Melina Neves, PT Physical Therapist PT     08/09/20 -  Jeet Mortensen PT Physical Therapist PT              PT Recommendation and Plan  Anticipated Discharge Disposition (PT): skilled nursing facility, home with 24/7 care, home with home health  Planned Therapy Interventions (PT): balance training, bed mobility training, gait training, home exercise program, patient/family education, strengthening, stair training, transfer training  Therapy Frequency (PT): other (see comments)(6-11x/wk)  Progress Summary (PT)  Progress Toward Functional Goals (PT): progress toward functional goals as expected  Plan of Care Reviewed With: patient  Outcome Summary: PT tx completed this date. Pt pleasant and agreeable. Pt requesting to use toilet prior to ambulation. Pt performing sit<>supine with supervision and sit<>stand t/f with HHA. Pt performing toilet transfer with CGA and supervision for hygeine. Pt ambulating 200ft with HHA and VCs for safety. Pt deferring further BLE therex. No new goals met. Cont PT POC.  Outcome Measures     Row Name 09/18/20 1414 09/18/20 1011 09/17/20 0834       How much help from another person do you currently need...    Turning from your back to your side while in flat bed without using bedrails?  4  -KW  --  --    Moving from lying on back to sitting on the side of a flat bed without bedrails?  4  -KW  --  --    Moving to and from a bed to a chair (including a wheelchair)?  3  -KW  --  --    Standing up from a chair using your arms (e.g., wheelchair, bedside chair)?  3  -KW  --  --    Climbing 3-5 steps with a  railing?  3  -KW  --  --    To walk in hospital room?  3  -KW  --  --    AM-PAC 6 Clicks Score (PT)  20  -KW  --  --       How much help from another is currently needed...    Putting on and taking off regular lower body clothing?  --  3  -RB  3  -JH    Bathing (including washing, rinsing, and drying)  --  3  -RB  3  -JH    Toileting (which includes using toilet bed pan or urinal)  --  3  -RB  3  -JH    Putting on and taking off regular upper body clothing  --  4  -RB  4  -JH    Taking care of personal grooming (such as brushing teeth)  --  4  -RB  4  -JH    Eating meals  --  4  -RB  4  -JH    AM-PAC 6 Clicks Score (OT)  --  21  -RB  21  -JH       Functional Assessment    Outcome Measure Options  AM-PAC 6 Clicks Basic Mobility (PT)  -KW  --  --    Row Name 09/16/20 1345             How much help from another person do you currently need...    Turning from your back to your side while in flat bed without using bedrails?  4  -TW      Moving from lying on back to sitting on the side of a flat bed without bedrails?  4  -TW      Moving to and from a bed to a chair (including a wheelchair)?  3  -TW      Standing up from a chair using your arms (e.g., wheelchair, bedside chair)?  3  -TW      Climbing 3-5 steps with a railing?  3  -TW      To walk in hospital room?  3  -TW      AM-PAC 6 Clicks Score (PT)  20  -TW        User Key  (r) = Recorded By, (t) = Taken By, (c) = Cosigned By    Initials Name Provider Type    RB Tyrel Lopez, OT Occupational Therapist    TW Remigio Mi, PTA Physical Therapy Assistant    Melina Valladares, PT Physical Therapist    JH Diego Pelaez, OT Occupational Therapist           Time Calculation:   PT Charges     Row Name 09/18/20 1610             Time Calculation    Start Time  1414  -KW      Stop Time  1453  -KW      Time Calculation (min)  39 min  -KW      PT Received On  09/18/20  -KW        User Key  (r) = Recorded By, (t) = Taken By, (c) = Cosigned By    Initials Name Provider Type     KW Melina Neves, PT Physical Therapist        Therapy Charges for Today     Code Description Service Date Service Provider Modifiers Qty    10200442252 HC PT THERAPEUTIC ACT EA 15 MIN 9/18/2020 Melina Neves, PT GP 3          PT G-Codes  Outcome Measure Options: AM-PAC 6 Clicks Basic Mobility (PT)  AM-PAC 6 Clicks Score (PT): 20  AM-PAC 6 Clicks Score (OT): 21    Melina Neves, MARLINE  9/18/2020

## 2020-09-18 NOTE — PLAN OF CARE
Problem: Patient Care Overview  Goal: Plan of Care Review  Outcome: Ongoing, Progressing  Flowsheets (Taken 9/18/2020 0478)  Consent Given to Review Plan with: OT tx on this date. Pt participated in B UE therex with 2# dumbell while standing ~ 5 minutes then had to set down.  Pt able to don pants with set up supervision/CGA.  Pt progressing with OT POC.  Will cont with OT.  Plan of Care Reviewed With: patient

## 2020-09-18 NOTE — NURSING NOTE
"Behavior   Note any precipitants to event or behavior   Describe level and action of any aggressive behavior or speech and associated interventions.     Anxiety: Patient denies at this time  Depression: Patient denies at this time  Pain  0  AVH   no  S/I   no  Plan  no  H/I   no  Plan  no    Affect   euthymic/normal      Note: Pt is sitting on the side of her bed getting ready to eat breakfast during the time of assessment. She appears irritable with staff. She complains about having to take the medicine, breakfast being interrupted, and that the medications are in pudding. When writer asks if she had a bad night she says \"No I did not have a bad night. Did you have a bad night?. Pt encouraged to notify staff of needs.     Intervention    PRN medication utilized:  no    Instructed in medication usage and effects  Medications administered as ordered  Encouraged to verbalize needs      Response    Verbalized understanding   Did patient take medications as ordered yes   Did patient interact with assessment?  yes     Plan    Will monitor for safety  Will monitor every 15 minutes as ordered  Will evaluate and promote the plan of care    Last BM:  unknown date  (Please chart in I/O as well)    "

## 2020-09-18 NOTE — CONSULTS
Adult Nutrition  Assessment    Patient Name:  Natacha Gandhi  YOB: 1950  MRN: [id number]  Admit Date:  9/3/2020    Assessment Date:  9/18/2020    Comments:  Staff was with pt.  Intake 100% - 5x, 0% - 2x.  RN indicates pt is free of nausea/vomiting/diarrhea.  Blood glucose has been low and elevated.  Sliding scale novolog prescribed.  Will maintain pt on prescribed diet.    Reason for Assessment     Row Name 09/18/20 164          Reason for Assessment    Reason For Assessment  follow-up protocol             Labs/Tests/Procedures/Meds     Row Name 09/18/20 1650          Labs/Procedures/Meds    Lab Results Reviewed  reviewed, pertinent        Medications    Pertinent Medications Reviewed  reviewed, pertinent             Nutrition Prescription Ordered     Row Name 09/18/20 1658          Nutrition Prescription PO    Current PO Diet  Soft Texture     Texture  Ground     Common Modifiers  Consistent Carbohydrate;Cardiac         Evaluation of Received Nutrient/Fluid Intake     Row Name 09/18/20 1654          PO Evaluation    Number of Meals  7     % PO Intake  100% - 5x, 0% - 2x               Electronically signed by:  Suzette Steiner RD  09/18/20 16:53 CDT

## 2020-09-18 NOTE — PLAN OF CARE
Problem: Adult Behavioral Health Plan of Care  Goal: Plan of Care Review  Recent Flowsheet Documentation  Taken 9/18/2020 1608 by Melina Neves, PT  Plan of Care Reviewed With: patient  Outcome Summary: PT tx completed this date. Pt pleasant and agreeable. Pt requesting to use toilet prior to ambulation. Pt performing sit<>supine with supervision and sit<>stand t/f with HHA. Pt performing toilet transfer with CGA and supervision for hygeine. Pt ambulating 200ft with HHA and VCs for safety. Pt deferring further BLE therex. No new goals met. Cont PT POC.  Taken 9/18/2020 1414 by Melina Neves, PT  Plan of Care Reviewed With: patient

## 2020-09-19 LAB
GLUCOSE BLDC GLUCOMTR-MCNC: 291 MG/DL (ref 70–130)
GLUCOSE BLDC GLUCOMTR-MCNC: 73 MG/DL (ref 70–130)

## 2020-09-19 PROCEDURE — 82962 GLUCOSE BLOOD TEST: CPT

## 2020-09-19 PROCEDURE — 97535 SELF CARE MNGMENT TRAINING: CPT

## 2020-09-19 PROCEDURE — 99232 SBSQ HOSP IP/OBS MODERATE 35: CPT | Performed by: PSYCHIATRY & NEUROLOGY

## 2020-09-19 RX ORDER — RISPERIDONE 1 MG/1
3 TABLET ORAL
Status: DISCONTINUED | OUTPATIENT
Start: 2020-09-19 | End: 2020-09-24 | Stop reason: HOSPADM

## 2020-09-19 RX ADMIN — RISPERIDONE 3 MG: 1 TABLET ORAL at 18:01

## 2020-09-19 RX ADMIN — PANTOPRAZOLE SODIUM 40 MG: 40 TABLET, DELAYED RELEASE ORAL at 13:24

## 2020-09-19 RX ADMIN — ESCITALOPRAM OXALATE 10 MG: 10 TABLET ORAL at 13:24

## 2020-09-19 RX ADMIN — FERROUS SULFATE TAB EC 324 MG (65 MG FE EQUIVALENT) 324 MG: 324 (65 FE) TABLET DELAYED RESPONSE at 13:23

## 2020-09-19 NOTE — PROGRESS NOTES
"Psychiatry Progress Note   9/19/2020      HD: #16  Legal: Vol    Chief Complaint: Gross Psychosis, Severe Hallucinations and Gross Disorganization      Subjective --  Ms. Natacha Gandhi is a 70 y.o. female who was seen on the Geriatric unit.      Patient is seen in her room today.  She remains isolative there.  She reports me that she feels safe and has no concerns that others are out to get her to bed by her.  However, when she worked with occupational therapy today she stated that people have been mean and evil to her calling her names and with themes notable for paranoia.  Patient does not any headache or stomachache or muscle aches.  She appeared guarded when asked about above statements to other providers.  Concern for subtle paranoia.      Objective   Objective --      Vital Signs:  Temp:  [96.8 °F (36 °C)-99 °F (37.2 °C)] 99 °F (37.2 °C)  Heart Rate:  [86-90] 86  Resp:  [18] 18  BP: (100-124)/(58-60) 124/58    Patient Vitals for the past 24 hrs:   BP Temp Temp src Pulse Resp SpO2   09/19/20 0900 124/58 99 °F (37.2 °C) Tympanic 86 18 98 %   09/18/20 2100 100/60 96.8 °F (36 °C) Tympanic 90 18 99 %        Physical Exam:   -General Appearance:  normal general appearance and in no apparent distress  -Hygiene:  Adequate   -Gait & Station:  Blank multiple: Deferred, in bed  -Musculoskeletal:  No tremors or abnormal involuntary movements and No Cog [place] or Rigidity and No atrophy noted  -Pulm: unlaboured    Mental Status Exam:   --Cooperation:  Cooperative  --Eye Contact:  Non-sustained (legally blind)  --Psychomotor Behavior:  Slow and improving  --Mood:  \"Good\"  --Affect:  blunted, improving  --Speech:  Slow and Soft  --Thought Process:   Less sluggish and more organized  --Associations:  More goal-directed today  --Themes:  None overt  --Thought Content:                --Mood congruent              --Suicidal:  Denies               --Homicidal:  Denies              --Hallucinations:  Denies and none overt     "          --Delusion:  None overt  --Cognitive Functioning:              -Consciousness: awake and alert              -Orientation:  Person, Place and Time              -Attention:  Distractible                         -Concentration:  Distractible              -Language:  Average based on interaction; Intact  --Reliability:  fair  --Insight:  Diminished  --Judgment:  Impaired and improving  --Impulse Control:  Impaired and improving      Lab Results (last 24 hours)     Procedure Component Value Units Date/Time    POC Glucose Once [[id number]]  (Normal) Collected: 09/19/20 0636    Specimen: Blood Updated: 09/19/20 0649     Glucose 73 mg/dL     POC Glucose Once [[id number]]  (Abnormal) Collected: 09/18/20 1955    Specimen: Blood Updated: 09/18/20 2013     Glucose 227 mg/dL     POC Glucose Once [[id number]]  (Abnormal) Collected: 09/18/20 1657    Specimen: Blood Updated: 09/18/20 1711     Glucose 262 mg/dL           Imaging Results (Last 24 Hours)     ** No results found for the last 24 hours. **            Medications:   Scheduled Meds:  escitalopram, 10 mg, Oral, Daily  ferrous sulfate, 324 mg, Oral, Daily With Breakfast  insulin aspart, 0-7 Units, Subcutaneous, TID AC  pantoprazole, 40 mg, Oral, Q AM  risperiDONE, 2 mg, Oral, Daily With Dinner      Continuous Infusions:   PRN Meds:.•  aluminum-magnesium hydroxide-simethicone  •  cloNIDine  •  dextrose  •  dextrose  •  glucagon (human recombinant)  •  loperamide  •  magnesium hydroxide      Assessment:     Acute exacerbation of chronic paranoid schizophrenia (CMS/HCC)    Schizophrenia, paranoid type (CMS/HCC)    Acute blood loss anemia    DMII (diabetes mellitus, type 2) (CMS/HCC)      --> IMPRESSION: Ongoing psychosis, slowly improving.  Tolerating medications.  Concern for more subtle forms of psychosis given comments made Occupational Therapy and her isolative behaviors      Treatment Plan:  1) Will continue care for the patient on the behavioral health unit at  [facility] to ensure patient safety given severity of presenting symptoms, need for further improvement and maintenance of improvement prior to discharge.    2) Will continue to provide treatment with the unit milieu, activities, therapies and psychopharmacological management.    3) Patient to be placed on or continued on  Q15 minute checks  and Fall precautions.    4) Pertinent Concurrent Non-Psychiatric Medical Issues:   --Type 2 diabetes: Continue sliding scale insulin  -GERD/acute blood loss anemia: Continue Protonix and ferrous sulfate  - Foot care/podiatry: Appreciate their aid and care    5) Will order following labs: None    6) Behavioral Health Treatment Planning:  --Increase risperidone to 3 mg daily with dinner  -Continue Lexapro 10 mg daily for affective symptoms  7) Psychotherapy provided: Supportive, for less than 10 minutes.    --> Dispostion Planning: To be determined, further improvement stabilization.    Treatment plan and medication risks and benefits discussed with: Patient and Treatment Team.    Tyrel Anderson II, MD  09/19/20 @ 14:23 CDT  Dictated using Dragon.

## 2020-09-19 NOTE — NURSING NOTE
Behavior   Note any precipitants to event or behavior   Describe level and action of any aggressive behavior or speech and associated interventions.     Anxiety: Patient denies at this time  Depression: Patient denies at this time  Pain  0  AVH   no  S/I   no  Plan  no  H/I   no  Plan  no    Affect   euthymic/normal      Note: Pt is alert and  cooperative  with no complaints or concerns voiced, interacts in appropriate manner, no falls. She denies any S/I, H/I or hallucinations, she was in her room sitting up in bed at time of assessment.      Intervention    PRN medication utilized:  no    Instructed in medication usage and effects  Medications administered as ordered  Encouraged to verbalize needs      Response    Verbalized understanding   Did patient take medications as ordered yes   Did patient interact with assessment?  yes     Plan    Will monitor for safety  Will monitor every 15 minutes as ordered  Will evaluate and promote the plan of care    Last BM:  unknown date  (Please chart in I/O as well)

## 2020-09-19 NOTE — PLAN OF CARE
Goal Outcome Evaluation:  Plan of Care Reviewed With: patient  Progress: improving  Outcome Summary: Pt interviewed in her room this am.  She is pleasant and cooperative with care.  She is compliant with medication administration and assessment and is participating well with therapy.  She denies any SI/HI/AVH.

## 2020-09-19 NOTE — NURSING NOTE
Behavior   Note any precipitants to event or behavior   Describe level and action of any aggressive behavior or speech and associated interventions.     Anxiety: Patient denies at this time  Depression: Patient denies at this time  Pain  0  AVH   no  S/I   no  Plan  no  H/I   no  Plan  no    Affect   euthymic/normal      Note: Pt interviewed in her room this am.  She is pleasant and cooperative with care.  She is compliant with medication administration and assessment and is participating well with therapy.  She denies any SI/HI/AVH.      Intervention    PRN medication utilized:  no    Instructed in medication usage and effects  Medications administered as ordered  Encouraged to verbalize needs      Response    Verbalized understanding   Did patient take medications as ordered yes   Did patient interact with assessment?  yes     Plan    Will monitor for safety  Will monitor every 15 minutes as ordered  Will evaluate and promote the plan of care    Last BM:  unknown date  (Please chart in I/O as well)

## 2020-09-19 NOTE — PLAN OF CARE
"  Problem: Patient Care Overview  Goal: Plan of Care Review  Outcome: Ongoing, Progressing  Flowsheets  Taken 2020 1130  Consent Given to Review Plan with: Pt sitting on commode with nsg present and agreed to OT. Pt performed sit<>stand<>sit t/f from commode<>sink<>EOB requiring CGA. Pt stood sink side and performed hand washing with 1 LOB. Pt deferred dynamic standing ax secondary to wanting to eat breakfast. Pt performed self-feeding while sitting EOB to improve ax tolerance. Pt required SBA and vc's for self-feeding. Pt required extended time to finish meal and required 1 RB. Pt expressed to RANDALL frustration stating, \"these people have called me evil and mean and unwilling to do anything.\" Pt stated that she did not want to go into common area because she had been friends with another pt and she believes they may have . Pt then stated, \"it hurts to bad to go out there at this time.\" Pt did become tearful but recovered emotional state quickly. RANDALL discussed clients feelings with . Pt left sitting EOB with all needs in reach.  Taken 2020 0900  Plan of Care Reviewed With: patient     "

## 2020-09-19 NOTE — THERAPY TREATMENT NOTE
Acute Care - Occupational Therapy Treatment Note  [facility]     Patient Name: Natacha Gandhi  : 1950  MRN: [id number]  Today's Date: 2020  Onset of Illness/Injury or Date of Surgery: 20  Date of Referral to OT: 20  Referring Physician: Dr. Anderson    Admit Date: 9/3/2020       ICD-10-CM ICD-9-CM   1. Impaired mobility and activities of daily living  Z74.09 V49.89    Z78.9    2. Impaired functional mobility, balance, gait, and endurance  Z74.09 V49.89   3. Oral phase dysphagia  R13.11 787.21     Patient Active Problem List   Diagnosis   • Acute GI bleeding   • Altered mental status, unspecified   • Schizophrenia, paranoid type (CMS/HCC)   • Acute exacerbation of chronic paranoid schizophrenia (CMS/HCC)   • Acute blood loss anemia   • DMII (diabetes mellitus, type 2) (CMS/HCC)     Past Medical History:   Diagnosis Date   • Depression    • Psychiatric illness    • Schizoaffective disorder (CMS/HCC)      Past Surgical History:   Procedure Laterality Date   • COLONOSCOPY N/A 2020    Procedure: COLONOSCOPY;  Surgeon: Ge Gorman MD;  Location: [facility];  Service: Gastroenterology;  Laterality: N/A;   • ENDOSCOPY N/A 2020    Procedure: ESOPHAGOGASTRODUODENOSCOPY;  Surgeon: Ge Gorman MD;  Location: [facility];  Service: Gastroenterology;  Laterality: N/A;          OT ASSESSMENT FLOWSHEET (last 12 hours)      OT Evaluation and Treatment     Row Name 20 0915                   OT Time and Intention    Subjective Information  no complaints  -EUGENIA        Document Type  therapy note (daily note)  -EUGENIA        Mode of Treatment  individual therapy;occupational therapy  -EUGENIA        Patient Effort  good  -EUGENIA           General Information    Existing Precautions/Restrictions  fall  -EUGENIA        Risks Reviewed  patient:  -EUGENIA        Benefits Reviewed  patient:  -EUGENIA        Barriers to Rehab  -- feels that staff thinks she is evil and stubborn  -EUGENIA           Cognition     Affect/Mental Status (Cognitive)  WFL  -EUGENIA        Orientation Status (Cognition)  oriented x 4  -EUGENIA        Follows Commands (Cognition)  WNL  -EUGENIA           Pain Scale: Numbers Pre/Post-Treatment    Pretreatment Pain Rating  0/10 - no pain  -EUGENIA        Posttreatment Pain Rating  0/10 - no pain  -EUGENIA           Functional Mobility    Functional Mobility- Ind. Level  contact guard assist  -EUGENIA        Functional Mobility- Device  -- HHA  -EUGENIA        Functional Mobility-Distance (Feet)  10  -EUGENIA        Functional Mobility- Safety Issues  balance decreased during turns;weight-shifting ability decreased  -EUGENIA           Transfer Assessment/Treatment    Transfers  sit-stand transfer;stand-sit transfer;toilet transfer  -EUGENIA           Transfers    Sit-Stand Greene (Transfers)  contact guard  -EUGENIA        Stand-Sit [place] (Transfers)  supervision  -EUGENIA        Greene Level (Toilet Transfer)  contact guard  -EUGENIA        Assistive Device (Toilet Transfer)  grab bars/safety frame  -EUGENIA           Sit-Stand Transfer    Assistive Device (Sit-Stand Transfers)  -- HHA  -EUGENIA           Toilet Transfer    Type (Toilet Transfer)  sit-stand;stand-sit  -EUGENIA           Safety Issues, Functional Mobility    Impairments Affecting Function (Mobility)  balance;coordination;endurance/activity tolerance;motor control;motor planning;visual/perceptual  -EUGENIA           Balance    Balance Interventions  weight shifting activity  -EUGENIA        Comment, Balance  Pt had LOB 1x while standing at sink to wash hands  -EUGENIA           Activities of Daily Living    BADL Assessment/Intervention  feeding  -EUGENIA        Additional Documentation  -- Pt performed self-feeding at EOB to improve ax tolerance  -EUGENIA           Grooming Assessment/Training    Greene Level (Grooming)  wash face, hands;contact guard assist  -EUGENIA        Position (Grooming)  unsupported standing  -EUGENIA           Self-Feeding Assessment/Training    Greene Level (Feeding)  feeding skills;finger  foods;liquids to mouth;prepare tray/open items;scoop food and bring to mouth;standby assist;verbal cues  -EUGENIA        Position (Self-Feeding)  edge of bed sitting  -EUGENIA           Coping    Observed Emotional State  pleasant at times frustrated   -EUGENIA        Verbalized Emotional State  acceptance  -EUGENIA        Trust Relationship/Rapport  care explained;choices provided;emotional support provided;empathic listening provided;questions answered;questions encouraged;reassurance provided;thoughts/feelings acknowledged  -EUGENIA           Plan of Care Review    Plan of Care Reviewed With  patient  -EUGENIA           Vital Signs    Intratreatment Heart Rate (beats/min)  72  -EUGENIA        Intra SpO2 (%)  98  -EUGENIA        O2 Delivery Intra Treatment  room air  -EUGENIA        Pre Patient Position  Sitting  -EUGENIA        Intra Patient Position  Sitting  -EUGENIA        Post Patient Position  Sitting  -EUGENIA          User Key  (r) = Recorded By, (t) = Taken By, (c) = Cosigned By    Initials Name Effective Dates    Nasima Campos OTA 11/05/19 -          Occupational Therapy Education                 Title: PT OT SLP Therapies (In Progress)     Topic: Occupational Therapy (In Progress)     Point: ADL training (Done)     Description:   Instruct learner(s) on proper safety adaptation and remediation techniques during self care or transfers.   Instruct in proper use of assistive devices.              Learning Progress Summary           Patient Acceptance, E, VU by BB at 9/10/2020 1336    Acceptance, E, VU by BB at 9/9/2020 1101    Acceptance, E, VU by BB at 9/8/2020 1047    Acceptance, E, VU,NR by AS at 9/4/2020 1205    Comment: role of OT, OT POC, ADL training, fall preacutions, bed mobility, transfer training                   Point: Home exercise program (Not Started)     Description:   Instruct learner(s) on appropriate technique for monitoring, assisting and/or progressing therapeutic exercises/activities.              Learner Progress:  Not documented in this  visit.          Point: Precautions (Done)     Description:   Instruct learner(s) on prescribed precautions during self-care and functional transfers.              Learning Progress Summary           Patient Acceptance, E, VU by EUGENIA at 9/19/2020 1129    Comment: Pt educated on OT and POC.    Acceptance, D, DU by RB at 9/18/2020 1308    Comment: Edu pt on no OOB without assist.    Acceptance, E,TB, VU by KM at 9/9/2020 0424    Acceptance, E, VU,NR by AS at 9/4/2020 1205    Comment: role of OT, OT POC, ADL training, fall preacutions, bed mobility, transfer training                   Point: Body mechanics (Done)     Description:   Instruct learner(s) on proper positioning and spine alignment during self-care, functional mobility activities and/or exercises.              Learning Progress Summary           Patient Acceptance, E, VU by BB at 9/8/2020 1047    Acceptance, E, NR by BB at 9/7/2020 1131                               User Key     Initials Effective Dates Name Provider Type Discipline    RB 07/24/19 -  Tyrel Lopez OT Occupational Therapist OT    KM 10/17/16 -  Madison Irwin RN Registered Nurse Nurse    BB 03/07/18 -  Mare Reed COTA/L Occupational Therapy Assistant OT    AS 07/05/20 -  Unique Garcia OT Occupational Therapist OT    EUGENIA 11/05/19 -  Nasima Bond OTA Occupational Therapy Assistant OT                  OT Recommendation and Plan     Plan of Care Review  Plan of Care Reviewed With: patient  Plan of Care Reviewed With: patient    Outcome Measures     Row Name 09/19/20 0915 09/18/20 1414 09/18/20 1011       How much help from another person do you currently need...    Turning from your back to your side while in flat bed without using bedrails?  --  4  -KW  --    Moving from lying on back to sitting on the side of a flat bed without bedrails?  --  4  -KW  --    Moving to and from a bed to a chair (including a wheelchair)?  --  3  -KW  --    Standing up from a chair using your arms  (e.g., wheelchair, bedside chair)?  --  3  -KW  --    Climbing 3-5 steps with a railing?  --  3  -KW  --    To walk in hospital room?  --  3  -KW  --    AM-PAC 6 Clicks Score (PT)  --  20  -KW  --       How much help from another is currently needed...    Putting on and taking off regular lower body clothing?  3  -EUGENIA  --  3  -RB    Bathing (including washing, rinsing, and drying)  3  -EUGENIA  --  3  -RB    Toileting (which includes using toilet bed pan or urinal)  3  -EUGENIA  --  3  -RB    Putting on and taking off regular upper body clothing  4  -EUGENIA  --  4  -RB    Taking care of personal grooming (such as brushing teeth)  4  -EUGENIA  --  4  -RB    Eating meals  3  -EUGENIA  --  4  -RB    AM-PAC 6 Clicks Score (OT)  20  -EUGENIA  --  21  -RB       Functional Assessment    Outcome Measure Options  AM-PAC 6 Clicks Daily Activity (OT)  -EUGENIA  AM-[facility] 6 Clicks Basic Mobility (PT)  -KW  --    Row Name 09/17/20 08 09/16/20 7612          How much help from another person do you currently need...    Turning from your back to your side while in flat bed without using bedrails?  --  4  -TW     Moving from lying on back to sitting on the side of a flat bed without bedrails?  --  4  -TW     Moving to and from a bed to a chair (including a wheelchair)?  --  3  -TW     Standing up from a chair using your arms (e.g., wheelchair, bedside chair)?  --  3  -TW     Climbing 3-5 steps with a railing?  --  3  -TW     To walk in hospital room?  --  3  -TW     AM-PAC 6 Clicks Score (PT)  --  20  -TW        How much help from another is currently needed...    Putting on and taking off regular lower body clothing?  3  -JH  --     Bathing (including washing, rinsing, and drying)  3  -JH  --     Toileting (which includes using toilet bed pan or urinal)  3  -JH  --     Putting on and taking off regular upper body clothing  4  -JH  --     Taking care of personal grooming (such as brushing teeth)  4  -JH  --     Eating meals  4  -JH  --     AM-PAC 6 Clicks Score (OT)   21  -  --       User Key  (r) = Recorded By, (t) = Taken By, (c) = Cosigned By    Initials Name Provider Type    RB Tyrel Lopez, OT Occupational Therapist    Remigio Herzog, PTA Physical Therapy Assistant    KW Melina Neves, PT Physical Therapist    JH Diego Pelaez, OT Occupational Therapist    Nasima Campos OTA Occupational Therapy Assistant          Time Calculation:   Time Calculation- OT     Row Name 09/19/20 1143             Time Calculation- OT    OT Start Time  0915  -EUGENIA      OT Stop Time  1041  -EUGENIA      OT Time Calculation (min)  86 min  -      OT Received On  09/19/20  -        User Key  (r) = Recorded By, (t) = Taken By, (c) = Cosigned By    Initials Name Provider Type    Nasima Campos OTA Occupational Therapy Assistant        Therapy Charges for Today     Code Description Service Date Service Provider Modifiers Qty    [id number] HC OT SELF CARE/MGMT/TRAIN EA 15 MIN 9/19/2020 Nasima Bond OTA GO 5               RANDALL Henry  9/19/2020

## 2020-09-19 NOTE — SIGNIFICANT NOTE
09/19/20 1358   OTHER   Discipline physical therapy assistant   Rehab Time/Intention   Session Not Performed patient/family declined treatment  (checked on pt. this pm. and pt. stated she did not want any PT and wanted her nurse and wanted to eat.)

## 2020-09-19 NOTE — PLAN OF CARE
Goal Outcome Evaluation:  Plan of Care Reviewed With: patient  Progress: improving  Outcome Summary: Med compliant, slept throughout the night, no falls and denies AVH

## 2020-09-20 LAB
GLUCOSE BLDC GLUCOMTR-MCNC: 121 MG/DL (ref 70–130)
GLUCOSE BLDC GLUCOMTR-MCNC: 240 MG/DL (ref 70–130)
GLUCOSE BLDC GLUCOMTR-MCNC: 79 MG/DL (ref 70–130)

## 2020-09-20 PROCEDURE — 82962 GLUCOSE BLOOD TEST: CPT

## 2020-09-20 PROCEDURE — 99232 SBSQ HOSP IP/OBS MODERATE 35: CPT | Performed by: PSYCHIATRY & NEUROLOGY

## 2020-09-20 RX ADMIN — ESCITALOPRAM OXALATE 10 MG: 10 TABLET ORAL at 09:04

## 2020-09-20 RX ADMIN — FERROUS SULFATE TAB EC 324 MG (65 MG FE EQUIVALENT) 324 MG: 324 (65 FE) TABLET DELAYED RESPONSE at 09:04

## 2020-09-20 RX ADMIN — RISPERIDONE 3 MG: 1 TABLET ORAL at 17:32

## 2020-09-20 RX ADMIN — PANTOPRAZOLE SODIUM 40 MG: 40 TABLET, DELAYED RELEASE ORAL at 06:49

## 2020-09-20 NOTE — SIGNIFICANT NOTE
09/20/20 1203   OTHER   Discipline physical therapy assistant   Rehab Time/Intention   Session Not Performed patient/family declined treatment  (pt. deferred any PT this date)

## 2020-09-20 NOTE — PLAN OF CARE
Goal Outcome Evaluation:  Plan of Care Reviewed With: patient  Progress: improving  Outcome Summary: Patient has slept throughout the night, no falls, no prns, no concerns voiced, no acute distress, will continue to monitor

## 2020-09-20 NOTE — PROGRESS NOTES
"Psychiatry Progress Note   9/20/2020      HD: #17  Legal: Vol    Chief Complaint: Gross Psychosis, Severe Hallucinations and Gross Disorganization      Subjective --  Ms. Natacha Gandhi is a 70 y.o. female who was seen on the Geriatric unit.      Patient is seen in her room today.  She remains isolative there.     She reports me that she feels safe and has no paranoia; none overt.  Does not appear guarded.    Patient denies any headache or stomachache or muscle aches.          Objective   Objective --      Vital Signs:  Temp:  [97.2 °F (36.2 °C)-97.5 °F (36.4 °C)] 97.2 °F (36.2 °C)  Heart Rate:  [73-87] 73  Resp:  [18] 18  BP: (116-137)/(55-69) 137/69    Patient Vitals for the past 24 hrs:   BP Temp Temp src Pulse Resp SpO2   09/20/20 0730 137/69 97.2 °F (36.2 °C) Tympanic 73 18 100 %   09/19/20 2013 116/55 97.5 °F (36.4 °C) Tympanic 87 18 98 %        Physical Exam:   -General Appearance:  normal general appearance and in no apparent distress  -Hygiene:  Adequate   -Gait & Station:  Blank multiple: Deferred, in bed  -Musculoskeletal:  No tremors or abnormal involuntary movements and No Cog [place] or Rigidity and No atrophy noted  -Pulm: unlaboured    Mental Status Exam:   --Cooperation:  Cooperative  --Eye Contact:  Non-sustained (legally blind)  --Psychomotor Behavior:  Slow and improving  --Mood:  \"OK\"  --Affect:  blunted, improving  --Speech:  Slow and Soft  --Thought Process:   Less sluggish and more organized  --Associations:  More goal-directed today  --Themes:  None overt  --Thought Content:                --Mood congruent              --Suicidal:  Denies               --Homicidal:  Denies              --Hallucinations:  Denies and none overt              --Delusion:  None overt  --Cognitive Functioning:              -Consciousness: awake and alert              -Orientation:  Person, Place and Time              -Attention:  Distractible                         -Concentration:  Distractible              " -Language:  Average based on interaction; Intact  --Reliability:  fair  --Insight:  Diminished  --Judgment:  Impaired and improving  --Impulse Control:  Impaired and improving      Lab Results (last 24 hours)     Procedure Component Value Units Date/Time    POC Glucose Once [[id number]]  (Normal) Collected: 09/20/20 1223    Specimen: Blood Updated: 09/20/20 1239     Glucose 121 mg/dL     POC Glucose Once [[id number]]  (Normal) Collected: 09/20/20 0628    Specimen: Blood Updated: 09/20/20 0639     Glucose 79 mg/dL     POC Glucose Once [[id number]]  (Abnormal) Collected: 09/19/20 1939    Specimen: Blood Updated: 09/19/20 1954     Glucose 291 mg/dL           Imaging Results (Last 24 Hours)     ** No results found for the last 24 hours. **            Medications:   Scheduled Meds:  escitalopram, 10 mg, Oral, Daily  ferrous sulfate, 324 mg, Oral, Daily With Breakfast  insulin aspart, 0-7 Units, Subcutaneous, TID AC  pantoprazole, 40 mg, Oral, Q AM  risperiDONE, 3 mg, Oral, Daily With Dinner      Continuous Infusions:   PRN Meds:.•  aluminum-magnesium hydroxide-simethicone  •  cloNIDine  •  dextrose  •  dextrose  •  glucagon (human recombinant)  •  loperamide  •  magnesium hydroxide      Assessment:     Acute exacerbation of chronic paranoid schizophrenia (CMS/HCC)    Schizophrenia, paranoid type (CMS/HCC)    Acute blood loss anemia    DMII (diabetes mellitus, type 2) (CMS/HCC)      --> IMPRESSION: Ongoing psychosis, slowly improving.  Tolerating medications.        Treatment Plan:  1) Will continue care for the patient on the behavioral health unit at [facility] to ensure patient safety given severity of presenting symptoms, need for further improvement and maintenance of improvement prior to discharge.    2) Will continue to provide treatment with the unit milieu, activities, therapies and psychopharmacological management.    3) Patient to be placed on or continued on  Q15 minute checks  and Fall  precautions.    4) Pertinent Concurrent Non-Psychiatric Medical Issues:   --Type 2 diabetes: Continue sliding scale insulin  -GERD/acute blood loss anemia: Continue Protonix and ferrous sulfate  - Foot care/podiatry: Appreciate their aid and care    5) Will order following labs: None    6) Behavioral Health Treatment Planning:  --Cont Risperidone 3 mg daily with dinner for psychosis  -Continue Lexapro 10 mg daily for affective symptoms      7) Psychotherapy provided: Supportive, for less than 10 minutes.    --> Dispostion Planning: To be determined, further improvement stabilization.    Treatment plan and medication risks and benefits discussed with: Patient and Treatment Team.    Tyrel Anderson II, MD  09/20/20 @ 18:49 CDT  Dictated using Dragon.

## 2020-09-20 NOTE — NURSING NOTE
Behavior   Note any precipitants to event or behavior   Describe level and action of any aggressive behavior or speech and associated interventions.     Anxiety: Patient denies at this time  Depression: Patient denies at this time  Pain  0  AVH   no  S/I   no  Plan  no  H/I   no  Plan  no    Affect   euthymic/normal      Note: Pt interviewed in her room this am.  She is pleasant and cooperative with care.  She is compliant with medication administration and assessment.  She denies any SI/HI/AVH and is eating well.       Intervention    PRN medication utilized:  no    Instructed in medication usage and effects  Medications administered as ordered  Encouraged to verbalize needs      Response    Verbalized understanding   Did patient take medications as ordered yes   Did patient interact with assessment?  yes     Plan    Will monitor for safety  Will monitor every 15 minutes as ordered  Will evaluate and promote the plan of care    Last BM:  unknown date  (Please chart in I/O as well)

## 2020-09-20 NOTE — SIGNIFICANT NOTE
09/20/20 1018   OTHER   Discipline occupational therapist   Rehab Time/Intention   Session Not Performed patient/family declined treatment  (OT tx attempted; pt declined and requesting to rest.)

## 2020-09-20 NOTE — PLAN OF CARE
Goal Outcome Evaluation:  Plan of Care Reviewed With: patient  Progress: improving  Outcome Summary: Pt interviewed in her room this am.  She is pleasant and cooperative with care.  She is compliant with medication administration and assessment.  She denies any SI/HI/AVH and is eating well.

## 2020-09-21 LAB
GLUCOSE BLDC GLUCOMTR-MCNC: 102 MG/DL (ref 70–130)
GLUCOSE BLDC GLUCOMTR-MCNC: 163 MG/DL (ref 70–130)
GLUCOSE BLDC GLUCOMTR-MCNC: 231 MG/DL (ref 70–130)
GLUCOSE BLDC GLUCOMTR-MCNC: 95 MG/DL (ref 70–130)

## 2020-09-21 PROCEDURE — 99232 SBSQ HOSP IP/OBS MODERATE 35: CPT | Performed by: PSYCHIATRY & NEUROLOGY

## 2020-09-21 PROCEDURE — 97530 THERAPEUTIC ACTIVITIES: CPT

## 2020-09-21 PROCEDURE — 82962 GLUCOSE BLOOD TEST: CPT

## 2020-09-21 PROCEDURE — 97110 THERAPEUTIC EXERCISES: CPT

## 2020-09-21 PROCEDURE — 97535 SELF CARE MNGMENT TRAINING: CPT

## 2020-09-21 PROCEDURE — 97116 GAIT TRAINING THERAPY: CPT

## 2020-09-21 RX ADMIN — RISPERIDONE 3 MG: 1 TABLET ORAL at 17:24

## 2020-09-21 RX ADMIN — ESCITALOPRAM OXALATE 10 MG: 10 TABLET ORAL at 08:07

## 2020-09-21 RX ADMIN — PANTOPRAZOLE SODIUM 40 MG: 40 TABLET, DELAYED RELEASE ORAL at 08:07

## 2020-09-21 RX ADMIN — FERROUS SULFATE TAB EC 324 MG (65 MG FE EQUIVALENT) 324 MG: 324 (65 FE) TABLET DELAYED RESPONSE at 08:07

## 2020-09-21 NOTE — THERAPY DISCHARGE NOTE
Acute Care - IRF Occupational Therapy Treatment Note/Discharge  [facility]     Patient Name: Natacha Gandhi  : 1950  MRN: [id number]  Today's Date: 2020  Onset of Illness/Injury or Date of Surgery: 20  Date of Referral to OT: 20  Referring Physician: Dr. Anderson      Admit Date: 9/3/2020       ICD-10-CM ICD-9-CM   1. Impaired mobility and activities of daily living  Z74.09 V49.89    Z78.9    2. Impaired functional mobility, balance, gait, and endurance  Z74.09 V49.89   3. Oral phase dysphagia  R13.11 787.21     Patient Active Problem List   Diagnosis   • Acute GI bleeding   • Altered mental status, unspecified   • Schizophrenia, paranoid type (CMS/HCC)   • Acute exacerbation of chronic paranoid schizophrenia (CMS/HCC)   • Acute blood loss anemia   • DMII (diabetes mellitus, type 2) (CMS/HCC)     Past Medical History:   Diagnosis Date   • Depression    • Psychiatric illness    • Schizoaffective disorder (CMS/HCC)      Past Surgical History:   Procedure Laterality Date   • COLONOSCOPY N/A 2020    Procedure: COLONOSCOPY;  Surgeon: Ge Gorman MD;  Location: [facility];  Service: Gastroenterology;  Laterality: N/A;   • ENDOSCOPY N/A 2020    Procedure: ESOPHAGOGASTRODUODENOSCOPY;  Surgeon: Ge Gorman MD;  Location: [facility];  Service: Gastroenterology;  Laterality: N/A;          IRF OT ASSESSMENT FLOWSHEET (last 12 hours)      IRF OT Evaluation and Treatment    No documentation.            Occupational Therapy Education                 Title: PT OT SLP Therapies (In Progress)     Topic: Occupational Therapy (In Progress)     Point: ADL training (Done)     Description:   Instruct learner(s) on proper safety adaptation and remediation techniques during self care or transfers.   Instruct in proper use of assistive devices.              Learning Progress Summary           Patient Acceptance, E, VU by BB at 9/10/2020 1336    Acceptance, E, VU by BB at 2020 1101     Acceptance, E, VU by BB at 9/8/2020 1047    Acceptance, E, VU,NR by AS at 9/4/2020 1205    Comment: role of OT, OT POC, ADL training, fall preacutions, bed mobility, transfer training                   Point: Home exercise program (Not Started)     Description:   Instruct learner(s) on appropriate technique for monitoring, assisting and/or progressing therapeutic exercises/activities.              Learner Progress:  Not documented in this visit.          Point: Precautions (Done)     Description:   Instruct learner(s) on prescribed precautions during self-care and functional transfers.              Learning Progress Summary           Patient Acceptance, D, DU,VU by RB at 9/21/2020 1548    Comment: Use of non skids on feet when OOB.    Acceptance, E, VU by EUGENIA at 9/19/2020 1129    Comment: Pt educated on OT and POC.    Acceptance, D, DU by RB at 9/18/2020 1308    Comment: Edu pt on no OOB without assist.    Acceptance, E,TB, VU by KM at 9/9/2020 0424    Acceptance, E, VU,NR by AS at 9/4/2020 1205    Comment: role of OT, OT POC, ADL training, fall preacutions, bed mobility, transfer training                   Point: Body mechanics (Done)     Description:   Instruct learner(s) on proper positioning and spine alignment during self-care, functional mobility activities and/or exercises.              Learning Progress Summary           Patient Acceptance, E, VU by BB at 9/8/2020 1047    Acceptance, E, NR by BB at 9/7/2020 1131                               User Key     Initials Effective Dates Name Provider Type Discipline    RB 07/24/19 -  Tyrel Lopez OT Occupational Therapist OT    KM 10/17/16 -  Madison Irwin RN Registered Nurse Nurse    BB 03/07/18 -  aMre Reed COTA/L Occupational Therapy Assistant OT    AS 07/05/20 -  Unique Garcia OT Occupational Therapist OT    EUGENIA 11/05/19 -  Nasima Bond OTA Occupational Therapy Assistant OT                OT Recommendation and Plan  Anticipated Discharge  Disposition (OT): home with 24/7 care, home with assist  Planned Therapy Interventions (OT): activity tolerance training, BADL retraining, neuromuscular control/coordination retraining, functional balance retraining, patient/caregiver education/training, ROM/therapeutic exercise, strengthening exercise, transfer/mobility retraining, occupation/activity based interventions  Therapy Frequency (OT): other (see comments)(5-7 days/wk)    Plan of Care Reviewed With: patient     OT IRF GOALS     Row Name 09/21/20 1329 09/19/20 0915 09/18/20 1011       Balance Goal 1 (OT)    Activity/Assistive Device (Balance Goal 1, OT)  standing, dynamic  -RB  standing, dynamic  -EUGENIA  standing, dynamic  -RB    [place] Level/Cues Needed (Balance Goal 1, OT)  standby assist  -RB  standby assist  -EUGENIA  standby assist  -RB    Time Frame (Balance Goal 1, OT)  long term goal (LTG);by discharge  -RB  long term goal (LTG);by discharge  -EUGENIA  long term goal (LTG);by discharge  -RB    Progress/Outcomes (Balance Goal 1, OT)  (S) goal met  -RB  goal not met  -EUGENIA  goal not met  -RB    Row Name 09/17/20 0834 09/16/20 1446 09/15/20 1023       Balance Goal 1 (OT)    Activity/Assistive Device (Balance Goal 1, OT)  standing, dynamic  -JH  standing, dynamic  -LM  standing, dynamic  -LM    [place] Level/Cues Needed (Balance Goal 1, OT)  standby assist  -JH  standby assist  -LM  standby assist  -LM    Time Frame (Balance Goal 1, OT)  long term goal (LTG);by discharge  -JH  long term goal (LTG);by discharge  -LM  long term goal (LTG);by discharge  -LM    Progress/Outcomes (Balance Goal 1, OT)  goal not met  -JH  goal not met  -LM  goal not met  -LM    Row Name 09/14/20 0818 09/13/20 1550 09/11/20 1335       Balance Goal 1 (OT)    Activity/Assistive Device (Balance Goal 1, OT)  standing, dynamic  -JH  standing, dynamic  -TO  standing, dynamic  -TO    [place] Level/Cues Needed (Balance Goal 1, OT)  standby assist  -JH  standby assist  -TO  standby  assist  -TO    Time Frame (Balance Goal 1, OT)  long term goal (LTG);by discharge  -JH  long term goal (LTG);by discharge  -TO  long term goal (LTG);by discharge  -TO    Progress/Outcomes (Balance Goal 1, OT)  goal not met  -  goal not met  -TO  goal not met  -TO    Row Name 09/10/20 1105 09/09/20 0803 09/08/20 0814       Balance Goal 1 (OT)    Activity/Assistive Device (Balance Goal 1, OT)  standing, dynamic  -BB  standing, dynamic  -BB  standing, dynamic  -BB    [place] Level/Cues Needed (Balance Goal 1, OT)  standby assist  -BB  standby assist  -BB  standby assist  -BB    Time Frame (Balance Goal 1, OT)  long term goal (LTG);by discharge  -BB  long term goal (LTG);by discharge  -BB  long term goal (LTG);by discharge  -BB    Progress/Outcomes (Balance Goal 1, OT)  goal not met  -BB  goal not met  -BB  goal not met  -BB      User Key  (r) = Recorded By, (t) = Taken By, (c) = Cosigned By    Initials Name Provider Type    RB Tyrel Lopez, JAUN Occupational Therapist    BB Mare Reed GARCIA/L Occupational Therapy Assistant    Melissa Tellez, GARCIA/L Occupational Therapy Assistant    TO Neri Ramirez, GARCIA/L Occupational Therapy Assistant    Diego Washington, JAUN Occupational Therapist    Nasima Campos OTA Occupational Therapy Assistant          Outcome Measures     Row Name 09/21/20 1329 09/21/20 0930 09/19/20 0915       How much help from another person do you currently need...    Turning from your back to your side while in flat bed without using bedrails?  --  4  -JA  --    Moving from lying on back to sitting on the side of a flat bed without bedrails?  --  4  -JA  --    Moving to and from a bed to a chair (including a wheelchair)?  --  3  -JA  --    Standing up from a chair using your arms (e.g., wheelchair, bedside chair)?  --  3  -JA  --    Climbing 3-5 steps with a railing?  --  3  -JA  --    To walk in hospital room?  --  3  -JA  --    AM-PAC 6 Clicks Score (PT)  --  20  -JA  --        How much help from another is currently needed...    Putting on and taking off regular lower body clothing?  4  -RB  --  3  -EUGENIA    Bathing (including washing, rinsing, and drying)  4 For sponge bath.  -RB  --  3  -EUGENIA    Toileting (which includes using toilet bed pan or urinal)  4  -RB  --  3  -EUGENIA    Putting on and taking off regular upper body clothing  4  -RB  --  4  -EUGENIA    Taking care of personal grooming (such as brushing teeth)  4  -RB  --  4  -EUGENIA    Eating meals  4  -RB  --  3  -EUGENIA    AM-PAC 6 Clicks Score (OT)  24  -RB  --  20  -EUGENIA       Functional Assessment    Outcome Measure Options  --  AM-PAC 6 Clicks Basic Mobility (PT)  -  AM-PAC 6 Clicks Daily Activity (OT)  -EUGENIA      User Key  (r) = Recorded By, (t) = Taken By, (c) = Cosigned By    Initials Name Provider Type    RB Tyrel Lopez OT Occupational Therapist    Juan Miguel Horvath, KEIRA Physical Therapy Assistant    Nasima Campos OTA Occupational Therapy Assistant          Time Calculation:   Time Calculation- OT     Row Name 09/21/20 1556             Time Calculation- OT    OT Start Time  1329  -RB      OT Stop Time  1403  -RB      OT Time Calculation (min)  34 min  -RB      OT Received On  09/21/20  -        User Key  (r) = Recorded By, (t) = Taken By, (c) = Cosigned By    Initials Name Provider Type    RB Tyrel Lopez OT Occupational Therapist          Therapy Charges for Today     Code Description Service Date Service Provider Modifiers Qty    24101417105  OT SELF CARE/MGMT/TRAIN EA 15 MIN 9/21/2020 Tyrel Lopez OT GO 1    [id number]  OT THERAPEUTIC ACT EA 15 MIN 9/21/2020 Tyrel Lopez OT GO 1               OT Discharge Summary  Anticipated Discharge Disposition (OT): home with 24/7 care, home with assist    Tyrel Lopez OT  9/21/2020

## 2020-09-21 NOTE — THERAPY TREATMENT NOTE
Acute Care - Physical Therapy Treatment Note  [facility]     Patient Name: Natacha Gandhi  : 1950  MRN: [id number]  Today's Date: 2020   Onset of Illness/Injury or Date of Surgery: 20       PT Assessment (last 12 hours)      PT Evaluation and Treatment     Row Name 20          Physical Therapy Time and Intention    Subjective Information  no complaints  -     Document Type  therapy note (daily note)  -     Mode of Treatment  individual therapy;physical therapy  -     Patient Effort  good  -     Row Name 20          General Information    Patient Profile Reviewed  yes  -     Existing Precautions/Restrictions  fall  -     Limitations/Impairments  visual  -     Row Name 20          Cognition    Orientation Status (Cognition)  oriented x 4  -     Follows Commands (Cognition)  WNL  -     Cognitive Function (Cognitive)  safety deficit  -     Memory Deficit (Cognitive)  minimal deficit  -     Row Name 20          Pain Scale: Numbers Pre/Post-Treatment    Pretreatment Pain Rating  0/10 - no pain  -     Posttreatment Pain Rating  0/10 - no pain  -     Row Name 20          Bed Mobility    Bed Mobility  bed mobility (all) activities  -     All Activities, [place] (Bed Mobility)  independent  -     Scooting/Bridging [place] (Bed Mobility)  independent  -     Supine-Sit [place] (Bed Mobility)  independent  -     Sit-Supine [place] (Bed Mobility)  independent  -     Row Name 20          Transfers    Transfers  sit-stand transfer;stand-sit transfer  -     Sit-Stand [place] (Transfers)  supervision;1 person assist  -     Stand-Sit [place] (Transfers)  supervision;1 person assist  -     Row Name 20          Sit-Stand Transfer    Assistive Device (Sit-Stand Transfers)  other (see comments) HHA  -     Row Name 20          Stand-Sit Transfer     Assistive Device (Stand-Sit Transfers)  other (see comments)  -     Row Name 09/21/20 0930          Gait/Stairs (Locomotion)    [place] Level (Gait)  contact guard;1 person assist  -     Assistive Device (Gait)  other (see comments) HHA  -     Distance in Feet (Gait)  300'  -     Deviations/Abnormal Patterns (Gait)  base of support, narrow;gait speed decreased;stride length decreased  -     Row Name 09/21/20 0930          Safety Issues, Functional Mobility    Impairments Affecting Function (Mobility)  balance;coordination;endurance/activity tolerance;motor control;motor planning  -[facility] Name 09/21/20 0930          Hip (Therapeutic Exercise)    Hip (Therapeutic Exercise)  AROM (active range of motion);isometric exercises  -     Hip AROM (Therapeutic Exercise)  sitting;bilateral;flexion 2x15  -     Hip Isometrics (Therapeutic Exercise)  sitting;bilateral;aDduction 2x15  -[facility] Name 09/21/20 0930          Knee (Therapeutic Exercise)    Knee (Therapeutic Exercise)  AROM (active range of motion)  -     Knee AROM (Therapeutic Exercise)  sitting;bilateral;LAQ (long arc quad) 2x15  -[facility] Name 09/21/20 0930          Ankle (Therapeutic Exercise)    Ankle (Therapeutic Exercise)  AROM (active range of motion)  -     Ankle AROM (Therapeutic Exercise)  sitting;bilateral;dorsiflexion;plantarflexion 2x15  -[facility] Name 09/21/20 0930          Vital Signs    Pre Systolic BP Rehab  107  -     Pre Treatment Diastolic BP  66  -     Pretreatment Heart Rate (beats/min)  89  -     Pre SpO2 (%)  98  -     O2 Delivery Pre Treatment  room air  -     Pre Patient Position  Sitting  -     Intra Patient Position  Standing  -     Post Patient Position  Sitting  -     Row Name 09/21/20 0930          Bed Mobility Goal 1 (PT)    Activity/Assistive Device (Bed Mobility Goal 1, PT)  sit to supine;supine to sit  -     [place] Level/Cues Needed (Bed Mobility Goal 1, PT)  independent  -      Time Frame (Bed Mobility Goal 1, PT)  3 days  -JA     Progress/Outcomes (Bed Mobility Goal 1, PT)  (S) goal met  -     Row Name 09/21/20 0930          Transfer Goal 1 (PT)    Activity/Assistive Device (Transfer Goal 1, PT)  sit-to-stand/stand-to-sit  -JA     [place] Level/Cues Needed (Transfer Goal 1, PT)  supervision required  -JA     Time Frame (Transfer Goal 1, PT)  3 days  -JA     Progress/Outcome (Transfer Goal 1, PT)  (S) goal met  -     Row Name 09/21/20 0930          Gait Training Goal 1 (PT)    Activity/Assistive Device (Gait Training Goal 1, PT)  gait (walking locomotion) LRAD PRN  -JA     [place] Level (Gait Training Goal 1, PT)  supervision required  -JA     Distance (Gait Training Goal 1, PT)  100ft or more  -JA     Time Frame (Gait Training Goal 1, PT)  5 days  -JA     Progress/Outcome (Gait Training Goal 1, PT)  goal not met  -     Row Name 09/21/20 0930          Stairs Goal 1 (PT)    Activity/Assistive Device (Stairs Goal 1, PT)  ascending stairs;descending stairs  -JA     [place] Level/Cues Needed (Stairs Goal 1, PT)  standby assist  -JA     Number of Stairs (Stairs Goal 1, PT)  1 or more  -JA     Time Frame (Stairs Goal 1, PT)  by discharge  -JA     Progress/Outcome (Stairs Goal 1, PT)  goal not met  -     Row Name 09/21/20 0930          Therapy Assessment/Plan (PT)    Rehab Potential (PT)  good, to achieve stated therapy goals  -     Criteria for Skilled Interventions Met (PT)  yes;skilled treatment is necessary  -       User Key  (r) = Recorded By, (t) = Taken By, (c) = Cosigned By    Initials Name Provider Type    Juan Miguel Horvath, KEIRA Physical Therapy Assistant        Physical Therapy Education                 Title: PT OT SLP Therapies (In Progress)     Topic: Physical Therapy (In Progress)     Point: Mobility training (Done)     Learning Progress Summary           Patient Acceptance, E, VU by KERRY at 9/17/2020 1111    Comment: Cont PT POC, gt mech/stride  length    Acceptance, E, VU by KW1 at 9/4/2020 1334    Comment: Role of PT, POC, use of gait belt                   Point: Home exercise program (Not Started)     Learner Progress:  Not documented in this visit.          Point: Body mechanics (Done)     Learning Progress Summary           Patient Acceptance, E, VU by KW at 9/17/2020 1111    Comment: Cont PT POC, gt mech/stride length                   Point: Precautions (Done)     Learning Progress Summary           Patient Acceptance, E, VU by KW at 9/17/2020 1111    Comment: Cont PT POC, gt mech/stride length    Acceptance, E,TB, VU by KM at 9/9/2020 0424    Acceptance, E, VU by KW1 at 9/4/2020 1334    Comment: Role of PT, POC, use of gait belt                               User Key     Initials Effective Dates Name Provider Type Discipline     10/17/16 -  Madison Irwin, RN Registered Nurse Nurse    KW1 08/09/20 -  Melina Neves, PT Physical Therapist PT     08/09/20 -  Jeet Mortensen, PT Physical Therapist PT              PT Recommendation and Plan  Anticipated Discharge Disposition (PT): skilled nursing facility, home with 24/7 care, home with home health  Therapy Frequency (PT): other (see comments)(6-11x/wk)  Progress Summary (PT)  Progress Toward Functional Goals (PT): progress toward functional goals as expected  Plan of Care Reviewed With: patient  Progress: improving  Outcome Summary: Pt. wtih good effort with mobility this a.m. Pt. transferred sup-sit-sup independent, sit-stand-sit SBA, amb. 300' with HHA CGA no LOB, performed 2x15 reps seated therex at EOB. Cont. to mobilize  Outcome Measures     Row Name 09/21/20 0930 09/19/20 0915 09/18/20 1414       How much help from another person do you currently need...    Turning from your back to your side while in flat bed without using bedrails?  4  -JA  --  4  -KW    Moving from lying on back to sitting on the side of a flat bed without bedrails?  4  -JA  --  4  -KW    Moving to and from a bed to a  chair (including a wheelchair)?  3  -JA  --  3  -KW    Standing up from a chair using your arms (e.g., wheelchair, bedside chair)?  3  -JA  --  3  -KW    Climbing 3-5 steps with a railing?  3  -JA  --  3  -KW    To walk in hospital room?  3  -JA  --  3  -KW    AM-PAC 6 Clicks Score (PT)  20  -JA  --  20  -KW       How much help from another is currently needed...    Putting on and taking off regular lower body clothing?  --  3  -EUGENIA  --    Bathing (including washing, rinsing, and drying)  --  3  -EUGENIA  --    Toileting (which includes using toilet bed pan or urinal)  --  3  -EUGENIA  --    Putting on and taking off regular upper body clothing  --  4  -EUGENIA  --    Taking care of personal grooming (such as brushing teeth)  --  4  -EUGENIA  --    Eating meals  --  3  -EUGENIA  --    AM-PAC 6 Clicks Score (OT)  --  20  -EUGENIA  --       Functional Assessment    Outcome Measure Options  AM-PAC 6 Clicks Basic Mobility (PT)  -JA  AM-PAC 6 Clicks Daily Activity (OT)  -EUGENIA  AM-PAC 6 Clicks Basic Mobility (PT)  -      User Key  (r) = Recorded By, (t) = Taken By, (c) = Cosigned By    Initials Name Provider Type    Juan Miguel Horvath PTA Physical Therapy Assistant    Melina Valladares, PT Physical Therapist    Nasima Campos OTA Occupational Therapy Assistant           Time Calculation:   PT Charges     Row Name 09/21/20 1137             Time Calculation    Start Time  0930  -      Stop Time  1010  -      Time Calculation (min)  40 min  -ANNA         Time Calculation- PT    Total Timed Code Minutes- PT  40 minute(s)  -ANNA        User Key  (r) = Recorded By, (t) = Taken By, (c) = Cosigned By    Initials Name Provider Type    Juan Miguel Horvath PTA Physical Therapy Assistant        Therapy Charges for Today     Code Description Service Date Service Provider Modifiers Qty    89895917218 HC GAIT TRAINING EA 15 MIN 9/21/2020 Juan Miguel Pryor, PTA GP 1    09902214228 HC PT THER PROC EA 15 MIN 9/21/2020 Juan Miguel Pryor, PTA GP 2           PT G-Codes  Outcome Measure Options: AM-PAC 6 Clicks Basic Mobility (PT)  AM-PAC 6 Clicks Score (PT): 20  AM-PAC 6 Clicks Score (OT): 20    Juan Miguel Pryor, PTA  9/21/2020

## 2020-09-21 NOTE — PROGRESS NOTES
"9/21/2020    Chief Complaint: psychosis    Subjective:  Patient is a 70 y.o. female that is currently inpatient on the [facility] today she is seen in her room after she has worked with therapy. She is pleasant, lack of conversation. She reports that she is doing well, no worries today. Does not report any paranoia to signee and staff state she has not been making paranoid statements.   Pt is compliant with medications, although she does fuss about the amount of medications.   Objective     Vital Signs    Temp:  [96.9 °F (36.1 °C)-98 °F (36.7 °C)] 96.9 °F (36.1 °C)  Heart Rate:  [73-81] 81  Resp:  [16-18] 16  BP: ()/(48-68) 97/48    Physical Exam:   General Appearance: alert, appears stated age and cooperative,  Hygiene:   fair  Gait & Station: Needs Assistance  Musculoskeletal: No tremors or abnormal involuntary movements    Mental Status Exam:   Cooperation:  Evasive  Eye Contact:Blind   Psychomotor Behavior:  Slow  Mood: \"Fine\"  Affect:  mood-congruent  Speech:  Minimal  Thought Process:  Poverty of thought  Associations: Disorganized  Thought Content:     Mood congruent   Suicidal:  None   Homicidal:  None   Hallucinations:  None   Delusion:  None  Cognitive Functioning:   Consciousness: awake and alert  Reliability:  fair  Insight:  Poor  Judgement:  Poor  Impulse Control:  Fair    Lab Results (last 24 hours)     Procedure Component Value Units Date/Time    POC Glucose Once [[id number]]  (Abnormal) Collected: 09/21/20 1116    Specimen: Blood Updated: 09/21/20 1129     Glucose 231 mg/dL     POC Glucose Once [[id number]]  (Normal) Collected: 09/21/20 0621    Specimen: Blood Updated: 09/21/20 0633     Glucose 102 mg/dL     POC Glucose Once [[id number]]  (Abnormal) Collected: 09/20/20 2030    Specimen: Blood Updated: 09/20/20 2100     Glucose 240 mg/dL         Imaging Results (Last 24 Hours)     ** No results found for the last 24 hours. **          Medicine:   Current Facility-Administered Medications:   •  " aluminum-magnesium hydroxide-simethicone (MAALOX MAX) 400-400-40 MG/5ML suspension 15 mL, 15 mL, Oral, Q6H PRN, Tyrel Anderson II, MD  •  cloNIDine (CATAPRES) tablet 0.1 mg, 0.1 mg, Oral, Q6H PRN, Tyrel Anderson II, MD  •  dextrose (D50W) 25 g/ 50mL Intravenous Solution 25 g, 25 g, Intravenous, Q15 Min PRN, Cas Houston APRN  •  dextrose (GLUTOSE) oral gel 15 g, 15 g, Oral, Q15 Min PRN, Cas Houston APRN, 15 g at 09/15/20 0556  •  escitalopram (LEXAPRO) tablet 10 mg, 10 mg, Oral, Daily, Tyrel Anderson II, MD, 10 mg at 09/21/20 0807  •  ferrous sulfate EC tablet 324 mg, 324 mg, Oral, Daily With Breakfast, Tyrel Anderson II, MD, 324 mg at 09/21/20 0807  •  glucagon (human recombinant) (GLUCAGEN DIAGNOSTIC) injection 1 mg, 1 mg, Subcutaneous, Q15 Min PRN, Cas Houston APRN  •  insulin aspart (novoLOG) injection 0-7 Units, 0-7 Units, Subcutaneous, TID AC, Cas Houston APRN, Stopped at 09/10/20 1227  •  loperamide (IMODIUM) capsule 2 mg, 2 mg, Oral, Q2H PRN, Tyrel Anderson II, MD  •  magnesium hydroxide (MILK OF MAGNESIA) suspension 2400 mg/10mL 10 mL, 10 mL, Oral, Daily PRN, Tyrel Anderson II, MD  •  pantoprazole (PROTONIX) EC tablet 40 mg, 40 mg, Oral, Q AM, Tyrel Anderson II, MD, 40 mg at 09/21/20 0807  •  risperiDONE (risperDAL) tablet 3 mg, 3 mg, Oral, Daily With Dinner, Tyrel Anderson II, MD, 3 mg at 09/20/20 1922    Diagnoses/Assessment:     Acute exacerbation of chronic paranoid schizophrenia (CMS/HCC)    Schizophrenia, paranoid type (CMS/HCC)    Acute blood loss anemia    DMII (diabetes mellitus, type 2) (CMS/[facility])      Treatment Plan:    1) Will continue care for the patient on the behavioral health unit at [facility] to ensure patient safety.  2) Will continue to provide treatment with the unit milieu, activities, therapies and psychopharmacological management.  3) Patient to be placed on  or continued on  Q15 minute checks  and Fall precautions.  4) Pertinent medical issues:   --Type 2 diabetes: Continue sliding scale insulin  -GERD/acute blood loss anemia: Continue Protonix and ferrous sulfate  - Foot care/podiatry: Appreciate their aid and care  5) Will order following labs: none  6) Will make the following medication changes:   --Cont Risperidone 3 mg daily with dinner for psychosis  -Continue Lexapro 10 mg daily for affective symptoms  7) Will continue discharge planning as appropriate for patient.  8) Psychotherapy provided for less than 16 minutes.    Treatment plan and medication risks and benefits discussed with: Patient    Elena Dave, CLAIRE  09/21/20  15:07 CDT

## 2020-09-21 NOTE — PLAN OF CARE
Problem: Patient Care Overview  Goal: Plan of Care Review  Outcome: Ongoing, Progressing  Flowsheets (Taken 9/21/2020 8132)  Progress: improving  Plan of Care Reviewed With: patient  Patient Agreement with Plan of Care: agrees  Outcome Summary: Pt. wtih good effort with mobility this a.m. Pt. transferred sup-sit-sup independent, sit-stand-sit SBA, amb. 300' with HHA CGA no LOB, performed 2x15 reps seated therex at EOB. Cont. to mobilize

## 2020-09-21 NOTE — NURSING NOTE
"Behavior   Note any precipitants to event or behavior   Describe level and action of any aggressive behavior or speech and associated interventions.     Anxiety: Patient denies at this time  Depression: Patient denies at this time  Pain  0  AVH   no  S/I   no  Plan  no  H/I   no  Plan  no    Affect   euthymic/normal      Note: Pt is alert and has had come complaints. She has complained about the amount of yelling and screaming of another patient and all the bed and chair alarms going off causing her to stay awake , she also was upset about the round tht are done every 15 minutes \"I'm tired of all ya'll sneaking in my room all the time\" educated on the rules of this unit, She went on to say that she is not yelling and she understands about the other patient but is just upset with all the noise the last two day., interacts in appropriate manner, no falls. She denies any S/I, H/I or hallucinations, she was in her room sitting up in bed at time of assessment.      Intervention    PRN medication utilized:  no    Instructed in medication usage and effects  Medications administered as ordered  Encouraged to verbalize needs      Response    Verbalized understanding   Did patient take medications as ordered yes   Did patient interact with assessment?  yes     Plan    Will monitor for safety  Will monitor every 15 minutes as ordered  Will evaluate and promote the plan of care    Last BM:  unknown date  (Please chart in I/O as well)  "

## 2020-09-21 NOTE — PLAN OF CARE
"Goal Outcome Evaluation:  Plan of Care Reviewed With: patient  Progress: no change  Outcome Summary: Has slept off and on throughout the night, is still upset about being here and wants to go home, she is \"tired of all this noise and everybody coming in my room at night\" she was educated of unit rules and policy about 15 minute rounds, Medication compliant, no falls able to make needs known, no distress  "

## 2020-09-21 NOTE — PLAN OF CARE
Problem: Patient Care Overview  Goal: Plan of Care Review  Outcome: Met  Flowsheets (Taken 9/21/2020 4069)  Consent Given to Review Plan with: OT tx on this date.  Pt demo good dynamic stand balance at SBA with table dusting activity.  Pt demo set up for LB dressing (i.e. slippers).  Pt has previously meet all other goals.  OT discussed D/C from OT and pt agreed she was at her previous functional level.  Will D/C from OT.  Rec home with home health and assist as needed due to blindness when D/C from hospital.  Plan of Care Reviewed With: patient

## 2020-09-21 NOTE — NURSING NOTE
Behavior   Note any precipitants to event or behavior   Describe level and action of any aggressive behavior or speech and associated interventions.     Anxiety: Patient denies at this time  Depression: Patient denies at this time  Pain  0  AVH   yes   S/I   no  Plan  no  H/I   no  Plan  no    Affect   euthymic/normal      Note: Patient is smiling and cheerful this morning. Patient denies anxiety or depression. Patient ate breakfast independently with meal setup. Patient took medications without difficulty. Patient interacting well with staff and responds appropriately to questions.      Intervention    PRN medication utilized:  no    Instructed in medication usage and effects  Medications administered as ordered  Encouraged to verbalize needs      Response    Verbalized understanding   Did patient take medications as ordered yes   Did patient interact with assessment?  yes     Plan    Will monitor for safety  Will monitor every 15 minutes as ordered  Will evaluate and promote the plan of care    Last BM:  unknown date  (Please chart in I/O as well)

## 2020-09-21 NOTE — PLAN OF CARE
Goal Outcome Evaluation:  Plan of Care Reviewed With: patient  Progress: improving  Outcome Summary: Patient is smiling and cheerful this morning. Patient denies anxiety or depression. Patient ate breakfast independently with meal setup. Patient took medications without difficulty. Patient interacting well with staff and responds appropriately to questions.

## 2020-09-22 LAB
GLUCOSE BLDC GLUCOMTR-MCNC: 156 MG/DL (ref 70–130)
GLUCOSE BLDC GLUCOMTR-MCNC: 204 MG/DL (ref 70–130)
GLUCOSE BLDC GLUCOMTR-MCNC: 216 MG/DL (ref 70–130)
GLUCOSE BLDC GLUCOMTR-MCNC: 82 MG/DL (ref 70–130)

## 2020-09-22 PROCEDURE — 97116 GAIT TRAINING THERAPY: CPT

## 2020-09-22 PROCEDURE — 82962 GLUCOSE BLOOD TEST: CPT

## 2020-09-22 PROCEDURE — 99232 SBSQ HOSP IP/OBS MODERATE 35: CPT | Performed by: PSYCHIATRY & NEUROLOGY

## 2020-09-22 PROCEDURE — 97110 THERAPEUTIC EXERCISES: CPT

## 2020-09-22 RX ADMIN — ESCITALOPRAM OXALATE 10 MG: 10 TABLET ORAL at 08:06

## 2020-09-22 RX ADMIN — FERROUS SULFATE TAB EC 324 MG (65 MG FE EQUIVALENT) 324 MG: 324 (65 FE) TABLET DELAYED RESPONSE at 08:07

## 2020-09-22 RX ADMIN — PANTOPRAZOLE SODIUM 40 MG: 40 TABLET, DELAYED RELEASE ORAL at 08:06

## 2020-09-22 RX ADMIN — RISPERIDONE 3 MG: 1 TABLET ORAL at 17:17

## 2020-09-22 NOTE — PROGRESS NOTES
9/22/2020    Chief Complaint: paranoid schizophrenia    Subjective:  Patient is a 70 y.o. female seen on the U today for chronic paranoid schizophrenia. Pt is pleasant setting up on the side of bed smiling. Pt reports she slept well through the night. She denies hearing any voices. Pt is able to carry on conversation. No thoughts of SI/HI/AVH. No behaviors noted. Pt is compliant with medications.  Objective     Vital Signs    Temp:  [97.6 °F (36.4 °C)-97.7 °F (36.5 °C)] 97.6 °F (36.4 °C)  Heart Rate:  [86-89] 86  Resp:  [18] 18  BP: ()/(50-52) 101/50    Physical Exam:   General Appearance: alert, appears stated age and cooperative,  Hygiene:   good  Gait & Station: Wheelchair  Musculoskeletal: No tremors or abnormal involuntary movements    Mental Status Exam:   Cooperation:  Cooperative  Eye Contact:  Poor  Psychomotor Behavior:  Appropriate  Mood: Improving  Affect:  mood-congruent  Speech:  Normal  Thought Process:  Appropriately abstract  Associations: Circumstantial  Thought Content:     Mood congruent   Suicidal:  None   Homicidal:  None   Hallucinations:  None   Delusion:  None  Cognitive Functioning:   Consciousness: awake, alert and oriented  Reliability:  good  Insight:  Fair  Judgement:  Fair  Impulse Control:  Fair    Lab Results (last 24 hours)     Procedure Component Value Units Date/Time    POC Glucose Once [[id number]]  (Abnormal) Collected: 09/22/20 1139    Specimen: Blood Updated: 09/22/20 1155     Glucose 204 mg/dL     POC Glucose Once [[id number]]  (Normal) Collected: 09/22/20 0557    Specimen: Blood Updated: 09/22/20 0614     Glucose 82 mg/dL     POC Glucose Once [[id number]]  (Abnormal) Collected: 09/21/20 1937    Specimen: Blood Updated: 09/21/20 1950     Glucose 163 mg/dL     POC Glucose Once [[id number]]  (Normal) Collected: 09/21/20 1617    Specimen: Blood Updated: 09/21/20 1629     Glucose 95 mg/dL         Imaging Results (Last 24 Hours)     ** No results found for the last 24 hours.  **          Medicine:   Current Facility-Administered Medications:   •  aluminum-magnesium hydroxide-simethicone (MAALOX MAX) 400-400-40 MG/5ML suspension 15 mL, 15 mL, Oral, Q6H PRN, Tyrel Anderson II, MD  •  cloNIDine (CATAPRES) tablet 0.1 mg, 0.1 mg, Oral, Q6H PRN, Tyrel Anderson II, MD  •  dextrose (D50W) 25 g/ 50mL Intravenous Solution 25 g, 25 g, Intravenous, Q15 Min PRN, Cas Houston APRN  •  dextrose (GLUTOSE) oral gel 15 g, 15 g, Oral, Q15 Min PRN, Cas Houston APRN, 15 g at 09/15/20 0556  •  escitalopram (LEXAPRO) tablet 10 mg, 10 mg, Oral, Daily, Tyrel Anderson II, MD, 10 mg at 09/22/20 0806  •  ferrous sulfate EC tablet 324 mg, 324 mg, Oral, Daily With Breakfast, Tyrel Anderson II, MD, 324 mg at 09/22/20 0807  •  glucagon (human recombinant) (GLUCAGEN DIAGNOSTIC) injection 1 mg, 1 mg, Subcutaneous, Q15 Min PRN, Cas Houston APRN  •  insulin aspart (novoLOG) injection 0-7 Units, 0-7 Units, Subcutaneous, TID AC, Cas Houston APRN, Stopped at 09/10/20 1227  •  loperamide (IMODIUM) capsule 2 mg, 2 mg, Oral, Q2H PRN, Tyrel Anderson II, MD  •  magnesium hydroxide (MILK OF MAGNESIA) suspension 2400 mg/10mL 10 mL, 10 mL, Oral, Daily PRN, Tyrel Anderson II, MD  •  pantoprazole (PROTONIX) EC tablet 40 mg, 40 mg, Oral, Q AM, Tyrel Anderson II, MD, 40 mg at 09/22/20 0806  •  risperiDONE (risperDAL) tablet 3 mg, 3 mg, Oral, Daily With Dinner, Tyrel Anderson II, MD, 3 mg at 09/21/20 1724    Diagnoses/Assessment:     Acute exacerbation of chronic paranoid schizophrenia (CMS/HCC)    Schizophrenia, paranoid type (CMS/HCC)    Acute blood loss anemia    DMII (diabetes mellitus, type 2) (CMS/HCC)      Treatment Plan:    1) Will continue care for the patient on the behavioral health unit at [facility] to ensure patient safety.  2) Will continue to provide treatment with the unit milieu, activities,  therapies and psychopharmacological management.  3) Patient to be placed on or continued on  Q15 minute checks  and Fall precautions.  4) Pertinent medical issues: Diabetes type 2. Continue sliding scale insulin. Continue Protonix and ferrous sulfate.   5) Will order following labs: none  6) Will make the following medication changes:   -Cont Risperidone 3 mg daily with dinner for psychosis  -Continue Lexapro 10 mg daily for affective symptoms  7) Will continue discharge planning as appropriate for patient.  8) Psychotherapy provided: none    Treatment plan and medication risks and benefits discussed with: Patient    CLAIRE Yen  09/22/20  14:16 CDT

## 2020-09-22 NOTE — NURSING NOTE
Behavior   Note any precipitants to event or behavior   Describe level and action of any aggressive behavior or speech and associated interventions.     Anxiety: Patient denies at this time  Depression: Patient denies at this time  Pain  0  AVH   no  S/I   no  Plan  no  H/I   no  Plan  no    Affect   euthymic/normal      Note: Pt is alert and oriented, medication compliant FSBS was 163 this evening no complaints or concerns voiced. She denies any S/I, H/I or hallucinations, she was in her room sitting up in bed at time of assessment.Has been cooperative and able to make needs known. Encouraged open communication.      Intervention    PRN medication utilized:  no    Instructed in medication usage and effects  Medications administered as ordered  Encouraged to verbalize needs      Response    Verbalized understanding   Did patient take medications as ordered yes   Did patient interact with assessment?  yes     Plan    Will monitor for safety  Will monitor every 15 minutes as ordered  Will evaluate and promote the plan of care    Last BM:  unknown date  (Please chart in I/O as well)

## 2020-09-22 NOTE — PROGRESS NOTES
LCSW called and spoke with brother re: disposition. Brother notes that he has spoke with Yani and BO office and his plan is for pt to return home with continued waiver services that will assist pt in home care needs and meals. Brother has requested home health to be ordered for med management while he continues to pursue guardianship and placement. LCSW will discuss with treatment team. Anticipate dc in 1-2 days.

## 2020-09-22 NOTE — PLAN OF CARE
Problem: Adult Behavioral Health Plan of Care  Goal: Plan of Care Review  Outcome: Ongoing, Progressing  Flowsheets (Taken 9/22/2020 6313)  Plan of Care Reviewed With: patient  Patient Agreement with Plan of Care: agrees  Outcome Summary: Pt. agreeable to gt this a.m. with encouragement. Pt. transferred sit-stand-sit SBA, amb. 300' with HHA at  no [facility] v..'s for direction, performed 2x20 reps seated therex.

## 2020-09-22 NOTE — THERAPY TREATMENT NOTE
Acute Care - Physical Therapy Treatment Note  [facility]     Patient Name: Natacha Gandhi  : 1950  MRN: [id number]  Today's Date: 2020   Onset of Illness/Injury or Date of Surgery: 20       PT Assessment (last 12 hours)      PT Evaluation and Treatment     Row Name 20          Physical Therapy Time and Intention    Subjective Information  no complaints  -     Document Type  therapy note (daily note)  -     Mode of Treatment  individual therapy;physical therapy  -     Patient Effort  good  -     Row Name 20          General Information    Patient Profile Reviewed  yes  -     Existing Precautions/Restrictions  fall  -     Limitations/Impairments  visual  -     Row Name 20          Cognition    Orientation Status (Cognition)  oriented x 4  -     Follows Commands (Cognition)  WNL  -     Cognitive Function (Cognitive)  safety deficit  -     Memory Deficit (Cognitive)  minimal deficit  -[facility] Name 20          Pain Scale: Numbers Pre/Post-Treatment    Pretreatment Pain Rating  0/10 - no pain  -     Posttreatment Pain Rating  0/10 - no pain  -[facility] Name 20          Transfers    Transfers  sit-stand transfer;stand-sit transfer  -     Sit-Stand [place] (Transfers)  supervision;1 person assist  -     Stand-Sit [place] (Transfers)  supervision;1 person assist  -[facility] Name 20          Sit-Stand Transfer    Assistive Device (Sit-Stand Transfers)  other (see comments) HHA  -[facility] Name 20          Stand-Sit Transfer    Assistive Device (Stand-Sit Transfers)  other (see comments)  -[facility] Name 20          Gait/Stairs (Locomotion)    [place] Level (Gait)  contact guard;1 person assist  -     Assistive Device (Gait)  other (see comments) A  -     Distance in Feet (Gait)  300'  -     Deviations/Abnormal Patterns (Gait)  base of support, narrow;gait  "speed decreased;stride length decreased  -     Comment (Gait/Stairs)  v.c's for direction throughout gt. & encouragement to continue with gt. this a.m. pt. kept saying, \" I don't want to go for these long walks\"   -[facility] Name 09/22/20 0935          Safety Issues, Functional Mobility    Impairments Affecting Function (Mobility)  balance;coordination;endurance/activity tolerance;motor control;motor planning  -[facility] Name 09/22/20 0935          Hip (Therapeutic Exercise)    Hip (Therapeutic Exercise)  AROM (active range of motion)  -     Hip AROM (Therapeutic Exercise)  sitting;flexion 2x20  -[facility] Name 09/22/20 0935          Knee (Therapeutic Exercise)    Knee (Therapeutic Exercise)  AROM (active range of motion)  -     Knee AROM (Therapeutic Exercise)  sitting;bilateral;LAQ (long arc quad) 2x20  -[facility] Name 09/22/20 0935          Ankle (Therapeutic Exercise)    Ankle (Therapeutic Exercise)  AROM (active range of motion)  -     Ankle AROM (Therapeutic Exercise)  sitting;bilateral;dorsiflexion;plantarflexion 2x20  -[facility] Name 09/22/20 0935          Vital Signs    Pre Systolic BP Rehab  119  -     Pre Treatment Diastolic BP  56  -     Pretreatment Heart Rate (beats/min)  74  -     Pre Patient Position  Sitting  -     Intra Patient Position  Standing  -     Post Patient Position  Sitting  -[facility] Name 09/22/20 0935          Bed Mobility Goal 1 (PT)    Activity/Assistive Device (Bed Mobility Goal 1, PT)  sit to supine;supine to sit  -     [place] Level/Cues Needed (Bed Mobility Goal 1, PT)  independent  -     Time Frame (Bed Mobility Goal 1, PT)  3 days  -     Progress/Outcomes (Bed Mobility Goal 1, PT)  (S) goal met  -[facility] Name 09/22/20 0935          Transfer Goal 1 (PT)    Activity/Assistive Device (Transfer Goal 1, PT)  sit-to-stand/stand-to-sit  -     [place] Level/Cues Needed (Transfer Goal 1, PT)  supervision required  -     Time Frame (Transfer " Goal 1, PT)  3 days  -JA     Progress/Outcome (Transfer Goal 1, PT)  (S) goal met  -     Row Name 09/22/20 0935          Gait Training Goal 1 (PT)    Activity/Assistive Device (Gait Training Goal 1, PT)  gait (walking locomotion) LRAD PRN  -JA     [place] Level (Gait Training Goal 1, PT)  supervision required  -JA     Distance (Gait Training Goal 1, PT)  100ft or more  -JA     Time Frame (Gait Training Goal 1, PT)  5 days  -JA     Progress/Outcome (Gait Training Goal 1, PT)  goal not met  -     Row Name 09/22/20 0935          Stairs Goal 1 (PT)    Activity/Assistive Device (Stairs Goal 1, PT)  ascending stairs;descending stairs  -JA     [place] Level/Cues Needed (Stairs Goal 1, PT)  standby assist  -JA     Number of Stairs (Stairs Goal 1, PT)  1 or more  -JA     Time Frame (Stairs Goal 1, PT)  by discharge  -JA     Progress/Outcome (Stairs Goal 1, PT)  goal not met  -     Row Name 09/22/20 0935          Therapy Assessment/Plan (PT)    Rehab Potential (PT)  good, to achieve stated therapy goals  -     Criteria for Skilled Interventions Met (PT)  yes;skilled treatment is necessary  -       User Key  (r) = Recorded By, (t) = Taken By, (c) = Cosigned By    Initials Name Provider Type    Juan Miguel Horvath, PTA Physical Therapy Assistant        Physical Therapy Education                 Title: PT OT SLP Therapies (In Progress)     Topic: Physical Therapy (In Progress)     Point: Mobility training (Done)     Learning Progress Summary           Patient Acceptance, E, VU by KW at 9/17/2020 1111    Comment: Cont PT POC, gt mech/stride length    Acceptance, E, VU by KW1 at 9/4/2020 1334    Comment: Role of PT, POC, use of gait belt                   Point: Home exercise program (Not Started)     Learner Progress:  Not documented in this visit.          Point: Body mechanics (Done)     Learning Progress Summary           Patient Acceptance, E, VU by KW at 9/17/2020 1111    Comment: Cont PT POC, gt  mech/stride length                   Point: Precautions (Done)     Learning Progress Summary           Patient Acceptance, E, VU by KW at 9/17/2020 1111    Comment: Cont PT POC, gt mech/stride length    Acceptance, E,TB, VU by KM at 9/9/2020 0424    Acceptance, E, VU by KW1 at 9/4/2020 1334    Comment: Role of PT, POC, use of gait belt                               User Key     Initials Effective Dates Name Provider Type Discipline     10/17/16 -  Madison Irwin, RN Registered Nurse Nurse    KW 08/09/20 -  Melina Neves, PT Physical Therapist PT     08/09/20 -  Jeet Mortensen PT Physical Therapist PT              PT Recommendation and Plan  Anticipated Discharge Disposition (PT): skilled nursing facility, home with 24/7 care, home with home health  Therapy Frequency (PT): other (see comments)(6-11x/wk)  Progress Summary (PT)  Progress Toward Functional Goals (PT): progress toward functional goals as expected  Plan of Care Reviewed With: patient  Progress: improving  Outcome Summary: Pt. agreeable to gt this a.m. with encouragement. Pt. transferred sit-stand-sit SBA, amb. 300' with HHA at R no LOB v.c.'s for direction, performed 2x20 reps seated therex.  Outcome Measures     Row Name 09/22/20 0935 09/21/20 1329 09/21/20 0930       How much help from another person do you currently need...    Turning from your back to your side while in flat bed without using bedrails?  4  -JA  --  4  -JA    Moving from lying on back to sitting on the side of a flat bed without bedrails?  4  -JA  --  4  -JA    Moving to and from a bed to a chair (including a wheelchair)?  3  -JA  --  3  -JA    Standing up from a chair using your arms (e.g., wheelchair, bedside chair)?  3  -JA  --  3  -JA    Climbing 3-5 steps with a railing?  3  -JA  --  3  -JA    To walk in hospital room?  3  -JA  --  3  -JA    AM-PAC 6 Clicks Score (PT)  20  -JA  --  20  -JA       How much help from another is currently needed...    Putting on and taking off  regular lower body clothing?  --  4  -RB  --    Bathing (including washing, rinsing, and drying)  --  4 For sponge bath.  -RB  --    Toileting (which includes using toilet bed pan or urinal)  --  4  -RB  --    Putting on and taking off regular upper body clothing  --  4  -RB  --    Taking care of personal grooming (such as brushing teeth)  --  4  -RB  --    Eating meals  --  4  -RB  --    AM-PAC 6 Clicks Score (OT)  --  24  -RB  --       Functional Assessment    Outcome Measure Options  AM-PAC 6 Clicks Basic Mobility (PT)  -JA  --  AM-PAC 6 Clicks Basic Mobility (PT)  -JA      User Key  (r) = Recorded By, (t) = Taken By, (c) = Cosigned By    Initials Name Provider Type    Tyrel Amaya, OT Occupational Therapist    Juan Miguel Horvath PTA Physical Therapy Assistant           Time Calculation:   PT Charges     Row Name 09/22/20 1110             Time Calculation    Start Time  0935  -ANNA      Stop Time  1000  -ANNA      Time Calculation (min)  25 min  -ANNA         Time Calculation- PT    Total Timed Code Minutes- PT  25 minute(s)  -ANNA        User Key  (r) = Recorded By, (t) = Taken By, (c) = Cosigned By    Initials Name Provider Type    Juan Miguel Horvath PTA Physical Therapy Assistant        Therapy Charges for Today     Code Description Service Date Service Provider Modifiers Qty    54826334535 HC GAIT TRAINING EA 15 MIN 9/21/2020 Juan Miguel Pryor, PTA GP 1    54059416462 HC PT THER PROC EA 15 MIN 9/21/2020 Juan Miguel Pryor, KEIRA GP 2    79876003958 HC GAIT TRAINING EA 15 MIN 9/22/2020 Juan Miguel Pryor, PTA GP 1    19034418112 HC PT THER PROC EA 15 MIN 9/22/2020 Juan Miguel Pryor, PTA GP 1          PT G-Codes  Outcome Measure Options: AM-PAC 6 Clicks Basic Mobility (PT)  AM-PAC 6 Clicks Score (PT): 20  AM-PAC 6 Clicks Score (OT): 24    Juan Miguel Pryor PTA  9/22/2020

## 2020-09-22 NOTE — PLAN OF CARE
Problem: Adult Behavioral Health Plan of Care  Goal: Plan of Care Review  9/22/2020 0313 by Princess TAMIKA Valencia, RN  Outcome: Ongoing, Progressing  9/22/2020 0304 by Princess TAMIKA Valencia, RN  Outcome: Ongoing, Progressing   Goal Outcome Evaluation:  Plan of Care Reviewed With: patient  Progress: improving Pt has slept off and on throughout the night, has still complained of yelling and screaming of another peer keeping her up at night. No falls and denies AVH.

## 2020-09-22 NOTE — PLAN OF CARE
Goal Outcome Evaluation:  Plan of Care Reviewed With: patient  Progress: improving    Patient has been pleasant and cooperative with staff. Patient follows directions appropriately. Patient prefers to be in her room. Patient has good appetite and takes meds without difficulty.

## 2020-09-22 NOTE — NURSING NOTE
Patient has been pleasant and cooperative with staff. Patient follows directions appropriately. Patient prefers to be in her room. Patient has good appetite and takes meds without difficulty.

## 2020-09-23 LAB
GLUCOSE BLDC GLUCOMTR-MCNC: 113 MG/DL (ref 70–130)
GLUCOSE BLDC GLUCOMTR-MCNC: 174 MG/DL (ref 70–130)
GLUCOSE BLDC GLUCOMTR-MCNC: 210 MG/DL (ref 70–130)
GLUCOSE BLDC GLUCOMTR-MCNC: 218 MG/DL (ref 70–130)

## 2020-09-23 PROCEDURE — 97116 GAIT TRAINING THERAPY: CPT

## 2020-09-23 PROCEDURE — 99231 SBSQ HOSP IP/OBS SF/LOW 25: CPT | Performed by: PSYCHIATRY & NEUROLOGY

## 2020-09-23 PROCEDURE — 97110 THERAPEUTIC EXERCISES: CPT

## 2020-09-23 PROCEDURE — 82962 GLUCOSE BLOOD TEST: CPT

## 2020-09-23 RX ADMIN — ESCITALOPRAM OXALATE 10 MG: 10 TABLET ORAL at 08:19

## 2020-09-23 RX ADMIN — FERROUS SULFATE TAB EC 324 MG (65 MG FE EQUIVALENT) 324 MG: 324 (65 FE) TABLET DELAYED RESPONSE at 08:19

## 2020-09-23 RX ADMIN — RISPERIDONE 3 MG: 1 TABLET ORAL at 20:14

## 2020-09-23 RX ADMIN — PANTOPRAZOLE SODIUM 40 MG: 40 TABLET, DELAYED RELEASE ORAL at 08:19

## 2020-09-23 NOTE — PROGRESS NOTES
"9/23/2020    Chief Complaint: dementia related behavioral issues    Subjective:  Patient is a 70 y.o. female that is currently inpatient on the [facility] today she was seen in her room. She is still upset about having to eat meals in the common area, she states she is happiest when she is alone and away from the noise and disruption from peers.   Pt is calm, appropriate, friendly.   Pt is compliant with medications and care. She is working with therapy and active in treatment.        Objective     Vital Signs    Temp:  [97.7 °F (36.5 °C)-98.6 °F (37 °C)] 97.7 °F (36.5 °C)  Heart Rate:  [77-87] 77  Resp:  [18] 18  BP: (112-141)/(58-71) 112/58    Physical Exam:   General Appearance: alert, appears stated age and cooperative,  Hygiene:   fair  Gait & Station: Needs Assistance  Musculoskeletal: No tremors or abnormal involuntary movements    Mental Status Exam:   Cooperation:  Cooperative  Eye Contact:  blind  Psychomotor Behavior:  Appropriate  Mood: \"Fine\"  Affect:  normal  Speech:  Normal  Thought Process:  Appropriately abstract  Associations: Circumstantial  Thought Content:     Mood congruent   Suicidal:  None   Homicidal:  None   Hallucinations:  Auditory   Delusion:  None  Cognitive Functioning:   Consciousness: awake and alert  Reliability:  fair  Insight:  Poor  Judgement:  Impaired  Impulse Control:  Fair    Lab Results (last 24 hours)     Procedure Component Value Units Date/Time    POC Glucose Once [[id number]]  (Abnormal) Collected: 09/23/20 1136    Specimen: Blood Updated: 09/23/20 1152     Glucose 218 mg/dL     POC Glucose Once [[id number]]  (Normal) Collected: 09/23/20 0559    Specimen: Blood Updated: 09/23/20 0615     Glucose 113 mg/dL     POC Glucose Once [[id number]]  (Abnormal) Collected: 09/22/20 1943    Specimen: Blood Updated: 09/22/20 2009     Glucose 216 mg/dL     POC Glucose Once [[id number]]  (Abnormal) Collected: 09/22/20 1653    Specimen: Blood Updated: 09/22/20 1750     Glucose 156 mg/dL     "     Imaging Results (Last 24 Hours)     ** No results found for the last 24 hours. **          Medicine:   Current Facility-Administered Medications:   •  aluminum-magnesium hydroxide-simethicone (MAALOX MAX) 400-400-40 MG/5ML suspension 15 mL, 15 mL, Oral, Q6H PRN, Tyrel Anderson II, MD  •  cloNIDine (CATAPRES) tablet 0.1 mg, 0.1 mg, Oral, Q6H PRN, Tyrel Anderson II, MD  •  dextrose (D50W) 25 g/ 50mL Intravenous Solution 25 g, 25 g, Intravenous, Q15 Min PRN, Cas Houston APRN  •  dextrose (GLUTOSE) oral gel 15 g, 15 g, Oral, Q15 Min PRN, Cas Houston APRN, 15 g at 09/15/20 0556  •  escitalopram (LEXAPRO) tablet 10 mg, 10 mg, Oral, Daily, Tyrel Anderson II, MD, 10 mg at 09/23/20 0819  •  ferrous sulfate EC tablet 324 mg, 324 mg, Oral, Daily With Breakfast, Tyrel Anderson II, MD, 324 mg at 09/23/20 0819  •  glucagon (human recombinant) (GLUCAGEN DIAGNOSTIC) injection 1 mg, 1 mg, Subcutaneous, Q15 Min PRN, Cas Houston APRN  •  insulin aspart (novoLOG) injection 0-7 Units, 0-7 Units, Subcutaneous, TID AC, Cas Houston APRN, Stopped at 09/10/20 1227  •  loperamide (IMODIUM) capsule 2 mg, 2 mg, Oral, Q2H PRN, Tyrel Anderson II, MD  •  magnesium hydroxide (MILK OF MAGNESIA) suspension 2400 mg/10mL 10 mL, 10 mL, Oral, Daily PRN, Tyrel Anderson II, MD  •  pantoprazole (PROTONIX) EC tablet 40 mg, 40 mg, Oral, Q AM, Tyrel Anderson II, MD, 40 mg at 09/23/20 0819  •  risperiDONE (risperDAL) tablet 3 mg, 3 mg, Oral, Daily With Dinner, Tyrel Anderson II, MD, 3 mg at 09/22/20 3776    Diagnoses/Assessment:     Acute exacerbation of chronic paranoid schizophrenia (CMS/HCC)    Schizophrenia, paranoid type (CMS/HCC)    Acute blood loss anemia    DMII (diabetes mellitus, type 2) (CMS/HCC)      Treatment Plan:    1) Will continue care for the patient on the behavioral health unit at [facility] to ensure patient  safety.  2) Will continue to provide treatment with the unit milieu, activities, therapies and psychopharmacological management.  3) Patient to be placed on or continued on  Q15 minute checks  and Fall precautions.  4) Pertinent medical issues:   --DM2  SSI  5) Will order following labs: none  6) Will make the following medication changes:   --Cont Risperidone 3 mg daily with dinner  --Cont Lexapro 10 mg daily   7) Will continue discharge planning as appropriate for patient.  8) Psychotherapy provided for less than 16 minutes.    Treatment plan and medication risks and benefits discussed with: Patient    Elena Dave, APRSHANI  09/23/20  15:54 CDT

## 2020-09-23 NOTE — PLAN OF CARE
Goal Outcome Evaluation:  Plan of Care Reviewed With: patient  Progress: improving  Outcome Summary: pt slept off and on throughout the night, no falls, no behaviors

## 2020-09-23 NOTE — CONSULTS
Adult Nutrition  Assessment    Patient Name:  Natacha Gandhi  YOB: 1950  MRN: [id number]  Admit Date:  9/3/2020    Assessment Date:  9/23/2020    Comments:  Pt reports good appetite.  Voiced no food preferences.  Intake 100% - 4x, 75% - 1x, 50% - 1x, 0% - 1x.  Blood glucose is elevated.  Sliding scale novolog prescribed.  Staff indicates pt isn't receiving her meals in divided container.  Menu checked in Food and Nutrition.  Will maintain pt on prescribed diet.        Reason for Assessment     Row Name 09/23/20 1556          Reason for Assessment    Reason For Assessment  follow-up protocol             Labs/Tests/Procedures/Meds     Row Name 09/23/20 1557          Labs/Procedures/Meds    Lab Results Reviewed  reviewed, pertinent        Medications    Pertinent Medications Reviewed  reviewed, pertinent             Nutrition Prescription Ordered     Row Name 09/23/20 1557          Nutrition Prescription PO    Current PO Diet  Soft Texture     Texture  Ground     Common Modifiers  Cardiac;Consistent Carbohydrate         Evaluation of Received Nutrient/Fluid Intake     Row Name 09/23/20 1558          PO Evaluation    Number of Meals  8     % PO Intake  100% - 5x, 75% - 1x, 50% - 1x, 0% - 1x               Electronically signed by:  Suzette Steiner RD  09/23/20 15:59 CDT

## 2020-09-23 NOTE — PLAN OF CARE
Problem: Adult Behavioral Health Plan of Care  Goal: Plan of Care Review  Outcome: Ongoing, Progressing  Flowsheets (Taken 9/23/2020 1035)  Plan of Care Reviewed With: patient  Patient Agreement with Plan of Care: agrees  Outcome Summary:   PT treatment completed this a.m. Pt. transferred sup-sit independent, sit-stand-sit SBA, Pt. amb. 320' HHA at R no LOB, but guarded with performance due to visual deficits, pt. performed x20 reps standing therex. Cont. to mobilize   Pt. will need 24/7 care upon d/c due to poor vision, Pt. cont's to be CGA for transfers & gt. due to vision at this time, pt increasing gt. & therex tolerance. Cont. to mobilize

## 2020-09-23 NOTE — NURSING NOTE
Behavior   Note any precipitants to event or behavior   Describe level and action of any aggressive behavior or speech and associated interventions.     Anxiety: Patient denies at this time  Depression: Patient denies at this time  Pain  0  AVH   no  S/I   no  Plan  no  H/I   no  Plan  no    Affect   euthymic/normal      Note: She denies any S/I, H/I or hallucinations, she was in her room sitting up in bed at time of assessment.Has been cooperative and able to make needs known. Encouraged open communication. Has been medication compliant although she has no meds due at HS other than a FSBS      Intervention    PRN medication utilized:  no    Instructed in medication usage and effects  Medications administered as ordered  Encouraged to verbalize needs      Response    Verbalized understanding   Did patient take medications as ordered yes   Did patient interact with assessment?  yes     Plan    Will monitor for safety  Will monitor every 15 minutes as ordered  Will evaluate and promote the plan of care    Last BM:  unknown date  (Please chart in I/O as well)

## 2020-09-23 NOTE — PLAN OF CARE
Problem: Patient Care Overview  Goal: Plan of Care Review  Recent Flowsheet Documentation  Taken 9/23/2020 1606 by Suzette Steiner, RD  Progress: no change  Plan of Care Reviewed With: (nursing)   patient   other (see comments)  Outcome Summary: Intake 100% - 5x, 75% - 1x, 50% - 1x, 0% - 1x

## 2020-09-23 NOTE — THERAPY TREATMENT NOTE
Acute Care - Physical Therapy Treatment Note  [facility]     Patient Name: Natacha Gandhi  : 1950  MRN: [id number]  Today's Date: 2020   Onset of Illness/Injury or Date of Surgery: 20       PT Assessment (last 12 hours)      PT Evaluation and Treatment     Row Name 20 1035          Physical Therapy Time and Intention    Subjective Information  no complaints  -     Document Type  therapy note (daily note)  -     Mode of Treatment  individual therapy;physical therapy  -     Patient Effort  good  -     Row Name 20 1035          General Information    Patient Profile Reviewed  yes  -     Existing Precautions/Restrictions  fall  -     Limitations/Impairments  visual  -     Row Name 20 1035          Cognition    Orientation Status (Cognition)  oriented x 4  -     Follows Commands (Cognition)  WNL  -     Cognitive Function (Cognitive)  safety deficit  -     Memory Deficit (Cognitive)  minimal deficit  -[facility] Name 20 1035          Pain Scale: Numbers Pre/Post-Treatment    Pretreatment Pain Rating  0/10 - no pain  -     Posttreatment Pain Rating  0/10 - no pain  -[facility] Name 20 1035          Bed Mobility    Supine-Sit [place] (Bed Mobility)  independent  -[facility] Name 20 1035          Transfers    Transfers  sit-stand transfer;stand-sit transfer  -     Sit-Stand [place] (Transfers)  supervision;1 person assist  -     Stand-Sit [place] (Transfers)  supervision;1 person assist  -[facility] Name 20 1035          Sit-Stand Transfer    Assistive Device (Sit-Stand Transfers)  other (see comments) HHA  -     Row Name 20 1035          Stand-Sit Transfer    Assistive Device (Stand-Sit Transfers)  other (see comments)  -[facility] Name 20 1035          Gait/Stairs (Locomotion)    [place] Level (Gait)  contact guard  -     Assistive Device (Gait)  other (see comments) A  -     Distance in Feet  (Gait)  320'  -     Pattern (Gait)  step-through  -     Deviations/Abnormal Patterns (Gait)  base of support, narrow;gait speed decreased;stride length decreased  -[facility] Name 09/23/20 1035          Safety Issues, Functional Mobility    Impairments Affecting Function (Mobility)  balance;coordination;endurance/activity tolerance;motor control;motor planning  -[facility] Name 09/23/20 1035          Hip (Therapeutic Exercise)    Hip (Therapeutic Exercise)  AROM (active range of motion)  -     Hip AROM (Therapeutic Exercise)  standing;aDduction;flexion x20  -[facility] Name 09/23/20 1035          Ankle (Therapeutic Exercise)    Ankle (Therapeutic Exercise)  AROM (active range of motion)  -     Ankle AROM (Therapeutic Exercise)  standing;bilateral;dorsiflexion;plantarflexion  -[facility] Name 09/23/20 1035          Vital Signs    Pre Patient Position  Sitting  -     Intra Patient Position  Standing  -     Post Patient Position  Sitting  -JA     Row Name 09/23/20 1035          Bed Mobility Goal 1 (PT)    Activity/Assistive Device (Bed Mobility Goal 1, PT)  sit to supine;supine to sit  -     [place] Level/Cues Needed (Bed Mobility Goal 1, PT)  independent  -     Time Frame (Bed Mobility Goal 1, PT)  3 days  -     Progress/Outcomes (Bed Mobility Goal 1, PT)  (S) goal met  -[facility] Name 09/23/20 1035          Transfer Goal 1 (PT)    Activity/Assistive Device (Transfer Goal 1, PT)  sit-to-stand/stand-to-sit  -     [place] Level/Cues Needed (Transfer Goal 1, PT)  supervision required  -     Time Frame (Transfer Goal 1, PT)  3 days  -     Progress/Outcome (Transfer Goal 1, PT)  (S) goal met  -[facility] Name 09/23/20 1035          Gait Training Goal 1 (PT)    Activity/Assistive Device (Gait Training Goal 1, PT)  gait (walking locomotion) LRAD PRN  -     [place] Level (Gait Training Goal 1, PT)  supervision required  -     Distance (Gait Training Goal 1, PT)  100ft or more  -      Time Frame (Gait Training Goal 1, PT)  5 days  -     Progress/Outcome (Gait Training Goal 1, PT)  goal not met  -     Row Name 09/23/20 1035          Stairs Goal 1 (PT)    Activity/Assistive Device (Stairs Goal 1, PT)  ascending stairs;descending stairs  -     [place] Level/Cues Needed (Stairs Goal 1, PT)  standby assist  -JA     Number of Stairs (Stairs Goal 1, PT)  1 or more  -     Time Frame (Stairs Goal 1, PT)  by discharge  -     Progress/Outcome (Stairs Goal 1, PT)  goal not met  -     Row Name 09/23/20 1035          Therapy Assessment/Plan (PT)    Rehab Potential (PT)  good, to achieve stated therapy goals  -     Criteria for Skilled Interventions Met (PT)  yes;skilled treatment is necessary  -       User Key  (r) = Recorded By, (t) = Taken By, (c) = Cosigned By    Initials Name Provider Type    Juan Miguel Horvath PTA Physical Therapy Assistant        Physical Therapy Education                 Title: PT OT SLP Therapies (In Progress)     Topic: Physical Therapy (In Progress)     Point: Mobility training (Done)     Learning Progress Summary           Patient Acceptance, E, VU by KW at 9/17/2020 1111    Comment: Cont PT POC, gt mech/stride length    Acceptance, E, VU by KW1 at 9/4/2020 1334    Comment: Role of PT, POC, use of gait belt                   Point: Home exercise program (Not Started)     Learner Progress:  Not documented in this visit.          Point: Body mechanics (Done)     Learning Progress Summary           Patient Acceptance, E, VU by KW at 9/17/2020 1111    Comment: Cont PT POC, gt mech/stride length                   Point: Precautions (Done)     Learning Progress Summary           Patient Acceptance, E, VU by KW at 9/17/2020 1111    Comment: Cont PT POC, gt mech/stride length    Acceptance, E,TB, VU by KM at 9/9/2020 0424    Acceptance, E, VU by KW1 at 9/4/2020 1334    Comment: Role of PT, POC, use of gait belt                               User Key     Initials  Effective Dates Name Provider Type Discipline     10/17/16 -  Madison Irwin RN Registered Nurse Nurse    KW1 08/09/20 -  Melina Neves, PT Physical Therapist PT    KW 08/09/20 -  Jeet Mortensen PT Physical Therapist PT              PT Recommendation and Plan  Anticipated Discharge Disposition (PT): skilled nursing facility, home with 24/7 care, home with home health  Therapy Frequency (PT): other (see comments)(6-11x/wk)  Progress Summary (PT)  Progress Toward Functional Goals (PT): progress toward functional goals as expected  Plan of Care Reviewed With: patient  Progress: improving  Outcome Summary: PT treatment completed this a.m. Pt. transferred sup-sit independent, sit-stand-sit SBA, Pt. amb. 320' HHA at R no LOB, but guarded with performance due to visual deficits, pt. performed x20 reps standing therex. Cont. to mobilize; Pt. will need 24/7 care upon d/c due to poor vision, Pt. cont's to be CGA for transfers & gt. due to vision at this time, pt increasing gt. & therex tolerance. Cont. to mobilize  Outcome Measures     Row Name 09/23/20 1035 09/22/20 0935 09/21/20 1329       How much help from another person do you currently need...    Turning from your back to your side while in flat bed without using bedrails?  4  -JA  4  -JA  --    Moving from lying on back to sitting on the side of a flat bed without bedrails?  4  -JA  4  -JA  --    Moving to and from a bed to a chair (including a wheelchair)?  3  -JA  3  -JA  --    Standing up from a chair using your arms (e.g., wheelchair, bedside chair)?  3  -JA  3  -JA  --    Climbing 3-5 steps with a railing?  3  -JA  3  -JA  --    To walk in hospital room?  3  -JA  3  -JA  --    AM-PAC 6 Clicks Score (PT)  20  -JA  20  -JA  --       How much help from another is currently needed...    Putting on and taking off regular lower body clothing?  --  --  4  -RB    Bathing (including washing, rinsing, and drying)  --  --  4 For sponge bath.  -RB    Toileting (which  includes using toilet bed pan or urinal)  --  --  4  -RB    Putting on and taking off regular upper body clothing  --  --  4  -RB    Taking care of personal grooming (such as brushing teeth)  --  --  4  -RB    Eating meals  --  --  4  -RB    AM-PAC 6 Clicks Score (OT)  --  --  24  -RB       Functional Assessment    Outcome Measure Options  -PAC 6 Clicks Basic Mobility (PT)  -[facility] 6 Clicks Basic Mobility (PT)  -  --    Row Name 09/21/20 0930             How much help from another person do you currently need...    Turning from your back to your side while in flat bed without using bedrails?  4  -JA      Moving from lying on back to sitting on the side of a flat bed without bedrails?  4  -JA      Moving to and from a bed to a chair (including a wheelchair)?  3  -JA      Standing up from a chair using your arms (e.g., wheelchair, bedside chair)?  3  -JA      Climbing 3-5 steps with a railing?  3  -JA      To walk in hospital room?  3  -JA      AM-PAC 6 Clicks Score (PT)  20  -         Functional Assessment    Outcome Measure Options  -PAC 6 Clicks Basic Mobility (PT)  -        User Key  (r) = Recorded By, (t) = Taken By, (c) = Cosigned By    Initials Name Provider Type    RB Tyrel Lopez, OT Occupational Therapist    Juan Miguel Horvath PTA Physical Therapy Assistant           Time Calculation:   PT Charges     Row Name 09/23/20 1253             Time Calculation    Start Time  1035  -      Stop Time  1058  -      Time Calculation (min)  23 min  -         Time Calculation- PT    Total Timed Code Minutes- PT  23 minute(s)  -        User Key  (r) = Recorded By, (t) = Taken By, (c) = Cosigned By    Initials Name Provider Type    Juan Miguel Horvath PTA Physical Therapy Assistant        Therapy Charges for Today     Code Description Service Date Service Provider Modifiers Qty    53957122754 HC GAIT TRAINING EA 15 MIN 9/22/2020 Juan Miguel Pryor PTA GP 1    92582903365 HC PT THER PROC EA  15 MIN 9/22/2020 Juan Miguel Pryor, PTA GP 1    06160130848 HC GAIT TRAINING EA 15 MIN 9/23/2020 Juan Miguel Pryor, PTA GP 1    72977657310 HC PT THER PROC EA 15 MIN 9/23/2020 Juan Miguel Pryor, PTA GP 1          PT G-Codes  Outcome Measure Options: AM-PAC 6 Clicks Basic Mobility (PT)  AM-PAC 6 Clicks Score (PT): 20  AM-PAC 6 Clicks Score (OT): 24    Juan Miguel Pryor PTA  9/23/2020

## 2020-09-24 VITALS
HEART RATE: 83 BPM | OXYGEN SATURATION: 98 % | RESPIRATION RATE: 18 BRPM | WEIGHT: 122.4 LBS | DIASTOLIC BLOOD PRESSURE: 54 MMHG | TEMPERATURE: 98.7 F | HEIGHT: 63 IN | SYSTOLIC BLOOD PRESSURE: 113 MMHG | BODY MASS INDEX: 21.69 KG/M2

## 2020-09-24 LAB
GLUCOSE BLDC GLUCOMTR-MCNC: 140 MG/DL (ref 70–130)
GLUCOSE BLDC GLUCOMTR-MCNC: 245 MG/DL (ref 70–130)

## 2020-09-24 PROCEDURE — 97110 THERAPEUTIC EXERCISES: CPT

## 2020-09-24 PROCEDURE — 97116 GAIT TRAINING THERAPY: CPT

## 2020-09-24 PROCEDURE — 99238 HOSP IP/OBS DSCHRG MGMT 30/<: CPT | Performed by: PSYCHIATRY & NEUROLOGY

## 2020-09-24 PROCEDURE — 97530 THERAPEUTIC ACTIVITIES: CPT

## 2020-09-24 PROCEDURE — 82962 GLUCOSE BLOOD TEST: CPT

## 2020-09-24 RX ORDER — PANTOPRAZOLE SODIUM 40 MG/1
40 TABLET, DELAYED RELEASE ORAL DAILY
Qty: 30 TABLET | Refills: 0 | Status: SHIPPED | OUTPATIENT
Start: 2020-09-24

## 2020-09-24 RX ORDER — ESCITALOPRAM OXALATE 10 MG/1
10 TABLET ORAL DAILY
Qty: 30 TABLET | Refills: 0 | Status: SHIPPED | OUTPATIENT
Start: 2020-09-25

## 2020-09-24 RX ORDER — RISPERIDONE 3 MG/1
3 TABLET ORAL
Qty: 30 TABLET | Refills: 0 | Status: SHIPPED | OUTPATIENT
Start: 2020-09-24 | End: 2021-01-01 | Stop reason: HOSPADM

## 2020-09-24 RX ORDER — FERROUS SULFATE 325(65) MG
325 TABLET ORAL
Qty: 30 TABLET | Refills: 0 | Status: SHIPPED | OUTPATIENT
Start: 2020-09-24 | End: 2021-01-01 | Stop reason: HOSPADM

## 2020-09-24 RX ADMIN — PANTOPRAZOLE SODIUM 40 MG: 40 TABLET, DELAYED RELEASE ORAL at 08:21

## 2020-09-24 RX ADMIN — FERROUS SULFATE TAB EC 324 MG (65 MG FE EQUIVALENT) 324 MG: 324 (65 FE) TABLET DELAYED RESPONSE at 08:22

## 2020-09-24 RX ADMIN — ESCITALOPRAM OXALATE 10 MG: 10 TABLET ORAL at 08:22

## 2020-09-24 NOTE — NURSING NOTE
Pt discharged at this time. Belongings returned to patient and discharge instructions given to and discussed with pt. Verbalizes understanding.  Escorted to main entrance by [facility] staff and met by [employer] to transport her home.

## 2020-09-24 NOTE — PLAN OF CARE
Problem: Adult Behavioral Health Plan of Care  Goal: Plan of Care Review  Outcome: Ongoing, Progressing  Flowsheets (Taken 9/24/2020 1030)  Consent Given to Review Plan with: Pt. transferred sit-stand SBA, amb. 400' w/o AD HHA at R no LOB v.c.'s for direction due to visual deficits, pt. performed x20 reps standing therex. Cont. to mobilize if no d/c home today  Progress: improving  Plan of Care Reviewed With: patient  Patient Agreement with Plan of Care: agrees

## 2020-09-24 NOTE — NURSING NOTE
Behavior   Note any precipitants to event or behavior   Describe level and action of any aggressive behavior or speech and associated interventions.     Anxiety: Patient denies at this time  Depression: Patient denies at this time  Pain  0  AVH   no  S/I   no  Plan  no  H/I   no  Plan  no    Affect   euthymic/normal      Note: Patient wanted to stay in her room for group and assessment. Ate snack. Patient is calm/cooperative and pleasant with staff. Took scheduled HS meds. Assisted patient to bathroom. Denies SI/HI/AVH at this time. Will continue to monitor.      Intervention    PRN medication utilized:  no    Instructed in medication usage and effects  Medications administered as ordered  Encouraged to verbalize needs      Response    Verbalized understanding   Did patient take medications as ordered yes   Did patient interact with assessment?  yes     Plan    Will monitor for safety  Will monitor every 15 minutes as ordered  Will evaluate and promote the plan of care    Last BM:  unknown date  (Please chart in I/O as well)

## 2020-09-24 NOTE — SIGNIFICANT NOTE
LCSW met with pt 1:1, spoke with brother, Bahman, as well as  Ioana Lorenz. Safety/dc plan was reviewed with all. Pt presented in her room, while working with physical therapy. Pt is pleasant and cooperative. Pt somewhat with a playful attitude today. Pt denies any pain or discomfort. Denies depression or anxiety. Denies SI/HI ,AVH. Pt appears to be stable to dc and at baseline. Pt does not appear to be an imminent risk to self or others, denies access to firearms. LCSW arranged for home health through [facility] for PT/OT rickey and RN for med management, as well as to check on FSBS. Per Ioana, , pt also has Medicaid Waiver home aides coming to the house starting tomorrow. They will be there daily for multiple hours to assist as needed. Pt to see an APRN at home as well, through [facility]. Pt educated on Crisis hotline, advised to call as needed. Family and CM requested P[place] for transportation at [place]. That was arranged. Plan dc around 1300 today.

## 2020-09-24 NOTE — PLAN OF CARE
Goal Outcome Evaluation:  Plan of Care Reviewed With: patient  Progress: improving  Outcome Summary: No behaviors noted. Pt went to sleep at midnight.

## 2020-09-24 NOTE — THERAPY TREATMENT NOTE
Acute Care - Physical Therapy Treatment Note  [facility]     Patient Name: Natacha Gandhi  : 1950  MRN: [id number]  Today's Date: 2020   Onset of Illness/Injury or Date of Surgery: 20       PT Assessment (last 12 hours)      PT Evaluation and Treatment     Row Name 20 1030          Physical Therapy Time and Intention    Subjective Information  no complaints  -     Document Type  therapy note (daily note)  -     Mode of Treatment  individual therapy;physical therapy  -     Patient Effort  good  -     Row Name 20 1030          General Information    Patient Profile Reviewed  yes  -     Existing Precautions/Restrictions  fall  -     Limitations/Impairments  visual  -     Row Name 20 1030          Cognition    Orientation Status (Cognition)  oriented x 4  -JA     Follows Commands (Cognition)  WNL  -     Cognitive Function (Cognitive)  safety deficit  -     Memory Deficit (Cognitive)  minimal deficit  -[facility] Name 20 1030          Pain Scale: Numbers Pre/Post-Treatment    Pretreatment Pain Rating  0/10 - no pain  -     Posttreatment Pain Rating  0/10 - no pain  -[facility] Name 20 1030          Transfers    Transfers  sit-stand transfer;stand-sit transfer  -     Sit-Stand [place] (Transfers)  supervision;1 person assist  -     Stand-Sit [place] (Transfers)  supervision;1 person assist  -[facility] Name 20 1030          Sit-Stand Transfer    Assistive Device (Sit-Stand Transfers)  other (see comments) HHA  -[facility] Name 20 1030          Stand-Sit Transfer    Assistive Device (Stand-Sit Transfers)  other (see comments)  -     Row Name 20 1030          Gait/Stairs (Locomotion)    [place] Level (Gait)  contact guard  -     Assistive Device (Gait)  other (see comments) A  -     Distance in Feet (Gait)  400'  -     Pattern (Gait)  step-through  -     Deviations/Abnormal Patterns (Gait)  base of  support, narrow;gait speed decreased;stride length decreased  -[facility] Name 09/24/20 1030          Safety Issues, Functional Mobility    Impairments Affecting Function (Mobility)  balance;coordination;endurance/activity tolerance;motor control;motor planning  -JA     Row Name 09/24/20 1030          Hip (Therapeutic Exercise)    Hip AROM (Therapeutic Exercise)  standing;bilateral;flexion x20  -JA     Row Name 09/24/20 1030          Ankle (Therapeutic Exercise)    Ankle (Therapeutic Exercise)  AROM (active range of motion)  -     Ankle AROM (Therapeutic Exercise)  standing;dorsiflexion;plantarflexion x20  -[facility] Name 09/24/20 1030          Bed Mobility Goal 1 (PT)    Activity/Assistive Device (Bed Mobility Goal 1, PT)  sit to supine;supine to sit  -JA     [place] Level/Cues Needed (Bed Mobility Goal 1, PT)  independent  -JA     Time Frame (Bed Mobility Goal 1, PT)  3 days  -JA     Progress/Outcomes (Bed Mobility Goal 1, PT)  (S) goal met  -JA     Row Name 09/24/20 1030          Transfer Goal 1 (PT)    Activity/Assistive Device (Transfer Goal 1, PT)  sit-to-stand/stand-to-sit  -JA     [place] Level/Cues Needed (Transfer Goal 1, PT)  supervision required  -JA     Time Frame (Transfer Goal 1, PT)  3 days  -JA     Progress/Outcome (Transfer Goal 1, PT)  (S) goal met  -JA     Row Name 09/24/20 1030          Gait Training Goal 1 (PT)    Activity/Assistive Device (Gait Training Goal 1, PT)  gait (walking locomotion) LRAD PRN  -JA     [place] Level (Gait Training Goal 1, PT)  supervision required  -JA     Distance (Gait Training Goal 1, PT)  100ft or more  -JA     Time Frame (Gait Training Goal 1, PT)  5 days  -JA     Progress/Outcome (Gait Training Goal 1, PT)  goal not met  -JA     Row Name 09/24/20 1030          Stairs Goal 1 (PT)    Activity/Assistive Device (Stairs Goal 1, PT)  ascending stairs;descending stairs  -JA     [place] Level/Cues Needed (Stairs Goal 1, PT)  standby assist  -JA      Number of Stairs (Stairs Goal 1, PT)  1 or more  -     Time Frame (Stairs Goal 1, PT)  by discharge  -     Progress/Outcome (Stairs Goal 1, PT)  goal not met  -     Row Name 09/24/20 1030          Therapy Assessment/Plan (PT)    Rehab Potential (PT)  good, to achieve stated therapy goals  -     Criteria for Skilled Interventions Met (PT)  yes;skilled treatment is necessary  -       User Key  (r) = Recorded By, (t) = Taken By, (c) = Cosigned By    Initials Name Provider Type    Juan Miguel Horvath, PTA Physical Therapy Assistant        Physical Therapy Education                 Title: PT OT SLP Therapies (Done)     Topic: Physical Therapy (Done)     Point: Mobility training (Done)     Learning Progress Summary           Patient Acceptance, E, VU by  at 9/23/2020 1308    Acceptance, E, VU by KW at 9/17/2020 1111    Comment: Cont PT POC, gt mech/stride length    Acceptance, E, VU by KW1 at 9/4/2020 1334    Comment: Role of PT, POC, use of gait belt                   Point: Home exercise program (Done)     Learning Progress Summary           Patient Acceptance, E, VU by  at 9/23/2020 1308                   Point: Body mechanics (Done)     Learning Progress Summary           Patient Acceptance, E, VU by  at 9/23/2020 1308    Acceptance, E, VU by KW at 9/17/2020 1111    Comment: Cont PT POC, gt mech/stride length                   Point: Precautions (Done)     Learning Progress Summary           Patient Acceptance, E, VU by  at 9/23/2020 1308    Acceptance, E, VU by KW at 9/17/2020 1111    Comment: Cont PT POC, gt mech/stride length    Acceptance, E,TB, VU by  at 9/9/2020 0424    Acceptance, E, VU by KW1 at 9/4/2020 1334    Comment: Role of PT, POC, use of gait belt                               User Key     Initials Effective Dates Name Provider Type Discipline     10/17/16 -  Madison Irwin, RN Registered Nurse Nurse    KW1 08/09/20 -  Melina Neves, PT Physical Therapist PT     11/25/19 -   Angeli Chaudhari, RN Registered Nurse Nurse     08/09/20 -  Jeet Mortensen, PT Physical Therapist PT              PT Recommendation and Plan  Anticipated Discharge Disposition (PT): skilled nursing facility, home with 24/7 care, home with home health  Therapy Frequency (PT): other (see comments)(6-11x/wk)  Progress Summary (PT)  Progress Toward Functional Goals (PT): progress toward functional goals as expected  Plan of Care Reviewed With: patient  Progress: improving  Outcome Summary: PT treatment completed this a.m. Pt. transferred sup-sit independent, sit-stand-sit SBA, Pt. amb. 320' HHA at R no LOB, but guarded with performance due to visual deficits, pt. performed x20 reps standing therex. Cont. to mobilize; Pt. will need 24/7 care upon d/c due to poor vision, Pt. cont's to be CGA for transfers & gt. due to vision at this time, pt increasing gt. & therex tolerance. Cont. to mobilize  Outcome Measures     Row Name 09/24/20 1030 09/23/20 1035 09/22/20 0935       How much help from another person do you currently need...    Turning from your back to your side while in flat bed without using bedrails?  4  -JA  4  -JA  4  -JA    Moving from lying on back to sitting on the side of a flat bed without bedrails?  4  -JA  4  -JA  4  -JA    Moving to and from a bed to a chair (including a wheelchair)?  3  -JA  3  -JA  3  -JA    Standing up from a chair using your arms (e.g., wheelchair, bedside chair)?  3  -JA  3  -JA  3  -JA    Climbing 3-5 steps with a railing?  3  -JA  3  -JA  3  -JA    To walk in hospital room?  3  -JA  3  -JA  3  -JA    AM-PAC 6 Clicks Score (PT)  20  -JA  20  -JA  20  -JA       Functional Assessment    Outcome Measure Options  AM-PAC 6 Clicks Basic Mobility (PT)  -JA  AM-PAC 6 Clicks Basic Mobility (PT)  -JA  AM-PAC 6 Clicks Basic Mobility (PT)  -JA    Row Name 09/21/20 1329             How much help from another is currently needed...    Putting on and taking off regular lower body  clothing?  4  -RB      Bathing (including washing, rinsing, and drying)  4 For sponge bath.  -RB      Toileting (which includes using toilet bed pan or urinal)  4  -RB      Putting on and taking off regular upper body clothing  4  -RB      Taking care of personal grooming (such as brushing teeth)  4  -RB      Eating meals  4  -RB      AM-PAC 6 Clicks Score (OT)  24  -RB        User Key  (r) = Recorded By, (t) = Taken By, (c) = Cosigned By    Initials Name Provider Type    Tyrel Amaya, JAUN Occupational Therapist    Juan Miguel Horvath PTA Physical Therapy Assistant           Time Calculation:   PT Charges     Row Name 09/24/20 1126             Time Calculation    Start Time  1030  -ANNA      Stop Time  1108  -ANNA      Time Calculation (min)  38 min  -ANNA         Time Calculation- PT    Total Timed Code Minutes- PT  38 minute(s)  -ANNA        User Key  (r) = Recorded By, (t) = Taken By, (c) = Cosigned By    Initials Name Provider Type    Juan Miguel Horvath PTA Physical Therapy Assistant        Therapy Charges for Today     Code Description Service Date Service Provider Modifiers Qty    01035038504 HC GAIT TRAINING EA 15 MIN 9/23/2020 Juan Miguel Pryor, PTA GP 1    03045802768 HC PT THER PROC EA 15 MIN 9/23/2020 Juan Miguel Pryor, PTA GP 1    57107122456 HC GAIT TRAINING EA 15 MIN 9/24/2020 Juan Miguel Pryor, PTA GP 1    60600600386 HC PT THER PROC EA 15 MIN 9/24/2020 Juan Miguel Pryor, PTA GP 1    42908655837 HC PT THERAPEUTIC ACT EA 15 MIN 9/24/2020 Juan Miguel Pryor, PTA GP 1          PT G-Codes  Outcome Measure Options: AM-PAC 6 Clicks Basic Mobility (PT)  AM-PAC 6 Clicks Score (PT): 20  AM-PAC 6 Clicks Score (OT): 24    Juan Miguel Pryor PTA  9/24/2020

## 2020-09-24 NOTE — DISCHARGE SUMMARY
"Admission Date: 9/3/2020    Discharge Date: 09/24/20    Psychiatric History: Ms. Natacha Gandhi is a 70 y.o. female with a concurrent neuropsychiatric history notable for schizophrenia.     Seen on the hospitalist service for psychosis by Dr. BOYLE:  Patient is a 70 y.o. female who presents with paranoia. Onset of symptoms was gradual starting several months ago.  Symptoms have been present on an increasingly more frequent basis. Symptoms are associated with anxiety, insomnia and auditory hallucinations and paranoia.  Symptoms are aggravated by medication non-compliance.   Patient's symptoms occur in the context of long history of treatment by [facility] for mental health issues.     She notes that she was brought to the ED ambulance due to \"passing out.\"  Patient was apparently found unresponsive on the floor of her room surrounded by bloody stool.  She was found by her [facility] .  Her brother reports she has been going to [facility] for years and has had a  for years.     Patient notes that people are after her and she hears voices of \"real human beings.\"  Her brother who is present reports that she has been medication non-compliant b/c she believes there are people out to kill her.  She is also hearing voices as well.  She endorse the belief that there are individuals who are trying to kill her.  She denies medication compliance.  When asked what medications she is taking, she notes \"diet pills\" and then changes to medications for her diabetes.  Her brother reports that she has been worse in the last 6-7 months.     Nursing reports poor sleep last night with constant responding to auditory hallucinations and paranoia.  She thought there was someone outside her window.        Today, pt is pleasant.  Remains disorganized.  Fewer behaviors overnight.  Benefiting from starting of Risperdal.  Per staff, less yelling, disorganization, though still confused and illogical.          Presents with " "psychosis. Onset of symptoms was gradual starting some days ago.  Symptoms have been present on an increasingly more frequent basis. Symptoms are associated with agitation, irritability and medical illness.  Symptoms are aggravated by problems with health.   Symptoms improve with Rispderal.  Patient's symptom severity is severe.  Patient's symptoms occur in the context of chronic illness.     Per LCSW Vinnie's note:  Pt presented to her room, dressed in hospital scrubs, alert and oriented x4. Mood is fair, somewhat suspicious, affect is congruent. Pt makes limited eye contact, reports being legally blind. Pt is pleasant and cooperative, engages fairly well in conversation. Re: reason for admission, pt stated \"they found me passed out at my apartment and brought me in.\" pt reportedly was on the medical floor for several days  due to a GI bleed. When asked what led to her psychiatric admission, pt could not give a logical answer. Per notes, pt has a long hx of psychosis and non compliance with medications. Per family, [facility] and med management for many years. Pt does acknowledge her involvement with [facility], states that she has someone help her with meals, shopping, and around the house ,as well as med management. Pt denies non compliance with meds, but also only wants to focus on needing medications for her Diabetes. Pt eventually acknowledges that she has been having increased issues at home \"with the voices.\" pt notes that she hears voices \"that other people dont\" and that they have been bothering her with what they say. Pt notes that she does not understand why they have worsened recently. Pt reports most recently hearing them this morning. Pt denies past hospitalizations, denies previous trauma, denies substance abuse hx. Pt is agreeable to tx and willing to engage in individual and group therapy.        Psychiatric Review Of Systems:  -Disorganization, " "psychosis        Concurrent Psychiatric History:  --Past neuropsychiatric history: psychosis, schizophrenia     --Psychiatric Hospitalizations:   Denies any prior hospitalizations.     --Suicide Attempts:   Denies any prior suicide attempts.     --Firearm Access: denies     --Prior Treatment and Medications Tried:   --She has lexapro 20mg daily and seroquel 50mg qhs on PTA med list and she states that she is taking it.     --History of violence or legal issues:   Reports significant legal charges regarding hx bad checks.  Denies current pending legal charges.     -Abuse/Trauma/Neglect/Exploitation: denied        Substance Use:   --Nicotine: denies   --Caffeine: denies   --EtOH: denies   --THC: denies   --Illicits: denies        Social History:  --> Marriages: once for \"pretty good while.\"  Current Relationships:   Children: one son     Abuse/Trauma: she denies     Education: 11th grade   Occupation: on disability  Living Situation: alone     Social History   Social History            Socioeconomic History   • Marital status: Legally        Spouse name: Not on file   • Number of children: Not on file   • Years of education: Not on file   • Highest education level: Not on file   Tobacco Use   • Smoking status: Former Smoker   • Smokeless tobacco: Never Used   • Tobacco comment: \" a little bit a long time ago\"   Substance and Sexual Activity   • Alcohol use: Not Currently   • Drug use: Never   • Sexual activity: Not Currently              Family History:        Family History   Problem Relation Age of Onset   • Dementia Mother     • No Known Problems Father     • No Known Problems Sister     • No Known Problems Brother     • No Known Problems Maternal Aunt     • No Known Problems Paternal Aunt     • No Known Problems Maternal Uncle     • No Known Problems Paternal Uncle     • No Known Problems Maternal Grandfather     • No Known Problems Maternal Grandmother     • No Known Problems Paternal Grandfather "     • No Known Problems Paternal Grandmother     • No Known Problems Cousin     • No Known Problems Other     • Suicide Attempts Neg Hx     • ADD / ADHD Neg Hx     • Alcohol abuse Neg Hx     • Anxiety disorder Neg Hx     • Bipolar disorder Neg Hx     • Depression Neg Hx     • Drug abuse Neg Hx     • OCD Neg Hx     • Paranoid behavior Neg Hx     • Schizophrenia Neg Hx     • Seizures Neg Hx     • Self-Injurious Behavior  Neg Hx        -->Further details: Family Suicides: denied        Past Medical and Surgical History:  Medical History        Past Medical History:   Diagnosis Date   • Depression     • Psychiatric illness     • Schizoaffective disorder (CMS/HCC)          --> Seizure Hx: denied  --> Head Injury w/ LOC: denied        Surgical History         Past Surgical History:   Procedure Laterality Date   • COLONOSCOPY N/A 8/29/2020     Procedure: COLONOSCOPY;  Surgeon: Ge Gorman MD;  Location: [facility];  Service: Gastroenterology;  Laterality: N/A;   • ENDOSCOPY N/A 8/28/2020     Procedure: ESOPHAGOGASTRODUODENOSCOPY;  Surgeon: Ge Gorman MD;  Location: [facility];  Service: Gastroenterology;  Laterality: N/A;           Allergies:  Patient has no known allergies.     Prescriptions Prior to Admission           Medications Prior to Admission   Medication Sig Dispense Refill Last Dose   • Empagliflozin (Jardiance) 10 MG tablet Take 10 mg by mouth Daily.     9/3/2020 at Unknown time   • escitalopram (LEXAPRO) 20 MG tablet Take 20 mg by mouth Daily.     9/3/2020 at Unknown time   • ferrous sulfate (FerrouSul) 325 (65 FE) MG tablet Take 1 tablet by mouth Daily With Breakfast. 30 tablet 0 9/3/2020 at Unknown time   • glipiZIDE-metFORMIN (METAGLIP) 2.5-500 MG per tablet Take 1 tablet by mouth 2 (Two) Times a Day Before Meals.     9/3/2020 at Unknown time   • pantoprazole (PROTONIX) 40 MG EC tablet Take 1 tablet by mouth Daily. 30 tablet 0 9/3/2020 at Unknown time   • risperiDONE (risperDAL) 1 MG tablet  Take 1 tablet by mouth Every Night.     9/3/2020 at Unknown time   • risperiDONE (risperDAL) 1 MG tablet Take 1 tablet by mouth At Night As Needed (severe paranoia or agitation).     Unknown at Unknown time        --> Reviewed; Risperdal started on hospital service    Medications taken at home prior to admission to the hospitalist service:  Medications Prior to Admission   Medication Sig Dispense Refill Last Dose   • Empagliflozin (Jardiance) 10 MG tablet Take 10 mg by mouth Daily.     Past Week at Unknown time   • escitalopram (LEXAPRO) 20 MG tablet Take 20 mg by mouth Daily.     Past Week at Unknown time   • glipiZIDE-metFORMIN (METAGLIP) 2.5-500 MG per tablet Take 1 tablet by mouth 2 (Two) Times a Day Before Meals.     Past Week at Unknown time   • QUEtiapine (SEROquel) 50 MG tablet Take 50 mg by mouth Daily.     Past Week at Unknown time           Diagnostic Data:    Lab Results:  Recent Results (from the past 168 hour(s))   POC Glucose Once    Collection Time: 09/17/20 11:10 AM    Specimen: Blood   Result Value Ref Range    Glucose 266 (H) 70 - 130 mg/dL   POC Glucose Once    Collection Time: 09/17/20  4:45 PM    Specimen: Blood   Result Value Ref Range    Glucose 207 (H) 70 - 130 mg/dL   POC Glucose Once    Collection Time: 09/17/20  7:37 PM    Specimen: Blood   Result Value Ref Range    Glucose 245 (H) 70 - 130 mg/dL   POC Glucose Once    Collection Time: 09/18/20  6:27 AM    Specimen: Blood   Result Value Ref Range    Glucose 89 70 - 130 mg/dL   POC Glucose Once    Collection Time: 09/18/20 11:45 AM    Specimen: Blood   Result Value Ref Range    Glucose 201 (H) 70 - 130 mg/dL   POC Glucose Once    Collection Time: 09/18/20  4:57 PM    Specimen: Blood   Result Value Ref Range    Glucose 262 (H) 70 - 130 mg/dL   POC Glucose Once    Collection Time: 09/18/20  7:55 PM    Specimen: Blood   Result Value Ref Range    Glucose 227 (H) 70 - 130 mg/dL   POC Glucose Once    Collection Time: 09/19/20  6:36 AM     Specimen: Blood   Result Value Ref Range    Glucose 73 70 - 130 mg/dL   POC Glucose Once    Collection Time: 09/19/20  7:39 PM    Specimen: Blood   Result Value Ref Range    Glucose 291 (H) 70 - 130 mg/dL   POC Glucose Once    Collection Time: 09/20/20  6:28 AM    Specimen: Blood   Result Value Ref Range    Glucose 79 70 - 130 mg/dL   POC Glucose Once    Collection Time: 09/20/20 12:23 PM    Specimen: Blood   Result Value Ref Range    Glucose 121 70 - 130 mg/dL   POC Glucose Once    Collection Time: 09/20/20  8:30 PM    Specimen: Blood   Result Value Ref Range    Glucose 240 (H) 70 - 130 mg/dL   POC Glucose Once    Collection Time: 09/21/20  6:21 AM    Specimen: Blood   Result Value Ref Range    Glucose 102 70 - 130 mg/dL   POC Glucose Once    Collection Time: 09/21/20 11:16 AM    Specimen: Blood   Result Value Ref Range    Glucose 231 (H) 70 - 130 mg/dL   POC Glucose Once    Collection Time: 09/21/20  4:17 PM    Specimen: Blood   Result Value Ref Range    Glucose 95 70 - 130 mg/dL   POC Glucose Once    Collection Time: 09/21/20  7:37 PM    Specimen: Blood   Result Value Ref Range    Glucose 163 (H) 70 - 130 mg/dL   POC Glucose Once    Collection Time: 09/22/20  5:57 AM    Specimen: Blood   Result Value Ref Range    Glucose 82 70 - 130 mg/dL   POC Glucose Once    Collection Time: 09/22/20 11:39 AM    Specimen: Blood   Result Value Ref Range    Glucose 204 (H) 70 - 130 mg/dL   POC Glucose Once    Collection Time: 09/22/20  4:53 PM    Specimen: Blood   Result Value Ref Range    Glucose 156 (H) 70 - 130 mg/dL   POC Glucose Once    Collection Time: 09/22/20  7:43 PM    Specimen: Blood   Result Value Ref Range    Glucose 216 (H) 70 - 130 mg/dL   POC Glucose Once    Collection Time: 09/23/20  5:59 AM    Specimen: Blood   Result Value Ref Range    Glucose 113 70 - 130 mg/dL   POC Glucose Once    Collection Time: 09/23/20 11:36 AM    Specimen: Blood   Result Value Ref Range    Glucose 218 (H) 70 - 130 mg/dL   POC Glucose  Once    Collection Time: 09/23/20  4:02 PM    Specimen: Blood   Result Value Ref Range    Glucose 174 (H) 70 - 130 mg/dL   POC Glucose Once    Collection Time: 09/23/20  7:34 PM    Specimen: Blood   Result Value Ref Range    Glucose 210 (H) 70 - 130 mg/dL   POC Glucose Once    Collection Time: 09/24/20  6:18 AM    Specimen: Blood   Result Value Ref Range    Glucose 140 (H) 70 - 130 mg/dL       Results for OLIVIA VALDEZ (MRN [id number]) as of 9/24/2020 10:42   Ref. Range 9/5/2020 08:18   TSH Baseline Latest Ref Range: 0.270 - 4.200 uIU/mL 1.030   Folate Latest Ref Range: 4.78 - 24.20 ng/mL 9.86   Vitamin B-12 Latest Ref Range: 211 - 946 pg/mL 1,716 (H)   25 Hydroxy, Vitamin D Latest Ref Range: 30.0 - 100.0 ng/ml 11.5 (L)   Hemoglobin Latest Ref Range: 12.0 - 15.9 g/dL 7.5 (L)   Hematocrit Latest Ref Range: 34.0 - 46.6 % 23.3 (L)   RPR Latest Ref Range: Non-Reactive  Non-Reactive   HIV-1/ HIV-2 Ab Latest Ref Range: Non-Reactive  Non-Reactive     Results for OLIVIA VALDEZ (MRN [id number]) as of 9/24/2020 10:42   Ref. Range 9/3/2020 05:32   Glucose Latest Ref Range: 65 - 99 mg/dL 135 (H)   Sodium Latest Ref Range: 136 - 145 mmol/L 140   Potassium Latest Ref Range: 3.5 - 5.2 mmol/L 3.9   CO2 Latest Ref Range: 22.0 - 29.0 mmol/L 26.0   Chloride Latest Ref Range: 98 - 107 mmol/L 107   Anion Gap Latest Ref Range: 5.0 - 15.0 mmol/L 7.0   Creatinine Latest Ref Range: 0.57 - 1.00 mg/dL 0.91   BUN Latest Ref Range: 8 - 23 mg/dL 15   BUN/Creatinine Ratio Latest Ref Range: 7.0 - 25.0  16.5   Calcium Latest Ref Range: 8.6 - 10.5 mg/dL 8.5 (L)   eGFR African Am Latest Ref Range: >60 mL/min/1.73 75   WBC Latest Ref Range: 3.40 - 10.80 10*3/mm3 4.17   RBC Latest Ref Range: 3.77 - 5.28 10*6/mm3 2.33 (L)   Hemoglobin Latest Ref Range: 12.0 - 15.9 g/dL 7.5 (L)   Hematocrit Latest Ref Range: 34.0 - 46.6 % 23.0 (L)   RDW Latest Ref Range: 12.3 - 15.4 % 15.8 (H)   MCV Latest Ref Range: 79.0 - 97.0 fL 98.7 (H)   MCH Latest Ref Range:  26.6 - 33.0 pg 32.2   MCHC Latest Ref Range: 31.5 - 35.7 g/dL 32.6   MPV Latest Ref Range: 6.0 - 12.0 fL 11.0   Platelets Latest Ref Range: 140 - 450 10*3/mm3 90 (L)   RDW-SD Latest Ref Range: 37.0 - 54.0 fl 54.8 (H)   Neutrophil Rel % Latest Ref Range: 42.7 - 76.0 % 54.2   Lymphocyte Rel % Latest Ref Range: 19.6 - 45.3 % 35.0   Monocyte Rel % Latest Ref Range: 5.0 - 12.0 % 8.2   Eosinophil Rel % Latest Ref Range: 0.3 - 6.2 % 2.2   Basophil Rel % Latest Ref Range: 0.0 - 1.5 % 0.2   Immature Granulocyte Rel % Latest Ref Range: 0.0 - 0.5 % 0.2   Neutrophils Absolute Latest Ref Range: 1.70 - 7.00 10*3/mm3 2.26   Lymphocytes Absolute Latest Ref Range: 0.70 - 3.10 10*3/mm3 1.46   Monocytes Absolute Latest Ref Range: 0.10 - 0.90 10*3/mm3 0.34   Eosinophils Absolute Latest Ref Range: 0.00 - 0.40 10*3/mm3 0.09   Basophils Absolute Latest Ref Range: 0.00 - 0.20 10*3/mm3 0.01   Immature Grans, Absolute Latest Ref Range: 0.00 - 0.05 10*3/mm3 0.01   nRBC Latest Ref Range: 0.0 - 0.2 /100 WBC 0.0       Radiology Results:  Ct Head Without Contrast    Result Date: 8/28/2020  Narrative: PROCEDURE: CT head without contrast REASON FOR EXAM: Decreased alertness This exam was performed according to our departmental dose-optimization program, which includes automated exposure control, adjustment of the mA and/or kV according to patient size and/or use of iterative reconstruction technique. FINDINGS: Axial computer tomography sequential imaging of the head was performed from the vertex to the base of the skull. .Sagittal and coronal reformation was performed . The skull vault is intact. Paranasal sinuses and bilateral mastoid air cells are well aerated. Cerebral and cerebellar parenchymal are normal. Ventricular system and subarachnoid spaces are normal.     Impression: Normal CT of the head. Electronically signed by:  Chino Lacy MD  8/28/2020 2:31 PM CDT Workstation: [id number]    Mri Brain Without Contrast    Result Date:  8/30/2020  Narrative: EXAM: MR BRAIN WITHOUT IV CONTRAST COMPARISONS: CT head 8/28/2020 HISTORY: Encephalopathy, GI hemorrhage, TECHNIQUE: Multiplanar, multisequence MR images were obtained of the brain without the use of intravenous contrast. FINDINGS: No intracranial hemorrhage, mass, mass effect, or midline shift. Brain parenchyma is normal in signal and configuration. No restricted diffusion. CSF-containing spaces are symmetric and appropriate in size. Osseous structures are within normal limits. Orbits are unremarkable. Paranasal sinuses and mastoid air cells are clear.     Impression: No acute or significant chronic intracranial process. Electronically signed by:  Alba Moon MD  8/30/2020 10:46 AM CDT Workstation: [id number]      Summary of Hospital Course:  Patient was admitted to the behavioral health unit at [facility] to ensure patient safety.  Patient was provided treatment with the unit milieu, activities, therapies and psychopharmacological management.  Patient was placed on Q15 minute checks and Fall precautions.    Hospitalist was consulted for management of medical co-morbidities.  Patient was restarted on glipizide and metformin but patient refused the sliding scale that was ordered.  Patient was thus restricted to low carbs but had hypoglycemic episodes and the metformin and glipizide were discontinued.  Patient's blood sugars stabilized after return to consistent carb diet but her blood sugar was elevated in the 150 to 250 range.  Thus she was discharged with restarting of her home glipizide and metformin but discontinuation of Jardiance.  Patient was continued on iron and protonix started on the hospitalist service.    Patient was restarted on the following psychiatric medications: Patient's home lexapro was restarted but at a lower 10mg daily.   Patient's home seroquel was stopped.  The risperdal started on the hospitalist service was continued.    The following  medication changes were made during the hospital stay: Risperdal was increased eventually to 3mg qhs with good response.  Patient's lexapro was kept at 10mg daily.  Patient had improvement over the course of the hospital stay and tolerated his medications.  Patient had improvement in mood and affect and resolution of paranoia and hallucinations.    Social work and nursing communicated with family as needed.  Dr. Anderson and I spoke with her brother during the hospital course.  A letter for guardianship was written for the brother to obtain guardianship over the patient.  Extra service were arranged for the home prior to discharge and home health was ordered.  Patient's brother will likely attempt to obtain nursing home placement once he has guardianship available.    Substance abuse issues were not present.      Patients Condition at Discharge:  Patient is stable for discharge and is not an imminent threat to self or others.  The patient's behavior was Appropriate.  Patient reported that mood was Euthymic.  Patient's affect was normal.  Patient's thought content was as follows:   Suicidal:  Patient denies any suicidal thoughts, plans or intentions.   Homicidal:  Patient denies any homicidal thoughts, plans or intentions.   Hallucinations:  None   Delusion:  None    Discharge Diagnosis:    Acute exacerbation of chronic paranoid schizophrenia (CMS/HCC)    Schizophrenia, paranoid type (CMS/HCC)    Acute blood loss anemia    DMII (diabetes mellitus, type 2) (CMS/[facility])      Discharge Medications:      Your medication list      CHANGE how you take these medications      Instructions Last Dose Given Next Dose Due   escitalopram 10 MG tablet  Commonly known as: LEXAPRO  Start taking on: September 25, 2020  What changed:   · medication strength  · how much to take      Take 1 tablet by mouth Daily. Indications: Major Depressive Disorder       risperiDONE 3 MG tablet  Commonly known as: risperDAL  What changed:   · medication  strength  · how much to take  · when to take this  · additional instructions  · Another medication with the same name was removed. Continue taking this medication, and follow the directions you see here.      Take 1 tablet by mouth Daily With Dinner. STOP SEROQUEL (QUETIAPINE)  Indications: Schizophrenia          CONTINUE taking these medications      Instructions Last Dose Given Next Dose Due   ferrous sulfate 325 (65 FE) MG tablet  Commonly known as: FerrouSul      Take 1 tablet by mouth Daily With Breakfast. Indications: Anemia From Inadequate Iron in the Body       glipiZIDE-metFORMIN 2.5-500 MG per tablet  Commonly known as: METAGLIP      Take 1 tablet by mouth 2 (Two) Times a Day Before Meals.       pantoprazole 40 MG EC tablet  Commonly known as: PROTONIX      Take 1 tablet by mouth Daily. Indications: Gastroesophageal Reflux Disease          STOP taking these medications    Jardiance 10 MG tablet  Generic drug: Empagliflozin              Where to Get Your Medications      These medications were sent to [facility] [place] [place] - [address] [place] [address] - [phone] [facility] [phone] [place] [id number]    Phone: [phone]   · escitalopram 10 MG tablet  · ferrous sulfate 325 (65 FE) MG tablet  · pantoprazole 40 MG EC tablet  · risperiDONE 3 MG tablet         Discharge Diet:   Diet Order   Procedures   • Diet Soft Texture; Ground; Consistent Carbohydrate, Cardiac       Activity at Discharge: As tolerated.    Justification for multiple antipsychotic medications at discharge:  Not Applicable.    Medication for smoking cessation: Patient does not smoke and medication is not indicated.    Medication for substance abuse: Patient does not have a substance use diagnosis and medication is not indicated.    Disposition: Patient was discharged home with family.    Follow-up Information     Alisia Ramirez, CLAIRE. Call in 1 week(s).    Specialty: Nurse Practitioner  Contact  information:  [address] [address]  [phone]                   Psychiatric and medical follow up will be with primary care that comes to her home.    Time Spent: Less than 30 minutes.  I spent  25  minutes on this discharge activity which included: face-to-face encounter with the patient, reviewing the data in the system, coordination of the care with the nursing staff as well as consultants, documentation, and entering orders.        Ellie Raza MD  09/24/20  10:45 CDT

## 2021-01-01 ENCOUNTER — APPOINTMENT (OUTPATIENT)
Dept: GENERAL RADIOLOGY | Facility: HOSPITAL | Age: 71
End: 2021-01-01

## 2021-01-01 ENCOUNTER — HOSPITAL ENCOUNTER (EMERGENCY)
Facility: HOSPITAL | Age: 71
End: 2021-01-01

## 2021-01-01 ENCOUNTER — LAB (OUTPATIENT)
Dept: LAB | Facility: HOSPITAL | Age: 71
End: 2021-01-01

## 2021-01-01 ENCOUNTER — TELEPHONE (OUTPATIENT)
Dept: GASTROENTEROLOGY | Facility: CLINIC | Age: 71
End: 2021-01-01

## 2021-01-01 ENCOUNTER — HOSPITAL ENCOUNTER (INPATIENT)
Facility: HOSPITAL | Age: 71
LOS: 3 days | End: 2021-08-13
Attending: FAMILY MEDICINE | Admitting: INTERNAL MEDICINE

## 2021-01-01 ENCOUNTER — TELEPHONE (OUTPATIENT)
Dept: RADIATION ONCOLOGY | Facility: HOSPITAL | Age: 71
End: 2021-01-01

## 2021-01-01 ENCOUNTER — APPOINTMENT (OUTPATIENT)
Dept: PULMONOLOGY | Facility: HOSPITAL | Age: 71
End: 2021-01-01

## 2021-01-01 ENCOUNTER — OFFICE VISIT (OUTPATIENT)
Dept: GASTROENTEROLOGY | Facility: CLINIC | Age: 71
End: 2021-01-01

## 2021-01-01 ENCOUNTER — OFFICE VISIT (OUTPATIENT)
Dept: ONCOLOGY | Facility: CLINIC | Age: 71
End: 2021-01-01

## 2021-01-01 ENCOUNTER — APPOINTMENT (OUTPATIENT)
Dept: ULTRASOUND IMAGING | Facility: HOSPITAL | Age: 71
End: 2021-01-01

## 2021-01-01 ENCOUNTER — APPOINTMENT (OUTPATIENT)
Dept: CT IMAGING | Facility: HOSPITAL | Age: 71
End: 2021-01-01

## 2021-01-01 ENCOUNTER — HOSPITAL ENCOUNTER (EMERGENCY)
Facility: HOSPITAL | Age: 71
Discharge: HOME | End: 2021-02-06
Attending: EMERGENCY MEDICINE | Admitting: FAMILY MEDICINE

## 2021-01-01 ENCOUNTER — HOSPITAL ENCOUNTER (INPATIENT)
Facility: HOSPITAL | Age: 71
LOS: 4 days | Discharge: NURSING, REHAB OR LONG-TERM CARE | End: 2021-08-17
Attending: PSYCHIATRY & NEUROLOGY | Admitting: PSYCHIATRY & NEUROLOGY

## 2021-01-01 ENCOUNTER — HOSPITAL ENCOUNTER (EMERGENCY)
Facility: HOSPITAL | Age: 71
Discharge: NURSING, REHAB OR LONG-TERM CARE | End: 2021-08-03
Attending: FAMILY MEDICINE | Admitting: FAMILY MEDICINE

## 2021-01-01 ENCOUNTER — HOSPITAL ENCOUNTER (EMERGENCY)
Facility: HOSPITAL | Age: 71
Discharge: HOME | End: 2021-01-15
Attending: FAMILY MEDICINE | Admitting: FAMILY MEDICINE

## 2021-01-01 VITALS
BODY MASS INDEX: 23.85 KG/M2 | WEIGHT: 134.6 LBS | HEART RATE: 81 BPM | DIASTOLIC BLOOD PRESSURE: 77 MMHG | TEMPERATURE: 97 F | RESPIRATION RATE: 18 BRPM | HEIGHT: 63 IN | OXYGEN SATURATION: 98 % | SYSTOLIC BLOOD PRESSURE: 132 MMHG

## 2021-01-01 VITALS
BODY MASS INDEX: 23.04 KG/M2 | HEIGHT: 63 IN | SYSTOLIC BLOOD PRESSURE: 131 MMHG | HEART RATE: 96 BPM | RESPIRATION RATE: 16 BRPM | TEMPERATURE: 97.8 F | WEIGHT: 130 LBS | DIASTOLIC BLOOD PRESSURE: 71 MMHG

## 2021-01-01 VITALS
OXYGEN SATURATION: 97 % | HEART RATE: 97 BPM | BODY MASS INDEX: 23.37 KG/M2 | SYSTOLIC BLOOD PRESSURE: 149 MMHG | HEIGHT: 63 IN | WEIGHT: 131.9 LBS | RESPIRATION RATE: 18 BRPM | DIASTOLIC BLOOD PRESSURE: 76 MMHG | TEMPERATURE: 98.3 F

## 2021-01-01 VITALS
WEIGHT: 140.3 LBS | SYSTOLIC BLOOD PRESSURE: 107 MMHG | HEART RATE: 79 BPM | OXYGEN SATURATION: 98 % | HEIGHT: 63 IN | BODY MASS INDEX: 24.86 KG/M2 | DIASTOLIC BLOOD PRESSURE: 57 MMHG | TEMPERATURE: 97.9 F | RESPIRATION RATE: 18 BRPM

## 2021-01-01 VITALS
HEART RATE: 96 BPM | SYSTOLIC BLOOD PRESSURE: 122 MMHG | TEMPERATURE: 98.1 F | OXYGEN SATURATION: 97 % | DIASTOLIC BLOOD PRESSURE: 62 MMHG | RESPIRATION RATE: 18 BRPM

## 2021-01-01 VITALS
SYSTOLIC BLOOD PRESSURE: 124 MMHG | HEIGHT: 68 IN | HEART RATE: 81 BPM | DIASTOLIC BLOOD PRESSURE: 77 MMHG | WEIGHT: 130 LBS | BODY MASS INDEX: 19.7 KG/M2

## 2021-01-01 VITALS
OXYGEN SATURATION: 98 % | RESPIRATION RATE: 18 BRPM | DIASTOLIC BLOOD PRESSURE: 69 MMHG | TEMPERATURE: 98.6 F | SYSTOLIC BLOOD PRESSURE: 130 MMHG | HEART RATE: 94 BPM

## 2021-01-01 VITALS
DIASTOLIC BLOOD PRESSURE: 82 MMHG | SYSTOLIC BLOOD PRESSURE: 120 MMHG | HEART RATE: 97 BPM | HEIGHT: 63 IN | WEIGHT: 130 LBS | BODY MASS INDEX: 23.04 KG/M2

## 2021-01-01 DIAGNOSIS — Z74.09 IMPAIRED MOBILITY AND ADLS: ICD-10-CM

## 2021-01-01 DIAGNOSIS — Z74.09 IMPAIRED FUNCTIONAL MOBILITY, BALANCE, GAIT, AND ENDURANCE: Primary | ICD-10-CM

## 2021-01-01 DIAGNOSIS — Z74.09 IMPAIRED FUNCTIONAL MOBILITY, BALANCE, GAIT, AND ENDURANCE: ICD-10-CM

## 2021-01-01 DIAGNOSIS — R41.82 ALTERED MENTAL STATUS, UNSPECIFIED ALTERED MENTAL STATUS TYPE: ICD-10-CM

## 2021-01-01 DIAGNOSIS — D64.9 ANEMIA, UNSPECIFIED TYPE: ICD-10-CM

## 2021-01-01 DIAGNOSIS — E87.6 HYPOKALEMIA: ICD-10-CM

## 2021-01-01 DIAGNOSIS — D69.6 THROMBOCYTOPENIA (HCC): ICD-10-CM

## 2021-01-01 DIAGNOSIS — D62 ACUTE BLOOD LOSS ANEMIA: Primary | ICD-10-CM

## 2021-01-01 DIAGNOSIS — D62 ACUTE BLOOD LOSS ANEMIA: ICD-10-CM

## 2021-01-01 DIAGNOSIS — R53.1 GENERALIZED WEAKNESS: Primary | ICD-10-CM

## 2021-01-01 DIAGNOSIS — R13.12 OROPHARYNGEAL DYSPHAGIA: ICD-10-CM

## 2021-01-01 DIAGNOSIS — Z78.9 IMPAIRED MOBILITY AND ACTIVITIES OF DAILY LIVING: ICD-10-CM

## 2021-01-01 DIAGNOSIS — K74.60 HEPATIC CIRRHOSIS, UNSPECIFIED HEPATIC CIRRHOSIS TYPE, UNSPECIFIED WHETHER ASCITES PRESENT (HCC): ICD-10-CM

## 2021-01-01 DIAGNOSIS — K76.82 ENCEPHALOPATHY, HEPATIC (HCC): ICD-10-CM

## 2021-01-01 DIAGNOSIS — Z78.9 IMPAIRED MOBILITY AND ADLS: ICD-10-CM

## 2021-01-01 DIAGNOSIS — K74.60 CIRRHOSIS OF LIVER WITHOUT ASCITES, UNSPECIFIED HEPATIC CIRRHOSIS TYPE (HCC): ICD-10-CM

## 2021-01-01 DIAGNOSIS — N39.0 URINARY TRACT INFECTION WITHOUT HEMATURIA, SITE UNSPECIFIED: Primary | ICD-10-CM

## 2021-01-01 DIAGNOSIS — Z74.09 IMPAIRED MOBILITY AND ACTIVITIES OF DAILY LIVING: ICD-10-CM

## 2021-01-01 DIAGNOSIS — K74.60 CIRRHOSIS OF LIVER WITHOUT ASCITES, UNSPECIFIED HEPATIC CIRRHOSIS TYPE (HCC): Primary | ICD-10-CM

## 2021-01-01 DIAGNOSIS — D68.9 COAGULOPATHY (HCC): ICD-10-CM

## 2021-01-01 DIAGNOSIS — E83.19 IRON OVERLOAD: ICD-10-CM

## 2021-01-01 DIAGNOSIS — D69.6 THROMBOCYTOPENIA (HCC): Primary | ICD-10-CM

## 2021-01-01 DIAGNOSIS — I95.9 HYPOTENSION, UNSPECIFIED HYPOTENSION TYPE: Primary | ICD-10-CM

## 2021-01-01 DIAGNOSIS — R60.0 PEDAL EDEMA: Primary | ICD-10-CM

## 2021-01-01 LAB
ALBUMIN SERPL-MCNC: 1.8 G/DL (ref 3.5–5.2)
ALBUMIN SERPL-MCNC: 1.9 G/DL (ref 3.5–5.2)
ALBUMIN SERPL-MCNC: 2 G/DL (ref 3.5–5.2)
ALBUMIN SERPL-MCNC: 2.1 G/DL (ref 3.5–5.2)
ALBUMIN SERPL-MCNC: 2.1 G/DL (ref 3.5–5.2)
ALBUMIN/GLOB SERPL: 0.3 G/DL
ALBUMIN/GLOB SERPL: 0.3 G/DL
ALBUMIN/GLOB SERPL: 0.4 G/DL
ALP SERPL-CCNC: 111 U/L (ref 39–117)
ALP SERPL-CCNC: 122 U/L (ref 39–117)
ALP SERPL-CCNC: 72 U/L (ref 39–117)
ALP SERPL-CCNC: 73 U/L (ref 39–117)
ALP SERPL-CCNC: 78 U/L (ref 39–117)
ALP SERPL-CCNC: 81 U/L (ref 39–117)
ALP SERPL-CCNC: 84 U/L (ref 39–117)
ALP SERPL-CCNC: 86 U/L (ref 39–117)
ALP SERPL-CCNC: 97 U/L (ref 39–117)
ALT SERPL W P-5'-P-CCNC: 112 U/L (ref 1–33)
ALT SERPL W P-5'-P-CCNC: 114 U/L (ref 1–33)
ALT SERPL W P-5'-P-CCNC: 114 U/L (ref 1–33)
ALT SERPL W P-5'-P-CCNC: 115 U/L (ref 1–33)
ALT SERPL W P-5'-P-CCNC: 43 U/L (ref 1–33)
ALT SERPL W P-5'-P-CCNC: 58 U/L (ref 1–33)
ALT SERPL W P-5'-P-CCNC: 93 U/L (ref 1–33)
AMMONIA BLD-SCNC: 32 UMOL/L (ref 11–51)
AMMONIA BLD-SCNC: 48 UMOL/L (ref 11–51)
AMMONIA BLD-SCNC: 53 UMOL/L (ref 11–51)
AMMONIA BLD-SCNC: 55 UMOL/L (ref 11–51)
AMMONIA BLD-SCNC: 94 UMOL/L (ref 11–51)
ANION GAP SERPL CALCULATED.3IONS-SCNC: 10 MMOL/L (ref 5–15)
ANION GAP SERPL CALCULATED.3IONS-SCNC: 4 MMOL/L (ref 5–15)
ANION GAP SERPL CALCULATED.3IONS-SCNC: 5 MMOL/L (ref 5–15)
ANION GAP SERPL CALCULATED.3IONS-SCNC: 6 MMOL/L (ref 5–15)
ANION GAP SERPL CALCULATED.3IONS-SCNC: 8 MMOL/L (ref 5–15)
ANION GAP SERPL CALCULATED.3IONS-SCNC: 8.6 MMOL/L (ref 5–15)
ANISOCYTOSIS BLD QL: NORMAL
AST SERPL-CCNC: 109 U/L (ref 1–32)
AST SERPL-CCNC: 118 U/L (ref 1–32)
AST SERPL-CCNC: 119 U/L (ref 1–32)
AST SERPL-CCNC: 122 U/L (ref 1–32)
AST SERPL-CCNC: 129 U/L (ref 1–32)
AST SERPL-CCNC: 131 U/L (ref 1–32)
AST SERPL-CCNC: 133 U/L (ref 1–32)
AST SERPL-CCNC: 46 U/L (ref 1–32)
AST SERPL-CCNC: 68 U/L (ref 1–32)
BACTERIA SPEC AEROBE CULT: NORMAL
BACTERIA UR QL AUTO: ABNORMAL /HPF
BASOPHILS # BLD AUTO: 0.02 10*3/MM3 (ref 0–0.2)
BASOPHILS # BLD AUTO: 0.03 10*3/MM3 (ref 0–0.2)
BASOPHILS # BLD MANUAL: 0.04 10*3/MM3 (ref 0–0.2)
BASOPHILS NFR BLD AUTO: 0.5 % (ref 0–1.5)
BASOPHILS NFR BLD AUTO: 0.6 % (ref 0–1.5)
BASOPHILS NFR BLD AUTO: 0.7 % (ref 0–1.5)
BASOPHILS NFR BLD AUTO: 0.7 % (ref 0–1.5)
BASOPHILS NFR BLD AUTO: 0.8 % (ref 0–1.5)
BASOPHILS NFR BLD AUTO: 0.9 % (ref 0–1.5)
BASOPHILS NFR BLD AUTO: 1 % (ref 0–1.5)
BILIRUB SERPL-MCNC: 0.6 MG/DL (ref 0–1.2)
BILIRUB SERPL-MCNC: 0.7 MG/DL (ref 0–1.2)
BILIRUB SERPL-MCNC: 0.7 MG/DL (ref 0–1.2)
BILIRUB SERPL-MCNC: 0.9 MG/DL (ref 0–1.2)
BILIRUB SERPL-MCNC: 0.9 MG/DL (ref 0–1.2)
BILIRUB SERPL-MCNC: 1 MG/DL (ref 0–1.2)
BILIRUB SERPL-MCNC: 1.2 MG/DL (ref 0–1.2)
BILIRUB UR QL STRIP: ABNORMAL
BILIRUB UR QL STRIP: ABNORMAL
BILIRUB UR QL STRIP: NEGATIVE
BILIRUB UR QL STRIP: NEGATIVE
BUN SERPL-MCNC: 11 MG/DL (ref 8–23)
BUN SERPL-MCNC: 12 MG/DL (ref 8–23)
BUN SERPL-MCNC: 14 MG/DL (ref 8–23)
BUN SERPL-MCNC: 6 MG/DL (ref 8–23)
BUN SERPL-MCNC: 8 MG/DL (ref 8–23)
BUN SERPL-MCNC: 8 MG/DL (ref 8–23)
BUN SERPL-MCNC: 9 MG/DL (ref 8–23)
BUN/CREAT SERPL: 10.5 (ref 7–25)
BUN/CREAT SERPL: 11 (ref 7–25)
BUN/CREAT SERPL: 11 (ref 7–25)
BUN/CREAT SERPL: 11.3 (ref 7–25)
BUN/CREAT SERPL: 12 (ref 7–25)
BUN/CREAT SERPL: 12.4 (ref 7–25)
BUN/CREAT SERPL: 13.3 (ref 7–25)
BUN/CREAT SERPL: 7.8 (ref 7–25)
BUN/CREAT SERPL: 9.5 (ref 7–25)
CALCIUM SPEC-SCNC: 8.7 MG/DL (ref 8.6–10.5)
CALCIUM SPEC-SCNC: 9.1 MG/DL (ref 8.6–10.5)
CALCIUM SPEC-SCNC: 9.1 MG/DL (ref 8.6–10.5)
CALCIUM SPEC-SCNC: 9.2 MG/DL (ref 8.6–10.5)
CALCIUM SPEC-SCNC: 9.3 MG/DL (ref 8.6–10.5)
CALCIUM SPEC-SCNC: 9.4 MG/DL (ref 8.6–10.5)
CALCIUM SPEC-SCNC: 9.5 MG/DL (ref 8.6–10.5)
CHLORIDE SERPL-SCNC: 106 MMOL/L (ref 98–107)
CHLORIDE SERPL-SCNC: 106 MMOL/L (ref 98–107)
CHLORIDE SERPL-SCNC: 107 MMOL/L (ref 98–107)
CHLORIDE SERPL-SCNC: 109 MMOL/L (ref 98–107)
CHLORIDE SERPL-SCNC: 110 MMOL/L (ref 98–107)
CHLORIDE SERPL-SCNC: 110 MMOL/L (ref 98–107)
CHLORIDE SERPL-SCNC: 111 MMOL/L (ref 98–107)
CHLORIDE SERPL-SCNC: 114 MMOL/L (ref 98–107)
CHLORIDE SERPL-SCNC: 115 MMOL/L (ref 98–107)
CHOLEST SERPL-MCNC: 105 MG/DL (ref 0–200)
CK SERPL-CCNC: 58 U/L (ref 20–180)
CK SERPL-CCNC: 77 U/L (ref 20–180)
CLARITY UR: ABNORMAL
CLARITY UR: ABNORMAL
CLARITY UR: CLEAR
CLARITY UR: CLEAR
CO2 SERPL-SCNC: 21 MMOL/L (ref 22–29)
CO2 SERPL-SCNC: 23 MMOL/L (ref 22–29)
CO2 SERPL-SCNC: 23.4 MMOL/L (ref 22–29)
CO2 SERPL-SCNC: 25 MMOL/L (ref 22–29)
CO2 SERPL-SCNC: 26 MMOL/L (ref 22–29)
CO2 SERPL-SCNC: 27 MMOL/L (ref 22–29)
COLOR UR: ABNORMAL
COLOR UR: ABNORMAL
COLOR UR: YELLOW
COLOR UR: YELLOW
CREAT SERPL-MCNC: 0.73 MG/DL (ref 0.57–1)
CREAT SERPL-MCNC: 0.75 MG/DL (ref 0.57–1)
CREAT SERPL-MCNC: 0.77 MG/DL (ref 0.57–1)
CREAT SERPL-MCNC: 0.82 MG/DL (ref 0.57–1)
CREAT SERPL-MCNC: 0.84 MG/DL (ref 0.57–1)
CREAT SERPL-MCNC: 0.86 MG/DL (ref 0.57–1)
CREAT SERPL-MCNC: 0.97 MG/DL (ref 0.57–1)
CREAT SERPL-MCNC: 0.97 MG/DL (ref 0.57–1)
CREAT SERPL-MCNC: 1.05 MG/DL (ref 0.57–1)
D-LACTATE SERPL-SCNC: 2.5 MMOL/L (ref 0.5–2)
D-LACTATE SERPL-SCNC: 2.9 MMOL/L (ref 0.5–2)
DEPRECATED RDW RBC AUTO: 51.2 FL (ref 37–54)
DEPRECATED RDW RBC AUTO: 55.2 FL (ref 37–54)
DEPRECATED RDW RBC AUTO: 55.4 FL (ref 37–54)
DEPRECATED RDW RBC AUTO: 55.5 FL (ref 37–54)
DEPRECATED RDW RBC AUTO: 55.6 FL (ref 37–54)
DEPRECATED RDW RBC AUTO: 55.7 FL (ref 37–54)
DEPRECATED RDW RBC AUTO: 55.8 FL (ref 37–54)
DEPRECATED RDW RBC AUTO: 56.4 FL (ref 37–54)
DEPRECATED RDW RBC AUTO: 57.1 FL (ref 37–54)
DEPRECATED RDW RBC AUTO: 66.4 FL (ref 37–54)
DEPRECATED RDW RBC AUTO: 67.7 FL (ref 37–54)
EOSINOPHIL # BLD AUTO: 0.05 10*3/MM3 (ref 0–0.4)
EOSINOPHIL # BLD AUTO: 0.07 10*3/MM3 (ref 0–0.4)
EOSINOPHIL # BLD AUTO: 0.08 10*3/MM3 (ref 0–0.4)
EOSINOPHIL # BLD AUTO: 0.09 10*3/MM3 (ref 0–0.4)
EOSINOPHIL # BLD AUTO: 0.11 10*3/MM3 (ref 0–0.4)
EOSINOPHIL # BLD AUTO: 0.12 10*3/MM3 (ref 0–0.4)
EOSINOPHIL # BLD MANUAL: 0.08 10*3/MM3 (ref 0–0.4)
EOSINOPHIL # BLD MANUAL: 0.17 10*3/MM3 (ref 0–0.4)
EOSINOPHIL NFR BLD AUTO: 1.2 % (ref 0.3–6.2)
EOSINOPHIL NFR BLD AUTO: 1.6 % (ref 0.3–6.2)
EOSINOPHIL NFR BLD AUTO: 1.7 % (ref 0.3–6.2)
EOSINOPHIL NFR BLD AUTO: 2.2 % (ref 0.3–6.2)
EOSINOPHIL NFR BLD AUTO: 2.2 % (ref 0.3–6.2)
EOSINOPHIL NFR BLD AUTO: 2.3 % (ref 0.3–6.2)
EOSINOPHIL NFR BLD AUTO: 2.5 % (ref 0.3–6.2)
EOSINOPHIL NFR BLD AUTO: 3.5 % (ref 0.3–6.2)
EOSINOPHIL NFR BLD MANUAL: 2 % (ref 0.3–6.2)
EOSINOPHIL NFR BLD MANUAL: 4 % (ref 0.3–6.2)
ERYTHROCYTE [DISTWIDTH] IN BLOOD BY AUTOMATED COUNT: 15.2 % (ref 12.3–15.4)
ERYTHROCYTE [DISTWIDTH] IN BLOOD BY AUTOMATED COUNT: 15.6 % (ref 12.3–15.4)
ERYTHROCYTE [DISTWIDTH] IN BLOOD BY AUTOMATED COUNT: 15.7 % (ref 12.3–15.4)
ERYTHROCYTE [DISTWIDTH] IN BLOOD BY AUTOMATED COUNT: 15.7 % (ref 12.3–15.4)
ERYTHROCYTE [DISTWIDTH] IN BLOOD BY AUTOMATED COUNT: 15.8 % (ref 12.3–15.4)
ERYTHROCYTE [DISTWIDTH] IN BLOOD BY AUTOMATED COUNT: 15.9 % (ref 12.3–15.4)
ERYTHROCYTE [DISTWIDTH] IN BLOOD BY AUTOMATED COUNT: 16.6 % (ref 12.3–15.4)
ERYTHROCYTE [DISTWIDTH] IN BLOOD BY AUTOMATED COUNT: 18.3 % (ref 12.3–15.4)
ERYTHROCYTE [DISTWIDTH] IN BLOOD BY AUTOMATED COUNT: 19.7 % (ref 12.3–15.4)
FERRITIN SERPL-MCNC: 342.2 NG/ML (ref 13–150)
FERRITIN SERPL-MCNC: 351.7 NG/ML (ref 13–150)
FLUAV RNA RESP QL NAA+PROBE: NOT DETECTED
FLUAV RNA RESP QL NAA+PROBE: NOT DETECTED
FLUBV RNA RESP QL NAA+PROBE: NOT DETECTED
FLUBV RNA RESP QL NAA+PROBE: NOT DETECTED
FOLATE SERPL-MCNC: 16.8 NG/ML (ref 4.78–24.2)
GFR SERPL CREATININE-BSD FRML MDRD: 63 ML/MIN/1.73
GFR SERPL CREATININE-BSD FRML MDRD: 69 ML/MIN/1.73
GFR SERPL CREATININE-BSD FRML MDRD: 69 ML/MIN/1.73
GFR SERPL CREATININE-BSD FRML MDRD: 79 ML/MIN/1.73
GFR SERPL CREATININE-BSD FRML MDRD: 81 ML/MIN/1.73
GFR SERPL CREATININE-BSD FRML MDRD: 84 ML/MIN/1.73
GFR SERPL CREATININE-BSD FRML MDRD: 90 ML/MIN/1.73
GFR SERPL CREATININE-BSD FRML MDRD: 93 ML/MIN/1.73
GFR SERPL CREATININE-BSD FRML MDRD: 96 ML/MIN/1.73
GLOBULIN UR ELPH-MCNC: 4.2 GM/DL
GLOBULIN UR ELPH-MCNC: 4.3 GM/DL
GLOBULIN UR ELPH-MCNC: 4.4 GM/DL
GLOBULIN UR ELPH-MCNC: 4.4 GM/DL
GLOBULIN UR ELPH-MCNC: 4.5 GM/DL
GLOBULIN UR ELPH-MCNC: 4.7 GM/DL
GLOBULIN UR ELPH-MCNC: 4.8 GM/DL
GLOBULIN UR ELPH-MCNC: 6.2 GM/DL
GLOBULIN UR ELPH-MCNC: 6.4 GM/DL
GLUCOSE BLDC GLUCOMTR-MCNC: 101 MG/DL (ref 70–130)
GLUCOSE BLDC GLUCOMTR-MCNC: 101 MG/DL (ref 70–130)
GLUCOSE BLDC GLUCOMTR-MCNC: 102 MG/DL (ref 70–130)
GLUCOSE BLDC GLUCOMTR-MCNC: 104 MG/DL (ref 70–130)
GLUCOSE BLDC GLUCOMTR-MCNC: 106 MG/DL (ref 70–130)
GLUCOSE BLDC GLUCOMTR-MCNC: 108 MG/DL (ref 70–130)
GLUCOSE BLDC GLUCOMTR-MCNC: 108 MG/DL (ref 70–130)
GLUCOSE BLDC GLUCOMTR-MCNC: 109 MG/DL (ref 70–130)
GLUCOSE BLDC GLUCOMTR-MCNC: 109 MG/DL (ref 70–130)
GLUCOSE BLDC GLUCOMTR-MCNC: 110 MG/DL (ref 70–130)
GLUCOSE BLDC GLUCOMTR-MCNC: 110 MG/DL (ref 70–130)
GLUCOSE BLDC GLUCOMTR-MCNC: 113 MG/DL (ref 70–130)
GLUCOSE BLDC GLUCOMTR-MCNC: 116 MG/DL (ref 70–130)
GLUCOSE BLDC GLUCOMTR-MCNC: 118 MG/DL (ref 70–130)
GLUCOSE BLDC GLUCOMTR-MCNC: 123 MG/DL (ref 70–130)
GLUCOSE BLDC GLUCOMTR-MCNC: 123 MG/DL (ref 70–130)
GLUCOSE BLDC GLUCOMTR-MCNC: 126 MG/DL (ref 70–130)
GLUCOSE BLDC GLUCOMTR-MCNC: 129 MG/DL (ref 70–130)
GLUCOSE BLDC GLUCOMTR-MCNC: 132 MG/DL (ref 70–130)
GLUCOSE BLDC GLUCOMTR-MCNC: 133 MG/DL (ref 70–130)
GLUCOSE BLDC GLUCOMTR-MCNC: 136 MG/DL (ref 70–130)
GLUCOSE BLDC GLUCOMTR-MCNC: 138 MG/DL (ref 70–130)
GLUCOSE BLDC GLUCOMTR-MCNC: 143 MG/DL (ref 70–130)
GLUCOSE BLDC GLUCOMTR-MCNC: 146 MG/DL (ref 70–130)
GLUCOSE BLDC GLUCOMTR-MCNC: 148 MG/DL (ref 70–130)
GLUCOSE BLDC GLUCOMTR-MCNC: 150 MG/DL (ref 70–130)
GLUCOSE BLDC GLUCOMTR-MCNC: 155 MG/DL (ref 70–130)
GLUCOSE BLDC GLUCOMTR-MCNC: 155 MG/DL (ref 70–130)
GLUCOSE BLDC GLUCOMTR-MCNC: 79 MG/DL (ref 70–130)
GLUCOSE BLDC GLUCOMTR-MCNC: 80 MG/DL (ref 70–130)
GLUCOSE BLDC GLUCOMTR-MCNC: 88 MG/DL (ref 70–130)
GLUCOSE BLDC GLUCOMTR-MCNC: 90 MG/DL (ref 70–130)
GLUCOSE BLDC GLUCOMTR-MCNC: 93 MG/DL (ref 70–130)
GLUCOSE BLDC GLUCOMTR-MCNC: 93 MG/DL (ref 70–130)
GLUCOSE BLDC GLUCOMTR-MCNC: 94 MG/DL (ref 70–130)
GLUCOSE BLDC GLUCOMTR-MCNC: 96 MG/DL (ref 70–130)
GLUCOSE BLDC GLUCOMTR-MCNC: 98 MG/DL (ref 70–130)
GLUCOSE P FAST SERPL-MCNC: 83 MG/DL (ref 74–106)
GLUCOSE SERPL-MCNC: 101 MG/DL (ref 65–99)
GLUCOSE SERPL-MCNC: 104 MG/DL (ref 65–99)
GLUCOSE SERPL-MCNC: 114 MG/DL (ref 65–99)
GLUCOSE SERPL-MCNC: 116 MG/DL (ref 65–99)
GLUCOSE SERPL-MCNC: 134 MG/DL (ref 65–99)
GLUCOSE SERPL-MCNC: 142 MG/DL (ref 65–99)
GLUCOSE SERPL-MCNC: 170 MG/DL (ref 65–99)
GLUCOSE SERPL-MCNC: 171 MG/DL (ref 65–99)
GLUCOSE SERPL-MCNC: 88 MG/DL (ref 65–99)
GLUCOSE UR STRIP-MCNC: NEGATIVE MG/DL
HBV CORE AB SERPL QL IA: POSITIVE
HBV CORE IGM SERPL QL IA: NORMAL
HBV SURFACE AB SER RIA-ACNC: NORMAL
HBV SURFACE AG SERPL QL IA: NORMAL
HBV SURFACE AG SERPL QL IA: NORMAL
HBV SURFACE AG SERPL QL NT: POSITIVE
HCT VFR BLD AUTO: 28.2 % (ref 34–46.6)
HCT VFR BLD AUTO: 28.3 % (ref 34–46.6)
HCT VFR BLD AUTO: 28.7 % (ref 34–46.6)
HCT VFR BLD AUTO: 30.2 % (ref 34–46.6)
HCT VFR BLD AUTO: 30.2 % (ref 34–46.6)
HCT VFR BLD AUTO: 31 % (ref 34–46.6)
HCT VFR BLD AUTO: 31.6 % (ref 34–46.6)
HCT VFR BLD AUTO: 31.8 % (ref 34–46.6)
HCT VFR BLD AUTO: 33.5 % (ref 34–46.6)
HCT VFR BLD AUTO: 33.8 % (ref 34–46.6)
HCT VFR BLD AUTO: 34.1 % (ref 34–46.6)
HDLC SERPL-MCNC: 30 MG/DL (ref 40–60)
HGB BLD-MCNC: 10.1 G/DL (ref 12–15.9)
HGB BLD-MCNC: 10.2 G/DL (ref 12–15.9)
HGB BLD-MCNC: 10.5 G/DL (ref 12–15.9)
HGB BLD-MCNC: 10.6 G/DL (ref 12–15.9)
HGB BLD-MCNC: 11.1 G/DL (ref 12–15.9)
HGB BLD-MCNC: 11.1 G/DL (ref 12–15.9)
HGB BLD-MCNC: 11.2 G/DL (ref 12–15.9)
HGB BLD-MCNC: 11.4 G/DL (ref 12–15.9)
HGB BLD-MCNC: 9.6 G/DL (ref 12–15.9)
HGB BLD-MCNC: 9.8 G/DL (ref 12–15.9)
HGB BLD-MCNC: 9.9 G/DL (ref 12–15.9)
HGB RETIC QN AUTO: 40.7 PG (ref 29.8–36.1)
HGB UR QL STRIP.AUTO: NEGATIVE
HOLD SPECIMEN: NORMAL
HYALINE CASTS UR QL AUTO: ABNORMAL /LPF
HYPOCHROMIA BLD QL: NORMAL
IMM GRANULOCYTES # BLD AUTO: 0.01 10*3/MM3 (ref 0–0.05)
IMM GRANULOCYTES # BLD AUTO: 0.02 10*3/MM3 (ref 0–0.05)
IMM GRANULOCYTES # BLD AUTO: 0.02 10*3/MM3 (ref 0–0.05)
IMM GRANULOCYTES NFR BLD AUTO: 0.2 % (ref 0–0.5)
IMM GRANULOCYTES NFR BLD AUTO: 0.3 % (ref 0–0.5)
IMM GRANULOCYTES NFR BLD AUTO: 0.5 % (ref 0–0.5)
IMM GRANULOCYTES NFR BLD AUTO: 0.5 % (ref 0–0.5)
IMM RETICS NFR: 7 % (ref 3–15.8)
INR PPP: 1.33 (ref 0.8–1.2)
INR PPP: 1.7 (ref 0.8–1.2)
INR PPP: 1.73 (ref 0.8–1.2)
INR PPP: 1.76 (ref 0.8–1.2)
INR PPP: 1.82 (ref 0.8–1.2)
INR PPP: 1.87 (ref 0.8–1.2)
IRON 24H UR-MRATE: 106 MCG/DL (ref 37–145)
IRON 24H UR-MRATE: 112 MCG/DL (ref 37–145)
IRON SATN MFR SERPL: 56 % (ref 20–50)
IRON SATN MFR SERPL: 64 % (ref 20–50)
KETONES UR QL STRIP: ABNORMAL
KETONES UR QL STRIP: ABNORMAL
KETONES UR QL STRIP: NEGATIVE
KETONES UR QL STRIP: NEGATIVE
LDH SERPL-CCNC: 170 U/L (ref 135–214)
LDLC SERPL CALC-MCNC: 62 MG/DL (ref 0–100)
LDLC/HDLC SERPL: 2.11 {RATIO}
LEUKOCYTE ESTERASE UR QL STRIP.AUTO: ABNORMAL
LEUKOCYTE ESTERASE UR QL STRIP.AUTO: NEGATIVE
LYMPHOCYTES # BLD AUTO: 1.14 10*3/MM3 (ref 0.7–3.1)
LYMPHOCYTES # BLD AUTO: 1.3 10*3/MM3 (ref 0.7–3.1)
LYMPHOCYTES # BLD AUTO: 1.32 10*3/MM3 (ref 0.7–3.1)
LYMPHOCYTES # BLD AUTO: 1.39 10*3/MM3 (ref 0.7–3.1)
LYMPHOCYTES # BLD AUTO: 1.48 10*3/MM3 (ref 0.7–3.1)
LYMPHOCYTES # BLD AUTO: 1.63 10*3/MM3 (ref 0.7–3.1)
LYMPHOCYTES # BLD AUTO: 1.71 10*3/MM3 (ref 0.7–3.1)
LYMPHOCYTES # BLD AUTO: 1.74 10*3/MM3 (ref 0.7–3.1)
LYMPHOCYTES # BLD MANUAL: 1.52 10*3/MM3 (ref 0.7–3.1)
LYMPHOCYTES # BLD MANUAL: 1.62 10*3/MM3 (ref 0.7–3.1)
LYMPHOCYTES NFR BLD AUTO: 29.7 % (ref 19.6–45.3)
LYMPHOCYTES NFR BLD AUTO: 31.3 % (ref 19.6–45.3)
LYMPHOCYTES NFR BLD AUTO: 35.1 % (ref 19.6–45.3)
LYMPHOCYTES NFR BLD AUTO: 37.3 % (ref 19.6–45.3)
LYMPHOCYTES NFR BLD AUTO: 38.1 % (ref 19.6–45.3)
LYMPHOCYTES NFR BLD AUTO: 38.5 % (ref 19.6–45.3)
LYMPHOCYTES NFR BLD AUTO: 39.5 % (ref 19.6–45.3)
LYMPHOCYTES NFR BLD AUTO: 41.1 % (ref 19.6–45.3)
LYMPHOCYTES NFR BLD MANUAL: 36 % (ref 19.6–45.3)
LYMPHOCYTES NFR BLD MANUAL: 41 % (ref 19.6–45.3)
LYMPHOCYTES NFR BLD MANUAL: 6 % (ref 5–12)
LYMPHOCYTES NFR BLD MANUAL: 8 % (ref 5–12)
MAGNESIUM SERPL-MCNC: 1.6 MG/DL (ref 1.6–2.4)
MAGNESIUM SERPL-MCNC: 1.6 MG/DL (ref 1.6–2.4)
MAGNESIUM SERPL-MCNC: 2 MG/DL (ref 1.6–2.4)
MAGNESIUM SERPL-MCNC: 2.5 MG/DL (ref 1.6–2.4)
MCH RBC QN AUTO: 31.1 PG (ref 26.6–33)
MCH RBC QN AUTO: 32.3 PG (ref 26.6–33)
MCH RBC QN AUTO: 32.5 PG (ref 26.6–33)
MCH RBC QN AUTO: 32.6 PG (ref 26.6–33)
MCH RBC QN AUTO: 32.7 PG (ref 26.6–33)
MCH RBC QN AUTO: 32.9 PG (ref 26.6–33)
MCH RBC QN AUTO: 33.1 PG (ref 26.6–33)
MCH RBC QN AUTO: 33.1 PG (ref 26.6–33)
MCH RBC QN AUTO: 34 PG (ref 26.6–33)
MCHC RBC AUTO-ENTMCNC: 32.8 G/DL (ref 31.5–35.7)
MCHC RBC AUTO-ENTMCNC: 33.1 G/DL (ref 31.5–35.7)
MCHC RBC AUTO-ENTMCNC: 33.3 G/DL (ref 31.5–35.7)
MCHC RBC AUTO-ENTMCNC: 33.4 G/DL (ref 31.5–35.7)
MCHC RBC AUTO-ENTMCNC: 33.7 G/DL (ref 31.5–35.7)
MCHC RBC AUTO-ENTMCNC: 33.8 G/DL (ref 31.5–35.7)
MCHC RBC AUTO-ENTMCNC: 33.9 G/DL (ref 31.5–35.7)
MCHC RBC AUTO-ENTMCNC: 33.9 G/DL (ref 31.5–35.7)
MCHC RBC AUTO-ENTMCNC: 34.1 G/DL (ref 31.5–35.7)
MCHC RBC AUTO-ENTMCNC: 35.1 G/DL (ref 31.5–35.7)
MCHC RBC AUTO-ENTMCNC: 35.1 G/DL (ref 31.5–35.7)
MCV RBC AUTO: 93.8 FL (ref 79–97)
MCV RBC AUTO: 94.3 FL (ref 79–97)
MCV RBC AUTO: 96 FL (ref 79–97)
MCV RBC AUTO: 96.2 FL (ref 79–97)
MCV RBC AUTO: 96.3 FL (ref 79–97)
MCV RBC AUTO: 96.3 FL (ref 79–97)
MCV RBC AUTO: 96.9 FL (ref 79–97)
MCV RBC AUTO: 97 FL (ref 79–97)
MCV RBC AUTO: 97.4 FL (ref 79–97)
MCV RBC AUTO: 98.3 FL (ref 79–97)
MCV RBC AUTO: 98.8 FL (ref 79–97)
MONOCYTES # BLD AUTO: 0.25 10*3/MM3 (ref 0.1–0.9)
MONOCYTES # BLD AUTO: 0.32 10*3/MM3 (ref 0.1–0.9)
MONOCYTES # BLD AUTO: 0.35 10*3/MM3 (ref 0.1–0.9)
MONOCYTES # BLD AUTO: 0.35 10*3/MM3 (ref 0.1–0.9)
MONOCYTES # BLD AUTO: 0.38 10*3/MM3 (ref 0.1–0.9)
MONOCYTES # BLD AUTO: 0.41 10*3/MM3 (ref 0.1–0.9)
MONOCYTES # BLD AUTO: 0.46 10*3/MM3 (ref 0.1–0.9)
MONOCYTES # BLD AUTO: 0.48 10*3/MM3 (ref 0.1–0.9)
MONOCYTES # BLD AUTO: 0.52 10*3/MM3 (ref 0.1–0.9)
MONOCYTES # BLD AUTO: 0.57 10*3/MM3 (ref 0.1–0.9)
MONOCYTES NFR BLD AUTO: 10.3 % (ref 5–12)
MONOCYTES NFR BLD AUTO: 10.3 % (ref 5–12)
MONOCYTES NFR BLD AUTO: 10.4 % (ref 5–12)
MONOCYTES NFR BLD AUTO: 10.5 % (ref 5–12)
MONOCYTES NFR BLD AUTO: 10.9 % (ref 5–12)
MONOCYTES NFR BLD AUTO: 11.7 % (ref 5–12)
MONOCYTES NFR BLD AUTO: 13 % (ref 5–12)
MONOCYTES NFR BLD AUTO: 9.7 % (ref 5–12)
MRSA DNA SPEC QL NAA+PROBE: NEGATIVE
NEUTROPHILS # BLD AUTO: 1.94 10*3/MM3 (ref 1.7–7)
NEUTROPHILS # BLD AUTO: 2.24 10*3/MM3 (ref 1.7–7)
NEUTROPHILS NFR BLD AUTO: 1.6 10*3/MM3 (ref 1.7–7)
NEUTROPHILS NFR BLD AUTO: 1.76 10*3/MM3 (ref 1.7–7)
NEUTROPHILS NFR BLD AUTO: 1.81 10*3/MM3 (ref 1.7–7)
NEUTROPHILS NFR BLD AUTO: 1.94 10*3/MM3 (ref 1.7–7)
NEUTROPHILS NFR BLD AUTO: 1.99 10*3/MM3 (ref 1.7–7)
NEUTROPHILS NFR BLD AUTO: 2.2 10*3/MM3 (ref 1.7–7)
NEUTROPHILS NFR BLD AUTO: 2.28 10*3/MM3 (ref 1.7–7)
NEUTROPHILS NFR BLD AUTO: 2.49 10*3/MM3 (ref 1.7–7)
NEUTROPHILS NFR BLD AUTO: 44.1 % (ref 42.7–76)
NEUTROPHILS NFR BLD AUTO: 45.5 % (ref 42.7–76)
NEUTROPHILS NFR BLD AUTO: 46.9 % (ref 42.7–76)
NEUTROPHILS NFR BLD AUTO: 48.7 % (ref 42.7–76)
NEUTROPHILS NFR BLD AUTO: 49.6 % (ref 42.7–76)
NEUTROPHILS NFR BLD AUTO: 52.1 % (ref 42.7–76)
NEUTROPHILS NFR BLD AUTO: 54.8 % (ref 42.7–76)
NEUTROPHILS NFR BLD AUTO: 56 % (ref 42.7–76)
NEUTROPHILS NFR BLD MANUAL: 49 % (ref 42.7–76)
NEUTROPHILS NFR BLD MANUAL: 53 % (ref 42.7–76)
NITRITE UR QL STRIP: NEGATIVE
NRBC BLD AUTO-RTO: 0 /100 WBC (ref 0–0.2)
NT-PROBNP SERPL-MCNC: 59.8 PG/ML (ref 0–900)
NT-PROBNP SERPL-MCNC: 78.3 PG/ML (ref 0–900)
PH UR STRIP.AUTO: 5.5 [PH] (ref 5–9)
PH UR STRIP.AUTO: 6 [PH] (ref 5–9)
PH UR STRIP.AUTO: 6 [PH] (ref 5–9)
PH UR STRIP.AUTO: 6.5 [PH] (ref 5–9)
PLATELET # BLD AUTO: 36 10*3/MM3 (ref 140–450)
PLATELET # BLD AUTO: 39 10*3/MM3 (ref 140–450)
PLATELET # BLD AUTO: 44 10*3/MM3 (ref 140–450)
PLATELET # BLD AUTO: 45 10*3/MM3 (ref 140–450)
PLATELET # BLD AUTO: 45 10*3/MM3 (ref 140–450)
PLATELET # BLD AUTO: 48 10*3/MM3 (ref 140–450)
PLATELET # BLD AUTO: 52 10*3/MM3 (ref 140–450)
PLATELET # BLD AUTO: 57 10*3/MM3 (ref 140–450)
PLATELET # BLD AUTO: 60 10*3/MM3 (ref 140–450)
PLATELET # BLD AUTO: 68 10*3/MM3 (ref 140–450)
PLATELET # BLD AUTO: 70 10*3/MM3 (ref 140–450)
PLATELETS.RETICULATED NFR BLD AUTO: 2 % (ref 0.9–6.5)
PMV BLD AUTO: 10.2 FL (ref 6–12)
PMV BLD AUTO: 10.5 FL (ref 6–12)
PMV BLD AUTO: 10.9 FL (ref 6–12)
PMV BLD AUTO: 10.9 FL (ref 6–12)
PMV BLD AUTO: 11 FL (ref 6–12)
PMV BLD AUTO: 11 FL (ref 6–12)
PMV BLD AUTO: 11.1 FL (ref 6–12)
PMV BLD AUTO: 11.1 FL (ref 6–12)
PMV BLD AUTO: 11.8 FL (ref 6–12)
PMV BLD AUTO: 11.9 FL (ref 6–12)
PMV BLD AUTO: 12.1 FL (ref 6–12)
POTASSIUM SERPL-SCNC: 3.3 MMOL/L (ref 3.5–5.2)
POTASSIUM SERPL-SCNC: 3.5 MMOL/L (ref 3.5–5.2)
POTASSIUM SERPL-SCNC: 3.6 MMOL/L (ref 3.5–5.2)
POTASSIUM SERPL-SCNC: 3.6 MMOL/L (ref 3.5–5.2)
POTASSIUM SERPL-SCNC: 3.7 MMOL/L (ref 3.5–5.2)
POTASSIUM SERPL-SCNC: 3.7 MMOL/L (ref 3.5–5.2)
POTASSIUM SERPL-SCNC: 3.8 MMOL/L (ref 3.5–5.2)
POTASSIUM SERPL-SCNC: 3.9 MMOL/L (ref 3.5–5.2)
POTASSIUM SERPL-SCNC: 3.9 MMOL/L (ref 3.5–5.2)
POTASSIUM SERPL-SCNC: 4.2 MMOL/L (ref 3.5–5.2)
POTASSIUM SERPL-SCNC: 4.3 MMOL/L (ref 3.5–5.2)
PROCALCITONIN SERPL-MCNC: 0.21 NG/ML (ref 0–0.25)
PROT SERPL-MCNC: 6 G/DL (ref 6–8.5)
PROT SERPL-MCNC: 6.1 G/DL (ref 6–8.5)
PROT SERPL-MCNC: 6.3 G/DL (ref 6–8.5)
PROT SERPL-MCNC: 6.3 G/DL (ref 6–8.5)
PROT SERPL-MCNC: 6.4 G/DL (ref 6–8.5)
PROT SERPL-MCNC: 6.8 G/DL (ref 6–8.5)
PROT SERPL-MCNC: 6.9 G/DL (ref 6–8.5)
PROT SERPL-MCNC: 8.2 G/DL (ref 6–8.5)
PROT SERPL-MCNC: 8.2 G/DL (ref 6–8.5)
PROT UR QL STRIP: NEGATIVE
PROTHROMBIN TIME: 17.2 SECONDS (ref 11.1–15.3)
PROTHROMBIN TIME: 19.6 SECONDS (ref 11.1–15.3)
PROTHROMBIN TIME: 19.9 SECONDS (ref 11.1–15.3)
PROTHROMBIN TIME: 20.2 SECONDS (ref 11.1–15.3)
PROTHROMBIN TIME: 20.7 SECONDS (ref 11.1–15.3)
PROTHROMBIN TIME: 21.1 SECONDS (ref 11.1–15.3)
QT INTERVAL: 362 MS
QT INTERVAL: 388 MS
QTC INTERVAL: 425 MS
QTC INTERVAL: 477 MS
RBC # BLD AUTO: 2.91 10*6/MM3 (ref 3.77–5.28)
RBC # BLD AUTO: 2.94 10*6/MM3 (ref 3.77–5.28)
RBC # BLD AUTO: 2.98 10*6/MM3 (ref 3.77–5.28)
RBC # BLD AUTO: 3.1 10*6/MM3 (ref 3.77–5.28)
RBC # BLD AUTO: 3.14 10*6/MM3 (ref 3.77–5.28)
RBC # BLD AUTO: 3.23 10*6/MM3 (ref 3.77–5.28)
RBC # BLD AUTO: 3.28 10*6/MM3 (ref 3.77–5.28)
RBC # BLD AUTO: 3.35 10*6/MM3 (ref 3.77–5.28)
RBC # BLD AUTO: 3.44 10*6/MM3 (ref 3.77–5.28)
RBC # BLD AUTO: 3.45 10*6/MM3 (ref 3.77–5.28)
RBC # BLD AUTO: 3.57 10*6/MM3 (ref 3.77–5.28)
RBC # UR: ABNORMAL /HPF
RBC MORPH BLD: NORMAL
REF LAB TEST METHOD: ABNORMAL
REF LAB TEST METHOD: NORMAL
RETICS # AUTO: 0.05 10*6/MM3 (ref 0.02–0.13)
RETICS/RBC NFR AUTO: 1.67 % (ref 0.7–1.9)
SARS-COV-2 RNA PNL SPEC NAA+PROBE: NOT DETECTED
SARS-COV-2 RNA RESP QL NAA+PROBE: NOT DETECTED
SARS-COV-2 RNA RESP QL NAA+PROBE: NOT DETECTED
SMALL PLATELETS BLD QL SMEAR: NORMAL
SODIUM SERPL-SCNC: 137 MMOL/L (ref 136–145)
SODIUM SERPL-SCNC: 140 MMOL/L (ref 136–145)
SODIUM SERPL-SCNC: 140 MMOL/L (ref 136–145)
SODIUM SERPL-SCNC: 141 MMOL/L (ref 136–145)
SODIUM SERPL-SCNC: 142 MMOL/L (ref 136–145)
SP GR UR STRIP: 1.01 (ref 1–1.03)
SP GR UR STRIP: 1.02 (ref 1–1.03)
SQUAMOUS #/AREA URNS HPF: ABNORMAL /HPF
TIBC SERPL-MCNC: 176 MCG/DL (ref 298–536)
TIBC SERPL-MCNC: 191 MCG/DL (ref 298–536)
TRANSFERRIN SERPL-MCNC: 118 MG/DL (ref 200–360)
TRANSFERRIN SERPL-MCNC: 128 MG/DL (ref 200–360)
TRIGL SERPL-MCNC: 59 MG/DL (ref 0–150)
TROPONIN T SERPL-MCNC: <0.01 NG/ML (ref 0–0.03)
TSH SERPL DL<=0.05 MIU/L-ACNC: 1.49 UIU/ML (ref 0.27–4.2)
TSH SERPL DL<=0.05 MIU/L-ACNC: 2.82 UIU/ML (ref 0.27–4.2)
UROBILINOGEN UR QL STRIP: ABNORMAL
VIT B12 BLD-MCNC: 1716 PG/ML (ref 211–946)
VLDLC SERPL-MCNC: 13 MG/DL (ref 5–40)
WBC # BLD AUTO: 3.41 10*3/MM3 (ref 3.4–10.8)
WBC # BLD AUTO: 3.61 10*3/MM3 (ref 3.4–10.8)
WBC # BLD AUTO: 3.64 10*3/MM3 (ref 3.4–10.8)
WBC # BLD AUTO: 3.96 10*3/MM3 (ref 3.4–10.8)
WBC # BLD AUTO: 3.97 10*3/MM3 (ref 3.4–10.8)
WBC # BLD AUTO: 4.22 10*3/MM3 (ref 3.4–10.8)
WBC # BLD AUTO: 4.4 10*3/MM3 (ref 3.4–10.8)
WBC # BLD AUTO: 4.44 10*3/MM3 (ref 3.4–10.8)
WBC # BLD AUTO: 4.54 10*3/MM3 (ref 3.4–10.8)
WBC # BLD AUTO: 4.59 10*3/MM3 (ref 3.4–10.8)
WBC # BLD AUTO: 4.77 10*3/MM3 (ref 3.4–10.8)
WBC MORPH BLD: NORMAL
WBC UR QL AUTO: ABNORMAL /HPF
WHOLE BLOOD HOLD SPECIMEN: NORMAL

## 2021-01-01 PROCEDURE — 85025 COMPLETE CBC W/AUTO DIFF WBC: CPT | Performed by: FAMILY MEDICINE

## 2021-01-01 PROCEDURE — 99232 SBSQ HOSP IP/OBS MODERATE 35: CPT | Performed by: PSYCHIATRY & NEUROLOGY

## 2021-01-01 PROCEDURE — 82962 GLUCOSE BLOOD TEST: CPT

## 2021-01-01 PROCEDURE — 85610 PROTHROMBIN TIME: CPT | Performed by: INTERNAL MEDICINE

## 2021-01-01 PROCEDURE — 82607 VITAMIN B-12: CPT | Performed by: INTERNAL MEDICINE

## 2021-01-01 PROCEDURE — 83540 ASSAY OF IRON: CPT | Performed by: FAMILY MEDICINE

## 2021-01-01 PROCEDURE — 84484 ASSAY OF TROPONIN QUANT: CPT | Performed by: EMERGENCY MEDICINE

## 2021-01-01 PROCEDURE — 25010000002 VITAMIN K1 PER 1 MG: Performed by: INTERNAL MEDICINE

## 2021-01-01 PROCEDURE — 80053 COMPREHEN METABOLIC PANEL: CPT | Performed by: FAMILY MEDICINE

## 2021-01-01 PROCEDURE — 99213 OFFICE O/P EST LOW 20 MIN: CPT | Performed by: INTERNAL MEDICINE

## 2021-01-01 PROCEDURE — 97110 THERAPEUTIC EXERCISES: CPT

## 2021-01-01 PROCEDURE — 97535 SELF CARE MNGMENT TRAINING: CPT

## 2021-01-01 PROCEDURE — 85610 PROTHROMBIN TIME: CPT | Performed by: NURSE PRACTITIONER

## 2021-01-01 PROCEDURE — 86705 HEP B CORE ANTIBODY IGM: CPT | Performed by: INTERNAL MEDICINE

## 2021-01-01 PROCEDURE — 82140 ASSAY OF AMMONIA: CPT | Performed by: INTERNAL MEDICINE

## 2021-01-01 PROCEDURE — G0378 HOSPITAL OBSERVATION PER HR: HCPCS

## 2021-01-01 PROCEDURE — 73502 X-RAY EXAM HIP UNI 2-3 VIEWS: CPT

## 2021-01-01 PROCEDURE — 92526 ORAL FUNCTION THERAPY: CPT | Performed by: SPEECH-LANGUAGE PATHOLOGIST

## 2021-01-01 PROCEDURE — 94799 UNLISTED PULMONARY SVC/PX: CPT

## 2021-01-01 PROCEDURE — 99284 EMERGENCY DEPT VISIT MOD MDM: CPT

## 2021-01-01 PROCEDURE — 93005 ELECTROCARDIOGRAM TRACING: CPT | Performed by: EMERGENCY MEDICINE

## 2021-01-01 PROCEDURE — 99222 1ST HOSP IP/OBS MODERATE 55: CPT | Performed by: INTERNAL MEDICINE

## 2021-01-01 PROCEDURE — 85007 BL SMEAR W/DIFF WBC COUNT: CPT | Performed by: FAMILY MEDICINE

## 2021-01-01 PROCEDURE — 87341 HEP B SURFACE AG NEUTRLZJ IA: CPT | Performed by: INTERNAL MEDICINE

## 2021-01-01 PROCEDURE — 81003 URINALYSIS AUTO W/O SCOPE: CPT | Performed by: FAMILY MEDICINE

## 2021-01-01 PROCEDURE — 85007 BL SMEAR W/DIFF WBC COUNT: CPT | Performed by: NURSE PRACTITIONER

## 2021-01-01 PROCEDURE — 83880 ASSAY OF NATRIURETIC PEPTIDE: CPT | Performed by: NURSE PRACTITIONER

## 2021-01-01 PROCEDURE — 92610 EVALUATE SWALLOWING FUNCTION: CPT | Performed by: SPEECH-LANGUAGE PATHOLOGIST

## 2021-01-01 PROCEDURE — 99222 1ST HOSP IP/OBS MODERATE 55: CPT | Performed by: PSYCHIATRY & NEUROLOGY

## 2021-01-01 PROCEDURE — 87641 MR-STAPH DNA AMP PROBE: CPT | Performed by: FAMILY MEDICINE

## 2021-01-01 PROCEDURE — 82728 ASSAY OF FERRITIN: CPT | Performed by: FAMILY MEDICINE

## 2021-01-01 PROCEDURE — 36415 COLL VENOUS BLD VENIPUNCTURE: CPT | Performed by: INTERNAL MEDICINE

## 2021-01-01 PROCEDURE — 1126F AMNT PAIN NOTED NONE PRSNT: CPT | Performed by: INTERNAL MEDICINE

## 2021-01-01 PROCEDURE — 94760 N-INVAS EAR/PLS OXIMETRY 1: CPT

## 2021-01-01 PROCEDURE — 25010000003 POTASSIUM CHLORIDE 10 MEQ/100ML SOLUTION: Performed by: FAMILY MEDICINE

## 2021-01-01 PROCEDURE — 25010000003 MAGNESIUM SULFATE 4 GM/100ML SOLUTION: Performed by: FAMILY MEDICINE

## 2021-01-01 PROCEDURE — 84145 PROCALCITONIN (PCT): CPT | Performed by: FAMILY MEDICINE

## 2021-01-01 PROCEDURE — 80061 LIPID PANEL: CPT | Performed by: PSYCHIATRY & NEUROLOGY

## 2021-01-01 PROCEDURE — 25010000002 CEFEPIME PER 500 MG: Performed by: FAMILY MEDICINE

## 2021-01-01 PROCEDURE — 84484 ASSAY OF TROPONIN QUANT: CPT | Performed by: FAMILY MEDICINE

## 2021-01-01 PROCEDURE — 81003 URINALYSIS AUTO W/O SCOPE: CPT | Performed by: NURSE PRACTITIONER

## 2021-01-01 PROCEDURE — 83615 LACTATE (LD) (LDH) ENZYME: CPT | Performed by: INTERNAL MEDICINE

## 2021-01-01 PROCEDURE — 99232 SBSQ HOSP IP/OBS MODERATE 35: CPT | Performed by: INTERNAL MEDICINE

## 2021-01-01 PROCEDURE — 99283 EMERGENCY DEPT VISIT LOW MDM: CPT

## 2021-01-01 PROCEDURE — 97530 THERAPEUTIC ACTIVITIES: CPT

## 2021-01-01 PROCEDURE — 82728 ASSAY OF FERRITIN: CPT | Performed by: INTERNAL MEDICINE

## 2021-01-01 PROCEDURE — 84443 ASSAY THYROID STIM HORMONE: CPT | Performed by: FAMILY MEDICINE

## 2021-01-01 PROCEDURE — 85025 COMPLETE CBC W/AUTO DIFF WBC: CPT | Performed by: NURSE PRACTITIONER

## 2021-01-01 PROCEDURE — 82140 ASSAY OF AMMONIA: CPT | Performed by: PSYCHIATRY & NEUROLOGY

## 2021-01-01 PROCEDURE — 86706 HEP B SURFACE ANTIBODY: CPT | Performed by: INTERNAL MEDICINE

## 2021-01-01 PROCEDURE — 99285 EMERGENCY DEPT VISIT HI MDM: CPT

## 2021-01-01 PROCEDURE — 76700 US EXAM ABDOM COMPLETE: CPT

## 2021-01-01 PROCEDURE — 87086 URINE CULTURE/COLONY COUNT: CPT | Performed by: FAMILY MEDICINE

## 2021-01-01 PROCEDURE — 71045 X-RAY EXAM CHEST 1 VIEW: CPT

## 2021-01-01 PROCEDURE — 99219 PR INITIAL OBSERVATION CARE/DAY 50 MINUTES: CPT | Performed by: INTERNAL MEDICINE

## 2021-01-01 PROCEDURE — 87340 HEPATITIS B SURFACE AG IA: CPT | Performed by: INTERNAL MEDICINE

## 2021-01-01 PROCEDURE — 97166 OT EVAL MOD COMPLEX 45 MIN: CPT

## 2021-01-01 PROCEDURE — 93005 ELECTROCARDIOGRAM TRACING: CPT | Performed by: PSYCHIATRY & NEUROLOGY

## 2021-01-01 PROCEDURE — 85025 COMPLETE CBC W/AUTO DIFF WBC: CPT | Performed by: EMERGENCY MEDICINE

## 2021-01-01 PROCEDURE — 80053 COMPREHEN METABOLIC PANEL: CPT | Performed by: EMERGENCY MEDICINE

## 2021-01-01 PROCEDURE — 88184 FLOWCYTOMETRY/ TC 1 MARKER: CPT

## 2021-01-01 PROCEDURE — 88185 FLOWCYTOMETRY/TC ADD-ON: CPT

## 2021-01-01 PROCEDURE — 80053 COMPREHEN METABOLIC PANEL: CPT | Performed by: NURSE PRACTITIONER

## 2021-01-01 PROCEDURE — 83605 ASSAY OF LACTIC ACID: CPT | Performed by: FAMILY MEDICINE

## 2021-01-01 PROCEDURE — 86704 HEP B CORE ANTIBODY TOTAL: CPT | Performed by: INTERNAL MEDICINE

## 2021-01-01 PROCEDURE — 99214 OFFICE O/P EST MOD 30 MIN: CPT | Performed by: INTERNAL MEDICINE

## 2021-01-01 PROCEDURE — 83735 ASSAY OF MAGNESIUM: CPT | Performed by: INTERNAL MEDICINE

## 2021-01-01 PROCEDURE — 82550 ASSAY OF CK (CPK): CPT | Performed by: FAMILY MEDICINE

## 2021-01-01 PROCEDURE — 95816 EEG AWAKE AND DROWSY: CPT

## 2021-01-01 PROCEDURE — 85027 COMPLETE CBC AUTOMATED: CPT | Performed by: NURSE PRACTITIONER

## 2021-01-01 PROCEDURE — 87635 SARS-COV-2 COVID-19 AMP PRB: CPT | Performed by: PSYCHIATRY & NEUROLOGY

## 2021-01-01 PROCEDURE — 85007 BL SMEAR W/DIFF WBC COUNT: CPT | Performed by: INTERNAL MEDICINE

## 2021-01-01 PROCEDURE — 80053 COMPREHEN METABOLIC PANEL: CPT | Performed by: INTERNAL MEDICINE

## 2021-01-01 PROCEDURE — 85027 COMPLETE CBC AUTOMATED: CPT

## 2021-01-01 PROCEDURE — 83735 ASSAY OF MAGNESIUM: CPT | Performed by: FAMILY MEDICINE

## 2021-01-01 PROCEDURE — 84466 ASSAY OF TRANSFERRIN: CPT | Performed by: FAMILY MEDICINE

## 2021-01-01 PROCEDURE — 81001 URINALYSIS AUTO W/SCOPE: CPT | Performed by: FAMILY MEDICINE

## 2021-01-01 PROCEDURE — 85007 BL SMEAR W/DIFF WBC COUNT: CPT | Performed by: EMERGENCY MEDICINE

## 2021-01-01 PROCEDURE — 83880 ASSAY OF NATRIURETIC PEPTIDE: CPT | Performed by: EMERGENCY MEDICINE

## 2021-01-01 PROCEDURE — 84443 ASSAY THYROID STIM HORMONE: CPT | Performed by: EMERGENCY MEDICINE

## 2021-01-01 PROCEDURE — 87636 SARSCOV2 & INF A&B AMP PRB: CPT | Performed by: EMERGENCY MEDICINE

## 2021-01-01 PROCEDURE — G0463 HOSPITAL OUTPT CLINIC VISIT: HCPCS | Performed by: INTERNAL MEDICINE

## 2021-01-01 PROCEDURE — P9612 CATHETERIZE FOR URINE SPEC: HCPCS

## 2021-01-01 PROCEDURE — 99239 HOSP IP/OBS DSCHRG MGMT >30: CPT | Performed by: PSYCHIATRY & NEUROLOGY

## 2021-01-01 PROCEDURE — 99232 SBSQ HOSP IP/OBS MODERATE 35: CPT | Performed by: NURSE PRACTITIONER

## 2021-01-01 PROCEDURE — 85025 COMPLETE CBC W/AUTO DIFF WBC: CPT | Performed by: INTERNAL MEDICINE

## 2021-01-01 PROCEDURE — 83540 ASSAY OF IRON: CPT | Performed by: INTERNAL MEDICINE

## 2021-01-01 PROCEDURE — 88185 FLOWCYTOMETRY/TC ADD-ON: CPT | Performed by: INTERNAL MEDICINE

## 2021-01-01 PROCEDURE — 87636 SARSCOV2 & INF A&B AMP PRB: CPT | Performed by: FAMILY MEDICINE

## 2021-01-01 PROCEDURE — 84132 ASSAY OF SERUM POTASSIUM: CPT | Performed by: INTERNAL MEDICINE

## 2021-01-01 PROCEDURE — 87040 BLOOD CULTURE FOR BACTERIA: CPT | Performed by: FAMILY MEDICINE

## 2021-01-01 PROCEDURE — 93010 ELECTROCARDIOGRAM REPORT: CPT | Performed by: INTERNAL MEDICINE

## 2021-01-01 PROCEDURE — 97162 PT EVAL MOD COMPLEX 30 MIN: CPT

## 2021-01-01 PROCEDURE — 85055 RETICULATED PLATELET ASSAY: CPT | Performed by: INTERNAL MEDICINE

## 2021-01-01 PROCEDURE — 81003 URINALYSIS AUTO W/O SCOPE: CPT | Performed by: EMERGENCY MEDICINE

## 2021-01-01 PROCEDURE — 70450 CT HEAD/BRAIN W/O DYE: CPT

## 2021-01-01 PROCEDURE — 85046 RETICYTE/HGB CONCENTRATE: CPT | Performed by: INTERNAL MEDICINE

## 2021-01-01 PROCEDURE — 82746 ASSAY OF FOLIC ACID SERUM: CPT | Performed by: INTERNAL MEDICINE

## 2021-01-01 PROCEDURE — 83735 ASSAY OF MAGNESIUM: CPT | Performed by: EMERGENCY MEDICINE

## 2021-01-01 PROCEDURE — 82947 ASSAY GLUCOSE BLOOD QUANT: CPT | Performed by: PSYCHIATRY & NEUROLOGY

## 2021-01-01 PROCEDURE — 25010000002 VANCOMYCIN: Performed by: FAMILY MEDICINE

## 2021-01-01 PROCEDURE — 84484 ASSAY OF TROPONIN QUANT: CPT | Performed by: NURSE PRACTITIONER

## 2021-01-01 PROCEDURE — 84466 ASSAY OF TRANSFERRIN: CPT | Performed by: INTERNAL MEDICINE

## 2021-01-01 RX ORDER — PANTOPRAZOLE SODIUM 40 MG/1
40 TABLET, DELAYED RELEASE ORAL DAILY
Status: DISCONTINUED | OUTPATIENT
Start: 2021-01-01 | End: 2021-01-01 | Stop reason: HOSPADM

## 2021-01-01 RX ORDER — MAGNESIUM SULFATE HEPTAHYDRATE 40 MG/ML
2 INJECTION, SOLUTION INTRAVENOUS AS NEEDED
Status: DISCONTINUED | OUTPATIENT
Start: 2021-01-01 | End: 2021-01-01 | Stop reason: HOSPADM

## 2021-01-01 RX ORDER — MAGNESIUM SULFATE HEPTAHYDRATE 40 MG/ML
4 INJECTION, SOLUTION INTRAVENOUS AS NEEDED
Status: DISCONTINUED | OUTPATIENT
Start: 2021-01-01 | End: 2021-01-01 | Stop reason: HOSPADM

## 2021-01-01 RX ORDER — ARIPIPRAZOLE 2 MG/1
12.5 TABLET ORAL
Start: 2021-01-01 | End: 2021-01-01 | Stop reason: HOSPADM

## 2021-01-01 RX ORDER — DEXTROSE MONOHYDRATE 25 G/50ML
25 INJECTION, SOLUTION INTRAVENOUS
Status: DISCONTINUED | OUTPATIENT
Start: 2021-01-01 | End: 2021-01-01 | Stop reason: HOSPADM

## 2021-01-01 RX ORDER — SODIUM CHLORIDE 0.9 % (FLUSH) 0.9 %
10 SYRINGE (ML) INJECTION AS NEEDED
Status: DISCONTINUED | OUTPATIENT
Start: 2021-01-01 | End: 2021-01-01 | Stop reason: HOSPADM

## 2021-01-01 RX ORDER — ESCITALOPRAM OXALATE 10 MG/1
10 TABLET ORAL DAILY
Status: DISCONTINUED | OUTPATIENT
Start: 2021-01-01 | End: 2021-01-01 | Stop reason: HOSPADM

## 2021-01-01 RX ORDER — MELATONIN
5000 DAILY
Status: DISCONTINUED | OUTPATIENT
Start: 2021-01-01 | End: 2021-01-01 | Stop reason: HOSPADM

## 2021-01-01 RX ORDER — ARIPIPRAZOLE 15 MG/1
15 TABLET ORAL
Status: DISCONTINUED | OUTPATIENT
Start: 2021-01-01 | End: 2021-01-01

## 2021-01-01 RX ORDER — CEPHALEXIN 500 MG/1
500 CAPSULE ORAL 3 TIMES DAILY
COMMUNITY
End: 2021-01-01 | Stop reason: HOSPADM

## 2021-01-01 RX ORDER — POTASSIUM CHLORIDE 1.5 G/1.77G
40 POWDER, FOR SOLUTION ORAL AS NEEDED
Status: DISCONTINUED | OUTPATIENT
Start: 2021-01-01 | End: 2021-01-01 | Stop reason: HOSPADM

## 2021-01-01 RX ORDER — RISPERIDONE 3 MG/1
3 TABLET ORAL DAILY
COMMUNITY
End: 2021-01-01 | Stop reason: HOSPADM

## 2021-01-01 RX ORDER — MULTIPLE VITAMINS W/ MINERALS TAB 9MG-400MCG
1 TAB ORAL DAILY
COMMUNITY

## 2021-01-01 RX ORDER — MULTIPLE VITAMINS W/ MINERALS TAB 9MG-400MCG
1 TAB ORAL DAILY
Status: DISCONTINUED | OUTPATIENT
Start: 2021-01-01 | End: 2021-01-01 | Stop reason: HOSPADM

## 2021-01-01 RX ORDER — LACTULOSE 10 G/15ML
30 SOLUTION ORAL 3 TIMES DAILY
Start: 2021-01-01

## 2021-01-01 RX ORDER — RISPERIDONE 1 MG/1
1 TABLET ORAL
Status: COMPLETED | OUTPATIENT
Start: 2021-01-01 | End: 2021-01-01

## 2021-01-01 RX ORDER — LORAZEPAM 2 MG/ML
1 INJECTION INTRAMUSCULAR ONCE
Status: DISCONTINUED | OUTPATIENT
Start: 2021-01-01 | End: 2021-01-01

## 2021-01-01 RX ORDER — ALUMINA, MAGNESIA, AND SIMETHICONE 2400; 2400; 240 MG/30ML; MG/30ML; MG/30ML
15 SUSPENSION ORAL EVERY 6 HOURS PRN
Status: DISCONTINUED | OUTPATIENT
Start: 2021-01-01 | End: 2021-01-01 | Stop reason: HOSPADM

## 2021-01-01 RX ORDER — ARIPIPRAZOLE 15 MG/1
15 TABLET ORAL
Status: DISCONTINUED | OUTPATIENT
Start: 2021-01-01 | End: 2021-01-01 | Stop reason: HOSPADM

## 2021-01-01 RX ORDER — ONDANSETRON 4 MG/1
4 TABLET, ORALLY DISINTEGRATING ORAL EVERY 6 HOURS PRN
Qty: 10 TABLET | Refills: 0 | Status: SHIPPED | OUTPATIENT
Start: 2021-01-01 | End: 2021-01-01 | Stop reason: HOSPADM

## 2021-01-01 RX ORDER — CEPHALEXIN 500 MG/1
500 CAPSULE ORAL 3 TIMES DAILY
Qty: 21 CAPSULE | Refills: 0 | Status: ON HOLD | OUTPATIENT
Start: 2021-01-01 | End: 2021-01-01

## 2021-01-01 RX ORDER — LACTULOSE 10 G/15ML
30 SOLUTION ORAL 3 TIMES DAILY
Status: DISCONTINUED | OUTPATIENT
Start: 2021-01-01 | End: 2021-01-01 | Stop reason: ALTCHOICE

## 2021-01-01 RX ORDER — LACTULOSE 10 G/15ML
30 SOLUTION ORAL 3 TIMES DAILY
Status: DISCONTINUED | OUTPATIENT
Start: 2021-01-01 | End: 2021-01-01 | Stop reason: HOSPADM

## 2021-01-01 RX ORDER — POTASSIUM CHLORIDE 7.45 MG/ML
10 INJECTION INTRAVENOUS
Status: DISCONTINUED | OUTPATIENT
Start: 2021-01-01 | End: 2021-01-01 | Stop reason: HOSPADM

## 2021-01-01 RX ORDER — ONDANSETRON 4 MG/1
4 TABLET, FILM COATED ORAL EVERY 8 HOURS PRN
COMMUNITY

## 2021-01-01 RX ORDER — ARIPIPRAZOLE 5 MG/1
5 TABLET ORAL
Status: COMPLETED | OUTPATIENT
Start: 2021-01-01 | End: 2021-01-01

## 2021-01-01 RX ORDER — FERROUS SULFATE TAB EC 324 MG (65 MG FE EQUIVALENT) 324 (65 FE) MG
324 TABLET DELAYED RESPONSE ORAL
Status: DISCONTINUED | OUTPATIENT
Start: 2021-01-01 | End: 2021-01-01

## 2021-01-01 RX ORDER — ARIPIPRAZOLE 10 MG/1
10 TABLET ORAL
Status: DISCONTINUED | OUTPATIENT
Start: 2021-01-01 | End: 2021-01-01

## 2021-01-01 RX ORDER — ARIPIPRAZOLE 15 MG/1
15 TABLET ORAL
Start: 2021-01-01

## 2021-01-01 RX ORDER — FERROUS SULFATE TAB EC 324 MG (65 MG FE EQUIVALENT) 324 (65 FE) MG
324 TABLET DELAYED RESPONSE ORAL
Status: DISCONTINUED | OUTPATIENT
Start: 2021-01-01 | End: 2021-01-01 | Stop reason: HOSPADM

## 2021-01-01 RX ORDER — POTASSIUM CHLORIDE 750 MG/1
10 TABLET, FILM COATED, EXTENDED RELEASE ORAL 2 TIMES DAILY
Qty: 20 TABLET | Refills: 0 | Status: ON HOLD | OUTPATIENT
Start: 2021-01-01 | End: 2021-01-01

## 2021-01-01 RX ORDER — NICOTINE POLACRILEX 4 MG
15 LOZENGE BUCCAL
Status: DISCONTINUED | OUTPATIENT
Start: 2021-01-01 | End: 2021-01-01 | Stop reason: HOSPADM

## 2021-01-01 RX ORDER — RISPERIDONE 1 MG/1
2 TABLET ORAL
Status: COMPLETED | OUTPATIENT
Start: 2021-01-01 | End: 2021-01-01

## 2021-01-01 RX ORDER — SODIUM CHLORIDE 9 MG/ML
50 INJECTION, SOLUTION INTRAVENOUS CONTINUOUS
Status: DISCONTINUED | OUTPATIENT
Start: 2021-01-01 | End: 2021-01-01

## 2021-01-01 RX ORDER — DOCUSATE SODIUM 100 MG/1
200 CAPSULE, LIQUID FILLED ORAL ONCE
Status: DISCONTINUED | OUTPATIENT
Start: 2021-01-01 | End: 2021-01-01 | Stop reason: HOSPADM

## 2021-01-01 RX ORDER — TRAZODONE HYDROCHLORIDE 50 MG/1
50 TABLET ORAL NIGHTLY PRN
Status: DISCONTINUED | OUTPATIENT
Start: 2021-01-01 | End: 2021-01-01 | Stop reason: HOSPADM

## 2021-01-01 RX ORDER — FERROUS SULFATE 325(65) MG
325 TABLET ORAL
COMMUNITY

## 2021-01-01 RX ORDER — LACTULOSE 10 G/15ML
60 SOLUTION ORAL EVERY 4 HOURS
Status: DISCONTINUED | OUTPATIENT
Start: 2021-01-01 | End: 2021-01-01

## 2021-01-01 RX ORDER — ONDANSETRON 2 MG/ML
4 INJECTION INTRAMUSCULAR; INTRAVENOUS EVERY 6 HOURS PRN
Status: DISCONTINUED | OUTPATIENT
Start: 2021-01-01 | End: 2021-01-01 | Stop reason: HOSPADM

## 2021-01-01 RX ORDER — HYDROXYZINE PAMOATE 50 MG/1
50 CAPSULE ORAL EVERY 6 HOURS PRN
Status: DISCONTINUED | OUTPATIENT
Start: 2021-01-01 | End: 2021-01-01 | Stop reason: HOSPADM

## 2021-01-01 RX ORDER — POTASSIUM CHLORIDE 750 MG/1
40 CAPSULE, EXTENDED RELEASE ORAL AS NEEDED
Status: DISCONTINUED | OUTPATIENT
Start: 2021-01-01 | End: 2021-01-01 | Stop reason: HOSPADM

## 2021-01-01 RX ORDER — ONDANSETRON 4 MG/1
4 TABLET, FILM COATED ORAL EVERY 6 HOURS PRN
Status: DISCONTINUED | OUTPATIENT
Start: 2021-01-01 | End: 2021-01-01 | Stop reason: HOSPADM

## 2021-01-01 RX ORDER — RISPERIDONE 1 MG/1
3 TABLET ORAL
Status: DISCONTINUED | OUTPATIENT
Start: 2021-01-01 | End: 2021-01-01

## 2021-01-01 RX ORDER — LORAZEPAM 0.5 MG/1
0.5 TABLET ORAL EVERY 8 HOURS PRN
COMMUNITY

## 2021-01-01 RX ORDER — LACTULOSE 10 G/15ML
30 SOLUTION ORAL 4 TIMES DAILY
Status: DISCONTINUED | OUTPATIENT
Start: 2021-01-01 | End: 2021-01-01

## 2021-01-01 RX ORDER — ANORECTAL OINTMENT 15.7; .44; 24; 20.6 G/100G; G/100G; G/100G; G/100G
OINTMENT TOPICAL EVERY 12 HOURS
COMMUNITY

## 2021-01-01 RX ORDER — MULTIPLE VITAMINS W/ MINERALS TAB 9MG-400MCG
1 TAB ORAL DAILY
Status: DISCONTINUED | OUTPATIENT
Start: 2021-01-01 | End: 2021-01-01

## 2021-01-01 RX ORDER — CEPHALEXIN 500 MG/1
500 CAPSULE ORAL ONCE
Status: COMPLETED | OUTPATIENT
Start: 2021-01-01 | End: 2021-01-01

## 2021-01-01 RX ORDER — SODIUM CHLORIDE 0.9 % (FLUSH) 0.9 %
10 SYRINGE (ML) INJECTION EVERY 12 HOURS SCHEDULED
Status: DISCONTINUED | OUTPATIENT
Start: 2021-01-01 | End: 2021-01-01 | Stop reason: HOSPADM

## 2021-01-01 RX ORDER — CALCIUM CARBONATE 200(500)MG
2 TABLET,CHEWABLE ORAL 2 TIMES DAILY PRN
Status: DISCONTINUED | OUTPATIENT
Start: 2021-01-01 | End: 2021-01-01 | Stop reason: HOSPADM

## 2021-01-01 RX ADMIN — ESCITALOPRAM OXALATE 10 MG: 10 TABLET ORAL at 08:45

## 2021-01-01 RX ADMIN — AZTREONAM 2 G: 2 INJECTION, POWDER, LYOPHILIZED, FOR SOLUTION INTRAMUSCULAR; INTRAVENOUS at 19:03

## 2021-01-01 RX ADMIN — FERROUS SULFATE TAB EC 324 MG (65 MG FE EQUIVALENT) 324 MG: 324 (65 FE) TABLET DELAYED RESPONSE at 10:53

## 2021-01-01 RX ADMIN — LACTULOSE 30 G: 10 SOLUTION ORAL at 09:22

## 2021-01-01 RX ADMIN — RISPERIDONE 1 MG: 1 TABLET ORAL at 17:25

## 2021-01-01 RX ADMIN — ARIPIPRAZOLE 10 MG: 10 TABLET ORAL at 17:25

## 2021-01-01 RX ADMIN — SODIUM CHLORIDE, PRESERVATIVE FREE 10 ML: 5 INJECTION INTRAVENOUS at 22:55

## 2021-01-01 RX ADMIN — LACTULOSE 30 G: 10 SOLUTION ORAL at 17:00

## 2021-01-01 RX ADMIN — ARIPIPRAZOLE 15 MG: 15 TABLET ORAL at 17:15

## 2021-01-01 RX ADMIN — PANTOPRAZOLE SODIUM 40 MG: 40 TABLET, DELAYED RELEASE ORAL at 09:46

## 2021-01-01 RX ADMIN — PANTOPRAZOLE SODIUM 40 MG: 40 TABLET, DELAYED RELEASE ORAL at 08:52

## 2021-01-01 RX ADMIN — POTASSIUM CHLORIDE 10 MEQ: 7.46 INJECTION, SOLUTION INTRAVENOUS at 04:33

## 2021-01-01 RX ADMIN — ARIPIPRAZOLE 10 MG: 10 TABLET ORAL at 17:43

## 2021-01-01 RX ADMIN — Medication 1 TABLET: at 08:24

## 2021-01-01 RX ADMIN — SODIUM CHLORIDE 125 ML/HR: 900 INJECTION, SOLUTION INTRAVENOUS at 17:38

## 2021-01-01 RX ADMIN — Medication 5000 UNITS: at 08:24

## 2021-01-01 RX ADMIN — LACTULOSE 60 ML: 10 SOLUTION ORAL at 08:20

## 2021-01-01 RX ADMIN — CEFEPIME HYDROCHLORIDE 2 G: 2 INJECTION, POWDER, FOR SOLUTION INTRAVENOUS at 11:39

## 2021-01-01 RX ADMIN — SODIUM CHLORIDE, PRESERVATIVE FREE 10 ML: 5 INJECTION INTRAVENOUS at 21:46

## 2021-01-01 RX ADMIN — Medication 1 TABLET: at 08:36

## 2021-01-01 RX ADMIN — ESCITALOPRAM OXALATE 10 MG: 10 TABLET ORAL at 08:24

## 2021-01-01 RX ADMIN — Medication 1 TABLET: at 08:53

## 2021-01-01 RX ADMIN — ARIPIPRAZOLE 12.5 MG: 10 TABLET ORAL at 18:16

## 2021-01-01 RX ADMIN — LACTULOSE 30 G: 10 SOLUTION ORAL at 21:16

## 2021-01-01 RX ADMIN — Medication 5000 UNITS: at 08:46

## 2021-01-01 RX ADMIN — POTASSIUM CHLORIDE 10 MEQ: 7.46 INJECTION, SOLUTION INTRAVENOUS at 02:13

## 2021-01-01 RX ADMIN — ESCITALOPRAM OXALATE 10 MG: 10 TABLET ORAL at 07:52

## 2021-01-01 RX ADMIN — Medication 5000 UNITS: at 09:21

## 2021-01-01 RX ADMIN — Medication 5000 UNITS: at 09:32

## 2021-01-01 RX ADMIN — ARIPIPRAZOLE 12.5 MG: 10 TABLET ORAL at 21:16

## 2021-01-01 RX ADMIN — ESCITALOPRAM OXALATE 10 MG: 10 TABLET ORAL at 08:48

## 2021-01-01 RX ADMIN — ARIPIPRAZOLE 15 MG: 15 TABLET ORAL at 20:28

## 2021-01-01 RX ADMIN — LACTULOSE 30 G: 10 SOLUTION ORAL at 08:46

## 2021-01-01 RX ADMIN — VANCOMYCIN HYDROCHLORIDE 1250 MG: 10 INJECTION, POWDER, LYOPHILIZED, FOR SOLUTION INTRAVENOUS at 20:36

## 2021-01-01 RX ADMIN — POTASSIUM CHLORIDE 40 MEQ: 1.5 POWDER, FOR SOLUTION ORAL at 10:45

## 2021-01-01 RX ADMIN — ARIPIPRAZOLE 15 MG: 15 TABLET ORAL at 17:00

## 2021-01-01 RX ADMIN — FERROUS SULFATE TAB EC 324 MG (65 MG FE EQUIVALENT) 324 MG: 324 (65 FE) TABLET DELAYED RESPONSE at 09:46

## 2021-01-01 RX ADMIN — SODIUM CHLORIDE 125 ML/HR: 900 INJECTION, SOLUTION INTRAVENOUS at 22:35

## 2021-01-01 RX ADMIN — PHYTONADIONE 10 MG: 10 INJECTION, EMULSION INTRAMUSCULAR; INTRAVENOUS; SUBCUTANEOUS at 18:16

## 2021-01-01 RX ADMIN — FERROUS SULFATE TAB EC 324 MG (65 MG FE EQUIVALENT) 324 MG: 324 (65 FE) TABLET DELAYED RESPONSE at 09:33

## 2021-01-01 RX ADMIN — Medication 1 TABLET: at 09:22

## 2021-01-01 RX ADMIN — FERROUS SULFATE TAB EC 324 MG (65 MG FE EQUIVALENT) 324 MG: 324 (65 FE) TABLET DELAYED RESPONSE at 08:36

## 2021-01-01 RX ADMIN — ARIPIPRAZOLE 5 MG: 5 TABLET ORAL at 18:21

## 2021-01-01 RX ADMIN — CEFEPIME HYDROCHLORIDE 2 G: 2 INJECTION, POWDER, FOR SOLUTION INTRAVENOUS at 09:55

## 2021-01-01 RX ADMIN — ESCITALOPRAM OXALATE 10 MG: 10 TABLET ORAL at 09:33

## 2021-01-01 RX ADMIN — SODIUM CHLORIDE 50 ML/HR: 900 INJECTION, SOLUTION INTRAVENOUS at 02:22

## 2021-01-01 RX ADMIN — ESCITALOPRAM OXALATE 10 MG: 10 TABLET ORAL at 08:52

## 2021-01-01 RX ADMIN — LACTULOSE 30 G: 10 SOLUTION ORAL at 20:34

## 2021-01-01 RX ADMIN — Medication 5000 UNITS: at 08:52

## 2021-01-01 RX ADMIN — LACTULOSE 60 ML: 10 SOLUTION ORAL at 03:22

## 2021-01-01 RX ADMIN — PANTOPRAZOLE SODIUM 40 MG: 40 TABLET, DELAYED RELEASE ORAL at 09:22

## 2021-01-01 RX ADMIN — Medication 1 TABLET: at 08:52

## 2021-01-01 RX ADMIN — PANTOPRAZOLE SODIUM 40 MG: 40 TABLET, DELAYED RELEASE ORAL at 08:53

## 2021-01-01 RX ADMIN — PANTOPRAZOLE SODIUM 40 MG: 40 TABLET, DELAYED RELEASE ORAL at 07:51

## 2021-01-01 RX ADMIN — METFORMIN HYDROCHLORIDE 500 MG: 500 TABLET ORAL at 10:53

## 2021-01-01 RX ADMIN — Medication 1 TABLET: at 07:51

## 2021-01-01 RX ADMIN — POTASSIUM CHLORIDE 10 MEQ: 7.46 INJECTION, SOLUTION INTRAVENOUS at 03:25

## 2021-01-01 RX ADMIN — SODIUM CHLORIDE, PRESERVATIVE FREE 10 ML: 5 INJECTION INTRAVENOUS at 09:22

## 2021-01-01 RX ADMIN — LACTULOSE 30 G: 10 SOLUTION ORAL at 20:28

## 2021-01-01 RX ADMIN — LACTULOSE 30 G: 10 SOLUTION ORAL at 10:52

## 2021-01-01 RX ADMIN — SODIUM CHLORIDE 125 ML/HR: 900 INJECTION, SOLUTION INTRAVENOUS at 02:20

## 2021-01-01 RX ADMIN — ESCITALOPRAM OXALATE 10 MG: 10 TABLET ORAL at 08:37

## 2021-01-01 RX ADMIN — Medication 5000 UNITS: at 07:51

## 2021-01-01 RX ADMIN — Medication 5000 UNITS: at 08:45

## 2021-01-01 RX ADMIN — PANTOPRAZOLE SODIUM 40 MG: 40 TABLET, DELAYED RELEASE ORAL at 08:37

## 2021-01-01 RX ADMIN — LACTULOSE 60 ML: 10 SOLUTION ORAL at 22:54

## 2021-01-01 RX ADMIN — PANTOPRAZOLE SODIUM 40 MG: 40 TABLET, DELAYED RELEASE ORAL at 09:33

## 2021-01-01 RX ADMIN — FERROUS SULFATE TAB EC 324 MG (65 MG FE EQUIVALENT) 324 MG: 324 (65 FE) TABLET DELAYED RESPONSE at 08:52

## 2021-01-01 RX ADMIN — LACTULOSE 30 G: 10 SOLUTION ORAL at 16:52

## 2021-01-01 RX ADMIN — LACTULOSE 30 G: 10 SOLUTION ORAL at 17:24

## 2021-01-01 RX ADMIN — LACTULOSE 30 G: 10 SOLUTION ORAL at 08:52

## 2021-01-01 RX ADMIN — SODIUM CHLORIDE, PRESERVATIVE FREE 10 ML: 5 INJECTION INTRAVENOUS at 20:57

## 2021-01-01 RX ADMIN — PANTOPRAZOLE SODIUM 40 MG: 40 TABLET, DELAYED RELEASE ORAL at 08:46

## 2021-01-01 RX ADMIN — PANTOPRAZOLE SODIUM 40 MG: 40 TABLET, DELAYED RELEASE ORAL at 08:45

## 2021-01-01 RX ADMIN — CEPHALEXIN 500 MG: 500 CAPSULE ORAL at 23:10

## 2021-01-01 RX ADMIN — CEFEPIME HYDROCHLORIDE 2 G: 2 INJECTION, POWDER, FOR SOLUTION INTRAVENOUS at 22:35

## 2021-01-01 RX ADMIN — LACTULOSE 30 G: 10 SOLUTION ORAL at 08:37

## 2021-01-01 RX ADMIN — RISPERIDONE 2 MG: 1 TABLET ORAL at 18:20

## 2021-01-01 RX ADMIN — POTASSIUM CHLORIDE 40 MEQ: 1.5 POWDER, FOR SOLUTION ORAL at 06:39

## 2021-01-01 RX ADMIN — Medication 1 TABLET: at 09:55

## 2021-01-01 RX ADMIN — Medication 5000 UNITS: at 08:36

## 2021-01-01 RX ADMIN — PANTOPRAZOLE SODIUM 40 MG: 40 TABLET, DELAYED RELEASE ORAL at 10:53

## 2021-01-01 RX ADMIN — METFORMIN HYDROCHLORIDE 500 MG: 500 TABLET ORAL at 08:51

## 2021-01-01 RX ADMIN — Medication 1 TABLET: at 10:53

## 2021-01-01 RX ADMIN — Medication 5000 UNITS: at 09:45

## 2021-01-01 RX ADMIN — Medication 5000 UNITS: at 10:53

## 2021-01-01 RX ADMIN — SODIUM CHLORIDE 125 ML/HR: 900 INJECTION, SOLUTION INTRAVENOUS at 09:55

## 2021-01-01 RX ADMIN — MAGNESIUM SULFATE 4 G: 4 INJECTION INTRAVENOUS at 01:21

## 2021-01-01 RX ADMIN — PANTOPRAZOLE SODIUM 40 MG: 40 TABLET, DELAYED RELEASE ORAL at 08:24

## 2021-01-01 RX ADMIN — ARIPIPRAZOLE 12.5 MG: 10 TABLET ORAL at 17:37

## 2021-01-01 RX ADMIN — SODIUM CHLORIDE 125 ML/HR: 900 INJECTION, SOLUTION INTRAVENOUS at 18:54

## 2021-01-01 RX ADMIN — CEFEPIME HYDROCHLORIDE 2 G: 2 INJECTION, POWDER, FOR SOLUTION INTRAVENOUS at 21:45

## 2021-01-01 RX ADMIN — SODIUM CHLORIDE, PRESERVATIVE FREE 10 ML: 5 INJECTION INTRAVENOUS at 02:13

## 2021-01-01 RX ADMIN — SODIUM CHLORIDE 500 ML: 9 INJECTION, SOLUTION INTRAVENOUS at 17:37

## 2021-01-01 RX ADMIN — POTASSIUM CHLORIDE 10 MEQ: 7.46 INJECTION, SOLUTION INTRAVENOUS at 01:11

## 2021-01-01 RX ADMIN — LACTULOSE 30 G: 10 SOLUTION ORAL at 17:15

## 2021-01-01 RX ADMIN — Medication 1 TABLET: at 08:46

## 2021-01-01 RX ADMIN — ESCITALOPRAM OXALATE 10 MG: 10 TABLET ORAL at 09:21

## 2021-01-01 RX ADMIN — ESCITALOPRAM OXALATE 10 MG: 10 TABLET ORAL at 10:53

## 2021-01-01 RX ADMIN — Medication 1 TABLET: at 08:45

## 2021-01-01 RX ADMIN — ESCITALOPRAM OXALATE 10 MG: 10 TABLET ORAL at 08:53

## 2021-01-01 RX ADMIN — METFORMIN HYDROCHLORIDE 500 MG: 500 TABLET ORAL at 08:36

## 2021-01-01 RX ADMIN — LACTULOSE 30 G: 10 SOLUTION ORAL at 08:45

## 2021-01-01 RX ADMIN — PHYTONADIONE 10 MG: 10 INJECTION, EMULSION INTRAMUSCULAR; INTRAVENOUS; SUBCUTANEOUS at 17:43

## 2021-01-01 RX ADMIN — SODIUM CHLORIDE, PRESERVATIVE FREE 10 ML: 5 INJECTION INTRAVENOUS at 21:18

## 2021-01-01 RX ADMIN — FERROUS SULFATE TAB EC 324 MG (65 MG FE EQUIVALENT) 324 MG: 324 (65 FE) TABLET DELAYED RESPONSE at 08:53

## 2021-01-01 RX ADMIN — ESCITALOPRAM OXALATE 10 MG: 10 TABLET ORAL at 09:46

## 2021-01-01 RX ADMIN — METFORMIN HYDROCHLORIDE 500 MG: 500 TABLET ORAL at 17:00

## 2021-01-01 RX ADMIN — SODIUM CHLORIDE 125 ML/HR: 900 INJECTION, SOLUTION INTRAVENOUS at 09:07

## 2021-01-01 RX ADMIN — RISPERIDONE 3 MG: 1 TABLET ORAL at 21:57

## 2021-01-01 RX ADMIN — Medication 1 TABLET: at 09:46

## 2021-01-01 RX ADMIN — Medication 5000 UNITS: at 08:53

## 2021-01-01 RX ADMIN — LACTULOSE 30 G: 10 SOLUTION ORAL at 20:24

## 2021-01-16 NOTE — ED PROVIDER NOTES
"Subjective   Patient presents to the ER via EMS for difficulty waking up. Patient states she is here because they kept \"harrassing me until I agreed to come\". She states she does not hurt anywhere, denies CP, SOB, headache. Reports she is blind. Patient is oriented to person, place, and time. Had long discussion about upcoming birthday with all answers appropriate. Patient states her brother is her power of . She states she is ambulatory at home. Able to give correct address and discuss her living situations. Patient state she does not want to be stuck with a needle of any kind and refuses lab work. She states she has been having urinary frequency and agrees to give a urine sample at this time.           Review of Systems   Constitutional: Negative for activity change, appetite change, chills, fatigue and fever.   Respiratory: Negative for cough and shortness of breath.    Cardiovascular: Positive for leg swelling. Negative for chest pain and palpitations.   Gastrointestinal: Negative for abdominal pain, nausea and vomiting.   Genitourinary: Positive for frequency. Negative for dysuria and pelvic pain.   Musculoskeletal: Negative.    Skin: Negative.    Neurological: Negative.    Psychiatric/Behavioral: Negative.        Past Medical History:   Diagnosis Date   • Depression    • Psychiatric illness    • Schizoaffective disorder (CMS/HCC)        No Known Allergies    Past Surgical History:   Procedure Laterality Date   • COLONOSCOPY N/A 8/29/2020    Procedure: COLONOSCOPY;  Surgeon: Ge Gorman MD;  Location: [facility];  Service: Gastroenterology;  Laterality: N/A;   • ENDOSCOPY N/A 8/28/2020    Procedure: ESOPHAGOGASTRODUODENOSCOPY;  Surgeon: Ge Gorman MD;  Location: [facility];  Service: Gastroenterology;  Laterality: N/A;       Family History   Problem Relation Age of Onset   • Dementia Mother    • No Known Problems Father    • No Known Problems Sister    • No Known Problems Brother    • No " "Known Problems Maternal Aunt    • No Known Problems Paternal Aunt    • No Known Problems Maternal Uncle    • No Known Problems Paternal Uncle    • No Known Problems Maternal Grandfather    • No Known Problems Maternal Grandmother    • No Known Problems Paternal Grandfather    • No Known Problems Paternal Grandmother    • No Known Problems Cousin    • No Known Problems Other    • Suicide Attempts Neg Hx    • ADD / ADHD Neg Hx    • Alcohol abuse Neg Hx    • Anxiety disorder Neg Hx    • Bipolar disorder Neg Hx    • Depression Neg Hx    • Drug abuse Neg Hx    • OCD Neg Hx    • Paranoid behavior Neg Hx    • Schizophrenia Neg Hx    • Seizures Neg Hx    • Self-Injurious Behavior  Neg Hx        Social History     Socioeconomic History   • Marital status: Legally      Spouse name: Not on file   • Number of children: Not on file   • Years of education: Not on file   • Highest education level: Not on file   Tobacco Use   • Smoking status: Former Smoker   • Smokeless tobacco: Never Used   • Tobacco comment: \" a little bit a long time ago\"   Substance and Sexual Activity   • Alcohol use: Not Currently   • Drug use: Never   • Sexual activity: Not Currently           Objective    /69   Pulse 94   Temp 98.6 °F (37 °C) (Oral)   Resp 18   LMP  (LMP Unknown)   SpO2 98%     Physical Exam  Vitals signs and nursing note reviewed.   Constitutional:       General: She is not in acute distress.     Appearance: She is well-developed. She is not ill-appearing or toxic-appearing.   HENT:      Head: Normocephalic and atraumatic.   Eyes:      Comments: blind   Neck:      Musculoskeletal: Normal range of motion and neck supple.   Cardiovascular:      Rate and Rhythm: Normal rate and regular rhythm.      Heart sounds: Normal heart sounds. No murmur.      Comments: Bilateral lower extremities with +2 pitting edema, no redness, no tenderness  Pulmonary:      Effort: Pulmonary effort is normal. No respiratory distress.      Breath " sounds: Normal breath sounds. No wheezing.   Abdominal:      General: Bowel sounds are normal. There is no distension.      Palpations: Abdomen is soft.      Tenderness: There is no abdominal tenderness.   Musculoskeletal: Normal range of motion.   Skin:     General: Skin is warm and dry.      Capillary Refill: Capillary refill takes less than 2 seconds.   Neurological:      Mental Status: She is alert and oriented to person, place, and time.      Coordination: Coordination normal.   Psychiatric:         Behavior: Behavior normal.         Thought Content: Thought content normal.         Judgment: Judgment normal.         Procedures           ED Course  ED Course as of Christofer 15 2308   Fri Christofer 15, 2021   2021 Spoke with Brother Lio who states when he went to check on patient today she was very hard to wake up. He states EMS had to shake her really hard to get her to respond. He states she has been having swelling to her legs and feet over the past several weeks. He states this happened last week where she was hard to wake up but once EMS got there and she started responding to them she refused transport and further treatment. He states when she is awake she is oriented and at baseline. He states patient has not complained of any pain to him. Informed brother that patient is awake and oriented at this time and is refusing all treatment except for a UA at this time. He verbalized understanding.     [SH]   2047 In to speak with patient about swelling in legs and need for lab work. Patient verbalized understanding and agrees to IV stick with blood draw along with EKG at this time.     [SH]   2100 Patient discussed with and reviewed with Dr. Dias for patient hand off and continuation of care.     [SH]   2308 Findings were discussed with patient, who is eager to go home.  She currently does not have any complaints.    [CB]      ED Course User Index  [CB] Koko Dias MD  [SH] Marian Lagunas APRN                    Labs Reviewed   URINALYSIS W/ CULTURE IF INDICATED - Abnormal; Notable for the following components:       Result Value    Appearance, UA Cloudy (*)     Urobilinogen, UA 4.0 E.U./dL (*)     All other components within normal limits    Narrative:     Urine microscopic not indicated.   COMPREHENSIVE METABOLIC PANEL - Abnormal; Notable for the following components:    Glucose 171 (*)     Potassium 3.3 (*)     Albumin 1.80 (*)     ALT (SGPT) 58 (*)     AST (SGOT) 68 (*)     All other components within normal limits    Narrative:     GFR Normal >60  Chronic Kidney Disease <60  Kidney Failure <15     PROTIME-INR - Abnormal; Notable for the following components:    Protime 17.2 (*)     INR 1.33 (*)     All other components within normal limits    Narrative:     Therapeutic range for most indications is 2.0-3.0 INR,  or 2.5-3.5 for mechanical heart valves.   CBC WITH AUTO DIFFERENTIAL - Abnormal; Notable for the following components:    RBC 3.57 (*)     Hemoglobin 11.1 (*)     Hematocrit 33.5 (*)     RDW 19.7 (*)     RDW-SD 67.7 (*)     Platelets 68 (*)     All other components within normal limits   TROPONIN (IN-HOUSE) - Normal    Narrative:     Troponin T Reference Range:  <= 0.03 ng/mL-   Negative for AMI  >0.03 ng/mL-     Abnormal for myocardial necrosis.  Clinicians would have to utilize clinical acumen, EKG, Troponin and serial changes to determine if it is an Acute Myocardial Infarction or myocardial injury due to an underlying chronic condition.       Results may be falsely decreased if patient taking Biotin.     BNP (IN-HOUSE) - Normal    Narrative:     Among patients with dyspnea, NT-proBNP is highly sensitive for the detection of acute congestive heart failure. In addition NT-proBNP of <300 pg/ml effectively rules out acute congestive heart failure with 99% negative predictive value.    Results may be falsely decreased if patient taking Biotin.     RAINBOW DRAW    Narrative:     The following orders  were created for panel order Terry Draw.  Procedure                               Abnormality         Status                     ---------                               -----------         ------                     Light Blue Top[[id number]]                                   Final result               Green Top (Gel)[[id number]]                                  Final result               Lavender Top[[id number]]                                     Final result               Gold Top - SST[[id number]]                                   Final result                 Please view results for these tests on the individual orders.   SCAN SLIDE   CBC AND DIFFERENTIAL    Narrative:     The following orders were created for panel order CBC & Differential.  Procedure                               Abnormality         Status                     ---------                               -----------         ------                     Scan Slide[[id number]]                                       Final result               CBC Auto Differential[[id number]]        Abnormal            Final result                 Please view results for these tests on the individual orders.   LIGHT BLUE TOP   GREEN TOP   LAVENDER TOP   GOLD TOP - SST       XR Chest 1 View   Final Result   1.  No acute pulmonary findings.       Electronically signed by:  Jose C Kenney MD  1/15/2021 9:35 PM [facility]   Workstation: [id number]                                       [facility]    Final diagnoses:   Pedal edema   Hypokalemia            Koko Dias MD  01/15/21 2208       Koko Dias MD  01/15/21 6172

## 2021-02-06 NOTE — ED NOTES
I d/w Jamila IVERSONW transportation issue. I called pt's brother to d/w him and make sure pt can get in once she arrives home, however , no answer at this time. I d/w Jennifer KELLEY that I will continue trying to reach pt's brother .

## 2021-02-06 NOTE — ED NOTES
Incont of urine. Cloths changed. Assisted up To BSC, voided and had moderate sized soft formed BM. Assisted back to bed, side rails up, call light placed in patient's hand and shoed patient nurse call button by using her hand to feel call button.     Jennifer Medina, LPN  02/06/21 1712

## 2021-02-06 NOTE — ED NOTES
Brii PARMAR RN CM d/w me she and LUZ IVERSONW did ER CM consult early this morning before my arrival . See their notes. They said pt was agreeable to [facility] for transition visit. I d/w  and Jennifer VELASQUEZ LPN [facility]. I will set up HH .

## 2021-02-06 NOTE — ED PROVIDER NOTES
Subjective   71-year-old female with history of schizoaffective disorder and depression is brought to the emergency department via EMS from home accompanied by her brother with concern for generalized weakness, and foul-smelling urine.  Patient is reportedly blind and has had a longstanding history of psychiatric issues and hospitalization and recently has been managed by [facility].  She reportedly rents a apartment and has home instead Senior care during the day but stays alone at night.  The brother reports checking on her frequently.  Reports tonight he received a phone call from police that she was bothering neighbors by beating on the wall trying to get someone to take her to the bathroom.  He reports she has difficulty ambulating without assistance.  He reports that police advised him to bring her in for evaluation.  He reports that she is not able to take care of herself.  He reports that she was admitted to the psychiatric unit last year and since that time he has been working to try to get emergency guardianship.  He reports that some days she  is agreeable to nursing home placement and other stated she is not.  No known falls or other trauma.  She denies any symptoms.    Family history, surgical history, social history, current medications and allergies are reviewed with the patient and triage documentation and vitals are reviewed.      History provided by:  Patient, relative, medical records and EMS personnel   used: No        Review of Systems   Unable to perform ROS: Psychiatric disorder   Constitutional: Negative for chills and fever.   Respiratory: Negative for shortness of breath.    Cardiovascular: Negative for chest pain.   Genitourinary: Negative for dysuria.        Foul smelling urine   Neurological: Negative for headaches.   Psychiatric/Behavioral: Positive for behavioral problems and confusion.       Past Medical History:   Diagnosis Date   • Depression    • Psychiatric  "illness    • Schizoaffective disorder (CMS/HCC)        No Known Allergies    Past Surgical History:   Procedure Laterality Date   • COLONOSCOPY N/A 8/29/2020    Procedure: COLONOSCOPY;  Surgeon: Ge Gorman MD;  Location: [facility];  Service: Gastroenterology;  Laterality: N/A;   • ENDOSCOPY N/A 8/28/2020    Procedure: ESOPHAGOGASTRODUODENOSCOPY;  Surgeon: Ge Gorman MD;  Location: [facility];  Service: Gastroenterology;  Laterality: N/A;       Family History   Problem Relation Age of Onset   • Dementia Mother    • No Known Problems Father    • No Known Problems Sister    • No Known Problems Brother    • No Known Problems Maternal Aunt    • No Known Problems Paternal Aunt    • No Known Problems Maternal Uncle    • No Known Problems Paternal Uncle    • No Known Problems Maternal Grandfather    • No Known Problems Maternal Grandmother    • No Known Problems Paternal Grandfather    • No Known Problems Paternal Grandmother    • No Known Problems Cousin    • No Known Problems Other    • Suicide Attempts Neg Hx    • ADD / ADHD Neg Hx    • Alcohol abuse Neg Hx    • Anxiety disorder Neg Hx    • Bipolar disorder Neg Hx    • Depression Neg Hx    • Drug abuse Neg Hx    • OCD Neg Hx    • Paranoid behavior Neg Hx    • Schizophrenia Neg Hx    • Seizures Neg Hx    • Self-Injurious Behavior  Neg Hx        Social History     Socioeconomic History   • Marital status: Legally      Spouse name: Not on file   • Number of children: Not on file   • Years of education: Not on file   • Highest education level: Not on file   Tobacco Use   • Smoking status: Former Smoker   • Smokeless tobacco: Never Used   • Tobacco comment: \" a little bit a long time ago\"   Substance and Sexual Activity   • Alcohol use: Not Currently   • Drug use: Never   • Sexual activity: Not Currently           Objective   Physical Exam  Vitals signs and nursing note reviewed.   Constitutional:       General: She is not in acute distress.     " Appearance: She is underweight. She is not ill-appearing, toxic-appearing or diaphoretic.      Comments: dishevealed   HENT:      Head: Normocephalic and atraumatic.      Mouth/Throat:      Mouth: Mucous membranes are dry.   Eyes:      Conjunctiva/sclera: Conjunctivae normal.      Pupils: Pupils are equal, round, and reactive to light.   Neck:      Musculoskeletal: Normal range of motion and neck supple.   Cardiovascular:      Rate and Rhythm: Normal rate and regular rhythm.      Pulses: Normal pulses.      Heart sounds: No murmur.   Pulmonary:      Effort: Pulmonary effort is normal. No respiratory distress.      Breath sounds: Normal breath sounds. No wheezing.   Abdominal:      General: Bowel sounds are normal.      Palpations: Abdomen is soft.   Musculoskeletal: Normal range of motion.   Skin:     General: Skin is warm and dry.      Capillary Refill: Capillary refill takes less than 2 seconds.   Neurological:      General: No focal deficit present.      Mental Status: She is alert. She is confused.      Cranial Nerves: Cranial nerves are intact.      Motor: Motor function is intact.      Comments: Oriented to person and place.   Psychiatric:         Attention and Perception: She is inattentive.         Mood and Affect: Mood normal.         Speech: Speech normal.         Behavior: Behavior normal.         Thought Content: Thought content is not paranoid or delusional. Thought content does not include homicidal or suicidal ideation.         Cognition and Memory: Memory is impaired.         Judgment: Judgment is impulsive.         Procedures  none         ED Course  ED Course as of Feb 06 1244   Sat Feb 06, 2021   1243 This patient was came and saw patient and evaluated patient and also side vomiting patient said patient is okay to go home.  Home health will be arranged to go and evaluate patient at home.    [MO]      ED Course User Index  [MO] Guillaume Abbasi MD      Labs Reviewed   URINALYSIS W/  MICROSCOPIC IF INDICATED (NO CULTURE) - Abnormal; Notable for the following components:       Result Value    Urobilinogen, UA 4.0 E.U./dL (*)     All other components within normal limits    Narrative:     Urine microscopic not indicated.   COMPREHENSIVE METABOLIC PANEL - Abnormal; Notable for the following components:    Glucose 116 (*)     Albumin 2.00 (*)     ALT (SGPT) 43 (*)     AST (SGOT) 46 (*)     Alkaline Phosphatase 122 (*)     All other components within normal limits    Narrative:     GFR Normal >60  Chronic Kidney Disease <60  Kidney Failure <15     CBC WITH AUTO DIFFERENTIAL - Abnormal; Notable for the following components:    RBC 3.45 (*)     Hemoglobin 11.2 (*)     MCV 98.8 (*)     RDW 18.3 (*)     RDW-SD 66.4 (*)     Platelets 70 (*)     Neutrophils, Absolute 1.60 (*)     All other components within normal limits   COVID-19 AND FLU A/B, NP SWAB IN TRANSPORT MEDIA 8-12 HR TAT - Normal    Narrative:     Fact sheet for providers: https://www.fda.gov/media/347992/download    Fact sheet for patients: https://www.fda.gov/media/801259/download    Test performed by PCR.   TROPONIN (IN-HOUSE) - Normal    Narrative:     Troponin T Reference Range:  <= 0.03 ng/mL-   Negative for AMI  >0.03 ng/mL-     Abnormal for myocardial necrosis.  Clinicians would have to utilize clinical acumen, EKG, Troponin and serial changes to determine if it is an Acute Myocardial Infarction or myocardial injury due to an underlying chronic condition.       Results may be falsely decreased if patient taking Biotin.     BNP (IN-HOUSE) - Normal    Narrative:     Among patients with dyspnea, NT-proBNP is highly sensitive for the detection of acute congestive heart failure. In addition NT-proBNP of <300 pg/ml effectively rules out acute congestive heart failure with 99% negative predictive value.    Results may be falsely decreased if patient taking Biotin.     MAGNESIUM - Normal   TSH - Normal   SCAN SLIDE   CBC AND DIFFERENTIAL     Narrative:     The following orders were created for panel order CBC & Differential.  Procedure                               Abnormality         Status                     ---------                               -----------         ------                     Scan Slide[[id number]]                                       Final result               CBC Auto Differential[[id number]]        Abnormal            Final result                 Please view results for these tests on the individual orders.   EXTRA TUBES    Narrative:     The following orders were created for panel order Extra Tubes.  Procedure                               Abnormality         Status                     ---------                               -----------         ------                     Light Blue Top[[id number]]                                   Final result               Gold Top - SST[[id number]]                                   Final result                 Please view results for these tests on the individual orders.   LIGHT BLUE TOP   GOLD TOP - SST     Xr Chest 1 View    Result Date: 2/6/2021  Narrative: PORTABLE CHEST X-RAY CLINICAL HISTORY: weakness, swelling COMPARISON:  1/15/2021. FINDINGS:  Single portable view of the chest obtained.  There are various overlying monitor leads/artifacts in place. The lungs are fairly well inflated. Mild scattered fibrotic changes appear stable and chronic. There is a small focus of linear atelectasis in the right lung base. No active airspace disease.  Cardiac size is within normal limits.  Vascularity is normal considering technique.  No pleural fluid is demonstrated by portable imaging.     Impression: No active disease by portable imaging. Electronically signed by:  Greg Kee MD  2/6/2021 12:26 AM CST Workstation: [id number]    Xr Chest 1 View    Result Date: 1/15/2021  Narrative: EXAM DESCRIPTION: Site:  XR CHEST 1 VW RP: XR CHEST 1 VIEW CLINICAL HISTORY: 70 years  Female;  [facility] protocol;  COMPARISON: 11/07/2012 FINDINGS: Lungs: Lungs are clear, with no focal infiltrate, pneumothorax, or pleural effusion. Mediastinum: Mediastinum is within normal limits for this positioning. Bones: Bony structures are unremarkable.     Impression: 1.  No acute pulmonary findings. Electronically signed by:  Jose C Kenney MD  1/15/2021 9:35 PM CST Workstation: [id number]    EKG February 6, 2021 at 600 24 reveals normal sinus rhythm at a rate of 91 bpm.  T wave flattening in lead III as well as aVL without inversion.  No ST elevation or depression.  No evidence of acute ischemia.          MDM  Number of Diagnoses or Management Options     Amount and/or Complexity of Data Reviewed  Clinical lab tests: reviewed  Tests in the radiology section of CPT®: reviewed  Discuss the patient with other providers: yes      Medically cleared. No source of cause of weakness. Generalized and slow decline. No admission criteria medically. Patient unable to care for self. Does not meet psychiatric admission criteria. Patient signed out to Dr. Abbasi at the end of my shift awaiting Case Management consultation.     Final diagnoses:   Generalized weakness            Guillaume Abbasi MD  02/06/21 1244

## 2021-02-06 NOTE — ED NOTES
"Discharge Planning Assessment  [facility]     Patient Name: Natacha Gandhi  MRN: [id number]  Today's Date: 2/6/2021    Admit Date: 2/5/2021    Discharge Needs Assessment    No documentation.       Discharge Plan     Row Name 02/06/21 1011       Plan    Plan Comments  ...from the ED this date. LORI informed him that CM will f/u after speaking with patient and decision. LORI will provide handoff to ED RNCM. ED RN aware of above information at this time...WELLINGTON Cantrell    Row Name 02/06/21 0959       Plan    Plan Comments  SW and RNCM received call from ED RN. There are concerns with patient d/c home due to safety reasons. Patient has hx of psych dx, patient is legally blind, and does not have around-the-clock caregivers. MD consulted psychicatrist. They have evaluated and they feel patient is a/o and able to make her own decisions about her d/c plan. LORI and RN CM spoke with patient in ED room this date re: concerns and d/c plan. Patient states she has [facility] help with baths, meals, and medicines 7 days a week for about 4-5 hours per day. She states that she ambulates with her straight cane but states she does not ambulate without someone assisting her because she has a fear of falling. LORI and RNCM provided patient of options of SNF. Patient refuses at this time. Patient states she wants to d/c home. She is agreeable to . She wants [facility]. SW discussed her fear of falling and concerns about not having 24/7 caregivers. Patient still refused SNF placement. Patient gave LORI permission to call her Brother Bahman and [facility]. LORI spoke with Jessie Stubbs @ [phone]. She states she is the care director at [facility]. She states that patient had a visit scheduled today but they have canceled since patient is currently in ED. She states that patient's mental state has \"been changing a lot.\" She states that patient is getting more forgetful, refusing baths, refusing to eat at times, and refusing to get up " and ambulate. She states that patient will sit in her own urine if she refuses to get up and ambulate. Per Jessie, patient's brother Bahman has been helping patient with medications. Jessie would like a call back on when patient is d/c in order for them to see patient at home. LORI also spoke with patient's brother Bahman Pittman @ [phone]. He has concerns with patient staying by herself at night. He states that it isn't safe for her anymore but he knows that she is refusing SNF. He states that at time she will agree to SNF and other times she will not. He states he has attempted to get Guardianship over patient however when evaluated she has been deemed competent to make her own decisions. LORI informed him that CM can attempt to arrange  services and provide them with a sitter's list. Informed him that this would be private pay. Brother insist on CM speaking with patient again about SNF placement. He states that if she does agree, then he would like for her to go to [facility]. LORI informed him that CM can f/u but that she is medically stable for d/c....        Continued Care and Services - Admitted Since 2/5/2021    Coordination has not been started for this encounter.         Demographic Summary    No documentation.       Functional Status    No documentation.       Psychosocial    No documentation.       Abuse/Neglect    No documentation.       Legal    No documentation.       Substance Abuse    No documentation.       Patient Forms    No documentation.           WELLINGTON Garcia

## 2021-02-06 NOTE — ED NOTES
Ms. Omarielijah with medicaid waiver services called and d/w me  she has had pt for 10 years. She said pt gets [facility] and [facility] services through the state . Pt had given er staff permission to speak with her

## 2021-02-06 NOTE — ED NOTES
[facility] unable to provide transport services for patient.     Jennifer Medina, LPN  02/06/21 8540

## 2021-02-06 NOTE — NURSING NOTE
Inpatient Psychiatry Initial Intake    2/6/2021    Natacha Gandhi, a 71 y.o. female, for initial evaluation visit.  Patient is referred by Shmuel.    Patient information was obtained from patient.  Patient presented voluntarily to the Emergency Department.  Precipitant and chief complaint: According to She,  I came in because my feet are swollen and I cant get around. Im unable to walk without any help. Pt states she has people to come in to help her with meals and bathing. She is alert and orientated x4.  No confusion noted. No hallucinations or suicidal thoughts.   Patient presents with none.   Onset of symptoms was abrupt starting 1 week ago.  Patient states symptoms have been exacerbated by decline in mobility .      Current Medications:  Scheduled Meds:    PRN Meds: •  [COMPLETED] Insert peripheral IV **AND** sodium chloride    History:     Past Psychiatric History:   Previous therapy: yes  Previous psychiatric treatment and medication trials:  yes - yes  Previous psychiatric hospitalizations:  yes - yes  Previous diagnoses:     yes - yes  Previous suicide attempts:    no  Previous homicide attempts:    no  Family history of suicide:    no  Previous history of abuse:     no         Further details: no        No  History of legal issues and violence: The patient has no significant history of legal issues.  Currently in treatment with denies.  Education: high school diploma/GED  Other pertinent history: None    Current Evaluation:     Mental Status Evaluation:  Appearance:  disheveled   Behavior:  normal   Speech:  normal pitch and normal volume   Mood:  normal   Affect:  normal   Thought Process:  normal   Thought Content:  normal   Sensorium:  person, place, time/date, situation, month of year and year   Cognition:  grossly intact   Insight:  age appropriate   Judgment:  age appropriate         Psychiatric Review Of Systems:  Sleep: yes  Appetite changes: no  Weight changes: no  Energy:  no  Interest/pleasure/anhedonia: yes  Libido: no  Sexual orientation:  heterosexual  Anxiety/panic: no  Guilty/hopeless: no  Self-injurious behavior/risky behavior: no    Stressors: none      Substance Abuse History:     Substance Abuse History:  Tobacco use: no  Use of alcohol: no  Recreational drugs: no  Use of OTC medications: no    Hx of Overdose:  no  Hx of Blackouts: no  Past attempts to quit or limit use?:no  Patient feels she has mental, emotional, or medical problems co-occurred or worsened as a result of alcohol and/or drug use: no    Suicide Risk Screening:     Suicidal Ideation:  Current thoughts of suicide?no  Recent thoughts of suicide?no    Suicide Planning:  Specific Plan?no  Thought Content/Patients own words:denies.  Potentially lethal means?no  Access to gun?no  Access to other lethal means?no    Suicide Attempt:  Recent attempt?no  Past attempt?no  Cutting/burning/self-mutilation?no      Protective Factors:  family    Homicide Risk Screening:     Homicidal Ideation:  Current thoughts of homicide:  no  Recent thoughts of homicide:  no    Homicidal Planning:  Specific Plan?  no  Thought Content/Patients own words:denies.  Access/Means?  no    Perceptual Disturbances     Auditory:  nono  Hallucinations continued:  no    Hypnopompic hallucinations? no Patients own words: no.  Hypnagogic hallucinations?  no  Patients own words: no.    Delusions? no no  Patients own words: no  .    Shared Delusion no        Recommendations:     Reviewed with: Dr schwartz  Medically cleared by: unknown  Admit to Inpatient Behavioral Health Unit: na  If admitted to unit please perform Review of Current Symptoms for Dr. Perez.      ( gretel ) note    Rita Braun RN

## 2021-02-06 NOTE — ED NOTES
Jennifer LPN d/w me pt does not have transport home. She said her brother said he can't take pt home because he has no way to transport her home. Jennifer said she called [facility] and they can't transport her home either. Pt's brother told Jennifer she went home by ems last time. Jennifer said pt is requiring assist of 1 to get OOB . I will d/w Jamila PARIS

## 2021-02-06 NOTE — ED NOTES
ER CM VISIT WITH PT TO D/W HER DCPOC. PT SAID SHE'S CURRENT WITH [facility]. I TOLD HER I WILL CALL HH AND FIND OUT WHICH ONE AND IF SHE ISN'T CURRENT WITH  I D/W HER SETTING UP A TRANSITION VISIT WITH [facility] SINCE THEY ARE THE ONLY ONES THAT PROVIDE THAT SERVICE. PT IS AGREEABLE TO [facility]. SHE SAID SHE ONLY HAS A CANE DME WISE AND SHE WOULD LIKE A BSC. DME PROVIDER CHOICES GIVEN AND PT SAID WHICHEVER ONE IS CHEAPER. PT DOES HAVE MEDICARE AND MEDICAID. I D/W TRANSPORT HOME AND SHE SAID THE ONLY WAY SHE CAN GET HOME IS BY AMBULANCE AND IS AGREEABLE THAT I CALL THEM. I ASKED WHO HER PCP AKA REGULAR DR OR NP IS AND SHE DIDN'T KNOW HER NAME BUT SAID SHE COMES TO HER HOUSE TO SEE HER.

## 2021-02-06 NOTE — ED NOTES
Shelley OJEDA set up with May for transition visit, I faxed orders to her. Pt and brother asked for hospital bed and BSC. Orders [facility]oned and [facility]ed to [facility], I spoke with Kyle and he will meet Mr Pittman at pt's apartment . I d/w pt ambulance transport home and I discussed with pt medicare may or may not pay for ride home.pt said that's the only way she can ride. Pt's pcp is [facility]. [facility] Pt was again adamant to go home and did not want snf placement

## 2021-02-06 NOTE — ED NOTES
Notified brother and unable to pick patient up to take home, reprots EMS took her home last time she was here.     Jennifer Medina, URBANON  02/06/21 1248

## 2021-02-06 NOTE — ED NOTES
Patient , True, Called wanting update. Verbal consent from patient obtained with permission to talk with .  Request to speak with . Transferred call to .     Jennifer Medina, ALLIE  02/06/21 5537

## 2021-02-06 NOTE — ED NOTES
Located within [facility] notified by Ashley TALLEY for evalaution     Jennifer Medina, LPN  02/06/21 0813

## 2021-02-06 NOTE — ED NOTES
Patient incont in bed, linen and gown changed, assisted up to BSC, patient very unsteady on feet. Patient voided while up on BSC. Back to bed, additional blankets gotten, call light in place.     Jennifer Medina, LPN  02/06/21 1546

## 2021-02-06 NOTE — ED NOTES
Assisted to BSC, patient very unsteady on feet. Patient has been incont. Linen changed, gown changed. Patient request BSC for home.     Jennifer Medina, LPN  02/06/21 1141

## 2021-02-07 NOTE — ED NOTES
Offered to get patient up to BSC, patient reports doesn't not have to at this time.     Jennifer Medina, URBANON  02/06/21 5147

## 2021-08-04 NOTE — ED NOTES
Spoke with patients brother Lio Pittman on the phone who states that the patient is at times confused, but most of the time is alert and oriented x4.  Brother states that it seems that patient is at baseline at this time. Pt. Denies any pain or symptoms and states I just want to go home.  IV and labs drawn at bedside.  Dr. Dias made aware and to bedside at this time.      Janet Castellano, RN  08/03/21 1946

## 2021-08-04 NOTE — ED NOTES
"Patient was brought from [facility] and rehab for \"unresponsive episode.\"  Per EMS, this is the patient's normal level of confusion.  Per EMS, they were called a few weeks ago for the patient passing out in the shower.      Marita Short RN  08/03/21 1916    "

## 2021-08-06 PROBLEM — E87.20 LACTIC ACIDOSIS: Status: ACTIVE | Noted: 2021-01-01

## 2021-08-06 PROBLEM — I95.9 HYPOTENSION: Status: ACTIVE | Noted: 2021-01-01

## 2021-08-06 PROBLEM — D69.6 THROMBOCYTOPENIA (HCC): Status: ACTIVE | Noted: 2021-01-01

## 2021-08-06 NOTE — ED NOTES
Patient is at an increased risk for falls. Fall light activated, yellow falls bracelet and yellow non-skid socks placed on patient. Call light within reach and patient instructed to call for assistance. Side rails up x2, bed alarm activated, and gait belt readily accessible.            Marianela Roach RN  08/06/21 3048

## 2021-08-07 NOTE — THERAPY EVALUATION
Acute Care - Occupational Therapy Initial Evaluation  [facility]     Patient Name: Natacha Gandhi  : 1950  MRN: [id number]  Today's Date: 2021     Date of Referral to OT: 21       Admit Date: 2021       ICD-10-CM ICD-9-CM   1. Hypotension, unspecified hypotension type  I95.9 458.9   2. Altered mental status, unspecified altered mental status type  R41.82 780.97   3. Impaired functional mobility, balance, gait, and endurance  Z74.09 V49.89   4. Oropharyngeal dysphagia  R13.12 787.22   5. Impaired mobility and activities of daily living  Z74.09 V49.89    Z78.9      Patient Active Problem List   Diagnosis   • Acute GI bleeding   • Altered mental status, unspecified   • Schizophrenia, paranoid type (CMS/HCC)   • Acute exacerbation of chronic paranoid schizophrenia (CMS/HCC)   • Acute blood loss anemia   • DMII (diabetes mellitus, type 2) (CMS/HCC)   • Hypotension   • Lactic acidosis   • Thrombocytopenia (CMS/HCC)     Past Medical History:   Diagnosis Date   • Bipolar disorder, unspecified (CMS/HCC)    • Depression    • Diabetes mellitus (CMS/HCC)    • GERD (gastroesophageal reflux disease)    • Hypertension    • Legal blindness    • Psychiatric illness    • Rheumatoid arthritis (CMS/HCC)    • Schizoaffective disorder (CMS/HCC)      Past Surgical History:   Procedure Laterality Date   • COLONOSCOPY N/A 2020    Procedure: COLONOSCOPY;  Surgeon: eG Gorman MD;  Location: [facility];  Service: Gastroenterology;  Laterality: N/A;   • ENDOSCOPY N/A 2020    Procedure: ESOPHAGOGASTRODUODENOSCOPY;  Surgeon: Ge Gorman MD;  Location: [facility];  Service: Gastroenterology;  Laterality: N/A;            OT ASSESSMENT FLOWSHEET (last 12 hours)      OT Evaluation and Treatment     Row Name 21 1000                   OT Time and Intention    Subjective Information  no complaints  -RB        Document Type  evaluation  -RB        Mode of Treatment  co-treatment;physical  therapy;occupational therapy  -RB        Patient Effort  adequate  -RB           General Information    Patient Profile Reviewed  yes  -RB        Existing Precautions/Restrictions  (S) fall  -RB           Living Environment    Lives With  facility resident [facility] for rehab.  -RB           Home Main Entrance    Number of Stairs, Main Entrance  one  -RB        Stair Railings, Main Entrance  none  -RB           Stairs Within Home, Primary    Number of Stairs, Within Home, Primary  none  -RB           Cognition    Orientation Status (Cognition)  oriented to;person;place   -RB           Range of Motion Comprehensive    General Range of Motion  upper extremity range of motion deficits identified  -RB        Comment, General Range of Motion  B shld flex was 40 to 45* and rest of B UE strength was WNL.  -RB           Strength Comprehensive (MMT)    General Manual Muscle Testing (MMT) Assessment  upper extremity strength deficits identified  -RB        Comment, General Manual Muscle Testing (MMT) Assessment  B UE strength was 3+ to 4-/5 grossly.  -RB           Bed Mobility    Bed Mobility  bed mobility (all) activities  -RB        All Activities, [place] (Bed Mobility)  moderate assist (50% patient effort);maximum assist (25% patient effort);2 person assist  -RB        Assistive Device (Bed Mobility)  bed rails;draw sheet;head of bed elevated  -RB           Functional Mobility    Functional Mobility- Ind. Level  moderate assist (50% patient effort);2 person assist required  -RB        Functional Mobility- Device  rolling walker  -RB        Functional Mobility-Distance (Feet)  8  -RB        Functional Mobility- Safety Issues  balance decreased during turns;step length decreased  -RB           Transfer Assessment/Treatment    Transfers  sit-stand transfer;stand-sit transfer;toilet transfer  -RB           Transfers    Sit-Stand [place] (Transfers)  moderate assist (50% patient effort)  -RB        Stand-Sit  [place] (Transfers)  moderate assist (50% patient effort)  -RB        [place] Level (Toilet Transfer)  moderate assist (50% patient effort)  -RB        Assistive Device (Toilet Transfer)  grab bars/safety frame;raised toilet seat;walker, front-wheeled  -RB           Sit-Stand Transfer    Assistive Device (Sit-Stand Transfers)  walker, front-wheeled  -RB           Stand-Sit Transfer    Assistive Device (Stand-Sit Transfers)  walker, front-wheeled  -RB           Toilet Transfer    Type (Toilet Transfer)  sit-stand;stand-sit  -RB           Balance    Static Sitting Balance  moderate impairment  -RB        Dynamic Sitting Balance  moderate impairment  -RB        Static Standing Balance  moderate impairment  -RB        Dynamic Standing Balance  moderate impairment  -RB           Activities of Daily Living    BADL Assessment/Intervention  lower body dressing  -RB           Lower Body Dressing Assessment/Training    [place] Level (Lower Body Dressing)  lower body dressing skills;doff;don;socks;moderate assist (50% patient effort)  -RB        Position (Lower Body Dressing)  edge of bed sitting  -RB           Plan of Care Review    Plan of Care Reviewed With  patient  -RB           Vital Signs    Pre Systolic BP Rehab  120  -RB        Pre Treatment Diastolic BP  68  -RB        Intra Systolic BP Rehab  122  -RB        Intra Treatment Diastolic BP  63  -RB        Post Systolic BP Rehab  134  -RB        Post Treatment Diastolic BP  72  -RB        Pretreatment Heart Rate (beats/min)  87  -RB        Intratreatment Heart Rate (beats/min)  90  -RB        Posttreatment Heart Rate (beats/min)  107  -RB        Pre SpO2 (%)  98  -RB        O2 Delivery Pre Treatment  room air  -RB        Intra SpO2 (%)  95  -RB        O2 Delivery Intra Treatment  room air  -RB        Pre Patient Position  Supine  -RB        Intra Patient Position  Standing  -RB        Post Patient Position  Supine  -RB           OT Goals    Transfer Goal  Selection (OT)  transfer, OT goal 1  -RB        Bathing Goal Selection (OT)  bathing, OT goal 1  -RB        Dressing Goal Selection (OT)  dressing, OT goal 1  -RB        Toileting Goal Selection (OT)  toileting, OT goal 1  -RB        Strength Goal Selection (OT)  strength, OT goal 1  -RB           Transfer Goal 1 (OT)    Activity/Assistive Device (Transfer Goal 1, OT)  transfers, all  -RB        [place] Level/Cues Needed (Transfer Goal 1, OT)  modified independence  -RB        Time Frame (Transfer Goal 1, OT)  long term goal (LTG)  -RB        Progress/Outcome (Transfer Goal 1, OT)  goal not met  -RB           Bathing Goal 1 (OT)    Activity/Device (Bathing Goal 1, OT)  bathing skills, all  -RB        [place] Level/Cues Needed (Bathing Goal 1, OT)  modified independence  -RB        Time Frame (Bathing Goal 1, OT)  long term goal (LTG)  -RB        Progress/Outcomes (Bathing Goal 1, OT)  goal not met  -RB           Dressing Goal 1 (OT)    Activity/Device (Dressing Goal 1, OT)  dressing skills, all  -RB        [place]/Cues Needed (Dressing Goal 1, OT)  modified independence  -RB        Time Frame (Dressing Goal 1, OT)  long term goal (LTG)  -RB        Progress/Outcome (Dressing Goal 1, OT)  goal not met  -RB           Toileting Goal 1 (OT)    Activity/Device (Toileting Goal 1, OT)  toileting skills, all  -RB        [place] Level/Cues Needed (Toileting Goal 1, OT)  modified independence  -RB        Time Frame (Toileting Goal 1, OT)  long term goal (LTG)  -RB        Progress/Outcome (Toileting Goal 1, OT)  goal not met  -RB           Strength Goal 1 (OT)    Strength Goal 1 (OT)  Increase UE str by 1/2 grade to increase independence with functional mobility.  -RB        Time Frame (Strength Goal 1, OT)  long term goal (LTG)  -RB        Progress/Outcome (Strength Goal 1, OT)  goal not met  -RB           Positioning and Restraints    Pre-Treatment Position  in bed  -RB        Post Treatment Position  bed   -RB           Therapy Assessment/Plan (OT)    Date of Referral to OT  08/06/21  -RB        Functional Level at Time of Evaluation (OT)  Imp mob and ADLs.  -RB        OT Diagnosis  Imp mob and ADLs.  -RB        Rehab Potential (OT)  good, to achieve stated therapy goals  -RB        Criteria for Skilled Therapeutic Interventions Met (OT)  yes;meets criteria  -RB        Therapy Frequency (OT)  other (see comments) 5-7 days/wk  -RB        Predicted Duration of Therapy Intervention (OT)  Until D/C or goals met.   -RB        Problem List (OT)  problems related to;balance;cognition;coordination;inability to direct their own care  -RB        Activity Limitations Related to Problem List (OT)  unable to ambulate safely;unable to transfer safely;BADLs not performed adequately or safely;IADLs not performed adequately or safely  -RB        Planned Therapy Interventions (OT)  activity tolerance training;adaptive equipment training;BADL retraining;functional balance retraining;occupation/activity based interventions;patient/caregiver education/training;strengthening exercise;ROM/therapeutic exercise;transfer/mobility retraining  -RB           Evaluation Complexity (OT)    Review Occupational Profile/Medical/Therapy History Complexity  moderate complexity  -RB        Assessment, Occupational Performance/Identification of Deficit Complexity  moderate complexity  -RB        Clinical Decision Making Complexity (OT)  moderate complexity  -RB        Overall Complexity of Evaluation (OT)  moderate complexity  -RB           Therapy Plan Review/Discharge Plan (OT)    Anticipated Discharge Disposition (OT)  skilled nursing facility  -RB          User Key  (r) = Recorded By, (t) = Taken By, (c) = Cosigned By    Initials Name Effective Dates    RB Tyrel Lopez, OT 06/16/21 -            Occupational Therapy Education                 Title: PT OT SLP Therapies (In Progress)     Topic: Occupational Therapy (In Progress)     Point: ADL training  (Not Started)     Description:   Instruct learner(s) on proper safety adaptation and remediation techniques during self care or transfers.   Instruct in proper use of assistive devices.              Learner Progress:  Not documented in this visit.          Point: Home exercise program (Not Started)     Description:   Instruct learner(s) on appropriate technique for monitoring, assisting and/or progressing therapeutic exercises/activities.              Learner Progress:  Not documented in this visit.          Point: Precautions (Done)     Description:   Instruct learner(s) on prescribed precautions during self-care and functional transfers.              Learning Progress Summary           Patient Acceptance, E, VU by LISA at 8/7/2021 1406    Comment: Edu pt on use of gait belt and non skid socks when OOB and no OOB without assist.                   Point: Body mechanics (Not Started)     Description:   Instruct learner(s) on proper positioning and spine alignment during self-care, functional mobility activities and/or exercises.              Learner Progress:  Not documented in this visit.                      User Key     Initials Effective Dates Name Provider Type Discipline    LISA 06/16/21 -  Tyrel Lopez, OT Occupational Therapist OT                  OT Recommendation and Plan  Planned Therapy Interventions (OT): activity tolerance training, adaptive equipment training, BADL retraining, functional balance retraining, occupation/activity based interventions, patient/caregiver education/training, strengthening exercise, ROM/therapeutic exercise, transfer/mobility retraining  Therapy Frequency (OT): other (see comments) (5-7 days/wk)  Plan of Care Review  Plan of Care Reviewed With: patient  Outcome Summary: OT eval on this date as co-eval with PT.  Pt was mod A with bed mobility and mod A with sit to stand.  She needed mod of 2 for BSC transfer.  Pt able to don sock with mod A.  She could benefit from OT services to  regain lost function for ADLs and functional mobility.  Rec cont therapy when D/C from hospital.  Plan of Care Reviewed With: patient  Outcome Summary: OT eval on this date as co-eval with PT.  Pt was mod A with bed mobility and mod A with sit to stand.  She needed mod of 2 for BSC transfer.  Pt able to don sock with mod A.  She could benefit from OT services to regain lost function for ADLs and functional mobility.  Rec cont therapy when D/C from hospital.    Outcome Measures     Row Name 08/07/21 0959             How much help from another is currently needed...    Putting on and taking off regular lower body clothing?  2  -RB      Bathing (including washing, rinsing, and drying)  2  -RB      Toileting (which includes using toilet bed pan or urinal)  2  -RB      Putting on and taking off regular upper body clothing  3  -RB      Taking care of personal grooming (such as brushing teeth)  3  -RB      Eating meals  4  -RB      AM-PAC 6 Clicks Score (OT)  16  -RB         Functional Assessment    Outcome Measure Options  AM-PAC 6 Clicks Daily Activity (OT)  -RB        User Key  (r) = Recorded By, (t) = Taken By, (c) = Cosigned By    Initials Name Provider Type    RB Tyrel Lopez OT Occupational Therapist          Time Calculation:   Time Calculation- OT     Row Name 08/07/21 1410             Time Calculation- OT    OT Start Time  0959  -RB      OT Stop Time  1109  -RB      OT Time Calculation (min)  70 min  -RB      OT Received On  08/07/21  -RB      OT Goal Re-Cert Due Date  08/20/21  -RB        User Key  (r) = Recorded By, (t) = Taken By, (c) = Cosigned By    Initials Name Provider Type    Tyrel Amaya OT Occupational Therapist        Therapy Charges for Today     Code Description Service Date Service Provider Modifiers Qty    13250562203 HC OT EVAL MOD COMPLEXITY 4 8/7/2021 Tyrel Lopez OT GO 1    [id number]  OT SELF CARE/MGMT/TRAIN EA 15 MIN 8/7/2021 Tyrel Lopez OT GO 1               Tyrel oLpez  OT  8/7/2021

## 2021-08-07 NOTE — THERAPY EVALUATION
Patient Name: Natacha Gandhi  : 1950    MRN: [id number]                              Today's Date: 2021     PT Eval   Admit Date: 2021    Visit Dx:     ICD-10-CM ICD-9-CM   1. Hypotension, unspecified hypotension type  I95.9 458.9   2. Altered mental status, unspecified altered mental status type  R41.82 780.97   3. Impaired functional mobility, balance, gait, and endurance  Z74.09 V49.89     Patient Active Problem List   Diagnosis   • Acute GI bleeding   • Altered mental status, unspecified   • Schizophrenia, paranoid type (CMS/HCC)   • Acute exacerbation of chronic paranoid schizophrenia (CMS/HCC)   • Acute blood loss anemia   • DMII (diabetes mellitus, type 2) (CMS/HCC)   • Hypotension   • Lactic acidosis   • Thrombocytopenia (CMS/HCC)     Past Medical History:   Diagnosis Date   • Bipolar disorder, unspecified (CMS/HCC)    • Depression    • Diabetes mellitus (CMS/HCC)    • GERD (gastroesophageal reflux disease)    • Hypertension    • Legal blindness    • Psychiatric illness    • Rheumatoid arthritis (CMS/HCC)    • Schizoaffective disorder (CMS/HCC)      Past Surgical History:   Procedure Laterality Date   • COLONOSCOPY N/A 2020    Procedure: COLONOSCOPY;  Surgeon: Ge Gorman MD;  Location: [facility];  Service: Gastroenterology;  Laterality: N/A;   • ENDOSCOPY N/A 2020    Procedure: ESOPHAGOGASTRODUODENOSCOPY;  Surgeon: Ge Gorman MD;  Location: [facility];  Service: Gastroenterology;  Laterality: N/A;     General Information     Row Name 2159          Physical Therapy Time and Intention    Document Type  evaluation  -GB     Mode of Treatment  co-treatment;physical therapy;occupational therapy  -GB     Row Name 2159          General Information    Patient Profile Reviewed  yes  -GB     Existing Precautions/Restrictions  (S) fall appears to have herniated vaginal tissue per RN when wiping for toilet hygiend  -GB     Row Name 21 0918           Living Environment    Lives With  facility resident lived alone prior in apt;  -GB     Row Name 21 09          Home Main Entrance    Number of Stairs, Main Entrance  one home  -GB     Stair Railings, Main Entrance  none  -GB     Row Name 21          Stairs Within Home, Primary    Number of Stairs, Within Home, Primary  none  -GB     Row Name 21          Cognition    Orientation Status (Cognition)  oriented to;person;place   -GB     Row Name 21          Safety Issues, Functional Mobility    Comment, Safety Issues/Impairments (Mobility)  states she walked indep; bathing/dressing alone per pt; walk in shower; states has RW used by her; shower seat w/ grab bar;  -GB       User Key  (r) = Recorded By, (t) = Taken By, (c) = Cosigned By    Initials Name Provider Type    Rosina Anguiano, PT Physical Therapist        Mobility     Row Name 21          Bed Mobility    Bed Mobility  bed mobility (all) activities  -GB     All Activities, [place] (Bed Mobility)  moderate assist (50% patient effort);maximum assist (25% patient effort);2 person assist  -GB     Assistive Device (Bed Mobility)  bed rails;draw sheet;head of bed elevated  -GB     Row Name 21          Sit-Stand Transfer    Sit-Stand [place] (Transfers)  moderate assist (50% patient effort);2 person assist  -GB     Assistive Device (Sit-Stand Transfers)  walker, front-wheeled  -GB     Row Name 21          Gait/Stairs (Locomotion)    [place] Level (Gait)  moderate assist (50% patient effort);maximum assist (25% patient effort);2 person assist  -GB     Assistive Device (Gait)  walker, front-wheeled  -GB     Distance in Feet (Gait)  1 ft; difficulty following directions; slight side step to L but lost track and tried to side step opposite dirrection;  -GB     Deviations/Abnormal Patterns (Gait)  antalgic;alanna decreased;gait speed decreased;stride length  decreased;weight shifting decreased  -GB     Bilateral Gait Deviations  heel strike decreased  -GB       User Key  (r) = Recorded By, (t) = Taken By, (c) = Cosigned By    Initials Name Provider Type    Rosina Anguiano, PT Physical Therapist        Obj/Interventions     Row Name 08/07/21 0959          Range of Motion Comprehensive    General Range of Motion  bilateral lower extremity ROM WFL  -GB     Comment, General Range of Motion  lacks full DF  -     Row Name 08/07/21 0959          Strength Comprehensive (MMT)    Comment, General Manual Muscle Testing (MMT) Assessment  holds RLE int rotated at rest, plantarflexed R foot w/ some resistance/tightness at Passive DF to neutral; RLE allows AAROM, she initiated some active knee flx/ext but did not go thru full hip & knee flx/ext on RLE; LLE able to show ability to initiate/contract for hip, knee, ankle flx/ext tho help needed to complete range; L DF /PF 3+-4-/5 ; other motion not easily graded accurately due to lethargy and low exertion; RLE w/ resting fasciulations w/ or post exertion; once no longer moving they decrease on R;  -     Row Name 08/07/21 0959          Motor Skills    Motor Skills  neuro-muscular function  -     Neuromuscular Function  tremor, resting RUE/LE w/ fasiculations at rest and post exertion; she was also noted to have them eloisa UE/LEs sitting upright ; RN notified of neuro signs of the trunk lean, fasiculations and R LE movement and posture  -     Row Name 08/07/21 0959          Balance    Balance Assessment  sitting static balance;sitting dynamic balance;standing static balance;standing dynamic balance  -     Static Sitting Balance  moderate impairment;supported;sitting, edge of bed  -     Dynamic Sitting Balance  moderate impairment;sitting, edge of bed;supported  -GB     Static Standing Balance  moderate impairment;supported;standing  -     Dynamic Standing Balance  moderate impairment;supported;standing  -[facility]  Name 08/07/21 0959          Sensory Assessment (Somatosensory)    Sensory Assessment (Somatosensory)  unable/difficult to assess;sensation intact reports accurately LE sensatin location to light touch but slow response  -GB       User Key  (r) = Recorded By, (t) = Taken By, (c) = Cosigned By    Initials Name Provider Type    GB Rosina Martinez, PT Physical Therapist        Goals/Plan     Row Name 08/07/21 1005          Bed Mobility Goal 1 (PT)    Activity/Assistive Device (Bed Mobility Goal 1, PT)  bed mobility activities, all  -GB     [place] Level/Cues Needed (Bed Mobility Goal 1, PT)  modified independence  -GB     Time Frame (Bed Mobility Goal 1, PT)  1 week  -GB     Progress/Outcomes (Bed Mobility Goal 1, PT)  goal not met  -GB     Row Name 08/07/21 1005          Transfer Goal 1 (PT)    Activity/Assistive Device (Transfer Goal 1, PT)  bed-to-chair/chair-to-bed  -GB     [place] Level/Cues Needed (Transfer Goal 1, PT)  modified independence  -GB     Time Frame (Transfer Goal 1, PT)  10 days  -GB     Progress/Outcome (Transfer Goal 1, PT)  goal not met  -GB     Row Name 08/07/21 1005          Gait Training Goal 1 (PT)    Activity/Assistive Device (Gait Training Goal 1, PT)  gait (walking locomotion);decrease fall risk  -GB     [place] Level (Gait Training Goal 1, PT)  modified independence  -GB     Distance (Gait Training Goal 1, PT)  50 ft or more  -GB     Time Frame (Gait Training Goal 1, PT)  10 days  -GB     Progress/Outcome (Gait Training Goal 1, PT)  goal not met  -GB     Row Name 08/07/21 1005          Stairs Goal 1 (PT)    Activity/Assistive Device (Stairs Goal 1, PT)  ascending stairs;descending stairs  -GB     [place] Level/Cues Needed (Stairs Goal 1, PT)  modified independence  -GB     Number of Stairs (Stairs Goal 1, PT)  1  -GB     Time Frame (Stairs Goal 1, PT)  2 weeks  -GB       User Key  (r) = Recorded By, (t) = Taken By, (c) = Cosigned By    Initials Name Provider  Type    GB Rosina Martinez, PT Physical Therapist        Clinical Impression     Row Name 08/07/21 0959          Plan of Care Review    Plan of Care Reviewed With  patient  -GB     Progress  no change  -GB     Outcome Summary  PT Zofia completed w/ OT. Pt very lethargic, quiet and slow response to interaction but was appropriate in response tho delayed; Keesp eyes closed but able to open at request. She had limited active range RLE with some fasiculations noted as well as internal rotation hip/LE and plantarflx tone on RLE. She had ability to move LEs more funtionally w/ assisted sup<>sit but needed mod-max to accomplish all mobiltiy. She had signficant posterior & L lateral lean in sitting and to lesser degree in standing till she could maintain upright w/ support and aclimate to change. She tried sidestepping to L to HOB w/ min-mod assist of 2 but very little steps and significant assist; Pt not capable of caring for her self and did not show ability to advocate for herself today, so she will need support system that can physically assist her and 24/7 care pending improvement w/ intervention. PT to follow to help progress her mobility and ex tali for functional mobilty and care.  -GB     Row Name 08/07/21 0959          Therapy Assessment/Plan (PT)    Rehab Potential (PT)  fair, will monitor progress closely  -GB     Criteria for Skilled Interventions Met (PT)  yes  -GB     Row Name 08/07/21 0959          Vital Signs    Pre Systolic BP Rehab  120  -GB     Pre Treatment Diastolic BP  68  -GB     Intra Systolic BP Rehab  122  -GB     Intra Treatment Diastolic BP  63  -GB     Post Systolic BP Rehab  134 post standing  -GB     Post Treatment Diastolic BP  72  -GB     Pretreatment Heart Rate (beats/min)  87  -GB     Intratreatment Heart Rate (beats/min)  90  -GB     Posttreatment Heart Rate (beats/min)  107  -GB     Pre SpO2 (%)  98  -GB     O2 Delivery Pre Treatment  room air  -GB     Intra SpO2 (%)  95  -GB      Pre Patient Position  Supine  -GB     Intra Patient Position  Sitting  -GB     Post Patient Position  Supine  -GB     Recovery Time  120/58 HR 84 sats 98% post supine 8 min;  -GB     Row Name 08/07/21 0959          Positioning and Restraints    Pre-Treatment Position  in bed  -GB     Post Treatment Position  bed  -GB     In Bed  side rails up x2;call light within reach;encouraged to call for assist;exit alarm on;notified nsg;supine  -GB       User Key  (r) = Recorded By, (t) = Taken By, (c) = Cosigned By    Initials Name Provider Type    Rosina Anguiano PT Physical Therapist        Outcome Measures     Row Name 08/07/21 1005          How much help from another person do you currently need...    Turning from your back to your side while in flat bed without using bedrails?  2  -GB     Moving from lying on back to sitting on the side of a flat bed without bedrails?  2  -GB     Moving to and from a bed to a chair (including a wheelchair)?  2  -GB     Standing up from a chair using your arms (e.g., wheelchair, bedside chair)?  2  -GB     Climbing 3-5 steps with a railing?  1  -GB     To walk in hospital room?  1  -GB     AM-PAC 6 Clicks Score (PT)  10  -GB     Row Name 08/07/21 1005          Functional Assessment    Outcome Measure Options  AM-PAC 6 Clicks Basic Mobility (PT)  -GB       User Key  (r) = Recorded By, (t) = Taken By, (c) = Cosigned By    Initials Name Provider Type    Rosina Anguiano PT Physical Therapist                       Physical Therapy Education                 Title: PT OT SLP Therapies (In Progress)     Topic: Physical Therapy (In Progress)     Point: Mobility training (In Progress)     Learning Progress Summary           Patient Acceptance, D,E, NR by MAIA at 8/7/2021 1007    Comment: POC; mobility                   Point: Home exercise program (In Progress)     Learning Progress Summary           Patient Acceptance, D,E, NR by MAIA at 8/7/2021 1007    Comment: POC;  mobility                   Point: Body mechanics (In Progress)     Learning Progress Summary           Patient Acceptance, D,E, NR by  at 8/7/2021 1007    Comment: POC; mobility                   Point: Precautions (In Progress)     Learning Progress Summary           Patient Acceptance, D,E, NR by  at 8/7/2021 1007    Comment: POC; mobility                               User Key     Initials Effective Dates Name Provider Type Discipline     06/16/21 -  Rosina Martinez, PT Physical Therapist PT              PT Recommendation and Plan  Planned Therapy Interventions (PT): balance training, bed mobility training, gait training, transfer training, patient/family education, stair training, strengthening  Plan of Care Reviewed With: patient  Progress: no change  Outcome Summary: PT Eval completed w/ OT. Pt very lethargic, quiet and slow response to interaction but was appropriate in response tho delayed; [place] eyes closed but able to open at request. She had limited active range RLE with some fasiculations noted as well as internal rotation hip/LE and plantarflx tone on RLE. She had ability to move LEs more funtionally w/ assisted sup<>sit but needed mod-max to accomplish all mobiltiy. She had signficant posterior & L lateral lean in sitting and to lesser degree in standing till she could maintain upright w/ support and aclimate to change. She tried sidestepping to L to HOB w/ min-mod assist of 2 but very little steps and significant assist; Pt not capable of caring for her self and did not show ability to advocate for herself today, so she will need support system that can physically assist her and 24/7 care pending improvement w/ intervention. PT to follow to help progress her mobility and ex tali for functional mobilty and care.     Time Calculation:   PT Charges     Row Name 08/07/21 0949             Time Calculation    Start Time  0959  -GB      Stop Time  1109  -GB      Time Calculation (min)  70 min   -GB      PT Received On  08/07/21  -GB      PT Goal Re-Cert Due Date  08/16/21  -GB         Untimed Charges    PT Eval/Re-eval Minutes  70  -GB         Total Minutes    Untimed Charges Total Minutes  70  -GB       Total Minutes  70  -GB        User Key  (r) = Recorded By, (t) = Taken By, (c) = Cosigned By    Initials Name Provider Type    Rosina Anguiano, PT Physical Therapist        Therapy Charges for Today     Code Description Service Date Service Provider Modifiers Qty    [id number] HC-PT EVAL MOD COMPLEXITY 5 8/7/2021 Rosina Martinez, PT  1          PT G-Codes  Outcome Measure Options: AM-PAC 6 Clicks Basic Mobility (PT)  AM-PAC 6 Clicks Score (PT): 10    Rosina Martinez, MARLINE  8/7/2021

## 2021-08-07 NOTE — PLAN OF CARE
Goal Outcome Evaluation:  Plan of Care Reviewed With: patient        Progress: no change  Outcome Summary: Initital assessment.  Soft Chopped Diet ordered this date.  Encourage intake of meals and milk.

## 2021-08-07 NOTE — PLAN OF CARE
Goal Outcome Evaluation:  Plan of Care Reviewed With: patient        Progress: no change  Outcome Summary: PT Zofia completed w/ OT. Pt very lethargic, quiet and slow response to interaction but was appropriate in response tho delayed; [place] eyes closed but able to open at request. She had limited active range RLE with some fasiculations noted as well as internal rotation hip/LE and plantarflx tone on RLE. She had ability to move LEs more funtionally w/ assisted sup<>sit but needed mod-max to accomplish all mobiltiy. She had signficant posterior & L lateral lean in sitting and to lesser degree in standing till she could maintain upright w/ support and aclimate to change. She tried sidestepping to L to HOB w/ min-mod assist of 2 but very little steps and significant assist; Pt not capable of caring for her self and did not show ability to advocate for herself today, so she will need support system that can physically assist her and 24/7 care pending improvement w/ intervention. PT to follow to help progress her mobility and ex tali for functional mobilty and care.

## 2021-08-07 NOTE — CONSULTS
Inpatient Consult to Psychiatry    8/7/2021    Referring Provider: Dr. Tsai  Reason for Consultation: altered mental status    Source of History:  chart review, the patient and unobtainable from patient due to mental status    HPI:    Patient is a 71 y.o. female who presents with altered mental status. Onset of symptoms was abrupt starting 1 week ago.  Symptoms have been present on an intermittent basis. Symptoms are associated with unresponsiveness.  Symptoms are aggravated by none known.   Patient's symptoms occur in the context of schizophrenia and one prior psych admission for psychosis in Sept 2020 on the [facility].    Patient presented for two episodes of unresponsiveness at the SNF in the last week.  The episodes last 10-15 minutes and it is reported that she was not responsive to sternal rub.  She was sent to the ED by SNF after the second episode.    When I enter the patient's room, she is sitting in bed with mouth open with right hand at mouth as if she is feeding herself.  Her lunch tray is present but there food is covered by a lid.  Patient is responsive, however, and does interact.  She knows her name and that she is in the hosp. She is able to report that she lives at a facility but she could not recall the full name.  She does report being blind.    Patient's brother came in during my interaction and he provided the further history and confirmation of background information.  She notes she had similar episodes of being unresponsive on at least 3 separate occasions around April 2021.  This was prior to placement at the SNF.  He notes that she was taken to the ED on one of these episodes.  He notes that by the time she was picked up by EMS, the episode had ended.  He is not able to recall any episodes like this prior to this past spring.  He does not recall witness any posturing.  He notes she was bed bound and was simply just laying there not responding.    Psychiatric Review Of Systems:  blind, denies AVH  "or paranoia and Pertinent items are noted in HPI.    History  Past psychiatric history:    Psychiatric Hospitalizations: Patient has had 1 prior hospitalization.  [facility] for management of psychosis from schizophrenia.    Suicide Attempts: Patient has had no prior suicide attempts.    Prior Treatment and Medications Tried: Was on lexapro and seroquel prior to psych admission in 2020.  Patient was discharged on lexapro and risperdal.    History of violence or legal issues: The patient has no significant history of legal issues.    Social History:    Substance Abuse: Chart review and brother note no prior A&D issues.     Marriages: once  Current Relationships:   Children: one son    Abuse/Trauma: Chart review and brother note none.    Education: 11th grade   Occupation: on disability  Living Situation: at [facility] since June 2021    Firearms Access: none at [facility]    Social History     Socioeconomic History   • Marital status:      Spouse name: Not on file   • Number of children: Not on file   • Years of education: Not on file   • Highest education level: Not on file   Tobacco Use   • Smoking status: Former Smoker   • Smokeless tobacco: Never Used   • Tobacco comment: \" a little bit a long time ago\"   Substance and Sexual Activity   • Alcohol use: Not Currently   • Drug use: Never   • Sexual activity: Not Currently         Family History:    Family History   Problem Relation Age of Onset   • Dementia Mother    • No Known Problems Father    • No Known Problems Sister    • No Known Problems Brother    • No Known Problems Maternal Aunt    • No Known Problems Paternal Aunt    • No Known Problems Maternal Uncle    • No Known Problems Paternal Uncle    • No Known Problems Maternal Grandfather    • No Known Problems Maternal Grandmother    • No Known Problems Paternal Grandfather    • No Known Problems Paternal Grandmother    • No Known Problems Cousin    • No Known Problems " Other    • Suicide Attempts Neg Hx    • ADD / ADHD Neg Hx    • Alcohol abuse Neg Hx    • Anxiety disorder Neg Hx    • Bipolar disorder Neg Hx    • Depression Neg Hx    • Drug abuse Neg Hx    • OCD Neg Hx    • Paranoid behavior Neg Hx    • Schizophrenia Neg Hx    • Seizures Neg Hx    • Self-Injurious Behavior  Neg Hx        Further details: family history of ETOH use.    Past Medical and Surgical History:    Past Medical History:   Diagnosis Date   • Bipolar disorder, unspecified (CMS/HCC)    • Depression    • Diabetes mellitus (CMS/HCC)    • GERD (gastroesophageal reflux disease)    • Hypertension    • Legal blindness    • Psychiatric illness    • Rheumatoid arthritis (CMS/HCC)    • Schizoaffective disorder (CMS/HCC)      Past Surgical History:   Procedure Laterality Date   • COLONOSCOPY N/A 8/29/2020    Procedure: COLONOSCOPY;  Surgeon: Ge Gorman MD;  Location: [facility];  Service: Gastroenterology;  Laterality: N/A;   • ENDOSCOPY N/A 8/28/2020    Procedure: ESOPHAGOGASTRODUODENOSCOPY;  Surgeon: Ge Gorman MD;  Location: [facility];  Service: Gastroenterology;  Laterality: N/A;     Allergies:  Patient has no known allergies.  Medications Prior to Admission   Medication Sig Dispense Refill Last Dose   • Emollient (BAG BALM EX) Apply  topically 2 (two) times a day.      • escitalopram (LEXAPRO) 10 MG tablet Take 1 tablet by mouth Daily. Indications: Major Depressive Disorder 30 tablet 0    • ferrous sulfate (FerrouSul) 325 (65 FE) MG tablet Take 1 tablet by mouth Daily With Breakfast. Indications: Anemia From Inadequate Iron in the Body 30 tablet 0    • Menthol-Zinc Oxide (Calmoseptine) 0.44-20.6 % ointment Apply  topically to the appropriate area as directed Every 12 (Twelve) Hours.      • metFORMIN (GLUCOPHAGE) 500 MG tablet Take 500 mg by mouth Daily.      • multivitamin with minerals (MULTIVITAMIN ADULT PO) Take 1 tablet by mouth Daily.      • ondansetron ODT (ZOFRAN-ODT) 4 MG disintegrating  "tablet Place 1 tablet on the tongue Every 6 (Six) Hours As Needed for Nausea or Vomiting. 10 tablet 0    • pantoprazole (PROTONIX) 40 MG EC tablet Take 1 tablet by mouth Daily. Indications: Gastroesophageal Reflux Disease 30 tablet 0    • risperiDONE (risperDAL) 3 MG tablet Take 1 tablet by mouth Daily With Dinner. STOP SEROQUEL (QUETIAPINE)  Indications: Schizophrenia 30 tablet 0    • tuberculin (Tubersol) 5 UNIT/0.1ML injection Inject 5 Units into the appropriate area of the skin as directed by provider 1 (One) Time.      • cephalexin (KEFLEX) 500 MG capsule Take 500 mg by mouth 3 (Three) Times a Day.      • Cholecalciferol 125 MCG (5000 UT) tablet Take 5,000 Units by mouth Daily.          Medical Review Of Systems:  Reviewed review of systems from  Dr. Tsai's  note from today.    Agree with ROS as noted with any relevant updates:    Unable to perform ROS: Mental status change    All other systems reviewed and are negative.    Objective     Vital Signs    Temp:  [96.3 °F (35.7 °C)-98.5 °F (36.9 °C)] 97.2 °F (36.2 °C)  Heart Rate:  [78-96] 94  Resp:  [16-18] 18  BP: ()/(56-99) 122/57    Physical Exam:   General Appearance: seated in bed mildly posturing when I entered the room,  Hygiene:   fair  Gait & Station: deferred, in bed  Musculoskeletal: Pill Rolling Tremor    Mental Status Exam:   Cooperation:  Cooperative  Eye Contact:  Poor; does not track  Psychomotor Behavior:  Mostly appropriate but had some posturing  Mood: \"Fine\"  Affect:  constricted  Speech:  Normal  Thought Process:  dyscognitive, sluggish  Associations: Goal Directed and Tangential  Thought Content:     Normal   Suicidal:  None   Homicidal:  None   Hallucinations:  Not demonstrated today   Delusion:  None expressed  Cognitive Functioning:   Consciousness: awake and alert   Orientation:  Person and Place   Attention: impaired Concentration: Impaired   Language:  word finding Vocabulary: Below Average   Short Term Memory: Deficits   Long " Term Memory: Deficits   Fund of Knowledge: Below Average  Reliability:  limited  Insight:  limited  Judgement:  Impaired  Impulse Control:  adequate    Diagnostic Data: Results source: EMR     Lab Results (last 72 hours)     Procedure Component Value Units Date/Time    POC Glucose Once [[id number]]  (Normal) Collected: 08/07/21 1120    Specimen: Blood Updated: 08/07/21 1157     Glucose 93 mg/dL      Comment: RN NotifiedOperator: [id number] [facility] ALISSAMeter ID: [id number]       Magnesium [[id number]]  (Abnormal) Collected: 08/07/21 1007    Specimen: Blood Updated: 08/07/21 1034     Magnesium 2.5 mg/dL     Potassium [[id number]]  (Normal) Collected: 08/07/21 1007    Specimen: Blood Updated: 08/07/21 1032     Potassium 3.9 mmol/L     Comprehensive Metabolic Panel [[id number]]  (Abnormal) Collected: 08/07/21 1007    Specimen: Blood Updated: 08/07/21 1032     Glucose 88 mg/dL      BUN 9 mg/dL      Creatinine 0.82 mg/dL      Sodium 142 mmol/L      Potassium 3.9 mmol/L      Chloride 115 mmol/L      CO2 23.0 mmol/L      Calcium 8.7 mg/dL      Total Protein 6.1 g/dL      Albumin 1.80 g/dL      ALT (SGPT) 112 U/L      AST (SGOT) 122 U/L      Alkaline Phosphatase 73 U/L      Total Bilirubin 1.0 mg/dL      eGFR  African Amer 84 mL/min/1.73      Globulin 4.3 gm/dL      A/G Ratio 0.4 g/dL      BUN/Creatinine Ratio 11.0     Anion Gap 4.0 mmol/L     Narrative:      GFR Normal >60  Chronic Kidney Disease <60  Kidney Failure <15      CBC & Differential [[id number]]  (Abnormal) Collected: 08/07/21 1007    Specimen: Blood Updated: 08/07/21 1023    Narrative:      The following orders were created for panel order CBC & Differential.  Procedure                               Abnormality         Status                     ---------                               -----------         ------                     CBC Auto Differential[[id number]]        Abnormal            Final result               Scan Slide[[id number]]                                                                     Please view results for these tests on the individual orders.    CBC Auto Differential [[id number]]  (Abnormal) Collected: 08/07/21 1007    Specimen: Blood Updated: 08/07/21 1023     WBC 3.61 10*3/mm3      RBC 3.10 10*6/mm3      Hemoglobin 10.1 g/dL      Hematocrit 30.2 %      MCV 97.4 fL      MCH 32.6 pg      MCHC 33.4 g/dL      RDW 15.9 %      RDW-SD 56.4 fl      MPV 10.5 fL      Platelets 36 10*3/mm3      Neutrophil % 48.7 %      Lymphocyte % 38.5 %      Monocyte % 9.7 %      Eosinophil % 2.2 %      Basophil % 0.6 %      Immature Grans % 0.3 %      Neutrophils, Absolute 1.76 10*3/mm3      Lymphocytes, Absolute 1.39 10*3/mm3      Monocytes, Absolute 0.35 10*3/mm3      Eosinophils, Absolute 0.08 10*3/mm3      Basophils, Absolute 0.02 10*3/mm3      Immature Grans, Absolute 0.01 10*3/mm3      nRBC 0.0 /100 WBC     MRSA Screen, PCR (Inpatient) - Swab, Nares [[id number]]  (Normal) Collected: 08/07/21 0559    Specimen: Swab from Nares Updated: 08/07/21 0718     MRSA, PCR Negative    Narrative:      Performed by real-time polymerase chain reaction (qPCR).    POC Glucose Once [[id number]]  (Normal) Collected: 08/07/21 0610    Specimen: Blood Updated: 08/07/21 0626     Glucose 98 mg/dL      Comment: RN NotifiedOperator: [id number] [facility] ID: [id number]       Procalcitonin [[id number]]  (Normal) Collected: 08/06/21 1705    Specimen: Blood Updated: 08/06/21 2152     Procalcitonin 0.21 ng/mL     Narrative:      As a Marker for Sepsis (Non-Neonates):     1. <0.5 ng/mL represents a low risk of severe sepsis and/or septic shock.  2. >2 ng/mL represents a high risk of severe sepsis and/or septic shock.    As a Marker for Lower Respiratory Tract Infections that require antibiotic therapy:  PCT on Admission     Antibiotic Therapy             6-12 Hrs later  >0.5                          Strongly Recommended            >0.25 - <0.5             Recommended  0.1 - 0.25                   "Discouraged                       Remeasure/reassess PCT  <0.1                         Strongly Discouraged         Remeasure/reassess PCT      As 28 day mortality risk marker: \"Change in Procalcitonin Result\" (>80% or <=80%) if Day 0 (or Day 1) and Day 4 values are available. Refer to http://www.Intersect ENTpct-calculator.com/    Change in PCT <=80 %   A decrease of PCT levels below or equal to 80% defines a positive change in PCT test result representing a higher risk for 28-day all-cause mortality of patients diagnosed with severe sepsis or septic shock.    Change in PCT >80 %   A decrease of PCT levels of more than 80% defines a negative change in PCT result representing a lower risk for 28-day all-cause mortality of patients diagnosed with severe sepsis or septic shock.              Results may be falsely decreased if patient taking Biotin.     Ferritin [[id number]]  (Abnormal) Collected: 08/06/21 1705    Specimen: Blood Updated: 08/06/21 2151     Ferritin 342.20 ng/mL     Narrative:      Results may be falsely decreased if patient taking Biotin.      TSH [[id number]]  (Normal) Collected: 08/06/21 1705    Specimen: Blood Updated: 08/06/21 2151     TSH 1.490 uIU/mL     Iron Profile [[id number]]  (Abnormal) Collected: 08/06/21 1705    Specimen: Blood Updated: 08/06/21 2145     Iron 106 mcg/dL      Iron Saturation 56 %      Transferrin 128 mg/dL      TIBC 191 mcg/dL     COVID-19 and FLU A/B PCR - Swab, Nasopharynx [[id number]]  (Normal) Collected: 08/06/21 1943    Specimen: Swab from Nasopharynx Updated: 08/06/21 2014     COVID19 Not Detected     Influenza A PCR Not Detected     Influenza B PCR Not Detected    Narrative:      Fact sheet for providers: https://www.fda.gov/media/272729/download    Fact sheet for patients: https://www.fda.gov/media/054473/download    Test performed by PCR.    STAT Lactic Acid, Reflex [[id number]]  (Abnormal) Collected: 08/06/21 1932    Specimen: Blood from Arm, Right Updated: 08/1952     " Lactate 2.5 mmol/L     Blood Culture - Blood, Arm, Right [[id number]] Collected: 08/06/21 1931    Specimen: Blood from Arm, Right Updated: 08/06/21 1934    Manual Differential [[id number]] Collected: 08/06/21 1705    Specimen: Blood Updated: 08/06/21 1846     Neutrophil % 53.0 %      Lymphocyte % 36.0 %      Monocyte % 6.0 %      Eosinophil % 4.0 %      Basophil % 1.0 %      Neutrophils Absolute 2.24 10*3/mm3      Lymphocytes Absolute 1.52 10*3/mm3      Monocytes Absolute 0.25 10*3/mm3      Eosinophils Absolute 0.17 10*3/mm3      Basophils Absolute 0.04 10*3/mm3      Anisocytosis Slight/1+     Hypochromia Slight/1+     WBC Morphology Normal     Platelet Estimate Decreased    Urinalysis With Culture If Indicated - Urine, Catheter In/Out [[id number]]  (Abnormal) Collected: 08/06/21 1756    Specimen: Urine, Catheter In/Out Updated: 08/06/21 1825     Color, UA Dark Yellow     Appearance, UA Clear     pH, UA 6.0     Specific Gravity, UA 1.021     Glucose, UA Negative     Ketones, UA Trace     Bilirubin, UA Small (1+)     Blood, UA Negative     Protein, UA Negative     Leuk Esterase, UA Negative     Nitrite, UA Negative     Urobilinogen, UA >=8.0 E.U./dL    Narrative:      Urine microscopic not indicated.    Extra Tubes [[id number]] Collected: 08/06/21 1705    Specimen: Blood Updated: 08/06/21 1815    Narrative:      The following orders were created for panel order Extra Tubes.  Procedure                               Abnormality         Status                     ---------                               -----------         ------                     Gold Top - SST[[id number]]                                   Final result                 Please view results for these tests on the individual orders.    Gold Top - SST [[id number]] Collected: 08/06/21 1705    Specimen: Blood Updated: 08/06/21 1815     Extra Tube Hold for add-ons.     Comment: Auto resulted.       Lactic Acid, Plasma [[id number]]  (Abnormal) Collected: 08/06/21  1705    Specimen: Blood Updated: 08/06/21 1728     Lactate 2.9 mmol/L     Comprehensive Metabolic Panel [[id number]]  (Abnormal) Collected: 08/06/21 1705    Specimen: Blood Updated: 08/06/21 1727     Glucose 142 mg/dL      BUN 12 mg/dL      Creatinine 0.97 mg/dL      Sodium 141 mmol/L      Potassium 3.5 mmol/L      Chloride 110 mmol/L      CO2 25.0 mmol/L      Calcium 9.4 mg/dL      Total Protein 6.3 g/dL      Albumin 1.90 g/dL      ALT (SGPT) 115 U/L      AST (SGOT) 129 U/L      Alkaline Phosphatase 84 U/L      Total Bilirubin 1.0 mg/dL      eGFR  African Amer 69 mL/min/1.73      Globulin 4.4 gm/dL      A/G Ratio 0.4 g/dL      BUN/Creatinine Ratio 12.4     Anion Gap 6.0 mmol/L     Narrative:      GFR Normal >60  Chronic Kidney Disease <60  Kidney Failure <15      CK [[id number]]  (Normal) Collected: 08/06/21 1705    Specimen: Blood Updated: 08/06/21 1727     Creatine Kinase 77 U/L     Magnesium [[id number]]  (Normal) Collected: 08/06/21 1705    Specimen: Blood Updated: 08/06/21 1727     Magnesium 1.6 mg/dL     Troponin [[id number]]  (Normal) Collected: 08/06/21 1705    Specimen: Blood Updated: 08/06/21 1726     Troponin T <0.010 ng/mL     Narrative:      Troponin T Reference Range:  <= 0.03 ng/mL-   Negative for AMI  >0.03 ng/mL-     Abnormal for myocardial necrosis.  Clinicians would have to utilize clinical acumen, EKG, Troponin and serial changes to determine if it is an Acute Myocardial Infarction or myocardial injury due to an underlying chronic condition.       Results may be falsely decreased if patient taking Biotin.      CBC & Differential [[id number]]  (Abnormal) Collected: 08/06/21 1705    Specimen: Blood Updated: 08/06/21 1715    Narrative:      The following orders were created for panel order CBC & Differential.  Procedure                               Abnormality         Status                     ---------                               -----------         ------                     CBC Auto  Differential[[id number]]        Abnormal            Final result               Scan Slide[[id number]]                                                                    Please view results for these tests on the individual orders.    CBC Auto Differential [[id number]]  (Abnormal) Collected: 08/06/21 1705    Specimen: Blood Updated: 08/06/21 1714     WBC 4.22 10*3/mm3      RBC 3.28 10*6/mm3      Hemoglobin 10.6 g/dL      Hematocrit 31.8 %      MCV 97.0 fL      MCH 32.3 pg      MCHC 33.3 g/dL      RDW 15.7 %      RDW-SD 55.7 fl      MPV 11.0 fL      Platelets 45 10*3/mm3      Neutrophil % 52.1 %      Lymphocyte % 35.1 %      Monocyte % 10.9 %      Eosinophil % 1.2 %      Basophil % 0.5 %      Immature Grans % 0.2 %      Neutrophils, Absolute 2.20 10*3/mm3      Lymphocytes, Absolute 1.48 10*3/mm3      Monocytes, Absolute 0.46 10*3/mm3      Eosinophils, Absolute 0.05 10*3/mm3      Basophils, Absolute 0.02 10*3/mm3      Immature Grans, Absolute 0.01 10*3/mm3      nRBC 0.0 /100 WBC     Blood Culture - Blood, Arm, Left [[id number]] Collected: 08/06/21 1704    Specimen: Blood from Arm, Left Updated: 08/06/21 1704        Imaging Results (Last 72 Hours)     Procedure Component Value Units Date/Time    CT Head Without Contrast [[id number]] Collected: 08/06/21 1830     Updated: 08/06/21 1849    Narrative:        CT Head Without Contrast    History: Mental status change    Axial scans of the brain were obtained without intravenous  contrast.  Coronal and sagital reconstructions were preformed.    This exam was performed according to our departmental  dose-optimization program, which includes automated exposure  control, adjustment of the mA and/or kV according to patient size  and/or use of iterative reconstruction technique.    DLP: 813.90    Comparison: August 28, 2020    Findings:  Bone windows are unremarkable.  Minimal fluid left mastoid air cells.    No acute intracranial process.  Cerebral and cerebellar atrophy.  Minimal  small vessel disease.  No hemorrhage.  No mass.  No abnormal areas of increased attenuation.  No midline shift.  No abnormal extra-axial fluid collections.      Impression:      CONCLUSION:  No acute intracranial process.  Cerebral and cerebellar atrophy.  Minimal small vessel disease.  Minimal fluid left mastoid air cells.    [id number]    Electronically signed by:  Waylon Larkin MD  8/6/2021 6:48 PM CDT  Workstation: [email or web address]    XR Chest 1 View [[id number]] Collected: 08/06/21 1638     Updated: 08/06/21 1658    Narrative:        PORTABLE CHEST    HISTORY: Altered mental status    Portable AP upright film of the chest was obtained at 4:38 PM.  COMPARISON: February 5, 2021    FINDINGS:   The lungs are clear of an acute process.  Old granulomatous disease is present.  The heart is not enlarged.  The pulmonary vasculature is not increased.  No pleural effusion.  No pneumothorax.  No acute osseous abnormality.  Degenerative changes are present in the thoracic spine.      Impression:      CONCLUSION:  No Acute Disease    [id number]    Electronically signed by:  Waylon Larkin MD  8/6/2021 4:57 PM CDT  Workstation: [email or web address]          Assessment/Plan       Altered mental status, unspecified    Schizophrenia, paranoid type (CMS/HCC)    DMII (diabetes mellitus, type 2) (CMS/HCC)    Hypotension    Lactic acidosis    Thrombocytopenia (CMS/HCC)      Recommendations:    I have some concern that this may be catatonic episodes.  Will taper off risperdal and start abilify to reduce D2 blockade.  Will titrated abilify to 10mg with dinner.  Will consider increase to 15mg or 20mg as appropriate.  Will not start ativan at present.  She is not currently catatonic.  Patient was responsive during the posturing I noticed today.    Will consider need for psych admission for further observation and management once medically stable.    Thank you for allowing me to participate in the care of this patient.  Please contact me with any further  questions or concerns.    Ellie Raza MD  08/07/21  13:20 CDT

## 2021-08-07 NOTE — PROGRESS NOTES
"  Pharmacokinetics by Pharmacy - Vancomycin    Natacha Gandhi is a 71 y.o. female   [Ht: 160 cm (63\"); Wt: 57.7 kg (127 lb 4.8 oz)]    Estimated Creatinine Clearance: 49.9 mL/min (by C-G formula based on SCr of 0.97 mg/dL).   Creatinine   Date Value Ref Range Status   08/06/2021 0.97 0.57 - 1.00 mg/dL Final   08/03/2021 1.05 (H) 0.57 - 1.00 mg/dL Final   02/06/2021 0.84 0.57 - 1.00 mg/dL Final      Lab Results   Component Value Date    WBC 4.22 08/06/2021    WBC 3.96 08/03/2021    WBC 3.41 02/06/2021      Temp Readings from Last 1 Encounters:   08/07/21 96.8 °F (36 °C) (Oral)      Lactate   Date Value Ref Range Status   08/06/2021 2.5 (C) 0.5 - 2.0 mmol/L Final   08/06/2021 2.9 (C) 0.5 - 2.0 mmol/L Final   08/28/2020 5.2 (C) 0.5 - 2.0 mmol/L Final       Indication for use: sepsis     Baseline culture results:  Microbiology Results (last 10 days)       Procedure Component Value - Date/Time    MRSA Screen, PCR (Inpatient) - Swab, Nares [[id number]]  (Normal) Collected: 08/07/21 0559    Lab Status: Final result Specimen: Swab from Nares Updated: 08/07/21 0718     MRSA, PCR Negative    Narrative:      Performed by real-time polymerase chain reaction (qPCR).    COVID-19 and FLU A/B PCR - Swab, Nasopharynx [[id number]]  (Normal) Collected: 08/06/21 1943    Lab Status: Final result Specimen: Swab from Nasopharynx Updated: 08/06/21 2014     COVID19 Not Detected     Influenza A PCR Not Detected     Influenza B PCR Not Detected    Narrative:      Fact sheet for providers: https://www.fda.gov/media/480013/download    Fact sheet for patients: https://www.fda.gov/media/758312/download    Test performed by PCR.    Urine Culture - Urine, Urine, Clean Catch [[id number]] Collected: 08/03/21 2137    Lab Status: Final result Specimen: Urine, Clean Catch Updated: 08/04/21 1327     Urine Culture 25,000 CFU/mL Mixed [place] Isolated    Narrative:      Specimen contains mixed organisms of questionable pathogenicity which indicates " contamination with commensal maddi.  Further identification is unlikely to provide clinically useful information.  Suggest recollection.            Assessment/Plan  Initiated Vancomycin 1250 mg x 1 dose 8/6 at 2036 then dosed at 1000 mg IVPB Q24H. Will order Vancomycin peak level 8/8 at 2300 and  trough level 8/9 at 2030.  Calculated AUC is 540.69 (goal 400-600), calculated trough level is 12.66  (goal 10-20) and calculated peak level is 36.3 (goal 30-40).  Blood cultures pending.  Pharmacy will monitor renal function and adjust dose accordingly.    Marlin Bowers, PharmD  08/07/21 09:01 CDT

## 2021-08-07 NOTE — THERAPY EVALUATION
Acute Care - Speech Language Pathology   Swallow Initial Evaluation [facility]     Patient Name: Natacha Gandhi  : 1950  MRN: [id number]  Today's Date: 2021               Admit Date: 2021    Visit Dx:     ICD-10-CM ICD-9-CM   1. Hypotension, unspecified hypotension type  I95.9 458.9   2. Altered mental status, unspecified altered mental status type  R41.82 780.97   3. Impaired functional mobility, balance, gait, and endurance  Z74.09 V49.89   4. Oropharyngeal dysphagia  R13.12 787.22     Patient Active Problem List   Diagnosis   • Acute GI bleeding   • Altered mental status, unspecified   • Schizophrenia, paranoid type (CMS/HCC)   • Acute exacerbation of chronic paranoid schizophrenia (CMS/HCC)   • Acute blood loss anemia   • DMII (diabetes mellitus, type 2) (CMS/[facility])   • Hypotension   • Lactic acidosis   • Thrombocytopenia (CMS/HCC)     Past Medical History:   Diagnosis Date   • Bipolar disorder, unspecified (CMS/[facility])    • Depression    • Diabetes mellitus (CMS/HCC)    • GERD (gastroesophageal reflux disease)    • Hypertension    • Legal blindness    • Psychiatric illness    • Rheumatoid arthritis (CMS/[facility])    • Schizoaffective disorder (CMS/[facility])      Past Surgical History:   Procedure Laterality Date   • COLONOSCOPY N/A 2020    Procedure: COLONOSCOPY;  Surgeon: Ge Gorman MD;  Location: [facility];  Service: Gastroenterology;  Laterality: N/A;   • ENDOSCOPY N/A 2020    Procedure: ESOPHAGOGASTRODUODENOSCOPY;  Surgeon: Ge Gorman MD;  Location: [facility];  Service: Gastroenterology;  Laterality: N/A;       SLP Recommendation and Plan  SLP Swallowing Diagnosis: mild, oral dysphagia  SLP Diet Recommendation: soft textures, ground, thin liquids  Recommended Precautions and Strategies: upright posture during/after eating, small bites of food and sips of liquid, multiple swallows per bite of food, multiple swallows per sip of liquid, alternate between small bites of  food and sips of liquid, check mouth frequently for oral residue/pocketing  SLP Rec. for Method of Medication Administration: meds whole, with thin liquids, with pudding or applesauce, as tolerated     Monitor for Signs of Aspiration: yes, notify SLP if any concerns     Swallow Criteria for Skilled Therapeutic Interventions Met: demonstrates skilled criteria     Rehab Potential/Prognosis, Swallowing: good, to achieve stated therapy goals  Therapy Frequency (Swallow): 3 days per week, 5 days per week  Predicted Duration Therapy Intervention (Days): until discharge                         Plan of Care Reviewed With: patient  Progress: improving  Outcome Summary: Swallow evaluation completed. Pt with mild difficulty chewing therefore SLP initiated mech soft with ground meat and thin liquids. Pt to follow up for 1-2 visits for diet tolerance and diet safety.         SWALLOW EVALUATION (last 72 hours)      SLP Adult Swallow Evaluation     Row Name 08/07/21 1221                   Rehab Evaluation    Document Type  evaluation  -EK        Subjective Information  no complaints  -EK        Patient Observations  alert;cooperative;agree to therapy  -EK        Care Plan Review  evaluation/treatment results reviewed  -EK        Patient Effort  good  -EK           General Information    Patient Profile Reviewed  yes  -EK        Current Method of Nutrition  NPO  -EK        Precautions/Limitations, Vision  vision impairment, bilaterally  -EK        Precautions/Limitations, Hearing  WFL  -EK        Plans/Goals Discussed with  patient  -EK           Oral Motor Structure and Function    Dentition Assessment  missing teeth  -EK        Secretion Management  WNL/WFL  -EK        Volitional Swallow  WFL  -EK        Volitional Cough  WFL  -EK           Oral Musculature and Cranial Nerve Assessment    Oral Motor General Assessment  WFL  -EK           General Eating/Swallowing Observations    Respiratory Support Currently in Use  room air  -EK         Eating/Swallowing Skills  fed by SLP  -EK        Positioning During Eating  upright 90 degree;upright in bed  -EK        Utensils Used  cup;straw  -EK        Consistencies Trialed  regular textures;pureed;thin liquids  -EK           Clinical Swallow Eval    Oral Prep Phase  impaired  -EK        Oral Transit  WFL  -EK        Oral Residue  WFL  -EK        Pharyngeal Phase  WFL  -EK        Clinical Swallow Evaluation Summary  Swallow evaluation completed. Pt with mild difficulty chewing therefore SLP initiated [facility] soft with ground meat and thin liquids. Pt to follow up for 1-2 visits for diet tolerance and diet safety.   -EK           Clinical Impression    SLP Swallowing Diagnosis  mild;oral dysphagia  -EK        Functional Impact  risk of aspiration/pneumonia;risk of malnutrition;risk of dehydration  -EK        Rehab Potential/Prognosis, Swallowing  good, to achieve stated therapy goals  -EK        Swallow Criteria for Skilled Therapeutic Interventions Met  demonstrates skilled criteria  -EK           Recommendations    Therapy Frequency (Swallow)  3 days per week;5 days per week  -EK        Predicted Duration Therapy Intervention (Days)  until discharge  -EK        SLP Diet Recommendation  soft textures;ground;thin liquids  -EK        Recommended Precautions and Strategies  upright posture during/after eating;small bites of food and sips of liquid;multiple swallows per bite of food;multiple swallows per sip of liquid;alternate between small bites of food and sips of liquid;check mouth frequently for oral residue/pocketing  -EK        Oral Care Recommendations  Oral Care BID/PRN  -EK        SLP Rec. for Method of Medication Administration  meds whole;with thin liquids;with pudding or applesauce;as tolerated  -EK        Monitor for Signs of Aspiration  yes;notify SLP if any concerns  -EK           Swallow Goals (SLP)    Oral Nutrition/Hydration Goal Selection (SLP)  oral nutrition/hydration, SLP goal 1  -EK            Oral Nutrition/Hydration Goal 1 (SLP)    Oral Nutrition/Hydration Goal 1, SLP  Pt to tolerate least restrictive diet with no s/s of aspiration for adequate nutrition and hydration.   -EK        Time Frame (Oral Nutrition/Hydration Goal 1, SLP)  by discharge  -EK        Progress/Outcomes (Oral Nutrition/Hydration Goal 1, SLP)  other (see comments) new goal  -EK          User Key  (r) = Recorded By, (t) = Taken By, (c) = Cosigned By    Initials Name Effective Dates    Savanna García CCC-SLP 06/16/21 -           EDUCATION  The patient has been educated in the following areas:   Dysphagia (Swallowing Impairment).       SLP GOALS     Row Name 08/07/21 1221             Oral Nutrition/Hydration Goal 1 (SLP)    Oral Nutrition/Hydration Goal 1, SLP  Pt to tolerate least restrictive diet with no s/s of aspiration for adequate nutrition and hydration.   -EK      Time Frame (Oral Nutrition/Hydration Goal 1, SLP)  by discharge  -EK      Progress/Outcomes (Oral Nutrition/Hydration Goal 1, SLP)  other (see comments) new goal  -EK        User Key  (r) = Recorded By, (t) = Taken By, (c) = Cosigned By    Initials Name Provider Type    Savanna García CCC-SLP Speech and Language Pathologist             Time Calculation:   Time Calculation- SLP     Row Name 08/07/21 1402             Time Calculation- SLP    SLP Start Time  1221  -EK      SLP Stop Time  1236  -EK      SLP Time Calculation (min)  15 min  -EK      SLP Received On  08/07/21  -EK      SLP Goal Re-Cert Due Date  08/20/21  -EK        User Key  (r) = Recorded By, (t) = Taken By, (c) = Cosigned By    Initials Name Provider Type    Savanna García CCC-SLP Speech and Language Pathologist          Therapy Charges for Today     Code Description Service Date Service Provider Modifiers Qty    [id number] HC ST EVAL ORAL PHARYNG SWALLOW 1 8/7/2021 Savanna Parikh CCC-SLP GN 1               Savanna Condon  Jl, CCC-SLP  8/7/2021

## 2021-08-07 NOTE — H&P
[facility] Medicine Admission      Date of Admission: 8/6/2021      Primary Care Physician: Alisia Ramirez APRN      Chief Complaint: AMS    HPI: Patient is a 71-year-old -American female with medical history notable for schizophrenia and diabetes who presented to the emergency department from rehab facility due to altered mental status.  There is no family or relatives available during my evaluation for further history so history is obtained from chart review.  Patient is unable to obtain or provide any further history from.  Patient is able to state her name and that she is in the hospital but cannot provide any further details.    Evaluation emergency department was notable for a CBC that showed a hemoglobin of 10.6, hematocrit 31.8 with platelets of 45.  Patient has a history of thrombocytopenia noted previously but not as severe Gordon currently.  Troponin was normal.  Magnesium and creatine kinase were normal.  Lactate was elevated 2.9.  CMP showed glucose 142 with a chloride of 110, albumin 1.9, , .  Urinalysis did not really show any signs of of infection at this time.  X-ray was negative for acute cardiopulmonary process and CT of her head was also negative.    Concurrent Medical History:  has a past medical history of Depression, Psychiatric illness, and Schizoaffective disorder (CMS/HCC).    Past Surgical History:  has a past surgical history that includes Colonoscopy (N/A, 8/29/2020) and Esophagogastroduodenoscopy (N/A, 8/28/2020).    Family History: family history includes Dementia in her mother; No Known Problems in her brother, cousin, father, maternal aunt, maternal grandfather, maternal grandmother, maternal uncle, paternal aunt, paternal grandfather, paternal grandmother, paternal uncle, sister, and another family member.  Unable to assess due to patient's altered status.    Social History:  reports that she has quit smoking. She  has never used smokeless tobacco. She reports previous alcohol use. She reports that she does not use drugs.    Allergies: No Known Allergies    Medications:   Prior to Admission medications    Medication Sig Start Date End Date Taking? Authorizing Provider   Emollient (BAG BALM EX) Apply  topically.   Yes Terence Rodriguez MD   Menthol-Zinc Oxide (Calmoseptine) 0.44-20.6 % ointment Apply  topically to the appropriate area as directed Every 12 (Twelve) Hours.   Yes Terence Rodriguez MD   metFORMIN (GLUCOPHAGE) 500 MG tablet Take 500 mg by mouth 2 (Two) Times a Day With Meals.   Yes Terence Rodriguez MD   tuberculin (Tubersol) 5 UNIT/0.1ML injection Inject 5 Units into the appropriate area of the skin as directed by provider 1 (One) Time.   Yes Terence Rodriguez MD   cephalexin (KEFLEX) 500 MG capsule Take 1 capsule by mouth 3 (Three) Times a Day. 8/3/21   Koko Dias MD   Cholecalciferol 125 MCG (5000 UT) tablet Take 5,000 Units by mouth Daily.    Terence Rodriguez MD   escitalopram (LEXAPRO) 10 MG tablet Take 1 tablet by mouth Daily. Indications: Major Depressive Disorder 9/25/20   Ellie Raza MD   ferrous sulfate (FerrouSul) 325 (65 FE) MG tablet Take 1 tablet by mouth Daily With Breakfast. Indications: Anemia From Inadequate Iron in the Body 9/24/20   Ellie Raza MD   glipiZIDE-metFORMIN (METAGLIP) 2.5-500 MG per tablet Take 1 tablet by mouth 2 (Two) Times a Day Before Meals.    Terence Rodriguez MD   multivitamin with minerals (MULTIVITAMIN ADULT PO) Take 1 tablet by mouth Daily.    Terence Rodriguez MD   ondansetron ODT (ZOFRAN-ODT) 4 MG disintegrating tablet Place 1 tablet on the tongue Every 6 (Six) Hours As Needed for Nausea or Vomiting. 1/15/21   Koko Dias MD   pantoprazole (PROTONIX) 40 MG EC tablet Take 1 tablet by mouth Daily. Indications: Gastroesophageal Reflux Disease 9/24/20   Ellie Raza MD   potassium chloride 10 MEQ CR  tablet Take 1 tablet by mouth 2 (Two) Times a Day. 1/15/21   Koko Dias MD   risperiDONE (risperDAL) 3 MG tablet Take 1 tablet by mouth Daily With Dinner. STOP SEROQUEL (QUETIAPINE)  Indications: Schizophrenia 9/24/20   Ellie Raza MD       Review of Systems:  Review of Systems   Unable to perform ROS: Mental status change      Otherwise complete ROS is negative except as mentioned above.    Physical Exam:   Temp:  [98.5 °F (36.9 °C)] 98.5 °F (36.9 °C)  Heart Rate:  [87-91] 91  Resp:  [16-18] 18  BP: ()/(56-65) 123/65  Physical Exam  Constitutional:       Comments: Elderly appearing -American female does not appear to be in distress in bed.   HENT:      Head: Normocephalic and atraumatic.      Right Ear: External ear normal.      Left Ear: External ear normal.      Nose: Nose normal.      Mouth/Throat:      Mouth: Mucous membranes are moist.      Pharynx: Oropharynx is clear.   Eyes:      Conjunctiva/sclera: Conjunctivae normal.   Cardiovascular:      Rate and Rhythm: Normal rate and regular rhythm.      Pulses: Normal pulses.      Heart sounds: Normal heart sounds.   Pulmonary:      Effort: Pulmonary effort is normal. No respiratory distress.      Breath sounds: Normal breath sounds.   Abdominal:      General: Bowel sounds are normal.      Palpations: Abdomen is soft.      Tenderness: There is no abdominal tenderness.   Musculoskeletal:         General: No swelling.      Cervical back: Neck supple.   Skin:     General: Skin is warm and dry.      Capillary Refill: Capillary refill takes less than 2 seconds.   Neurological:      Comments: Patient can state her name and that she is in the hospital, she does move extremities and her speech is fluent.  She is not oriented otherwise.   Psychiatric:      Comments: Pleasantly confused currently.           Results Reviewed:  I have personally reviewed current lab, radiology, and data and agree with results.  Lab Results (last 24 hours)      Procedure Component Value Units Date/Time    Manual Differential [[id number]] Collected: 08/06/21 1705    Specimen: Blood Updated: 08/06/21 1846     Neutrophil % 53.0 %      Lymphocyte % 36.0 %      Monocyte % 6.0 %      Eosinophil % 4.0 %      Basophil % 1.0 %      Neutrophils Absolute 2.24 10*3/mm3      Lymphocytes Absolute 1.52 10*3/mm3      Monocytes Absolute 0.25 10*3/mm3      Eosinophils Absolute 0.17 10*3/mm3      Basophils Absolute 0.04 10*3/mm3      Anisocytosis Slight/1+     Hypochromia Slight/1+     WBC Morphology Normal     Platelet Estimate Decreased    Urinalysis With Culture If Indicated - Urine, Catheter In/Out [[id number]]  (Abnormal) Collected: 08/06/21 1756    Specimen: Urine, Catheter In/Out Updated: 08/06/21 1825     Color, UA Dark Yellow     Appearance, UA Clear     pH, UA 6.0     Specific Gravity, UA 1.021     Glucose, UA Negative     Ketones, UA Trace     Bilirubin, UA Small (1+)     Blood, UA Negative     Protein, UA Negative     Leuk Esterase, UA Negative     Nitrite, UA Negative     Urobilinogen, UA >=8.0 E.U./dL    Narrative:      Urine microscopic not indicated.    Extra Tubes [[id number]] Collected: 08/06/21 1705    Specimen: Blood, Venous Line Updated: 08/06/21 1815    Narrative:      The following orders were created for panel order Extra Tubes.  Procedure                               Abnormality         Status                     ---------                               -----------         ------                     Gold Top - SST[[id number]]                                   Final result                 Please view results for these tests on the individual orders.    Gold Top - SST [[id number]] Collected: 08/06/21 1705    Specimen: Blood Updated: 08/06/21 1815     Extra Tube Hold for add-ons.     Comment: Auto resulted.       Lactic Acid, Plasma [[id number]]  (Abnormal) Collected: 08/06/21 1705    Specimen: Blood Updated: 08/06/21 1728     Lactate 2.9 mmol/L     Comprehensive Metabolic  Panel [[id number]]  (Abnormal) Collected: 08/06/21 1705    Specimen: Blood Updated: 08/06/21 1727     Glucose 142 mg/dL      BUN 12 mg/dL      Creatinine 0.97 mg/dL      Sodium 141 mmol/L      Potassium 3.5 mmol/L      Chloride 110 mmol/L      CO2 25.0 mmol/L      Calcium 9.4 mg/dL      Total Protein 6.3 g/dL      Albumin 1.90 g/dL      ALT (SGPT) 115 U/L      AST (SGOT) 129 U/L      Alkaline Phosphatase 84 U/L      Total Bilirubin 1.0 mg/dL      eGFR  African Amer 69 mL/min/1.73      Globulin 4.4 gm/dL      A/G Ratio 0.4 g/dL      BUN/Creatinine Ratio 12.4     Anion Gap 6.0 mmol/L     Narrative:      GFR Normal >60  Chronic Kidney Disease <60  Kidney Failure <15      CK [[id number]]  (Normal) Collected: 08/06/21 1705    Specimen: Blood Updated: 08/06/21 1727     Creatine Kinase 77 U/L     Magnesium [[id number]]  (Normal) Collected: 08/06/21 1705    Specimen: Blood Updated: 08/06/21 1727     Magnesium 1.6 mg/dL     Troponin [[id number]]  (Normal) Collected: 08/06/21 1705    Specimen: Blood Updated: 08/06/21 1726     Troponin T <0.010 ng/mL     Narrative:      Troponin T Reference Range:  <= 0.03 ng/mL-   Negative for AMI  >0.03 ng/mL-     Abnormal for myocardial necrosis.  Clinicians would have to utilize clinical acumen, EKG, Troponin and serial changes to determine if it is an Acute Myocardial Infarction or myocardial injury due to an underlying chronic condition.       Results may be falsely decreased if patient taking Biotin.      CBC & Differential [[id number]]  (Abnormal) Collected: 08/06/21 1705    Specimen: Blood Updated: 08/06/21 1715    Narrative:      The following orders were created for panel order CBC & Differential.  Procedure                               Abnormality         Status                     ---------                               -----------         ------                     CBC Auto Differential[[id number]]        Abnormal            Final result               Scan Slide[[id number]]                                                                     Please view results for these tests on the individual orders.    CBC Auto Differential [[id number]]  (Abnormal) Collected: 08/06/21 1705    Specimen: Blood Updated: 08/06/21 1714     WBC 4.22 10*3/mm3      RBC 3.28 10*6/mm3      Hemoglobin 10.6 g/dL      Hematocrit 31.8 %      MCV 97.0 fL      MCH 32.3 pg      MCHC 33.3 g/dL      RDW 15.7 %      RDW-SD 55.7 fl      MPV 11.0 fL      Platelets 45 10*3/mm3      Neutrophil % 52.1 %      Lymphocyte % 35.1 %      Monocyte % 10.9 %      Eosinophil % 1.2 %      Basophil % 0.5 %      Immature Grans % 0.2 %      Neutrophils, Absolute 2.20 10*3/mm3      Lymphocytes, Absolute 1.48 10*3/mm3      Monocytes, Absolute 0.46 10*3/mm3      Eosinophils, Absolute 0.05 10*3/mm3      Basophils, Absolute 0.02 10*3/mm3      Immature Grans, Absolute 0.01 10*3/mm3      nRBC 0.0 /100 WBC     Blood Culture - Blood, Arm, Left [[id number]] Collected: 08/06/21 1704    Specimen: Blood from Arm, Left Updated: 08/06/21 1704        Imaging Results (Last 24 Hours)     Procedure Component Value Units Date/Time    CT Head Without Contrast [[id number]] Collected: 08/06/21 1830     Updated: 08/06/21 1849    Narrative:        CT Head Without Contrast    History: Mental status change    Axial scans of the brain were obtained without intravenous  contrast.  Coronal and sagital reconstructions were preformed.    This exam was performed according to our departmental  dose-optimization program, which includes automated exposure  control, adjustment of the mA and/or kV according to patient size  and/or use of iterative reconstruction technique.    DLP: 813.90    Comparison: August 28, 2020    Findings:  Bone windows are unremarkable.  Minimal fluid left mastoid air cells.    No acute intracranial process.  Cerebral and cerebellar atrophy.  Minimal small vessel disease.  No hemorrhage.  No mass.  No abnormal areas of increased attenuation.  No midline shift.  No  abnormal extra-axial fluid collections.      Impression:      CONCLUSION:  No acute intracranial process.  Cerebral and cerebellar atrophy.  Minimal small vessel disease.  Minimal fluid left mastoid air cells.    [id number]    Electronically signed by:  Waylon Larkin MD  8/6/2021 6:48 PM CDT  Workstation: [email or web address]    XR Chest 1 View [[id number]] Collected: 08/06/21 1638     Updated: 08/06/21 1658    Narrative:        PORTABLE CHEST    HISTORY: Altered mental status    Portable AP upright film of the chest was obtained at 4:38 PM.  COMPARISON: February 5, 2021    FINDINGS:   The lungs are clear of an acute process.  Old granulomatous disease is present.  The heart is not enlarged.  The pulmonary vasculature is not increased.  No pleural effusion.  No pneumothorax.  No acute osseous abnormality.  Degenerative changes are present in the thoracic spine.      Impression:      CONCLUSION:  No Acute Disease    [id number]    Electronically signed by:  Waylon Larkin MD  8/6/2021 4:57 PM CDT  Workstation: [email or web address]            Assessment:    Active Hospital Problems    Diagnosis    • Hypotension    • Lactic acidosis    • Thrombocytopenia (CMS/HCC)    • Schizophrenia, paranoid type (CMS/HCC)    • Altered mental status, unspecified              Plan:  -Patient will be admitted for observation  -We will continue with IV fluids as patient did have some noted hypotension in the emergency department and continue with normal saline for now  -She does have some worsening thrombocytopenia so we will check iron studies and TSH on her  -For now we will also continue with IV antibiotics and see about having pharmacy to dose vancomycin and cefepime.  -MRSA swab will be obtained  -Blood cultures have been collected in the emergency department  -Patient was noted to have a foul urine smell while in the ER, I will repeat her urinalysis as I am somewhat skeptical that she does not have a urinary tract infection currently.  -For now we will  also make her n.p.o. except for sips and ice chips with medicines  -Patient's lactic acidosis may be related to infection but may also be related to her Metformin so we will hold this and monitor.  -She will be screen for COVID as well.   -DVT prophylaxis with SCDs  -CODE STATUS: Full    I confirmed that the patient's Advance Care Plan is present, code status is documented, or surrogate decision maker is listed in the patient's medical record.     I have utilized all available immediate resources to obtain, update, or review the patient's current medications.     I discussed the patient's findings and my recommendations with: ED staff    Karan Tsai MD

## 2021-08-07 NOTE — ED PROVIDER NOTES
[facility]   Nursing home was contacted since patient was unable to give history.  The patient's nurse, Nasima, states that patient has had 2 episodes this week of unresponsiveness that lasted roughly 10 to 15 minutes.  The nursing staff was unable to awaken patient with a sternal rub.  She was sent to the emergency department today after her second episode.  She presents to the ER, unable to give any history of her own.  She does have some mild tremors and is confused.      Altered Mental Status  Presenting symptoms: confusion and lethargy    Severity:  Mild  Most recent episode:  Today  Progression:  Improving  Chronicity:  Recurrent      Review of Systems   Unable to perform ROS: Acuity of condition   Constitutional: Positive for activity change.   Neurological: Positive for tremors.   Psychiatric/Behavioral: Positive for confusion.       Past Medical History:   Diagnosis Date   • Bipolar disorder, unspecified (CMS/[facility])    • Depression    • Diabetes mellitus (CMS/[facility])    • GERD (gastroesophageal reflux disease)    • Hypertension    • Legal blindness    • Psychiatric illness    • Rheumatoid arthritis (CMS/HCC)    • Schizoaffective disorder (CMS/[facility])        No Known Allergies    Past Surgical History:   Procedure Laterality Date   • COLONOSCOPY N/A 8/29/2020    Procedure: COLONOSCOPY;  Surgeon: Ge Gorman MD;  Location: [facility];  Service: Gastroenterology;  Laterality: N/A;   • ENDOSCOPY N/A 8/28/2020    Procedure: ESOPHAGOGASTRODUODENOSCOPY;  Surgeon: Ge Gorman MD;  Location: [facility];  Service: Gastroenterology;  Laterality: N/A;       Family History   Problem Relation Age of Onset   • Dementia Mother    • No Known Problems Father    • No Known Problems Sister    • No Known Problems Brother    • No Known Problems Maternal Aunt    • No Known Problems Paternal Aunt    • No Known Problems Maternal Uncle    • No Known Problems Paternal Uncle    • No Known Problems Maternal Grandfather    • No  "Known Problems Maternal Grandmother    • No Known Problems Paternal Grandfather    • No Known Problems Paternal Grandmother    • No Known Problems Cousin    • No Known Problems Other    • Suicide Attempts Neg Hx    • ADD / ADHD Neg Hx    • Alcohol abuse Neg Hx    • Anxiety disorder Neg Hx    • Bipolar disorder Neg Hx    • Depression Neg Hx    • Drug abuse Neg Hx    • OCD Neg Hx    • Paranoid behavior Neg Hx    • Schizophrenia Neg Hx    • Seizures Neg Hx    • Self-Injurious Behavior  Neg Hx        Social History     Socioeconomic History   • Marital status:      Spouse name: Not on file   • Number of children: Not on file   • Years of education: Not on file   • Highest education level: Not on file   Tobacco Use   • Smoking status: Former Smoker   • Smokeless tobacco: Never Used   • Tobacco comment: \" a little bit a long time ago\"   Substance and Sexual Activity   • Alcohol use: Not Currently   • Drug use: Never   • Sexual activity: Not Currently           Objective   Physical Exam  Vitals and nursing note reviewed.   Constitutional:       Appearance: She is well-developed.   HENT:      Head: Normocephalic and atraumatic.      Nose: Nose normal.   Eyes:      General: No scleral icterus.        Right eye: No discharge.         Left eye: No discharge.      Conjunctiva/sclera: Conjunctivae normal.      Pupils: Pupils are equal, round, and reactive to light.   Neck:      Trachea: No tracheal deviation.   Cardiovascular:      Rate and Rhythm: Normal rate and regular rhythm.      Heart sounds: Normal heart sounds. No murmur heard.     Pulmonary:      Effort: Pulmonary effort is normal. No respiratory distress.      Breath sounds: Normal breath sounds. No stridor. No wheezing or rales.   Abdominal:      General: Bowel sounds are normal. There is no distension.      Palpations: Abdomen is soft. There is no mass.      Tenderness: There is no abdominal tenderness. There is no guarding or rebound.   Musculoskeletal:      " Cervical back: Normal range of motion and neck supple.   Skin:     General: Skin is warm and dry.      Findings: No erythema or rash.   Neurological:      Mental Status: She is confused.      Motor: Tremor present.      Coordination: Coordination normal.   Psychiatric:         Speech: Speech is delayed.         Behavior: Behavior is cooperative.         Thought Content: Thought content normal.         Procedures           ED Course              Labs Reviewed   COMPREHENSIVE METABOLIC PANEL - Abnormal; Notable for the following components:       Result Value    Glucose 142 (*)     Chloride 110 (*)     Albumin 1.90 (*)     ALT (SGPT) 115 (*)     AST (SGOT) 129 (*)     All other components within normal limits    Narrative:     GFR Normal >60  Chronic Kidney Disease <60  Kidney Failure <15     URINALYSIS W/ CULTURE IF INDICATED - Abnormal; Notable for the following components:    Ketones, UA Trace (*)     Bilirubin, UA Small (1+) (*)     Urobilinogen, UA >=8.0 E.U./dL (*)     All other components within normal limits    Narrative:     Urine microscopic not indicated.   LACTIC ACID, PLASMA - Abnormal; Notable for the following components:    Lactate 2.9 (*)     All other components within normal limits   CBC WITH AUTO DIFFERENTIAL - Abnormal; Notable for the following components:    RBC 3.28 (*)     Hemoglobin 10.6 (*)     Hematocrit 31.8 (*)     RDW 15.7 (*)     RDW-SD 55.7 (*)     Platelets 45 (*)     All other components within normal limits   LACTIC ACID, REFLEX - Abnormal; Notable for the following components:    Lactate 2.5 (*)     All other components within normal limits   FERRITIN - Abnormal; Notable for the following components:    Ferritin 342.20 (*)     All other components within normal limits    Narrative:     Results may be falsely decreased if patient taking Biotin.     IRON PROFILE - Abnormal; Notable for the following components:    Iron Saturation 56 (*)     Transferrin 128 (*)     TIBC 191 (*)      All other components within normal limits   MAGNESIUM - Abnormal; Notable for the following components:    Magnesium 2.5 (*)     All other components within normal limits   COMPREHENSIVE METABOLIC PANEL - Abnormal; Notable for the following components:    Chloride 115 (*)     Albumin 1.80 (*)     ALT (SGPT) 112 (*)     AST (SGOT) 122 (*)     Anion Gap 4.0 (*)     All other components within normal limits    Narrative:     GFR Normal >60  Chronic Kidney Disease <60  Kidney Failure <15     CBC WITH AUTO DIFFERENTIAL - Abnormal; Notable for the following components:    RBC 3.10 (*)     Hemoglobin 10.1 (*)     Hematocrit 30.2 (*)     MCV 97.4 (*)     RDW 15.9 (*)     RDW-SD 56.4 (*)     Platelets 36 (*)     All other components within normal limits   PROTIME-INR - Abnormal; Notable for the following components:    Protime 21.1 (*)     INR 1.87 (*)     All other components within normal limits    Narrative:     Therapeutic range for most indications is 2.0-3.0 INR,  or 2.5-3.5 for mechanical heart valves.   FERRITIN - Abnormal; Notable for the following components:    Ferritin 351.70 (*)     All other components within normal limits    Narrative:     Results may be falsely decreased if patient taking Biotin.     IRON PROFILE - Abnormal; Notable for the following components:    Iron Saturation 64 (*)     Transferrin 118 (*)     TIBC 176 (*)     All other components within normal limits   VITAMIN B12 - Abnormal; Notable for the following components:    Vitamin B-12 1,716 (*)     All other components within normal limits    Narrative:     Results may be falsely increased if patient taking Biotin.     COMPREHENSIVE METABOLIC PANEL - Abnormal; Notable for the following components:    Glucose 114 (*)     Chloride 114 (*)     CO2 21.0 (*)     Albumin 1.90 (*)     ALT (SGPT) 114 (*)     AST (SGOT) 118 (*)     All other components within normal limits    Narrative:     GFR Normal >60  Chronic Kidney Disease <60  Kidney Failure  <15     CBC WITH AUTO DIFFERENTIAL - Abnormal; Notable for the following components:    RBC 3.14 (*)     Hemoglobin 10.2 (*)     Hematocrit 30.2 (*)     RDW 15.9 (*)     RDW-SD 55.6 (*)     Platelets 39 (*)     All other components within normal limits   RETIC W IRF & RET-HE - Abnormal; Notable for the following components:    Reticulocyte Hgb 40.7 (*)     All other components within normal limits   PROTIME-INR - Abnormal; Notable for the following components:    Protime 19.9 (*)     INR 1.73 (*)     All other components within normal limits    Narrative:     Therapeutic range for most indications is 2.0-3.0 INR,  or 2.5-3.5 for mechanical heart valves.   HEPATITIS B CORE ANTIBODY, TOTAL - Abnormal; Notable for the following components:    Hep B Core Total Ab Positive (*)     All other components within normal limits    Narrative:     Performed at:  [address] [place] [address]  [id number]  : Aaron Kan PhD, Phone:  [phone]   CBC WITH AUTO DIFFERENTIAL - Abnormal; Notable for the following components:    RBC 3.23 (*)     Hemoglobin 10.5 (*)     Hematocrit 31.0 (*)     RDW 15.7 (*)     RDW-SD 55.4 (*)     Platelets 44 (*)     All other components within normal limits   AMMONIA - Abnormal; Notable for the following components:    Ammonia 94 (*)     All other components within normal limits   AMMONIA - Abnormal; Notable for the following components:    Ammonia 55 (*)     All other components within normal limits   PROTIME-INR - Abnormal; Notable for the following components:    Protime 19.6 (*)     INR 1.70 (*)     All other components within normal limits    Narrative:     Therapeutic range for most indications is 2.0-3.0 INR,  or 2.5-3.5 for mechanical heart valves.   COMPREHENSIVE METABOLIC PANEL - Abnormal; Notable for the following components:    Glucose 104 (*)     Chloride 111 (*)     Albumin 1.80 (*)     ALT (SGPT) 112 (*)     AST (SGOT) 133 (*)     All other components  within normal limits    Narrative:     GFR Normal >60  Chronic Kidney Disease <60  Kidney Failure <15     CBC WITH AUTO DIFFERENTIAL - Abnormal; Notable for the following components:    RBC 2.91 (*)     Hemoglobin 9.9 (*)     Hematocrit 28.2 (*)     MCH 34.0 (*)     RDW 15.8 (*)     RDW-SD 55.5 (*)     Platelets 48 (*)     All other components within normal limits   POCT GLUCOSE FINGERSTICK - Abnormal; Notable for the following components:    Glucose 155 (*)     All other components within normal limits   POCT GLUCOSE FINGERSTICK - Abnormal; Notable for the following components:    Glucose 138 (*)     All other components within normal limits   POCT GLUCOSE FINGERSTICK - Abnormal; Notable for the following components:    Glucose 155 (*)     All other components within normal limits   POCT GLUCOSE FINGERSTICK - Abnormal; Notable for the following components:    Glucose 132 (*)     All other components within normal limits   POCT GLUCOSE FINGERSTICK - Abnormal; Notable for the following components:    Glucose 146 (*)     All other components within normal limits   COVID-19 AND FLU A/B, NP SWAB IN TRANSPORT MEDIA 8-12 HR TAT - Normal    Narrative:     Fact sheet for providers: https://www.fda.gov/media/093309/download    Fact sheet for patients: https://www.fda.gov/media/958184/download    Test performed by PCR.   MRSA SCREEN, PCR - Normal    Narrative:     Performed by real-time polymerase chain reaction (qPCR).   CK - Normal   MAGNESIUM - Normal   TROPONIN (IN-HOUSE) - Normal    Narrative:     Troponin T Reference Range:  <= 0.03 ng/mL-   Negative for AMI  >0.03 ng/mL-     Abnormal for myocardial necrosis.  Clinicians would have to utilize clinical acumen, EKG, Troponin and serial changes to determine if it is an Acute Myocardial Infarction or myocardial injury due to an underlying chronic condition.       Results may be falsely decreased if patient taking Biotin.     PROCALCITONIN - Normal    Narrative:     As a  "Marker for Sepsis (Non-Neonates):     1. <0.5 ng/mL represents a low risk of severe sepsis and/or septic shock.  2. >2 ng/mL represents a high risk of severe sepsis and/or septic shock.    As a Marker for Lower Respiratory Tract Infections that require antibiotic therapy:  PCT on Admission     Antibiotic Therapy             6-12 Hrs later  >0.5                          Strongly Recommended            >0.25 - <0.5             Recommended  0.1 - 0.25                  Discouraged                       Remeasure/reassess PCT  <0.1                         Strongly Discouraged         Remeasure/reassess PCT      As 28 day mortality risk marker: \"Change in Procalcitonin Result\" (>80% or <=80%) if Day 0 (or Day 1) and Day 4 values are available. Refer to http://[email or web address]/    Change in PCT <=80 %   A decrease of PCT levels below or equal to 80% defines a positive change in PCT test result representing a higher risk for 28-day all-cause mortality of patients diagnosed with severe sepsis or septic shock.    Change in PCT >80 %   A decrease of PCT levels of more than 80% defines a negative change in PCT result representing a lower risk for 28-day all-cause mortality of patients diagnosed with severe sepsis or septic shock.              Results may be falsely decreased if patient taking Biotin.    TSH - Normal   POTASSIUM - Normal   LACTATE DEHYDROGENASE - Normal   FOLATE - Normal    Narrative:     Results may be falsely increased if patient taking Biotin.     IMMATURE PLATELET FRACTION - Normal   HEPATITIS B SURFACE ANTIBODY - Normal    Narrative:     Results may be falsely decreased if patient taking Biotin.     POCT GLUCOSE FINGERSTICK - Normal   POCT GLUCOSE FINGERSTICK - Normal   POCT GLUCOSE FINGERSTICK - Normal   POCT GLUCOSE FINGERSTICK - Normal   POCT GLUCOSE FINGERSTICK - Normal   POCT GLUCOSE FINGERSTICK - Normal   POCT GLUCOSE FINGERSTICK - Normal   POCT GLUCOSE FINGERSTICK - Normal   POCT GLUCOSE " FINGERSTICK - Normal   POCT GLUCOSE FINGERSTICK - Normal   POCT GLUCOSE FINGERSTICK - Normal   POCT GLUCOSE FINGERSTICK - Normal   POCT GLUCOSE FINGERSTICK - Normal   POCT GLUCOSE FINGERSTICK - Normal   POCT GLUCOSE FINGERSTICK - Normal   POCT GLUCOSE FINGERSTICK - Normal   BLOOD CULTURE   BLOOD CULTURE   MANUAL DIFFERENTIAL   HEPATITIS B SURFACE ANTIGEN    Narrative:     Results may be falsely decreased if patient taking Biotin.    Preliminary reactive result pending confirmation at [facility].     SCAN SLIDE   URINALYSIS W/ CULTURE IF INDICATED   URINALYSIS WITHOUT MICROSCOPIC (NO CULTURE)   URINALYSIS, MICROSCOPIC ONLY   FLOW CYTOMETRY, BLOOD   HEPATITIS B SURFACE ANTIGEN   AMMONIA   HEPATITIS B CORE ANTIBODY, IGM   POCT GLUCOSE FINGERSTICK   POCT GLUCOSE FINGERSTICK   POCT GLUCOSE FINGERSTICK   POCT GLUCOSE FINGERSTICK   POCT GLUCOSE FINGERSTICK   POCT GLUCOSE FINGERSTICK   POCT GLUCOSE FINGERSTICK   POCT GLUCOSE FINGERSTICK   POCT GLUCOSE FINGERSTICK   POCT GLUCOSE FINGERSTICK   POCT GLUCOSE FINGERSTICK   POCT GLUCOSE FINGERSTICK   POCT GLUCOSE FINGERSTICK   POCT GLUCOSE FINGERSTICK   POCT GLUCOSE FINGERSTICK   POCT GLUCOSE FINGERSTICK   POCT GLUCOSE FINGERSTICK   POCT GLUCOSE FINGERSTICK   POCT GLUCOSE FINGERSTICK   POCT GLUCOSE FINGERSTICK   POCT GLUCOSE FINGERSTICK   POCT GLUCOSE FINGERSTICK   POCT GLUCOSE FINGERSTICK   POCT GLUCOSE FINGERSTICK   CBC AND DIFFERENTIAL    Narrative:     The following orders were created for panel order CBC & Differential.  Procedure                               Abnormality         Status                     ---------                               -----------         ------                     CBC Auto Differential[[id number]]        Abnormal            Final result               Scan Slide[[id number]]                                                                    Please view results for these tests on the individual orders.   EXTRA TUBES    Narrative:     The following orders  were created for panel order Extra Tubes.  Procedure                               Abnormality         Status                     ---------                               -----------         ------                     Gold Top - SST[[id number]]                                   Final result                 Please view results for these tests on the individual orders.   GOLD TOP - SST   CBC AND DIFFERENTIAL    Narrative:     The following orders were created for panel order CBC & Differential.  Procedure                               Abnormality         Status                     ---------                               -----------         ------                     CBC Auto Differential[[id number]]        Abnormal            Final result               Scan Slide[[id number]]                                                                    Please view results for these tests on the individual orders.   CBC AND DIFFERENTIAL    Narrative:     The following orders were created for panel order CBC & Differential.  Procedure                               Abnormality         Status                     ---------                               -----------         ------                     CBC Auto Differential[[id number]]        Abnormal            Final result               Scan Slide[[id number]]                                                                    Please view results for these tests on the individual orders.   EXTRA TUBES    Narrative:     The following orders were created for panel order Extra Tubes.  Procedure                               Abnormality         Status                     ---------                               -----------         ------                     Lavender Top[[id number]]                                     Final result                 Please view results for these tests on the individual orders.   LAVENDER TOP   CBC AND DIFFERENTIAL    Narrative:     The following orders were created for  panel order CBC & Differential.  Procedure                               Abnormality         Status                     ---------                               -----------         ------                     CBC Auto Differential[[id number]]        Abnormal            Final result               Scan Slide[[id number]]                                       Final result                 Please view results for these tests on the individual orders.   EXTRA TUBES    Narrative:     The following orders were created for panel order Extra Tubes.  Procedure                               Abnormality         Status                     ---------                               -----------         ------                     Green Top (Gel)[[id number]]                                  Final result                 Please view results for these tests on the individual orders.   GREEN TOP   CBC AND DIFFERENTIAL    Narrative:     The following orders were created for panel order CBC & Differential.  Procedure                               Abnormality         Status                     ---------                               -----------         ------                     CBC Auto Differential[[id number]]        Abnormal            Final result               Scan Slide[[id number]]                                                                    Please view results for these tests on the individual orders.   URINALYSIS AND MICROSCOPIC    Narrative:     The following orders were created for panel order Urinalysis With Microscopic - Urine, Clean Catch.  Procedure                               Abnormality         Status                     ---------                               -----------         ------                     Urinalysis without micro...[[id number]]                                                 Urinalysis, Microscopic ...[[id number]]                                                   Please view results for these tests on the  individual orders.       US Abdomen Complete   Final Result   Heterogeneous echotexture within the liver with macro lobular   margins consistent with cirrhosis there is color-flow in the   portal and hepatic veins.   Small amount of ascites around the liver.      Contracted gallbladder with thick wall with no pericholecystic   fluid.   5 mm calculus in the gallbladder.      Small amount of fluid around portion of the right kidney about 1   to 2 mm thick.      [id number]         Electronically signed by:  Frank Maradiaga MD  8/9/2021 9:42 AM   CDT Workstation: [id number]      CT Head Without Contrast   Final Result   CONCLUSION:   No acute intracranial process.   Cerebral and cerebellar atrophy.   Minimal small vessel disease.   Minimal fluid left mastoid air cells.      [id number]      Electronically signed by:  Waylon Larkin MD  8/6/2021 6:48 PM CDT   Workstation: [id number]      XR Chest 1 View   Final Result   CONCLUSION:   No Acute Disease      [id number]      Electronically signed by:  Waylon Larkin MD  8/6/2021 4:57 PM CDT   Workstation: [id number]                                            MDM    Final diagnoses:   Hypotension, unspecified hypotension type   Altered mental status, unspecified altered mental status type       ED Disposition  ED Disposition     ED Disposition Condition Comment    Decision to Admit  Level of Care: Telemetry [5]   Diagnosis: Hypotension, unspecified hypotension type [5495993]   Admitting Physician: GISELL COLÓN [[id number]]   Attending Physician: GISELL COLÓN [[id number]]            No follow-up provider specified.       Medication List      ASK your doctor about these medications    cephalexin 500 MG capsule  Commonly known as: KEFLEX  Ask about: Which instructions should I use?             Koko Dias MD  08/12/21 0119

## 2021-08-07 NOTE — CONSULTS
Adult Nutrition  Assessment    Patient Name:  Natacha Gandhi  YOB: 1950  MRN: [id number]  Admit Date:  8/6/2021    Assessment Date:  8/7/2021    Comments:  Pt with dx hypotension, lactic acidosis, thrombocytopenia, paranoid schizophrenia, altered mental status.  Pt previously NPO due to mental status.  Pt assessed by speech this date with orders for soft chopped diet.  Pt reports good appetite.  Pt unsure regarding wt change.  Possible wt loss noted per Nutrition Screen.  Per Wt Report wt fluctuations noted since admit.  Blood glucose elevated on occasion,  Sliding scale novolog prescribed.  Hgb, Hct are low.  Ferrous Sulfate, MVI with Minerals prescribed.  Pt denied GI distress.  Will maintain pt on prescribed diet adding milk to meals.      Reason for Assessment     Row Name 08/07/21 1439          Reason for Assessment    Reason For Assessment  identified at risk by screening criteria     Identified At Risk by Screening Criteria  MST SCORE 2+;difficulty chewing/swallowing             Labs/Tests/Procedures/Meds     Row Name 08/07/21 1439          Labs/Procedures/Meds    Lab Results Reviewed  reviewed, pertinent        Medications    Pertinent Medications Reviewed  reviewed, pertinent           Estimated/Assessed Needs     Row Name 08/07/21 1441          Calculation Measurements    Weight Used For Calculations  57.7 kg (127 lb 3.3 oz)        Estimated/Assessed Needs    Additional Documentation  Calorie Requirements (Group);Fluid Requirements (Group);Charles-St. Jeor Equation (Group);Protein Requirements (Group)        Calorie Requirements    Estimated Calorie Requirement (kcal/day)  1392     Estimated Calorie Need Method  Charles-St Jeor        Charles-St. Jeor Equation    RMR (Charles-St. Jeor Equation)  1071.125        Protein Requirements    Weight Used For Protein Calculations  57.7 kg (127 lb 3.3 oz)     Est Protein Requirement Amount (gms/kg)  1.2 gm protein     Estimated Protein  Requirements (gms/day)  69.24        Fluid Requirements    Fluid Requirements (mL/day)  1442     RDA Method (mL)  1442         Nutrition Prescription Ordered     Row Name 08/07/21 1442          Nutrition Prescription PO    Current PO Diet  Soft Texture     Texture  Ground                 Electronically signed by:  Suzette Steiner RD  08/07/21 14:44 CDT

## 2021-08-07 NOTE — PLAN OF CARE
Goal Outcome Evaluation:  Plan of Care Reviewed With: patient           Outcome Summary: OT eval on this date as co-eval with PT.  Pt was mod A with bed mobility and mod A with sit to stand.  She needed mod of 2 for BSC transfer.  Pt able to don sock with mod A.  She could benefit from OT services to regain lost function for ADLs and functional mobility.  Rec cont therapy when D/C from hospital.

## 2021-08-07 NOTE — PROGRESS NOTES
/      [facility] Medicine Services  INPATIENT PROGRESS NOTE    Length of Stay: 0  Date of Admission: 8/6/2021  Primary Care Physician: Alisia Ramirez APRN    Subjective   Chief Complaint: Altered mental status  HPI: Patient has had improvement mentation since admission but still has some confusion.    Review of Systems   Unable to perform ROS: Mental status change      All pertinent negatives and positives are as above. All other systems have been reviewed and are negative unless otherwise stated.     Objective    Temp:  [96.3 °F (35.7 °C)-98.5 °F (36.9 °C)] 96.8 °F (36 °C)  Heart Rate:  [78-96] 81  Resp:  [16-18] 18  BP: ()/(56-99) 138/78    Physical Exam  Constitutional:       Comments: Elderly appearing female in NAD.    HENT:      Head: Normocephalic and atraumatic.      Right Ear: External ear normal.      Left Ear: External ear normal.      Mouth/Throat:      Mouth: Mucous membranes are moist.      Pharynx: Oropharynx is clear.   Eyes:      Conjunctiva/sclera: Conjunctivae normal.   Cardiovascular:      Rate and Rhythm: Normal rate and regular rhythm.      Pulses: Normal pulses.      Heart sounds: Normal heart sounds.   Pulmonary:      Effort: Pulmonary effort is normal. No respiratory distress.      Breath sounds: Normal breath sounds.   Abdominal:      General: Bowel sounds are normal.      Palpations: Abdomen is soft.      Tenderness: There is no abdominal tenderness.   Musculoskeletal:         General: No swelling.      Cervical back: Neck supple.   Skin:     General: Skin is warm and dry.      Capillary Refill: Capillary refill takes less than 2 seconds.   Neurological:      Comments: Follows commands, moves extremities, speech fluent/soft.    Psychiatric:         Behavior: Behavior normal.           Results Review:  I have reviewed the labs, radiology results, and diagnostic studies.    Laboratory Data:   Results from last 7 days   Lab Units  08/06/21 1705 08/03/21 2011   SODIUM mmol/L 141 142   POTASSIUM mmol/L 3.5 3.7   CHLORIDE mmol/L 110* 109*   CO2 mmol/L 25.0 25.0   BUN mg/dL 12 14   CREATININE mg/dL 0.97 1.05*   GLUCOSE mg/dL 142* 170*   CALCIUM mg/dL 9.4 9.5   BILIRUBIN mg/dL 1.0 0.6   ALK PHOS U/L 84 86   ALT (SGPT) U/L 115* 112*   AST (SGOT) U/L 129* 119*   ANION GAP mmol/L 6.0 8.0     Estimated Creatinine Clearance: 49.9 mL/min (by C-G formula based on SCr of 0.97 mg/dL).  Results from last 7 days   Lab Units 08/06/21 1705 08/03/21 2011   MAGNESIUM mg/dL 1.6 1.6         Results from last 7 days   Lab Units 08/06/21 1705 08/03/21 2010   WBC 10*3/mm3 4.22 3.96   HEMOGLOBIN g/dL 10.6* 11.4*   HEMATOCRIT % 31.8* 33.8*   PLATELETS 10*3/mm3 45* 45*           Culture Data:   No results found for: BLOODCX  No results found for: URINECX  No results found for: RESPCX  No results found for: WOUNDCX  No results found for: STOOLCX  No components found for: BODYFLD    Radiology Data:   Imaging Results (Last 24 Hours)     Procedure Component Value Units Date/Time    CT Head Without Contrast [[id number]] Collected: 08/06/21 1830     Updated: 08/06/21 1849    Narrative:        CT Head Without Contrast    History: Mental status change    Axial scans of the brain were obtained without intravenous  contrast.  Coronal and sagital reconstructions were preformed.    This exam was performed according to our departmental  dose-optimization program, which includes automated exposure  control, adjustment of the mA and/or kV according to patient size  and/or use of iterative reconstruction technique.    DLP: 813.90    Comparison: August 28, 2020    Findings:  Bone windows are unremarkable.  Minimal fluid left mastoid air cells.    No acute intracranial process.  Cerebral and cerebellar atrophy.  Minimal small vessel disease.  No hemorrhage.  No mass.  No abnormal areas of increased attenuation.  No midline shift.  No abnormal extra-axial fluid collections.       Impression:      CONCLUSION:  No acute intracranial process.  Cerebral and cerebellar atrophy.  Minimal small vessel disease.  Minimal fluid left mastoid air cells.    [id number]    Electronically signed by:  Waylon Larkin MD  8/6/2021 6:48 PM CDT  Workstation: [employer]    XR Chest 1 View [[id number]] Collected: 08/06/21 1638     Updated: 08/06/21 1658    Narrative:        PORTABLE CHEST    HISTORY: Altered mental status    Portable AP upright film of the chest was obtained at 4:38 PM.  COMPARISON: February 5, 2021    FINDINGS:   The lungs are clear of an acute process.  Old granulomatous disease is present.  The heart is not enlarged.  The pulmonary vasculature is not increased.  No pleural effusion.  No pneumothorax.  No acute osseous abnormality.  Degenerative changes are present in the thoracic spine.      Impression:      CONCLUSION:  No Acute Disease    [id number]    Electronically signed by:  Waylon Larkin MD  8/6/2021 4:57 PM CDT  Workstation: [employer]          I have reviewed the patient's current medications.     Assessment/Plan     Active Problems:    Altered mental status, unspecified    Schizophrenia, paranoid type (CMS/HCC)    DMII (diabetes mellitus, type 2) (CMS/HCC)    Hypotension    Lactic acidosis    Thrombocytopenia (CMS/HCC)    Plan:  -Continue with IV fluids and continue to monitor  -Continue monitor vital signs closely  -Discussed with patient's brother and her mental status appears to be improving and approaching baseline  -She does have a history of schizophrenia so we will see about having psychiatry see her as well  -EEG has been ordered for further evaluation  -Infective work-up is negative thus far but for now we will continue with IV antibiotics for at least 1 more day.  Her MRSA screen is negative so we can likely stop the vancomycin.  -DVT prophylaxis with SCDs  -CODE STATUS: Full    I confirmed that the patient's Advance Care Plan is present, code status is documented, or surrogate  decision maker is listed in the patient's medical record.     Discharge Planning: In process    Karan Tsai MD

## 2021-08-07 NOTE — PLAN OF CARE
Goal Outcome Evaluation:  Plan of Care Reviewed With: patient        Progress: improving  Outcome Summary: Swallow evaluation completed. Pt with mild difficulty chewing therefore SLP initiated mech soft with ground meat and thin liquids. Pt to follow up for 1-2 visits for diet tolerance and diet safety.

## 2021-08-08 NOTE — PLAN OF CARE
Problem: Fall Injury Risk  Goal: Absence of Fall and Fall-Related Injury  8/8/2021 0610 by Araceli Pepper RN  Outcome: Ongoing, Progressing  8/8/2021 0609 by Araceli Pepper RN  Outcome: Ongoing, Progressing  Intervention: Promote Injury-Free Environment  Recent Flowsheet Documentation  Taken 8/8/2021 0500 by Araceli Pepper RN  Safety Promotion/Fall Prevention: safety round/check completed  Taken 8/7/2021 2300 by Araceli Pepper RN  Safety Promotion/Fall Prevention: safety round/check completed  Taken 8/7/2021 2100 by Araceli Pepper RN  Safety Promotion/Fall Prevention: safety round/check completed  Taken 8/7/2021 1900 by Araceli Pepper RN  Safety Promotion/Fall Prevention: safety round/check completed     Problem: Adult Inpatient Plan of Care  Goal: Plan of Care Review  8/8/2021 0610 by Araceli Pepper RN  Outcome: Ongoing, Progressing  Flowsheets (Taken 8/8/2021 0609)  Outcome Summary: Stable overnight. No acute events  8/8/2021 0609 by Araceli Pepper RN  Outcome: Ongoing, Progressing  Flowsheets (Taken 8/8/2021 0609)  Outcome Summary: Stable overnight. No acute events  Goal: Patient-Specific Goal (Individualized)  8/8/2021 0610 by Araceli Pepper RN  Outcome: Ongoing, Progressing  8/8/2021 0609 by Araceli Pepper RN  Outcome: Ongoing, Progressing  Goal: Absence of Hospital-Acquired Illness or Injury  8/8/2021 0610 by Araceli Pepper RN  Outcome: Ongoing, Progressing  8/8/2021 0609 by Araceli Pepper RN  Outcome: Ongoing, Progressing  Intervention: Identify and Manage Fall Risk  Recent Flowsheet Documentation  Taken 8/8/2021 0500 by Araceli Pepper RN  Safety Promotion/Fall Prevention: safety round/check completed  Taken 8/7/2021 2300 by Araceli Pepper RN  Safety Promotion/Fall Prevention: safety round/check completed  Taken 8/7/2021 2100 by Araceli Pepper RN  Safety Promotion/Fall Prevention: safety round/check completed  Taken 8/7/2021 1900 by Evgeny, Araceli, RN  Safety Promotion/Fall Prevention: safety  round/check completed  Goal: Optimal Comfort and Wellbeing  8/8/2021 0610 by Araceli Pepper RN  Outcome: Ongoing, Progressing  8/8/2021 0609 by Araceli Pepper RN  Outcome: Ongoing, Progressing  Intervention: Provide Person-Centered Care  Recent Flowsheet Documentation  Taken 8/7/2021 2140 by Araceli Pepper RN  Trust Relationship/Rapport: care explained  Goal: Readiness for Transition of Care  8/8/2021 0610 by Araceli Pepper RN  Outcome: Ongoing, Progressing  8/8/2021 0609 by Araceli Pepper RN  Outcome: Ongoing, Progressing     Problem: Skin Injury Risk Increased  Goal: Skin Health and Integrity  8/8/2021 0610 by Araceli Pepper RN  Outcome: Ongoing, Progressing  8/8/2021 0609 by Araceli Pepper RN  Outcome: Ongoing, Progressing   Goal Outcome Evaluation:              Outcome Summary: Stable overnight. No acute events

## 2021-08-08 NOTE — THERAPY TREATMENT NOTE
Acute Care - Speech Language Pathology   Swallow Treatment Note [facility]     Patient Name: Natacha Gandhi  : 1950  MRN: [id number]  Today's Date: 2021               Admit Date: 2021    Visit Dx:     ICD-10-CM ICD-9-CM   1. Hypotension, unspecified hypotension type  I95.9 458.9   2. Altered mental status, unspecified altered mental status type  R41.82 780.97   3. Impaired functional mobility, balance, gait, and endurance  Z74.09 V49.89   4. Oropharyngeal dysphagia  R13.12 787.22   5. Impaired mobility and activities of daily living  Z74.09 V49.89    Z78.9      Patient Active Problem List   Diagnosis   • Acute GI bleeding   • Altered mental status, unspecified   • Schizophrenia, paranoid type (CMS/HCC)   • Acute exacerbation of chronic paranoid schizophrenia (CMS/HCC)   • Acute blood loss anemia   • DMII (diabetes mellitus, type 2) (CMS/HCC)   • Hypotension   • Lactic acidosis   • Thrombocytopenia (CMS/HCC)     Past Medical History:   Diagnosis Date   • Bipolar disorder, unspecified (CMS/HCC)    • Depression    • Diabetes mellitus (CMS/HCC)    • GERD (gastroesophageal reflux disease)    • Hypertension    • Legal blindness    • Psychiatric illness    • Rheumatoid arthritis (CMS/HCC)    • Schizoaffective disorder (CMS/HCC)      Past Surgical History:   Procedure Laterality Date   • COLONOSCOPY N/A 2020    Procedure: COLONOSCOPY;  Surgeon: Ge Gorman MD;  Location: [facility];  Service: Gastroenterology;  Laterality: N/A;   • ENDOSCOPY N/A 2020    Procedure: ESOPHAGOGASTRODUODENOSCOPY;  Surgeon: Ge Gorman MD;  Location: [facility];  Service: Gastroenterology;  Laterality: N/A;       SLP Recommendation and Plan  SLP Swallowing Diagnosis: mild, oral dysphagia  SLP Diet Recommendation: soft textures, ground, thin liquids  Recommended Precautions and Strategies: upright posture during/after eating, small bites of food and sips of liquid, multiple swallows per bite of  food, multiple swallows per sip of liquid, alternate between small bites of food and sips of liquid, check mouth frequently for oral residue/pocketing  SLP Rec. for Method of Medication Administration: meds whole, with thin liquids, with pudding or applesauce, as tolerated     Monitor for Signs of Aspiration: yes, notify SLP if any concerns     Swallow Criteria for Skilled Therapeutic Interventions Met: demonstrates skilled criteria     Rehab Potential/Prognosis, Swallowing: good, to achieve stated therapy goals  Therapy Frequency (Swallow): 3 days per week, 5 days per week  Predicted Duration Therapy Intervention (Days): until discharge     Daily Summary of Progress (SLP): progress toward functional goals is good                   Plan of Care Reviewed With: patient  Progress: improving  Outcome Summary: SLP fed pt morning meal. No s/s of aspiration noted. Pt with good tolerance of soft solids and thin liquids. Pt ate over 50% of am meal with no difficulty. SLP to follow up in am for additional visit.         SWALLOW EVALUATION (last 72 hours)      SLP Adult Swallow Evaluation     Row Name 08/08/21 0928 08/07/21 1221                Rehab Evaluation    Document Type  therapy note (daily note)  -EK  evaluation  -EK       Subjective Information  no complaints  -EK  no complaints  -EK       Patient Observations  alert;cooperative;agree to therapy  -EK  alert;cooperative;agree to therapy  -EK       Care Plan Review  risks/benefits reviewed  -EK  evaluation/treatment results reviewed  -EK       Patient Effort  good  -EK  good  -EK          General Information    Patient Profile Reviewed  yes  -EK  yes  -EK       Current Method of Nutrition  NPO  -EK  NPO  -EK       Precautions/Limitations, Vision  vision impairment, bilaterally  -EK  vision impairment, bilaterally  -EK       Precautions/Limitations, Hearing  WFL  -EK  WFL  -EK       Plans/Goals Discussed with  patient  -EK  patient  -EK          Oral Motor Structure and  Function    Dentition Assessment  missing teeth  -EK  missing teeth  -EK       Secretion Management  WNL/WFL  -EK  WNL/WFL  -EK       Volitional Swallow  WFL  -EK  WFL  -EK       Volitional Cough  WFL  -EK  WFL  -EK          Oral Musculature and Cranial Nerve Assessment    Oral Motor General Assessment  WFL  -EK  WFL  -EK          General Eating/Swallowing Observations    Respiratory Support Currently in Use  room air  -EK  room air  -EK       Eating/Swallowing Skills  fed by SLP  -EK  fed by SLP  -EK       Positioning During Eating  upright 90 degree;upright in bed  -EK  upright 90 degree;upright in bed  -EK       Utensils Used  cup;straw  -EK  cup;straw  -EK       Consistencies Trialed  regular textures;pureed;thin liquids  -EK  regular textures;pureed;thin liquids  -EK          Clinical Swallow Eval    Oral Prep Phase  impaired  -EK  impaired  -EK       Oral Transit  WFL  -EK  WFL  -EK       Oral Residue  WFL  -EK  WFL  -EK       Pharyngeal Phase  WFL  -EK  WFL  -EK       Clinical Swallow Evaluation Summary  SLP fed pt morning meal. No s/s of aspiration noted. Pt with good tolerance of soft solids and thin liquids. Pt ate over 50% of am meal with no difficulty. SLP to follow up in am for additional visit.   -EK  Swallow evaluation completed. Pt with mild difficulty chewing therefore SLP initiated mech soft with ground meat and thin liquids. Pt to follow up for 1-2 visits for diet tolerance and diet safety.   -EK          Clinical Impression    Daily Summary of Progress (SLP)  progress toward functional goals is good  -EK  --       SLP Swallowing Diagnosis  mild;oral dysphagia  -EK  mild;oral dysphagia  -EK       Functional Impact  risk of aspiration/pneumonia;risk of malnutrition;risk of dehydration  -EK  risk of aspiration/pneumonia;risk of malnutrition;risk of dehydration  -EK       Rehab Potential/Prognosis, Swallowing  good, to achieve stated therapy goals  -EK  good, to achieve stated therapy goals  -EK        Swallow Criteria for Skilled Therapeutic Interventions Met  demonstrates skilled criteria  -EK  demonstrates skilled criteria  -EK          Recommendations    Therapy Frequency (Swallow)  3 days per week;5 days per week  -EK  3 days per week;5 days per week  -EK       Predicted Duration Therapy Intervention (Days)  until discharge  -EK  until discharge  -EK       SLP Diet Recommendation  soft textures;ground;thin liquids  -EK  soft textures;ground;thin liquids  -EK       Recommended Precautions and Strategies  upright posture during/after eating;small bites of food and sips of liquid;multiple swallows per bite of food;multiple swallows per sip of liquid;alternate between small bites of food and sips of liquid;check mouth frequently for oral residue/pocketing  -EK  upright posture during/after eating;small bites of food and sips of liquid;multiple swallows per bite of food;multiple swallows per sip of liquid;alternate between small bites of food and sips of liquid;check mouth frequently for oral residue/pocketing  -EK       Oral Care Recommendations  Oral Care BID/PRN  -EK  Oral Care BID/PRN  -EK       SLP Rec. for Method of Medication Administration  meds whole;with thin liquids;with pudding or applesauce;as tolerated  -EK  meds whole;with thin liquids;with pudding or applesauce;as tolerated  -EK       Monitor for Signs of Aspiration  yes;notify SLP if any concerns  -EK  yes;notify SLP if any concerns  -EK          Swallow Goals (SLP)    Oral Nutrition/Hydration Goal Selection (SLP)  oral nutrition/hydration, SLP goal 1  -EK  oral nutrition/hydration, SLP goal 1  -EK          Oral Nutrition/Hydration Goal 1 (SLP)    Oral Nutrition/Hydration Goal 1, SLP  Pt to tolerate least restrictive diet with no s/s of aspiration for adequate nutrition and hydration.   -EK  Pt to tolerate least restrictive diet with no s/s of aspiration for adequate nutrition and hydration.   -EK       Time Frame (Oral Nutrition/Hydration Goal  1, SLP)  by discharge  -EK  by discharge  -EK       Progress/Outcomes (Oral Nutrition/Hydration Goal 1, SLP)  goal met  -EK  other (see comments) new goal  -EK         User Key  (r) = Recorded By, (t) = Taken By, (c) = Cosigned By    Initials Name Effective Dates    Savanna García CCC-SLP 06/16/21 -           EDUCATION  The patient has been educated in the following areas:   Dysphagia (Swallowing Impairment).       SLP GOALS     Row Name 08/08/21 0928 08/07/21 1221          Oral Nutrition/Hydration Goal 1 (SLP)    Oral Nutrition/Hydration Goal 1, SLP  Pt to tolerate least restrictive diet with no s/s of aspiration for adequate nutrition and hydration.   -EK  Pt to tolerate least restrictive diet with no s/s of aspiration for adequate nutrition and hydration.   -EK     Time Frame (Oral Nutrition/Hydration Goal 1, SLP)  by discharge  -EK  by discharge  -EK     Progress/Outcomes (Oral Nutrition/Hydration Goal 1, SLP)  goal met  -EK  other (see comments) new goal  -EK       User Key  (r) = Recorded By, (t) = Taken By, (c) = Cosigned By    Initials Name Provider Type    Savanna García CCC-SLP Speech and Language Pathologist             Time Calculation:   Time Calculation- SLP     Row Name 08/08/21 1105             Time Calculation- SLP    SLP Start Time  0928  -EK      SLP Stop Time  1008  -EK      SLP Time Calculation (min)  40 min  -EK      SLP Received On  08/08/21  -EK        User Key  (r) = Recorded By, (t) = Taken By, (c) = Cosigned By    Initials Name Provider Type    Savanna García CCC-SLP Speech and Language Pathologist          Therapy Charges for Today     Code Description Service Date Service Provider Modifiers Qty    72756086384 HC ST EVAL ORAL PHARYNG SWALLOW 1 8/7/2021 Svaanna Parikh CCC-SLP GN 1    95505076274 HC ST TREATMENT SWALLOW 3 8/8/2021 Savanna Parikh CCC-SLP GN 1               Savanna Parikh  CCC-SLP  8/8/2021

## 2021-08-08 NOTE — PROGRESS NOTES
"Psychiatry Progress Note    8/8/2021    Chief Complaint: altered mental status    Subjective:  Patient is a 71 y.o. female who was hospitalized for altered mental status.    Patient is in bed.  She is not posturing.  She denies AVH or paranoia.  She denies any fearfulness.    Objective     Vital Signs    Vitals:    08/08/21 0842 08/08/21 0931 08/08/21 1037 08/08/21 1210   BP:  164/65  136/87   BP Location:  Left arm  Left arm   Patient Position:    Lying   Pulse: 83 89 95 92   Resp:  16  16   Temp:  96.3 °F (35.7 °C)  97.6 °F (36.4 °C)   TempSrc:    Oral   SpO2:  98%  97%   Weight:       Height:           Physical Exam:   General Appearance: alert and cooperative,  Hygiene:   fair  Gait & Station: in bed  Musculoskeletal: No tremors or abnormal involuntary movements today    Mental Status Exam:   Cooperation:  Cooperative  Eye Contact:  Poor due to blindness  Psychomotor Behavior:  minimal movement  Mood: \"Fine\"  Affect:  constricted  Speech:  Normal  Thought Process:  coherent but sluggish  Associations: Goal Directed  Thought Content:     Normal   Suicidal:  None   Homicidal:  None   Hallucinations:  Not demonstrated today   Delusion:  None expressed  Cognitive Functioning:   Consciousness: awake and alert  Reliability:  limited  Insight:  limited  Judgement:  Impaired  Impulse Control:  Impaired    Lab Results: Results source: EMR   Lab Results (last 24 hours)     Procedure Component Value Units Date/Time    POC Glucose Once [[id number]]  (Abnormal) Collected: 08/07/21 2023    Specimen: Blood Updated: 08/07/21 2036     Glucose 155 mg/dL      Comment: RN NotifiedOperator: [id number] LUIS Vallejo ID: [id number]       Blood Culture - Blood, Arm, Right [[id number]] Collected: 08/06/21 1931    Specimen: Blood from Arm, Right Updated: 08/07/21 1945     Blood Culture No growth at 24 hours          Radiology Results:  Imaging Results (Last 24 Hours)     ** No results found for the last 24 hours. **    "       Medicine:   Current Facility-Administered Medications:   •  [COMPLETED] ARIPiprazole (ABILIFY) tablet 5 mg, 5 mg, Oral, Daily With Dinner, 5 mg at 08/07/21 1821 **FOLLOWED BY** ARIPiprazole (ABILIFY) tablet 10 mg, 10 mg, Oral, Daily With Dinner, Ellie Raza MD  •  calcium carbonate (TUMS) chewable tablet 500 mg (200 mg elemental), 2 tablet, Oral, BID PRN, Karan Tsai MD  •  cholecalciferol (VITAMIN D3) tablet 5,000 Units, 5,000 Units, Oral, Daily, Karan Tsai MD, 5,000 Units at 08/08/21 0932  •  dextrose (D50W) 25 g/ 50mL Intravenous Solution 25 g, 25 g, Intravenous, Q15 Min PRN, Karan Tsai MD  •  dextrose (GLUTOSE) oral gel 15 g, 15 g, Oral, Q15 Min PRN, Karan Tsai MD  •  escitalopram (LEXAPRO) tablet 10 mg, 10 mg, Oral, Daily, Karan Tsai MD, 10 mg at 08/08/21 0933  •  ferrous sulfate EC tablet 324 mg, 324 mg, Oral, Daily With Breakfast, Karan Tsai MD, 324 mg at 08/08/21 0933  •  glucagon (human recombinant) (GLUCAGEN DIAGNOSTIC) injection 1 mg, 1 mg, Subcutaneous, Q15 Min PRN, Karan Tsai MD  •  insulin aspart (novoLOG) injection 0-7 Units, 0-7 Units, Subcutaneous, TID AC, Karan Tsai MD  •  Magnesium Sulfate 2 gram Bolus, followed by 8 gram infusion (total Mg dose 10 grams)- Mg less than or equal to 1mg/dL, 2 g, Intravenous, PRN **OR** Magnesium Sulfate 2 gram / 50mL Infusion (GIVE X 3 BAGS TO EQUAL 6GM TOTAL DOSE) - Mg 1.1 - 1.5 mg/dl, 2 g, Intravenous, PRN **OR** Magnesium Sulfate 4 gram infusion- Mg 1.6-1.9 mg/dL, 4 g, Intravenous, PRN, Karan Tsai MD, Last Rate: 25 mL/hr at 08/07/21 0121, 4 g at 08/07/21 0121  •  multivitamin with minerals 1 tablet, 1 tablet, Oral, Daily, Karan Tsai MD, 1 tablet at 08/08/21 0955  •  ondansetron (ZOFRAN) tablet 4 mg, 4 mg, Oral, Q6H PRN **OR** ondansetron (ZOFRAN) injection 4 mg, 4 mg, Intravenous, Q6H PRN, Karan Tsai MD  •  pantoprazole (PROTONIX) EC tablet 40 mg, 40 mg, Oral, Daily, Karan sTai MD, 40 mg at  08/08/21 0933  •  potassium chloride (MICRO-K) CR capsule 40 mEq, 40 mEq, Oral, PRN **OR** potassium chloride (KLOR-CON) packet 40 mEq, 40 mEq, Oral, PRN **OR** potassium chloride 10 mEq in 100 mL IVPB, 10 mEq, Intravenous, Q1H PRN, Karan Tsai MD, Last Rate: 100 mL/hr at 08/07/21 0433, 10 mEq at 08/07/21 0433  •  [COMPLETED] risperiDONE (risperDAL) tablet 2 mg, 2 mg, Oral, Daily With Dinner, 2 mg at 08/07/21 1820 **FOLLOWED BY** risperiDONE (risperDAL) tablet 1 mg, 1 mg, Oral, Daily With Dinner, Ellie Raza MD  •  sodium chloride 0.9 % flush 10 mL, 10 mL, Intravenous, Q12H, Karan Tsai MD, 10 mL at 08/07/21 2146  •  sodium chloride 0.9 % flush 10 mL, 10 mL, Intravenous, PRN, Karan Tsai MD  •  sodium chloride 0.9 % infusion, 50 mL/hr, Intravenous, Continuous, Karan Tsai MD, Last Rate: 50 mL/hr at 08/08/21 1237, 50 mL/hr at 08/08/21 1237    Diagnoses/Assessment:     Altered mental status, unspecified    Schizophrenia, paranoid type (CMS/HCC)    DMII (diabetes mellitus, type 2) (CMS/HCC)    Hypotension    Lactic acidosis    Thrombocytopenia (CMS/HCC)      Treatment Plan:    --Risperdal to stop after today's dose.  --Continue Abilify 10mg with dinner.  Consider increase to 15 or 20mg as appropriate.  --EEG pending.  --Admit to psych after medical clearance to observe for catatonia and further adjustment of the abilify dose.    Ellie Raza MD  08/08/21 at 12:48 CDT

## 2021-08-08 NOTE — PROGRESS NOTES
/      [facility] Medicine Services  INPATIENT PROGRESS NOTE    Length of Stay: 0  Date of Admission: 8/6/2021  Primary Care Physician: Alisia Ramirez APRN    Subjective   Chief Complaint: Altered mental status  HPI: Doing well, no new concerns currently.  Will answer questions slowly.     Review of Systems   Unable to perform ROS: Psychiatric disorder      All pertinent negatives and positives are as above. All other systems have been reviewed and are negative unless otherwise stated.     Objective    Temp:  [96.3 °F (35.7 °C)-97.9 °F (36.6 °C)] 97.6 °F (36.4 °C)  Heart Rate:  [83-95] 92  Resp:  [16-18] 16  BP: (103-164)/(55-87) 136/87    Physical Exam  Constitutional:       Comments: Elderly appearing female in NAD.    HENT:      Head: Normocephalic and atraumatic.      Right Ear: External ear normal.      Left Ear: External ear normal.      Mouth/Throat:      Mouth: Mucous membranes are moist.      Pharynx: Oropharynx is clear.   Eyes:      Conjunctiva/sclera: Conjunctivae normal.   Cardiovascular:      Rate and Rhythm: Normal rate and regular rhythm.      Pulses: Normal pulses.      Heart sounds: Normal heart sounds.   Pulmonary:      Effort: Pulmonary effort is normal. No respiratory distress.      Breath sounds: Normal breath sounds.   Abdominal:      General: Bowel sounds are normal.      Palpations: Abdomen is soft.      Tenderness: There is no abdominal tenderness.   Musculoskeletal:         General: No swelling.      Cervical back: Neck supple.   Skin:     General: Skin is warm and dry.      Capillary Refill: Capillary refill takes less than 2 seconds.   Neurological:      Comments: Follows commands, moves extremities, speech fluent/soft.    Psychiatric:         Behavior: Behavior normal.           Results Review:  I have reviewed the labs, radiology results, and diagnostic studies.    Laboratory Data:   Results from last 7 days   Lab Units 08/07/21  1007  08/06/21  1705 08/03/21 2011   SODIUM mmol/L 142 141 142   POTASSIUM mmol/L 3.9  3.9 3.5 3.7   CHLORIDE mmol/L 115* 110* 109*   CO2 mmol/L 23.0 25.0 25.0   BUN mg/dL 9 12 14   CREATININE mg/dL 0.82 0.97 1.05*   GLUCOSE mg/dL 88 142* 170*   CALCIUM mg/dL 8.7 9.4 9.5   BILIRUBIN mg/dL 1.0 1.0 0.6   ALK PHOS U/L 73 84 86   ALT (SGPT) U/L 112* 115* 112*   AST (SGOT) U/L 122* 129* 119*   ANION GAP mmol/L 4.0* 6.0 8.0     Estimated Creatinine Clearance: 60.6 mL/min (by C-G formula based on SCr of 0.82 mg/dL).  Results from last 7 days   Lab Units 08/07/21 1007 08/06/21  1705 08/03/21 2011   MAGNESIUM mg/dL 2.5* 1.6 1.6         Results from last 7 days   Lab Units 08/07/21 1007 08/06/21  1705 08/03/21 2010   WBC 10*3/mm3 3.61 4.22 3.96   HEMOGLOBIN g/dL 10.1* 10.6* 11.4*   HEMATOCRIT % 30.2* 31.8* 33.8*   PLATELETS 10*3/mm3 36* 45* 45*           Culture Data:   Blood Culture   Date Value Ref Range Status   08/06/2021 No growth at 24 hours  Preliminary   08/06/2021 No growth at 24 hours  Preliminary     No results found for: URINECX  No results found for: RESPCX  No results found for: WOUNDCX  No results found for: STOOLCX  No components found for: BODYFLD    Radiology Data:   Imaging Results (Last 24 Hours)     ** No results found for the last 24 hours. **          I have reviewed the patient's current medications.     Assessment/Plan     Active Problems:    Altered mental status, unspecified    Schizophrenia, paranoid type (CMS/HCC)    DMII (diabetes mellitus, type 2) (CMS/HCC)    Hypotension    Lactic acidosis    Thrombocytopenia (CMS/HCC)    Plan:  -Decrease IV fluids and monitor.   -Continue monitor vital signs closely  -Hematology consult ordered and RUQ US ordered to evaluate thrombocytopenia further.   -Appreciate Psychiatry's help. If stable tomorrow then can likely d/c to behavioral health.   -EEG pending.   -Infective work up negative.  Will stop Cefepime and monitor off of antibiotics.   -DVT prophylaxis  with SCDs  -CODE STATUS: Full    I confirmed that the patient's Advance Care Plan is present, code status is documented, or surrogate decision maker is listed in the patient's medical record.     Discharge Planning: In process    Karan Tsai MD

## 2021-08-08 NOTE — PLAN OF CARE
Goal Outcome Evaluation:  Plan of Care Reviewed With: patient        Progress: improving  Outcome Summary: SLP fed pt morning meal. No s/s of aspiration noted. Pt with good tolerance of soft solids and thin liquids. Pt ate over 50% of am meal with no difficulty. SLP to follow up in am for additional visit.

## 2021-08-09 NOTE — THERAPY TREATMENT NOTE
"Acute Care - Physical Therapy Treatment Note  [facility]     Patient Name: Natacha Gandhi  : 1950  MRN: [id number]  Today's Date: 2021      Visit Dx:     ICD-10-CM ICD-9-CM   1. Hypotension, unspecified hypotension type  I95.9 458.9   2. Altered mental status, unspecified altered mental status type  R41.82 780.97   3. Impaired functional mobility, balance, gait, and endurance  Z74.09 V49.89   4. Oropharyngeal dysphagia  R13.12 787.22   5. Impaired mobility and activities of daily living  Z74.09 V49.89    Z78.9      Patient Active Problem List   Diagnosis   • Acute GI bleeding   • Altered mental status, unspecified   • Schizophrenia, paranoid type (CMS/HCC)   • Acute exacerbation of chronic paranoid schizophrenia (CMS/HCC)   • Acute blood loss anemia   • DMII (diabetes mellitus, type 2) (CMS/HCC)   • Hypotension   • Lactic acidosis   • Thrombocytopenia (CMS/HCC)     Past Medical History:   Diagnosis Date   • Bipolar disorder, unspecified (CMS/HCC)    • Depression    • Diabetes mellitus (CMS/HCC)    • GERD (gastroesophageal reflux disease)    • Hypertension    • Legal blindness    • Psychiatric illness    • Rheumatoid arthritis (CMS/HCC)    • Schizoaffective disorder (CMS/HCC)      Past Surgical History:   Procedure Laterality Date   • COLONOSCOPY N/A 2020    Procedure: COLONOSCOPY;  Surgeon: Ge Gorman MD;  Location: [facility];  Service: Gastroenterology;  Laterality: N/A;   • ENDOSCOPY N/A 2020    Procedure: ESOPHAGOGASTRODUODENOSCOPY;  Surgeon: Ge Gorman MD;  Location: [facility];  Service: Gastroenterology;  Laterality: N/A;        PT Assessment (last 12 hours)      PT Evaluation and Treatment     Row Name 21 1011          Physical Therapy Time and Intention    Subjective Information  complains of;fatigue \"tired, just tired\"  -TA     Document Type  therapy note (daily note)  -TA     Mode of Treatment  physical therapy  -TA     Comment  Nurse okay'd Tx. pt " defered EOB/OOB  -TA     [facility] Name 21 1011          General Information    Patient Profile Reviewed  yes  -TA     Existing Precautions/Restrictions  fall appears to have herniated vaginal tissue per RN when wiping for toilet hygiend  -[facility] Name 21 1011          Cognition    Orientation Status (Cognition)  oriented to;person;place   -TA     Personal Safety Interventions  fall prevention program maintained;muscle strengthening facilitated;supervised activity  -[facility] Name 21 1011          Pain Scale: Word Pre/Post-Treatment    Pain: Word Scale, Pretreatment  0 - no pain  -TA     Posttreatment Pain Rating  0 - no pain  -TA     Row Name 21 1011          Range of Motion Comprehensive    General Range of Motion  bilateral lower extremity ROM WFL  -TA     Row Name 21 1011          Bed Mobility    Bed Mobility  bed mobility (all) activities  -TA     All Activities, [place] (Bed Mobility)  not tested  -TA     Assistive Device (Bed Mobility)  bed rails;draw sheet;head of bed elevated  -TA     Row Name 21 1011          Transfers    Sit-Stand [place] (Transfers)  not tested  -TA     Stand-Sit [place] (Transfers)  not tested  -TA     Row Name 21 1011          Sit-Stand Transfer    Assistive Device (Sit-Stand Transfers)  --  -TA     Row Name 21 1011          Gait/Stairs (Locomotion)    Stephenson Level (Gait)  --  -TA     Assistive Device (Gait)  --  -TA     Deviations/Abnormal Patterns (Gait)  --  -TA     Bilateral Gait Deviations  --  -TA     Row Name 21 1011          Hip (Therapeutic Exercise)    Hip (Therapeutic Exercise)  AAROM (active assistive range of motion)  -TA     Hip AAROM (Therapeutic Exercise)  bilateral;flexion;aBduction;aDduction;supine;10 repetitions  -TA     [facility] Name 21 1011          Knee (Therapeutic Exercise)    Knee (Therapeutic Exercise)  AAROM (active assistive range of motion)  -TA     Knee AAROM (Therapeutic  Exercise)  bilateral;flexion;extension;supine;10 repetitions  -TA     Row Name 08/09/21 1011          Ankle (Therapeutic Exercise)    Ankle (Therapeutic Exercise)  AAROM (active assistive range of motion)  -TA     Ankle AAROM (Therapeutic Exercise)  bilateral;dorsiflexion;plantarflexion;supine;10 repetitions;2 sets  -TA     Row Name 08/09/21 1011          Plan of Care Review    Plan of Care Reviewed With  patient  -TA     Outcome Summary  pt defered EOB/OOB. pt performed AAROM of B LE in supine. pt will require 24/7 care & continued PT services  -TA     Row Name 08/09/21 1011          Vital Signs    Pre Systolic BP Rehab  104  -TA     Pre Treatment Diastolic BP  61  -TA     Post Systolic BP Rehab  145  -TA     Post Treatment Diastolic BP  69  -TA     Pretreatment Heart Rate (beats/min)  94  -TA     Posttreatment Heart Rate (beats/min)  98  -TA     Pre SpO2 (%)  98  -TA     O2 Delivery Pre Treatment  room air  -TA     Post SpO2 (%)  97  -TA     O2 Delivery Post Treatment  room air  -TA     Pre Patient Position  Supine  -TA     Intra Patient Position  Supine  -TA     Post Patient Position  Supine  -TA     Row Name 08/09/21 1011          Positioning and Restraints    Pre-Treatment Position  in bed  -TA     Post Treatment Position  bed  -TA     In Bed  supine;call light within reach;exit alarm on  -TA     Row Name 08/09/21 1011          Therapy Assessment/Plan (PT)    Rehab Potential (PT)  fair, will monitor progress closely  -TA     Criteria for Skilled Interventions Met (PT)  yes  -TA     Comment, Therapy Assessment/Plan (PT)  continue  -TA       User Key  (r) = Recorded By, (t) = Taken By, (c) = Cosigned By    Initials Name Provider Type    Maude Marsh, PTA Physical Therapy Assistant        Physical Therapy Education                 Title: PT OT SLP Therapies (In Progress)     Topic: Physical Therapy (In Progress)     Point: Mobility training (In Progress)     Learning Progress Summary           Patient  Acceptance, D,E, NR by  at 8/7/2021 1007    Comment: POC; mobility                   Point: Home exercise program (In Progress)     Learning Progress Summary           Patient Acceptance, D,E, NR by  at 8/7/2021 1007    Comment: POC; mobility                   Point: Body mechanics (In Progress)     Learning Progress Summary           Patient Acceptance, D,E, NR by  at 8/7/2021 1007    Comment: POC; mobility                   Point: Precautions (In Progress)     Learning Progress Summary           Patient Acceptance, D,E, NR by  at 8/7/2021 1007    Comment: POC; mobility                               User Key     Initials Effective Dates Name Provider Type Discipline     06/16/21 -  Rosina Martinez, PT Physical Therapist PT              PT Recommendation and Plan  Anticipated Discharge Disposition (PT): skilled nursing facility, home with 24/7 care, home with home health  Therapy Frequency (PT): other (see comments) (5-7 d/wk)  Plan of Care Reviewed With: patient  Outcome Summary: pt defered EOB/OOB. pt performed AAROM of B LE in supine. pt will require 24/7 care & continued PT services  Outcome Measures     Row Name 08/09/21 1300 08/09/21 0858 08/07/21 0959       How much help from another person do you currently need...    Turning from your back to your side while in flat bed without using bedrails?  2  -TA  --  --    Moving from lying on back to sitting on the side of a flat bed without bedrails?  2  -TA  --  --    Moving to and from a bed to a chair (including a wheelchair)?  2  -TA  --  --    Standing up from a chair using your arms (e.g., wheelchair, bedside chair)?  2  -TA  --  --    Climbing 3-5 steps with a railing?  1  -TA  --  --    To walk in hospital room?  1  -TA  --  --    AM-PAC 6 Clicks Score (PT)  10  -TA  --  --       How much help from another is currently needed...    Putting on and taking off regular lower body clothing?  --  2  -RB  2  -RB    Bathing (including washing,  rinsing, and drying)  --  2  -RB  2  -RB    Toileting (which includes using toilet bed pan or urinal)  --  2  -RB  2  -RB    Putting on and taking off regular upper body clothing  --  2  -RB  3  -RB    Taking care of personal grooming (such as brushing teeth)  --  2  -RB  3  -RB    Eating meals  --  2  -RB  4  -RB    AM-PAC 6 Clicks Score (OT)  --  12  -RB  16  -RB       Functional Assessment    Outcome Measure Options  AM-PAC 6 Clicks Basic Mobility (PT)  -TA  --  AM-PAC 6 Clicks Daily Activity (OT)  -RB      User Key  (r) = Recorded By, (t) = Taken By, (c) = Cosigned By    Initials Name Provider Type    RB Tyrel Lopez, JAUN Occupational Therapist    Maude Marsh PTA Physical Therapy Assistant           Time Calculation:   PT Charges     Row Name 08/09/21 1315             Time Calculation    Start Time  1011  -TA      Stop Time  1027  -TA      Time Calculation (min)  16 min  -TA      PT Received On  08/09/21  -TA         Time Calculation- PT    Total Timed Code Minutes- PT  16 minute(s)  -TA         Timed Charges    37413 - PT Therapeutic Exercise Minutes  16  -TA         Total Minutes    Timed Charges Total Minutes  16  -TA       Total Minutes  16  -TA        User Key  (r) = Recorded By, (t) = Taken By, (c) = Cosigned By    Initials Name Provider Type    Maude Marsh PTA Physical Therapy Assistant        Therapy Charges for Today     Code Description Service Date Service Provider Modifiers Qty    [id number] HC PT THER PROC EA 15 MIN 8/9/2021 Maude Aguilar PTA GP 1          PT G-Codes  Outcome Measure Options: AM-PAC 6 Clicks Basic Mobility (PT)  AM-PAC 6 Clicks Score (PT): 10  AM-PAC 6 Clicks Score (OT): 12    Maude Aguilar PTA  8/9/2021

## 2021-08-09 NOTE — SIGNIFICANT NOTE
08/09/21 1038   OTHER   Discipline speech language pathologist   Rehab Time/Intention   Session Not Performed patient unavailable for treatment  (Pt NPO for procedure)

## 2021-08-09 NOTE — PLAN OF CARE
Goal Outcome Evaluation:  Plan of Care Reviewed With: patient        Progress: no change  Outcome Summary: vss; no new complaints this shift; pt very pleasant this shift; will continue to monitor

## 2021-08-09 NOTE — PLAN OF CARE
Goal Outcome Evaluation:  Plan of Care Reviewed With: patient           Outcome Summary: OT tx on this date.  Bed mob with mod/max A.  Pt sat EOB with supervision/CGA for ~ 25 minutes.  Pt performed B UE AAROM with 3# dowel javier 2x10 reps with max VC and tactile cues.  Attempted LB dresssing without success.  Will cont as pt participates with therapy.

## 2021-08-09 NOTE — CONSULTS
DATE OF CONSULT: 8/9/2021    REQUESTING SOURCE:  Dr Tsai      REASON FOR CONSULTATION: Thrombocytopenia, anemia, elevated iron level, elevated liver function test, coagulopathy      HISTORY OF PRESENT ILLNESS:  71-year-old female with medical problem consisting of diabetes mellitus, hypertension, legal blindness, rheumatoid arthritis, schizoaffective disorder, bipolar disorder was admitted to [facility] on 8/6/2021 with complaint of altered mental status.  During course of hospitalization patient was found to have worsening thrombocytopenia with platelet count of 35,000 on 8/7/2021.  Patient was also found to have persistent anemia and elevated iron level on anemia work-up.  Hematology oncology has been consulted for recommendation regarding abnormal CBC and elevated iron level.  Patient is very poor historian and cannot provide any further details.  History was obtained from patient's medical records and patient's chart as well as hospitalist team.  No bleeding reported.  No fever.            PAST MEDICAL HISTORY:    Past Medical History:   Diagnosis Date   • Bipolar disorder, unspecified (CMS/HCC)    • Depression    • Diabetes mellitus (CMS/HCC)    • GERD (gastroesophageal reflux disease)    • Hypertension    • Legal blindness    • Psychiatric illness    • Rheumatoid arthritis (CMS/HCC)    • Schizoaffective disorder (CMS/HCC)        PAST SURGICAL HISTORY:  Past Surgical History:   Procedure Laterality Date   • COLONOSCOPY N/A 8/29/2020    Procedure: COLONOSCOPY;  Surgeon: Ge Gorman MD;  Location: [facility];  Service: Gastroenterology;  Laterality: N/A;   • ENDOSCOPY N/A 8/28/2020    Procedure: ESOPHAGOGASTRODUODENOSCOPY;  Surgeon: Ge Gorman MD;  Location: [facility];  Service: Gastroenterology;  Laterality: N/A;       ALLERGIES:    No Known Allergies    SOCIAL HISTORY:   Social History     Tobacco Use   • Smoking status: Former Smoker   • Smokeless tobacco: Never Used   • Tobacco  "comment: \" a little bit a long time ago\"   Substance Use Topics   • Alcohol use: Not Currently   • Drug use: Never       CURRENT MEDICATIONS:    Current Facility-Administered Medications   Medication Dose Route Frequency Provider Last Rate Last Admin   • ARIPiprazole (ABILIFY) tablet 10 mg  10 mg Oral Daily With Dinner Ellie Raza MD   10 mg at 08/08/21 1725   • calcium carbonate (TUMS) chewable tablet 500 mg (200 mg elemental)  2 tablet Oral BID PRN Karan Tsai MD       • cholecalciferol (VITAMIN D3) tablet 5,000 Units  5,000 Units Oral Daily Karan Tsai MD   5,000 Units at 08/09/21 0945   • dextrose (D50W) 25 g/ 50mL Intravenous Solution 25 g  25 g Intravenous Q15 Min PRN Karan Tsai MD       • dextrose (GLUTOSE) oral gel 15 g  15 g Oral Q15 Min PRN Karan Tsai MD       • escitalopram (LEXAPRO) tablet 10 mg  10 mg Oral Daily Karan Tsai MD   10 mg at 08/09/21 0946   • ferrous sulfate EC tablet 324 mg  324 mg Oral Daily With Breakfast Karan Tsai MD   324 mg at 08/09/21 0946   • glucagon (human recombinant) (GLUCAGEN DIAGNOSTIC) injection 1 mg  1 mg Subcutaneous Q15 Min PRN Karan Tsai MD       • insulin aspart (novoLOG) injection 0-7 Units  0-7 Units Subcutaneous TID AC Karan Tsai MD       • Magnesium Sulfate 2 gram Bolus, followed by 8 gram infusion (total Mg dose 10 grams)- Mg less than or equal to 1mg/dL  2 g Intravenous PRN Karan Tsai MD        Or   • Magnesium Sulfate 2 gram / 50mL Infusion (GIVE X 3 BAGS TO EQUAL 6GM TOTAL DOSE) - Mg 1.1 - 1.5 mg/dl  2 g Intravenous PRN Karan Tsai MD        Or   • Magnesium Sulfate 4 gram infusion- Mg 1.6-1.9 mg/dL  4 g Intravenous PRN Karan Tsai MD 25 mL/hr at 08/07/21 0121 4 g at 08/07/21 0121   • multivitamin with minerals 1 tablet  1 tablet Oral Daily Karan Tsai MD   1 tablet at 08/09/21 0946   • ondansetron (ZOFRAN) tablet 4 mg  4 mg Oral Q6H PRN Karan Tsai MD        Or   • ondansetron (ZOFRAN) injection " 4 mg  4 mg Intravenous Q6H PRN Karan Tsai MD       • pantoprazole (PROTONIX) EC tablet 40 mg  40 mg Oral Daily Karan Tsai MD   40 mg at 08/09/21 0946   • potassium chloride (MICRO-K) CR capsule 40 mEq  40 mEq Oral PRN Karan Tsai MD        Or   • potassium chloride (KLOR-CON) packet 40 mEq  40 mEq Oral PRN Karan Tsai MD        Or   • potassium chloride 10 mEq in 100 mL IVPB  10 mEq Intravenous Q1H PRN Karan Tsai  mL/hr at 08/07/21 0433 10 mEq at 08/07/21 0433   • sodium chloride 0.9 % flush 10 mL  10 mL Intravenous Q12H Karan Tsai MD   10 mL at 08/08/21 2057   • sodium chloride 0.9 % flush 10 mL  10 mL Intravenous PRN Karan Tsai MD            HOME MEDICATIONS:   No current facility-administered medications on file prior to encounter.     Current Outpatient Medications on File Prior to Encounter   Medication Sig Dispense Refill   • Emollient (BAG BALM EX) Apply  topically 2 (two) times a day.     • escitalopram (LEXAPRO) 10 MG tablet Take 1 tablet by mouth Daily. Indications: Major Depressive Disorder 30 tablet 0   • ferrous sulfate (FerrouSul) 325 (65 FE) MG tablet Take 1 tablet by mouth Daily With Breakfast. Indications: Anemia From Inadequate Iron in the Body 30 tablet 0   • Menthol-Zinc Oxide (Calmoseptine) 0.44-20.6 % ointment Apply  topically to the appropriate area as directed Every 12 (Twelve) Hours.     • metFORMIN (GLUCOPHAGE) 500 MG tablet Take 500 mg by mouth Daily.     • multivitamin with minerals (MULTIVITAMIN ADULT PO) Take 1 tablet by mouth Daily.     • ondansetron ODT (ZOFRAN-ODT) 4 MG disintegrating tablet Place 1 tablet on the tongue Every 6 (Six) Hours As Needed for Nausea or Vomiting. 10 tablet 0   • pantoprazole (PROTONIX) 40 MG EC tablet Take 1 tablet by mouth Daily. Indications: Gastroesophageal Reflux Disease 30 tablet 0   • risperiDONE (risperDAL) 3 MG tablet Take 1 tablet by mouth Daily With Dinner. STOP SEROQUEL (QUETIAPINE)  Indications:  "Schizophrenia 30 tablet 0   • tuberculin (Tubersol) 5 UNIT/0.1ML injection Inject 5 Units into the appropriate area of the skin as directed by provider 1 (One) Time.     • cephalexin (KEFLEX) 500 MG capsule Take 500 mg by mouth 3 (Three) Times a Day.     • Cholecalciferol 125 MCG (5000 UT) tablet Take 5,000 Units by mouth Daily.         FAMILY HISTORY:    Family History   Problem Relation Age of Onset   • Dementia Mother    • No Known Problems Father    • No Known Problems Sister    • No Known Problems Brother    • No Known Problems Maternal Aunt    • No Known Problems Paternal Aunt    • No Known Problems Maternal Uncle    • No Known Problems Paternal Uncle    • No Known Problems Maternal Grandfather    • No Known Problems Maternal Grandmother    • No Known Problems Paternal Grandfather    • No Known Problems Paternal Grandmother    • No Known Problems Cousin    • No Known Problems Other    • Suicide Attempts Neg Hx    • ADD / ADHD Neg Hx    • Alcohol abuse Neg Hx    • Anxiety disorder Neg Hx    • Bipolar disorder Neg Hx    • Depression Neg Hx    • Drug abuse Neg Hx    • OCD Neg Hx    • Paranoid behavior Neg Hx    • Schizophrenia Neg Hx    • Seizures Neg Hx    • Self-Injurious Behavior  Neg Hx        REVIEW OF SYSTEMS:      Unable to obtain secondary to patient's maintenance        PHYSICAL EXAMINATION:      VITAL SIGNS:  /71 (BP Location: Right arm, Patient Position: Lying)   Pulse 94   Temp 98.2 °F (36.8 °C) (Axillary)   Resp 18   Ht 160 cm (63\")   Wt 59.3 kg (130 lb 12.8 oz)   LMP  (LMP Unknown)   SpO2 96%   BMI 23.17 kg/m²       08/06/21  2121 08/08/21  0608   Weight: 57.7 kg (127 lb 4.8 oz) (1 sheet, 1 pillow, and 1 blanket) 59.3 kg (130 lb 12.8 oz)           CONSTITUTIONAL:  Not in any distress.    EYES: Mild conjunctival Pallor. No Icterus. No Pterygium. No ptosis.    ENMT:  Normocephalic, Atraumatic.No Facial Asymmetry noted.    NECK:  No adenopathy.Trachea midline. NO JVD.    RESPIRATORY: " Diminished air entry on left lower lung field. No rhonchi or wheezing    CARDIOVASCULAR:  S1, S2. Regular rate and rhythm. No murmur or gallop appreciated.    ABDOMEN:  Soft,  nontender. Bowel sounds present in all four quadrants.  No Hepatosplenomegaly appreciated.    MUSCULOSKELETAL:  No edema.No Calf Tenderness.Decreased range of motion.    NEUROLOGIC:   Moves all 4 extremities.    SKIN : No new skin lesion identified. Skin is warm and dry to touch.    LYMPHATICS: No new enlarged lymph nodes in neck or supraclavicular area.                DIAGNOSTIC DATA:    WBC   Date Value Ref Range Status   08/09/2021 4.44 3.40 - 10.80 10*3/mm3 Final     RBC   Date Value Ref Range Status   08/09/2021 3.14 (L) 3.77 - 5.28 10*6/mm3 Final     Hemoglobin   Date Value Ref Range Status   08/09/2021 10.2 (L) 12.0 - 15.9 g/dL Final     Hematocrit   Date Value Ref Range Status   08/09/2021 30.2 (L) 34.0 - 46.6 % Final     MCV   Date Value Ref Range Status   08/09/2021 96.2 79.0 - 97.0 fL Final     MCH   Date Value Ref Range Status   08/09/2021 32.5 26.6 - 33.0 pg Final     MCHC   Date Value Ref Range Status   08/09/2021 33.8 31.5 - 35.7 g/dL Final     RDW   Date Value Ref Range Status   08/09/2021 15.9 (H) 12.3 - 15.4 % Final     RDW-SD   Date Value Ref Range Status   08/09/2021 55.6 (H) 37.0 - 54.0 fl Final     MPV   Date Value Ref Range Status   08/09/2021 11.0 6.0 - 12.0 fL Final     Platelets   Date Value Ref Range Status   08/09/2021 39 (C) 140 - 450 10*3/mm3 Final     Neutrophil %   Date Value Ref Range Status   08/09/2021 56.0 42.7 - 76.0 % Final     Lymphocyte %   Date Value Ref Range Status   08/09/2021 29.7 19.6 - 45.3 % Final     Monocyte %   Date Value Ref Range Status   08/09/2021 11.7 5.0 - 12.0 % Final     Eosinophil %   Date Value Ref Range Status   08/09/2021 1.6 0.3 - 6.2 % Final     Basophil %   Date Value Ref Range Status   08/09/2021 0.5 0.0 - 1.5 % Final     Immature Grans %   Date Value Ref Range Status    08/09/2021 0.5 0.0 - 0.5 % Final     Neutrophils, Absolute   Date Value Ref Range Status   08/09/2021 2.49 1.70 - 7.00 10*3/mm3 Final     Lymphocytes, Absolute   Date Value Ref Range Status   08/09/2021 1.32 0.70 - 3.10 10*3/mm3 Final     Monocytes, Absolute   Date Value Ref Range Status   08/09/2021 0.52 0.10 - 0.90 10*3/mm3 Final     Eosinophils, Absolute   Date Value Ref Range Status   08/09/2021 0.07 0.00 - 0.40 10*3/mm3 Final     Basophils, Absolute   Date Value Ref Range Status   08/09/2021 0.02 0.00 - 0.20 10*3/mm3 Final     Immature Grans, Absolute   Date Value Ref Range Status   08/09/2021 0.02 0.00 - 0.05 10*3/mm3 Final     nRBC   Date Value Ref Range Status   08/09/2021 0.0 0.0 - 0.2 /100 WBC Final     Glucose   Date Value Ref Range Status   08/09/2021 114 (H) 65 - 99 mg/dL Final     Sodium   Date Value Ref Range Status   08/09/2021 140 136 - 145 mmol/L Final     Potassium   Date Value Ref Range Status   08/09/2021 4.3 3.5 - 5.2 mmol/L Final     CO2   Date Value Ref Range Status   08/09/2021 21.0 (L) 22.0 - 29.0 mmol/L Final     Chloride   Date Value Ref Range Status   08/09/2021 114 (H) 98 - 107 mmol/L Final     Anion Gap   Date Value Ref Range Status   08/09/2021 5.0 5.0 - 15.0 mmol/L Final     Creatinine   Date Value Ref Range Status   08/09/2021 0.75 0.57 - 1.00 mg/dL Final     BUN   Date Value Ref Range Status   08/09/2021 9 8 - 23 mg/dL Final     BUN/Creatinine Ratio   Date Value Ref Range Status   08/09/2021 12.0 7.0 - 25.0 Final     Calcium   Date Value Ref Range Status   08/09/2021 9.3 8.6 - 10.5 mg/dL Final     Alkaline Phosphatase   Date Value Ref Range Status   08/09/2021 78 39 - 117 U/L Final     Total Protein   Date Value Ref Range Status   08/09/2021 6.4 6.0 - 8.5 g/dL Final     ALT (SGPT)   Date Value Ref Range Status   08/09/2021 114 (H) 1 - 33 U/L Final     AST (SGOT)   Date Value Ref Range Status   08/09/2021 118 (H) 1 - 32 U/L Final     Total Bilirubin   Date Value Ref Range Status    08/09/2021 0.9 0.0 - 1.2 mg/dL Final     Albumin   Date Value Ref Range Status   08/09/2021 1.90 (L) 3.50 - 5.20 g/dL Final     Globulin   Date Value Ref Range Status   08/09/2021 4.5 gm/dL Final     Lab Results   Component Value Date    IRON 112 08/09/2021    TIBC 176 (L) 08/09/2021    LABIRON 64 (H) 08/09/2021    FERRITIN 351.70 (H) 08/09/2021    VOTLWYCH12 1,716 (H) 08/09/2021    FOLATE 16.80 08/09/2021     No results found for: , LABCA2, AFPTM, HCGQUANT, , CHROMGRNA, [id number], CEA, REFLABREPO]    Radiology Data :  EEG    Result Date: 8/8/2021  EEG background is moderate generalized slow Generalized triphasic waves are present No electrographic seizures are seen Note-triphasic waves on a background of generalized slowing are typically seen in the setting of toxic/metabolic encephalopathies, but may also be seen with cefepime exposure This report is transcribed using the Dragon dictation system.      CT Head Without Contrast    Result Date: 8/6/2021  CONCLUSION: No acute intracranial process. Cerebral and cerebellar atrophy. Minimal small vessel disease. Minimal fluid left mastoid air cells. [id number] Electronically signed by:  Waylon Larkin MD  8/6/2021 6:48 PM CDT Workstation: [id number]    US Abdomen Complete    Result Date: 8/9/2021  Heterogeneous echotexture within the liver with macro lobular margins consistent with cirrhosis there is color-flow in the portal and hepatic veins. Small amount of ascites around the liver. Contracted gallbladder with thick wall with no pericholecystic fluid. 5 mm calculus in the gallbladder. Small amount of fluid around portion of the right kidney about 1 to 2 mm thick. [id number] Electronically signed by:  Frank Maradiaga MD  8/9/2021 9:42 AM CDT Workstation: [id number]    XR Chest 1 View    Result Date: 8/6/2021  CONCLUSION: No Acute Disease [id number] Electronically signed by:  Waylon Larkin MD  8/6/2021 4:57 PM CDT Workstation: [id number]    XR Hip With or Without  Pelvis 2 - 3 View Left    Result Date: 8/3/2021  1. No acute osseous abnormality. Electronically signed by:  Greg Kee MD  8/3/2021 9:15 PM CDT Workstation: [id number]      Pathology :  * Cannot find OR log *    ASSESSMENT AND PLAN:      1.  Thrombocytopenia:  -Patient has chronic ongoing thrombocytopenia since July 2020  -Baseline platelet count stays around 70,000.  -Patient was admitted on 8/6/2021 with platelet count of 45,000.  Platelet count decreased to 35,000 on 8/7/2021.  -Platelet count earlier today on 8/9/2021 is 39,000.  There is no bleeding.  -Immature platelet fraction is within normal limit, less likely ITP.  -Ultrasound of abdomen done earlier today does show evidence of cirrhosis of liver that can contribute to chronic thrombocytopenia as well  -Patient does not have any nutritional deficiency  -Recommend transfusing as needed if hemoglobin is less than 15,000 or any active bleeding.      2.  Anemia:  -Hemoglobin is 10.2  -Hemoglobin has been between 7 and 11 since August 2020.  -Recommend flow cytometry in the morning to rule out any bone marrow pathology like low-grade leukemia or lymphoma or myelodysplasia.  -Anemia work-up done earlier today does not show any evidence of nutritional deficiency  -Due to elevated iron level recommend discontinuing ferrous sulfate.    3.  Elevated iron level:  -Anemia work-up done earlier today shows iron saturation is elevated at 64% with iron level of 112.  -Recommend discontinuing ferrous sulfate.    4.  Elevated liver function test: Of questionable etiology  -Check hepatitis B profile in the morning to rule out any hepatitis.  -Avoid any hepatotoxic medication at present      5.  Coagulopathy:  -Likely secondary to poor p.o. intake and underlying vitamin K deficiency with poor synthetic function of liver.  -We will give vitamin K 10 mg IV x1 today.  -Check PT/INR in the morning.        Thank you for this consultation.    Terry Bills MD  8/9/2021  16:45  CDT        Part of this note may be an electronic transcription/translation of spoken language to printed text using the Dragon Dictation System.

## 2021-08-09 NOTE — THERAPY TREATMENT NOTE
Acute Care - Occupational Therapy Treatment Note  [facility]     Patient Name: Natacha Gandhi  : 1950  MRN: [id number]  Today's Date: 2021     Date of Referral to OT: 21       Admit Date: 2021       ICD-10-CM ICD-9-CM   1. Hypotension, unspecified hypotension type  I95.9 458.9   2. Altered mental status, unspecified altered mental status type  R41.82 780.97   3. Impaired functional mobility, balance, gait, and endurance  Z74.09 V49.89   4. Oropharyngeal dysphagia  R13.12 787.22   5. Impaired mobility and activities of daily living  Z74.09 V49.89    Z78.9      Patient Active Problem List   Diagnosis   • Acute GI bleeding   • Altered mental status, unspecified   • Schizophrenia, paranoid type (CMS/HCC)   • Acute exacerbation of chronic paranoid schizophrenia (CMS/HCC)   • Acute blood loss anemia   • DMII (diabetes mellitus, type 2) (CMS/HCC)   • Hypotension   • Lactic acidosis   • Thrombocytopenia (CMS/HCC)     Past Medical History:   Diagnosis Date   • Bipolar disorder, unspecified (CMS/HCC)    • Depression    • Diabetes mellitus (CMS/HCC)    • GERD (gastroesophageal reflux disease)    • Hypertension    • Legal blindness    • Psychiatric illness    • Rheumatoid arthritis (CMS/HCC)    • Schizoaffective disorder (CMS/HCC)      Past Surgical History:   Procedure Laterality Date   • COLONOSCOPY N/A 2020    Procedure: COLONOSCOPY;  Surgeon: Ge Gorman MD;  Location: [facility];  Service: Gastroenterology;  Laterality: N/A;   • ENDOSCOPY N/A 2020    Procedure: ESOPHAGOGASTRODUODENOSCOPY;  Surgeon: Ge Gorman MD;  Location: [facility];  Service: Gastroenterology;  Laterality: N/A;            OT ASSESSMENT FLOWSHEET (last 12 hours)      OT Evaluation and Treatment     Row Name 21 0858                   OT Time and Intention    Subjective Information  complains of;fatigue  -RB        Mode of Treatment  occupational therapy;individual therapy  -RB        Patient  Effort  adequate  -RB           General Information    Patient Profile Reviewed  yes  -RB        Existing Precautions/Restrictions  fall  -RB           Cognition    Orientation Status (Cognition)  oriented to;person;place   -RB           Range of Motion Comprehensive    General Range of Motion  --  -RB           Strength Comprehensive (MMT)    General Manual Muscle Testing (MMT) Assessment  --  -RB           Bed Mobility    Bed Mobility  bed mobility (all) activities  -RB        All Activities, [place] (Bed Mobility)  moderate assist (50% patient effort);maximum assist (25% patient effort)  -RB        Assistive Device (Bed Mobility)  bed rails;draw sheet;head of bed elevated  -RB           Functional Mobility    Functional Mobility- Ind. Level  moderate assist (50% patient effort);2 person assist required  -RB        Functional Mobility- Device  rolling walker  -RB        Functional Mobility-Distance (Feet)  1  -RB        Functional Mobility- Safety Issues  step length decreased;loses balance backward  -RB           Transfer Assessment/Treatment    Transfers  sit-stand transfer;stand-sit transfer;toilet transfer  -RB           Transfers    Sit-Stand [place] (Transfers)  moderate assist (50% patient effort);maximum assist (25% patient effort)  -RB        Stand-Sit [place] (Transfers)  moderate assist (50% patient effort);maximum assist (25% patient effort)  -RB        [place] Level (Toilet Transfer)  --  -RB        Assistive Device (Toilet Transfer)  --  -RB           Sit-Stand Transfer    Assistive Device (Sit-Stand Transfers)  walker, front-wheeled  -RB           Stand-Sit Transfer    Assistive Device (Stand-Sit Transfers)  walker, front-wheeled  -RB           Toilet Transfer    Type (Toilet Transfer)  --  -RB           Motor Skills    Motor Skills  therapeutic exercise  -RB        Therapeutic Exercise  elbow/forearm  -RB           Elbow/Forearm (Therapeutic Exercise)    Elbow/Forearm  (Therapeutic Exercise)  AAROM (active assistive range of motion)  -RB        Elbow/Forearm AAROM (Therapeutic Exercise)  bilateral;flexion;extension;sitting;10 repetitions 2 sets of 10 reps.  -RB           Balance    Balance Assessment  sitting static balance;sitting dynamic balance  -RB        Static Sitting Balance  mild impairment  -RB        Dynamic Sitting Balance  moderate impairment  -RB        Static Standing Balance  moderate impairment  -RB        Dynamic Standing Balance  moderate impairment  -RB           Lower Body Dressing Assessment/Training    [place] Level (Lower Body Dressing)  lower body dressing skills;don;socks  -RB        Position (Lower Body Dressing)  edge of bed sitting  -RB        Comment (Lower Body Dressing)  Attempt only while sitting EOB.  -RB           Vital Signs    Pre Systolic BP Rehab  129  -RB        Pre Treatment Diastolic BP  71  -RB        Post Systolic BP Rehab  142  -RB        Post Treatment Diastolic BP  74  -RB        Pretreatment Heart Rate (beats/min)  102  -RB        Posttreatment Heart Rate (beats/min)  108  -RB        Pre SpO2 (%)  97  -RB        O2 Delivery Pre Treatment  room air  -RB        Post SpO2 (%)  99  -RB        O2 Delivery Post Treatment  room air  -RB        Pre Patient Position  Supine  -RB        Intra Patient Position  Sitting  -RB        Post Patient Position  Supine  -RB           OT Goals    Transfer Goal Selection (OT)  transfer, OT goal 1  -RB        Bathing Goal Selection (OT)  bathing, OT goal 1  -RB        Dressing Goal Selection (OT)  dressing, OT goal 1  -RB        Toileting Goal Selection (OT)  toileting, OT goal 1  -RB        Strength Goal Selection (OT)  strength, OT goal 1  -RB           Transfer Goal 1 (OT)    Activity/Assistive Device (Transfer Goal 1, OT)  transfers, all  -RB        [place] Level/Cues Needed (Transfer Goal 1, OT)  modified independence  -RB        Time Frame (Transfer Goal 1, OT)  long term goal (LTG)  -RB         Progress/Outcome (Transfer Goal 1, OT)  goal not met  -RB           Bathing Goal 1 (OT)    Activity/Device (Bathing Goal 1, OT)  bathing skills, all  -RB        [place] Level/Cues Needed (Bathing Goal 1, OT)  modified independence  -RB        Time Frame (Bathing Goal 1, OT)  long term goal (LTG)  -RB        Progress/Outcomes (Bathing Goal 1, OT)  goal not met  -RB           Dressing Goal 1 (OT)    Activity/Device (Dressing Goal 1, OT)  dressing skills, all  -RB        [place]/Cues Needed (Dressing Goal 1, OT)  modified independence  -RB        Time Frame (Dressing Goal 1, OT)  long term goal (LTG)  -RB        Progress/Outcome (Dressing Goal 1, OT)  goal not met  -RB           Toileting Goal 1 (OT)    Activity/Device (Toileting Goal 1, OT)  toileting skills, all  -RB        [place] Level/Cues Needed (Toileting Goal 1, OT)  modified independence  -RB        Time Frame (Toileting Goal 1, OT)  long term goal (LTG)  -RB        Progress/Outcome (Toileting Goal 1, OT)  goal not met  -RB           Strength Goal 1 (OT)    Strength Goal 1 (OT)  Increase UE str by 1/2 grade to increase independence with functional mobility.  -RB        Time Frame (Strength Goal 1, OT)  long term goal (LTG)  -RB        Progress/Outcome (Strength Goal 1, OT)  goal not met  -RB           Therapy Assessment/Plan (OT)    Date of Referral to OT  --  -RB        Rehab Potential (OT)  --  -RB        Criteria for Skilled Therapeutic Interventions Met (OT)  --  -RB        Therapy Frequency (OT)  other (see comments) 5-7 days/wk  -RB        Problem List (OT)  --  -RB        Activity Limitations Related to Problem List (OT)  --  -RB           Therapy Plan Review/Discharge Plan (OT)    Anticipated Discharge Disposition (OT)  skilled nursing facility  -RB          User Key  (r) = Recorded By, (t) = Taken By, (c) = Cosigned By    Initials Name Effective Dates    RB Tyrel Lopez, OT 06/16/21 -            Occupational Therapy Education                  Title: PT OT SLP Therapies (In Progress)     Topic: Occupational Therapy (In Progress)     Point: ADL training (Not Started)     Description:   Instruct learner(s) on proper safety adaptation and remediation techniques during self care or transfers.   Instruct in proper use of assistive devices.              Learner Progress:  Not documented in this visit.          Point: Home exercise program (In Progress)     Description:   Instruct learner(s) on appropriate technique for monitoring, assisting and/or progressing therapeutic exercises/activities.              Learning Progress Summary           Patient Acceptance, E,D, NR by RB at 8/9/2021 0956    Comment: Attempted B UE HEP while sitting EOB with minimal understanding.                   Point: Precautions (Done)     Description:   Instruct learner(s) on prescribed precautions during self-care and functional transfers.              Learning Progress Summary           Patient Acceptance, E, VU by RB at 8/7/2021 1406    Comment: Edu pt on use of gait belt and non skid socks when OOB and no OOB without assist.                   Point: Body mechanics (Not Started)     Description:   Instruct learner(s) on proper positioning and spine alignment during self-care, functional mobility activities and/or exercises.              Learner Progress:  Not documented in this visit.                      User Key     Initials Effective Dates Name Provider Type Discipline    LISA 06/16/21 -  Tyrel Lopez, OT Occupational Therapist OT                  OT Recommendation and Plan  Planned Therapy Interventions (OT): activity tolerance training, adaptive equipment training, BADL retraining, functional balance retraining, occupation/activity based interventions, patient/caregiver education/training, strengthening exercise, ROM/therapeutic exercise, transfer/mobility retraining  Therapy Frequency (OT): other (see comments) (5-7 days/wk)  Plan of Care Review  Plan of Care Reviewed  With: patient  Outcome Summary: OT tx on this date.  Bed mob with mod/max A.  Pt sat EOB with supervision/CGA for ~ 25 minutes.  Pt performed B UE AAROM with 3# dowel javier 2x10 reps with max VC and tactile cues.  Attempted LB dresssing without success.  Plan of Care Reviewed With: patient  Outcome Summary: OT tx on this date.  Bed mob with mod/max A.  Pt sat EOB with supervision/CGA for ~ 25 minutes.  Pt performed B UE AAROM with 3# dowel javier 2x10 reps with max VC and tactile cues.  Attempted LB dresssing without success.    Outcome Measures     Row Name 08/09/21 0858 08/07/21 0959          How much help from another is currently needed...    Putting on and taking off regular lower body clothing?  2  -RB  2  -RB     Bathing (including washing, rinsing, and drying)  2  -RB  2  -RB     Toileting (which includes using toilet bed pan or urinal)  2  -RB  2  -RB     Putting on and taking off regular upper body clothing  2  -RB  3  -RB     Taking care of personal grooming (such as brushing teeth)  2  -RB  3  -RB     Eating meals  2  -RB  4  -RB     AM-PAC 6 Clicks Score (OT)  12  -RB  16  -RB        Functional Assessment    Outcome Measure Options  --  AM-PAC 6 Clicks Daily Activity (OT)  -RB       User Key  (r) = Recorded By, (t) = Taken By, (c) = Cosigned By    Initials Name Provider Type    RB Tyrel Lopez OT Occupational Therapist          Time Calculation:   Time Calculation- OT     Row Name 08/09/21 1006             Time Calculation- OT    OT Start Time  0858  -RB      OT Stop Time  0940  -RB      OT Time Calculation (min)  42 min  -RB      OT Received On  08/09/21  -RB        User Key  (r) = Recorded By, (t) = Taken By, (c) = Cosigned By    Initials Name Provider Type    RB Tyrel Lopez OT Occupational Therapist        Therapy Charges for Today     Code Description Service Date Service Provider Modifiers Qty    35350252220  OT SELF CARE/MGMT/TRAIN EA 15 MIN 8/9/2021 Tyrel Lopez OT GO 1    [id number]  OT  THER PROC EA 15 MIN 8/9/2021 Tyrel Lopez OT GO 1    [id number]  OT THERAPEUTIC ACT EA 15 MIN 8/9/2021 Tyrel Lopez OT GO 1               Tyrel Lopez OT  8/9/2021

## 2021-08-09 NOTE — PLAN OF CARE
Goal Outcome Evaluation:  Plan of Care Reviewed With: patient           Outcome Summary: pt defered EOB/OOB. pt performed AAROM of B LE in supine. pt will require 24/7 care & continued PT services

## 2021-08-10 PROBLEM — K76.82 ENCEPHALOPATHY, HEPATIC (HCC): Status: ACTIVE | Noted: 2021-01-01

## 2021-08-10 NOTE — THERAPY TREATMENT NOTE
Acute Care - Physical Therapy Treatment Note  [facility]     Patient Name: Natacha Gandhi  : 1950  MRN: [id number]  Today's Date: 8/10/2021      Visit Dx:     ICD-10-CM ICD-9-CM   1. Hypotension, unspecified hypotension type  I95.9 458.9   2. Altered mental status, unspecified altered mental status type  R41.82 780.97   3. Impaired functional mobility, balance, gait, and endurance  Z74.09 V49.89   4. Oropharyngeal dysphagia  R13.12 787.22   5. Impaired mobility and activities of daily living  Z74.09 V49.89    Z78.9      Patient Active Problem List   Diagnosis   • Acute GI bleeding   • Altered mental status, unspecified   • Schizophrenia, paranoid type (CMS/HCC)   • Acute exacerbation of chronic paranoid schizophrenia (CMS/HCC)   • Acute blood loss anemia   • DMII (diabetes mellitus, type 2) (CMS/HCC)   • Hypotension   • Lactic acidosis   • Thrombocytopenia (CMS/HCC)     Past Medical History:   Diagnosis Date   • Bipolar disorder, unspecified (CMS/HCC)    • Depression    • Diabetes mellitus (CMS/HCC)    • GERD (gastroesophageal reflux disease)    • Hypertension    • Legal blindness    • Psychiatric illness    • Rheumatoid arthritis (CMS/HCC)    • Schizoaffective disorder (CMS/HCC)      Past Surgical History:   Procedure Laterality Date   • COLONOSCOPY N/A 2020    Procedure: COLONOSCOPY;  Surgeon: Ge Gorman MD;  Location: [facility];  Service: Gastroenterology;  Laterality: N/A;   • ENDOSCOPY N/A 2020    Procedure: ESOPHAGOGASTRODUODENOSCOPY;  Surgeon: Ge Gorman MD;  Location: [facility];  Service: Gastroenterology;  Laterality: N/A;        PT Assessment (last 12 hours)      PT Evaluation and Treatment     Row Name 08/10/21 1118          Physical Therapy Time and Intention    Subjective Information  no complaints  -TA     Document Type  therapy note (daily note)  -TA     Mode of Treatment  physical therapy  -TA     Row Name 08/10/21 1118          General Information     Patient Profile Reviewed  yes  -TA     Existing Precautions/Restrictions  fall appears to have herniated vaginal tissue per RN when wiping for toilet hygiend  -TA     Row Name 08/10/21 1118          Cognition    Orientation Status (Cognition)  oriented to;person;place   -TA     Personal Safety Interventions  fall prevention program maintained;nonskid shoes/slippers when out of bed;supervised activity  -TA     Row Name 08/10/21 1118          Pain Scale: Word Pre/Post-Treatment    Pain: Word Scale, Pretreatment  0 - no pain  -TA     Posttreatment Pain Rating  0 - no pain  -TA     Row Name 08/10/21 1118          Range of Motion Comprehensive    General Range of Motion  bilateral lower extremity ROM WFL  -TA     Row Name 08/10/21 1118          Bed Mobility    Bed Mobility  bed mobility (all) activities;supine-sit;sit-supine;scooting/bridging  -TA     All Activities, [place] (Bed Mobility)  not tested  -TA     Scooting/Bridging [place] (Bed Mobility)  dependent (less than 25% patient effort);2 person assist >HOB  -TA     Supine-Sit [place] (Bed Mobility)  moderate assist (50% patient effort);verbal cues;nonverbal cues (demo/gesture)  -TA     Sit-Supine [place] (Bed Mobility)  moderate assist (50% patient effort);verbal cues;nonverbal cues (demo/gesture)  -TA     Assistive Device (Bed Mobility)  bed rails;draw sheet;head of bed elevated  -TA     Comment (Bed Mobility)  pt sat @ EOB ~15 minutes with min-mod assist of 1, pt with posterior lean initially, then sat with trunk flexed & B feet not touching the floor   -TA     Row Name 08/10/21 1118          Transfers    Sit-Stand [place] (Transfers)  not tested  -TA     Stand-Sit [place] (Transfers)  not tested  -TA     Row Name 08/10/21 1118          Plan of Care Review    Plan of Care Reviewed With  patient  -TA     Outcome Summary  pt sup<>sit with mod assist of 1, pt sat @ EOB ~15 minutes with min-mod assit of 1, pt will require  care @  D/C  -TA     Row Name 08/10/21 1118          Vital Signs    Pre Systolic BP Rehab  135  -TA     Pre Treatment Diastolic BP  83  -TA     Post Systolic BP Rehab  128  -TA     Post Treatment Diastolic BP  74  -TA     Pretreatment Heart Rate (beats/min)  95  -TA     Posttreatment Heart Rate (beats/min)  77  -TA     Pre SpO2 (%)  98  -TA     O2 Delivery Pre Treatment  room air  -TA     Post SpO2 (%)  98  -TA     O2 Delivery Post Treatment  room air  -TA     Pre Patient Position  Supine  -TA     Intra Patient Position  Sitting  -TA     Post Patient Position  Supine  -TA     Row Name 08/10/21 1118          Bed Mobility Goal 1 (PT)    Activity/Assistive Device (Bed Mobility Goal 1, PT)  bed mobility activities, all  -TA     [place] Level/Cues Needed (Bed Mobility Goal 1, PT)  modified independence  -TA     Time Frame (Bed Mobility Goal 1, PT)  1 week  -TA     Progress/Outcomes (Bed Mobility Goal 1, PT)  goal not met  -TA     Row Name 08/10/21 1118          Transfer Goal 1 (PT)    Activity/Assistive Device (Transfer Goal 1, PT)  bed-to-chair/chair-to-bed  -TA     [place] Level/Cues Needed (Transfer Goal 1, PT)  modified independence  -TA     Time Frame (Transfer Goal 1, PT)  10 days  -TA     Progress/Outcome (Transfer Goal 1, PT)  goal not met  -TA     Row Name 08/10/21 1118          Gait Training Goal 1 (PT)    Activity/Assistive Device (Gait Training Goal 1, PT)  gait (walking locomotion);decrease fall risk  -TA     [place] Level (Gait Training Goal 1, PT)  modified independence  -TA     Distance (Gait Training Goal 1, PT)  50 ft or more  -TA     Time Frame (Gait Training Goal 1, PT)  10 days  -TA     Progress/Outcome (Gait Training Goal 1, PT)  goal not met  -TA     Row Name 08/10/21 1118          Stairs Goal 1 (PT)    Activity/Assistive Device (Stairs Goal 1, PT)  ascending stairs;descending stairs  -TA     [place] Level/Cues Needed (Stairs Goal 1, PT)  modified independence  -TA     Number of Stairs  (Stairs Goal 1, PT)  1  -TA     Time Frame (Stairs Goal 1, PT)  2 weeks  -TA     Row Name 08/10/21 1118          Positioning and Restraints    Pre-Treatment Position  in bed  -TA     Post Treatment Position  bed  -TA     In Bed  supine;call light within reach;exit alarm on  -TA     Row Name 08/10/21 1118          Therapy Assessment/Plan (PT)    Rehab Potential (PT)  fair, will monitor progress closely  -TA     Criteria for Skilled Interventions Met (PT)  yes  -TA     Comment, Therapy Assessment/Plan (PT)  continue  -TA       User Key  (r) = Recorded By, (t) = Taken By, (c) = Cosigned By    Initials Name Provider Type    Maude Marsh PTA Physical Therapy Assistant        Physical Therapy Education                 Title: PT OT SLP Therapies (In Progress)     Topic: Physical Therapy (In Progress)     Point: Mobility training (In Progress)     Learning Progress Summary           Patient Acceptance, D,E, NR by  at 8/7/2021 1007    Comment: POC; mobility                   Point: Home exercise program (In Progress)     Learning Progress Summary           Patient Acceptance, D,E, NR by  at 8/7/2021 1007    Comment: POC; mobility                   Point: Body mechanics (In Progress)     Learning Progress Summary           Patient Acceptance, D,E, NR by  at 8/7/2021 1007    Comment: POC; mobility                   Point: Precautions (In Progress)     Learning Progress Summary           Patient Acceptance, D,E, NR by  at 8/7/2021 1007    Comment: POC; mobility                               User Key     Initials Effective Dates Name Provider Type Discipline     06/16/21 -  Rosina Martinez, PT Physical Therapist PT              PT Recommendation and Plan  Anticipated Discharge Disposition (PT): skilled nursing facility, home with 24/7 care, home with home health  Therapy Frequency (PT): other (see comments) (5-7 d/wk)  Plan of Care Reviewed With: patient  Outcome Summary: pt sup<>sit with mod assist of  1, pt sat @ EOB ~15 minutes with min-mod assit of 1, pt will require 24/7 care @ D/C  Outcome Measures     Row Name 08/10/21 1300 08/09/21 1300 08/09/21 0858       How much help from another person do you currently need...    Turning from your back to your side while in flat bed without using bedrails?  2  -TA  2  -TA  --    Moving from lying on back to sitting on the side of a flat bed without bedrails?  2  -TA  2  -TA  --    Moving to and from a bed to a chair (including a wheelchair)?  2  -TA  2  -TA  --    Standing up from a chair using your arms (e.g., wheelchair, bedside chair)?  2  -TA  2  -TA  --    Climbing 3-5 steps with a railing?  1  -TA  1  -TA  --    To walk in hospital room?  1  -TA  1  -TA  --    AM-PAC 6 Clicks Score (PT)  10  -TA  10  -TA  --       How much help from another is currently needed...    Putting on and taking off regular lower body clothing?  --  --  2  -RB    Bathing (including washing, rinsing, and drying)  --  --  2  -RB    Toileting (which includes using toilet bed pan or urinal)  --  --  2  -RB    Putting on and taking off regular upper body clothing  --  --  2  -RB    Taking care of personal grooming (such as brushing teeth)  --  --  2  -RB    Eating meals  --  --  2  -RB    AM-PAC 6 Clicks Score (OT)  --  --  12  -RB       Functional Assessment    Outcome Measure Options  AM-PAC 6 Clicks Basic Mobility (PT)  -TA  AM-PAC 6 Clicks Basic Mobility (PT)  -TA  --      User Key  (r) = Recorded By, (t) = Taken By, (c) = Cosigned By    Initials Name Provider Type    RB Tyrel Lopez, OT Occupational Therapist    Maude Marsh PTA Physical Therapy Assistant           Time Calculation:   PT Charges     Row Name 08/10/21 1323             Time Calculation    Start Time  1118  -TA      Stop Time  1149  -TA      Time Calculation (min)  31 min  -TA      PT Received On  08/10/21  -TA         Time Calculation- PT    Total Timed Code Minutes- PT  31 minute(s)  -TA         Timed Charges     24232 - PT Therapeutic Activity Minutes  31  -TA         Total Minutes    Timed Charges Total Minutes  31  -TA       Total Minutes  31  -TA        User Key  (r) = Recorded By, (t) = Taken By, (c) = Cosigned By    Initials Name Provider Type    Maude Marsh PTA Physical Therapy Assistant        Therapy Charges for Today     Code Description Service Date Service Provider Modifiers Qty    01942612851 HC PT THER PROC EA 15 MIN 8/9/2021 Maude Aguilar PTA GP 1    61980101641 HC PT THERAPEUTIC ACT EA 15 MIN 8/10/2021 Maude Aguilar PTA GP 2          PT G-Codes  Outcome Measure Options: AM-PAC 6 Clicks Basic Mobility (PT)  AM-PAC 6 Clicks Score (PT): 10  AM-PAC 6 Clicks Score (OT): 12    Maude Aguilar PTA  8/10/2021

## 2021-08-10 NOTE — PROGRESS NOTES
"Psychiatry progress note  August 10, 2021    Chief complaint: Schizophrenia in setting of altered mental status    Subjective:  71-year-old female seen in her room.  She remains sluggish with her mentation and disorganized.  Her brother is there, Jag, who states that he is nursing some overall improvement but still periods confused.    Patient has difficulty with 3 step commands and is ultimately unable to follow.    Review of systems is negative for headache or stomachache or muscle aches.  Positive for chronic blindness.      O:  /74 (BP Location: Left arm, Patient Position: Lying)   Pulse 77   Temp 98 °F (36.7 °C) (Temporal)   Resp 18   Ht 160 cm (63\")   Wt 58.7 kg (129 lb 6.4 oz)   LMP  (LMP Unknown)   SpO2 98%   BMI 22.92 kg/m²     PE:  In no acute distress.  Breathing is unlabored.  No tremor or noted atrophy.  Gait is deferred.    MSE: 71-year-old female appearing older than biological age.  No eye contact but in the context of blindness.  Psychomotor slowing appreciated.  Affect is blunted.  Thought processing is disorganized and concrete.  Insight and judgment are limited.    Labs reviewed:  Elevated NH3 @ 94  Plt increased to 44    EEG reviewed with possible frontal biphasic waves which is suggestive of hepatic dysfunction, noted in Dr. Bills's consult and ultrasound.      Assessment:     Altered mental status, unspecified    Schizophrenia, paranoid type (CMS/HCC)    DMII (diabetes mellitus, type 2) (CMS/HCC)    Hypotension    Lactic acidosis    Thrombocytopenia (CMS/HCC)      Plan:  -Titrate Abilify to 12.5 mg daily.  We will plan further titration as needed with likely ultimate target dose in the 15 to 25 mg daily range.  -Plan transition to [facility] for further behavioral stabilization after medical clearance.  Have discussed with primary team and will plan for potentially tomorrow after lactulose is initiated for the hyperammonemia.    Tyrel Anderson II, MD  08/10/21 @ 9:19 AM CDT      "

## 2021-08-10 NOTE — THERAPY DISCHARGE NOTE
Acute Care - Speech Language Pathology   Swallow Treatment Note/Discharge [facility]     Patient Name: Natacha Gandhi  : 1950  MRN: [id number]  Today's Date: 8/10/2021               Admit Date: 2021    Goal:  Patient will safely tolerate least restricted diet w/no overt s/s of aspiration for adequate nutrition and hydration:  Pt seen for skilled ST services to address oral dysphagia this date.  Pt was repositioned to upright in bed and kept eyes closed most of session.  Pt did require full feeding assist.  Pt consumed mech soft/mixed consistency (cheerios w/milk) w/efficient mastication, good oral clearance, and timely AP transit.  Pt consumed crushed meds in applesauce w/no difficulties and thin liquids via straw w/no overt s/s of aspiration.  SLP recommends d/c on current diet of mech soft solids/thin liquids w/feeding assist.  SLP educated pt; however, no response to education was given.      Visit Dx:    ICD-10-CM ICD-9-CM   1. Hypotension, unspecified hypotension type  I95.9 458.9   2. Altered mental status, unspecified altered mental status type  R41.82 780.97   3. Impaired functional mobility, balance, gait, and endurance  Z74.09 V49.89   4. Oropharyngeal dysphagia  R13.12 787.22   5. Impaired mobility and activities of daily living  Z74.09 V49.89    Z78.9      Patient Active Problem List   Diagnosis   • Acute GI bleeding   • Altered mental status, unspecified   • Schizophrenia, paranoid type (CMS/HCC)   • Acute exacerbation of chronic paranoid schizophrenia (CMS/HCC)   • Acute blood loss anemia   • DMII (diabetes mellitus, type 2) (CMS/HCC)   • Hypotension   • Lactic acidosis   • Thrombocytopenia (CMS/HCC)     Past Medical History:   Diagnosis Date   • Bipolar disorder, unspecified (CMS/HCC)    • Depression    • Diabetes mellitus (CMS/HCC)    • GERD (gastroesophageal reflux disease)    • Hypertension    • Legal blindness    • Psychiatric illness    • Rheumatoid arthritis (CMS/HCC)    •  Schizoaffective disorder (CMS/HCC)      Past Surgical History:   Procedure Laterality Date   • COLONOSCOPY N/A 8/29/2020    Procedure: COLONOSCOPY;  Surgeon: Ge Gorman MD;  Location: [facility];  Service: Gastroenterology;  Laterality: N/A;   • ENDOSCOPY N/A 8/28/2020    Procedure: ESOPHAGOGASTRODUODENOSCOPY;  Surgeon: Ge Gorman MD;  Location: [facility];  Service: Gastroenterology;  Laterality: N/A;       SLP Recommendation and Plan  SLP Swallowing Diagnosis: mild, oral dysphagia  SLP Diet Recommendation: soft textures, ground, thin liquids     Monitor for Signs of Aspiration: yes, notify SLP if any concerns     Swallow Criteria for Skilled Therapeutic Interventions Met: demonstrates skilled criteria  Anticipated Discharge Disposition (SLP): unknown  Rehab Potential/Prognosis, Swallowing: good, to achieve stated therapy goals  Therapy Frequency (Swallow): 3 days per week, 5 days per week  Predicted Duration Therapy Intervention (Days): until discharge     Daily Summary of Progress (SLP): progress toward functional goals as expected  Plan for Continued Treatment (SLP): d/c on current diet  Anticipated Discharge Disposition (SLP): unknown        Reason for Discharge: all goals and outcomes met, no further needs identified    Plan of Care Reviewed With: patient  Progress: no change  Outcome Summary: Pt seen for skilled ST services to address oral dysphagia this date.  Pt was repositioned to upright in bed and kept eyes closed most of session.  Pt did require full feeding assist.  Pt consumed mech soft/mixed consistency (cheerios w/milk) w/efficient mastication, good oral clearance, and timely AP transit.  Pt consumed crushed meds in applesauce w/no difficulties and thin liquids via straw w/no overt s/s of aspiration.  SLP recommends d/c on current diet of mech soft solids/thin liquids w/feeding assist.  SLP educated pt; however, no response to education was given.       SWALLOW EVALUATION (last 72  hours)      SLP Adult Swallow Evaluation     Row Name 08/10/21 0729 08/08/21 0928 08/07/21 1221             Rehab Evaluation    Document Type  --  therapy note (daily note)  -EK  evaluation  -EK      Subjective Information  --  no complaints  -EK  no complaints  -EK      Patient Observations  cooperative;agree to therapy  -CK  alert;cooperative;agree to therapy  -EK  alert;cooperative;agree to therapy  -EK      Care Plan Review  --  risks/benefits reviewed  -EK  evaluation/treatment results reviewed  -EK      Patient Effort  good  -CK  good  -EK  good  -EK         General Information    Patient Profile Reviewed  yes  -CK  yes  -EK  yes  -EK      Current Method of Nutrition  soft textures;ground;thin liquids  -CK  NPO  -EK  NPO  -EK      Precautions/Limitations, Vision  vision impairment, bilaterally  -CK  vision impairment, bilaterally  -EK  vision impairment, bilaterally  -EK      Precautions/Limitations, Hearing  WFL  -CK  WFL  -EK  WFL  -EK      Plans/Goals Discussed with  patient  -CK  patient  -EK  patient  -EK         Pain    Additional Documentation  Pain Scale: FACES Pre/Post-Treatment (Group)  -CK  --  --         Pain Scale: FACES Pre/Post-Treatment    Pain: FACES Scale, Pretreatment  0-->no hurt  -CK  --  --      Posttreatment Pain Rating  0-->no hurt  -CK  --  --         Oral Motor Structure and Function    Dentition Assessment  missing teeth  -CK  missing teeth  -EK  missing teeth  -EK      Secretion Management  WNL/WFL  -CK  WNL/WFL  -EK  WNL/WFL  -EK      Volitional Swallow  WFL  -CK  WFL  -EK  WFL  -EK      Volitional Cough  WFL  -CK  WFL  -EK  WFL  -EK         Oral Musculature and Cranial Nerve Assessment    Oral Motor General Assessment  WFL  -CK  WFL  -EK  WFL  -EK         General Eating/Swallowing Observations    Respiratory Support Currently in Use  room air  -CK  room air  -EK  room air  -EK      Eating/Swallowing Skills  fed by SLP  -CK  fed by SLP  -EK  fed by SLP  -EK      Positioning During  Eating  upright 90 degree;upright in bed  -CK  upright 90 degree;upright in bed  -EK  upright 90 degree;upright in bed  -EK      Utensils Used  cup;straw  -CK  cup;straw  -EK  cup;straw  -EK      Consistencies Trialed  thin liquids;soft textures;mixed consistency  -CK  regular textures;pureed;thin liquids  -EK  regular textures;pureed;thin liquids  -EK         Clinical Swallow Eval    Oral Prep Phase  WFL  -CK  impaired  -EK  impaired  -EK      Oral Transit  WFL  -CK  WFL  -EK  WFL  -EK      Oral Residue  WFL  -CK  WFL  -EK  WFL  -EK      Pharyngeal Phase  WFL  -CK  WFL  -EK  WFL  -EK      Clinical Swallow Evaluation Summary  Pt seen for skilled ST services to address oral dysphagia this date.  Pt was repositioned to upright in bed and kept eyes closed most of session.  Pt did require full feeding assist.  Pt consumed mech soft/mixed consistency (cheerios w/milk) w/efficient mastication, good oral clearance, and timely AP transit.  Pt consumed crushed meds in applesauce w/no difficulties and thin liquids via straw w/no overt s/s of aspiration.  SLP recommends d/c on current diet of mech soft solids/thin liquids w/feeding assist.  SLP educated pt; however, no response to education was given.  -CK  SLP fed pt morning meal. No s/s of aspiration noted. Pt with good tolerance of soft solids and thin liquids. Pt ate over 50% of am meal with no difficulty. SLP to follow up in am for additional visit.   -EK  Swallow evaluation completed. Pt with mild difficulty chewing therefore SLP initiated mech soft with ground meat and thin liquids. Pt to follow up for 1-2 visits for diet tolerance and diet safety.   -EK         Clinical Impression    Daily Summary of Progress (SLP)  progress toward functional goals as expected  -CK  progress toward functional goals is good  -EK  --      SLP Swallowing Diagnosis  mild;oral dysphagia  -CK  mild;oral dysphagia  -EK  mild;oral dysphagia  -EK      Functional Impact  risk of  aspiration/pneumonia;risk of malnutrition;risk of dehydration  -CK  risk of aspiration/pneumonia;risk of malnutrition;risk of dehydration  -EK  risk of aspiration/pneumonia;risk of malnutrition;risk of dehydration  -EK      Rehab Potential/Prognosis, Swallowing  good, to achieve stated therapy goals  -CK  good, to achieve stated therapy goals  -EK  good, to achieve stated therapy goals  -EK      Swallow Criteria for Skilled Therapeutic Interventions Met  demonstrates skilled criteria  -CK  demonstrates skilled criteria  -EK  demonstrates skilled criteria  -EK      Plan for Continued Treatment (SLP)  d/c on current diet  -CK  --  --         Recommendations    Therapy Frequency (Swallow)  3 days per week;5 days per week  -CK  3 days per week;5 days per week  -EK  3 days per week;5 days per week  -EK      Predicted Duration Therapy Intervention (Days)  until discharge  -CK  until discharge  -EK  until discharge  -EK      SLP Diet Recommendation  soft textures;ground;thin liquids  -CK  soft textures;ground;thin liquids  -EK  soft textures;ground;thin liquids  -EK      Recommended Precautions and Strategies  upright posture during/after eating;small bites of food and sips of liquid;multiple swallows per bite of food;multiple swallows per sip of liquid;alternate between small bites of food and sips of liquid;check mouth frequently for oral residue/pocketing  -CK  upright posture during/after eating;small bites of food and sips of liquid;multiple swallows per bite of food;multiple swallows per sip of liquid;alternate between small bites of food and sips of liquid;check mouth frequently for oral residue/pocketing  -EK  upright posture during/after eating;small bites of food and sips of liquid;multiple swallows per bite of food;multiple swallows per sip of liquid;alternate between small bites of food and sips of liquid;check mouth frequently for oral residue/pocketing  -EK      Oral Care Recommendations  Oral Care BID/PRN   -CK  Oral Care BID/PRN  -EK  Oral Care BID/PRN  -EK      SLP Rec. for Method of Medication Administration  meds whole;with thin liquids;with pudding or applesauce;as tolerated  -CK  meds whole;with thin liquids;with pudding or applesauce;as tolerated  -EK  meds whole;with thin liquids;with pudding or applesauce;as tolerated  -EK      Monitor for Signs of Aspiration  yes;notify SLP if any concerns  -CK  yes;notify SLP if any concerns  -EK  yes;notify SLP if any concerns  -EK         Swallow Goals (SLP)    Oral Nutrition/Hydration Goal Selection (SLP)  oral nutrition/hydration, SLP goal 1  -CK  oral nutrition/hydration, SLP goal 1  -EK  oral nutrition/hydration, SLP goal 1  -EK         Oral Nutrition/Hydration Goal 1 (SLP)    Oral Nutrition/Hydration Goal 1, SLP  Pt to tolerate least restrictive diet with no s/s of aspiration for adequate nutrition and hydration.   -CK  Pt to tolerate least restrictive diet with no s/s of aspiration for adequate nutrition and hydration.   -EK  Pt to tolerate least restrictive diet with no s/s of aspiration for adequate nutrition and hydration.   -EK      Time Frame (Oral Nutrition/Hydration Goal 1, SLP)  by discharge  -CK  by discharge  -EK  by discharge  -EK      Progress/Outcomes (Oral Nutrition/Hydration Goal 1, SLP)  (S) goal met  -CK  goal met  -EK  other (see comments) new goal  -EK        User Key  (r) = Recorded By, (t) = Taken By, (c) = Cosigned By    Initials Name Effective Dates    EK Savanna Parikh, CCC-SLP 06/16/21 -     Elizabeth Strong, MS CCC-SLP 06/16/21 -           EDUCATION  The patient has been educated in the following areas:   Dysphagia (Swallowing Impairment) Modified Diet Instruction.        SLP GOALS     Row Name 08/10/21 0729 08/08/21 0928 08/07/21 1221       Oral Nutrition/Hydration Goal 1 (SLP)    Oral Nutrition/Hydration Goal 1, SLP  Pt to tolerate least restrictive diet with no s/s of aspiration for adequate nutrition and hydration.    -CK  Pt to tolerate least restrictive diet with no s/s of aspiration for adequate nutrition and hydration.   -EK  Pt to tolerate least restrictive diet with no s/s of aspiration for adequate nutrition and hydration.   -EK    Time Frame (Oral Nutrition/Hydration Goal 1, SLP)  by discharge  -CK  by discharge  -EK  by discharge  -EK    Progress/Outcomes (Oral Nutrition/Hydration Goal 1, SLP)  (S) goal met  -CK  goal met  -EK  other (see comments) new goal  -EK      User Key  (r) = Recorded By, (t) = Taken By, (c) = Cosigned By    Initials Name Provider Type    Savanna García CCC-SLP Speech and Language Pathologist    Elizabeth Strong MS CCC-SLP Speech and Language Pathologist               Time Calculation:   Time Calculation- SLP     Row Name 08/10/21 0807             Time Calculation- SLP    SLP Start Time  0729  -CK      SLP Stop Time  0807  -CK      SLP Time Calculation (min)  38 min  -CK      Total Timed Code Minutes- SLP  38 minute(s)  -CK      SLP Received On  08/10/21  -CK         Untimed Charges    27633-FO Treatment Swallow Minutes  38  -CK         Total Minutes    Untimed Charges Total Minutes  38  -CK       Total Minutes  38  -CK        User Key  (r) = Recorded By, (t) = Taken By, (c) = Cosigned By    Initials Name Provider Type    Elizabeth Strong MS CCC-SLP Speech and Language Pathologist          Therapy Charges for Today     Code Description Service Date Service Provider Modifiers Qty    [id number]  ST TREATMENT SWALLOW 3 8/10/2021 Elizabeth Parikh MS CCC-SLP GN 1               SLP Discharge Summary  Anticipated Discharge Disposition (SLP): unknown  Reason for Discharge: all goals and outcomes met, no further needs identified  Progress Toward Achieving Short/long Term Goals: all goals met within established timelines    Elizabeth Parikh MS CCC-SLP  8/10/2021

## 2021-08-10 NOTE — THERAPY TREATMENT NOTE
Patient Name: Natacha Gandhi  : 1950    MRN: [id number]                              Today's Date: 8/10/2021       Admit Date: 2021    Visit Dx:     ICD-10-CM ICD-9-CM   1. Hypotension, unspecified hypotension type  I95.9 458.9   2. Altered mental status, unspecified altered mental status type  R41.82 780.97   3. Impaired functional mobility, balance, gait, and endurance  Z74.09 V49.89   4. Oropharyngeal dysphagia  R13.12 787.22   5. Impaired mobility and activities of daily living  Z74.09 V49.89    Z78.9      Patient Active Problem List   Diagnosis   • Acute GI bleeding   • Altered mental status, unspecified   • Schizophrenia, paranoid type (CMS/HCC)   • Acute exacerbation of chronic paranoid schizophrenia (CMS/HCC)   • Acute blood loss anemia   • DMII (diabetes mellitus, type 2) (CMS/HCC)   • Hypotension   • Lactic acidosis   • Thrombocytopenia (CMS/HCC)     Past Medical History:   Diagnosis Date   • Bipolar disorder, unspecified (CMS/HCC)    • Depression    • Diabetes mellitus (CMS/HCC)    • GERD (gastroesophageal reflux disease)    • Hypertension    • Legal blindness    • Psychiatric illness    • Rheumatoid arthritis (CMS/HCC)    • Schizoaffective disorder (CMS/HCC)      Past Surgical History:   Procedure Laterality Date   • COLONOSCOPY N/A 2020    Procedure: COLONOSCOPY;  Surgeon: Ge Gorman MD;  Location: [facility];  Service: Gastroenterology;  Laterality: N/A;   • ENDOSCOPY N/A 2020    Procedure: ESOPHAGOGASTRODUODENOSCOPY;  Surgeon: Ge Gorman MD;  Location: [facility];  Service: Gastroenterology;  Laterality: N/A;     General Information     Row Name 08/10/21 1032          OT Time and Intention    Document Type  therapy note (daily note)  -BB     Mode of Treatment  individual therapy;occupational therapy  -BB     Row Name 08/10/21 1032          General Information    Patient Profile Reviewed  yes  -BB     Existing Precautions/Restrictions  fall appears to have  herniated vaginal tissue per RN when wiping for toilet hygiend  -     Row Name 08/10/21 1032          Cognition    Orientation Status (Cognition)  oriented to;person;place   -BB       User Key  (r) = Recorded By, (t) = Taken By, (c) = Cosigned By    Initials Name Provider Type     Mare Reed COTA/L Occupational Therapy Assistant          Mobility/ADL's     Row Name 08/10/21 1032          Bed Mobility    Bed Mobility  bed mobility (all) activities  -BB     All Activities, [place] (Bed Mobility)  not tested  -BB     Assistive Device (Bed Mobility)  bed rails;draw sheet;head of bed elevated  -     Row Name 08/10/21 1032          Transfers    Sit-Stand [place] (Transfers)  not tested  -BB     [place] Level (Toilet Transfer)  not tested  -     Row Name 08/10/21 1032          Activities of Daily Living    BADL Assessment/Intervention  grooming;upper body dressing  -[facility] Name 08/10/21 103          Grooming Assessment/Training    [place] Level (Grooming)  wash face, hands;moderate assist (50% patient effort)  -BB     Position (Grooming)  sitting up in bed  -[facility] Name 08/10/21 1032          Upper Body Dressing Assessment/Training    [place] Level (Upper Body Dressing)  doff;don;maximum assist (25% patient effort) HG  -BB       User Key  (r) = Recorded By, (t) = Taken By, (c) = Cosigned By    Initials Name Provider Type    BB Mare Reed COTA/L Occupational Therapy Assistant        Obj/Interventions     Row Name 08/10/21 1032          Range of Motion Comprehensive    General Range of Motion  bilateral lower extremity ROM WFL  -[facility] Name 08/10/21 1032          Strength Comprehensive (MMT)    General Manual Muscle Testing (MMT) Assessment  upper extremity strength deficits identified  -     Row Name 08/10/21 1032          Shoulder (Therapeutic Exercise)    Shoulder (Therapeutic Exercise)  AAROM (active assistive range of motion)  -     Shoulder AAROM  (Therapeutic Exercise)  bilateral;flexion;extension;supine 2x10  -BB     Row Name 08/10/21 1032          Elbow/Forearm (Therapeutic Exercise)    Elbow/Forearm (Therapeutic Exercise)  AAROM (active assistive range of motion)  -BB     Elbow/Forearm AAROM (Therapeutic Exercise)  bilateral;flexion;extension;supination;pronation;supine 2x10  -BB     Row Name 08/10/21 1032          Wrist (Therapeutic Exercise)    Wrist (Therapeutic Exercise)  AAROM (active assistive range of motion)  -BB     Wrist AAROM (Therapeutic Exercise)  bilateral;flexion;extension 2x10  -BB     Row Name 08/10/21 1032          Hand (Therapeutic Exercise)    Hand (Therapeutic Exercise)  PROM (passive range of motion)  -BB     Hand PROM (Therapeutic Exercise)  finger extension;finger flexion 2x10  -BB     Row Name 08/10/21 1032          Therapeutic Exercise    Therapeutic Exercise  shoulder;elbow/forearm;wrist;hand  -BB       User Key  (r) = Recorded By, (t) = Taken By, (c) = Cosigned By    Initials Name Provider Type    BB Mare Reed COTA/L Occupational Therapy Assistant        Goals/Plan     Row Name 08/10/21 1032          Transfer Goal 1 (OT)    Activity/Assistive Device (Transfer Goal 1, OT)  transfers, all  -BB     [place] Level/Cues Needed (Transfer Goal 1, OT)  modified independence  -BB     Time Frame (Transfer Goal 1, OT)  long term goal (LTG)  -BB     Progress/Outcome (Transfer Goal 1, OT)  goal not met  -BB     Row Name 08/10/21 1032          Bathing Goal 1 (OT)    Activity/Device (Bathing Goal 1, OT)  bathing skills, all  -BB     [place] Level/Cues Needed (Bathing Goal 1, OT)  modified independence  -BB     Time Frame (Bathing Goal 1, OT)  long term goal (LTG)  -BB     Progress/Outcomes (Bathing Goal 1, OT)  goal not met  -BB     Row Name 08/10/21 1032          Dressing Goal 1 (OT)    Activity/Device (Dressing Goal 1, OT)  dressing skills, all  -BB     [place]/Cues Needed (Dressing Goal 1, OT)  modified  independence  -BB     Time Frame (Dressing Goal 1, OT)  long term goal (LTG)  -BB     Progress/Outcome (Dressing Goal 1, OT)  goal not met  -BB     Row Name 08/10/21 1032          Toileting Goal 1 (OT)    Activity/Device (Toileting Goal 1, OT)  toileting skills, all  -BB     [place] Level/Cues Needed (Toileting Goal 1, OT)  modified independence  -BB     Time Frame (Toileting Goal 1, OT)  long term goal (LTG)  -BB     Progress/Outcome (Toileting Goal 1, OT)  goal not met  -BB     Row Name 08/10/21 1032          Strength Goal 1 (OT)    Strength Goal 1 (OT)  Increase UE str by 1/2 grade to increase independence with functional mobility.  -BB     Time Frame (Strength Goal 1, OT)  long term goal (LTG)  -BB     Progress/Outcome (Strength Goal 1, OT)  goal not met  -BB       User Key  (r) = Recorded By, (t) = Taken By, (c) = Cosigned By    Initials Name Provider Type    BB Mare Reed COTA/L Occupational Therapy Assistant        Clinical Impression     Row Name 08/10/21 1032          Pain Scale: FACES Pre/Post-Treatment    Pain: FACES Scale, Pretreatment  0-->no hurt  -BB     Posttreatment Pain Rating  0-->no hurt  -BB     Row Name 08/10/21 1032          Plan of Care Review    Plan of Care Reviewed With  patient  -BB     Progress  no change  -BB     Outcome Summary  Pt performed AAROM B UE with 2 sets x10 reps. Pt Max A for UB dressing with verbal cues. No new goals met. Continue OT POC  -BB     Row Name 08/10/21 1032          Therapy Assessment/Plan (OT)    Rehab Potential (OT)  good, to achieve stated therapy goals  -BB     Criteria for Skilled Therapeutic Interventions Met (OT)  yes;meets criteria  -BB     Therapy Frequency (OT)  other (see comments) 5-7 days/wk  -BB     Row Name 08/10/21 1032          Therapy Plan Review/Discharge Plan (OT)    Anticipated Discharge Disposition (OT)  skilled nursing facility  -BB     Row Name 08/10/21 1032          Vital Signs    Pre Systolic BP Rehab  133  -BB     Pre  Treatment Diastolic BP  63  -BB     Pretreatment Heart Rate (beats/min)  96  -BB     Pre SpO2 (%)  95  -BB     O2 Delivery Pre Treatment  room air  -BB     Pre Patient Position  Supine  -BB     Row Name 08/10/21 1032          Positioning and Restraints    Pre-Treatment Position  in bed  -BB     Post Treatment Position  bed  -BB     In Bed  supine;call light within reach;encouraged to call for assist;exit alarm on  -BB       User Key  (r) = Recorded By, (t) = Taken By, (c) = Cosigned By    Initials Name Provider Type    Mare [place]brook COTA/L Occupational Therapy Assistant        Outcome Measures     Row Name 08/10/21 1032          How much help from another is currently needed...    Putting on and taking off regular lower body clothing?  2  -BB     Bathing (including washing, rinsing, and drying)  2  -BB     Toileting (which includes using toilet bed pan or urinal)  2  -BB     Putting on and taking off regular upper body clothing  2  -BB     Taking care of personal grooming (such as brushing teeth)  2  -BB     Eating meals  2  -BB     AM-PAC 6 Clicks Score (OT)  12  -BB     Row Name 08/10/21 1300          How much help from another person do you currently need...    Turning from your back to your side while in flat bed without using bedrails?  2  -TA     Moving from lying on back to sitting on the side of a flat bed without bedrails?  2  -TA     Moving to and from a bed to a chair (including a wheelchair)?  2  -TA     Standing up from a chair using your arms (e.g., wheelchair, bedside chair)?  2  -TA     Climbing 3-5 steps with a railing?  1  -TA     To walk in hospital room?  1  -TA     AM-PAC 6 Clicks Score (PT)  10  -TA     Row Name 08/10/21 1300          Functional Assessment    Outcome Measure Options  AM-PAC 6 Clicks Basic Mobility (PT)  -TA       User Key  (r) = Recorded By, (t) = Taken By, (c) = Cosigned By    Initials Name Provider Type    Maude Marsh, PTA Physical Therapy Assistant    BB  Mare Reed COTA/L Occupational Therapy Assistant          Occupational Therapy Education                 Title: PT OT SLP Therapies (In Progress)     Topic: Occupational Therapy (In Progress)     Point: ADL training (In Progress)     Description:   Instruct learner(s) on proper safety adaptation and remediation techniques during self care or transfers.   Instruct in proper use of assistive devices.              Learning Progress Summary           Patient Acceptance, E, NR by BB at 8/10/2021 1329                   Point: Home exercise program (In Progress)     Description:   Instruct learner(s) on appropriate technique for monitoring, assisting and/or progressing therapeutic exercises/activities.              Learning Progress Summary           Patient Acceptance, E,D, NR by RB at 8/9/2021 0956    Comment: Attempted B UE HEP while sitting EOB with minimal understanding.                   Point: Precautions (Done)     Description:   Instruct learner(s) on prescribed precautions during self-care and functional transfers.              Learning Progress Summary           Patient Acceptance, E, VU by RB at 8/7/2021 1406    Comment: Edu pt on use of gait belt and non skid socks when OOB and no OOB without assist.                   Point: Body mechanics (Not Started)     Description:   Instruct learner(s) on proper positioning and spine alignment during self-care, functional mobility activities and/or exercises.              Learner Progress:  Not documented in this visit.                      User Key     Initials Effective Dates Name Provider Type Discipline     06/16/21 -  Tyrel Lopze OT Occupational Therapist OT     06/16/21 -  Mare Reed COTA/L Occupational Therapy Assistant OT              OT Recommendation and Plan  Therapy Frequency (OT): other (see comments) (5-7 days/wk)  Plan of Care Review  Plan of Care Reviewed With: patient  Progress: no change  Outcome Summary: Pt performed ROBERTO PAYTON  UE with 2 sets x10 reps. Pt Max A for UB dressing with verbal cues. No new goals met. Continue OT POC     Time Calculation:   Time Calculation- OT     Row Name 08/10/21 1332             Time Calculation- OT    OT Start Time  1032  -BB      OT Stop Time  1111  -BB      OT Time Calculation (min)  39 min  -BB      Total Timed Code Minutes- OT  39 minute(s)  -BB      OT Received On  08/10/21  -BB         Timed Charges    65292 - OT Therapeutic Exercise Minutes  24  -BB      82999 - OT Self Care/Mgmt Minutes  15  -BB         Total Minutes    Timed Charges Total Minutes  39  -BB       Total Minutes  39  -BB        User Key  (r) = Recorded By, (t) = Taken By, (c) = Cosigned By    Initials Name Provider Type    Mare [place] COTA/L Occupational Therapy Assistant        Therapy Charges for Today     Code Description Service Date Service Provider Modifiers Qty    07603193814 HC OT THER PROC EA 15 MIN 8/10/2021 Mare Reed COTA/L GO 2    37382573656 HC OT SELF CARE/MGMT/TRAIN EA 15 MIN 8/10/2021 Mare Reed COTA/L GO 1               NARDA Lares  8/10/2021

## 2021-08-10 NOTE — CONSULTS
SUBJECTIVE:   8/10/2021    Name: Natacha Gandhi  DOD: 1950    REASON FOR CONSULT:     Chief Complaint:     Chief Complaint   Patient presents with   • Loss of Consciousness   • Altered Mental Status       Subjective     Patient is 71 y.o. female with past medical history of bipolar disorder, depression, diabetes mellitus, GERD, hypertension, blindness, rheumatoid arthritis, schizoaffective disorder presents with change in mental status.  She was admitted 4 days ago.  Noted to have elevated liver enzymes with AST of 129 and ALT of 115 upon admission.  Noted to have elevated ammonia level and henceforth GI consult was requested. patient is responsive to painful stimuli only.  Unable to provide any history.  History is obtained from the nursing staff and chart.  Abdominal ultrasound was consistent with heterogeneous hepatic architecture consistent with cirrhosis.  Gallbladder wall thickening and a small amount of ascites noted.  Ammonia level is 94.     ROS/HISTORY/ CURRENT MEDICATIONS/OBJECTIVE/VS/PE:   Review of Systems:   Review of Systems   Constitutional: Negative for chills, fatigue, fever and unexpected weight change.   HENT: Negative for congestion, ear discharge, hearing loss, nosebleeds and sore throat.    Eyes: Negative for pain, discharge and redness.   Respiratory: Negative for cough, chest tightness, shortness of breath and wheezing.    Cardiovascular: Negative for chest pain and palpitations.   Gastrointestinal: Negative for abdominal distention, abdominal pain, blood in stool, constipation, diarrhea, nausea and vomiting.   Endocrine: Negative for cold intolerance, polydipsia, polyphagia and polyuria.   Genitourinary: Negative for dysuria, flank pain, frequency, hematuria and urgency.   Musculoskeletal: Negative for arthralgias, back pain, joint swelling and myalgias.   Skin: Negative for color change, pallor and rash.   Neurological: Negative for tremors, seizures, syncope, weakness and  "headaches.   Hematological: Negative for adenopathy. Does not bruise/bleed easily.   Psychiatric/Behavioral: Negative for behavioral problems, confusion, dysphoric mood, hallucinations and suicidal ideas. The patient is not nervous/anxious.        History:     Past Medical History:   Diagnosis Date   • Bipolar disorder, unspecified (CMS/HCC)    • Depression    • Diabetes mellitus (CMS/HCC)    • GERD (gastroesophageal reflux disease)    • Hypertension    • Legal blindness    • Psychiatric illness    • Rheumatoid arthritis (CMS/HCC)    • Schizoaffective disorder (CMS/[facility])      Past Surgical History:   Procedure Laterality Date   • COLONOSCOPY N/A 8/29/2020    Procedure: COLONOSCOPY;  Surgeon: Ge Gorman MD;  Location: [facility];  Service: Gastroenterology;  Laterality: N/A;   • ENDOSCOPY N/A 8/28/2020    Procedure: ESOPHAGOGASTRODUODENOSCOPY;  Surgeon: Ge Gorman MD;  Location: [facility];  Service: Gastroenterology;  Laterality: N/A;     Family History   Problem Relation Age of Onset   • Dementia Mother    • No Known Problems Father    • No Known Problems Sister    • No Known Problems Brother    • No Known Problems Maternal Aunt    • No Known Problems Paternal Aunt    • No Known Problems Maternal Uncle    • No Known Problems Paternal Uncle    • No Known Problems Maternal Grandfather    • No Known Problems Maternal Grandmother    • No Known Problems Paternal Grandfather    • No Known Problems Paternal Grandmother    • No Known Problems Cousin    • No Known Problems Other    • Suicide Attempts Neg Hx    • ADD / ADHD Neg Hx    • Alcohol abuse Neg Hx    • Anxiety disorder Neg Hx    • Bipolar disorder Neg Hx    • Depression Neg Hx    • Drug abuse Neg Hx    • OCD Neg Hx    • Paranoid behavior Neg Hx    • Schizophrenia Neg Hx    • Seizures Neg Hx    • Self-Injurious Behavior  Neg Hx      Social History     Tobacco Use   • Smoking status: Former Smoker   • Smokeless tobacco: Never Used   • Tobacco comment: \" a " "little bit a long time ago\"   Substance Use Topics   • Alcohol use: Not Currently   • Drug use: Never     Medications Prior to Admission   Medication Sig Dispense Refill Last Dose   • Emollient (BAG BALM EX) Apply  topically 2 (two) times a day.      • escitalopram (LEXAPRO) 10 MG tablet Take 1 tablet by mouth Daily. Indications: Major Depressive Disorder 30 tablet 0    • ferrous sulfate (FerrouSul) 325 (65 FE) MG tablet Take 1 tablet by mouth Daily With Breakfast. Indications: Anemia From Inadequate Iron in the Body 30 tablet 0    • Menthol-Zinc Oxide (Calmoseptine) 0.44-20.6 % ointment Apply  topically to the appropriate area as directed Every 12 (Twelve) Hours.      • metFORMIN (GLUCOPHAGE) 500 MG tablet Take 500 mg by mouth Daily.      • multivitamin with minerals (MULTIVITAMIN ADULT PO) Take 1 tablet by mouth Daily.      • ondansetron ODT (ZOFRAN-ODT) 4 MG disintegrating tablet Place 1 tablet on the tongue Every 6 (Six) Hours As Needed for Nausea or Vomiting. 10 tablet 0    • pantoprazole (PROTONIX) 40 MG EC tablet Take 1 tablet by mouth Daily. Indications: Gastroesophageal Reflux Disease 30 tablet 0    • risperiDONE (risperDAL) 3 MG tablet Take 1 tablet by mouth Daily With Dinner. STOP SEROQUEL (QUETIAPINE)  Indications: Schizophrenia 30 tablet 0    • tuberculin (Tubersol) 5 UNIT/0.1ML injection Inject 5 Units into the appropriate area of the skin as directed by provider 1 (One) Time.      • cephalexin (KEFLEX) 500 MG capsule Take 500 mg by mouth 3 (Three) Times a Day.      • Cholecalciferol 125 MCG (5000 UT) tablet Take 5,000 Units by mouth Daily.        Allergies:  Patient has no known allergies.    I have reviewed the patient's medical history, surgical history and family history in the available medical record system.     Current Medications:     Current Facility-Administered Medications   Medication Dose Route Frequency Provider Last Rate Last Admin   • ARIPiprazole (ABILIFY) half tablet 12.5 mg  12.5 mg " Oral Daily With Tyrel Gomes II, MD       • calcium carbonate (TUMS) chewable tablet 500 mg (200 mg elemental)  2 tablet Oral BID PRN Karan Tsai MD       • cholecalciferol (VITAMIN D3) tablet 5,000 Units  5,000 Units Oral Daily Karan Tsai MD   5,000 Units at 08/10/21 0751   • dextrose (D50W) 25 g/ 50mL Intravenous Solution 25 g  25 g Intravenous Q15 Min PRN Karan Tsai MD       • dextrose (GLUTOSE) oral gel 15 g  15 g Oral Q15 Min PRN Karan Tsai MD       • escitalopram (LEXAPRO) tablet 10 mg  10 mg Oral Daily Karan Tsai MD   10 mg at 08/10/21 0752   • glucagon (human recombinant) (GLUCAGEN DIAGNOSTIC) injection 1 mg  1 mg Subcutaneous Q15 Min PRN Karan Tsai MD       • insulin aspart (novoLOG) injection 0-7 Units  0-7 Units Subcutaneous TID AC Karan Tsai MD       • lactulose (CHRONULAC) 10 GM/15ML solution 30 g  30 g Oral TID Clara Gupta MD       • Magnesium Sulfate 2 gram Bolus, followed by 8 gram infusion (total Mg dose 10 grams)- Mg less than or equal to 1mg/dL  2 g Intravenous PRN Karan Tsai MD        Or   • Magnesium Sulfate 2 gram / 50mL Infusion (GIVE X 3 BAGS TO EQUAL 6GM TOTAL DOSE) - Mg 1.1 - 1.5 mg/dl  2 g Intravenous PRN Karan Tsai MD        Or   • Magnesium Sulfate 4 gram infusion- Mg 1.6-1.9 mg/dL  4 g Intravenous PRN Karan Tsai MD 25 mL/hr at 08/07/21 0121 4 g at 08/07/21 0121   • multivitamin with minerals 1 tablet  1 tablet Oral Daily Karan Tsai MD   1 tablet at 08/10/21 0751   • ondansetron (ZOFRAN) tablet 4 mg  4 mg Oral Q6H PRN Karan Tsai MD        Or   • ondansetron (ZOFRAN) injection 4 mg  4 mg Intravenous Q6H PRN Karan Tsai MD       • pantoprazole (PROTONIX) EC tablet 40 mg  40 mg Oral Daily Karan Tsai MD   40 mg at 08/10/21 0751   • phytonadione (AQUA-MEPHYTON, VITAMIN K) 10 mg in sodium chloride 0.9 % 50 mL IVPB  10 mg Intravenous Once Terry Bills MD       • potassium chloride (MICRO-K) CR capsule  40 mEq  40 mEq Oral PRN Karan Tsai MD        Or   • potassium chloride (KLOR-CON) packet 40 mEq  40 mEq Oral PRN Karan Tsai MD        Or   • potassium chloride 10 mEq in 100 mL IVPB  10 mEq Intravenous Q1H PRN Karan Tsai  mL/hr at 08/07/21 0433 10 mEq at 08/07/21 0433   • sodium chloride 0.9 % flush 10 mL  10 mL Intravenous Q12H Karan Tsai MD   10 mL at 08/08/21 2057   • sodium chloride 0.9 % flush 10 mL  10 mL Intravenous PRN Karan Tsai MD           Objective     Physical Exam:   Temp:  [96.8 °F (36 °C)-98 °F (36.7 °C)] 98 °F (36.7 °C)  Heart Rate:  [77-97] 77  Resp:  [18] 18  BP: (128-134)/(57-74) 128/74    Physical Exam:  General Appearance:    Alert, cooperative, in no acute distress   Head:    Normocephalic, without obvious abnormality, atraumatic   Eyes:            Lids and lashes normal, conjunctivae and sclerae normal, no   icterus, no pallor, corneas clear, PERRLA   Ears:    Ears appear intact with no abnormalities noted   Throat:   No oral lesions, no thrush, oral mucosa moist   Neck:   No adenopathy, supple, trachea midline, no thyromegaly, no     carotid bruit, no JVD   Back:     No kyphosis present, no scoliosis present, no skin lesions,       erythema or scars, no tenderness to percussion or                   palpation,   range of motion normal   Lungs:     Clear to auscultation,respirations regular, even and                   unlabored    Heart:    Regular rhythm and normal rate, normal S1 and S2, no            murmur, no gallop, no rub, no click   Breast Exam:    Deferred   Abdomen:     Normal bowel sounds, no masses, no organomegaly, soft        nontender, nondistended, no guarding, no rebound                 tenderness   Genitalia:    Deferred   Extremities:   Moves all extremities well, no edema, no cyanosis, no              redness   Pulses:   Pulses palpable and equal bilaterally   Skin:   No bleeding, bruising or rash   Lymph nodes:   No palpable adenopathy    Neurologic:   Cranial nerves 2 - 12 grossly intact, sensation intact, DTR        present and equal bilaterally      Results Review:     Lab Results   Component Value Date    WBC 3.97 08/10/2021    WBC 4.44 08/09/2021    WBC 3.61 08/07/2021    HGB 10.5 (L) 08/10/2021    HGB 10.2 (L) 08/09/2021    HGB 10.1 (L) 08/07/2021    HCT 31.0 (L) 08/10/2021    HCT 30.2 (L) 08/09/2021    HCT 30.2 (L) 08/07/2021    PLT 44 (C) 08/10/2021    PLT 39 (C) 08/09/2021    PLT 36 (C) 08/07/2021     Results from last 7 days   Lab Units 08/09/21  0604 08/07/21  1007 08/06/21  1705   ALK PHOS U/L 78 73 84   ALT (SGPT) U/L 114* 112* 115*   AST (SGOT) U/L 118* 122* 129*     Results from last 7 days   Lab Units 08/09/21  0604 08/07/21  1007 08/06/21  1705   BILIRUBIN mg/dL 0.9 1.0 1.0   ALK PHOS U/L 78 73 84     No results found for: LIPASE  Lab Results   Component Value Date    INR 1.73 (H) 08/10/2021    INR 1.87 (H) 08/09/2021    INR 1.33 (H) 01/15/2021         Radiology Review:  Imaging Results (Last 72 Hours)     Procedure Component Value Units Date/Time    US Abdomen Complete [[id number]] Collected: 08/09/21 0754     Updated: 08/09/21 0943    Narrative:      PROCEDURE: Ultrasound abdomen, complete.    INDICATION: elevated liver enzymes, thrombocytopenia rule out  cirrhosis or splenomegaly, I95.9 Hypotension, unspecified R41.82  Altered mental status, unspecified Z74.09 Other reduced mobility  R13.12 Dysphagia, oropharyngeal phase Z74.09 Other reduced  mobility Z78.9 Other specified health status    COMPARISON: None.    FINDINGS:    The liver has heterogeneous echotexture with macro lobular margin  consistent with cirrhosis. There is a color-flow in the portal  and hepatic veins. No focal hepatic nodules or cyst. There is  small amount of ascites in the right upper abdomen around the  liver and a small amount of fluid 1 to 2 mm thick around the  right kidney..    Spleen appears normal in size measuring about 2.6 cm in. There  is  homogeneous echotexture within the spleen.    The pancreas normal in size, shape and echogenicity.    Gallbladder is contracted. There is mild diffuse thickening of  the gallbladder wall up to about 4.5 mm thick may be secondary to  the contracted state of the gallbladder or possibly related to  the ascites with edema. There does appear to be acoustically  shadowing calculus within the contracted gallbladder about 5 mm.  Normal caliber common bile duct 3.9 mm. No pericholecystic fluid.    The right kidney measures 10.1 x 4.1 x 5.0 cm. Left kidney 10.6 x  5.6 x 4.6 cm. No hydronephrosis or renal mass on either side.  There is small amount of fluid about 1 to 2 mm thick around  portion of the right kidney.    The proximal mid abdominal aorta and IVC visualized and normal.      Impression:      Heterogeneous echotexture within the liver with macro lobular  margins consistent with cirrhosis there is color-flow in the  portal and hepatic veins.  Small amount of ascites around the liver.    Contracted gallbladder with thick wall with no pericholecystic  fluid.  5 mm calculus in the gallbladder.    Small amount of fluid around portion of the right kidney about 1  to 2 mm thick.    [id number]      Electronically signed by:  Frank Maradiaga MD  8/9/2021 9:42 AM  CDT Workstation: [id number]          I reviewed the patient's new clinical results.    I reviewed the patient's new imaging results and agree with the interpretation.     ASSESSMENT/PLAN:   ASSESSMENT: 1.  Pain in mental status, likely due to hepatic encephalopathy.  Etiology for liver disease unclear.  Hepatitis panel was unremarkable.  Has elevated iron saturation of 64%.  2.  Thrombocytopenia, likely due to cirrhosis.  3.  Cirrhosis, etiology unclear.    PLAN: 1.  Continue lactulose and change to enemas.  2.  Avoid hypotension hepatotoxic medications.  3.  Obtain autoimmune panel.  4.  Obtain hemochromatosis genetic testing.  The risks, benefits, and alternatives  of this procedure have been discussed with the patient or the responsible party. The patient understands and agrees to proceed.         eG Gorman MD  08/10/21  17:42 CDT

## 2021-08-10 NOTE — PROGRESS NOTES
[facility] Medicine Services  INPATIENT PROGRESS NOTE    Length of Stay: 0  Date of Admission: 8/6/2021  Primary Care Physician: Alisia Ramirez APRN    Subjective   Chief Complaint: Altered mental status    HPI: Patient is more alert today and responsive to questions.  She is oriented to self only.  Abdominal ultrasound showed cirrhosis.    Review of Systems   Unable to perform ROS: Psychiatric disorder       Objective    Temp:  [97.9 °F (36.6 °C)-98.2 °F (36.8 °C)] 98 °F (36.7 °C)  Heart Rate:  [85-98] 86  Resp:  [18] 18  BP: (131-140)/(71-80) 140/80    Physical Exam  Constitutional:       General: She is not in acute distress.     Appearance: She is not ill-appearing or diaphoretic.   HENT:      Head: Normocephalic and atraumatic.      Right Ear: External ear normal.      Left Ear: External ear normal.      Nose: No congestion or rhinorrhea.      Mouth/Throat:      Mouth: Mucous membranes are moist.      Pharynx: No oropharyngeal exudate or posterior oropharyngeal erythema.   Eyes:      General: No scleral icterus.     Extraocular Movements: Extraocular movements intact.      Conjunctiva/sclera: Conjunctivae normal.   Cardiovascular:      Rate and Rhythm: Normal rate and regular rhythm.      Heart sounds: Normal heart sounds. No murmur heard.     Pulmonary:      Effort: Pulmonary effort is normal. No respiratory distress.      Breath sounds: Normal breath sounds. No wheezing, rhonchi or rales.   Abdominal:      General: Abdomen is flat. There is no distension.      Palpations: Abdomen is soft.      Tenderness: There is no abdominal tenderness. There is no guarding.   Musculoskeletal:         General: No swelling, tenderness or deformity.      Cervical back: Neck supple. No rigidity. No muscular tenderness.      Right lower leg: No edema.      Left lower leg: No edema.   Lymphadenopathy:      Cervical: No cervical adenopathy.   Skin:     General: Skin is warm and dry.    Neurological:      General: No focal deficit present.      Mental Status: She is alert. She is disoriented.      Cranial Nerves: No cranial nerve deficit.      Motor: No weakness.   Psychiatric:         Mood and Affect: Mood normal.         Behavior: Behavior normal.       Medication Review:    Current Facility-Administered Medications:   •  [COMPLETED] ARIPiprazole (ABILIFY) tablet 5 mg, 5 mg, Oral, Daily With Dinner, 5 mg at 08/07/21 1821 **FOLLOWED BY** ARIPiprazole (ABILIFY) tablet 10 mg, 10 mg, Oral, Daily With Dinner, Ellie Raza MD, 10 mg at 08/09/21 1743  •  calcium carbonate (TUMS) chewable tablet 500 mg (200 mg elemental), 2 tablet, Oral, BID PRN, Karan Tsai MD  •  cholecalciferol (VITAMIN D3) tablet 5,000 Units, 5,000 Units, Oral, Daily, Karan Tsai MD, 5,000 Units at 08/09/21 0945  •  dextrose (D50W) 25 g/ 50mL Intravenous Solution 25 g, 25 g, Intravenous, Q15 Min PRN, Karan Tsai MD  •  dextrose (GLUTOSE) oral gel 15 g, 15 g, Oral, Q15 Min PRN, Karan Tsai MD  •  escitalopram (LEXAPRO) tablet 10 mg, 10 mg, Oral, Daily, Karan Tsai MD, 10 mg at 08/09/21 0946  •  glucagon (human recombinant) (GLUCAGEN DIAGNOSTIC) injection 1 mg, 1 mg, Subcutaneous, Q15 Min PRN, Karan Tsai MD  •  insulin aspart (novoLOG) injection 0-7 Units, 0-7 Units, Subcutaneous, TID AC, Karan Tsai MD  •  Magnesium Sulfate 2 gram Bolus, followed by 8 gram infusion (total Mg dose 10 grams)- Mg less than or equal to 1mg/dL, 2 g, Intravenous, PRN **OR** Magnesium Sulfate 2 gram / 50mL Infusion (GIVE X 3 BAGS TO EQUAL 6GM TOTAL DOSE) - Mg 1.1 - 1.5 mg/dl, 2 g, Intravenous, PRN **OR** Magnesium Sulfate 4 gram infusion- Mg 1.6-1.9 mg/dL, 4 g, Intravenous, PRN, Karan Tsai MD, Last Rate: 25 mL/hr at 08/07/21 0121, 4 g at 08/07/21 0121  •  multivitamin with minerals 1 tablet, 1 tablet, Oral, Daily, Karan Tsai MD, 1 tablet at 08/09/21 0946  •  ondansetron (ZOFRAN) tablet 4 mg, 4 mg, Oral, Q6H PRN  **OR** ondansetron (ZOFRAN) injection 4 mg, 4 mg, Intravenous, Q6H PRN, Karan Tsai MD  •  pantoprazole (PROTONIX) EC tablet 40 mg, 40 mg, Oral, Daily, Karan Tsai MD, 40 mg at 08/09/21 0946  •  potassium chloride (MICRO-K) CR capsule 40 mEq, 40 mEq, Oral, PRN **OR** potassium chloride (KLOR-CON) packet 40 mEq, 40 mEq, Oral, PRN **OR** potassium chloride 10 mEq in 100 mL IVPB, 10 mEq, Intravenous, Q1H PRN, Karan Tsai MD, Last Rate: 100 mL/hr at 08/07/21 0433, 10 mEq at 08/07/21 0433  •  sodium chloride 0.9 % flush 10 mL, 10 mL, Intravenous, Q12H, Karan Tsai MD, 10 mL at 08/08/21 2057  •  sodium chloride 0.9 % flush 10 mL, 10 mL, Intravenous, PRN, Karan Tsai MD    I have reviewed the patient's current medications.     Results Review:  I have reviewed the labs, radiology results, and diagnostic studies.    Laboratory Data:   Results from last 7 days   Lab Units 08/09/21  0604 08/07/21  1007 08/06/21  1705   SODIUM mmol/L 140 142 141   POTASSIUM mmol/L 4.3 3.9  3.9 3.5   CHLORIDE mmol/L 114* 115* 110*   CO2 mmol/L 21.0* 23.0 25.0   BUN mg/dL 9 9 12   CREATININE mg/dL 0.75 0.82 0.97   GLUCOSE mg/dL 114* 88 142*   CALCIUM mg/dL 9.3 8.7 9.4   BILIRUBIN mg/dL 0.9 1.0 1.0   ALK PHOS U/L 78 73 84   ALT (SGPT) U/L 114* 112* 115*   AST (SGOT) U/L 118* 122* 129*   ANION GAP mmol/L 5.0 4.0* 6.0     Estimated Creatinine Clearance: 62.1 mL/min (by C-G formula based on SCr of 0.75 mg/dL).  Results from last 7 days   Lab Units 08/07/21  1007 08/06/21  1705 08/03/21 2011   MAGNESIUM mg/dL 2.5* 1.6 1.6         Results from last 7 days   Lab Units 08/09/21  0604 08/07/21  1007 08/06/21  1705 08/03/21 2010   WBC 10*3/mm3 4.44 3.61 4.22 3.96   HEMOGLOBIN g/dL 10.2* 10.1* 10.6* 11.4*   HEMATOCRIT % 30.2* 30.2* 31.8* 33.8*   PLATELETS 10*3/mm3 39* 36* 45* 45*     Results from last 7 days   Lab Units 08/09/21  0604   INR  1.87*       Culture Data:   Blood Culture   Date Value Ref Range Status   08/06/2021 No growth  at 2 days  Preliminary     No results found for: URINECX  No results found for: RESPCX  No results found for: WOUNDCX  No results found for: STOOLCX  No components found for: BODYFLD    Radiology Data:   Imaging Results (Last 24 Hours)     Procedure Component Value Units Date/Time    US Abdomen Complete [[id number]] Collected: 08/09/21 0754     Updated: 08/09/21 0943    Narrative:      PROCEDURE: Ultrasound abdomen, complete.    INDICATION: elevated liver enzymes, thrombocytopenia rule out  cirrhosis or splenomegaly, I95.9 Hypotension, unspecified R41.82  Altered mental status, unspecified Z74.09 Other reduced mobility  R13.12 Dysphagia, oropharyngeal phase Z74.09 Other reduced  mobility Z78.9 Other specified health status    COMPARISON: None.    FINDINGS:    The liver has heterogeneous echotexture with macro lobular margin  consistent with cirrhosis. There is a color-flow in the portal  and hepatic veins. No focal hepatic nodules or cyst. There is  small amount of ascites in the right upper abdomen around the  liver and a small amount of fluid 1 to 2 mm thick around the  right kidney..    Spleen appears normal in size measuring about 2.6 cm in. There is  homogeneous echotexture within the spleen.    The pancreas normal in size, shape and echogenicity.    Gallbladder is contracted. There is mild diffuse thickening of  the gallbladder wall up to about 4.5 mm thick may be secondary to  the contracted state of the gallbladder or possibly related to  the ascites with edema. There does appear to be acoustically  shadowing calculus within the contracted gallbladder about 5 mm.  Normal caliber common bile duct 3.9 mm. No pericholecystic fluid.    The right kidney measures 10.1 x 4.1 x 5.0 cm. Left kidney 10.6 x  5.6 x 4.6 cm. No hydronephrosis or renal mass on either side.  There is small amount of fluid about 1 to 2 mm thick around  portion of the right kidney.    The proximal mid abdominal aorta and IVC visualized and  normal.      Impression:      Heterogeneous echotexture within the liver with macro lobular  margins consistent with cirrhosis there is color-flow in the  portal and hepatic veins.  Small amount of ascites around the liver.    Contracted gallbladder with thick wall with no pericholecystic  fluid.  5 mm calculus in the gallbladder.    Small amount of fluid around portion of the right kidney about 1  to 2 mm thick.    [id number]      Electronically signed by:  Frank Maradiaga MD  8/9/2021 9:42 AM  CDT Workstation: [id number]          Assessment/Plan     Hospital Problem List:  Active Problems:    Altered mental status, unspecified    Schizophrenia, paranoid type (CMS/HCC)    DMII (diabetes mellitus, type 2) (CMS/HCC)    Hypotension    Lactic acidosis    Thrombocytopenia (CMS/HCC)  Cirrhosis secondary to suspected ESTRADA from diabetes mellitus    Plan  -Discontinue IV fluids  -Hematology input appreciated.  Patient thrombocytopenia is likely from liver cirrhosis  -We will give vitamin K therapy due to elevated INR  -Patient's lactic acidosis is likely secondary to poor oral intake and dehydration  -Continue to monitor vital signs  -DVT prophylaxis with SCDs  -CODE STATUS is full code    Discharge Planning: Expect patient to be discharged to the behavioral health unit in the next 24 hours    I confirmed that the patient's Advance Care Plan is present, code status is documented, or surrogate decision maker is listed in the patient's medical record.      I have utilized all available immediate resources to obtain, update, or review the patient's current medications.      Clara Gupta MD   08/09/21   19:03 CDT

## 2021-08-10 NOTE — PLAN OF CARE
Goal Outcome Evaluation:  Plan of Care Reviewed With: caregiver, patient        Progress: improving  Outcome Summary: Po diet started following SLP eval.  Intake averaging ~50%.  Poor acceptance of milk with all meals.  Will add ice cream lunch and supper.

## 2021-08-10 NOTE — PLAN OF CARE
Problem: Adult Inpatient Plan of Care  Goal: Plan of Care Review  Flowsheets  Taken 8/10/2021 1329  Progress: no change  Plan of Care Reviewed With: patient  Taken 8/10/2021 1032  Progress: no change  Plan of Care Reviewed With: patient  Outcome Summary: Pt performed AAROM B UE with 2 sets x10 reps. Pt Max A for UB dressing with verbal cues. No new goals met. Continue OT POC   Goal Outcome Evaluation:  Plan of Care Reviewed With: patient        Progress: no change

## 2021-08-10 NOTE — PLAN OF CARE
Problem: Fall Injury Risk  Goal: Absence of Fall and Fall-Related Injury  Outcome: Ongoing, Progressing  Intervention: Identify and Manage Contributors to Fall Injury Risk  Recent Flowsheet Documentation  Taken 8/9/2021 1910 by Madalyn Stanton RN  Medication Review/Management: medications reviewed  Intervention: Promote Injury-Free Environment  Recent Flowsheet Documentation  Taken 8/10/2021 0100 by Madalyn Stanton RN  Safety Promotion/Fall Prevention: safety round/check completed  Taken 8/9/2021 2300 by Madalyn Stanton RN  Safety Promotion/Fall Prevention: safety round/check completed  Taken 8/9/2021 2100 by Madalyn Stanton RN  Safety Promotion/Fall Prevention: safety round/check completed  Taken 8/9/2021 1910 by Madalyn Stanton RN  Safety Promotion/Fall Prevention: safety round/check completed   Goal Outcome Evaluation:           Progress: improving

## 2021-08-10 NOTE — PROGRESS NOTES
"      HISTORY OF PRESENT ILLNESS:    More alert today.  Remains confused and disoriented.  No bleeding.  No fever.        PAST MEDICAL HISTORY:    Past Medical History:   Diagnosis Date   • Bipolar disorder, unspecified (CMS/HCC)    • Depression    • Diabetes mellitus (CMS/HCC)    • GERD (gastroesophageal reflux disease)    • Hypertension    • Legal blindness    • Psychiatric illness    • Rheumatoid arthritis (CMS/HCC)    • Schizoaffective disorder (CMS/HCC)        SOCIAL HISTORY:    Social History     Tobacco Use   • Smoking status: Former Smoker   • Smokeless tobacco: Never Used   • Tobacco comment: \" a little bit a long time ago\"   Substance Use Topics   • Alcohol use: Not Currently   • Drug use: Never       Surgical History :  Past Surgical History:   Procedure Laterality Date   • COLONOSCOPY N/A 8/29/2020    Procedure: COLONOSCOPY;  Surgeon: Ge Gorman MD;  Location: [facility];  Service: Gastroenterology;  Laterality: N/A;   • ENDOSCOPY N/A 8/28/2020    Procedure: ESOPHAGOGASTRODUODENOSCOPY;  Surgeon: Ge Gorman MD;  Location: [facility];  Service: Gastroenterology;  Laterality: N/A;       ALLERGIES:    No Known Allergies      FAMILY HISTORY:  Family History   Problem Relation Age of Onset   • Dementia Mother    • No Known Problems Father    • No Known Problems Sister    • No Known Problems Brother    • No Known Problems Maternal Aunt    • No Known Problems Paternal Aunt    • No Known Problems Maternal Uncle    • No Known Problems Paternal Uncle    • No Known Problems Maternal Grandfather    • No Known Problems Maternal Grandmother    • No Known Problems Paternal Grandfather    • No Known Problems Paternal Grandmother    • No Known Problems Cousin    • No Known Problems Other    • Suicide Attempts Neg Hx    • ADD / ADHD Neg Hx    • Alcohol abuse Neg Hx    • Anxiety disorder Neg Hx    • Bipolar disorder Neg Hx    • Depression Neg Hx    • Drug abuse Neg Hx    • OCD Neg Hx    • Paranoid behavior Neg " "Hx    • Schizophrenia Neg Hx    • Seizures Neg Hx    • Self-Injurious Behavior  Neg Hx            REVIEW OF SYSTEMS:      Unable to obtain secondary to altered mentation      PHYSICAL EXAMINATION:      VITAL SIGNS:  /74 (BP Location: Left arm, Patient Position: Lying)   Pulse 77   Temp 98 °F (36.7 °C) (Temporal)   Resp 18   Ht 160 cm (63\")   Wt 58.7 kg (129 lb 6.4 oz)   LMP  (LMP Unknown)   SpO2 98%   BMI 22.92 kg/m²       08/06/21  2121 08/08/21  0608 08/10/21  0600   Weight: 57.7 kg (127 lb 4.8 oz) (1 sheet, 1 pillow, and 1 blanket) 59.3 kg (130 lb 12.8 oz) 58.7 kg (129 lb 6.4 oz)             CONSTITUTIONAL:  Not in any distress.    EYES: Mild conjunctival Pallor. No Icterus. No Pterygium. No ptosis.    ENMT:  Normocephalic, Atraumatic.No Facial Asymmetry noted.    NECK:  No adenopathy.Trachea midline. NO JVD.    RESPIRATORY:  Fair air entry bilateral. No rhonchi or wheezing.Fair respiratory effort.    CARDIOVASCULAR:  S1, S2. Regular rate and rhythm. No murmur or gallop appreciated.    ABDOMEN:  Soft, obese, nontender. Bowel sounds present in all four quadrants.  No Hepatosplenomegaly appreciated.    MUSCULOSKELETAL:  No edema.No Calf Tenderness.Decreased range of motion.    NEUROLOGIC:    No  Motor  deficit appreciated.     SKIN : No new skin lesion identified. Skin is warm and moist to touch.    LYMPHATICS: No new enlarged lymph nodes in neck or supraclavicular area.          DIAGNOSTIC DATA:    Glucose   Date Value Ref Range Status   08/09/2021 114 (H) 65 - 99 mg/dL Final     Sodium   Date Value Ref Range Status   08/09/2021 140 136 - 145 mmol/L Final     Potassium   Date Value Ref Range Status   08/09/2021 4.3 3.5 - 5.2 mmol/L Final     CO2   Date Value Ref Range Status   08/09/2021 21.0 (L) 22.0 - 29.0 mmol/L Final     Chloride   Date Value Ref Range Status   08/09/2021 114 (H) 98 - 107 mmol/L Final     Anion Gap   Date Value Ref Range Status   08/09/2021 5.0 5.0 - 15.0 mmol/L Final "     Creatinine   Date Value Ref Range Status   08/09/2021 0.75 0.57 - 1.00 mg/dL Final     BUN   Date Value Ref Range Status   08/09/2021 9 8 - 23 mg/dL Final     BUN/Creatinine Ratio   Date Value Ref Range Status   08/09/2021 12.0 7.0 - 25.0 Final     Calcium   Date Value Ref Range Status   08/09/2021 9.3 8.6 - 10.5 mg/dL Final     Alkaline Phosphatase   Date Value Ref Range Status   08/09/2021 78 39 - 117 U/L Final     Total Protein   Date Value Ref Range Status   08/09/2021 6.4 6.0 - 8.5 g/dL Final     ALT (SGPT)   Date Value Ref Range Status   08/09/2021 114 (H) 1 - 33 U/L Final     AST (SGOT)   Date Value Ref Range Status   08/09/2021 118 (H) 1 - 32 U/L Final     Total Bilirubin   Date Value Ref Range Status   08/09/2021 0.9 0.0 - 1.2 mg/dL Final     Albumin   Date Value Ref Range Status   08/09/2021 1.90 (L) 3.50 - 5.20 g/dL Final     Globulin   Date Value Ref Range Status   08/09/2021 4.5 gm/dL Final     Lab Results   Component Value Date    WBC 3.97 08/10/2021    HGB 10.5 (L) 08/10/2021    HCT 31.0 (L) 08/10/2021    MCV 96.0 08/10/2021    PLT 44 (C) 08/10/2021     Lab Results   Component Value Date    NEUTROABS 1.81 08/10/2021    IRON 112 08/09/2021    TIBC 176 (L) 08/09/2021    LABIRON 64 (H) 08/09/2021    FERRITIN 351.70 (H) 08/09/2021    KVMESQOC27 1,716 (H) 08/09/2021    FOLATE 16.80 08/09/2021     No results found for: , LABCA2, AFPTM, HCGQUANT, , CHROMGRNA, [id number], CEA, REFLABREPO        PATHOLOGY:  * Cannot find OR log *         RADIOLOGY DATA :  EEG    Result Date: 8/8/2021  EEG background is moderate generalized slow Generalized triphasic waves are present No electrographic seizures are seen Note-triphasic waves on a background of generalized slowing are typically seen in the setting of toxic/metabolic encephalopathies, but may also be seen with cefepime exposure This report is transcribed using the Dragon dictation system.      CT Head Without Contrast    Result Date:  8/6/2021  CONCLUSION: No acute intracranial process. Cerebral and cerebellar atrophy. Minimal small vessel disease. Minimal fluid left mastoid air cells. [id number] Electronically signed by:  Waylon Larkin MD  8/6/2021 6:48 PM CDT Workstation: [employer]    US Abdomen Complete    Result Date: 8/9/2021  Heterogeneous echotexture within the liver with macro lobular margins consistent with cirrhosis there is color-flow in the portal and hepatic veins. Small amount of ascites around the liver. Contracted gallbladder with thick wall with no pericholecystic fluid. 5 mm calculus in the gallbladder. Small amount of fluid around portion of the right kidney about 1 to 2 mm thick. [id number] Electronically signed by:  Frank Maradiaga MD  8/9/2021 9:42 AM CDT Workstation: [id number]    XR Chest 1 View    Result Date: 8/6/2021  CONCLUSION: No Acute Disease [id number] Electronically signed by:  Waylon Larkin MD  8/6/2021 4:57 PM CDT Workstation: [employer]    XR Hip With or Without Pelvis 2 - 3 View Left    Result Date: 8/3/2021  1. No acute osseous abnormality. Electronically signed by:  Greg Kee MD  8/3/2021 9:15 PM CDT Workstation: [id number]        ASSESSMENT AND PLAN:      1.  Thrombocytopenia:  -Patient is a chronic ongoing thrombocytopenia since July 2020  -Platelet count today is 44,000.  -Ultrasound of abdomen does show evidence of cirrhosis that can contribute to chronic thrombocytopenia.  -Peripheral blood flow cytometry has been sent out earlier today to rule out any other bone marrow pathology since patient also has a chronic anemia with it.  -Patient does not have any nutritional deficiency.  -We will transfuse as needed if platelet count less than 15,000 or any active bleeding.    2.  Anemia:  -Hemoglobin is 10.5  -Flow cytometry has been sent out today  -Due to elevated ferritin will discontinue ferrous sulfate.  -Recommend transfusing as needed if hemoglobin is less than 7    3.  Elevated iron level  -Recommend  discontinuing ferrous sulfate upfront.  If iron level continues to be increased in future will do work-up for hemochromatosis    4.  Elevated liver function test:  -Questionable etiology  -We will follow-up on result of hepatitis B  -Hepatitis C CT scan done last month at [facility] was negative    5.  Coagulopathy:  -Secondary to liver disease  -S/p vitamin K 10 mg IV x1 on August 9, 2021.  INR 1.72 today.  -We will give another dose of vitamin K tonight.                   Terry Bills MD  8/10/2021  16:34 CDT        Part of this note may be an electronic transcription/translation of spoken language to printed text using the Dragon Dictation System.

## 2021-08-10 NOTE — PLAN OF CARE
Goal Outcome Evaluation:  Plan of Care Reviewed With: patient        Progress: no change  Outcome Summary: Pt seen for skilled ST services to address oral dysphagia this date.  Pt was repositioned to upright in bed and kept eyes closed most of session.  Pt did require full feeding assist.  Pt consumed mech soft/mixed consistency (cheerios w/milk) w/efficient mastication, good oral clearance, and timely AP transit.  Pt consumed crushed meds in applesauce w/no difficulties and thin liquids via straw w/no overt s/s of aspiration.  SLP recommends d/c on current diet of mech soft solids/thin liquids w/feeding assist.  SLP educated pt; however, no response to education was given.

## 2021-08-10 NOTE — CONSULTS
"Adult Nutrition  Assessment    Patient Name:  Natacha Gandhi  YOB: 1950  MRN: [id number]  Admit Date:  8/6/2021    Assessment Date:  8/10/2021    Comments:  Pt has been started on a po diet following SLP eval.  Soft texture with grd meats. Her family reports that she does not like the modified diet but SLP said that she is not safe to take a regular diet due to her cognitive status.  They report that she was eating well at the NH but must be fed.  Her wt has been stable over the past 6 months or longer. Not drinking milk at all meals.  He did voice some food perferences.  Per noted--Thrombocytopenia & elevated LFT most likely related to a  new dx of Cirrhosis due to ESTRADA.  NH3 elevated at 94--discussed with staff.  Does she need Lactulose.  RD will note preferences and add ice cream.  Tranfer to  eventually.            Reason for Assessment     Row Name 08/10/21 1611          Reason for Assessment    Reason For Assessment  follow-up protocol         Nutrition/Diet History     Row Name 08/10/21 1611          Nutrition/Diet History    Typical Food/Fluid Intake  Pt would not look at me or acknowledge me.  Her family is present.  They report \"she will not eat that food\"  meaning grd up meats.  He said that at the NH they feed her.  She will not drink supplements.  Will drink Sprite and eat ice cream.           Labs/Tests/Procedures/Meds     Row Name 08/10/21 1613          Labs/Procedures/Meds    Lab Results Reviewed  reviewed, pertinent     Lab Results Comments  Platelets 44; Alb 1.90; ; NH3 94        Diagnostic Tests/Procedures    Diagnostic Test/Procedure Reviewed  reviewed, pertinent     Diagnostic Test/Procedures Comments  SLP; Oncologist; Psych        Medications    Pertinent Medications Reviewed  reviewed, pertinent     Pertinent Medications Comments  SSI; MVI         Physical Findings     Row Name 08/10/21 1611          Physical Findings    Overall Physical Appearance  on oxygen " therapy     Oral/Mouth Cavity  edentulous           Nutrition Prescription Ordered     Row Name 08/10/21 1616          Nutrition Prescription PO    Texture  Ground     Fluid Consistency  Thin     Common Modifiers  Consistent Carbohydrate         Evaluation of Received Nutrient/Fluid Intake     Row Name 08/10/21 1616          PO Evaluation    Number of Meals  5     % PO Intake  50% average               Electronically signed by:  Kristin Fulton RD  08/10/21 16:23 CDT

## 2021-08-10 NOTE — PLAN OF CARE
Goal Outcome Evaluation:  Plan of Care Reviewed With: patient           Outcome Summary: pt sup<>sit with mod assist of 1, pt sat @ EOB ~15 minutes with min-mod assit of 1, pt will require 24/7 care @ D/C

## 2021-08-11 NOTE — THERAPY TREATMENT NOTE
Patient Name: Natacha Gandhi  : 1950    MRN: [id number]                              Today's Date: 2021       Admit Date: 2021    Visit Dx:     ICD-10-CM ICD-9-CM   1. Hypotension, unspecified hypotension type  I95.9 458.9   2. Altered mental status, unspecified altered mental status type  R41.82 780.97   3. Impaired functional mobility, balance, gait, and endurance  Z74.09 V49.89   4. Oropharyngeal dysphagia  R13.12 787.22   5. Impaired mobility and activities of daily living  Z74.09 V49.89    Z78.9      Patient Active Problem List   Diagnosis   • Acute GI bleeding   • Altered mental status, unspecified   • Schizophrenia, paranoid type (CMS/HCC)   • Acute exacerbation of chronic paranoid schizophrenia (CMS/HCC)   • Acute blood loss anemia   • DMII (diabetes mellitus, type 2) (CMS/HCC)   • Hypotension   • Lactic acidosis   • Thrombocytopenia (CMS/HCC)   • Encephalopathy, hepatic (CMS/HCC)     Past Medical History:   Diagnosis Date   • Bipolar disorder, unspecified (CMS/HCC)    • Depression    • Diabetes mellitus (CMS/HCC)    • GERD (gastroesophageal reflux disease)    • Hypertension    • Legal blindness    • Psychiatric illness    • Rheumatoid arthritis (CMS/HCC)    • Schizoaffective disorder (CMS/HCC)      Past Surgical History:   Procedure Laterality Date   • COLONOSCOPY N/A 2020    Procedure: COLONOSCOPY;  Surgeon: Ge Gorman MD;  Location: [facility];  Service: Gastroenterology;  Laterality: N/A;   • ENDOSCOPY N/A 2020    Procedure: ESOPHAGOGASTRODUODENOSCOPY;  Surgeon: Ge Gorman MD;  Location: [facility];  Service: Gastroenterology;  Laterality: N/A;     General Information     Row Name 21 1125          OT Time and Intention    Document Type  therapy note (daily note)  -BB     Mode of Treatment  individual therapy;occupational therapy  -BB     Row Name 21 1125          General Information    Patient Profile Reviewed  yes  -BB     Existing  Precautions/Restrictions  fall appears to have herniated vaginal tissue per RN when wiping for toilet hygiend  -BB     Row Name 21 112          Cognition    Orientation Status (Cognition)  oriented to;person   -BB       User Key  (r) = Recorded By, (t) = Taken By, (c) = Cosigned By    Initials Name Provider Type    Mare Westbrook COTA/L Occupational Therapy Assistant          Mobility/ADL's     Row Name 21 112          Bed Mobility    Bed Mobility  bed mobility (all) activities;supine-sit;sit-supine;scooting/bridging  -BB     All Activities, [place] (Bed Mobility)  not tested  -BB     Scooting/Bridging [place] (Bed Mobility)  dependent (less than 25% patient effort);2 person assist >HOB  -BB     Supine-Sit [place] (Bed Mobility)  moderate assist (50% patient effort)  -BB     Sit-Supine [place] (Bed Mobility)  moderate assist (50% patient effort);nonverbal cues (demo/gesture);verbal cues  -BB     Assistive Device (Bed Mobility)  bed rails;draw sheet;head of bed elevated  -BB     Row Name 21 112          Transfers    Sit-Stand [place] (Transfers)  not tested  -BB     [place] Level (Toilet Transfer)  not tested  -BB     Row Name 21 112          Grooming Assessment/Training    [place] Level (Grooming)  wash face, hands;moderate assist (50% patient effort);verbal cues;nonverbal cues (demo/gesture)  -BB     Position (Grooming)  edge of bed sitting  -BB     Row Name 21 112          Upper Body Dressing Assessment/Training    [place] Level (Upper Body Dressing)  -- HG  -BB       User Key  (r) = Recorded By, (t) = Taken By, (c) = Cosigned By    Initials Name Provider Type    Mare Westbrook COTA/L Occupational Therapy Assistant        Obj/Interventions     Row Name 21 112          Range of Motion Comprehensive    General Range of Motion  bilateral lower extremity ROM WFL  -BB     Row Name 21 112          Strength  Comprehensive (MMT)    General Manual Muscle Testing (MMT) Assessment  upper extremity strength deficits identified  -BB     Row Name 08/11/21 1125          Shoulder (Therapeutic Exercise)    Shoulder AAROM (Therapeutic Exercise)  bilateral;flexion;extension;supine 1x15  -BB     Row Name 08/11/21 1125          Elbow/Forearm (Therapeutic Exercise)    Elbow/Forearm AAROM (Therapeutic Exercise)  bilateral;flexion;extension;supination;pronation;supine 1x15  -BB     Row Name 08/11/21 1125          Wrist (Therapeutic Exercise)    Wrist AAROM (Therapeutic Exercise)  bilateral;flexion;extension 1x15  -BB     Row Name 08/11/21 1125          Hand (Therapeutic Exercise)    Hand PROM (Therapeutic Exercise)  bilateral;finger flexion;finger extension 1x15  -BB       User Key  (r) = Recorded By, (t) = Taken By, (c) = Cosigned By    Initials Name Provider Type    Mare Westbrook COTA/L Occupational Therapy Assistant        Goals/Plan     Row Name 08/11/21 1125          Transfer Goal 1 (OT)    Activity/Assistive Device (Transfer Goal 1, OT)  transfers, all  -BB     [place] Level/Cues Needed (Transfer Goal 1, OT)  modified independence  -BB     Time Frame (Transfer Goal 1, OT)  long term goal (LTG)  -BB     Progress/Outcome (Transfer Goal 1, OT)  goal not met  -BB     Row Name 08/11/21 1125          Bathing Goal 1 (OT)    Activity/Device (Bathing Goal 1, OT)  bathing skills, all  -BB     [place] Level/Cues Needed (Bathing Goal 1, OT)  modified independence  -BB     Time Frame (Bathing Goal 1, OT)  long term goal (LTG)  -BB     Progress/Outcomes (Bathing Goal 1, OT)  goal not met  -BB     Row Name 08/11/21 1125          Dressing Goal 1 (OT)    Activity/Device (Dressing Goal 1, OT)  dressing skills, all  -BB     [place]/Cues Needed (Dressing Goal 1, OT)  modified independence  -BB     Time Frame (Dressing Goal 1, OT)  long term goal (LTG)  -BB     Progress/Outcome (Dressing Goal 1, OT)  goal not met  -     [place]  Name 08/11/21 1125          Toileting Goal 1 (OT)    Activity/Device (Toileting Goal 1, OT)  toileting skills, all  -BB     [place] Level/Cues Needed (Toileting Goal 1, OT)  modified independence  -BB     Time Frame (Toileting Goal 1, OT)  long term goal (LTG)  -BB     Progress/Outcome (Toileting Goal 1, OT)  goal not met  -BB     Row Name 08/11/21 1125          Strength Goal 1 (OT)    Strength Goal 1 (OT)  Increase UE str by 1/2 grade to increase independence with functional mobility.  -BB     Time Frame (Strength Goal 1, OT)  long term goal (LTG)  -BB     Progress/Outcome (Strength Goal 1, OT)  goal not met  -BB       User Key  (r) = Recorded By, (t) = Taken By, (c) = Cosigned By    Initials Name Provider Type    Mare Westbrook COTA/L Occupational Therapy Assistant        Clinical Impression     Row Name 08/11/21 1125          Pain Scale: FACES Pre/Post-Treatment    Pain: FACES Scale, Pretreatment  0-->no hurt  -BB     Posttreatment Pain Rating  0-->no hurt  -BB     Row Name 08/11/21 1125          Plan of Care Review    Plan of Care Reviewed With  patient  -BB     Progress  no change  -BB     Outcome Summary  Pt performed AAROM with B UE exercises 1 set x15 reps. Pt is Mod  A for supine<>sit with CGA -SBA for ~8 min. No new goals met. Continue OT POC  -BB     Row Name 08/11/21 1125          Therapy Assessment/Plan (OT)    Rehab Potential (OT)  good, to achieve stated therapy goals  -BB     Criteria for Skilled Therapeutic Interventions Met (OT)  yes;meets criteria  -BB     Therapy Frequency (OT)  other (see comments) 5-7 days/wk  -BB     Row Name 08/11/21 1125          Therapy Plan Review/Discharge Plan (OT)    Anticipated Discharge Disposition (OT)  skilled nursing facility  -BB     Row Name 08/11/21 1125          Vital Signs    Pretreatment Heart Rate (beats/min)  83  -BB     Posttreatment Heart Rate (beats/min)  81  -BB     Pre SpO2 (%)  97  -BB     O2 Delivery Pre Treatment  room air  -BB      Post SpO2 (%)  99  -BB     O2 Delivery Post Treatment  room air  -BB     Row Name 08/11/21 1125          Positioning and Restraints    Pre-Treatment Position  in bed  -BB     Post Treatment Position  bed  -BB     In Bed  supine;call light within reach;encouraged to call for assist;exit alarm on  -BB       User Key  (r) = Recorded By, (t) = Taken By, (c) = Cosigned By    Initials Name Provider Type    Mare Westbrook COTA/L Occupational Therapy Assistant        Outcome Measures     Row Name 08/11/21 1125          How much help from another is currently needed...    Putting on and taking off regular lower body clothing?  1  -BB     Bathing (including washing, rinsing, and drying)  2  -BB     Toileting (which includes using toilet bed pan or urinal)  2  -BB     Putting on and taking off regular upper body clothing  2  -BB     Taking care of personal grooming (such as brushing teeth)  2  -BB     Row Name 08/11/21 1300          How much help from another person do you currently need...    Turning from your back to your side while in flat bed without using bedrails?  2  -TA     Moving from lying on back to sitting on the side of a flat bed without bedrails?  2  -TA     Moving to and from a bed to a chair (including a wheelchair)?  2  -TA     Standing up from a chair using your arms (e.g., wheelchair, bedside chair)?  2  -TA     Climbing 3-5 steps with a railing?  1  -TA     To walk in hospital room?  1  -TA     AM-PAC 6 Clicks Score (PT)  10  -TA     Row Name 08/11/21 1300          Functional Assessment    Outcome Measure Options  AM-PAC 6 Clicks Basic Mobility (PT)  -TA       User Key  (r) = Recorded By, (t) = Taken By, (c) = Cosigned By    Initials Name Provider Type    Maude Marsh PTA Physical Therapy Assistant    Mare Westbrook COTA/L Occupational Therapy Assistant          Occupational Therapy Education                 Title: PT OT SLP Therapies (In Progress)     Topic: Occupational Therapy  (In Progress)     Point: ADL training (In Progress)     Description:   Instruct learner(s) on proper safety adaptation and remediation techniques during self care or transfers.   Instruct in proper use of assistive devices.              Learning Progress Summary           Patient Acceptance, E, NR by BB at 8/10/2021 1329                   Point: Home exercise program (In Progress)     Description:   Instruct learner(s) on appropriate technique for monitoring, assisting and/or progressing therapeutic exercises/activities.              Learning Progress Summary           Patient Acceptance, E,D, NR by RB at 8/9/2021 0956    Comment: Attempted B UE HEP while sitting EOB with minimal understanding.                   Point: Precautions (Done)     Description:   Instruct learner(s) on prescribed precautions during self-care and functional transfers.              Learning Progress Summary           Patient Acceptance, E, VU by RB at 8/7/2021 1406    Comment: Edu pt on use of gait belt and non skid socks when OOB and no OOB without assist.                   Point: Body mechanics (In Progress)     Description:   Instruct learner(s) on proper positioning and spine alignment during self-care, functional mobility activities and/or exercises.              Learning Progress Summary           Patient Acceptance, E, NR by BB at 8/11/2021 1435                               User Key     Initials Effective Dates Name Provider Type Discipline    RB 06/16/21 -  Tyrel Lopez OT Occupational Therapist OT    BB 06/16/21 -  Mare Reed COTA/L Occupational Therapy Assistant OT              OT Recommendation and Plan  Therapy Frequency (OT): other (see comments) (5-7 days/wk)  Plan of Care Review  Plan of Care Reviewed With: patient  Progress: no change  Outcome Summary: Pt performed AAROM with B UE exercises 1 set x15 reps. Pt is Mod  A for supine<>sit with CGA -SBA for ~8 min. No new goals met. Continue OT POC     Time  Calculation:   Time Calculation- OT     Row Name 08/11/21 1435             Time Calculation- OT    OT Start Time  1125  -BB      OT Stop Time  1205  -BB      OT Time Calculation (min)  40 min  -BB      Total Timed Code Minutes- OT  40 minute(s)  -BB      OT Received On  08/11/21  -BB         Timed Charges    15578 - OT Therapeutic Exercise Minutes  25  -BB      87664 - OT Self Care/Mgmt Minutes  15  -BB         Total Minutes    Timed Charges Total Minutes  40  -BB       Total Minutes  40  -BB        User Key  (r) = Recorded By, (t) = Taken By, (c) = Cosigned By    Initials Name Provider Type    BB Mare Reed COTA/L Occupational Therapy Assistant        Therapy Charges for Today     Code Description Service Date Service Provider Modifiers Qty    64145952572 HC OT THER PROC EA 15 MIN 8/10/2021 Mare Reed COTA/L GO 2    83209913113 HC OT SELF CARE/MGMT/TRAIN EA 15 MIN 8/10/2021 Mare Reed COTA/L GO 1    71591862867 HC OT THER PROC EA 15 MIN 8/11/2021 Mare Reed COTA/L GO 2    51245585445 HC OT SELF CARE/MGMT/TRAIN EA 15 MIN 8/11/2021 Mare Reed COTA/L GO 1               JOSE Lares/WINSTON  8/11/2021

## 2021-08-11 NOTE — PLAN OF CARE
Goal Outcome Evaluation:  Plan of Care Reviewed With: patient        Progress: no change  Outcome Summary: vss; more alert and interactive today; worked with PT/OT; will continue to monitor

## 2021-08-11 NOTE — PLAN OF CARE
Problem: Adult Inpatient Plan of Care  Goal: Plan of Care Review  Flowsheets  Taken 8/11/2021 1435  Progress: no change  Plan of Care Reviewed With: patient  Taken 8/11/2021 1125  Progress: no change  Plan of Care Reviewed With: patient  Outcome Summary: Pt performed AAROM with B UE exercises 1 set x15 reps. Pt is Mod  A for supine<>sit with CGA -SBA for ~8 min. No new goals met. Continue OT POC   Goal Outcome Evaluation:  Plan of Care Reviewed With: patient        Progress: no change  Outcome Summary: Pt performed AAROM with B UE exercises 1 set x15 reps. Pt is Mod  A for supine<>sit with CGA -SBA for ~8 min. No new goals met. Continue OT POC

## 2021-08-11 NOTE — ED PROVIDER NOTES
Subjective     Altered Mental Status  Presenting symptoms: confusion    Severity:  Mild  Most recent episode:  Today  Progression:  Waxing and waning  Chronicity:  Recurrent  Associated symptoms: no abdominal pain, no fever, no headaches, no light-headedness, no nausea, no rash, no seizures, no vomiting and no weakness        Review of Systems   Constitutional: Negative for appetite change, chills, diaphoresis, fatigue and fever.   HENT: Negative for congestion, ear discharge, ear pain, nosebleeds, rhinorrhea, sinus pressure, sore throat and trouble swallowing.    Eyes: Negative for discharge and redness.   Respiratory: Negative for apnea, cough, chest tightness, shortness of breath and wheezing.    Cardiovascular: Negative for chest pain.   Gastrointestinal: Negative for abdominal pain, diarrhea, nausea and vomiting.   Endocrine: Negative for polyuria.   Genitourinary: Negative for dysuria, frequency and urgency.   Musculoskeletal: Negative for myalgias and neck pain.   Skin: Negative for color change and rash.   Allergic/Immunologic: Negative for immunocompromised state.   Neurological: Negative for dizziness, seizures, syncope, weakness, light-headedness and headaches.   Hematological: Negative for adenopathy. Does not bruise/bleed easily.   Psychiatric/Behavioral: Positive for confusion and decreased concentration. Negative for behavioral problems.   All other systems reviewed and are negative.      Past Medical History:   Diagnosis Date   • Bipolar disorder, unspecified (CMS/HCC)    • Depression    • Diabetes mellitus (CMS/HCC)    • GERD (gastroesophageal reflux disease)    • Hypertension    • Legal blindness    • Psychiatric illness    • Rheumatoid arthritis (CMS/HCC)    • Schizoaffective disorder (CMS/HCC)        No Known Allergies    Past Surgical History:   Procedure Laterality Date   • COLONOSCOPY N/A 8/29/2020    Procedure: COLONOSCOPY;  Surgeon: Ge Gorman MD;  Location: [facility];  Service:  "Gastroenterology;  Laterality: N/A;   • ENDOSCOPY N/A 8/28/2020    Procedure: ESOPHAGOGASTRODUODENOSCOPY;  Surgeon: Ge Gorman MD;  Location: [facility];  Service: Gastroenterology;  Laterality: N/A;       Family History   Problem Relation Age of Onset   • Dementia Mother    • No Known Problems Father    • No Known Problems Sister    • No Known Problems Brother    • No Known Problems Maternal Aunt    • No Known Problems Paternal Aunt    • No Known Problems Maternal Uncle    • No Known Problems Paternal Uncle    • No Known Problems Maternal Grandfather    • No Known Problems Maternal Grandmother    • No Known Problems Paternal Grandfather    • No Known Problems Paternal Grandmother    • No Known Problems Cousin    • No Known Problems Other    • Suicide Attempts Neg Hx    • ADD / ADHD Neg Hx    • Alcohol abuse Neg Hx    • Anxiety disorder Neg Hx    • Bipolar disorder Neg Hx    • Depression Neg Hx    • Drug abuse Neg Hx    • OCD Neg Hx    • Paranoid behavior Neg Hx    • Schizophrenia Neg Hx    • Seizures Neg Hx    • Self-Injurious Behavior  Neg Hx        Social History     Socioeconomic History   • Marital status:      Spouse name: Not on file   • Number of children: Not on file   • Years of education: Not on file   • Highest education level: Not on file   Tobacco Use   • Smoking status: Former Smoker   • Smokeless tobacco: Never Used   • Tobacco comment: \" a little bit a long time ago\"   Substance and Sexual Activity   • Alcohol use: Not Currently   • Drug use: Never   • Sexual activity: Not Currently           Objective   Physical Exam  Vitals and nursing note reviewed.   Constitutional:       Appearance: She is well-developed.   HENT:      Head: Normocephalic and atraumatic.      Nose: Nose normal.   Eyes:      General: No scleral icterus.        Right eye: No discharge.         Left eye: No discharge.      Conjunctiva/sclera: Conjunctivae normal.      Pupils: Pupils are equal, round, and reactive to " light.   Neck:      Trachea: No tracheal deviation.   Cardiovascular:      Rate and Rhythm: Normal rate and regular rhythm.      Heart sounds: Normal heart sounds. No murmur heard.     Pulmonary:      Effort: Pulmonary effort is normal. No respiratory distress.      Breath sounds: Normal breath sounds. No stridor. No wheezing or rales.   Abdominal:      General: Bowel sounds are normal. There is no distension.      Palpations: Abdomen is soft. There is no mass.      Tenderness: There is no abdominal tenderness. There is no guarding or rebound.   Musculoskeletal:      Cervical back: Normal range of motion and neck supple.   Skin:     General: Skin is warm and dry.      Findings: No erythema or rash.   Neurological:      Mental Status: She is alert.      Coordination: Coordination normal.   Psychiatric:         Behavior: Behavior normal.         Procedures           ED Course                  Labs Reviewed   COMPREHENSIVE METABOLIC PANEL - Abnormal; Notable for the following components:       Result Value    Glucose 170 (*)     Creatinine 1.05 (*)     Chloride 109 (*)     Albumin 2.10 (*)     ALT (SGPT) 112 (*)     AST (SGOT) 119 (*)     All other components within normal limits    Narrative:     GFR Normal >60  Chronic Kidney Disease <60  Kidney Failure <15     URINALYSIS W/ CULTURE IF INDICATED - Abnormal; Notable for the following components:    Appearance, UA Cloudy (*)     Ketones, UA Trace (*)     Bilirubin, UA Small (1+) (*)     Leuk Esterase, UA Moderate (2+) (*)     Urobilinogen, UA 4.0 E.U./dL (*)     All other components within normal limits   CBC WITH AUTO DIFFERENTIAL - Abnormal; Notable for the following components:    RBC 3.44 (*)     Hemoglobin 11.4 (*)     Hematocrit 33.8 (*)     MCV 98.3 (*)     MCH 33.1 (*)     RDW 15.6 (*)     RDW-SD 55.8 (*)     Platelets 45 (*)     All other components within normal limits   URINALYSIS, MICROSCOPIC ONLY - Abnormal; Notable for the following components:    WBC, UA  13-20 (*)     Bacteria, UA 1+ (*)     Squamous Epithelial Cells, UA 6-12 (*)     All other components within normal limits   CK - Normal   MAGNESIUM - Normal   URINE CULTURE    Narrative:     Specimen contains mixed organisms of questionable pathogenicity which indicates contamination with commensal maddi.  Further identification is unlikely to provide clinically useful information.  Suggest recollection.   MANUAL DIFFERENTIAL   CBC AND DIFFERENTIAL    Narrative:     The following orders were created for panel order CBC & Differential.  Procedure                               Abnormality         Status                     ---------                               -----------         ------                     CBC Auto Differential[[id number]]        Abnormal            Final result                 Please view results for these tests on the individual orders.   EXTRA TUBES    Narrative:     The following orders were created for panel order Extra Tubes.  Procedure                               Abnormality         Status                     ---------                               -----------         ------                     Gold Top - SST[[id number]]                                                              [place] Blood Culture Dangelo...[[id number]]                      Final result               Rodriguez Top[[id number]]                                         Final result                 Please view results for these tests on the individual orders.   RAINBOW BLOOD CULTURE BOTTLES - 1 SET   GRAY TOP       XR Hip With or Without Pelvis 2 - 3 View Left   Final Result   1. No acute osseous abnormality.      Electronically signed by:  Greg Kee MD  8/3/2021 9:15 PM CDT   Workstation: [id number]                                     [facility]    Final diagnoses:   Urinary tract infection without hematuria, site unspecified       ED Disposition  ED Disposition     ED Disposition Condition Comment    Discharge Stable           James  Alisia HILLMAN, APRN  [address]  [facility] [address]  [phone]    In 2 days  Urine culture resuts         Medication List      No changes were made to your prescriptions during this visit.          Koko Dias MD  08/11/21 9824

## 2021-08-11 NOTE — PLAN OF CARE
Goal Outcome Evaluation:  Plan of Care Reviewed With: patient           Outcome Summary: pt sup<>sit with mod assist of 1, pt sat @ EOB ~ 25 minutes with CGA-SBA of 1, pt not following directives. pt will require 24/7 care  @ D/C

## 2021-08-11 NOTE — PROGRESS NOTES
[facility] Medicine Services  INPATIENT PROGRESS NOTE    Length of Stay: 0  Date of Admission: 8/6/2021  Primary Care Physician: Alisia Ramirez APRN    Subjective   Chief Complaint: Altered mental status    HPI: Patient is more alert today.  She remains oriented to self only.  She had elevated ammonia which is in keeping with hepatic encephalopathy.    Review of Systems   Unable to perform ROS: Psychiatric disorder       Objective    Temp:  [96.8 °F (36 °C)-98 °F (36.7 °C)] 97.5 °F (36.4 °C)  Heart Rate:  [77-97] 87  Resp:  [18] 18  BP: (128-134)/(57-74) 129/61    Physical Exam  Constitutional:       General: She is not in acute distress.     Appearance: She is not ill-appearing or diaphoretic.   HENT:      Head: Normocephalic and atraumatic.      Right Ear: External ear normal.      Left Ear: External ear normal.      Nose: No congestion or rhinorrhea.      Mouth/Throat:      Mouth: Mucous membranes are moist.      Pharynx: No oropharyngeal exudate or posterior oropharyngeal erythema.   Eyes:      General: No scleral icterus.     Extraocular Movements: Extraocular movements intact.      Conjunctiva/sclera: Conjunctivae normal.   Cardiovascular:      Rate and Rhythm: Normal rate and regular rhythm.      Heart sounds: Normal heart sounds. No murmur heard.     Pulmonary:      Effort: Pulmonary effort is normal. No respiratory distress.      Breath sounds: Normal breath sounds. No wheezing, rhonchi or rales.   Abdominal:      General: Abdomen is flat. There is no distension.      Palpations: Abdomen is soft.      Tenderness: There is no abdominal tenderness. There is no guarding.   Musculoskeletal:         General: No swelling, tenderness or deformity.      Cervical back: Neck supple. No rigidity. No muscular tenderness.      Right lower leg: No edema.      Left lower leg: No edema.   Lymphadenopathy:      Cervical: No cervical adenopathy.   Skin:     General: Skin is  warm and dry.   Neurological:      General: No focal deficit present.      Mental Status: She is alert. She is disoriented.      Cranial Nerves: No cranial nerve deficit.      Motor: No weakness.   Psychiatric:         Mood and Affect: Mood normal.         Behavior: Behavior normal.       Medication Review:    Current Facility-Administered Medications:   •  [COMPLETED] ARIPiprazole (ABILIFY) tablet 5 mg, 5 mg, Oral, Daily With Dinner, 5 mg at 08/07/21 1821 **FOLLOWED BY** ARIPiprazole (ABILIFY) half tablet 12.5 mg, 12.5 mg, Oral, Daily With Dinner, Tyrel Anderson II, MD, 12.5 mg at 08/10/21 1816  •  calcium carbonate (TUMS) chewable tablet 500 mg (200 mg elemental), 2 tablet, Oral, BID PRN, Karan Tsai MD  •  cholecalciferol (VITAMIN D3) tablet 5,000 Units, 5,000 Units, Oral, Daily, Karan Tsai MD, 5,000 Units at 08/10/21 0751  •  dextrose (D50W) 25 g/ 50mL Intravenous Solution 25 g, 25 g, Intravenous, Q15 Min PRN, Karan Tsai MD  •  dextrose (GLUTOSE) oral gel 15 g, 15 g, Oral, Q15 Min PRN, Karan Tsai MD  •  escitalopram (LEXAPRO) tablet 10 mg, 10 mg, Oral, Daily, Karan Tsai MD, 10 mg at 08/10/21 0752  •  glucagon (human recombinant) (GLUCAGEN DIAGNOSTIC) injection 1 mg, 1 mg, Subcutaneous, Q15 Min PRN, Karan Tsai MD  •  insulin aspart (novoLOG) injection 0-7 Units, 0-7 Units, Subcutaneous, TID AC, Karan Tsai MD  •  lactulose (CHRONULAC) 10 GM/15ML solution 30 g, 30 g, Oral, TID, Clara Gupta MD  •  Magnesium Sulfate 2 gram Bolus, followed by 8 gram infusion (total Mg dose 10 grams)- Mg less than or equal to 1mg/dL, 2 g, Intravenous, PRN **OR** Magnesium Sulfate 2 gram / 50mL Infusion (GIVE X 3 BAGS TO EQUAL 6GM TOTAL DOSE) - Mg 1.1 - 1.5 mg/dl, 2 g, Intravenous, PRN **OR** Magnesium Sulfate 4 gram infusion- Mg 1.6-1.9 mg/dL, 4 g, Intravenous, PRN, Karan Tsai MD, Last Rate: 25 mL/hr at 08/07/21 0121, 4 g at 08/07/21 0121  •  multivitamin with minerals 1 tablet, 1  tablet, Oral, Daily, Karan Tsai MD, 1 tablet at 08/10/21 0751  •  ondansetron (ZOFRAN) tablet 4 mg, 4 mg, Oral, Q6H PRN **OR** ondansetron (ZOFRAN) injection 4 mg, 4 mg, Intravenous, Q6H PRN, Karan Tsai MD  •  pantoprazole (PROTONIX) EC tablet 40 mg, 40 mg, Oral, Daily, Karan Tsai MD, 40 mg at 08/10/21 0751  •  phytonadione (AQUA-MEPHYTON, VITAMIN K) 10 mg in sodium chloride 0.9 % 50 mL IVPB, 10 mg, Intravenous, Once, Terry Bills MD, Last Rate: 50 mL/hr at 08/10/21 1816, 10 mg at 08/10/21 1816  •  potassium chloride (MICRO-K) CR capsule 40 mEq, 40 mEq, Oral, PRN **OR** potassium chloride (KLOR-CON) packet 40 mEq, 40 mEq, Oral, PRN **OR** potassium chloride 10 mEq in 100 mL IVPB, 10 mEq, Intravenous, Q1H PRN, Karan Tsai MD, Last Rate: 100 mL/hr at 08/07/21 0433, 10 mEq at 08/07/21 0433  •  sodium chloride 0.9 % flush 10 mL, 10 mL, Intravenous, Q12H, Karan Tsai MD, 10 mL at 08/08/21 2057  •  sodium chloride 0.9 % flush 10 mL, 10 mL, Intravenous, PRN, Karan Tsai MD    I have reviewed the patient's current medications.     Results Review:  I have reviewed the labs, radiology results, and diagnostic studies.    Laboratory Data:   Results from last 7 days   Lab Units 08/09/21  0604 08/07/21  1007 08/06/21  1705   SODIUM mmol/L 140 142 141   POTASSIUM mmol/L 4.3 3.9  3.9 3.5   CHLORIDE mmol/L 114* 115* 110*   CO2 mmol/L 21.0* 23.0 25.0   BUN mg/dL 9 9 12   CREATININE mg/dL 0.75 0.82 0.97   GLUCOSE mg/dL 114* 88 142*   CALCIUM mg/dL 9.3 8.7 9.4   BILIRUBIN mg/dL 0.9 1.0 1.0   ALK PHOS U/L 78 73 84   ALT (SGPT) U/L 114* 112* 115*   AST (SGOT) U/L 118* 122* 129*   ANION GAP mmol/L 5.0 4.0* 6.0     Estimated Creatinine Clearance: 61.5 mL/min (by C-G formula based on SCr of 0.75 mg/dL).  Results from last 7 days   Lab Units 08/07/21  1007 08/06/21  1705 08/03/21 2011   MAGNESIUM mg/dL 2.5* 1.6 1.6         Results from last 7 days   Lab Units 08/10/21  0655 08/09/21  0604 08/07/21  1007  08/06/21  1705 08/03/21 2010   WBC 10*3/mm3 3.97 4.44 3.61 4.22 3.96   HEMOGLOBIN g/dL 10.5* 10.2* 10.1* 10.6* 11.4*   HEMATOCRIT % 31.0* 30.2* 30.2* 31.8* 33.8*   PLATELETS 10*3/mm3 44* 39* 36* 45* 45*     Results from last 7 days   Lab Units 08/10/21  0655 08/09/21  0604   INR  1.73* 1.87*       Culture Data:   No results found for: BLOODCX  No results found for: URINECX  No results found for: RESPCX  No results found for: WOUNDCX  No results found for: STOOLCX  No components found for: BODYFLD    Radiology Data:   Imaging Results (Last 24 Hours)     ** No results found for the last 24 hours. **          Assessment/Plan     Hospital Problem List:  Principal Problem:    Encephalopathy, hepatic (CMS/HCC)  Active Problems:    Altered mental status, unspecified    Schizophrenia, paranoid type (CMS/HCC)    DMII (diabetes mellitus, type 2) (CMS/HCC)    Hypotension    Lactic acidosis    Thrombocytopenia (CMS/HCC)  Cirrhosis secondary to suspected ESTRADA from diabetes mellitus    Plan  -Patient has cirrhosis and elevated ammonia levels consistent with hepatic encephalopathy  -We will start lactulose therapy  -Gastroenterology consultation due to new onset cirrhosis  -Hepatitis B screen pending  -Hematology input appreciated.  Patient thrombocytopenia is likely from liver cirrhosis  -We will repeat vitamin K therapy due to persistently elevated INR  -Patient's lactic acidosis is likely secondary to poor oral intake and dehydration  -Psychiatry input appreciated for schizophrenia.  Will plan for discharge to behavioral health unit when patient is more stable  -Continue to monitor vital signs  -DVT prophylaxis with SCDs  -CODE STATUS is full code    Discharge Planning: In progress    I confirmed that the patient's Advance Care Plan is present, code status is documented, or surrogate decision maker is listed in the patient's medical record.      I have utilized all available immediate resources to obtain, update, or review the  patient's current medications.      Clara Gutpa MD   08/10/21   19:14 CDT

## 2021-08-11 NOTE — THERAPY TREATMENT NOTE
Acute Care - Physical Therapy Treatment Note  [facility]     Patient Name: Natacha Gandhi  : 1950  MRN: [id number]  Today's Date: 2021      Visit Dx:     ICD-10-CM ICD-9-CM   1. Hypotension, unspecified hypotension type  I95.9 458.9   2. Altered mental status, unspecified altered mental status type  R41.82 780.97   3. Impaired functional mobility, balance, gait, and endurance  Z74.09 V49.89   4. Oropharyngeal dysphagia  R13.12 787.22   5. Impaired mobility and activities of daily living  Z74.09 V49.89    Z78.9      Patient Active Problem List   Diagnosis   • Acute GI bleeding   • Altered mental status, unspecified   • Schizophrenia, paranoid type (CMS/HCC)   • Acute exacerbation of chronic paranoid schizophrenia (CMS/HCC)   • Acute blood loss anemia   • DMII (diabetes mellitus, type 2) (CMS/HCC)   • Hypotension   • Lactic acidosis   • Thrombocytopenia (CMS/HCC)   • Encephalopathy, hepatic (CMS/HCC)     Past Medical History:   Diagnosis Date   • Bipolar disorder, unspecified (CMS/HCC)    • Depression    • Diabetes mellitus (CMS/HCC)    • GERD (gastroesophageal reflux disease)    • Hypertension    • Legal blindness    • Psychiatric illness    • Rheumatoid arthritis (CMS/HCC)    • Schizoaffective disorder (CMS/HCC)      Past Surgical History:   Procedure Laterality Date   • COLONOSCOPY N/A 2020    Procedure: COLONOSCOPY;  Surgeon: Ge Gorman MD;  Location: [facility];  Service: Gastroenterology;  Laterality: N/A;   • ENDOSCOPY N/A 2020    Procedure: ESOPHAGOGASTRODUODENOSCOPY;  Surgeon: Ge Gorman MD;  Location: [facility];  Service: Gastroenterology;  Laterality: N/A;        PT Assessment (last 12 hours)      PT Evaluation and Treatment     Row Name 21 1026          Physical Therapy Time and Intention    Subjective Information  no complaints  -TA     Document Type  therapy note (daily note)  -TA     Mode of Treatment  physical therapy  -TA     Row Name 21  1026          General Information    Patient Profile Reviewed  yes  -TA     Existing Precautions/Restrictions  fall appears to have herniated vaginal tissue per RN when wiping for toilet hygiend  -TA     Row Name 21 1026          Cognition    Orientation Status (Cognition)  oriented to;person   -TA     Personal Safety Interventions  fall prevention program maintained;nonskid shoes/slippers when out of bed;supervised activity  -TA     Row Name 21 1026          Pain Scale: Word Pre/Post-Treatment    Pain: Word Scale, Pretreatment  0 - no pain  -TA     Posttreatment Pain Rating  0 - no pain  -TA     Row Name 21 1026          Range of Motion Comprehensive    General Range of Motion  bilateral lower extremity ROM WFL  -TA     Row Name 21 1026          Bed Mobility    Bed Mobility  bed mobility (all) activities;supine-sit;sit-supine;scooting/bridging  -TA     All Activities, [place] (Bed Mobility)  not tested  -TA     Scooting/Bridging [place] (Bed Mobility)  dependent (less than 25% patient effort);2 person assist >HOB  -TA     Supine-Sit [place] (Bed Mobility)  moderate assist (50% patient effort);verbal cues;nonverbal cues (demo/gesture)  -TA     Sit-Supine [place] (Bed Mobility)  moderate assist (50% patient effort);verbal cues;nonverbal cues (demo/gesture)  -TA     Assistive Device (Bed Mobility)  bed rails;draw sheet;head of bed elevated  -TA     Comment (Bed Mobility)  pt sat @ EOB ~25 minutes with CGA-SBA, pt not following directives  -TA     Row Name 21 1026          Transfers    Sit-Stand [place] (Transfers)  not tested  -TA     Stand-Sit [place] (Transfers)  not tested  -TA     Row Name 21 1026          Plan of Care Review    Plan of Care Reviewed With  patient  -TA     Outcome Summary  pt sup<>sit with mod assist of 1, pt sat @ EOB ~ 25 minutes with CGA-SBA of 1, pt not following directives. pt will require / care  @ D/C  -TA     Row Name  08/11/21 1026          Vital Signs    Pre Systolic BP Rehab  115  -TA     Pre Treatment Diastolic BP  58  -TA     Post Systolic BP Rehab  126  -TA     Post Treatment Diastolic BP  64  -TA     Pretreatment Heart Rate (beats/min)  81  -TA     Posttreatment Heart Rate (beats/min)  84  -TA     Pre SpO2 (%)  98  -TA     O2 Delivery Pre Treatment  room air  -TA     Post SpO2 (%)  98  -TA     O2 Delivery Post Treatment  room air  -TA     Pre Patient Position  Supine  -TA     Post Patient Position  Supine  -TA     Row Name 08/11/21 1026          Bed Mobility Goal 1 (PT)    Activity/Assistive Device (Bed Mobility Goal 1, PT)  bed mobility activities, all  -TA     [place] Level/Cues Needed (Bed Mobility Goal 1, PT)  modified independence  -TA     Time Frame (Bed Mobility Goal 1, PT)  1 week  -TA     Progress/Outcomes (Bed Mobility Goal 1, PT)  goal not met  -TA     Row Name 08/11/21 1026          Transfer Goal 1 (PT)    Activity/Assistive Device (Transfer Goal 1, PT)  bed-to-chair/chair-to-bed  -TA     [place] Level/Cues Needed (Transfer Goal 1, PT)  modified independence  -TA     Time Frame (Transfer Goal 1, PT)  10 days  -TA     Progress/Outcome (Transfer Goal 1, PT)  goal not met  -TA     Row Name 08/11/21 1026          Gait Training Goal 1 (PT)    Activity/Assistive Device (Gait Training Goal 1, PT)  gait (walking locomotion);decrease fall risk  -TA     [place] Level (Gait Training Goal 1, PT)  modified independence  -TA     Distance (Gait Training Goal 1, PT)  50 ft or more  -TA     Time Frame (Gait Training Goal 1, PT)  10 days  -TA     Progress/Outcome (Gait Training Goal 1, PT)  goal not met  -TA     Row Name 08/11/21 1026          Stairs Goal 1 (PT)    Activity/Assistive Device (Stairs Goal 1, PT)  ascending stairs;descending stairs  -TA     [place] Level/Cues Needed (Stairs Goal 1, PT)  modified independence  -TA     Number of Stairs (Stairs Goal 1, PT)  1  -TA     Time Frame (Stairs Goal 1, PT)   2 weeks  -TA     Row Name 08/11/21 1026          Positioning and Restraints    Pre-Treatment Position  in bed  -TA     Post Treatment Position  bed  -TA     In Bed  supine;call light within reach;exit alarm on  -TA     Row Name 08/11/21 1026          Therapy Assessment/Plan (PT)    Rehab Potential (PT)  fair, will monitor progress closely  -TA     Criteria for Skilled Interventions Met (PT)  yes  -TA     Comment, Therapy Assessment/Plan (PT)  continue  -TA       User Key  (r) = Recorded By, (t) = Taken By, (c) = Cosigned By    Initials Name Provider Type    Maude Marsh, PTA Physical Therapy Assistant        Physical Therapy Education                 Title: PT OT SLP Therapies (In Progress)     Topic: Physical Therapy (In Progress)     Point: Mobility training (In Progress)     Learning Progress Summary           Patient Acceptance, D,E, NR by  at 8/7/2021 1007    Comment: POC; mobility                   Point: Home exercise program (In Progress)     Learning Progress Summary           Patient Acceptance, D,E, NR by  at 8/7/2021 1007    Comment: POC; mobility                   Point: Body mechanics (In Progress)     Learning Progress Summary           Patient Acceptance, D,E, NR by  at 8/7/2021 1007    Comment: POC; mobility                   Point: Precautions (In Progress)     Learning Progress Summary           Patient Acceptance, D,E, NR by  at 8/7/2021 1007    Comment: POC; mobility                               User Key     Initials Effective Dates Name Provider Type [facility] 06/16/21 -  Rosina Martinez, PT Physical Therapist PT              PT Recommendation and Plan  Anticipated Discharge Disposition (PT): skilled nursing facility, home with 24/7 care, home with home health  Therapy Frequency (PT): other (see comments) (5-7 d/wk)  Plan of Care Reviewed With: patient  Outcome Summary: pt sup<>sit with mod assist of 1, pt sat @ EOB ~ 25 minutes with CGA-SBA of 1, pt not  following directives. pt will require 24/7 care  @ D/C  Outcome Measures     Row Name 08/11/21 1300 08/10/21 1300 08/09/21 1300       How much help from another person do you currently need...    Turning from your back to your side while in flat bed without using bedrails?  2  -TA  2  -TA  2  -TA    Moving from lying on back to sitting on the side of a flat bed without bedrails?  2  -TA  2  -TA  2  -TA    Moving to and from a bed to a chair (including a wheelchair)?  2  -TA  2  -TA  2  -TA    Standing up from a chair using your arms (e.g., wheelchair, bedside chair)?  2  -TA  2  -TA  2  -TA    Climbing 3-5 steps with a railing?  1  -TA  1  -TA  1  -TA    To walk in hospital room?  1  -TA  1  -TA  1  -TA    AM-PAC 6 Clicks Score (PT)  10  -TA  10  -TA  10  -TA       Functional Assessment    Outcome Measure Options  AM-PAC 6 Clicks Basic Mobility (PT)  -TA  AM-PAC 6 Clicks Basic Mobility (PT)  -TA  AM-PAC 6 Clicks Basic Mobility (PT)  -TA    Row Name 08/09/21 0858             How much help from another is currently needed...    Putting on and taking off regular lower body clothing?  2  -RB      Bathing (including washing, rinsing, and drying)  2  -RB      Toileting (which includes using toilet bed pan or urinal)  2  -RB      Putting on and taking off regular upper body clothing  2  -RB      Taking care of personal grooming (such as brushing teeth)  2  -RB      Eating meals  2  -RB      AM-PAC 6 Clicks Score (OT)  12  -RB        User Key  (r) = Recorded By, (t) = Taken By, (c) = Cosigned By    Initials Name Provider Type    RB Tyrel Lopez OT Occupational Therapist    Maude Marsh, PTA Physical Therapy Assistant           Time Calculation:   PT Charges     Row Name 08/11/21 1322             Time Calculation    Start Time  1026  -TA      Stop Time  1104  -TA      Time Calculation (min)  38 min  -TA      PT Received On  08/11/21  -TA         Time Calculation- PT    Total Timed Code Minutes- PT  38 minute(s)  -TA          Timed Charges    00840 - PT Therapeutic Activity Minutes  38  -TA         Total Minutes    Timed Charges Total Minutes  38  -TA       Total Minutes  38  -TA        User Key  (r) = Recorded By, (t) = Taken By, (c) = Cosigned By    Initials Name Provider Type    Maude Marsh PTA Physical Therapy Assistant        Therapy Charges for Today     Code Description Service Date Service Provider Modifiers Qty    66092320544 HC PT THERAPEUTIC ACT EA 15 MIN 8/10/2021 Maude Aguilar PTA GP 2    54948462653 HC PT THERAPEUTIC ACT EA 15 MIN 8/11/2021 Maude Aguilar PTA GP 3          PT G-Codes  Outcome Measure Options: AM-PAC 6 Clicks Basic Mobility (PT)  AM-PAC 6 Clicks Score (PT): 10  AM-PAC 6 Clicks Score (OT): 12    Maude Aguilar PTA  8/11/2021

## 2021-08-11 NOTE — PROGRESS NOTES
[facility] Medicine Services  INPATIENT PROGRESS NOTE    Length of Stay: 1  Date of Admission: 8/6/2021  Primary Care Physician: Alisia Ramirez APRN    Subjective   Chief Complaint: Altered mental status    HPI: Patient is more alert today. She remains oriented to self only.  She has been managed for hepatic encephalopathy and cirrhosis. Ammonia level is trending downwards.    Review of Systems   Unable to perform ROS: Psychiatric disorder     Objective    Temp:  [96.1 °F (35.6 °C)-97.5 °F (36.4 °C)] 96.1 °F (35.6 °C)  Heart Rate:  [79-88] 82  Resp:  [18] 18  BP: (118-130)/(53-72) 121/69    Physical Exam  Constitutional:       General: She is not in acute distress.     Appearance: She is not ill-appearing or diaphoretic.   HENT:      Head: Normocephalic and atraumatic.      Right Ear: External ear normal.      Left Ear: External ear normal.      Nose: No congestion or rhinorrhea.      Mouth/Throat:      Mouth: Mucous membranes are moist.      Pharynx: No oropharyngeal exudate or posterior oropharyngeal erythema.   Eyes:      General: No scleral icterus.     Extraocular Movements: Extraocular movements intact.      Conjunctiva/sclera: Conjunctivae normal.   Cardiovascular:      Rate and Rhythm: Normal rate and regular rhythm.      Heart sounds: Normal heart sounds. No murmur heard.     Pulmonary:      Effort: Pulmonary effort is normal. No respiratory distress.      Breath sounds: Normal breath sounds. No wheezing, rhonchi or rales.   Abdominal:      General: Abdomen is flat. There is no distension.      Palpations: Abdomen is soft.      Tenderness: There is no abdominal tenderness. There is no guarding.   Musculoskeletal:         General: No swelling, tenderness or deformity.      Cervical back: Neck supple. No rigidity. No muscular tenderness.      Right lower leg: No edema.      Left lower leg: No edema.   Lymphadenopathy:      Cervical: No cervical adenopathy.    Skin:     General: Skin is warm and dry.   Neurological:      General: No focal deficit present.      Mental Status: She is alert. She is disoriented.      Cranial Nerves: No cranial nerve deficit.      Motor: No weakness.   Psychiatric:         Mood and Affect: Mood normal.         Behavior: Behavior normal.       Medication Review:    Current Facility-Administered Medications:   •  [COMPLETED] ARIPiprazole (ABILIFY) tablet 5 mg, 5 mg, Oral, Daily With Dinner, 5 mg at 08/07/21 1821 **FOLLOWED BY** ARIPiprazole (ABILIFY) half tablet 12.5 mg, 12.5 mg, Oral, Daily With Dinner, Tyrel Anderson II, MD, 12.5 mg at 08/10/21 1816  •  calcium carbonate (TUMS) chewable tablet 500 mg (200 mg elemental), 2 tablet, Oral, BID PRN, Karan Tsai MD  •  cholecalciferol (VITAMIN D3) tablet 5,000 Units, 5,000 Units, Oral, Daily, Karan Tsai MD, 5,000 Units at 08/11/21 0824  •  dextrose (D50W) 25 g/ 50mL Intravenous Solution 25 g, 25 g, Intravenous, Q15 Min PRN, Karan Tsai MD  •  dextrose (GLUTOSE) oral gel 15 g, 15 g, Oral, Q15 Min PRN, Karan Tsai MD  •  escitalopram (LEXAPRO) tablet 10 mg, 10 mg, Oral, Daily, Karan Tsai MD, 10 mg at 08/11/21 0824  •  glucagon (human recombinant) (GLUCAGEN DIAGNOSTIC) injection 1 mg, 1 mg, Subcutaneous, Q15 Min PRN, Karan Tsai MD  •  insulin aspart (novoLOG) injection 0-7 Units, 0-7 Units, Subcutaneous, TID AC, Karan Tsai MD  •  lactulose (CHRONULAC) 10 GM/15ML solution 30 g, 30 g, Oral, TID, Ge Gorman MD  •  lactulose (CHRONULAC) 10 GM/15ML solution 60 mL, 60 mL, Rectal, Q4H, Ge Gorman MD, 60 mL at 08/11/21 0820  •  Magnesium Sulfate 2 gram Bolus, followed by 8 gram infusion (total Mg dose 10 grams)- Mg less than or equal to 1mg/dL, 2 g, Intravenous, PRN **OR** Magnesium Sulfate 2 gram / 50mL Infusion (GIVE X 3 BAGS TO EQUAL 6GM TOTAL DOSE) - Mg 1.1 - 1.5 mg/dl, 2 g, Intravenous, PRN **OR** Magnesium Sulfate 4 gram infusion- Mg 1.6-1.9 mg/dL,  4 g, Intravenous, PRN, Karan Tsai MD, Last Rate: 25 mL/hr at 08/07/21 0121, 4 g at 08/07/21 0121  •  multivitamin with minerals 1 tablet, 1 tablet, Oral, Daily, Karan Tsai MD, 1 tablet at 08/11/21 0824  •  ondansetron (ZOFRAN) tablet 4 mg, 4 mg, Oral, Q6H PRN **OR** ondansetron (ZOFRAN) injection 4 mg, 4 mg, Intravenous, Q6H PRN, Karan Tsai MD  •  pantoprazole (PROTONIX) EC tablet 40 mg, 40 mg, Oral, Daily, Karan Tsai MD, 40 mg at 08/11/21 0824  •  potassium chloride (MICRO-K) CR capsule 40 mEq, 40 mEq, Oral, PRN **OR** potassium chloride (KLOR-CON) packet 40 mEq, 40 mEq, Oral, PRN **OR** potassium chloride 10 mEq in 100 mL IVPB, 10 mEq, Intravenous, Q1H PRN, Karan Tsai MD, Last Rate: 100 mL/hr at 08/07/21 0433, 10 mEq at 08/07/21 0433  •  sodium chloride 0.9 % flush 10 mL, 10 mL, Intravenous, Q12H, Karan Tsai MD, 10 mL at 08/10/21 2255  •  sodium chloride 0.9 % flush 10 mL, 10 mL, Intravenous, PRN, Karan Tsai MD    I have reviewed the patient's current medications.     Results Review:  I have reviewed the labs, radiology results, and diagnostic studies.    Laboratory Data:   Results from last 7 days   Lab Units 08/11/21  0702 08/09/21  0604 08/07/21  1007   SODIUM mmol/L 142 140 142   POTASSIUM mmol/L 3.7 4.3 3.9  3.9   CHLORIDE mmol/L 111* 114* 115*   CO2 mmol/L 25.0 21.0* 23.0   BUN mg/dL 8 9 9   CREATININE mg/dL 0.73 0.75 0.82   GLUCOSE mg/dL 104* 114* 88   CALCIUM mg/dL 9.1 9.3 8.7   BILIRUBIN mg/dL 1.0 0.9 1.0   ALK PHOS U/L 72 78 73   ALT (SGPT) U/L 112* 114* 112*   AST (SGOT) U/L 133* 118* 122*   ANION GAP mmol/L 6.0 5.0 4.0*     Estimated Creatinine Clearance: 61.5 mL/min (by C-G formula based on SCr of 0.73 mg/dL).  Results from last 7 days   Lab Units 08/07/21  1007 08/06/21  1705   MAGNESIUM mg/dL 2.5* 1.6         Results from last 7 days   Lab Units 08/11/21  0702 08/10/21  0655 08/09/21  0604 08/07/21  1007 08/06/21  1705   WBC 10*3/mm3 4.59 3.97 4.44 3.61 4.22    HEMOGLOBIN g/dL 9.9* 10.5* 10.2* 10.1* 10.6*   HEMATOCRIT % 28.2* 31.0* 30.2* 30.2* 31.8*   PLATELETS 10*3/mm3 48* 44* 39* 36* 45*     Results from last 7 days   Lab Units 08/11/21  0702 08/10/21  0655 08/09/21  0604   INR  1.70* 1.73* 1.87*       Culture Data:   No results found for: BLOODCX  No results found for: URINECX  No results found for: RESPCX  No results found for: WOUNDCX  No results found for: STOOLCX  No components found for: BODYFLD    Radiology Data:   Imaging Results (Last 24 Hours)     ** No results found for the last 24 hours. **          Assessment/Plan     Hospital Problem List:  Principal Problem:    Encephalopathy, hepatic (CMS/HCC)  Active Problems:    Altered mental status, unspecified    Schizophrenia, paranoid type (CMS/HCC)    DMII (diabetes mellitus, type 2) (CMS/HCC)    Hypotension    Lactic acidosis    Thrombocytopenia (CMS/HCC)  Cirrhosis secondary to suspected ESTRADA from diabetes mellitus  Hepatitis B positive serology    Plan  -Patient has cirrhosis and elevated ammonia levels consistent with hepatic encephalopathy  -Ammonia level trended down today.  Continue lactulose  -Gastroenterology consultation due to new onset cirrhosis  -Hepatitis B screen pending.  Hep B surface antibody negative.  However hep B core antibody was positive which signifies either acute or past infection.  Awaiting hepatitis B surface antigen test.  We will send hepatitis B core antibody IgM test  -Hematology input appreciated.  Patient thrombocytopenia is likely from liver cirrhosis  -Patient's lactic acidosis is likely secondary to poor oral intake and dehydration  -Psychiatry input appreciated for schizophrenia.  Will plan for discharge to behavioral health unit when patient is more stable  -Continue to monitor vital signs  -DVT prophylaxis with SCDs  -CODE STATUS is full code    Discharge Planning: In progress    I confirmed that the patient's Advance Care Plan is present, code status is documented, or  surrogate decision maker is listed in the patient's medical record.      I have utilized all available immediate resources to obtain, update, or review the patient's current medications.      Clara Gupta MD   08/11/21   16:36 CDT

## 2021-08-12 NOTE — THERAPY TREATMENT NOTE
Patient Name: Natacha Gandhi  : 1950    MRN: [id number]                              Today's Date: 2021       Admit Date: 2021    Visit Dx:     ICD-10-CM ICD-9-CM   1. Hypotension, unspecified hypotension type  I95.9 458.9   2. Altered mental status, unspecified altered mental status type  R41.82 780.97   3. Impaired functional mobility, balance, gait, and endurance  Z74.09 V49.89   4. Oropharyngeal dysphagia  R13.12 787.22   5. Impaired mobility and activities of daily living  Z74.09 V49.89    Z78.9      Patient Active Problem List   Diagnosis   • Acute GI bleeding   • Altered mental status, unspecified   • Schizophrenia, paranoid type (CMS/HCC)   • Acute exacerbation of chronic paranoid schizophrenia (CMS/HCC)   • Acute blood loss anemia   • DMII (diabetes mellitus, type 2) (CMS/HCC)   • Hypotension   • Lactic acidosis   • Thrombocytopenia (CMS/HCC)   • Encephalopathy, hepatic (CMS/HCC)     Past Medical History:   Diagnosis Date   • Bipolar disorder, unspecified (CMS/HCC)    • Depression    • Diabetes mellitus (CMS/HCC)    • GERD (gastroesophageal reflux disease)    • Hypertension    • Legal blindness    • Psychiatric illness    • Rheumatoid arthritis (CMS/HCC)    • Schizoaffective disorder (CMS/HCC)      Past Surgical History:   Procedure Laterality Date   • COLONOSCOPY N/A 2020    Procedure: COLONOSCOPY;  Surgeon: Ge Gorman MD;  Location: [facility], [place];  Service: Gastroenterology;  Laterality: N/A;   • ENDOSCOPY N/A 2020    Procedure: ESOPHAGOGASTRODUODENOSCOPY;  Surgeon: Ge Gorman MD;  Location: [facility], [place];  Service: Gastroenterology;  Laterality: N/A;     General Information     Row Name 21          OT Time and Intention    Document Type  therapy note (daily note)  -BB     Mode of Treatment  individual therapy;occupational therapy  -BB     Row Name 21          General Information    Patient Profile Reviewed  yes  -BB     Existing  Precautions/Restrictions  fall appears to have herniated vaginal tissue per RN when wiping for toilet hygiend  -BB     Row Name 21          Cognition    Orientation Status (Cognition)  oriented to;person   -BB       User Key  (r) = Recorded By, (t) = Taken By, (c) = Cosigned By    Initials Name Provider Type    BB Mare Reed COTA/L Occupational Therapy Assistant          Mobility/ADL's     Row Name 21          Bed Mobility    Bed Mobility  bed mobility (all) activities;supine-sit;sit-supine;scooting/bridging  -BB     All Activities, [place] (Bed Mobility)  not tested  -BB     Scooting/Bridging [place] (Bed Mobility)  dependent (less than 25% patient effort);2 person assist >HOB  -BB     Supine-Sit [place] (Bed Mobility)  not tested  -BB     Sit-Supine [place] (Bed Mobility)  not tested  -BB     Assistive Device (Bed Mobility)  bed rails;draw sheet;head of bed elevated  -BB     Row Name 21          Transfers    Sit-Stand [place] (Transfers)  not tested  -BB     [place] Level (Toilet Transfer)  not tested  -BB     Row Name 21          Activities of Daily Living    BADL Assessment/Intervention  bathing;upper body dressing;lower body dressing;grooming  -BB     Row Name 21          Grooming Assessment/Training    [place] Level (Grooming)  wash face, hands;moderate assist (50% patient effort);verbal cues;nonverbal cues (demo/gesture)  -BB     Position (Grooming)  sitting up in bed  -BB     Row Name 21          Upper Body Dressing Assessment/Training    [place] Level (Upper Body Dressing)  doff;don;maximum assist (25% patient effort) HG  -BB     Position (Upper Body Dressing)  sitting up in bed  -BB     Row Name 21          Bathing Assessment/Intervention    [place] Level (Bathing)  lower body;dependent (less than 25% patient effort);upper body;maximum assist (25% patient effort)  -BB      Position (Bathing)  sitting up in bed  -BB     Row Name 08/12/21 0925          Lower Body Dressing Assessment/Training    [place] Level (Lower Body Dressing)  doff;don;socks;dependent (less than 25% patient effort)  -BB     Position (Lower Body Dressing)  sitting up in bed  -BB       User Key  (r) = Recorded By, (t) = Taken By, (c) = Cosigned By    Initials Name Provider Type    Mare Westbrook COTA/L Occupational Therapy Assistant        Obj/Interventions     Row Name 08/12/21 0925          Range of Motion Comprehensive    General Range of Motion  bilateral lower extremity ROM WFL  -BB     [facility] Name 08/12/21 0925          Strength Comprehensive (MMT)    General Manual Muscle Testing (MMT) Assessment  upper extremity strength deficits identified  -BB       User Key  (r) = Recorded By, (t) = Taken By, (c) = Cosigned By    Initials Name Provider Type    Mare Westbrook COTA/L Occupational Therapy Assistant        Goals/Plan     Row Name 08/12/21 0925          Transfer Goal 1 (OT)    Activity/Assistive Device (Transfer Goal 1, OT)  transfers, all  -BB     [place] Level/Cues Needed (Transfer Goal 1, OT)  modified independence  -BB     Time Frame (Transfer Goal 1, OT)  long term goal (LTG)  -BB     Progress/Outcome (Transfer Goal 1, OT)  goal not met  -BB     [facility] Name 08/12/21 0925          Bathing Goal 1 (OT)    Activity/Device (Bathing Goal 1, OT)  bathing skills, all  -BB     [place] Level/Cues Needed (Bathing Goal 1, OT)  modified independence  -BB     Time Frame (Bathing Goal 1, OT)  long term goal (LTG)  -BB     Progress/Outcomes (Bathing Goal 1, OT)  goal not met  -BB     Row Name 08/12/21 0925          Dressing Goal 1 (OT)    Activity/Device (Dressing Goal 1, OT)  dressing skills, all  -BB     [place]/Cues Needed (Dressing Goal 1, OT)  modified independence  -BB     Time Frame (Dressing Goal 1, OT)  long term goal (LTG)  -BB     Progress/Outcome (Dressing Goal 1, OT)  goal not  met  -BB     Row Name 08/12/21 0925          Toileting Goal 1 (OT)    Activity/Device (Toileting Goal 1, OT)  toileting skills, all  -BB     [place] Level/Cues Needed (Toileting Goal 1, OT)  modified independence  -BB     Time Frame (Toileting Goal 1, OT)  long term goal (LTG)  -BB     Progress/Outcome (Toileting Goal 1, OT)  goal not met  -BB     Row Name 08/12/21 0925          Strength Goal 1 (OT)    Strength Goal 1 (OT)  Increase UE str by 1/2 grade to increase independence with functional mobility.  -BB     Time Frame (Strength Goal 1, OT)  long term goal (LTG)  -BB     Progress/Outcome (Strength Goal 1, OT)  goal not met  -BB       User Key  (r) = Recorded By, (t) = Taken By, (c) = Cosigned By    Initials Name Provider Type    BB Mare Reed COTA/L Occupational Therapy Assistant        Clinical Impression     Row Name 08/12/21 0925          Pain Scale: FACES Pre/Post-Treatment    Pain: FACES Scale, Pretreatment  0-->no hurt  -BB     Posttreatment Pain Rating  0-->no hurt  -BB     Row Name 08/12/21 0925          Plan of Care Review    Plan of Care Reviewed With  patient  -BB     Progress  no change  -BB     Outcome Summary  Pt is Max A for UB bathing and dressing. Pt is dependent for LB ADL. Pt requires verbal cues for all tasks. Continue OT POC  -BB     Row Name 08/12/21 0925          Therapy Assessment/Plan (OT)    Rehab Potential (OT)  good, to achieve stated therapy goals  -BB     Criteria for Skilled Therapeutic Interventions Met (OT)  yes;meets criteria  -BB     Therapy Frequency (OT)  other (see comments) 5-7 days/wk  -BB     Row Name 08/12/21 0925          Therapy Plan Review/Discharge Plan (OT)    Anticipated Discharge Disposition (OT)  skilled nursing facility  -BB     Row Name 08/12/21 0925          Vital Signs    Pretreatment Heart Rate (beats/min)  88  -BB     Pre SpO2 (%)  97  -BB     O2 Delivery Pre Treatment  room air  -BB     Pre Patient Position  Supine  -BB     Row Name 08/12/21  0925          Positioning and Restraints    Pre-Treatment Position  in bed  -BB     Post Treatment Position  bed  -BB     In Bed  fowlers;call light within reach;encouraged to call for assist;exit alarm on  -BB       User Key  (r) = Recorded By, (t) = Taken By, (c) = Cosigned By    Initials Name Provider Type    Mare Westbrook COTA/L Occupational Therapy Assistant        Outcome Measures     Row Name 08/12/21 0925          How much help from another is currently needed...    Putting on and taking off regular lower body clothing?  1  -BB     Bathing (including washing, rinsing, and drying)  2  -BB     Toileting (which includes using toilet bed pan or urinal)  2  -BB     Putting on and taking off regular upper body clothing  2  -BB     Taking care of personal grooming (such as brushing teeth)  2  -BB     Eating meals  2  -BB     AM-PAC 6 Clicks Score (OT)  11  -BB       User Key  (r) = Recorded By, (t) = Taken By, (c) = Cosigned By    Initials Name Provider Type    Mare Westbrook COTA/L Occupational Therapy Assistant          Occupational Therapy Education                 Title: PT OT SLP Therapies (In Progress)     Topic: Occupational Therapy (In Progress)     Point: ADL training (In Progress)     Description:   Instruct learner(s) on proper safety adaptation and remediation techniques during self care or transfers.   Instruct in proper use of assistive devices.              Learning Progress Summary           Patient Acceptance, E, NR by BB at 8/12/2021 1315    Acceptance, E, NR by BB at 8/10/2021 1329                   Point: Home exercise program (In Progress)     Description:   Instruct learner(s) on appropriate technique for monitoring, assisting and/or progressing therapeutic exercises/activities.              Learning Progress Summary           Patient Acceptance, E,D, NR by RB at 8/9/2021 0956    Comment: Attempted B UE HEP while sitting EOB with minimal understanding.                    Point: Precautions (Done)     Description:   Instruct learner(s) on prescribed precautions during self-care and functional transfers.              Learning Progress Summary           Patient Acceptance, E, VU by RB at 8/7/2021 1406    Comment: Edu pt on use of gait belt and non skid socks when OOB and no OOB without assist.                   Point: Body mechanics (In Progress)     Description:   Instruct learner(s) on proper positioning and spine alignment during self-care, functional mobility activities and/or exercises.              Learning Progress Summary           Patient Acceptance, E, NR by BB at 8/12/2021 1315    Acceptance, E, NR by BB at 8/11/2021 1435                               User Key     Initials Effective Dates Name Provider Type Discipline     06/16/21 -  Tyrel Lopez OT Occupational Therapist OT     06/16/21 -  Mare Reed COTA/L Occupational Therapy Assistant OT              OT Recommendation and Plan  Therapy Frequency (OT): other (see comments) (5-7 days/wk)  Plan of Care Review  Plan of Care Reviewed With: patient  Progress: no change  Outcome Summary: Pt is Max A for UB bathing and dressing. Pt is dependent for LB ADL. Pt requires verbal cues for all tasks. Continue OT POC     Time Calculation:   Time Calculation- OT     Row Name 08/12/21 1318             Time Calculation- OT    OT Start Time  0925  -BB      OT Stop Time  1004  -BB      OT Time Calculation (min)  39 min  -BB      Total Timed Code Minutes- OT  39 minute(s)  -BB      OT Received On  08/12/21  -BB         Timed Charges    19525 - OT Self Care/Mgmt Minutes  39  -BB         Total Minutes    Timed Charges Total Minutes  39  -BB       Total Minutes  39  -BB        User Key  (r) = Recorded By, (t) = Taken By, (c) = Cosigned By    Initials Name Provider Type    BB Mare Reed COTA/L Occupational Therapy Assistant        Therapy Charges for Today     Code Description Service Date Service Provider Modifiers Qty     76019789710 HC OT THER PROC EA 15 MIN 8/11/2021 Mare Reed GARCIA/L GO 2    36884029368 HC OT SELF CARE/MGMT/TRAIN EA 15 MIN 8/11/2021 Mare Reed COTA/L GO 1    58402624002 HC OT SELF CARE/MGMT/TRAIN EA 15 MIN 8/12/2021 Mare Reed COTA/L GO 3               JOSE Lares/WINSOTN  8/12/2021

## 2021-08-12 NOTE — THERAPY TREATMENT NOTE
Acute Care - Physical Therapy Treatment Note  [facility]     Patient Name: Natacha Gandhi  : 1950  MRN: [id number]  Today's Date: 2021      Visit Dx:     ICD-10-CM ICD-9-CM   1. Hypotension, unspecified hypotension type  I95.9 458.9   2. Altered mental status, unspecified altered mental status type  R41.82 780.97   3. Impaired functional mobility, balance, gait, and endurance  Z74.09 V49.89   4. Oropharyngeal dysphagia  R13.12 787.22   5. Impaired mobility and activities of daily living  Z74.09 V49.89    Z78.9      Patient Active Problem List   Diagnosis   • Acute GI bleeding   • Altered mental status, unspecified   • Schizophrenia, paranoid type (CMS/HCC)   • Acute exacerbation of chronic paranoid schizophrenia (CMS/HCC)   • Acute blood loss anemia   • DMII (diabetes mellitus, type 2) (CMS/HCC)   • Hypotension   • Lactic acidosis   • Thrombocytopenia (CMS/HCC)   • Encephalopathy, hepatic (CMS/HCC)     Past Medical History:   Diagnosis Date   • Bipolar disorder, unspecified (CMS/HCC)    • Depression    • Diabetes mellitus (CMS/HCC)    • GERD (gastroesophageal reflux disease)    • Hypertension    • Legal blindness    • Psychiatric illness    • Rheumatoid arthritis (CMS/HCC)    • Schizoaffective disorder (CMS/HCC)      Past Surgical History:   Procedure Laterality Date   • COLONOSCOPY N/A 2020    Procedure: COLONOSCOPY;  Surgeon: Ge Gorman MD;  Location: [facility];  Service: Gastroenterology;  Laterality: N/A;   • ENDOSCOPY N/A 2020    Procedure: ESOPHAGOGASTRODUODENOSCOPY;  Surgeon: Ge Gorman MD;  Location: [facility];  Service: Gastroenterology;  Laterality: N/A;        PT Assessment (last 12 hours)      PT Evaluation and Treatment     Row Name 21 1336          Physical Therapy Time and Intention    Subjective Information  complains of;fatigue  -TA     Document Type  therapy note (daily note)  -TA     Mode of Treatment  physical therapy  -TA     Comment   family member  present  -TA     Row Name 21 1336          General Information    Patient Profile Reviewed  yes  -TA     Existing Precautions/Restrictions  fall appears to have herniated vaginal tissue per RN when wiping for toilet hygiend  -TA     Row Name 21 1336          Cognition    Orientation Status (Cognition)  oriented to;person   -TA     Personal Safety Interventions  fall prevention program maintained;nonskid shoes/slippers when out of bed;supervised activity  -TA     Row Name 21 1336          Pain Scale: Word Pre/Post-Treatment    Pain: Word Scale, Pretreatment  0 - no pain  -TA     Posttreatment Pain Rating  0 - no pain  -TA     Row Name 21 1336          Range of Motion Comprehensive    General Range of Motion  bilateral lower extremity ROM WFL  -TA     Row Name 21 1336          Bed Mobility    Bed Mobility  bed mobility (all) activities;supine-sit;sit-supine;scooting/bridging;rolling left;rolling right  -TA     All Activities, [place] (Bed Mobility)  --  -TA     Rolling Left [place] (Bed Mobility)  moderate assist (50% patient effort);1 person assist;verbal cues;nonverbal cues (demo/gesture)  -TA     Rolling Right [place] (Bed Mobility)  moderate assist (50% patient effort);1 person assist;verbal cues;nonverbal cues (demo/gesture)  -TA     Scooting/Bridging [place] (Bed Mobility)  not tested  -TA     Supine-Sit [place] (Bed Mobility)  minimum assist (75% patient effort);moderate assist (50% patient effort);verbal cues;nonverbal cues (demo/gesture) x 2  -TA     Sit-Supine [place] (Bed Mobility)  minimum assist (75% patient effort);moderate assist (50% patient effort);verbal cues;nonverbal cues (demo/gesture) x2  -TA     Assistive Device (Bed Mobility)  bed rails;draw sheet;head of bed elevated  -TA     Comment (Bed Mobility)  pt sat up long sitting in bed without support of HOB `5 minutes with SBA, pt returned to supine, PTA assisted pt to  EOB, pt sat @ EOB ~7 minutes with CGA- SBA. pt c/o fatigue & returned to supiine   -TA     Row Name 08/12/21 1336          Transfers    Sit-Stand [place] (Transfers)  not tested  -TA     Stand-Sit [place] (Transfers)  not tested  -TA     Row Name 08/12/21 1336          Plan of Care Review    Plan of Care Reviewed With  patient  -TA     Outcome Summary  pt sup<>sit with min-mod assist of 1, pt sat long sitting in bed without support of HOB ~5 minutes with SBA, returned to supine, pt sat EOB ~7 minutes with CGA-SBA. pt will require 24/7 care @ d/c   -TA     Row Name 08/12/21 1336          Vital Signs    Pre Systolic BP Rehab  123  -TA     Pre Treatment Diastolic BP  66  -TA     Post Systolic BP Rehab  114  -TA     Post Treatment Diastolic BP  63  -TA     Pretreatment Heart Rate (beats/min)  81  -TA     Posttreatment Heart Rate (beats/min)  77  -TA     Pre SpO2 (%)  97  -TA     O2 Delivery Pre Treatment  room air  -TA     Post SpO2 (%)  99  -TA     O2 Delivery Post Treatment  room air  -TA     Pre Patient Position  Supine  -TA     Intra Patient Position  Sitting  -TA     Post Patient Position  Supine  -TA     Row Name 08/12/21 1336          Bed Mobility Goal 1 (PT)    Activity/Assistive Device (Bed Mobility Goal 1, PT)  bed mobility activities, all  -TA     [place] Level/Cues Needed (Bed Mobility Goal 1, PT)  modified independence  -TA     Time Frame (Bed Mobility Goal 1, PT)  1 week  -TA     Progress/Outcomes (Bed Mobility Goal 1, PT)  goal not met  -TA     Row Name 08/12/21 1336          Transfer Goal 1 (PT)    Activity/Assistive Device (Transfer Goal 1, PT)  bed-to-chair/chair-to-bed  -TA     [place] Level/Cues Needed (Transfer Goal 1, PT)  modified independence  -TA     Time Frame (Transfer Goal 1, PT)  10 days  -TA     Progress/Outcome (Transfer Goal 1, PT)  goal not met  -TA     Row Name 08/12/21 1336          Gait Training Goal 1 (PT)    Activity/Assistive Device (Gait Training Goal 1, PT)   gait (walking locomotion);decrease fall risk  -TA     [place] Level (Gait Training Goal 1, PT)  modified independence  -TA     Distance (Gait Training Goal 1, PT)  50 ft or more  -TA     Time Frame (Gait Training Goal 1, PT)  10 days  -TA     Progress/Outcome (Gait Training Goal 1, PT)  goal not met  -TA     Row Name 08/12/21 1336          Stairs Goal 1 (PT)    Activity/Assistive Device (Stairs Goal 1, PT)  ascending stairs;descending stairs  -TA     [place] Level/Cues Needed (Stairs Goal 1, PT)  modified independence  -TA     Number of Stairs (Stairs Goal 1, PT)  1  -TA     Time Frame (Stairs Goal 1, PT)  2 weeks  -TA     Row Name 08/12/21 1336          Positioning and Restraints    Pre-Treatment Position  in bed  -TA     Post Treatment Position  bed  -TA     In Bed  supine;call light within reach;exit alarm on  -TA     Row Name 08/12/21 1336          Therapy Assessment/Plan (PT)    Rehab Potential (PT)  fair, will monitor progress closely  -TA     Criteria for Skilled Interventions Met (PT)  yes  -TA     Comment, Therapy Assessment/Plan (PT)  continue  -TA       User Key  (r) = Recorded By, (t) = Taken By, (c) = Cosigned By    Initials Name Provider Type    TA Maude Aguilar, PTA Physical Therapy Assistant        Physical Therapy Education                 Title: PT OT SLP Therapies (In Progress)     Topic: Physical Therapy (In Progress)     Point: Mobility training (In Progress)     Learning Progress Summary           Patient Acceptance, D,E, NR by GB at 8/7/2021 1007    Comment: POC; mobility                   Point: Home exercise program (In Progress)     Learning Progress Summary           Patient Acceptance, D,E, NR by GB at 8/7/2021 1007    Comment: POC; mobility                   Point: Body mechanics (In Progress)     Learning Progress Summary           Patient Acceptance, D,E, NR by GB at 8/7/2021 1007    Comment: POC; mobility                   Point: Precautions (In Progress)     Learning  Progress Summary           Patient Acceptance, D,E, NR by MAIA at 8/7/2021 1007    Comment: POC; mobility                               User Key     Initials Effective Dates Name Provider Type Discipline    MAIA 06/16/21 -  Rosina Martinez, PT Physical Therapist PT              PT Recommendation and Plan  Anticipated Discharge Disposition (PT): skilled nursing facility, home with 24/7 care, home with home health  Therapy Frequency (PT): other (see comments) (5-7 d/wk)  Plan of Care Reviewed With: patient  Outcome Summary: pt sup<>sit with min-mod assist of 1, pt sat long sitting in bed without support of HOB ~5 minutes with SBA, returned to supine, pt sat EOB ~7 minutes with CGA-SBA. pt will require 24/7 care @ d/c   Outcome Measures     Row Name 08/12/21 1500 08/11/21 1300 08/10/21 1300       How much help from another person do you currently need...    Turning from your back to your side while in flat bed without using bedrails?  2  -TA  2  -TA  2  -TA    Moving from lying on back to sitting on the side of a flat bed without bedrails?  2  -TA  2  -TA  2  -TA    Moving to and from a bed to a chair (including a wheelchair)?  2  -TA  2  -TA  2  -TA    Standing up from a chair using your arms (e.g., wheelchair, bedside chair)?  2  -TA  2  -TA  2  -TA    Climbing 3-5 steps with a railing?  1  -TA  1  -TA  1  -TA    To walk in hospital room?  1  -TA  1  -TA  1  -TA    AM-PAC 6 Clicks Score (PT)  10  -TA  10  -TA  10  -TA       Functional Assessment    Outcome Measure Options  AM-PAC 6 Clicks Basic Mobility (PT)  -TA  AM-PAC 6 Clicks Basic Mobility (PT)  -TA  AM-PAC 6 Clicks Basic Mobility (PT)  -TA      User Key  (r) = Recorded By, (t) = Taken By, (c) = Cosigned By    Initials Name Provider Type    Maude Marsh, PTA Physical Therapy Assistant           Time Calculation:   PT Charges     Row Name 08/12/21 1532             Time Calculation    Start Time  1336  -TA      Stop Time  1405  -TA      Time  Calculation (min)  29 min  -TA      PT Received On  08/12/21  -TA         Time Calculation- PT    Total Timed Code Minutes- PT  29 minute(s)  -TA         Timed Charges    96352 - PT Therapeutic Activity Minutes  29  -TA         Total Minutes    Timed Charges Total Minutes  29  -TA       Total Minutes  29  -TA        User Key  (r) = Recorded By, (t) = Taken By, (c) = Cosigned By    Initials Name Provider Type    Maude Marsh PTA Physical Therapy Assistant        Therapy Charges for Today     Code Description Service Date Service Provider Modifiers Qty    40970921830 HC PT THERAPEUTIC ACT EA 15 MIN 8/11/2021 Maude Aguilar PTA GP 3    09283788618 HC PT THERAPEUTIC ACT EA 15 MIN 8/12/2021 Maude Aguilar PTA GP 2          PT G-Codes  Outcome Measure Options: AM-PAC 6 Clicks Basic Mobility (PT)  AM-PAC 6 Clicks Score (PT): 10  AM-PAC 6 Clicks Score (OT): 11    Maude Aguilar PTA  8/12/2021

## 2021-08-12 NOTE — PLAN OF CARE
Goal Outcome Evaluation:  Plan of Care Reviewed With: patient        Progress: no change  Outcome Summary: vss; resting today; more talkative and arousable today; no new complaints; will continue to monitor

## 2021-08-12 NOTE — PLAN OF CARE
Problem: Adult Inpatient Plan of Care  Goal: Plan of Care Review  Flowsheets  Taken 8/12/2021 1315  Progress: no change  Plan of Care Reviewed With: patient  Taken 8/12/2021 0971  Progress: no change  Plan of Care Reviewed With: patient  Outcome Summary: Pt is Max A for UB bathing and dressing. Pt is dependent for LB ADL. Pt requires verbal cues for all tasks. Continue OT POC   Goal Outcome Evaluation:  Plan of Care Reviewed With: patient        Progress: no change  Outcome Summary: Pt is Max A for UB bathing and dressing. Pt is dependent for LB ADL. Pt requires verbal cues for all tasks. Continue OT POC

## 2021-08-12 NOTE — PLAN OF CARE
Goal Outcome Evaluation:  Plan of Care Reviewed With: patient           Outcome Summary: pt sup<>sit with min-mod assist of 1, pt sat long sitting in bed without support of HOB ~5 minutes with SBA, returned to supine, pt sat EOB ~7 minutes with CGA-SBA. pt will require 24/7 care @ d/c

## 2021-08-12 NOTE — PROGRESS NOTES
"      HISTORY OF PRESENT ILLNESS:   Patient is more alert today.  No bleeding or fever reported.        PAST MEDICAL HISTORY:    Past Medical History:   Diagnosis Date   • Bipolar disorder, unspecified (CMS/HCC)    • Depression    • Diabetes mellitus (CMS/HCC)    • GERD (gastroesophageal reflux disease)    • Hypertension    • Legal blindness    • Psychiatric illness    • Rheumatoid arthritis (CMS/HCC)    • Schizoaffective disorder (CMS/HCC)        SOCIAL HISTORY:    Social History     Tobacco Use   • Smoking status: Former Smoker   • Smokeless tobacco: Never Used   • Tobacco comment: \" a little bit a long time ago\"   Substance Use Topics   • Alcohol use: Not Currently   • Drug use: Never       Surgical History :  Past Surgical History:   Procedure Laterality Date   • COLONOSCOPY N/A 8/29/2020    Procedure: COLONOSCOPY;  Surgeon: Ge Gomran MD;  Location: [facility];  Service: Gastroenterology;  Laterality: N/A;   • ENDOSCOPY N/A 8/28/2020    Procedure: ESOPHAGOGASTRODUODENOSCOPY;  Surgeon: Ge Gorman MD;  Location: [facility];  Service: Gastroenterology;  Laterality: N/A;       ALLERGIES:    No Known Allergies      FAMILY HISTORY:  Family History   Problem Relation Age of Onset   • Dementia Mother    • No Known Problems Father    • No Known Problems Sister    • No Known Problems Brother    • No Known Problems Maternal Aunt    • No Known Problems Paternal Aunt    • No Known Problems Maternal Uncle    • No Known Problems Paternal Uncle    • No Known Problems Maternal Grandfather    • No Known Problems Maternal Grandmother    • No Known Problems Paternal Grandfather    • No Known Problems Paternal Grandmother    • No Known Problems Cousin    • No Known Problems Other    • Suicide Attempts Neg Hx    • ADD / ADHD Neg Hx    • Alcohol abuse Neg Hx    • Anxiety disorder Neg Hx    • Bipolar disorder Neg Hx    • Depression Neg Hx    • Drug abuse Neg Hx    • OCD Neg Hx    • Paranoid behavior Neg Hx    • " "Schizophrenia Neg Hx    • Seizures Neg Hx    • Self-Injurious Behavior  Neg Hx            REVIEW OF SYSTEMS:      Unable to perform due to psychiatric illness      PHYSICAL EXAMINATION:      VITAL SIGNS:  /57 (BP Location: Right arm, Patient Position: Lying)   Pulse 83   Temp 96.5 °F (35.8 °C) (Axillary)   Resp 18   Ht 160 cm (63\")   Wt 61.3 kg (135 lb 3.2 oz)   LMP  (LMP Unknown)   SpO2 97%   BMI 23.95 kg/m²       08/08/21  0608 08/10/21  0600 08/12/21  0500   Weight: 59.3 kg (130 lb 12.8 oz) 58.7 kg (129 lb 6.4 oz) 61.3 kg (135 lb 3.2 oz)             CONSTITUTIONAL:  Not in any distress.    EYES: Mild conjunctival Pallor. No Icterus. No Pterygium.No ptosis.    ENMT:  Normocephalic, Atraumatic.No Facial Asymmetry noted.    NECK:  No adenopathy.Trachea midline.     RESPIRATORY:  Fair air entry bilateral. No rhonchi or wheezing.Fair respiratory effort.    CARDIOVASCULAR:  S1, S2. Regular rate and rhythm. No murmur or gallop appreciated.    ABDOMEN:  Soft,  nontender. Bowel sounds present in all four quadrants.  No Hepatosplenomegaly appreciated.    MUSCULOSKELETAL:  No edema.No Calf Tenderness.Decreased range of motion.    NEUROLOGIC:    No  Motor  deficit appreciated.     SKIN : No new skin lesion identified. Skin is warm and moist to touch.    LYMPHATICS: No new enlarged lymph nodes in neck or supraclavicular area.          DIAGNOSTIC DATA:    Glucose   Date Value Ref Range Status   08/11/2021 104 (H) 65 - 99 mg/dL Final     Sodium   Date Value Ref Range Status   08/11/2021 142 136 - 145 mmol/L Final     Potassium   Date Value Ref Range Status   08/11/2021 3.7 3.5 - 5.2 mmol/L Final     CO2   Date Value Ref Range Status   08/11/2021 25.0 22.0 - 29.0 mmol/L Final     Chloride   Date Value Ref Range Status   08/11/2021 111 (H) 98 - 107 mmol/L Final     Anion Gap   Date Value Ref Range Status   08/11/2021 6.0 5.0 - 15.0 mmol/L Final     Creatinine   Date Value Ref Range Status   08/11/2021 0.73 0.57 - " 1.00 mg/dL Final     BUN   Date Value Ref Range Status   08/11/2021 8 8 - 23 mg/dL Final     BUN/Creatinine Ratio   Date Value Ref Range Status   08/11/2021 11.0 7.0 - 25.0 Final     Calcium   Date Value Ref Range Status   08/11/2021 9.1 8.6 - 10.5 mg/dL Final     Alkaline Phosphatase   Date Value Ref Range Status   08/11/2021 72 39 - 117 U/L Final     Total Protein   Date Value Ref Range Status   08/11/2021 6.0 6.0 - 8.5 g/dL Final     ALT (SGPT)   Date Value Ref Range Status   08/11/2021 112 (H) 1 - 33 U/L Final     AST (SGOT)   Date Value Ref Range Status   08/11/2021 133 (H) 1 - 32 U/L Final     Total Bilirubin   Date Value Ref Range Status   08/11/2021 1.0 0.0 - 1.2 mg/dL Final     Albumin   Date Value Ref Range Status   08/11/2021 1.80 (L) 3.50 - 5.20 g/dL Final     Globulin   Date Value Ref Range Status   08/11/2021 4.2 gm/dL Final     Lab Results   Component Value Date    WBC 4.59 08/11/2021    HGB 9.9 (L) 08/11/2021    HCT 28.2 (L) 08/11/2021    MCV 96.9 08/11/2021    PLT 48 (C) 08/11/2021     Lab Results   Component Value Date    NEUTROABS 2.28 08/11/2021    IRON 112 08/09/2021    TIBC 176 (L) 08/09/2021    LABIRON 64 (H) 08/09/2021    FERRITIN 351.70 (H) 08/09/2021    RPBIBMRH78 1,716 (H) 08/09/2021    FOLATE 16.80 08/09/2021     No results found for: , LABCA2, AFPTM, HCGQUANT, , CHROMGRNA, [id number], CEA, REFLABREPO    Component      Latest Ref Rng & Units 8/28/2020 1/15/2021 8/9/2021 8/10/2021   Protime      11.1 - 15.3 Seconds 18.2 (H) 17.2 (H) 21.1 (H) 19.9 (H)   INR      0.80 - 1.20 1.43 (H) 1.33 (H) 1.87 (H) 1.73 (H)     Component      Latest Ref Rng & Units 8/11/2021   Protime      11.1 - 15.3 Seconds 19.6 (H)   INR      0.80 - 1.20 1.70 (H)         Peripheral blood flow cytometry done on August 10, 2021 showed:           PATHOLOGY:  * Cannot find OR log *         RADIOLOGY DATA :  EEG    Result Date: 8/8/2021  EEG background is moderate generalized slow Generalized triphasic waves are  present No electrographic seizures are seen Note-triphasic waves on a background of generalized slowing are typically seen in the setting of toxic/metabolic encephalopathies, but may also be seen with cefepime exposure This report is transcribed using the Dragon dictation system.      CT Head Without Contrast    Result Date: 8/6/2021  CONCLUSION: No acute intracranial process. Cerebral and cerebellar atrophy. Minimal small vessel disease. Minimal fluid left mastoid air cells. [id number] Electronically signed by:  Walyon Larkin MD  8/6/2021 6:48 PM CDT Workstation: [id number]    US Abdomen Complete    Result Date: 8/9/2021  Heterogeneous echotexture within the liver with macro lobular margins consistent with cirrhosis there is color-flow in the portal and hepatic veins. Small amount of ascites around the liver. Contracted gallbladder with thick wall with no pericholecystic fluid. 5 mm calculus in the gallbladder. Small amount of fluid around portion of the right kidney about 1 to 2 mm thick. [id number] Electronically signed by:  Frank Maradiaga MD  8/9/2021 9:42 AM CDT Workstation: [id number]    XR Chest 1 View    Result Date: 8/6/2021  CONCLUSION: No Acute Disease [id number] Electronically signed by:  Waylon Larkin MD  8/6/2021 4:57 PM CDT Workstation: [id number]        ASSESSMENT AND PLAN:      1. Thrombocytopenia:  -Most recent platelet count is 88,000.  Days likely secondary to cirrhosis of liver plus or minus bone marrow pathology  -Peripheral blood flow cytometry done on August 10, 2021 does not show any evidence of leukemia or lymphoma or any increase in blast.  -Recommend continue with clinical monitoring for now  -Recommend transfusing platelet if platelet count is less than 15,000 or any active bleeding        2.  Anemia:  -Hemoglobin is 9.9  -Patient was on ferrous sulfate until this admission and iron saturation was found to be elevated.  Does have a history of GI bleeding in the past.  -Flow cytometry done on  August 10, 2021 does not show any increase in blast or any immunophenotypic evidence suggestive of leukemia or lymphoma  -Recommend transfusing as needed if hemoglobin is less than 7    3.  Elevated iron level:  -Iron supplementation has been discontinued.    4.  Elevated liver function test:  -Continue with management as per Dr Gorman      5.  Coagulopathy:  -Secondary to liver disease  -Patient received vitamin K 10 mg IV x1 on August 9, 2021 and August 10, 2021  -INR is not showing any significant improvement due to poor synthetic liver function.  -Recommend transfusing FFP if any active bleeding.      Terry Bills MD  8/12/2021  17:55 CDT        Part of this note may be an electronic transcription/translation of spoken language to printed text using the Dragon Dictation System.

## 2021-08-12 NOTE — PLAN OF CARE
Goal Outcome Evaluation:  Plan of Care Reviewed With: patient        Progress: improving  Outcome Summary: VVS. Pt alert and ate most of her dinner. No s/s of distress noted. Will continue to monitor.

## 2021-08-12 NOTE — PROGRESS NOTES
"Psychiatry progress note  August 10, 2021    Chief complaint: Schizophrenia in setting of altered mental status    Subjective:  71-year-old female seen in her room.  She remains sluggish with her mentation and disorganized. Patient has difficulty with 3 step commands and is ultimately unable to follow.    Pleasant.  No behaivoral issues.      Review of systems is negative for headache or stomachache or muscle aches.  Positive for chronic blindness.      O:  /69 (BP Location: Left arm, Patient Position: Lying)   Pulse 82   Temp 96.1 °F (35.6 °C) (Oral)   Resp 18   Ht 160 cm (63\")   Wt 58.7 kg (129 lb 6.4 oz)   LMP  (LMP Unknown)   SpO2 96%   BMI 22.92 kg/m²     PE:  In no acute distress.  Breathing is unlabored.  No tremor or noted atrophy.  Gait is deferred.    MSE: 71-year-old female appearing older than biological age.  No eye contact but in the context of blindness.  Psychomotor slowing appreciated.  Affect is blunted.  Thought processing is disorganized and concrete.  Insight and judgment are limited.    Labs reviewed:  Elevated NH3 downtrending  Plt increased to 44        Assessment:     Altered mental status, unspecified    Schizophrenia, paranoid type (CMS/HCC)    DMII (diabetes mellitus, type 2) (CMS/HCC)    Hypotension    Lactic acidosis    Thrombocytopenia (CMS/HCC)      Plan:  --Cont Abilify 12.5 mg daily.  We will plan further titration as needed with likely ultimate target dose in the 15 to 25 mg daily range.  -Plan transition to [facility] for further behavioral stabilization after medical clearance.    --Please contact me with any questions or issues.  --D/W RN staff.    Tyrel Anderson II, MD  08/11/21        "

## 2021-08-12 NOTE — THERAPY TREATMENT NOTE
Patient Name: Natacha Gandhi  : 1950    MRN: [id number]                              Today's Date: 2021       Admit Date: 2021    Visit Dx:     ICD-10-CM ICD-9-CM   1. Hypotension, unspecified hypotension type  I95.9 458.9   2. Altered mental status, unspecified altered mental status type  R41.82 780.97   3. Impaired functional mobility, balance, gait, and endurance  Z74.09 V49.89   4. Oropharyngeal dysphagia  R13.12 787.22   5. Impaired mobility and activities of daily living  Z74.09 V49.89    Z78.9      Patient Active Problem List   Diagnosis   • Acute GI bleeding   • Altered mental status, unspecified   • Schizophrenia, paranoid type (CMS/HCC)   • Acute exacerbation of chronic paranoid schizophrenia (CMS/HCC)   • Acute blood loss anemia   • DMII (diabetes mellitus, type 2) (CMS/HCC)   • Hypotension   • Lactic acidosis   • Thrombocytopenia (CMS/HCC)   • Encephalopathy, hepatic (CMS/HCC)     Past Medical History:   Diagnosis Date   • Bipolar disorder, unspecified (CMS/HCC)    • Depression    • Diabetes mellitus (CMS/HCC)    • GERD (gastroesophageal reflux disease)    • Hypertension    • Legal blindness    • Psychiatric illness    • Rheumatoid arthritis (CMS/HCC)    • Schizoaffective disorder (CMS/HCC)      Past Surgical History:   Procedure Laterality Date   • COLONOSCOPY N/A 2020    Procedure: COLONOSCOPY;  Surgeon: Ge Gorman MD;  Location: [facility];  Service: Gastroenterology;  Laterality: N/A;   • ENDOSCOPY N/A 2020    Procedure: ESOPHAGOGASTRODUODENOSCOPY;  Surgeon: Ge Gorman MD;  Location: [facility];  Service: Gastroenterology;  Laterality: N/A;     General Information     Row Name 21 1135 21 0925       OT Time and Intention    Document Type  therapy note (daily note)  -BB  therapy note (daily note)  -BB    Mode of Treatment  individual therapy;occupational therapy  -BB  individual therapy;occupational therapy  -BB    Row Name 21 1135  21       General Information    Patient Profile Reviewed  yes  -BB  yes  -BB    Existing Precautions/Restrictions  fall appears to have herniated vaginal tissue per RN when wiping for toilet hygiend  -BB  fall appears to have herniated vaginal tissue per RN when wiping for toilet hygiend  -BB    Row Name 21 1135 21       Cognition    Orientation Status (Cognition)  oriented to;person   -BB  oriented to;person   -BB      User Key  (r) = Recorded By, (t) = Taken By, (c) = Cosigned By    Initials Name Provider Type    BB Mare Reed COTA/L Occupational Therapy Assistant          Mobility/ADL's     Row Name 21 1135 21       Bed Mobility    Bed Mobility  bed mobility (all) activities;supine-sit;sit-supine;scooting/bridging  -BB  bed mobility (all) activities;supine-sit;sit-supine;scooting/bridging  -BB    All Activities, [place] (Bed Mobility)  not tested  -BB  not tested  -BB    Scooting/Bridging [place] (Bed Mobility)  dependent (less than 25% patient effort);2 person assist >HOB  -BB  dependent (less than 25% patient effort);2 person assist >HOB  -BB    Supine-Sit [place] (Bed Mobility)  moderate assist (50% patient effort)  -BB  not tested  -BB    Sit-Supine [place] (Bed Mobility)  moderate assist (50% patient effort)  -BB  not tested  -BB    Assistive Device (Bed Mobility)  bed rails;draw sheet;head of bed elevated  -BB  bed rails;draw sheet;head of bed elevated  -BB    Row Name 215 21       Transfers    Sit-Stand [place] (Transfers)  not tested  -BB  not tested  -BB    [place] Level (Toilet Transfer)  not tested  -BB  not tested  -BB    Row Name 21          Activities of Daily Living    BADL Assessment/Intervention  bathing;upper body dressing;lower body dressing;grooming  -BB     Row Name 21          Grooming Assessment/Training    [place] Level (Grooming)  wash face,  hands;moderate assist (50% patient effort);verbal cues;nonverbal cues (demo/gesture)  -BB     Position (Grooming)  sitting up in bed  -BB     Row Name 08/12/21 1135 08/12/21 0925       Upper Body Dressing Assessment/Training    [place] Level (Upper Body Dressing)  -- HG  -BB  doff;don;maximum assist (25% patient effort) HG  -BB    Position (Upper Body Dressing)  --  sitting up in bed  -BB    Row Name 08/12/21 0925          Bathing Assessment/Intervention    [place] Level (Bathing)  lower body;dependent (less than 25% patient effort);upper body;maximum assist (25% patient effort)  -BB     Position (Bathing)  sitting up in bed  -BB     Row Name 08/12/21 0925          Lower Body Dressing Assessment/Training    [place] Level (Lower Body Dressing)  doff;don;socks;dependent (less than 25% patient effort)  -BB     Position (Lower Body Dressing)  sitting up in bed  -BB       User Key  (r) = Recorded By, (t) = Taken By, (c) = Cosigned By    Initials Name Provider Type    BB Mare Reed COTA/L Occupational Therapy Assistant        Obj/Interventions     Row Name 08/12/21 1135 08/12/21 0925       Range of Motion Comprehensive    General Range of Motion  bilateral lower extremity ROM WFL  -BB  bilateral lower extremity ROM WFL  -BB    Row Name 08/12/21 1135 08/12/21 0925       Strength Comprehensive (MMT)    General Manual Muscle Testing (MMT) Assessment  upper extremity strength deficits identified  -BB  upper extremity strength deficits identified  -BB    Row Name 08/12/21 1135          Shoulder (Therapeutic Exercise)    Shoulder AAROM (Therapeutic Exercise)  bilateral;flexion;extension;horizontal aBduction/aDduction 1x15  -BB     Row Name 08/12/21 1135          Elbow/Forearm (Therapeutic Exercise)    Elbow/Forearm AAROM (Therapeutic Exercise)  bilateral;flexion;extension;supination;pronation 1x15  -BB     Row Name 08/12/21 1135          Wrist (Therapeutic Exercise)    Wrist AAROM (Therapeutic Exercise)   bilateral;flexion;extension 1x15  -BB     Row Name 08/12/21 1135          Hand (Therapeutic Exercise)    Hand PROM (Therapeutic Exercise)  bilateral;finger flexion;finger extension 1x15  -BB       User Key  (r) = Recorded By, (t) = Taken By, (c) = Cosigned By    Initials Name Provider Type    Mare Westbrook COTA/L Occupational Therapy Assistant        Goals/Plan     Row Name 08/12/21 1135 08/12/21 0925       Transfer Goal 1 (OT)    Activity/Assistive Device (Transfer Goal 1, OT)  transfers, all  -BB  transfers, all  -BB    [place] Level/Cues Needed (Transfer Goal 1, OT)  modified independence  -BB  modified independence  -BB    Time Frame (Transfer Goal 1, OT)  long term goal (LTG)  -BB  long term goal (LTG)  -BB    Progress/Outcome (Transfer Goal 1, OT)  goal not met  -BB  goal not met  -BB    Row Name 08/12/21 1135 08/12/21 0925       Bathing Goal 1 (OT)    Activity/Device (Bathing Goal 1, OT)  bathing skills, all  -BB  bathing skills, all  -BB    [place] Level/Cues Needed (Bathing Goal 1, OT)  modified independence  -BB  modified independence  -BB    Time Frame (Bathing Goal 1, OT)  long term goal (LTG)  -BB  long term goal (LTG)  -BB    Progress/Outcomes (Bathing Goal 1, OT)  goal not met  -BB  goal not met  -BB    Row Name 08/12/21 1135 08/12/21 0925       Dressing Goal 1 (OT)    Activity/Device (Dressing Goal 1, OT)  dressing skills, all  -BB  dressing skills, all  -BB    [place]/Cues Needed (Dressing Goal 1, OT)  modified independence  -BB  modified independence  -BB    Time Frame (Dressing Goal 1, OT)  long term goal (LTG)  -BB  long term goal (LTG)  -BB    Progress/Outcome (Dressing Goal 1, OT)  goal not met  -BB  goal not met  -BB    Row Name 08/12/21 1135 08/12/21 0925       Toileting Goal 1 (OT)    Activity/Device (Toileting Goal 1, OT)  toileting skills, all  -BB  toileting skills, all  -BB    [place] Level/Cues Needed (Toileting Goal 1, OT)  modified independence  -BB   modified independence  -BB    Time Frame (Toileting Goal 1, OT)  long term goal (LTG)  -BB  long term goal (LTG)  -BB    Progress/Outcome (Toileting Goal 1, OT)  goal not met  -BB  goal not met  -BB    Row Name 08/12/21 1135 08/12/21 0925       Strength Goal 1 (OT)    Strength Goal 1 (OT)  Increase UE str by 1/2 grade to increase independence with functional mobility.  -BB  Increase UE str by 1/2 grade to increase independence with functional mobility.  -BB    Time Frame (Strength Goal 1, OT)  long term goal (LTG)  -BB  long term goal (LTG)  -BB    Progress/Outcome (Strength Goal 1, OT)  goal not met  -BB  goal not met  -BB      User Key  (r) = Recorded By, (t) = Taken By, (c) = Cosigned By    Initials Name Provider Type    Mare Westbrook COTA/L Occupational Therapy Assistant        Clinical Impression     Row Name 08/12/21 1135 08/12/21 0925       Pain Scale: FACES Pre/Post-Treatment    Pain: FACES Scale, Pretreatment  0-->no hurt  -BB  0-->no hurt  -BB    Posttreatment Pain Rating  0-->no hurt  -BB  0-->no hurt  -BB    Row Name 08/12/21 0925          Plan of Care Review    Plan of Care Reviewed With  patient  -BB     Progress  no change  -BB     Outcome Summary  Pt is Max A for UB bathing and dressing. Pt is dependent for LB ADL. Pt requires verbal cues for all tasks. Continue OT POC  -BB     Row Name 08/12/21 1135 08/12/21 0925       Therapy Assessment/Plan (OT)    Rehab Potential (OT)  good, to achieve stated therapy goals  -BB  good, to achieve stated therapy goals  -BB    Criteria for Skilled Therapeutic Interventions Met (OT)  yes;meets criteria  -BB  yes;meets criteria  -BB    Therapy Frequency (OT)  other (see comments) 5-7 days/wk  -BB  other (see comments) 5-7 days/wk  -BB    Row Name 08/12/21 1135 08/12/21 0925       Therapy Plan Review/Discharge Plan (OT)    Anticipated Discharge Disposition (OT)  skilled nursing facility  -BB  skilled nursing facility  -BB    Row Name 08/12/21 1135 08/12/21  0925       Vital Signs    Pretreatment Heart Rate (beats/min)  --  88  -BB    Pre SpO2 (%)  --  97  -BB    O2 Delivery Pre Treatment  --  room air  -BB    Pre Patient Position  Supine  -BB  Supine  -BB    Row Name 08/12/21 1135 08/12/21 0925       Positioning and Restraints    Pre-Treatment Position  in bed  -BB  in bed  -BB    Post Treatment Position  bed  -BB  bed  -BB    In Bed  fowlers;call light within reach;encouraged to call for assist;exit alarm on  -BB  fowlers;call light within reach;encouraged to call for assist;exit alarm on  -BB      User Key  (r) = Recorded By, (t) = Taken By, (c) = Cosigned By    Initials Name Provider Type    Mare Westbrook COTA/L Occupational Therapy Assistant        Outcome Measures     Row Name 08/12/21 0925          How much help from another is currently needed...    Putting on and taking off regular lower body clothing?  1  -BB     Bathing (including washing, rinsing, and drying)  2  -BB     Toileting (which includes using toilet bed pan or urinal)  2  -BB     Putting on and taking off regular upper body clothing  2  -BB     Taking care of personal grooming (such as brushing teeth)  2  -BB     Eating meals  2  -BB     AM-PAC 6 Clicks Score (OT)  11  -BB       User Key  (r) = Recorded By, (t) = Taken By, (c) = Cosigned By    Initials Name Provider Type    Mare Westbrook COTA/L Occupational Therapy Assistant          Occupational Therapy Education                 Title: PT OT SLP Therapies (In Progress)     Topic: Occupational Therapy (In Progress)     Point: ADL training (In Progress)     Description:   Instruct learner(s) on proper safety adaptation and remediation techniques during self care or transfers.   Instruct in proper use of assistive devices.              Learning Progress Summary           Patient Acceptance, E, NR by BB at 8/12/2021 1315    Acceptance, E, NR by BB at 8/10/2021 1329                   Point: Home exercise program (In Progress)      Description:   Instruct learner(s) on appropriate technique for monitoring, assisting and/or progressing therapeutic exercises/activities.              Learning Progress Summary           Patient Acceptance, E,D, NR by RB at 8/9/2021 0956    Comment: Attempted B UE HEP while sitting EOB with minimal understanding.                   Point: Precautions (Done)     Description:   Instruct learner(s) on prescribed precautions during self-care and functional transfers.              Learning Progress Summary           Patient Acceptance, E, VU by RB at 8/7/2021 1406    Comment: Edu pt on use of gait belt and non skid socks when OOB and no OOB without assist.                   Point: Body mechanics (In Progress)     Description:   Instruct learner(s) on proper positioning and spine alignment during self-care, functional mobility activities and/or exercises.              Learning Progress Summary           Patient Acceptance, E, NR by BB at 8/12/2021 1315    Acceptance, E, NR by BB at 8/11/2021 1435                               User Key     Initials Effective Dates Name Provider Type Discipline     06/16/21 -  Tyrel Lopez OT Occupational Therapist OT     06/16/21 -  Mare Reed COTA/L Occupational Therapy Assistant OT              OT Recommendation and Plan  Therapy Frequency (OT): other (see comments) (5-7 days/wk)  Plan of Care Review  Plan of Care Reviewed With: patient  Progress: no change  Outcome Summary: Pt is Max A for UB bathing and dressing. Pt is dependent for LB ADL. Pt requires verbal cues for all tasks. Continue OT POC     Time Calculation:   Time Calculation- OT     Row Name 08/12/21 1342 08/12/21 1318          Time Calculation- OT    OT Start Time  1135  -BB  0925  -BB     OT Stop Time  1205  -BB  1004  -BB     OT Time Calculation (min)  30 min  -BB  39 min  -BB     Total Timed Code Minutes- OT  30 minute(s)  -BB  39 minute(s)  -BB     OT Received On  08/12/21  -BB  08/12/21  -BB        Timed  Charges    57493 - OT Therapeutic Exercise Minutes  30  -BB  --     63942 - OT Self Care/Mgmt Minutes  --  39  -BB        Total Minutes    Timed Charges Total Minutes  30  -BB  39  -BB      Total Minutes  30  -BB  39  -BB       User Key  (r) = Recorded By, (t) = Taken By, (c) = Cosigned By    Initials Name Provider Type    Mare Westbrook GARCIA/L Occupational Therapy Assistant        Therapy Charges for Today     Code Description Service Date Service Provider Modifiers Qty    02102592572 HC OT THER PROC EA 15 MIN 8/11/2021 Mare Reed COTA/L GO 2    01804682532 HC OT SELF CARE/MGMT/TRAIN EA 15 MIN 8/11/2021 Mare Reed GARCIA/L GO 1    13279737187 HC OT SELF CARE/MGMT/TRAIN EA 15 MIN 8/12/2021 Mare Reed GARCIA/L GO 3    44192738514 HC OT THER PROC EA 15 MIN 8/12/2021 Mare Reed GARCIA/L GO 2               JOSE Lares/[place]  8/12/2021

## 2021-08-12 NOTE — PROGRESS NOTES
"        SUBJECTIVE:   8/12/2021  Chief Complaint:     Subjective      Patient feels better today. More awake and alert. Tolerating diet. No GI complaints at this time.  Ammonia improved to 55.     History:  Past Medical History:   Diagnosis Date   • Bipolar disorder, unspecified (CMS/HCC)    • Depression    • Diabetes mellitus (CMS/HCC)    • GERD (gastroesophageal reflux disease)    • Hypertension    • Legal blindness    • Psychiatric illness    • Rheumatoid arthritis (CMS/HCC)    • Schizoaffective disorder (CMS/HCC)      Past Surgical History:   Procedure Laterality Date   • COLONOSCOPY N/A 8/29/2020    Procedure: COLONOSCOPY;  Surgeon: Ge Gorman MD;  Location: [facility];  Service: Gastroenterology;  Laterality: N/A;   • ENDOSCOPY N/A 8/28/2020    Procedure: ESOPHAGOGASTRODUODENOSCOPY;  Surgeon: Ge Gorman MD;  Location: [facility];  Service: Gastroenterology;  Laterality: N/A;     Family History   Problem Relation Age of Onset   • Dementia Mother    • No Known Problems Father    • No Known Problems Sister    • No Known Problems Brother    • No Known Problems Maternal Aunt    • No Known Problems Paternal Aunt    • No Known Problems Maternal Uncle    • No Known Problems Paternal Uncle    • No Known Problems Maternal Grandfather    • No Known Problems Maternal Grandmother    • No Known Problems Paternal Grandfather    • No Known Problems Paternal Grandmother    • No Known Problems Cousin    • No Known Problems Other    • Suicide Attempts Neg Hx    • ADD / ADHD Neg Hx    • Alcohol abuse Neg Hx    • Anxiety disorder Neg Hx    • Bipolar disorder Neg Hx    • Depression Neg Hx    • Drug abuse Neg Hx    • OCD Neg Hx    • Paranoid behavior Neg Hx    • Schizophrenia Neg Hx    • Seizures Neg Hx    • Self-Injurious Behavior  Neg Hx      Social History     Tobacco Use   • Smoking status: Former Smoker   • Smokeless tobacco: Never Used   • Tobacco comment: \" a little bit a long time ago\"   Substance Use Topics "   • Alcohol use: Not Currently   • Drug use: Never     Medications Prior to Admission   Medication Sig Dispense Refill Last Dose   • Emollient (BAG BALM EX) Apply  topically 2 (two) times a day.      • escitalopram (LEXAPRO) 10 MG tablet Take 1 tablet by mouth Daily. Indications: Major Depressive Disorder 30 tablet 0    • ferrous sulfate (FerrouSul) 325 (65 FE) MG tablet Take 1 tablet by mouth Daily With Breakfast. Indications: Anemia From Inadequate Iron in the Body 30 tablet 0    • Menthol-Zinc Oxide (Calmoseptine) 0.44-20.6 % ointment Apply  topically to the appropriate area as directed Every 12 (Twelve) Hours.      • metFORMIN (GLUCOPHAGE) 500 MG tablet Take 500 mg by mouth Daily.      • multivitamin with minerals (MULTIVITAMIN ADULT PO) Take 1 tablet by mouth Daily.      • ondansetron ODT (ZOFRAN-ODT) 4 MG disintegrating tablet Place 1 tablet on the tongue Every 6 (Six) Hours As Needed for Nausea or Vomiting. 10 tablet 0    • pantoprazole (PROTONIX) 40 MG EC tablet Take 1 tablet by mouth Daily. Indications: Gastroesophageal Reflux Disease 30 tablet 0    • risperiDONE (risperDAL) 3 MG tablet Take 1 tablet by mouth Daily With Dinner. STOP SEROQUEL (QUETIAPINE)  Indications: Schizophrenia 30 tablet 0    • tuberculin (Tubersol) 5 UNIT/0.1ML injection Inject 5 Units into the appropriate area of the skin as directed by provider 1 (One) Time.      • cephalexin (KEFLEX) 500 MG capsule Take 500 mg by mouth 3 (Three) Times a Day.      • Cholecalciferol 125 MCG (5000 UT) tablet Take 5,000 Units by mouth Daily.        Allergies:  Patient has no known allergies.     CURRENT MEDICATIONS/OBJECTIVE/VS/PE:     Current Medications:     Current Facility-Administered Medications   Medication Dose Route Frequency Provider Last Rate Last Admin   • ARIPiprazole (ABILIFY) half tablet 12.5 mg  12.5 mg Oral Daily With Dinner Tyrel Anderson II, MD   12.5 mg at 08/11/21 2116   • calcium carbonate (TUMS) chewable tablet 500 mg (200  mg elemental)  2 tablet Oral BID PRN Karan Tsai MD       • cholecalciferol (VITAMIN D3) tablet 5,000 Units  5,000 Units Oral Daily Karan Tsai MD   5,000 Units at 08/12/21 0921   • dextrose (D50W) 25 g/ 50mL Intravenous Solution 25 g  25 g Intravenous Q15 Min PRN Karan Tsai MD       • dextrose (GLUTOSE) oral gel 15 g  15 g Oral Q15 Min PRN Karan Tsai MD       • escitalopram (LEXAPRO) tablet 10 mg  10 mg Oral Daily Karan Tsai MD   10 mg at 08/12/21 0921   • glucagon (human recombinant) (GLUCAGEN DIAGNOSTIC) injection 1 mg  1 mg Subcutaneous Q15 Min PRN Karan Tsai MD       • insulin aspart (novoLOG) injection 0-7 Units  0-7 Units Subcutaneous TID AC Karan Tsai MD       • lactulose (CHRONULAC) 10 GM/15ML solution 30 g  30 g Oral TID Ge Gorman MD   30 g at 08/12/21 0922   • Magnesium Sulfate 2 gram Bolus, followed by 8 gram infusion (total Mg dose 10 grams)- Mg less than or equal to 1mg/dL  2 g Intravenous PRN Karan Tsai MD        Or   • Magnesium Sulfate 2 gram / 50mL Infusion (GIVE X 3 BAGS TO EQUAL 6GM TOTAL DOSE) - Mg 1.1 - 1.5 mg/dl  2 g Intravenous PRN Karan Tsai MD        Or   • Magnesium Sulfate 4 gram infusion- Mg 1.6-1.9 mg/dL  4 g Intravenous PRN Karan Tsai MD 25 mL/hr at 08/07/21 0121 4 g at 08/07/21 0121   • multivitamin with minerals 1 tablet  1 tablet Oral Daily Karan Tsai MD   1 tablet at 08/12/21 0922   • ondansetron (ZOFRAN) tablet 4 mg  4 mg Oral Q6H PRN Karan Tsai MD        Or   • ondansetron (ZOFRAN) injection 4 mg  4 mg Intravenous Q6H PRN Karan Tsai MD       • pantoprazole (PROTONIX) EC tablet 40 mg  40 mg Oral Daily Karan Tsai MD   40 mg at 08/12/21 0922   • potassium chloride (MICRO-K) CR capsule 40 mEq  40 mEq Oral PRN Karan Tsai MD        Or   • potassium chloride (KLOR-CON) packet 40 mEq  40 mEq Oral PRN Karan Tsai MD        Or   • potassium chloride 10 mEq in 100 mL IVPB  10 mEq Intravenous Q1H PRN Eulalio,  Karan BOYLE  mL/hr at 08/07/21 0433 10 mEq at 08/07/21 0433   • sodium chloride 0.9 % flush 10 mL  10 mL Intravenous Q12H Karan Tsai MD   10 mL at 08/12/21 0922   • sodium chloride 0.9 % flush 10 mL  10 mL Intravenous PRN Karan Tsai MD           Objective     Review of Systems:   Review of Systems    Physical Exam:   Temp:  [96.5 °F (35.8 °C)-97.8 °F (36.6 °C)] 96.5 °F (35.8 °C)  Heart Rate:  [73-94] 83  Resp:  [18] 18  BP: (106-129)/(57-67) 106/57     Physical Exam:  General Appearance:    Alert, cooperative, in no acute distress   Head:    Normocephalic, without obvious abnormality, atraumatic   Eyes:            Lids and lashes normal, conjunctivae and sclerae normal, no   icterus, no pallor, corneas clear, PERRLA   Ears:    Ears appear intact with no abnormalities noted   Throat:   No oral lesions, no thrush, oral mucosa moist   Neck:   No adenopathy, supple, trachea midline, no thyromegaly, no     carotid bruit, no JVD   Back:     No kyphosis present, no scoliosis present, no skin lesions,       erythema or scars, no tenderness to percussion or                   palpation,   range of motion normal   Lungs:     Clear to auscultation,respirations regular, even and                   unlabored    Heart:    Regular rhythm and normal rate, normal S1 and S2, no            murmur, no gallop, no rub, no click   Breast Exam:    Deferred   Abdomen:     Normal bowel sounds, no masses, no organomegaly, soft        nontender, nondistended, no guarding, no rebound                 tenderness   Genitalia:    Deferred   Extremities:   Moves all extremities well, no edema, no cyanosis, no              redness   Pulses:   Pulses palpable and equal bilaterally   Skin:   No bleeding, bruising or rash   Lymph nodes:   No palpable adenopathy   Neurologic:   Cranial nerves 2 - 12 grossly intact, sensation intact, DTR        present and equal bilaterally      Results Review:     Lab Results (last 24 hours)     Procedure  Component Value Units Date/Time    Extra Tubes [[id number]] Collected: 08/12/21 0716    Specimen: Blood, Venous Line Updated: 08/12/21 1116    Narrative:      The following orders were created for panel order Extra Tubes.  Procedure                               Abnormality         Status                     ---------                               -----------         ------                     Green Top (Gel)[[id number]]                                  Final result                 Please view results for these tests on the individual orders.    Green Top (Gel) [[id number]] Collected: 08/12/21 0716    Specimen: Blood Updated: 08/12/21 1116     Extra Tube Hold for add-ons.     Comment: Auto resulted.       POC Glucose Once [[id number]]  (Normal) Collected: 08/12/21 1009    Specimen: Blood Updated: 08/12/21 1047     Glucose 116 mg/dL      Comment: RN NotifiedOperator: [id number] AJ Shrestha ID: [id number]       Flow Cytometry, Blood [[id number]] Collected: 08/10/21 0655    Specimen: Blood Updated: 08/12/21 0808     Reference Lab Report --    Hepatitis B Core Antibody, IgM [[id number]]  (Normal) Collected: 08/12/21 0716    Specimen: Blood Updated: 08/12/21 0803     Hep B C IgM Non-Reactive    Narrative:      Results may be falsely decreased if patient taking Biotin.      Ammonia [[id number]]  (Normal) Collected: 08/12/21 0716    Specimen: Blood Updated: 08/12/21 0757     Ammonia 48 umol/L     POC Glucose Once [[id number]]  (Normal) Collected: 08/12/21 0604    Specimen: Blood Updated: 08/12/21 0635     Glucose 102 mg/dL      Comment: RN NotifiedOperator: [id number] LING Ramsey ID: [id number]              I reviewed the patient's new clinical results.  I reviewed the patient's new imaging results and agree with the interpretation.     ASSESSMENT/PLAN:   ASSESSMENT: 1. change in mental status, improving. likely due to hepatic encephalopathy.  Etiology for liver disease unclear.  Hepatitis panel was  unremarkable.  Has elevated iron saturation of 64%.  2.  Thrombocytopenia, likely due to cirrhosis.  3.  Cirrhosis, etiology unclear.    PLAN: 1.  Continue lactulose and change to PO  2.  Avoid hypotension hepatotoxic medications.  3.  Obtain autoimmune panel.  4.  Obtain hemochromatosis genetic testing.  5. Diet as tolerated  The risks, benefits, and alternatives of this procedure have been discussed with the patient or the responsible party- the patient understands and agrees to proceed.         Ge Gorman MD  08/12/21  16:10 CDT

## 2021-08-13 PROBLEM — F20.9 SCHIZOPHRENIA (HCC): Status: ACTIVE | Noted: 2021-01-01

## 2021-08-13 NOTE — PLAN OF CARE
Goal Outcome Evaluation:  Plan of Care Reviewed With: patient  Patient Agreement with Plan of Care: unable to participate     Progress: no change

## 2021-08-13 NOTE — PLAN OF CARE
Problem: Adult Inpatient Plan of Care  Goal: Plan of Care Review  Flowsheets  Taken 8/13/2021 1317  Progress: no change  Plan of Care Reviewed With: patient  Taken 8/13/2021 0840  Progress: no change  Plan of Care Reviewed With: patient  Outcome Summary: Pt Min/Mod A for supine<>sit with verbal cues. Pt is Max A for UB bathing and Min A for UB dressing. Pt is dependent for LB bathing and dressing. Continue OT POC   Goal Outcome Evaluation:  Plan of Care Reviewed With: patient        Progress: no change  Outcome Summary: Pt Min/Mod A for supine<>sit with verbal cues. Pt is Max A for UB bathing and Min A for UB dressing. Pt is dependent for LB bathing and dressing. Continue OT POC

## 2021-08-13 NOTE — THERAPY TREATMENT NOTE
Patient Name: Natacha Gandhi  : 1950    MRN: [id number]                              Today's Date: 2021       Admit Date: 2021    Visit Dx:     ICD-10-CM ICD-9-CM   1. Hypotension, unspecified hypotension type  I95.9 458.9   2. Altered mental status, unspecified altered mental status type  R41.82 780.97   3. Impaired functional mobility, balance, gait, and endurance  Z74.09 V49.89   4. Oropharyngeal dysphagia  R13.12 787.22   5. Impaired mobility and activities of daily living  Z74.09 V49.89    Z78.9      Patient Active Problem List   Diagnosis   • Acute GI bleeding   • Altered mental status, unspecified   • Schizophrenia, paranoid type (CMS/HCC)   • Acute exacerbation of chronic paranoid schizophrenia (CMS/HCC)   • Acute blood loss anemia   • DMII (diabetes mellitus, type 2) (CMS/HCC)   • Hypotension   • Lactic acidosis   • Thrombocytopenia (CMS/HCC)   • Encephalopathy, hepatic (CMS/HCC)     Past Medical History:   Diagnosis Date   • Bipolar disorder, unspecified (CMS/HCC)    • Depression    • Diabetes mellitus (CMS/HCC)    • GERD (gastroesophageal reflux disease)    • Hypertension    • Legal blindness    • Psychiatric illness    • Rheumatoid arthritis (CMS/HCC)    • Schizoaffective disorder (CMS/HCC)      Past Surgical History:   Procedure Laterality Date   • COLONOSCOPY N/A 2020    Procedure: COLONOSCOPY;  Surgeon: Ge Gorman MD;  Location: [facility];  Service: Gastroenterology;  Laterality: N/A;   • ENDOSCOPY N/A 2020    Procedure: ESOPHAGOGASTRODUODENOSCOPY;  Surgeon: Ge Gorman MD;  Location: [facility];  Service: Gastroenterology;  Laterality: N/A;     General Information     Row Name 21 0840          OT Time and Intention    Document Type  therapy note (daily note)  -BB     Mode of Treatment  individual therapy;occupational therapy  -BB     Row Name 21 0840          General Information    Patient Profile Reviewed  yes  -BB     Existing  Precautions/Restrictions  fall appears to have herniated vaginal tissue per RN when wiping for toilet hygiend  -BB     Row Name 21 0840          Cognition    Orientation Status (Cognition)  oriented to;person;situation   -BB       User Key  (r) = Recorded By, (t) = Taken By, (c) = Cosigned By    Initials Name Provider Type    BB Mare Reed COTA/L Occupational Therapy Assistant          Mobility/ADL's     Row Name 21 0840          Bed Mobility    Bed Mobility  bed mobility (all) activities;supine-sit;sit-supine;scooting/bridging;rolling left;rolling right  -BB     All Activities, [place] (Bed Mobility)  not tested  -BB     Scooting/Bridging [place] (Bed Mobility)  not tested  -BB     Supine-Sit [place] (Bed Mobility)  minimum assist (75% patient effort);moderate assist (50% patient effort);verbal cues;nonverbal cues (demo/gesture) x 2  -BB     Sit-Supine [place] (Bed Mobility)  minimum assist (75% patient effort);moderate assist (50% patient effort);verbal cues;nonverbal cues (demo/gesture) x2  -BB     Assistive Device (Bed Mobility)  bed rails;draw sheet;head of bed elevated  -BB     Row Name 21 0840          Transfers    Sit-Stand [place] (Transfers)  not tested  -BB     [place] Level (Toilet Transfer)  not tested  -BB     Row Name 21 0840          Grooming Assessment/Training    [place] Level (Grooming)  wash face, hands;verbal cues;minimum assist (75% patient effort)  -BB     Position (Grooming)  edge of bed sitting  -BB     Row Name 21 0840          Upper Body Dressing Assessment/Training    [place] Level (Upper Body Dressing)  doff;don;minimum assist (75% patient effort);verbal cues HG  -BB     Position (Upper Body Dressing)  sitting up in bed  -BB     Row Name 21 0840          Bathing Assessment/Intervention    [place] Level (Bathing)  upper body;maximum assist (25% patient effort);lower body;dependent (less than 25%  patient effort);verbal cues  -BB     Position (Bathing)  sitting up in bed  -BB     Row Name 08/13/21 0840          Lower Body Dressing Assessment/Training    [place] Level (Lower Body Dressing)  doff;don;socks;dependent (less than 25% patient effort)  -BB     Position (Lower Body Dressing)  sitting up in bed  -BB       User Key  (r) = Recorded By, (t) = Taken By, (c) = Cosigned By    Initials Name Provider Type    Mare Westbrook COTA/L Occupational Therapy Assistant        Obj/Interventions     Row Name 08/13/21 0840          Range of Motion Comprehensive    General Range of Motion  bilateral lower extremity ROM WFL  -BB     Row Name 08/13/21 0840          Strength Comprehensive (MMT)    General Manual Muscle Testing (MMT) Assessment  upper extremity strength deficits identified  -BB       User Key  (r) = Recorded By, (t) = Taken By, (c) = Cosigned By    Initials Name Provider Type    Mare Westbrook COTA/L Occupational Therapy Assistant        Goals/Plan     Row Name 08/13/21 0840          Transfer Goal 1 (OT)    Activity/Assistive Device (Transfer Goal 1, OT)  transfers, all  -BB     [place] Level/Cues Needed (Transfer Goal 1, OT)  modified independence  -BB     Time Frame (Transfer Goal 1, OT)  long term goal (LTG)  -BB     Progress/Outcome (Transfer Goal 1, OT)  goal not met  -BB     Row Name 08/13/21 0840          Bathing Goal 1 (OT)    Activity/Device (Bathing Goal 1, OT)  bathing skills, all  -BB     [place] Level/Cues Needed (Bathing Goal 1, OT)  modified independence  -BB     Time Frame (Bathing Goal 1, OT)  long term goal (LTG)  -BB     Progress/Outcomes (Bathing Goal 1, OT)  goal not met  -BB     Row Name 08/13/21 0840          Dressing Goal 1 (OT)    Activity/Device (Dressing Goal 1, OT)  dressing skills, all  -BB     [place]/Cues Needed (Dressing Goal 1, OT)  modified independence  -BB     Time Frame (Dressing Goal 1, OT)  long term goal (LTG)  -BB      Progress/Outcome (Dressing Goal 1, OT)  goal not met  -BB     Row Name 08/13/21 0840          Toileting Goal 1 (OT)    Activity/Device (Toileting Goal 1, OT)  toileting skills, all  -BB     [place] Level/Cues Needed (Toileting Goal 1, OT)  modified independence  -BB     Time Frame (Toileting Goal 1, OT)  long term goal (LTG)  -BB     Progress/Outcome (Toileting Goal 1, OT)  goal not met  -BB     Row Name 08/13/21 0840          Strength Goal 1 (OT)    Strength Goal 1 (OT)  Increase UE str by 1/2 grade to increase independence with functional mobility.  -BB     Time Frame (Strength Goal 1, OT)  long term goal (LTG)  -BB     Progress/Outcome (Strength Goal 1, OT)  goal not met  -BB       User Key  (r) = Recorded By, (t) = Taken By, (c) = Cosigned By    Initials Name Provider Type    BB Mare Reed COTA/L Occupational Therapy Assistant        Clinical Impression     Row Name 08/13/21 0840          Pain Scale: FACES Pre/Post-Treatment    Pain: FACES Scale, Pretreatment  0-->no hurt  -BB     Posttreatment Pain Rating  0-->no hurt  -BB     Row Name 08/13/21 0840          Plan of Care Review    Plan of Care Reviewed With  patient  -BB     Progress  no change  -BB     Outcome Summary  Pt Min/Mod A for supine<>sit with verbal cues. Pt is Max A for UB bathing and Min A for UB dressing. Pt is dependent for LB bathing and dressing. Continue OT POC  -BB     Row Name 08/13/21 0840          Therapy Assessment/Plan (OT)    Rehab Potential (OT)  good, to achieve stated therapy goals  -BB     Criteria for Skilled Therapeutic Interventions Met (OT)  yes;meets criteria  -BB     Therapy Frequency (OT)  other (see comments) 5-7 days/wk  -BB     Row Name 08/13/21 0840          Therapy Plan Review/Discharge Plan (OT)    Anticipated Discharge Disposition (OT)  skilled nursing facility  -BB     Row Name 08/13/21 0840          Positioning and Restraints    Pre-Treatment Position  in bed  -BB     Post Treatment Position  bed  -BB      In Bed  fowlers;call light within reach;encouraged to call for assist;exit alarm on  -BB       User Key  (r) = Recorded By, (t) = Taken By, (c) = Cosigned By    Initials Name Provider Type    Mare Westbrook COTA/L Occupational Therapy Assistant        Outcome Measures     Row Name 08/13/21 0840          How much help from another is currently needed...    Putting on and taking off regular lower body clothing?  1  -BB     Bathing (including washing, rinsing, and drying)  2  -BB     Toileting (which includes using toilet bed pan or urinal)  2  -BB     Putting on and taking off regular upper body clothing  2  -BB     Taking care of personal grooming (such as brushing teeth)  2  -BB     Eating meals  2  -BB     AM-PAC 6 Clicks Score (OT)  11  -BB       User Key  (r) = Recorded By, (t) = Taken By, (c) = Cosigned By    Initials Name Provider Type    Mare Westbrook COTA/L Occupational Therapy Assistant          Occupational Therapy Education                 Title: PT OT SLP Therapies (In Progress)     Topic: Occupational Therapy (In Progress)     Point: ADL training (In Progress)     Description:   Instruct learner(s) on proper safety adaptation and remediation techniques during self care or transfers.   Instruct in proper use of assistive devices.              Learning Progress Summary           Patient Acceptance, E, NR by BB at 8/12/2021 1315    Acceptance, E, NR by BB at 8/10/2021 1329                   Point: Home exercise program (In Progress)     Description:   Instruct learner(s) on appropriate technique for monitoring, assisting and/or progressing therapeutic exercises/activities.              Learning Progress Summary           Patient Acceptance, E,D, NR by RB at 8/9/2021 0956    Comment: Attempted B UE HEP while sitting EOB with minimal understanding.                   Point: Precautions (Done)     Description:   Instruct learner(s) on prescribed precautions during self-care and functional  transfers.              Learning Progress Summary           Patient Acceptance, E, VU by RB at 8/7/2021 1406    Comment: Edu pt on use of gait belt and non skid socks when OOB and no OOB without assist.                   Point: Body mechanics (In Progress)     Description:   Instruct learner(s) on proper positioning and spine alignment during self-care, functional mobility activities and/or exercises.              Learning Progress Summary           Patient Acceptance, E, NR by BB at 8/12/2021 1315    Acceptance, E, NR by BB at 8/11/2021 1435                               User Key     Initials Effective Dates Name Provider Type Discipline    RB 06/16/21 -  Tyrel Lopez OT Occupational Therapist OT    BB 06/16/21 -  Mare Reed COTA/L Occupational Therapy Assistant OT              OT Recommendation and Plan  Therapy Frequency (OT): other (see comments) (5-7 days/wk)  Plan of Care Review  Plan of Care Reviewed With: patient  Progress: no change  Outcome Summary: Pt Min/Mod A for supine<>sit with verbal cues. Pt is Max A for UB bathing and Min A for UB dressing. Pt is dependent for LB bathing and dressing. Continue OT POC     Time Calculation:   Time Calculation- OT     Row Name 08/13/21 1317             Time Calculation- OT    OT Start Time  0840  -BB      OT Stop Time  0950  -BB      OT Time Calculation (min)  70 min  -BB      Total Timed Code Minutes- OT  70 minute(s)  -BB      OT Received On  08/13/21  -BB         Timed Charges    13627 - OT Self Care/Mgmt Minutes  70  -BB         Total Minutes    Timed Charges Total Minutes  70  -BB       Total Minutes  70  -BB        User Key  (r) = Recorded By, (t) = Taken By, (c) = Cosigned By    Initials Name Provider Type    BB Mare Reed COTA/L Occupational Therapy Assistant        Therapy Charges for Today     Code Description Service Date Service Provider Modifiers Qty    70432233839 HC OT SELF CARE/MGMT/TRAIN EA 15 MIN 8/12/2021 Mare Reed  JOSE/L GO 3    73328392991 HC OT THER PROC EA 15 MIN 8/12/2021 Mare Reed COTA/L GO 2    96484705117 HC OT SELF CARE/MGMT/TRAIN EA 15 MIN 8/13/2021 Mare Reed, GARCIA/L GO 5               NARDA Lares  8/13/2021

## 2021-08-13 NOTE — DISCHARGE SUMMARY
[facility] Medicine Services  DISCHARGE SUMMARY       Date of Admission: 8/6/2021  Date of Discharge:  8/13/2021  Primary Care Physician: Alisia Ramirez APRN    Presenting Problem/History of Present Illness:  Hypotension, unspecified hypotension type [I95.9]  Altered mental status, unspecified altered mental status type [R41.82]       Final Discharge Diagnoses:  Active Hospital Problems    Diagnosis    • **Encephalopathy, hepatic (CMS/HCC)    • Hypotension    • Lactic acidosis    • Thrombocytopenia (CMS/HCC)    • DMII (diabetes mellitus, type 2) (CMS/HCC)    • Schizophrenia, paranoid type (CMS/HCC)    • Altered mental status, unspecified        Consults:   Consults     Date and Time Order Name Status Description    8/10/2021  3:33 PM Inpatient Gastroenterology Consult Completed     8/8/2021 12:16 PM Hematology & Oncology Inpatient Consult      8/7/2021 11:59 AM Inpatient Psychiatrist Consult Completed     8/6/2021  7:04 PM Hospitalist (on-call MD unless specified)                Chief Complaint on Day of Discharge: None    Hospital Course:  The patient is a 71 y.o. female who presented to [facility] with medical history notable for schizophrenia and diabetes who presented to the emergency department from rehab facility due to altered mental status.  There is no family or relatives available during my evaluation for further history so history is obtained from chart review.  Patient is unable to obtain or provide any further history from.  Patient is able to state her name and that she is in the hospital but cannot provide any further details.     Evaluation emergency department was notable for a CBC that showed a hemoglobin of 10.6, hematocrit 31.8 with platelets of 45.  Patient has a history of thrombocytopenia noted previously but not as severe Gordon currently.  Troponin was normal.  Magnesium and creatine kinase were normal.  Lactate was elevated  "2.9.  CMP showed glucose 142 with a chloride of 110, albumin 1.9, , .  Urinalysis did not really show any signs of of infection at this time.  X-ray was negative for acute cardiopulmonary process and CT of her head was also negative.   Patient is having some confusion and her ammonia level was elevated and she was given lactulose.  Her ammonia level slowly normalized.  She was also monitored for her hemoglobin and thrombocytopenia and Dr. Bills was also seeing the patient and those levels were also stabilized.  Patient continued to have some confusion and she notably has schizophrenia.  Dr. Anderson also evaluated patient and recommended she could be benefited from behavioral health stay.  She was then transferred to behavioral health after discussion with Dr. Anderson.    Condition on Discharge: Stable    Physical Exam on Discharge:  /77 (BP Location: Right arm, Patient Position: Lying)   Pulse 81   Temp 97 °F (36.1 °C) (Axillary)   Resp 18   Ht 160 cm (63\")   Wt 61.1 kg (134 lb 9.6 oz)   LMP  (LMP Unknown)   SpO2 98%   BMI 23.84 kg/m²   Physical Exam  Vitals and nursing note reviewed.   Constitutional:       General: She is not in acute distress.     Appearance: She is well-developed. She is not diaphoretic.   HENT:      Head: Normocephalic and atraumatic.   Cardiovascular:      Rate and Rhythm: Normal rate.   Pulmonary:      Effort: Pulmonary effort is normal. No respiratory distress.      Breath sounds: No wheezing.   Abdominal:      General: There is no distension.      Palpations: Abdomen is soft.   Musculoskeletal:         General: Normal range of motion.   Skin:     General: Skin is warm and dry.   Neurological:      Mental Status: She is alert.      Cranial Nerves: No cranial nerve deficit.   Psychiatric:      Comments: Confusion present           Discharge Disposition:  Psychiatric Hospital or Unit ([place] - [facility])    Discharge Medications:     Discharge Medications      New " Medications      Instructions Start Date   ARIPiprazole 2 MG tablet  Commonly known as: ABILIFY   13 mg, Oral, Daily With Dinner      lactulose 10 GM/15ML solution  Commonly known as: CHRONULAC   30 g, Oral, 3 Times Daily         Continue These Medications      Instructions Start Date   Calmoseptine 0.44-20.6 % ointment  Generic drug: Menthol-Zinc Oxide   Topical, Every 12 Hours      Cholecalciferol 125 MCG (5000 UT) tablet   5,000 Units, Oral, Daily      escitalopram 10 MG tablet  Commonly known as: LEXAPRO   10 mg, Oral, Daily      ferrous sulfate 325 (65 FE) MG tablet  Commonly known as: FerrouSul   325 mg, Oral, Daily With Breakfast      metFORMIN 500 MG tablet  Commonly known as: GLUCOPHAGE   500 mg, Oral, Daily      multivitamin with minerals tablet tablet   1 tablet, Oral, Daily      pantoprazole 40 MG EC tablet  Commonly known as: PROTONIX   40 mg, Oral, Daily         Stop These Medications    BAG BALM EX     cephalexin 500 MG capsule  Commonly known as: KEFLEX     ondansetron ODT 4 MG disintegrating tablet  Commonly known as: ZOFRAN-ODT     risperiDONE 3 MG tablet  Commonly known as: risperDAL     Tubersol 5 UNIT/0.1ML injection  Generic drug: tuberculin            Discharge Diet:   Diet Instructions     Diet: Cardiac, Consistent Carbohydrate      Discharge Diet:  Cardiac  Consistent Carbohydrate             Activity at Discharge:   Activity Instructions     Activity as Tolerated            Discharge Care Plan/Instructions: Being discharged to behavioral health    Follow-up Appointments: Follow-up with the PCP  No future appointments.    Test Results Pending at Discharge:   Pending Labs     Order Current Status    Hepatitis B Surface Antigen In process          Sumit Carlisle MD  08/13/21  09:50 CDT

## 2021-08-13 NOTE — PROGRESS NOTES
"Psychiatry progress note  August 10, 2021    Chief complaint: Schizophrenia in setting of altered mental status    Subjective:  71-year-old female seen in her room.  She remains sluggis and disorganized. Patient has difficulty with 3 step commands again today.    Pleasant.  No behaivoral issues.      Review of systems is negative for headache or stomachache or muscle aches.         O:  /77 (BP Location: Right arm, Patient Position: Lying)   Pulse 81   Temp 97 °F (36.1 °C) (Axillary)   Resp 18   Ht 160 cm (63\")   Wt 61.1 kg (134 lb 9.6 oz)   LMP  (LMP Unknown)   SpO2 98%   BMI 23.84 kg/m²     PE:  In no acute distress.  Breathing is unlabored.  No tremor or noted atrophy.  Gait is deferred.    MSE: 71-year-old female appearing older than biological age.  No eye contact but in the context of blindness.  Psychomotor slowing.  Affect remainsblunted.  Thought processing is disorganized and concrete.  Insight and judgment are limited.    Labs reviewed:  Elevated NH3 downtrending  Plt increased to 44        Assessment:     Altered mental status, unspecified    Schizophrenia, paranoid type (CMS/HCC)    DMII (diabetes mellitus, type 2) (CMS/HCC)    Hypotension    Lactic acidosis    Thrombocytopenia (CMS/HCC)      Plan:  --Titrate Abilify to 15 mg daily.  We will plan further titration as needed with likely ultimate target dose in the 15 to 25 mg daily range.  -Plan transition to [facility] today, d/w primary team.    --Please contact me with any questions or issues.       Tyrel Anderson II, MD  08/13/21        "

## 2021-08-13 NOTE — PLAN OF CARE
Goal Outcome Evaluation:  Plan of Care Reviewed With: patient        Progress: improving  Outcome Summary: VVS. Pt slept throughout night. Pt more alert today. No s/s of distress noted. Will continue to monitor.

## 2021-08-13 NOTE — PROGRESS NOTES
[facility] Medicine Services  INPATIENT PROGRESS NOTE    Length of Stay: 2  Date of Admission: 8/6/2021  Primary Care Physician: Alisia Ramirez APRN    Subjective   Chief Complaint: Altered mental status    HPI: Patient is more alert today. She remains oriented to self only. She has been managed for hepatic encephalopathy and cirrhosis. Ammonia level is now normal.    Review of Systems   Unable to perform ROS: Psychiatric disorder     Objective    Temp:  [96.5 °F (35.8 °C)-97.8 °F (36.6 °C)] 96.6 °F (35.9 °C)  Heart Rate:  [73-94] 87  Resp:  [18] 18  BP: (106-129)/(57-83) 123/83    Physical Exam  Constitutional:       General: She is not in acute distress.     Appearance: She is not ill-appearing or diaphoretic.   HENT:      Head: Normocephalic and atraumatic.      Right Ear: External ear normal.      Left Ear: External ear normal.      Nose: No congestion or rhinorrhea.      Mouth/Throat:      Mouth: Mucous membranes are moist.      Pharynx: No oropharyngeal exudate or posterior oropharyngeal erythema.   Eyes:      General: No scleral icterus.     Extraocular Movements: Extraocular movements intact.      Conjunctiva/sclera: Conjunctivae normal.   Cardiovascular:      Rate and Rhythm: Normal rate and regular rhythm.      Heart sounds: Normal heart sounds. No murmur heard.     Pulmonary:      Effort: Pulmonary effort is normal. No respiratory distress.      Breath sounds: Normal breath sounds. No wheezing, rhonchi or rales.   Abdominal:      General: Abdomen is flat. There is no distension.      Palpations: Abdomen is soft.      Tenderness: There is no abdominal tenderness. There is no guarding.   Musculoskeletal:         General: No swelling, tenderness or deformity.      Cervical back: Neck supple. No rigidity. No muscular tenderness.      Right lower leg: No edema.      Left lower leg: No edema.   Lymphadenopathy:      Cervical: No cervical adenopathy.   Skin:      General: Skin is warm and dry.   Neurological:      General: No focal deficit present.      Mental Status: She is alert. She is disoriented.      Cranial Nerves: No cranial nerve deficit.      Motor: No weakness.   Psychiatric:         Mood and Affect: Mood normal.         Behavior: Behavior normal.       Medication Review:    Current Facility-Administered Medications:   •  [COMPLETED] ARIPiprazole (ABILIFY) tablet 5 mg, 5 mg, Oral, Daily With Dinner, 5 mg at 08/07/21 1821 **FOLLOWED BY** ARIPiprazole (ABILIFY) half tablet 12.5 mg, 12.5 mg, Oral, Daily With Dinner, Tyrel Anderson II, MD, 12.5 mg at 08/12/21 1737  •  calcium carbonate (TUMS) chewable tablet 500 mg (200 mg elemental), 2 tablet, Oral, BID PRN, Karan Tsai MD  •  cholecalciferol (VITAMIN D3) tablet 5,000 Units, 5,000 Units, Oral, Daily, Karan Tsai MD, 5,000 Units at 08/12/21 0921  •  dextrose (D50W) 25 g/ 50mL Intravenous Solution 25 g, 25 g, Intravenous, Q15 Min PRN, Karan Tsai MD  •  dextrose (GLUTOSE) oral gel 15 g, 15 g, Oral, Q15 Min PRN, Karan Tsai MD  •  escitalopram (LEXAPRO) tablet 10 mg, 10 mg, Oral, Daily, Karan Tsai MD, 10 mg at 08/12/21 0921  •  glucagon (human recombinant) (GLUCAGEN DIAGNOSTIC) injection 1 mg, 1 mg, Subcutaneous, Q15 Min PRN, Karan Tsai MD  •  insulin aspart (novoLOG) injection 0-7 Units, 0-7 Units, Subcutaneous, TID AC, Karan Tsai MD  •  lactulose (CHRONULAC) 10 GM/15ML solution 30 g, 30 g, Oral, TID, Ge Gorman MD, 30 g at 08/12/21 1652  •  Magnesium Sulfate 2 gram Bolus, followed by 8 gram infusion (total Mg dose 10 grams)- Mg less than or equal to 1mg/dL, 2 g, Intravenous, PRN **OR** Magnesium Sulfate 2 gram / 50mL Infusion (GIVE X 3 BAGS TO EQUAL 6GM TOTAL DOSE) - Mg 1.1 - 1.5 mg/dl, 2 g, Intravenous, PRN **OR** Magnesium Sulfate 4 gram infusion- Mg 1.6-1.9 mg/dL, 4 g, Intravenous, PRN, Karan Tsai MD, Last Rate: 25 mL/hr at 08/07/21 0121, 4 g at 08/07/21 0121  •   multivitamin with minerals 1 tablet, 1 tablet, Oral, Daily, Karan Tsai MD, 1 tablet at 08/12/21 0922  •  ondansetron (ZOFRAN) tablet 4 mg, 4 mg, Oral, Q6H PRN **OR** ondansetron (ZOFRAN) injection 4 mg, 4 mg, Intravenous, Q6H PRN, Karna Tsai MD  •  pantoprazole (PROTONIX) EC tablet 40 mg, 40 mg, Oral, Daily, Karan Tsai MD, 40 mg at 08/12/21 0922  •  potassium chloride (MICRO-K) CR capsule 40 mEq, 40 mEq, Oral, PRN **OR** potassium chloride (KLOR-CON) packet 40 mEq, 40 mEq, Oral, PRN **OR** potassium chloride 10 mEq in 100 mL IVPB, 10 mEq, Intravenous, Q1H PRN, Karan Tsai MD, Last Rate: 100 mL/hr at 08/07/21 0433, 10 mEq at 08/07/21 0433  •  sodium chloride 0.9 % flush 10 mL, 10 mL, Intravenous, Q12H, Karan Tsai MD, 10 mL at 08/12/21 0922  •  sodium chloride 0.9 % flush 10 mL, 10 mL, Intravenous, PRN, Karan Tsai MD    I have reviewed the patient's current medications.     Results Review:  I have reviewed the labs, radiology results, and diagnostic studies.    Laboratory Data:   Results from last 7 days   Lab Units 08/11/21  0702 08/09/21  0604 08/07/21  1007   SODIUM mmol/L 142 140 142   POTASSIUM mmol/L 3.7 4.3 3.9  3.9   CHLORIDE mmol/L 111* 114* 115*   CO2 mmol/L 25.0 21.0* 23.0   BUN mg/dL 8 9 9   CREATININE mg/dL 0.73 0.75 0.82   GLUCOSE mg/dL 104* 114* 88   CALCIUM mg/dL 9.1 9.3 8.7   BILIRUBIN mg/dL 1.0 0.9 1.0   ALK PHOS U/L 72 78 73   ALT (SGPT) U/L 112* 114* 112*   AST (SGOT) U/L 133* 118* 122*   ANION GAP mmol/L 6.0 5.0 4.0*     Estimated Creatinine Clearance: 64.2 mL/min (by C-G formula based on SCr of 0.73 mg/dL).  Results from last 7 days   Lab Units 08/07/21  1007 08/06/21  1705   MAGNESIUM mg/dL 2.5* 1.6         Results from last 7 days   Lab Units 08/11/21  0702 08/10/21  0655 08/09/21  0604 08/07/21  1007 08/06/21  1705   WBC 10*3/mm3 4.59 3.97 4.44 3.61 4.22   HEMOGLOBIN g/dL 9.9* 10.5* 10.2* 10.1* 10.6*   HEMATOCRIT % 28.2* 31.0* 30.2* 30.2* 31.8*   PLATELETS  10*3/mm3 48* 44* 39* 36* 45*     Results from last 7 days   Lab Units 08/11/21  0702 08/10/21  0655 08/09/21  0604   INR  1.70* 1.73* 1.87*       Culture Data:   No results found for: BLOODCX  No results found for: URINECX  No results found for: RESPCX  No results found for: WOUNDCX  No results found for: STOOLCX  No components found for: BODYFLD    Radiology Data:   Imaging Results (Last 24 Hours)     ** No results found for the last 24 hours. **          Assessment/Plan     Hospital Problem List:  Principal Problem:    Encephalopathy, hepatic (CMS/HCC)  Active Problems:    Altered mental status, unspecified    Schizophrenia, paranoid type (CMS/HCC)    DMII (diabetes mellitus, type 2) (CMS/HCC)    Hypotension    Lactic acidosis    Thrombocytopenia (CMS/HCC)  Cirrhosis secondary to chronic hepatitis B  Chronic hepatitis B    Plan  -Patient has cirrhosis and elevated ammonia levels consistent with hepatic encephalopathy  -Ammonia level trended down to normal today.  Continue lactulose  -Gastroenterology consultation due to new onset cirrhosis  -Hepatitis B serologies are consistent with chronic hepatitis B infection. waiting hepatitis B surface antigen test.  We will send hepatitis B core antibody IgM test  -Hematology input appreciated. Patient thrombocytopenia is likely from liver cirrhosis  -Patient's lactic acidosis is likely secondary to poor oral intake and dehydration  -Psychiatry input appreciated for schizophrenia. Will plan for discharge to behavioral health unit today  -Continue to monitor vital signs  -DVT prophylaxis with SCDs  -CODE STATUS is full code    Discharge Planning: I expect patient to be discharged in next 24 hours to the behavioral health unit    I confirmed that the patient's Advance Care Plan is present, code status is documented, or surrogate decision maker is listed in the patient's medical record.      I have utilized all available immediate resources to obtain, update, or review the  patient's current medications.      Clara Gupta MD   08/12/21   20:04 CDT

## 2021-08-14 NOTE — PLAN OF CARE
Goal Outcome Evaluation:  Plan of Care Reviewed With: patient  Patient Agreement with Plan of Care: agrees     Progress: improving  Outcome Summary: pt has been calm and pleasant, cooperative, has worked with pt and has eaten well at meals.  pt takes medications without complaint.

## 2021-08-14 NOTE — THERAPY EVALUATION
Acute Care - Physical Therapy Initial Evaluation  [facility]     Patient Name: Natacha Gandhi  : 1950  MRN: [id number]  Today's Date: 2021      Visit Dx:     ICD-10-CM ICD-9-CM   1. Impaired functional mobility, balance, gait, and endurance  Z74.09 V49.89   2. Impaired mobility and ADLs  Z74.09 V49.89    Z78.9      Patient Active Problem List   Diagnosis   • Acute GI bleeding   • Altered mental status, unspecified   • Schizophrenia, paranoid type (CMS/HCC)   • Acute exacerbation of chronic paranoid schizophrenia (CMS/HCC)   • Acute blood loss anemia   • DMII (diabetes mellitus, type 2) (CMS/HCC)   • Hypotension   • Lactic acidosis   • Thrombocytopenia (CMS/HCC)   • Encephalopathy, hepatic (CMS/HCC)   • Schizophrenia (CMS/HCC)     Past Medical History:   Diagnosis Date   • Bipolar disorder, unspecified (CMS/HCC)    • Depression    • Diabetes mellitus (CMS/HCC)    • GERD (gastroesophageal reflux disease)    • Hypertension    • Legal blindness    • Psychiatric illness    • Rheumatoid arthritis (CMS/HCC)    • Schizoaffective disorder (CMS/HCC)      Past Surgical History:   Procedure Laterality Date   • COLONOSCOPY N/A 2020    Procedure: COLONOSCOPY;  Surgeon: Ge Gorman MD;  Location: [facility];  Service: Gastroenterology;  Laterality: N/A;   • ENDOSCOPY N/A 2020    Procedure: ESOPHAGOGASTRODUODENOSCOPY;  Surgeon: Ge Gorman MD;  Location: [facility];  Service: Gastroenterology;  Laterality: N/A;        PT Assessment (last 12 hours)      PT Evaluation and Treatment     Row Name 21 103          Physical Therapy Time and Intention    Document Type  evaluation  -GB     Mode of Treatment  co-treatment;physical therapy;occupational therapy  -GB     Row Name 21 103          General Information    Patient Profile Reviewed  yes  -GB     Existing Precautions/Restrictions  fall;other (see comments) herniated vaginal tissue  -GB     Row Name 21 1037           Living Environment    Lives With  facility resident  -[facility] Name 08/14/21 1032          Vision Assessment/Intervention    Vision Assessment Comment  blind per chart  -GB     Row Name 08/14/21 1032          Sensory Assessment (Somatosensory)    Sensory Assessment (Somatosensory)  LE sensation intact  -GB     Row Name 08/14/21 1032          Sensory Interventions    Comment, Sensory Intervention  pt usually needs 1 step commands w assist;   -[facility] Name 08/14/21 1032          Cognition    Orientation Status (Cognition)  oriented to;person  -GB     Row Name 08/14/21 1032          Pain Scale: Numbers Pre/Post-Treatment    Pretreatment Pain Rating  0/10 - no pain  -     Posttreatment Pain Rating  0/10 - no pain  -GB     Row Name 08/14/21 1032          Range of Motion Comprehensive    General Range of Motion  bilateral lower extremity ROM WFL  -GB     Row Name 08/14/21 1032          Strength Comprehensive (MMT)    Comment, General Manual Muscle Testing (MMT) Assessment  at least functional strength of 3+-4-/5 for mobility; deferred MMT due to difficulty staying on task and completing commands trying to  keep her calm.   -GB     Row Name 08/14/21 1032          Bed Mobility    Bed Mobility  rolling left;rolling right;supine-sit  -     Rolling Left [place] (Bed Mobility)  moderate assist (50% patient effort);1 person to manage equipment  -GB     Rolling Right [place] (Bed Mobility)  moderate assist (50% patient effort);1 person to manage equipment  -GB     Supine-Sit [place] (Bed Mobility)  minimum assist (75% patient effort);2 person assist  -     Assistive Device (Bed Mobility)  bed rails;head of bed elevated  -GB     Row Name 08/14/21 1032          Transfers    Transfers  bed-chair transfer;sit-stand transfer  -     Bed-Chair [place] (Transfers)  moderate assist (50% patient effort);2 person assist  -GB     Sit-Stand [place] (Transfers)  moderate assist (50% patient effort);2  person assist  -GB     Stand-Sit [place] (Transfers)  moderate assist (50% patient effort);2 person assist  -GB     Row Name 08/14/21 1032          Gait/Stairs (Locomotion)    [place] Level (Gait)  moderate assist (50% patient effort);2 person assist  -GB     Distance in Feet (Gait)  3 ft x 1 plus stand pivot transfer w/ mod assist of 2; used hand held assist for attempted gait but she said she wanted to eat and sat but chair behind her. she has strong posterior lean in standing and to lesser degree in sittng; she does not appear aware or demonstrate fear of falling;   -GB     Row Name 08/14/21 1032          Safety Issues, Functional Mobility    Impairments Affecting Function (Mobility)  balance;cognition;endurance/activity tolerance;range of motion (ROM)  -     Cognitive Impairments, Mobility Safety/Performance  insight into deficits/self-awareness;judgment;safety precaution awareness;safety precaution follow-through  -     Row Name 08/14/21 1032          Balance    Static Sitting Balance  severe impairment;supported;sitting, edge of bed  -GB     Dynamic Sitting Balance  severe impairment;supported;sitting, edge of bed  -GB     Static Standing Balance  severe impairment;supported;standing  -GB     Dynamic Standing Balance  severe impairment;supported;standing  -GB     Row Name             Wound 08/13/21 1056 Left lower gluteal Pressure Injury    Wound - Properties Group Placement Date: 08/13/21  - Placement Time: 1056  -HT Present on Hospital Admission: N  -HT Side: Left  -HT Orientation: lower  -HT Location: gluteal  -HT Primary Wound Type: Pressure inj  -HT    Retired Wound - Properties Group Date first assessed: 08/13/21  - Time first assessed: 1056  -HT Present on Hospital Admission: N  -HT Side: Left  -HT Location: gluteal  -HT Primary Wound Type: Pressure inj  -HT    Row Name             Wound 08/13/21 1313 Right upper coccyx    Wound - Properties Group Placement Date: 08/13/21  -  Placement Time: 1313  -HT Present on Hospital Admission: N  -HT Side: Right  -HT Orientation: upper  -HT Location: coccyx  -HT    Retired Wound - Properties Group Date first assessed: 08/13/21  -HT Time first assessed: 1313  -HT Present on Hospital Admission: N  -HT Side: Right  -HT Location: coccyx  -HT    Row Name 08/14/21 1032          Plan of Care Review    Plan of Care Reviewed With  patient  -GB     Outcome Summary  PT completed w/ OT for mobility purposes. Pt able to respond to requests but generally better w/ single stage commands and delay in response expectation as she is slow to process; She does better w/out a lot of extraneous information when specific tasks like mobilty involved. She has strong posterior lean in sitting and standing and not safe for indep mobilty or sitting unsupported. She will be followed for mobility for physiological reasons with the hope of improved mobility and balance via repetition.  She needs 24/7 skilled care post d/c.   -GB     Row Name 08/14/21 1032          Vital Signs    Pre Systolic BP Rehab  111  -GB     Pre Treatment Diastolic BP  56  -GB     Post Systolic BP Rehab  144  -GB     Post Treatment Diastolic BP  63  -GB     Pretreatment Heart Rate (beats/min)  88  -GB     Posttreatment Heart Rate (beats/min)  78  -GB     Pre SpO2 (%)  96  -GB     O2 Delivery Pre Treatment  room air  -GB     Post SpO2 (%)  97  -GB     O2 Delivery Post Treatment  room air  -GB     Pre Patient Position  Supine  -GB     Intra Patient Position  Standing  -GB     Post Patient Position  Sitting  -GB     Row Name 08/14/21 1032          Physical Therapy Goals    Bed Mobility Goal Selection (PT)  bed mobility, PT goal 1  -GB     Transfer Goal Selection (PT)  transfer, PT goal 1  -GB     Problem Specific Goal Selection (PT)  problem specific goal 1, PT  -GB     Row Name 08/14/21 1032          Bed Mobility Goal 1 (PT)    Activity/Assistive Device (Bed Mobility Goal 1, PT)  bed mobility activities, all   -GB     [place] Level/Cues Needed (Bed Mobility Goal 1, PT)  minimum assist (75% or more patient effort);1 person assist  -GB     Time Frame (Bed Mobility Goal 1, PT)  2 weeks  -GB     Progress/Outcomes (Bed Mobility Goal 1, PT)  progress slower than expected;goal not met  -GB     [facility] Name 08/14/21 1032          Transfer Goal 1 (PT)    Activity/Assistive Device (Transfer Goal 1, PT)  bed-to-chair/chair-to-bed  -GB     [place] Level/Cues Needed (Transfer Goal 1, PT)  minimum assist (75% or more patient effort);moderate assist (50-74% patient effort);1 person assist  -GB     Time Frame (Transfer Goal 1, PT)  2 weeks  -GB     Progress/Outcome (Transfer Goal 1, PT)  goal not met  -[facility] Name 08/14/21 1032          Gait Training Goal 1 (PT)    Activity/Assistive Device (Gait Training Goal 1, PT)  gait (walking locomotion);assistive device use;decrease fall risk  -GB     [place] Level (Gait Training Goal 1, PT)  minimum assist (75% or more patient effort);moderate assist (50-74% patient effort);2 person assist  -GB     Progress/Outcome (Gait Training Goal 1, PT)  goal not met  -[facility] Name 08/14/21 1032          Problem Specific Goal 1 (PT)    Problem Specific Goal 1 (PT)  sitting balance w/ SBA and indep trunk control at EOB  -GB     Time Frame (Problem Specific Goal 1, PT)  by discharge  -GB     Progress/Outcome (Problem Specific Goal 1, PT)  goal not met  -[facility] Name 08/14/21 1032          Positioning and Restraints    Pre-Treatment Position  in bed  -GB     Post Treatment Position  chair  -GB     In Chair  notified nsg;reclined;sitting;with nsg  -GB     [facility] Name 08/14/21 1032          Therapy Assessment/Plan (PT)    Rehab Potential (PT)  fair, will monitor progress closely  -GB     Criteria for Skilled Interventions Met (PT)  yes  -GB     Predicted Duration of Therapy Intervention (PT)  2 wks  -GB     Row Name 08/14/21 1032          PT Evaluation Complexity    History, PT Evaluation  Complexity  3 or more personal factors and/or comorbidities  -GB     Examination of Body Systems (PT Eval Complexity)  total of 3 or more elements  -GB     Clinical Presentation (PT Evaluation Complexity)  evolving  -GB     Clinical Decision Making (PT Evaluation Complexity)  moderate complexity  -GB     Overall Complexity (PT Evaluation Complexity)  moderate complexity  -GB       User Key  (r) = Recorded By, (t) = Taken By, (c) = Cosigned By    Initials Name Provider Type    Rosina Anguiano, PT Physical Therapist    Meera Ash RN Registered Nurse        Physical Therapy Education                 Title: PT OT SLP Therapies (In Progress)     Topic: Physical Therapy (In Progress)     Point: Mobility training (In Progress)     Learning Progress Summary           Patient Acceptance, D,E, NR by  at 8/14/2021 1201    Comment: POC; mobility                   Point: Home exercise program (In Progress)     Learning Progress Summary           Patient Acceptance, D,E, NR by  at 8/14/2021 1201    Comment: POC; mobility                   Point: Body mechanics (In Progress)     Learning Progress Summary           Patient Acceptance, D,E, NR by  at 8/14/2021 1201    Comment: POC; mobility                   Point: Precautions (In Progress)     Learning Progress Summary           Patient Acceptance, D,E, NR by  at 8/14/2021 1201    Comment: POC; mobility                               User Key     Initials Effective Dates Name Provider Type [facility] 06/16/21 -  Rosina Martinez PT Physical Therapist PT              PT Recommendation and Plan  Anticipated Discharge Disposition (PT): skilled nursing facility  Planned Therapy Interventions (PT): balance training, bed mobility training, gait training, postural re-education, ROM (range of motion), strengthening  Therapy Frequency (PT): 5 times/wk  Plan of Care Reviewed With: patient  Outcome Summary: PT completed w/ OT for mobility purposes.  Pt able to respond to requests but generally better w/ single stage commands and delay in response expectation as she is slow to process; She does better w/out a lot of extraneous information when specific tasks like mobilty involved. She has strong posterior lean in sitting and standing and not safe for indep mobilty or sitting unsupported. She will be followed for mobility for physiological reasons with the hope of improved mobility and balance via repetition.  She needs 24/7 skilled care post d/c.   Outcome Measures     Row Name 08/14/21 1200             How much help from another person do you currently need...    Turning from your back to your side while in flat bed without using bedrails?  2  -GB      Moving from lying on back to sitting on the side of a flat bed without bedrails?  2  -GB      Moving to and from a bed to a chair (including a wheelchair)?  2  -GB      Standing up from a chair using your arms (e.g., wheelchair, bedside chair)?  2  -GB      Climbing 3-5 steps with a railing?  1  -GB      To walk in hospital room?  2  -GB      AM-PAC 6 Clicks Score (PT)  11  -GB         Functional Assessment    Outcome Measure Options  AM-PAC 6 Clicks Basic Mobility (PT)  -GB        User Key  (r) = Recorded By, (t) = Taken By, (c) = Cosigned By    Initials Name Provider Type    Rosina Anguiano, PT Physical Therapist           Time Calculation:   PT Charges     Row Name 08/14/21 1032             Time Calculation    Start Time  1032  -GB      Stop Time  1056  -GB      Time Calculation (min)  24 min  -GB      PT Received On  08/14/21  -GB      PT Goal Re-Cert Due Date  08/23/21  -GB         Untimed Charges    PT Eval/Re-eval Minutes  24  -GB         Total Minutes    Untimed Charges Total Minutes  24  -GB       Total Minutes  24  -GB        User Key  (r) = Recorded By, (t) = Taken By, (c) = Cosigned By    Initials Name Provider Type    Rosina Anguiano, PT Physical Therapist        Therapy  Charges for Today     Code Description Service Date Service Provider Modifiers Qty    79117902665 HC PT EVAL MOD COMPLEXITY 2 8/14/2021 Rosina Martinez, PT GP 1    68394183885 HC PT THER SUPP EA 15 MIN 8/14/2021 Rosina Martinez, PT GP 1          PT G-Codes  Outcome Measure Options: AM-PAC 6 Clicks Basic Mobility (PT)  AM-PAC 6 Clicks Score (PT): 11  AM-PAC 6 Clicks Score (OT): 10    Rosina Martinez, PT  8/14/2021

## 2021-08-14 NOTE — PLAN OF CARE
Goal Outcome Evaluation:  Plan of Care Reviewed With: patient           Outcome Summary: PT completed w/ OT for mobility purposes. Pt able to respond to requests but generally better w/ single stage commands and delay in response expectation as she is slow to process; She does better w/out a lot of extraneous information when specific tasks like mobilty involved. She has strong posterior lean in sitting and standing and not safe for indep mobilty or sitting unsupported. She will be followed for mobility for physiological reasons with the hope of improved mobility and balance via repetition.  She needs 24/7 skilled care post d/c.

## 2021-08-14 NOTE — PLAN OF CARE
Goal Outcome Evaluation:  Plan of Care Reviewed With: patient  Patient Agreement with Plan of Care: unable to participate     Progress: no change  Outcome Summary: No behaviors noted. Patient has appeared to rest well through the night. Has slept approx 6 hours at this time.

## 2021-08-14 NOTE — NURSING NOTE
Behavior   Note any precipitants to event or behavior   Describe level and action of any aggressive behavior or speech and associated interventions.     Anxiety: Patient denies at this time  Depression: Patient denies at this time  Pain  0  AVH   no  S/I   no  Plan  no  H/I   no  Plan  no    Affect   euthymic/normal      Note:  Pt seen in personal room this am, alert and oriented x 2, calm and cooperative, took medications crushed in pudding without complaint.  Pt asks for needs appropriately.  Pt is cooperative with assistance for adl's.  Pt is up to chair with pt and eating breakfast.      Intervention    PRN medication utilized:  no    Instructed in medication usage and effects  Medications administered as ordered  Encouraged to verbalize needs      Response    Verbalized understanding   Did patient take medications as ordered yes   Did patient interact with assessment?  yes     Plan    Will monitor for safety  Will monitor every 15 minutes as ordered  Will evaluate and promote the plan of care    Last BM:  unknown date  (Please chart in I/O as well)

## 2021-08-14 NOTE — THERAPY EVALUATION
Patient Name: Natacha Gandhi  : 1950    MRN: [id number]                              Today's Date: 2021       Admit Date: 2021    Visit Dx:     ICD-10-CM ICD-9-CM   1. Impaired mobility and ADLs  Z74.09 V49.89    Z78.9      Patient Active Problem List   Diagnosis   • Acute GI bleeding   • Altered mental status, unspecified   • Schizophrenia, paranoid type (CMS/HCC)   • Acute exacerbation of chronic paranoid schizophrenia (CMS/HCC)   • Acute blood loss anemia   • DMII (diabetes mellitus, type 2) (CMS/HCC)   • Hypotension   • Lactic acidosis   • Thrombocytopenia (CMS/HCC)   • Encephalopathy, hepatic (CMS/HCC)   • Schizophrenia (CMS/HCC)     Past Medical History:   Diagnosis Date   • Bipolar disorder, unspecified (CMS/HCC)    • Depression    • Diabetes mellitus (CMS/HCC)    • GERD (gastroesophageal reflux disease)    • Hypertension    • Legal blindness    • Psychiatric illness    • Rheumatoid arthritis (CMS/HCC)    • Schizoaffective disorder (CMS/HCC)      Past Surgical History:   Procedure Laterality Date   • COLONOSCOPY N/A 2020    Procedure: COLONOSCOPY;  Surgeon: Ge Gorman MD;  Location: [facility];  Service: Gastroenterology;  Laterality: N/A;   • ENDOSCOPY N/A 2020    Procedure: ESOPHAGOGASTRODUODENOSCOPY;  Surgeon: Ge Gorman MD;  Location: [facility];  Service: Gastroenterology;  Laterality: N/A;     General Information     Row Name 21 1030          OT Time and Intention    Document Type  evaluation  -     Mode of Treatment  co-treatment;occupational therapy;physical therapy  -     Row Name 21 103          General Information    Patient Profile Reviewed  yes  -     Existing Precautions/Restrictions  fall;other (see comments) herniated vaginal tissue  -     Row Name 21 103          Living Environment    Lives With  facility resident  -     Row Name 21 103          Cognition    Orientation Status (Cognition)  oriented to;person   -[facility] Name 08/14/21 1030          Safety Issues, Functional Mobility    Safety Issues Affecting Function (Mobility)  judgment;insight into deficits/self-awareness;problem-solving  -     Impairments Affecting Function (Mobility)  balance;cognition;endurance/activity tolerance;range of motion (ROM)  -     Cognitive Impairments, Mobility Safety/Performance  problem-solving/reasoning;judgment;insight into deficits/self-awareness;safety precaution awareness  -       User Key  (r) = Recorded By, (t) = Taken By, (c) = Cosigned By    Initials Name Provider Type    SJ Christopher Vanessa, JAUN Occupational Therapist          Mobility/ADL's     Row Name 08/14/21 1030          Bed Mobility    Bed Mobility  rolling left;rolling right;supine-sit  -SJ     Rolling Left [place] (Bed Mobility)  moderate assist (50% patient effort);1 person to manage equipment  -SJ     Rolling Right [place] (Bed Mobility)  moderate assist (50% patient effort);1 person to manage equipment  -SJ     Supine-Sit [place] (Bed Mobility)  minimum assist (75% patient effort);2 person assist  -     Assistive Device (Bed Mobility)  bed rails;head of bed elevated  -[facility] Name 08/14/21 1030          Transfers    Transfers  bed-chair transfer;sit-stand transfer  -     Comment (Transfers)  stand pivot transfer, no AE used.  -SJ     Bed-Chair [place] (Transfers)  moderate assist (50% patient effort);2 person assist  -SJ     Sit-Stand [place] (Transfers)  moderate assist (50% patient effort);2 person assist  -[facility] Name 08/14/21 1030          Activities of Daily Living    BADL Assessment/Intervention  lower body dressing;toileting  -[facility] Name 08/14/21 1030          Lower Body Dressing Assessment/Training    [place] Level (Lower Body Dressing)  don;pants/bottoms;socks;dependent (less than 25% patient effort)  -     Position (Lower Body Dressing)  supine;edge of bed sitting  -     Comment (Lower Body  Dressing)  scrub pants  -SJ     Row Name 08/14/21 1030          Toileting Assessment/Training    [place] Level (Toileting)  perform perineal hygiene;change pad/brief;dependent (less than 25% patient effort)  -     Position (Toileting)  supine  -       User Key  (r) = Recorded By, (t) = Taken By, (c) = Cosigned By    Initials Name Provider Type     Christopher Vanessa OT Occupational Therapist        Obj/Interventions     Row Name 08/14/21 1030          Sensory Assessment (Somatosensory)    Sensory Assessment (Somatosensory)  UE sensation intact  -[facility] 08/14/21 1030          Sensory Interventions    Comment, Sensory Intervention  Patient inconsistantly answering questions, but states she is able to detect light touch, unable to discriminate location  -[facility] 08/14/21 1030          Vision Assessment/Intervention    Vision Assessment Comment  As per staff, patient is blind  -[facility] 08/14/21 1030          Range of Motion Comprehensive    Comment, General Range of Motion  R shoulder ROM WFL, L shoulder flexion 45 degrees AROM. Elbow/wrist/hand WFL. Patient restisting PROM form external and internal rotation, not assessed bilaterally.  -SJ     Row Name 08/14/21 1030          Strength Comprehensive (MMT)    Comment, General Manual Muscle Testing (MMT) Assessment  L UE 4-/5 grossly, R shoulder 3/5, R elbow/wrist/hand 4-/5 grossly  -       User Key  (r) = Recorded By, (t) = Taken By, (c) = Cosigned By    Initials Name Provider Type     Christopher Vanessa OT Occupational Therapist        Goals/Plan     Row Name 08/14/21 1030          Transfer Goal 1 (OT)    Activity/Assistive Device (Transfer Goal 1, OT)  transfers, all  -     [place] Level/Cues Needed (Transfer Goal 1, OT)  supervision required  -     Time Frame (Transfer Goal 1, OT)  long term goal (LTG)  -     Progress/Outcome (Transfer Goal 1, OT)  goal not met  -SJ     Row Name 08/14/21 1030          Bathing Goal 1 (OT)     Activity/Device (Bathing Goal 1, OT)  bathing skills, all  -SJ     [place] Level/Cues Needed (Bathing Goal 1, OT)  supervision required  -SJ     Time Frame (Bathing Goal 1, OT)  long term goal (LTG)  -SJ     Progress/Outcomes (Bathing Goal 1, OT)  goal not met  -     Row Name 08/14/21 1030          Dressing Goal 1 (OT)    Activity/Device (Dressing Goal 1, OT)  dressing skills, all  -SJ     [place]/Cues Needed (Dressing Goal 1, OT)  supervision required  -SJ     Time Frame (Dressing Goal 1, OT)  long term goal (LTG)  -SJ     Progress/Outcome (Dressing Goal 1, OT)  goal not met  -     Row Name 08/14/21 1030          Toileting Goal 1 (OT)    Activity/Device (Toileting Goal 1, OT)  toileting skills, all  -SJ     [place] Level/Cues Needed (Toileting Goal 1, OT)  supervision required  -SJ     Time Frame (Toileting Goal 1, OT)  long term goal (LTG)  -SJ     Progress/Outcome (Toileting Goal 1, OT)  goal not met  -     Row Name 08/14/21 1030          Strength Goal 1 (OT)    Strength Goal 1 (OT)  Patient to participate in eloisa UE HEP to increase UE strength and endurance for ADLs and transfers  -SJ     Time Frame (Strength Goal 1, OT)  long term goal (LTG);by discharge  -SJ     Progress/Outcome (Strength Goal 1, OT)  goal not met  -[facility] Name 08/14/21 1030          Therapy Assessment/Plan (OT)    Planned Therapy Interventions (OT)  activity tolerance training;adaptive equipment training;BADL retraining;cognitive/visual perception retraining;functional balance retraining;IADL retraining;manual therapy/joint mobilization;neuromuscular control/coordination retraining;occupation/activity based interventions;patient/caregiver education/training;ROM/therapeutic exercise;strengthening exercise;transfer/mobility retraining  -       User Key  (r) = Recorded By, (t) = Taken By, (c) = Cosigned By    Initials Name Provider Type    Christopher Myrick, OT Occupational Therapist        Clinical Impression      Row Name 08/14/21 1030          Pain Assessment    Additional Documentation  Pain Scale: Numbers Pre/Post-Treatment (Group)  -SJ     Row Name 08/14/21 1030          Pain Scale: Numbers Pre/Post-Treatment    Pretreatment Pain Rating  0/10 - no pain  -     Posttreatment Pain Rating  0/10 - no pain  -SJ     Row Name 08/14/21 1030          Plan of Care Review    Plan of Care Reviewed With  patient  -     Outcome Summary  OT eval complete, eval performed with PT. Supine in bed upon arrival. Patient requesting to be changed. Mod assist for L/R rolling with verbal cues for hand placement. Dependent for toileting care. LE dressing performed, dependent for doffing of soiled diaper, donning of scrub pants (performed supine and adjusted EOB and in standing). Sit to stand and bed to chair transfer performed with moderate assistance x2 person. Staff trained in how to transfer this patient (recommending 2 person assist). Recommend 24/7 assistance and continued OT services as well as cont inpatient OT to maximize independence in ADLs, transfers, and UE strength and endurance.  -SJ     Row Name 08/14/21 1030          Therapy Assessment/Plan (OT)    Rehab Potential (OT)  good, to achieve stated therapy goals  -     Criteria for Skilled Therapeutic Interventions Met (OT)  yes;skilled treatment is necessary  -     Therapy Frequency (OT)  other (see comments) 5-7 days/week  -     Predicted Duration of Therapy Intervention (OT)  until discharge or all goals met  -SJ     Row Name 08/14/21 1030          Therapy Plan Review/Discharge Plan (OT)    Anticipated Discharge Disposition (OT)  skilled nursing facility  -[facility] 08/14/21 1030          Vital Signs    Pre Systolic BP Rehab  111  -SJ     Pre Treatment Diastolic BP  56  -SJ     Post Systolic BP Rehab  144  -SJ     Post Treatment Diastolic BP  63  -SJ     Pretreatment Heart Rate (beats/min)  88  -SJ     Posttreatment Heart Rate (beats/min)  78  -SJ     Pre SpO2 (%)  96   -SJ     O2 Delivery Pre Treatment  room air  -SJ     Post SpO2 (%)  97  -SJ     O2 Delivery Post Treatment  room air  -SJ     Pre Patient Position  Supine  -SJ     Post Patient Position  Sitting  -SJ     Row Name 08/14/21 1030          Positioning and Restraints    Pre-Treatment Position  in bed  -SJ     Post Treatment Position  chair  -SJ     In Chair  reclined;encouraged to call for assist;with other staff;with nsg  -       User Key  (r) = Recorded By, (t) = Taken By, (c) = Cosigned By    Initials Name Provider Type    Christopher Myrick OT Occupational Therapist        Outcome Measures     Row Name 08/14/21 0930          How much help from another is currently needed...    Putting on and taking off regular lower body clothing?  1  -SJ     Bathing (including washing, rinsing, and drying)  2  -SJ     Toileting (which includes using toilet bed pan or urinal)  1  -SJ     Putting on and taking off regular upper body clothing  2  -SJ     Taking care of personal grooming (such as brushing teeth)  2  -SJ     Eating meals  2  -SJ     AM-PAC 6 Clicks Score (OT)  10  -SJ     Row Name 08/14/21 0930          Functional Assessment    Outcome Measure Options  AM-PAC 6 Clicks Daily Activity (OT)  -       User Key  (r) = Recorded By, (t) = Taken By, (c) = Cosigned By    Initials Name Provider Type    Christopher Myrick OT Occupational Therapist          Occupational Therapy Education                 Title: PT OT SLP Therapies (In Progress)     Topic: Occupational Therapy (In Progress)     Point: ADL training (Not Started)     Description:   Instruct learner(s) on proper safety adaptation and remediation techniques during self care or transfers.   Instruct in proper use of assistive devices.              Learner Progress:  Not documented in this visit.          Point: Home exercise program (Not Started)     Description:   Instruct learner(s) on appropriate technique for monitoring, assisting and/or progressing therapeutic  exercises/activities.              Learner Progress:  Not documented in this visit.          Point: Precautions (In Progress)     Description:   Instruct learner(s) on prescribed precautions during self-care and functional transfers.              Learning Progress Summary           Patient Acceptance, E,TB, NR by  at 8/14/2021 1132    Comment: Role of OT, transfer training, POC                   Point: Body mechanics (In Progress)     Description:   Instruct learner(s) on proper positioning and spine alignment during self-care, functional mobility activities and/or exercises.              Learning Progress Summary           Patient Acceptance, E,TB, NR by  at 8/14/2021 1132    Comment: Role of OT, transfer training, POC                               User Key     Initials Effective Dates Name Provider Type Discipline     06/14/21 -  Christopher Vanessa OT Occupational Therapist OT              OT Recommendation and Plan  Planned Therapy Interventions (OT): activity tolerance training, adaptive equipment training, BADL retraining, cognitive/visual perception retraining, functional balance retraining, IADL retraining, manual therapy/joint mobilization, neuromuscular control/coordination retraining, occupation/activity based interventions, patient/caregiver education/training, ROM/therapeutic exercise, strengthening exercise, transfer/mobility retraining  Therapy Frequency (OT): other (see comments) (5-7 days/week)  Plan of Care Review  Plan of Care Reviewed With: patient  Outcome Summary: OT eval complete, eval performed with PT. Supine in bed upon arrival. Patient requesting to be changed. Mod assist for L/R rolling with verbal cues for hand placement. Dependent for toileting care. LE dressing performed, dependent for doffing of soiled diaper, donning of scrub pants (performed supine and adjusted EOB and in standing). Sit to stand and bed to chair transfer performed with moderate assistance x2 person. Staff  trained in how to transfer this patient (recommending 2 person assist). Recommend 24/7 assistance and continued OT services as well as cont inpatient OT to maximize independence in ADLs, transfers, and UE strength and endurance.     Time Calculation:   Time Calculation- OT     Row Name 08/14/21 1030             Time Calculation- OT    OT Start Time  1030  -      OT Stop Time  1056  -      OT Time Calculation (min)  26 min  -SJ      OT Received On  08/14/21  -      OT Goal Re-Cert Due Date  08/27/21  -         Untimed Charges    OT Eval/Re-eval Minutes  26  -SJ         Total Minutes    Untimed Charges Total Minutes  26  -SJ       Total Minutes  26  -SJ        User Key  (r) = Recorded By, (t) = Taken By, (c) = Cosigned By    Initials Name Provider Type     Christopher Vanessa OT Occupational Therapist        Therapy Charges for Today     Code Description Service Date Service Provider Modifiers Qty    [id number] HC OT EVAL MOD COMPLEXITY 2 8/14/2021 Christopher Vanessa OT GO 1               Christopher Vanessa OT  8/14/2021

## 2021-08-14 NOTE — NURSING NOTE
"Behavior   Note any precipitants to event or behavior   Describe level and action of any aggressive behavior or speech and associated interventions.     Anxiety: Patient denies  Depression: Patient denies  Pain  0  AVH   no  S/I   no  Plan  no  H/I   no  Plan  no    Affect   flat      Note: Patient resting in bed at time of assessment. Patient does not engage in conversation with nurse, only shakes head \"yes\" and \"no\" to questions. Patient is med compliant, took all scheduled HS medications without difficulty (crushed in applesauce). No needs voiced. No behaviors noted. Did not want snack. Repositioned patient in bed and will continue to provide safe environment.     Intervention    PRN medication utilized:  no    Instructed in medication usage and effects  Medications administered as ordered  Encouraged to verbalize needs      Response    Verbalized understanding   Did patient take medications as ordered yes   Did patient interact with assessment?  yes     Plan    Will monitor for safety  Will monitor every 15 minutes as ordered  Will evaluate and promote the plan of care    Last BM:  unknown date  (Please chart in I/O as well)  "

## 2021-08-14 NOTE — CONSULTS
[facility] Medicine Admission      Date of Admission: 8/13/2021      Primary Care Physician: Alisia Ramirez APRN      Chief Complaint: altered mental status     HPI: 71 year old female with past medical history of DM, bipolar disorder, schizoaffective disorder, HTN, GERD who is currently admitted to behavioral health unit related to schizophrenia.  Hospitalist team is consulted for medical assessment and management.  Patient is poor historian due to mental illness but denies complaints during exam and interview.     Concurrent Medical History:  has a past medical history of Bipolar disorder, unspecified (CMS/[facility]), Depression, Diabetes mellitus (CMS/HCC), GERD (gastroesophageal reflux disease), Hypertension, Legal blindness, Psychiatric illness, Rheumatoid arthritis (CMS/[facility]), and Schizoaffective disorder (CMS/[facility]).    Past Surgical History:  has a past surgical history that includes Colonoscopy (N/A, 8/29/2020) and Esophagogastroduodenoscopy (N/A, 8/28/2020).    Family History: family history includes Dementia in her mother; No Known Problems in her brother, cousin, father, maternal aunt, maternal grandfather, maternal grandmother, maternal uncle, paternal aunt, paternal grandfather, paternal grandmother, paternal uncle, sister, and another family member.    Social History:  reports that she has quit smoking. She has never used smokeless tobacco. She reports previous alcohol use. She reports that she does not use drugs.    Allergies: No Known Allergies    Medications:   Prior to Admission medications    Medication Sig Start Date End Date Taking? Authorizing Provider   Emollient (BAG BALM EX) Apply 1 application topically Daily. To eloisa feet   Yes ProviderTerence MD   ondansetron (ZOFRAN) 4 MG tablet Take 4 mg by mouth Every 8 (Eight) Hours As Needed for Nausea or Vomiting.   Yes ProviderTerence MD   risperiDONE (risperDAL) 3 MG tablet Take 3 mg by mouth  Daily.   Yes Terence Rodriguez MD   ARIPiprazole (ABILIFY) 2 MG tablet Take 6.5 tablets by mouth Daily With Dinner. 8/13/21   Sumit Carlisle MD   Cholecalciferol 125 MCG (5000 UT) tablet Take 5,000 Units by mouth Daily.    Terence Rodriguez MD   escitalopram (LEXAPRO) 10 MG tablet Take 1 tablet by mouth Daily. Indications: Major Depressive Disorder 9/25/20   Ellie Raza MD   ferrous sulfate (FerrouSul) 325 (65 FE) MG tablet Take 1 tablet by mouth Daily With Breakfast. Indications: Anemia From Inadequate Iron in the Body 9/24/20   Ellie Raza MD   lactulose (CHRONULAC) 10 GM/15ML solution Take 45 mL by mouth 3 (Three) Times a Day. 8/13/21   Sumit Carlisle MD   Menthol-Zinc Oxide (Calmoseptine) 0.44-20.6 % ointment Apply  topically to the appropriate area as directed Every 12 (Twelve) Hours.    Terence Rodriguez MD   metFORMIN (GLUCOPHAGE) 500 MG tablet Take 500 mg by mouth Daily.    Terence Rodriguez MD   multivitamin with minerals (MULTIVITAMIN ADULT PO) Take 1 tablet by mouth Daily.    Terence Rodriguez MD   pantoprazole (PROTONIX) 40 MG EC tablet Take 1 tablet by mouth Daily. Indications: Gastroesophageal Reflux Disease 9/24/20   Ellie Raza MD       Review of Systems:  Review of Systems   Unable to perform ROS: Psychiatric disorder            Physical Exam:   Temp:  [97.9 °F (36.6 °C)-98.8 °F (37.1 °C)] 97.9 °F (36.6 °C)  Heart Rate:  [79-82] 79  Resp:  [18] 18  BP: (115-132)/(57-66) 115/57  Physical Exam  Vitals and nursing note reviewed.   Constitutional:       General: She is not in acute distress.  HENT:      Head: Normocephalic and atraumatic.      Right Ear: External ear normal.      Left Ear: External ear normal.      Nose: Nose normal.      Mouth/Throat:      Pharynx: Oropharynx is clear.   Eyes:      General: No scleral icterus.        Right eye: No discharge.         Left eye: No discharge.      Conjunctiva/sclera: Conjunctivae normal.    Cardiovascular:      Rate and Rhythm: Normal rate and regular rhythm.      Pulses: Normal pulses.      Heart sounds: Normal heart sounds. No murmur heard.   No friction rub. No gallop.    Pulmonary:      Effort: Pulmonary effort is normal. No respiratory distress.      Breath sounds: Normal breath sounds. No stridor. No wheezing, rhonchi or rales.   Abdominal:      General: Bowel sounds are normal. There is no distension.      Palpations: Abdomen is soft.      Tenderness: There is no abdominal tenderness.   Musculoskeletal:         General: Normal range of motion.      Cervical back: Normal range of motion and neck supple.   Skin:     General: Skin is warm and dry.   Neurological:      General: No focal deficit present.      Mental Status: She is alert. She is disoriented.   Psychiatric:         Mood and Affect: Mood normal.           Results Reviewed:  I have personally reviewed current lab, radiology, and data and agree with results.  Lab Results (last 24 hours)     Procedure Component Value Units Date/Time    Glucose, Fasting [[id number]]  (Normal) Collected: 08/14/21 0550    Specimen: Blood Updated: 08/14/21 0659     Glucose, Fasting 83 mg/dL     Lipid Panel [[id number]]  (Abnormal) Collected: 08/14/21 0550    Specimen: Blood Updated: 08/14/21 0659     Total Cholesterol 105 mg/dL      Triglycerides 59 mg/dL      HDL Cholesterol 30 mg/dL      LDL Cholesterol  62 mg/dL      VLDL Cholesterol 13 mg/dL      LDL/HDL Ratio 2.11    Narrative:      Cholesterol Reference Ranges  (U.S. Department of Health and Human Services ATP III Classifications)    Desirable          <200 mg/dL  Borderline High    200-239 mg/dL  High Risk          >240 mg/dL      Triglyceride Reference Ranges  (U.S. Department of Health and Human Services ATP III Classifications)    Normal           <150 mg/dL  Borderline High  150-199 mg/dL  High             200-499 mg/dL  Very High        >500 mg/dL    HDL Reference Ranges  (U.S. Department of Health  and Human Services ATP III Classifcations)    Low     <40 mg/dl (major risk factor for CHD)  High    >60 mg/dl ('negative' risk factor for CHD)        LDL Reference Ranges  (U.S. Department of Health and Human Services ATP III Classifcations)    Optimal          <100 mg/dL  Near Optimal     100-129 mg/dL  Borderline High  130-159 mg/dL  High             160-189 mg/dL  Very High        >189 mg/dL        Imaging Results (Last 24 Hours)     ** No results found for the last 24 hours. **            Assessment:    Active Hospital Problems    Diagnosis    • Schizophrenia (CMS/[facility])              Plan:  1. Schizophrenia: defer management to psychiatry.   2. HTN: BP stable.   3. Type 2 DM: FSBS AC and HS with SSI.  Continue Metformin.   4. Thrombocytopenia: platelets currently 52.  No signs of bleeding or bruising noted. Will continue to monitor.    I confirmed that the patient's Advance Care Plan is present, code status is documented, or surrogate decision maker is listed in the patient's medical record.      I have utilized all available immediate resources to obtain, update, or review the patient's current medications.          This document has been electronically signed by CLAIRE Amaya on August 14, 2021 15:11 CDT

## 2021-08-14 NOTE — PLAN OF CARE
Goal Outcome Evaluation:  Plan of Care Reviewed With: patient           Outcome Summary: OT eval complete, eval performed with PT. Supine in bed upon arrival. Patient requesting to be changed. Mod assist for L/R rolling with verbal cues for hand placement. Dependent for toileting care. LE dressing performed, dependent for doffing of soiled diaper, donning of scrub pants (performed supine and adjusted EOB and in standing). Sit to stand and bed to chair transfer performed with moderate assistance x2 person. Staff trained in how to transfer this patient (recommending 2 person assist). Recommend 24/7 assistance and continued OT services as well as cont inpatient OT to maximize independence in ADLs, transfers, and UE strength and endurance.

## 2021-08-15 NOTE — NURSING NOTE
Patient POA with a shear to her right buttock.  Recommend cutting polymem to fit wound and securing with medipore tape.   Patient would benefit from turn protocol.

## 2021-08-15 NOTE — PROGRESS NOTES
[facility] Medicine Services  INPATIENT PROGRESS NOTE    Length of Stay: 2  Date of Admission: 8/13/2021  Primary Care Physician: Alisia Ramirez APRN    Subjective   Chief Complaint: altered mental status   HPI:  71 year old female with past medical history of DM, bipolar disorder, schizoaffective disorder, HTN, GERD who is currently admitted to behavioral health unit related to schizophrenia.  Hospitalist team is consulted for medical assessment and management.  Patient is poor historian due to mental illness but denies complaints during interaction.  She is seen in dining area eating lunch, smiling and speaking with staff.        Review of Systems   Unable to perform ROS: Psychiatric disorder   Cardiovascular: Negative for chest pain.   Gastrointestinal: Negative for abdominal pain.   Musculoskeletal: Negative for back pain and neck pain.   Neurological: Negative for headaches.   Psychiatric/Behavioral: Positive for confusion.        All pertinent negatives and positives are as above. All other systems have been reviewed and are negative unless otherwise stated.     Objective    Temp:  [97.9 °F (36.6 °C)-98.2 °F (36.8 °C)] 97.9 °F (36.6 °C)  Heart Rate:  [82-89] 89  Resp:  [18] 18  BP: (106-145)/(55-97) 106/55    Physical Exam  Vitals and nursing note reviewed.   Constitutional:       General: She is not in acute distress.     Appearance: Normal appearance.   HENT:      Head: Normocephalic and atraumatic.      Right Ear: External ear normal.      Left Ear: External ear normal.      Nose: Nose normal.      Mouth/Throat:      Mouth: Mucous membranes are moist.   Eyes:      General:         Right eye: No discharge.         Left eye: No discharge.      Conjunctiva/sclera: Conjunctivae normal.   Pulmonary:      Effort: Pulmonary effort is normal. No respiratory distress.      Breath sounds: No wheezing.   Abdominal:      General: There is no distension.   Musculoskeletal:          General: No swelling.      Cervical back: Normal range of motion.   Skin:     General: Skin is dry.   Neurological:      General: No focal deficit present.      Mental Status: She is alert.   Psychiatric:         Mood and Affect: Mood normal.             Results Review:  I have reviewed the labs, radiology results, and diagnostic studies.    Laboratory Data:   Results from last 7 days   Lab Units 08/13/21  1510 08/13/21  0601 08/11/21  0702 08/09/21  0604 08/09/21  0604   SODIUM mmol/L  --  137 142  --  140   POTASSIUM mmol/L 4.2 3.6 3.7   < > 4.3   CHLORIDE mmol/L  --  106 111*  --  114*   CO2 mmol/L  --  25.0 25.0  --  21.0*   BUN mg/dL  --  6* 8  --  9   CREATININE mg/dL  --  0.77 0.73  --  0.75   GLUCOSE mg/dL  --  101* 104*  --  114*   CALCIUM mg/dL  --  9.4 9.1  --  9.3   BILIRUBIN mg/dL  --  0.9 1.0  --  0.9   ALK PHOS U/L  --  81 72  --  78   ALT (SGPT) U/L  --  114* 112*  --  114*   AST (SGOT) U/L  --  131* 133*  --  118*   ANION GAP mmol/L  --  6.0 6.0  --  5.0    < > = values in this interval not displayed.     Estimated Creatinine Clearance: 59.6 mL/min (by C-G formula based on SCr of 0.77 mg/dL).          Results from last 7 days   Lab Units 08/15/21  1023 08/13/21  0601 08/11/21  0702 08/10/21  0655 08/09/21  0604   WBC 10*3/mm3 4.54 4.40 4.59 3.97 4.44   HEMOGLOBIN g/dL 9.6* 9.8* 9.9* 10.5* 10.2*   HEMATOCRIT % 28.3* 28.7* 28.2* 31.0* 30.2*   PLATELETS 10*3/mm3 57* 52* 48* 44* 39*     Results from last 7 days   Lab Units 08/11/21  0702 08/10/21  0655 08/09/21  0604   INR  1.70* 1.73* 1.87*       Culture Data:   No results found for: BLOODCX  No results found for: URINECX  No results found for: RESPCX  No results found for: WOUNDCX  No results found for: STOOLCX  No components found for: BODYFLD    Radiology Data:   Imaging Results (Last 24 Hours)     ** No results found for the last 24 hours. **          I have reviewed the patient's current medications.     Assessment/Plan     Active Hospital  Problems    Diagnosis    • Schizophrenia (CMS/[facility])        Plan:    1. Schizophrenia: defer management to psychiatry.   2. HTN: BP stable.   3. Type 2 DM: FSBS AC and HS with SSI.  Continue Metformin.   4. Thrombocytopenia: platelets currently 57.  No signs of bleeding or bruising noted.  Will need outpatient monitoring.  Plan for CBC one week post discharge and PCP follow up.    Patient is currently medically stable.  Hospitalist team can see on as needed basis.  Please call if any needs arise.     I confirmed that the patient's Advance Care Plan is present, code status is documented, or surrogate decision maker is listed in the patient's medical record.            This document has been electronically signed by CLAIRE Amaya on August 15, 2021 13:10 CDT

## 2021-08-15 NOTE — NURSING NOTE
Behavior   Note any precipitants to event or behavior   Describe level and action of any aggressive behavior or speech and associated interventions.     Anxiety: Denies at this time  Depression: Denies at this time  Pain  denies  AVH   denies  S/I   denies  Plan  none  H/I   denies  Plan  none    Affect   blunted      Note:  Patient presents with a blunted affect and fair eye contact.  Patient denies anxiety, depression, SI, HI, AVH.  Patient has been med compliant and has not participated in unit activities.  Patient spent most of the shift in bed, but was observed sitting at the tables at the beginning of shift.  Patient denies pain, discomfort, or any new medical problems.  Patient is calm and oriented x 2.  Patient has voiced no concerns at this moment.  Will continue to monitor patient for safety.      Intervention    PRN medication utilized:  no    Instructed in medication usage and effects  Medications administered as ordered  Encouraged to verbalize needs      Response    Verbalized understanding   Did patient take medications as ordered yes  Did patient interact with assessment?  yes    Plan    Will monitor for safety  Will monitor every 15 minutes as ordered  Will evaluate and promote the plan of care    Last BM:  Unknown date  (Please chart in I/O as well)

## 2021-08-15 NOTE — H&P
Psychiatric & Behavioral Health History & Physical  8/14/2021    --> Source of History: chart review and the patient; staff    --> Chief Complaint: Schizophrenia    History of Present Illness:  Ms. Natacha Gandhi is a 71 y.o. female with a concurrent neuropsychiatric history notable for Schizophrenia.    Presents with psychosis and disorganization. Onset of symptoms was gradual starting several days ago.  Symptoms have been present on an constant basis. Symptoms are associated with medical illness.  Symptoms are aggravated by problems with health.   Symptoms improve with supportive care.  Patient's symptom severity is severe.  Patient's symptoms occur in the context of chronic illness.    Seen on consultation; per Dr. Raza's consult note:  Patient is a 71 y.o. female who presents with altered mental status. Onset of symptoms was abrupt starting 1 week ago.  Symptoms have been present on an intermittent basis. Symptoms are associated with unresponsiveness.  Symptoms are aggravated by none known.   Patient's symptoms occur in the context of schizophrenia and one [facility] for [facility]     Patient presented for two episodes of unresponsiveness at the SNF in the last week.  The episodes last 10-15 minutes and it is reported that she was not responsive to sternal rub.  She was sent to the ED by SNF after the second episode.     When I enter the patient's room, she is sitting in bed with mouth open with right hand at mouth as if she is feeding herself.  Her lunch tray is present but there food is covered by a lid.  Patient is responsive, however, and does interact.  She knows her name and that she is in the hosp. She is able to report that she lives at a facility but she could not recall the full name.  She does report being blind.     Patient's brother came in during my interaction and he provided the further history and confirmation of background information.  She notes  she had similar episodes of being unresponsive on at least 3 separate occasions around April 2021.  This was prior to placement at the SNF.  He notes that she was taken to the ED on one of these episodes.  He notes that by the time she was picked up by EMS, the episode had ended.  He is not able to recall any episodes like this prior to this past spring.  He does not recall witness any posturing.  He notes she was bed bound and was simply just laying there not responding.    Today she is pleasant but still disorganized.  Denies any issues or concerns.  She has recently been changed from risperidone to Abilify.  She is transferred to [facility] for further stabilization ensure maintenance of improvement given change from high affinity D2 antagonist to low-affinity anti-D2 antagonist in the setting of chronic schizophrenia.        Psychiatric Review Of Systems:  --Positive disorganization    Concurrent Psychiatric History:  --Past neuropsychiatric history:  • Schizophrenia    --Psychiatric Hospitalizations:   Reports one prior hospitalization. Patient's hospitalizations have been for psychotic episodes. Previously hospitalized here on the [facility]    --Suicide Attempts:   Denies any prior suicide attempts.    --Firearm Access: Denied    --Prior Treatment:  --Outpatient: None clear    --Prior Medications Trials:  • Lexapro  • Seroquel  • Risperdal    --History of violence or legal issues:   Denies significant history of legal issues.    -Abuse/Trauma/Neglect/Exploitation: none per chart or family      Substance Use:   --No A&D hx      Social History:  --> Lives at [facility] since June 2021.  11th grade education and on disability.   with 1 son.    Social History     Socioeconomic History   • Marital status:      Spouse name: Not on file   • Number of children: Not on file   • Years of education: Not on file   • Highest education level: Not on file   Tobacco Use   • Smoking status: Former Smoker  "  • Smokeless tobacco: Never Used   • Tobacco comment: \" a little bit a long time ago\"   Substance and Sexual Activity   • Alcohol use: Not Currently   • Drug use: Never   • Sexual activity: Not Currently         Family History:  Family History   Problem Relation Age of Onset   • Dementia Mother    • No Known Problems Father    • No Known Problems Sister    • No Known Problems Brother    • No Known Problems Maternal Aunt    • No Known Problems Paternal Aunt    • No Known Problems Maternal Uncle    • No Known Problems Paternal Uncle    • No Known Problems Maternal Grandfather    • No Known Problems Maternal Grandmother    • No Known Problems Paternal Grandfather    • No Known Problems Paternal Grandmother    • No Known Problems Cousin    • No Known Problems Other    • Suicide Attempts Neg Hx    • ADD / ADHD Neg Hx    • Alcohol abuse Neg Hx    • Anxiety disorder Neg Hx    • Bipolar disorder Neg Hx    • Depression Neg Hx    • Drug abuse Neg Hx    • OCD Neg Hx    • Paranoid behavior Neg Hx    • Schizophrenia Neg Hx    • Seizures Neg Hx    • Self-Injurious Behavior  Neg Hx      -->Further details: Family Suicides: Denied      Past Medical and Surgical History:  Past Medical History:   Diagnosis Date   • Bipolar disorder, unspecified (CMS/HCC)    • Depression    • Diabetes mellitus (CMS/HCC)    • GERD (gastroesophageal reflux disease)    • Hypertension    • Legal blindness    • Psychiatric illness    • Rheumatoid arthritis (CMS/HCC)    • Schizoaffective disorder (CMS/HCC)      --> Seizure Hx: Denied       Past Surgical History:   Procedure Laterality Date   • COLONOSCOPY N/A 8/29/2020    Procedure: COLONOSCOPY;  Surgeon: Ge Gorman MD;  Location: [facility];  Service: Gastroenterology;  Laterality: N/A;   • ENDOSCOPY N/A 8/28/2020    Procedure: ESOPHAGOGASTRODUODENOSCOPY;  Surgeon: Ge Gorman MD;  Location: [facility];  Service: Gastroenterology;  Laterality: N/A;       Allergies:  Patient has no known " allergies.    Medications Prior to Admission   Medication Sig Dispense Refill Last Dose   • Emollient (BAG BALM EX) Apply 1 application topically Daily. To eloisa feet   Past Week at Unknown time   • ondansetron (ZOFRAN) 4 MG tablet Take 4 mg by mouth Every 8 (Eight) Hours As Needed for Nausea or Vomiting.   Past Week at Unknown time   • risperiDONE (risperDAL) 3 MG tablet Take 3 mg by mouth Daily.   Past Week at Unknown time   • ARIPiprazole (ABILIFY) 2 MG tablet Take 6.5 tablets by mouth Daily With Dinner.      • Cholecalciferol 125 MCG (5000 UT) tablet Take 5,000 Units by mouth Daily.      • escitalopram (LEXAPRO) 10 MG tablet Take 1 tablet by mouth Daily. Indications: Major Depressive Disorder 30 tablet 0    • ferrous sulfate (FerrouSul) 325 (65 FE) MG tablet Take 1 tablet by mouth Daily With Breakfast. Indications: Anemia From Inadequate Iron in the Body 30 tablet 0    • lactulose (CHRONULAC) 10 GM/15ML solution Take 45 mL by mouth 3 (Three) Times a Day.      • Menthol-Zinc Oxide (Calmoseptine) 0.44-20.6 % ointment Apply  topically to the appropriate area as directed Every 12 (Twelve) Hours.      • metFORMIN (GLUCOPHAGE) 500 MG tablet Take 500 mg by mouth Daily.      • multivitamin with minerals (MULTIVITAMIN ADULT PO) Take 1 tablet by mouth Daily.      • pantoprazole (PROTONIX) 40 MG EC tablet Take 1 tablet by mouth Daily. Indications: Gastroesophageal Reflux Disease 30 tablet 0      --> Reviewed; per chart      Medical Review Of Systems:  --Unable to meaningfully obtain given disorganization  ROS        Objective   Objective --    Vital Signs:  Temp:  [97.9 °F (36.6 °C)] 97.9 °F (36.6 °C)  Heart Rate:  [79] 79  Resp:  [18] 18  BP: (115)/(57) 115/57    PE:  In no acute distress.  Breathing is unlabored.  No tremor or noted atrophy.  Gait is deferred.     MSE: 71-year-old female appearing older than biological age.  No eye contact but in the context of blindness.  Psychomotor slowing.  Affect remainsblunted.   Thought processing is disorganized and concrete.  Insight and judgment are limited.      Diagnostic Data:  Results source: EMR    --> EKG: I reviewed the EKG, as below:              ECG/EMG Results (all)     Procedure Component Value Units Date/Time    ECG 12 Lead [[id number]] Collected: 08/13/21 1502     Updated: 08/13/21 1530     QT Interval 362 ms      QTC Interval 425 ms     Narrative:      Test Reason : new admission  Blood Pressure :   */*   mmHG  Vent. Rate :  83 BPM     Atrial Rate :  83 BPM     P-R Int : 162 ms          QRS Dur :  62 ms      QT Int : 362 ms       P-R-T Axes :  68 -11  34 degrees     QTc Int : 425 ms    Normal sinus rhythm with sinus arrhythmia  Low voltage QRS  Cannot rule out Anterior infarct , age undetermined  Abnormal ECG  When compared with ECG of 06-FEB-2021 00:24,  QT has shortened    Referred By:            Confirmed By:               -----------------------------------------------------        --> Lab Work  Results source: EMR    Recent Results (from the past 72 hour(s))   POC Glucose Once    Collection Time: 08/11/21  8:12 PM    Specimen: Blood   Result Value Ref Range    Glucose 123 70 - 130 mg/dL   POC Glucose Once    Collection Time: 08/12/21  6:04 AM    Specimen: Blood   Result Value Ref Range    Glucose 102 70 - 130 mg/dL   Ammonia    Collection Time: 08/12/21  7:16 AM    Specimen: Blood   Result Value Ref Range    Ammonia 48 11 - 51 umol/L   Hepatitis B Core Antibody, IgM    Collection Time: 08/12/21  7:16 AM    Specimen: Blood   Result Value Ref Range    Hep B C IgM Non-Reactive Non-Reactive   Green Top (Gel)    Collection Time: 08/12/21  7:16 AM   Result Value Ref Range    Extra Tube Hold for add-ons.    POC Glucose Once    Collection Time: 08/12/21 10:09 AM    Specimen: Blood   Result Value Ref Range    Glucose 116 70 - 130 mg/dL   POC Glucose Once    Collection Time: 08/12/21  4:32 PM    Specimen: Blood   Result Value Ref Range    Glucose 136 (H) 70 - 130 mg/dL   POC  Glucose Once    Collection Time: 08/12/21  7:16 PM    Specimen: Blood   Result Value Ref Range    Glucose 106 70 - 130 mg/dL   Ammonia    Collection Time: 08/13/21  6:01 AM    Specimen: Blood   Result Value Ref Range    Ammonia 32 11 - 51 umol/L   Comprehensive Metabolic Panel    Collection Time: 08/13/21  6:01 AM    Specimen: Blood   Result Value Ref Range    Glucose 101 (H) 65 - 99 mg/dL    BUN 6 (L) 8 - 23 mg/dL    Creatinine 0.77 0.57 - 1.00 mg/dL    Sodium 137 136 - 145 mmol/L    Potassium 3.6 3.5 - 5.2 mmol/L    Chloride 106 98 - 107 mmol/L    CO2 25.0 22.0 - 29.0 mmol/L    Calcium 9.4 8.6 - 10.5 mg/dL    Total Protein 6.3 6.0 - 8.5 g/dL    Albumin 1.90 (L) 3.50 - 5.20 g/dL    ALT (SGPT) 114 (H) 1 - 33 U/L    AST (SGOT) 131 (H) 1 - 32 U/L    Alkaline Phosphatase 81 39 - 117 U/L    Total Bilirubin 0.9 0.0 - 1.2 mg/dL    eGFR  African Amer 90 >60 mL/min/1.73    Globulin 4.4 gm/dL    A/G Ratio 0.4 g/dL    BUN/Creatinine Ratio 7.8 7.0 - 25.0    Anion Gap 6.0 5.0 - 15.0 mmol/L   CBC Auto Differential    Collection Time: 08/13/21  6:01 AM    Specimen: Blood   Result Value Ref Range    WBC 4.40 3.40 - 10.80 10*3/mm3    RBC 2.98 (L) 3.77 - 5.28 10*6/mm3    Hemoglobin 9.8 (L) 12.0 - 15.9 g/dL    Hematocrit 28.7 (L) 34.0 - 46.6 %    MCV 96.3 79.0 - 97.0 fL    MCH 32.9 26.6 - 33.0 pg    MCHC 34.1 31.5 - 35.7 g/dL    RDW 15.9 (H) 12.3 - 15.4 %    RDW-SD 55.2 (H) 37.0 - 54.0 fl    MPV 11.1 6.0 - 12.0 fL    Platelets 52 (L) 140 - 450 10*3/mm3    Neutrophil % 44.1 42.7 - 76.0 %    Lymphocyte % 39.5 19.6 - 45.3 %    Monocyte % 13.0 (H) 5.0 - 12.0 %    Eosinophil % 2.5 0.3 - 6.2 %    Basophil % 0.7 0.0 - 1.5 %    Immature Grans % 0.2 0.0 - 0.5 %    Neutrophils, Absolute 1.94 1.70 - 7.00 10*3/mm3    Lymphocytes, Absolute 1.74 0.70 - 3.10 10*3/mm3    Monocytes, Absolute 0.57 0.10 - 0.90 10*3/mm3    Eosinophils, Absolute 0.11 0.00 - 0.40 10*3/mm3    Basophils, Absolute 0.03 0.00 - 0.20 10*3/mm3    Immature Grans, Absolute 0.01  0.00 - 0.05 10*3/mm3    nRBC 0.0 0.0 - 0.2 /100 WBC   POC Glucose Once    Collection Time: 08/13/21  6:35 AM    Specimen: Blood   Result Value Ref Range    Glucose 96 70 - 130 mg/dL   POC Glucose Once    Collection Time: 08/13/21 10:49 AM    Specimen: Blood   Result Value Ref Range    Glucose 110 70 - 130 mg/dL   ECG 12 Lead    Collection Time: 08/13/21  3:02 PM   Result Value Ref Range    QT Interval 362 ms    QTC Interval 425 ms   Potassium    Collection Time: 08/13/21  3:10 PM    Specimen: Blood   Result Value Ref Range    Potassium 4.2 3.5 - 5.2 mmol/L   Glucose, Fasting    Collection Time: 08/14/21  5:50 AM    Specimen: Blood   Result Value Ref Range    Glucose, Fasting 83 74 - 106 mg/dL   Lipid Panel    Collection Time: 08/14/21  5:50 AM    Specimen: Blood   Result Value Ref Range    Total Cholesterol 105 0 - 200 mg/dL    Triglycerides 59 0 - 150 mg/dL    HDL Cholesterol 30 (L) 40 - 60 mg/dL    LDL Cholesterol  62 0 - 100 mg/dL    VLDL Cholesterol 13 5 - 40 mg/dL    LDL/HDL Ratio 2.11    POC Glucose Once    Collection Time: 08/14/21  5:02 PM    Specimen: Blood   Result Value Ref Range    Glucose 148 (H) 70 - 130 mg/dL       EEG    Result Date: 8/8/2021  Narrative: Reason for referral: 71 y.o.female with altered mental status Technical Summary:  A 19 channel digital EEG was performed using the international 10-20 placement system, including eye leads and EKG leads. Duration: 37 minutes Video: None Findings: The awake tracing shows diffuse medium amplitude 4-6 Hz intermixed delta and theta activity which is present symmetrically over both hemispheres.  EMG artifact is variably prominent.  A clear posterior rhythm is not seen.  Photic stimulation does not change the background.  As the study proceeds, generalized but frontal dominant triphasic waves are seen.  Definitive epilepsy epileptiform activity and electrographic seizures however are not seen.  Hyperventilation is not performed.  Sleep is not seen.      Impression: EEG background is moderate generalized slow Generalized triphasic waves are present No electrographic seizures are seen Note-triphasic waves on a background of generalized slowing are typically seen in the setting of toxic/metabolic encephalopathies, but may also be seen with cefepime exposure This report is transcribed using the Dragon dictation system.      CT Head Without Contrast    Result Date: 8/6/2021  Narrative: CT Head Without Contrast History: Mental status change Axial scans of the brain were obtained without intravenous contrast.  Coronal and sagital reconstructions were preformed. This exam was performed according to our departmental dose-optimization program, which includes automated exposure control, adjustment of the mA and/or kV according to patient size and/or use of iterative reconstruction technique. DLP: 813.90 Comparison: August 28, 2020 Findings: Bone windows are unremarkable. Minimal fluid left mastoid air cells. No acute intracranial process. Cerebral and cerebellar atrophy. Minimal small vessel disease. No hemorrhage. No mass. No abnormal areas of increased attenuation. No midline shift. No abnormal extra-axial fluid collections.     Impression: CONCLUSION: No acute intracranial process. Cerebral and cerebellar atrophy. Minimal small vessel disease. Minimal fluid left mastoid air cells. [id number] Electronically signed by:  Waylon Larkin MD  8/6/2021 6:48 PM CDT Workstation: [id number]    US Abdomen Complete    Result Date: 8/9/2021  Narrative: PROCEDURE: Ultrasound abdomen, complete. INDICATION: elevated liver enzymes, thrombocytopenia rule out cirrhosis or splenomegaly, I95.9 Hypotension, unspecified R41.82 Altered mental status, unspecified Z74.09 Other reduced mobility R13.12 Dysphagia, oropharyngeal phase Z74.09 Other reduced mobility Z78.9 Other specified health status COMPARISON: None. FINDINGS: The liver has heterogeneous echotexture with macro lobular margin  consistent with cirrhosis. There is a color-flow in the portal and hepatic veins. No focal hepatic nodules or cyst. There is small amount of ascites in the right upper abdomen around the liver and a small amount of fluid 1 to 2 mm thick around the right kidney.. Spleen appears normal in size measuring about 2.6 cm in. There is homogeneous echotexture within the spleen. The pancreas normal in size, shape and echogenicity. Gallbladder is contracted. There is mild diffuse thickening of the gallbladder wall up to about 4.5 mm thick may be secondary to the contracted state of the gallbladder or possibly related to the ascites with edema. There does appear to be acoustically shadowing calculus within the contracted gallbladder about 5 mm. Normal caliber common bile duct 3.9 mm. No pericholecystic fluid. The right kidney measures 10.1 x 4.1 x 5.0 cm. Left kidney 10.6 x 5.6 x 4.6 cm. No hydronephrosis or renal mass on either side. There is small amount of fluid about 1 to 2 mm thick around portion of the right kidney. The proximal mid abdominal aorta and IVC visualized and normal.     Impression: Heterogeneous echotexture within the liver with macro lobular margins consistent with cirrhosis there is color-flow in the portal and hepatic veins. Small amount of ascites around the liver. Contracted gallbladder with thick wall with no pericholecystic fluid. 5 mm calculus in the gallbladder. Small amount of fluid around portion of the right kidney about 1 to 2 mm thick. [id number] Electronically signed by:  Frank Maradiaga MD  8/9/2021 9:42 AM CDT Workstation: [id number]    XR Chest 1 View    Result Date: 8/6/2021  Narrative: PORTABLE CHEST HISTORY: Altered mental status Portable AP upright film of the chest was obtained at 4:38 PM. COMPARISON: February 5, 2021 FINDINGS: The lungs are clear of an acute process. Old granulomatous disease is present. The heart is not enlarged. The pulmonary vasculature is not increased. No pleural  effusion. No pneumothorax. No acute osseous abnormality. Degenerative changes are present in the thoracic spine.     Impression: CONCLUSION: No Acute Disease [id number] Electronically signed by:  Waylon Larkin MD  8/6/2021 4:57 PM CDT Workstation: [id number]    XR Hip With or Without Pelvis 2 - 3 View Left    Result Date: 8/3/2021  Narrative: THREE-VIEW LEFT HIP HISTORY: hip pain FINDINGS: Three views of the left hip were submitted. There is no fracture or dislocation. Soft tissues appear unremarkable. Regional osseous structures appear osteopenic.     Impression: 1. No acute osseous abnormality. Electronically signed by:  Greg Kee MD  8/3/2021 9:15 PM CDT Workstation: [id number]      Lab Results   Component Value Date    GLUF 83 08/14/2021        Lab Results   Component Value Date    HGBA1C 7.80 (H) 08/28/2020       Lab Results   Component Value Date    CHOL 105 08/14/2021    TRIG 59 08/14/2021    HDL 30 (L) 08/14/2021    LDL 62 08/14/2021    VLDL 13 08/14/2021    LDLHDL 2.11 08/14/2021        TSH   Date Value Ref Range Status   08/06/2021 1.490 0.270 - 4.200 uIU/mL Final       Lab Results   Component Value Date    OVCR42MW 11.5 (L) 09/05/2020    [id number] 1,716 (H) 08/09/2021    FOLATE 16.80 08/09/2021       Lab Results   Component Value Date    IRON 112 08/09/2021    TIBC 176 (L) 08/09/2021    FERRITIN 351.70 (H) 08/09/2021           Assessment/Plan   --Patient Strengths: ability for insight, supportive family/friends   --Patient Barriers: low self esteem, poor physical health      --Diagnostic Impression: Ms. Gandhi  is a 71 y.o. female admitted for gross psychosis and disorganization with recent medicine changes, necessitating inpatient psychiatric stabilization and treatment.     Diagnostically, schizophrenia.  Recent encephalopathy that is also improved.            Assessment:  --  Schizophrenia (CMS/HCC)      Non-Psychiatric Medical Conditions Include:  --HTN: BP stable; Catpres PRN  --Type 2 DM: FSBS  AC and HS with SSI.  Continue Metformin.   --Thrombocytopenia: platelets currently 57.  No signs of bleeding or bruising noted.  Will need outpatient monitoring.  Plan for CBC one week post discharge and PCP follow up.  --GI: Cont PPI  --Hepatic: Cont Lactulose TID         Treatment Plan:  1) Will admit patient to the behavioral health unit at [facility], geriatric behavioral health unit, as a voluntary admission per family to ensure patient safety given  imminent risk status and need for emergent inpatient stabilization and treatment.    2) Patient will be provided treatment with the unit milieu, activities, therapies and psychopharmacological management.    3) Patient placed on  Q15 minute checks and Suicide and Fall precautions.    4) Hospitalist consulted for assistance in management of medical comorbidities.    5) Will order following labs:   --none given recent note    6) Will restart patient on the following psychiatric home meds:   --Abilify & Lexapro.    7) Will make the following medication changes, and proceed with the following treatment planning:   --Abilify to 15mg daily  --Lexapro 10mg daily    8) Will begin discharge planning as appropriate for patient.    9) Psychotherapy provided: supportive for < 15 min.     All questions answered for the patient.  Treatment plan and medication risks and benefits discussed with: Patient and tx team.      Estimated Length of Stay: 3-5 days  Prognosis: Joselin Anderson II, MD  08/14/21 @ 19:33 CDT  Dictated using Dragon.

## 2021-08-15 NOTE — PLAN OF CARE
Goal Outcome Evaluation:  Plan of Care Reviewed With: patient  Patient Agreement with Plan of Care: agrees     Progress: improving  Outcome Summary: Patient denies SI/HI/AVH.  Patient has been out of bed a little more today with the assistance of staff.  Patient has been calm and cooperative and med compliant.  Patient has slept 6.5 hours so far this shift.

## 2021-08-15 NOTE — PLAN OF CARE
Goal Outcome Evaluation:  Plan of Care Reviewed With: patient  Patient Agreement with Plan of Care: agrees     Progress: improving  Outcome Summary: Patient denies SI, HI, AVH. She has had no behaviors this shift and has been compliant with medications. She got out of bed and into the frankie chair for dinner and was able to independently feed herself with little direction. No falls this shift.

## 2021-08-15 NOTE — NURSING NOTE
Behavior   Note any precipitants to event or behavior   Describe level and action of any aggressive behavior or speech and associated interventions.     Anxiety: Patient denies at this time  Depression: Patient denies at this time  Pain  2  AVH   no  S/I   no  Plan  no  H/I   no  Plan  no    Affect   mood-congruent      Note: Patient denies SI, HI, AVH. She has had no behaviors this shift and has been compliant with medications. She got out of bed and into the frankie chair for dinner and was able to independently feed herself with little direction. No falls this shift      Intervention    PRN medication utilized:  no    Instructed in medication usage and effects  Medications administered as ordered  Encouraged to verbalize needs      Response    Verbalized understanding   Did patient take medications as ordered yes   Did patient interact with assessment?  yes     Plan    Will monitor for safety  Will monitor every 15 minutes as ordered  Will evaluate and promote the plan of care    Last BM:  unknown date  (Please chart in I/O as well)

## 2021-08-16 NOTE — NURSING NOTE
Behavior   Note any precipitants to event or behavior   Describe level and action of any aggressive behavior or speech and associated interventions.     Anxiety: Patient denies at this time  Depression: Patient denies at this time  Pain  0  AVH   no  S/I   no  Plan  no  H/I   no  Plan  no    Affect   mood-congruent      Note: Patient interviewed in room. Patient did not get up for breakfast this morning- states that she is too sleepy. Patient compliant with medication administration. Unable to tell nurse name and birthday    Intervention    PRN medication utilized:  no    Instructed in medication usage and effects  Medications administered as ordered  Encouraged to verbalize needs      Response    Verbalized understanding   Did patient take medications as ordered yes   Did patient interact with assessment?  minimally    Plan    Will monitor for safety  Will monitor every 15 minutes as ordered  Will evaluate and promote the plan of care    Last BM:  8/16/21  (Please chart in I/O as well)

## 2021-08-16 NOTE — PROGRESS NOTES
"Psychiatry Progress Note   8/15/2021      HD: #2  Legal: Vol per family    Chief Complaint: Schizophrenia      Subjective --  Ms. Natacha Gandhi is a 71 y.o. female who was seen on the Geriatric unit.      Pleasant.  Has remained isolated in her room but is out on the unit at times.  Denies any issues.  Better organized with increased dose of Abilify.        Review of Systems:  --Neuro: Denies headache  --GI: Denies stomachache  --MS: Denies muscle ache  --General: Denies dizziness.  ROS      Objective   Objective --    Vital Signs:  Temp:  [97.9 °F (36.6 °C)-98.2 °F (36.8 °C)] 97.9 °F (36.6 °C)  Heart Rate:  [82-89] 89  Resp:  [18] 18  BP: (106-145)/(55-97) 106/55    Patient Vitals for the past 24 hrs:   BP Temp Temp src Pulse Resp SpO2   08/15/21 0700 106/55 97.9 °F (36.6 °C) Tympanic 89 18 96 %   08/14/21 2100 145/97 98.2 °F (36.8 °C) Tympanic 82 18 98 %            Physical Exam:   -General Appearance:  alert and in NAD  -Hygiene:  Adequate   -Gait & Station:  Deferred, in bed  -Musculoskeletal:  No tremors or abnormal involuntary movements and No atrophy noted  -Pulm: unlaboured    Mental Status Exam:   --Cooperation:  Cooperative  --Eye Contact:  Non-sustained; legally blind  --Psychomotor Behavior:  Slow and Improving  --Mood:  \"OK\"  --Affect:  blunted and Improving  --Speech:  Slow and Soft  --Thought Process:  Poverty of thought, [place], Improving and More Coherent  --Associations: Goal Directed  --Themes:  None overt  --Thought Content:     --Mood congruent   --Suicidal:  Denies    --Homicidal:  Denies   --Hallucinations:  Not appearing to respond to internal stimuli at time of my interview   --Delusion:  None noted/overt  --Cognitive Functioning:  --Consciousness: awake and alert  Attention:  Improving  --Reliability:  fair  --Insight:  Improving  --Judgment:  Improving  --Impulse Control:  Improving      Lab Results (last 24 hours)     Procedure Component Value Units Date/Time    POC Glucose Once " [[id number]]  (Abnormal) Collected: 08/15/21 1637    Specimen: Blood Updated: 08/15/21 1656     Glucose 133 mg/dL      Comment: RN NotifiedOperator: [id number] OZIEL LISAMeter ID: [id number]       POC Glucose Once [[id number]]  (Normal) Collected: 08/15/21 1130    Specimen: Blood Updated: 08/15/21 1148     Glucose 90 mg/dL      Comment: : [id number] OZIEL LISAMeter ID: [id number]       Extra Tubes [[id number]] Collected: 08/15/21 1036    Specimen: Blood, Venous Line Updated: 08/15/21 1145    Narrative:      The following orders were created for panel order Extra Tubes.  Procedure                               Abnormality         Status                     ---------                               -----------         ------                     Green Top (Gel)[[id number]]                                  Final result                 Please view results for these tests on the individual orders.    Green Top (Gel) [[id number]] Collected: 08/15/21 1036    Specimen: Blood Updated: 08/15/21 1145     Extra Tube Hold for add-ons.     Comment: Auto resulted.       Scan Slide [[id number]] Collected: 08/15/21 1023    Specimen: Blood Updated: 08/15/21 1133     Anisocytosis Slight/1+     Hypochromia Slight/1+     WBC Morphology Normal     Platelet Estimate Decreased    CBC (No Diff) [[id number]]  (Abnormal) Collected: 08/15/21 1023    Specimen: Blood Updated: 08/15/21 1041     WBC 4.54 10*3/mm3      RBC 2.94 10*6/mm3      Hemoglobin 9.6 g/dL      Hematocrit 28.3 %      MCV 96.3 fL      MCH 32.7 pg      MCHC 33.9 g/dL      RDW 16.6 %      RDW-SD 57.1 fl      MPV 10.9 fL      Platelets 57 10*3/mm3     POC Glucose Once [[id number]]  (Normal) Collected: 08/15/21 0633    Specimen: Blood Updated: 08/15/21 0653     Glucose 101 mg/dL      Comment: : [id number] JOSI MICHAILMeter ID: [id number]           Results source: EMR    Imaging Results (Last 24 Hours)     ** No results found for the last 24 hours. **        Results source:  EMR    Medications:   Scheduled Meds:ARIPiprazole, 15 mg, Oral, Daily With Dinner  vitamin D3, 5,000 Units, Oral, Daily  escitalopram, 10 mg, Oral, Daily  ferrous sulfate, 324 mg, Oral, Daily With Breakfast  insulin aspart, 0-7 Units, Subcutaneous, TID AC  lactulose, 30 g, Oral, TID  metFORMIN, 500 mg, Oral, Daily  multivitamin with minerals, 1 tablet, Oral, Daily  pantoprazole, 40 mg, Oral, Daily      Continuous Infusions:   PRN Meds:.•  aluminum-magnesium hydroxide-simethicone  •  dextrose  •  dextrose  •  glucagon (human recombinant)  •  hydrOXYzine pamoate  •  magnesium hydroxide  •  traZODone      Assessment & Planning:     Schizophrenia (CMS/HCC)  --HTN: BP stable; Catpres PRN  --Type 2 DM: FSBS AC and HS with SSI.  Continue Metformin.   --Thrombocytopenia: platelets currently 57.  No signs of bleeding or bruising noted.  Will need outpatient monitoring.  Plan for CBC one week post discharge and PCP follow up.  --GI: Cont PPI  --Hepatic: Cont Lactulose TID    --Cont on Q15 minute checks  and Suicide and Fall precautions.    --Schizophrenia: Continue Abilify 15 mg daily.    -COVID testing in the morning.    -Supportive therapy for less than 15 minutes provided    --> Dispostion Planning: Return to SNF/assisted likely in the next 2 days if doing well.    Treatment plan and medication risks and benefits discussed with: Patient and Treatment Team.    Tyrel Anderson II, MD  08/15/21 @ 19:23 CDT  Dictated using Dragon.

## 2021-08-16 NOTE — PLAN OF CARE
Goal Outcome Evaluation:  Plan of Care Reviewed With: patient  Patient Agreement with Plan of Care: agrees     Progress: no change  Outcome Summary: sitting in frankie chair in common area the majority of the day, compliant with medication administration, disoriented to person, place, time, and situation, pt is drowsy and responds to voice or touch

## 2021-08-16 NOTE — PROGRESS NOTES
Attempted to completed Recreation Therapy Assessment.  Patient only minimally engaged.  She is reclined in frankie chair, complaining of her back hurting and wanting to lay down.  Patient does not identify leisure interest and falls back asleep.

## 2021-08-16 NOTE — SIGNIFICANT NOTE
08/16/21 1252   OTHER   Discipline physical therapy assistant   Rehab Time/Intention   Session Not Performed other (see comments)   Pt soundly asleep and unarrouseable

## 2021-08-16 NOTE — NURSING NOTE
"Patient refused evening dose of abilify and lactulose. When signee walked into the room patient states \"What now?\" Patient was lying down. Patient states that really wants to rest and does not want to take medication at this time.   "

## 2021-08-16 NOTE — SIGNIFICANT NOTE
08/16/21 0735   OTHER   Discipline occupational therapist   Rehab Time/Intention   Session Not Performed other (see comments)     CNA getting vitals and pt is asleep. OT attempted to arouse pt to engage in therapy but OT unable at this time. OT will follow up for tx as pt appropriate.

## 2021-08-16 NOTE — PLAN OF CARE
Goal Outcome Evaluation:  Plan of Care Reviewed With: patient  Patient Agreement with Plan of Care: unable to participate     Progress: no change  Outcome Summary: Patient has been observed sleeping the entire shift with rise and fall of chest noted and no signs of distress.  Patient refused to take evening medications that were prescribed even with multiple attempts.  Patient displayed no other notable behaviors during shift.  Patient has slept a total of 9.5 hours so far this shift.

## 2021-08-16 NOTE — NURSING NOTE
Behavior   Note any precipitants to event or behavior   Describe level and action of any aggressive behavior or speech and associated interventions.     Anxiety: Patient did not answer  Depression: Patient did not answer  Pain  none  AVH   none   S/I   none  Plan  none  H/I   none  Plan  none    Affect   flat      Note:  Patient was observed sleeping in bed the entire shift with visible rise and fall of chest noted.  Patient refused to take prescribed evening medications.  Patient did not participate in unit activities or interacted with peers/staff. Patient displayed no nonverbal pain cues.  Patient did not eat evening snack but did eat appropriately during day shift with assistance.  Will continue to monitor patient for safety.       Intervention    PRN medication utilized: none    Instructed in medication usage and effects  Medications administered as ordered  Encouraged to verbalize needs      Response    Verbalized understanding   Did patient take medications as ordered no - refused  Did patient interact with assessment?  minimally    Plan    Will monitor for safety  Will monitor every 15 minutes as ordered  Will evaluate and promote the plan of care    Last BM:  Unknown date  (Please chart in I/O as well)

## 2021-08-17 NOTE — NURSING NOTE
"Behavior   Note any precipitants to event or behavior   Describe level and action of any aggressive behavior or speech and associated interventions.     Anxiety: Patient denies at this time  Depression: Patient denies at this time  Pain  0  AVH   no  S/I   no  Plan  no  H/I   no  Plan  no    Affect   mood-congruent      Note: Patient interviewed in room. Alert to person. Compliant with medication administration. Patient asks signee \"Why are they lying?\" When asked who she was talking about patient would not answer.     Intervention    PRN medication utilized:  no    Instructed in medication usage and effects  Medications administered as ordered  Encouraged to verbalize needs      Response    Verbalized understanding   Did patient take medications as ordered yes   Did patient interact with assessment?  minimally    Plan    Will monitor for safety  Will monitor every 15 minutes as ordered  Will evaluate and promote the plan of care    Last BM:  8/16/21  (Please chart in I/O as well)    "

## 2021-08-17 NOTE — CONSULTS
Adult Nutrition  Assessment    Patient Name:  Natacha Gandhi  YOB: 1950  MRN: [id number]  Admit Date:  8/13/2021    Assessment Date:  8/17/2021    Comments:  Pt seen 8/16/21.  Pt with dx psychosis and disorganization with recent med changes, schizophrenia, recent encephalopathy, HTN, Type 2 DM, thrombocytopenia.  Pt is blind.  Pt confused and gave limited responses.  Pt reported to h ave diarrhea related to receiving lactulose.  Ammonia currently WNL.  AST, ALT elevated consistent with dx liver disease.  Blood glucose is usually elevated.  Glucophage, sliding scale novolog prescribed.  Hgb, Hct are low.  Ferrous Sulfate, MVI with minerals prescribed.  Pt with left lower gluteal pressure injury, wound right upper coccyx.  Will maintain pt on prescribed diet adding Boost Glucose Control to meals.       Reason for Assessment     Row Name 08/17/21 1542          Reason for Assessment    Reason For Assessment  per organizational policy     Diagnosis  -- wound     Identified At Risk by Screening Criteria  -- left lower gluteal pressure injury, wound right upper coccyx             Labs/Tests/Procedures/Meds     Row Name 08/17/21 1546          Labs/Procedures/Meds    Lab Results Reviewed  reviewed, pertinent        Medications    Pertinent Medications Reviewed  reviewed, pertinent         Physical Findings     Row Name 08/17/21 1548          Physical Findings    Skin  other (see comments) left lower gluteal pressure injury, wound right upper coccyx         Estimated/Assessed Needs     Row Name 08/17/21 154          Calculation Measurements    Weight Used For Calculations  63.6 kg (140 lb 3.4 oz)        Estimated/Assessed Needs    Additional Documentation  Calorie Requirements (Group);Fluid Requirements (Group);Sharla-StMallika Frederick Equation (Group);Protein Requirements (Group)        Calorie Requirements    Estimated Calorie Requirement (kcal/day)  1468     Estimated Calorie Need Method  Sharla-St Otoniel         [place]-St. Jeor Equation    RMR ([place]-St. Jeor Equation)  1129.998        Protein Requirements    Weight Used For Protein Calculations  63.6 kg (140 lb 3.4 oz)     Est Protein Requirement Amount (gms/kg)  1.2 gm protein     Estimated Protein Requirements (gms/day)  76.32        Fluid Requirements    Fluid Requirements (mL/day)  1590     RDA Method (mL)  1590         Nutrition Prescription Ordered     Row Name 08/17/21 1552          Nutrition Prescription PO    Current PO Diet  Regular         Evaluation of Received Nutrient/Fluid Intake     Row Name 08/17/21 1552          PO Evaluation    Number of Meals  4 plus 2 snacks     % PO Intake  25% - 1x, 0% - 3x plus 75% - 1x, 0% - 1x for snacks               Electronically signed by:  Suzette Steiner RD  08/17/21 15:53 CDT

## 2021-08-17 NOTE — THERAPY TREATMENT NOTE
Patient Name: Natacha Gandhi  : 1950    MRN: [id number]                              Today's Date: 2021       Admit Date: 2021    Visit Dx:     ICD-10-CM ICD-9-CM   1. Impaired functional mobility, balance, gait, and endurance  Z74.09 V49.89   2. Impaired mobility and ADLs  Z74.09 V49.89    Z78.9    3. Thrombocytopenia (CMS/HCC)  D69.6 287.5     Patient Active Problem List   Diagnosis   • Acute GI bleeding   • Altered mental status, unspecified   • Schizophrenia, paranoid type (CMS/HCC)   • Acute exacerbation of chronic paranoid schizophrenia (CMS/HCC)   • Acute blood loss anemia   • DMII (diabetes mellitus, type 2) (CMS/HCC)   • Hypotension   • Lactic acidosis   • Thrombocytopenia (CMS/HCC)   • Encephalopathy, hepatic (CMS/HCC)   • Schizophrenia (CMS/HCC)     Past Medical History:   Diagnosis Date   • Bipolar disorder, unspecified (CMS/HCC)    • Depression    • Diabetes mellitus (CMS/HCC)    • GERD (gastroesophageal reflux disease)    • Hypertension    • Legal blindness    • Psychiatric illness    • Rheumatoid arthritis (CMS/HCC)    • Schizoaffective disorder (CMS/HCC)      Past Surgical History:   Procedure Laterality Date   • COLONOSCOPY N/A 2020    Procedure: COLONOSCOPY;  Surgeon: Ge Gorman MD;  Location: [facility];  Service: Gastroenterology;  Laterality: N/A;   • ENDOSCOPY N/A 2020    Procedure: ESOPHAGOGASTRODUODENOSCOPY;  Surgeon: Ge Gorman MD;  Location: [facility];  Service: Gastroenterology;  Laterality: N/A;     General Information     Row Name 21 1008          OT Time and Intention    Document Type  therapy note (daily note)  -     Mode of Treatment  individual therapy;occupational therapy  -     Row Name 21 1008          General Information    Patient Profile Reviewed  yes  -     Existing Precautions/Restrictions  fall;other (see comments)  -     Row Name 21 1008          Cognition    Orientation Status (Cognition)  oriented  to;person  -     Row Name 08/17/21 1008          Safety Issues, Functional Mobility    Safety Issues Affecting Function (Mobility)  safety precautions follow-through/compliance;insight into deficits/self-awareness;ability to follow commands;judgment;safety precaution awareness;problem-solving;awareness of need for assistance  -     Impairments Affecting Function (Mobility)  balance;cognition;endurance/activity tolerance;range of motion (ROM);coordination;postural/trunk control  -     Cognitive Impairments, Mobility Safety/Performance  attention;safety precaution awareness;awareness, need for assistance;safety precaution follow-through;sequencing abilities;insight into deficits/self-awareness;problem-solving/reasoning;judgment;impulsivity  -     Comment, Safety Issues/Impairments (Mobility)  delayed responses at times still does not follow directions with extended time, perservates with motions at times  -       User Key  (r) = Recorded By, (t) = Taken By, (c) = Cosigned By    Initials Name Provider Type     Luli Manley, OTR/L Occupational Therapist          Mobility/ADL's     Row Name 08/17/21 1008          Bed Mobility    Comment (Bed Mobility)  after delayed response pt verbalized agreement to get to the EOB, however after multiple attempts pt started to initiate with SBA but never fully made it to the EOB. Pt got back into supine stating she could not put her socks on sitting EOB. OT gave socks to pt and pt did not initiate with extended time. OT placed socks on pt then pt started to move to place socks on. Pt left in supine and RN notified, MD in room stated pt may be going back to [facility] this date.  -     Row Name 08/17/21 1008          Activities of Daily Living    BADL Assessment/Intervention  lower body dressing  -     Row Name 08/17/21 1008          Lower Body Dressing Assessment/Training    [place] Level (Lower Body Dressing)  don;socks;dependent (less than 25% patient effort)  -      Position (Lower Body Dressing)  supine  -       User Key  (r) = Recorded By, (t) = Taken By, (c) = Cosigned By    Initials Name Provider Type    Luli Luo, OTR/L Occupational Therapist        Obj/Interventions    No documentation.       Goals/Plan     [facility] Name 08/17/21 1008          Transfer Goal 1 (OT)    Activity/Assistive Device (Transfer Goal 1, OT)  transfers, all  -     [place] Level/Cues Needed (Transfer Goal 1, OT)  supervision required  -     Time Frame (Transfer Goal 1, OT)  long term goal (LTG)  -     Progress/Outcome (Transfer Goal 1, OT)  goal not met  -BH     Row Name 08/17/21 1008          Bathing Goal 1 (OT)    Activity/Device (Bathing Goal 1, OT)  bathing skills, all  -     [place] Level/Cues Needed (Bathing Goal 1, OT)  supervision required  -     Time Frame (Bathing Goal 1, OT)  long term goal (LTG)  -     Progress/Outcomes (Bathing Goal 1, OT)  goal not met  -BH     Row Name 08/17/21 1008          Dressing Goal 1 (OT)    Activity/Device (Dressing Goal 1, OT)  dressing skills, all  -     [place]/Cues Needed (Dressing Goal 1, OT)  supervision required  -     Time Frame (Dressing Goal 1, OT)  long term goal (LTG)  -     Progress/Outcome (Dressing Goal 1, OT)  goal not met  -BH     Row Name 08/17/21 1008          Toileting Goal 1 (OT)    Activity/Device (Toileting Goal 1, OT)  toileting skills, all  -     [place] Level/Cues Needed (Toileting Goal 1, OT)  supervision required  -     Time Frame (Toileting Goal 1, OT)  long term goal (LTG)  -     Progress/Outcome (Toileting Goal 1, OT)  goal not met  -[facility] Name 08/17/21 1008          Strength Goal 1 (OT)    Strength Goal 1 (OT)  Patient to participate in eloisa UE HEP to increase UE strength and endurance for ADLs and transfers  -     Time Frame (Strength Goal 1, OT)  long term goal (LTG);by discharge  -     Progress/Outcome (Strength Goal 1, OT)  goal not met  -       User Key  (r) =  Recorded By, (t) = Taken By, (c) = Cosigned By    Initials Name Provider Type     Luli Manley, OTR/L Occupational Therapist        Clinical Impression     Row Name 08/17/21 1008          Pain Scale: Numbers Pre/Post-Treatment    Pretreatment Pain Rating  0/10 - no pain  -     Posttreatment Pain Rating  0/10 - no pain  -     Pain Intervention(s)  Repositioned;Distraction;Emotional support  -     Row Name 08/17/21 1008          Plan of Care Review    Plan of Care Reviewed With  patient  -     Outcome Summary  OT tx this date. Pt alert and with extended time responsed agreed to therapy. However with extended time and redirection pt very slow with movements and never finished a request. Pt attempted to get to the EOB with SBA with slow movements and extended time but did not fully make it before putting herself back in the bed. Pt dependent with donning her socks but then started to initiate supine after telling OT she could not put them on. Pt left with MD in room to rest and RN notified of session. Pt may be d/c to SNF this date.  -     Row Name 08/17/21 1008          Therapy Plan Review/Discharge Plan (OT)    Anticipated Discharge Disposition (OT)  skilled nursing facility  -[facility] Name 08/17/21 1008          Positioning and Restraints    Pre-Treatment Position  in bed  -     Post Treatment Position  bed  -     In Bed  supine;notified nsg;side rails up x3;with other staff  -       User Key  (r) = Recorded By, (t) = Taken By, (c) = Cosigned By    Initials Name Provider Type     Luli Manley, OTR/L Occupational Therapist        Outcome Measures     Row Name 08/17/21 1008          How much help from another is currently needed...    Putting on and taking off regular lower body clothing?  1  -     Bathing (including washing, rinsing, and drying)  2  -     Toileting (which includes using toilet bed pan or urinal)  1  -     Putting on and taking off regular upper body clothing  2  -BH      Taking care of personal grooming (such as brushing teeth)  2  -     Eating meals  2  -     AM-PAC 6 Clicks Score (OT)  10  -     Row Name 08/17/21 1008          Functional Assessment    Outcome Measure Options  AM-PAC 6 Clicks Daily Activity (OT)  -       User Key  (r) = Recorded By, (t) = Taken By, (c) = Cosigned By    Initials Name Provider Type     Luli Manley, OTR/L Occupational Therapist          Occupational Therapy Education                 Title: PT OT SLP Therapies (In Progress)     Topic: Occupational Therapy (In Progress)     Point: ADL training (In Progress)     Description:   Instruct learner(s) on proper safety adaptation and remediation techniques during self care or transfers.   Instruct in proper use of assistive devices.              Learning Progress Summary           Patient Acceptance, E, NR by  at 8/17/2021 1046                   Point: Home exercise program (Not Started)     Description:   Instruct learner(s) on appropriate technique for monitoring, assisting and/or progressing therapeutic exercises/activities.              Learner Progress:  Not documented in this visit.          Point: Precautions (In Progress)     Description:   Instruct learner(s) on prescribed precautions during self-care and functional transfers.              Learning Progress Summary           Patient Acceptance, E, NR by  at 8/17/2021 1046    Acceptance, E,TB, NR by  at 8/14/2021 1132    Comment: Role of OT, transfer training, POC                   Point: Body mechanics (In Progress)     Description:   Instruct learner(s) on proper positioning and spine alignment during self-care, functional mobility activities and/or exercises.              Learning Progress Summary           Patient Acceptance, E, NR by  at 8/17/2021 1046    Acceptance, E,TB, NR by  at 8/14/2021 1132    Comment: Role of OT, transfer training, POC                               User Key     Initials Effective Dates Name  Provider Type Discipline     06/16/21 -  Luli Manley OTR/L Occupational Therapist OT     06/14/21 -  Christopher Vanessa, OT Occupational Therapist OT              OT Recommendation and Plan     Plan of Care Review  Plan of Care Reviewed With: patient  Outcome Summary: OT tx this date. Pt alert and with extended time responsed agreed to therapy. However with extended time and redirection pt very slow with movements and never finished a request. Pt attempted to get to the EOB with SBA with slow movements and extended time but did not fully make it before putting herself back in the bed. Pt dependent with donning her socks but then started to initiate supine after telling OT she could not put them on. Pt left with MD in room to rest and RN notified of session. Pt may be d/c to SNF this date.     Time Calculation:   Time Calculation- OT     Row Name 08/17/21 1047             Time Calculation- OT    OT Start Time  1008  -      OT Stop Time  1025  -      OT Time Calculation (min)  17 min  -      Total Timed Code Minutes- OT  17 minute(s)  -      OT Received On  08/17/21  -         Timed Charges    38861 - OT Therapeutic Activity Minutes  17  -BH         Total Minutes    Timed Charges Total Minutes  17  -       Total Minutes  17  -        User Key  (r) = Recorded By, (t) = Taken By, (c) = Cosigned By    Initials Name Provider Type     Luli Manley, OTR/L Occupational Therapist        Therapy Charges for Today     Code Description Service Date Service Provider Modifiers Qty    [id number]  OT THERAPEUTIC ACT EA 15 MIN 8/17/2021 Luli Manley OTR/L GO 1               LENORA Ramos/[place]  8/17/2021

## 2021-08-17 NOTE — DISCHARGE SUMMARY
Admission Date: 8/13/2021    Discharge Date: 08/17/21    Psychiatric History: Ms. Natacha Gandhi is a 71 y.o. female with a concurrent neuropsychiatric history notable for Schizophrenia.     Presents with psychosis and disorganization. Onset of symptoms was gradual starting several days ago.  Symptoms have been present on an constant basis. Symptoms are associated with medical illness.  Symptoms are aggravated by problems with health.   Symptoms improve with supportive care.  Patient's symptom severity is severe.  Patient's symptoms occur in the context of chronic illness.     Seen on consultation; per Dr. Raza's consult note:  Patient is a 71 y.o. female who presents with altered mental status. Onset of symptoms was abrupt starting 1 week ago.  Symptoms have been present on an intermittent basis. Symptoms are associated with unresponsiveness.  Symptoms are aggravated by none known.   Patient's symptoms occur in the context of schizophrenia and one prior psych admission for psychosis in Sept 2020 on the [facility].     Patient presented for two episodes of unresponsiveness at the SNF in the last week.  The episodes last 10-15 minutes and it is reported that she was not responsive to sternal rub.  She was sent to the ED by SNF after the second episode.     When I enter the patient's room, she is sitting in bed with mouth open with right hand at mouth as if she is feeding herself.  Her lunch tray is present but there food is covered by a lid.  Patient is responsive, however, and does interact.  She knows her name and that she is in the hosp. She is able to report that she lives at a facility but she could not recall the full name.  She does report being blind.     Patient's brother came in during my interaction and he provided the further history and confirmation of background information.  She notes she had similar episodes of being unresponsive on at least 3 separate occasions around April 2021.  This was prior  "to placement at the SNF.  He notes that she was taken to the ED on one of these episodes.  He notes that by the time she was picked up by EMS, the episode had ended.  He is not able to recall any episodes like this prior to this past spring.  He does not recall witness any posturing.  He notes she was bed bound and was simply just laying there not responding.     Today she is pleasant but still disorganized.  Denies any issues or concerns.  She has recently been changed from risperidone to Abilify.  She is transferred to [facility] for further stabilization ensure maintenance of improvement given change from high affinity D2 antagonist to low-affinity anti-D2 antagonist in the setting of chronic schizophrenia.           Psychiatric Review Of Systems:  --Positive disorganization     Concurrent Psychiatric History:  --Past neuropsychiatric history:  · Schizophrenia     --Psychiatric Hospitalizations:   Reports one prior hospitalization. Patient's hospitalizations have been for psychotic episodes. Previously hospitalized here on the [facility]     --Suicide Attempts:   Denies any prior suicide attempts.     --Firearm Access: Denied     --Prior Treatment:  --Outpatient: None clear     --Prior Medications Trials:  · Lexapro  · Seroquel  · Risperdal     --History of violence or legal issues:   Denies significant history of legal issues.     -Abuse/Trauma/Neglect/Exploitation: none per chart or family        Substance Use:   --No A&D hx        Social History:  --> Lives at [facility] since June 2021.  11th grade education and on disability.   with 1 son.     Social History   Social History            Socioeconomic History   • Marital status:        Spouse name: Not on file   • Number of children: Not on file   • Years of education: Not on file   • Highest education level: Not on file   Tobacco Use   • Smoking status: Former Smoker   • Smokeless tobacco: Never Used   • Tobacco comment: \" a little bit a " "long time ago\"   Substance and Sexual Activity   • Alcohol use: Not Currently   • Drug use: Never   • Sexual activity: Not Currently               Family History:        Family History   Problem Relation Age of Onset   • Dementia Mother     • No Known Problems Father     • No Known Problems Sister     • No Known Problems Brother     • No Known Problems Maternal Aunt     • No Known Problems Paternal Aunt     • No Known Problems Maternal Uncle     • No Known Problems Paternal Uncle     • No Known Problems Maternal Grandfather     • No Known Problems Maternal Grandmother     • No Known Problems Paternal Grandfather     • No Known Problems Paternal Grandmother     • No Known Problems Cousin     • No Known Problems Other     • Suicide Attempts Neg Hx     • ADD / ADHD Neg Hx     • Alcohol abuse Neg Hx     • Anxiety disorder Neg Hx     • Bipolar disorder Neg Hx     • Depression Neg Hx     • Drug abuse Neg Hx     • OCD Neg Hx     • Paranoid behavior Neg Hx     • Schizophrenia Neg Hx     • Seizures Neg Hx     • Self-Injurious Behavior  Neg Hx        -->Further details: Family Suicides: Denied        Past Medical and Surgical History:  Medical History        Past Medical History:   Diagnosis Date   • Bipolar disorder, unspecified (CMS/HCC)     • Depression     • Diabetes mellitus (CMS/HCC)     • GERD (gastroesophageal reflux disease)     • Hypertension     • Legal blindness     • Psychiatric illness     • Rheumatoid arthritis (CMS/HCC)     • Schizoaffective disorder (CMS/HCC)           --> Seizure Hx: Denied        Surgical History         Past Surgical History:   Procedure Laterality Date   • COLONOSCOPY N/A 8/29/2020     Procedure: COLONOSCOPY;  Surgeon: Ge Gorman MD;  Location: [facility];  Service: Gastroenterology;  Laterality: N/A;   • ENDOSCOPY N/A 8/28/2020     Procedure: ESOPHAGOGASTRODUODENOSCOPY;  Surgeon: Ge Gorman MD;  Location: [facility];  Service: Gastroenterology;  Laterality: N/A;    "         Allergies:  Patient has no known allergies.     Prescriptions Prior to Admission           Medications Prior to Admission   Medication Sig Dispense Refill Last Dose   • Emollient (BAG BALM EX) Apply 1 application topically Daily. To eloisa feet     Past Week at Unknown time   • ondansetron (ZOFRAN) 4 MG tablet Take 4 mg by mouth Every 8 (Eight) Hours As Needed for Nausea or Vomiting.     Past Week at Unknown time   • risperiDONE (risperDAL) 3 MG tablet Take 3 mg by mouth Daily.     Past Week at Unknown time   • ARIPiprazole (ABILIFY) 2 MG tablet Take 6.5 tablets by mouth Daily With Dinner.         • Cholecalciferol 125 MCG (5000 UT) tablet Take 5,000 Units by mouth Daily.         • escitalopram (LEXAPRO) 10 MG tablet Take 1 tablet by mouth Daily. Indications: Major Depressive Disorder 30 tablet 0     • ferrous sulfate (FerrouSul) 325 (65 FE) MG tablet Take 1 tablet by mouth Daily With Breakfast. Indications: Anemia From Inadequate Iron in the Body 30 tablet 0     • lactulose (CHRONULAC) 10 GM/15ML solution Take 45 mL by mouth 3 (Three) Times a Day.         • Menthol-Zinc Oxide (Calmoseptine) 0.44-20.6 % ointment Apply  topically to the appropriate area as directed Every 12 (Twelve) Hours.         • metFORMIN (GLUCOPHAGE) 500 MG tablet Take 500 mg by mouth Daily.         • multivitamin with minerals (MULTIVITAMIN ADULT PO) Take 1 tablet by mouth Daily.         • pantoprazole (PROTONIX) 40 MG EC tablet Take 1 tablet by mouth Daily. Indications: Gastroesophageal Reflux Disease 30 tablet 0           --> Reviewed; per chart     Diagnostic Data:    Lab Results: Results source: EMR   Recent Results (from the past 168 hour(s))   POC Glucose Once    Collection Time: 08/10/21  5:33 PM    Specimen: Blood   Result Value Ref Range    Glucose 132 (H) 70 - 130 mg/dL   POC Glucose Once    Collection Time: 08/10/21  7:51 PM    Specimen: Blood   Result Value Ref Range    Glucose 146 (H) 70 - 130 mg/dL   POC Glucose Once     Collection Time: 08/11/21  6:25 AM    Specimen: Blood   Result Value Ref Range    Glucose 109 70 - 130 mg/dL   Ammonia    Collection Time: 08/11/21  7:02 AM    Specimen: Blood   Result Value Ref Range    Ammonia 55 (H) 11 - 51 umol/L   Protime-INR    Collection Time: 08/11/21  7:02 AM    Specimen: Blood   Result Value Ref Range    Protime 19.6 (H) 11.1 - 15.3 Seconds    INR 1.70 (H) 0.80 - 1.20   Comprehensive Metabolic Panel    Collection Time: 08/11/21  7:02 AM    Specimen: Blood   Result Value Ref Range    Glucose 104 (H) 65 - 99 mg/dL    BUN 8 8 - 23 mg/dL    Creatinine 0.73 0.57 - 1.00 mg/dL    Sodium 142 136 - 145 mmol/L    Potassium 3.7 3.5 - 5.2 mmol/L    Chloride 111 (H) 98 - 107 mmol/L    CO2 25.0 22.0 - 29.0 mmol/L    Calcium 9.1 8.6 - 10.5 mg/dL    Total Protein 6.0 6.0 - 8.5 g/dL    Albumin 1.80 (L) 3.50 - 5.20 g/dL    ALT (SGPT) 112 (H) 1 - 33 U/L    AST (SGOT) 133 (H) 1 - 32 U/L    Alkaline Phosphatase 72 39 - 117 U/L    Total Bilirubin 1.0 0.0 - 1.2 mg/dL    eGFR  African Amer 96 >60 mL/min/1.73    Globulin 4.2 gm/dL    A/G Ratio 0.4 g/dL    BUN/Creatinine Ratio 11.0 7.0 - 25.0    Anion Gap 6.0 5.0 - 15.0 mmol/L   CBC Auto Differential    Collection Time: 08/11/21  7:02 AM    Specimen: Blood   Result Value Ref Range    WBC 4.59 3.40 - 10.80 10*3/mm3    RBC 2.91 (L) 3.77 - 5.28 10*6/mm3    Hemoglobin 9.9 (L) 12.0 - 15.9 g/dL    Hematocrit 28.2 (L) 34.0 - 46.6 %    MCV 96.9 79.0 - 97.0 fL    MCH 34.0 (H) 26.6 - 33.0 pg    MCHC 35.1 31.5 - 35.7 g/dL    RDW 15.8 (H) 12.3 - 15.4 %    RDW-SD 55.5 (H) 37.0 - 54.0 fl    MPV 11.9 6.0 - 12.0 fL    Platelets 48 (C) 140 - 450 10*3/mm3    Neutrophil % 49.6 42.7 - 76.0 %    Lymphocyte % 37.3 19.6 - 45.3 %    Monocyte % 10.5 5.0 - 12.0 %    Eosinophil % 1.7 0.3 - 6.2 %    Basophil % 0.7 0.0 - 1.5 %    Immature Grans % 0.2 0.0 - 0.5 %    Neutrophils, Absolute 2.28 1.70 - 7.00 10*3/mm3    Lymphocytes, Absolute 1.71 0.70 - 3.10 10*3/mm3    Monocytes, Absolute 0.48  0.10 - 0.90 10*3/mm3    Eosinophils, Absolute 0.08 0.00 - 0.40 10*3/mm3    Basophils, Absolute 0.03 0.00 - 0.20 10*3/mm3    Immature Grans, Absolute 0.01 0.00 - 0.05 10*3/mm3    nRBC 0.0 0.0 - 0.2 /100 WBC   POC Glucose Once    Collection Time: 08/11/21 10:09 AM    Specimen: Blood   Result Value Ref Range    Glucose 116 70 - 130 mg/dL   POC Glucose Once    Collection Time: 08/11/21  4:41 PM    Specimen: Blood   Result Value Ref Range    Glucose 108 70 - 130 mg/dL   POC Glucose Once    Collection Time: 08/11/21  8:12 PM    Specimen: Blood   Result Value Ref Range    Glucose 123 70 - 130 mg/dL   POC Glucose Once    Collection Time: 08/12/21  6:04 AM    Specimen: Blood   Result Value Ref Range    Glucose 102 70 - 130 mg/dL   Ammonia    Collection Time: 08/12/21  7:16 AM    Specimen: Blood   Result Value Ref Range    Ammonia 48 11 - 51 umol/L   Hepatitis B Core Antibody, IgM    Collection Time: 08/12/21  7:16 AM    Specimen: Blood   Result Value Ref Range    Hep B C IgM Non-Reactive Non-Reactive   Green Top (Gel)    Collection Time: 08/12/21  7:16 AM   Result Value Ref Range    Extra Tube Hold for add-ons.    POC Glucose Once    Collection Time: 08/12/21 10:09 AM    Specimen: Blood   Result Value Ref Range    Glucose 116 70 - 130 mg/dL   POC Glucose Once    Collection Time: 08/12/21  4:32 PM    Specimen: Blood   Result Value Ref Range    Glucose 136 (H) 70 - 130 mg/dL   POC Glucose Once    Collection Time: 08/12/21  7:16 PM    Specimen: Blood   Result Value Ref Range    Glucose 106 70 - 130 mg/dL   Ammonia    Collection Time: 08/13/21  6:01 AM    Specimen: Blood   Result Value Ref Range    Ammonia 32 11 - 51 umol/L   Comprehensive Metabolic Panel    Collection Time: 08/13/21  6:01 AM    Specimen: Blood   Result Value Ref Range    Glucose 101 (H) 65 - 99 mg/dL    BUN 6 (L) 8 - 23 mg/dL    Creatinine 0.77 0.57 - 1.00 mg/dL    Sodium 137 136 - 145 mmol/L    Potassium 3.6 3.5 - 5.2 mmol/L    Chloride 106 98 - 107 mmol/L     CO2 25.0 22.0 - 29.0 mmol/L    Calcium 9.4 8.6 - 10.5 mg/dL    Total Protein 6.3 6.0 - 8.5 g/dL    Albumin 1.90 (L) 3.50 - 5.20 g/dL    ALT (SGPT) 114 (H) 1 - 33 U/L    AST (SGOT) 131 (H) 1 - 32 U/L    Alkaline Phosphatase 81 39 - 117 U/L    Total Bilirubin 0.9 0.0 - 1.2 mg/dL    eGFR  African Amer 90 >60 mL/min/1.73    Globulin 4.4 gm/dL    A/G Ratio 0.4 g/dL    BUN/Creatinine Ratio 7.8 7.0 - 25.0    Anion Gap 6.0 5.0 - 15.0 mmol/L   CBC Auto Differential    Collection Time: 08/13/21  6:01 AM    Specimen: Blood   Result Value Ref Range    WBC 4.40 3.40 - 10.80 10*3/mm3    RBC 2.98 (L) 3.77 - 5.28 10*6/mm3    Hemoglobin 9.8 (L) 12.0 - 15.9 g/dL    Hematocrit 28.7 (L) 34.0 - 46.6 %    MCV 96.3 79.0 - 97.0 fL    MCH 32.9 26.6 - 33.0 pg    MCHC 34.1 31.5 - 35.7 g/dL    RDW 15.9 (H) 12.3 - 15.4 %    RDW-SD 55.2 (H) 37.0 - 54.0 fl    MPV 11.1 6.0 - 12.0 fL    Platelets 52 (L) 140 - 450 10*3/mm3    Neutrophil % 44.1 42.7 - 76.0 %    Lymphocyte % 39.5 19.6 - 45.3 %    Monocyte % 13.0 (H) 5.0 - 12.0 %    Eosinophil % 2.5 0.3 - 6.2 %    Basophil % 0.7 0.0 - 1.5 %    Immature Grans % 0.2 0.0 - 0.5 %    Neutrophils, Absolute 1.94 1.70 - 7.00 10*3/mm3    Lymphocytes, Absolute 1.74 0.70 - 3.10 10*3/mm3    Monocytes, Absolute 0.57 0.10 - 0.90 10*3/mm3    Eosinophils, Absolute 0.11 0.00 - 0.40 10*3/mm3    Basophils, Absolute 0.03 0.00 - 0.20 10*3/mm3    Immature Grans, Absolute 0.01 0.00 - 0.05 10*3/mm3    nRBC 0.0 0.0 - 0.2 /100 WBC   POC Glucose Once    Collection Time: 08/13/21  6:35 AM    Specimen: Blood   Result Value Ref Range    Glucose 96 70 - 130 mg/dL   POC Glucose Once    Collection Time: 08/13/21 10:49 AM    Specimen: Blood   Result Value Ref Range    Glucose 110 70 - 130 mg/dL   ECG 12 Lead    Collection Time: 08/13/21  3:02 PM   Result Value Ref Range    QT Interval 362 ms    QTC Interval 425 ms   Potassium    Collection Time: 08/13/21  3:10 PM    Specimen: Blood   Result Value Ref Range    Potassium 4.2 3.5 - 5.2  mmol/L   Glucose, Fasting    Collection Time: 08/14/21  5:50 AM    Specimen: Blood   Result Value Ref Range    Glucose, Fasting 83 74 - 106 mg/dL   Lipid Panel    Collection Time: 08/14/21  5:50 AM    Specimen: Blood   Result Value Ref Range    Total Cholesterol 105 0 - 200 mg/dL    Triglycerides 59 0 - 150 mg/dL    HDL Cholesterol 30 (L) 40 - 60 mg/dL    LDL Cholesterol  62 0 - 100 mg/dL    VLDL Cholesterol 13 5 - 40 mg/dL    LDL/HDL Ratio 2.11    POC Glucose Once    Collection Time: 08/14/21  5:02 PM    Specimen: Blood   Result Value Ref Range    Glucose 148 (H) 70 - 130 mg/dL   POC Glucose Once    Collection Time: 08/14/21  7:27 PM    Specimen: Blood   Result Value Ref Range    Glucose 143 (H) 70 - 130 mg/dL   POC Glucose Once    Collection Time: 08/15/21  6:33 AM    Specimen: Blood   Result Value Ref Range    Glucose 101 70 - 130 mg/dL   CBC (No Diff)    Collection Time: 08/15/21 10:23 AM    Specimen: Blood   Result Value Ref Range    WBC 4.54 3.40 - 10.80 10*3/mm3    RBC 2.94 (L) 3.77 - 5.28 10*6/mm3    Hemoglobin 9.6 (L) 12.0 - 15.9 g/dL    Hematocrit 28.3 (L) 34.0 - 46.6 %    MCV 96.3 79.0 - 97.0 fL    MCH 32.7 26.6 - 33.0 pg    MCHC 33.9 31.5 - 35.7 g/dL    RDW 16.6 (H) 12.3 - 15.4 %    RDW-SD 57.1 (H) 37.0 - 54.0 fl    MPV 10.9 6.0 - 12.0 fL    Platelets 57 (L) 140 - 450 10*3/mm3   Scan Slide    Collection Time: 08/15/21 10:23 AM    Specimen: Blood   Result Value Ref Range    Anisocytosis Slight/1+ None Seen    Hypochromia Slight/1+ None Seen    WBC Morphology Normal Normal    Platelet Estimate Decreased Normal   Green Top (Gel)    Collection Time: 08/15/21 10:36 AM   Result Value Ref Range    Extra Tube Hold for add-ons.    POC Glucose Once    Collection Time: 08/15/21 11:30 AM    Specimen: Blood   Result Value Ref Range    Glucose 90 70 - 130 mg/dL   POC Glucose Once    Collection Time: 08/15/21  4:37 PM    Specimen: Blood   Result Value Ref Range    Glucose 133 (H) 70 - 130 mg/dL   POC Glucose Once     Collection Time: 08/15/21  7:47 PM    Specimen: Blood   Result Value Ref Range    Glucose 150 (H) 70 - 130 mg/dL   POC Glucose Once    Collection Time: 08/16/21  6:35 AM    Specimen: Blood   Result Value Ref Range    Glucose 110 70 - 130 mg/dL   POC Glucose Once    Collection Time: 08/16/21 11:37 AM    Specimen: Blood   Result Value Ref Range    Glucose 94 70 - 130 mg/dL   COVID-19, [facility] IN-HOUSE, NP SWAB IN TRANSPORT MEDIA 8-10 HR TAT - Swab, Nasopharynx    Collection Time: 08/16/21  2:10 PM    Specimen: Nasopharynx; Swab   Result Value Ref Range    COVID19 Not Detected Not Detected - Ref. Range       Radiology Results:  EEG    Result Date: 8/8/2021  Narrative: Reason for referral: 71 y.o.female with altered mental status Technical Summary:  A 19 channel digital EEG was performed using the international 10-20 placement system, including eye leads and EKG leads. Duration: 37 minutes Video: None Findings: The awake tracing shows diffuse medium amplitude 4-6 Hz intermixed delta and theta activity which is present symmetrically over both hemispheres.  EMG artifact is variably prominent.  A clear posterior rhythm is not seen.  Photic stimulation does not change the background.  As the study proceeds, generalized but frontal dominant triphasic waves are seen.  Definitive epilepsy epileptiform activity and electrographic seizures however are not seen.  Hyperventilation is not performed.  Sleep is not seen.     Impression: EEG background is moderate generalized slow Generalized triphasic waves are present No electrographic seizures are seen Note-triphasic waves on a background of generalized slowing are typically seen in the setting of toxic/metabolic encephalopathies, but may also be seen with cefepime exposure This report is transcribed using the Dragon dictation system.      CT Head Without Contrast    Result Date: 8/6/2021  Narrative: CT Head Without Contrast History: Mental status change Axial scans of the brain  were obtained without intravenous contrast.  Coronal and sagital reconstructions were preformed. This exam was performed according to our departmental dose-optimization program, which includes automated exposure control, adjustment of the mA and/or kV according to patient size and/or use of iterative reconstruction technique. DLP: 813.90 Comparison: August 28, 2020 Findings: Bone windows are unremarkable. Minimal fluid left mastoid air cells. No acute intracranial process. Cerebral and cerebellar atrophy. Minimal small vessel disease. No hemorrhage. No mass. No abnormal areas of increased attenuation. No midline shift. No abnormal extra-axial fluid collections.     Impression: CONCLUSION: No acute intracranial process. Cerebral and cerebellar atrophy. Minimal small vessel disease. Minimal fluid left mastoid air cells. [id number] Electronically signed by:  Waylon Larkin MD  8/6/2021 6:48 PM CDT Workstation: [id number] [email or web address] Complete    Result Date: 8/9/2021  Narrative: PROCEDURE: Ultrasound abdomen, complete. INDICATION: elevated liver enzymes, thrombocytopenia rule out cirrhosis or splenomegaly, I95.9 Hypotension, unspecified R41.82 Altered mental status, unspecified Z74.09 Other reduced mobility R13.12 Dysphagia, oropharyngeal phase Z74.09 Other reduced mobility Z78.9 Other specified health status COMPARISON: None. FINDINGS: The liver has heterogeneous echotexture with macro lobular margin consistent with cirrhosis. There is a color-flow in the portal and hepatic veins. No focal hepatic nodules or cyst. There is small amount of ascites in the right upper abdomen around the liver and a small amount of fluid 1 to 2 mm thick around the right kidney.. Spleen appears normal in size measuring about 2.6 cm in. There is homogeneous echotexture within the spleen. The pancreas normal in size, shape and echogenicity. Gallbladder is contracted. There is mild diffuse thickening of the gallbladder wall up to about 4.5 mm  thick may be secondary to the contracted state of the gallbladder or possibly related to the ascites with edema. There does appear to be acoustically shadowing calculus within the contracted gallbladder about 5 mm. Normal caliber common bile duct 3.9 mm. No pericholecystic fluid. The right kidney measures 10.1 x 4.1 x 5.0 cm. Left kidney 10.6 x 5.6 x 4.6 cm. No hydronephrosis or renal mass on either side. There is small amount of fluid about 1 to 2 mm thick around portion of the right kidney. The proximal mid abdominal aorta and IVC visualized and normal.     Impression: Heterogeneous echotexture within the liver with macro lobular margins consistent with cirrhosis there is color-flow in the portal and hepatic veins. Small amount of ascites around the liver. Contracted gallbladder with thick wall with no pericholecystic fluid. 5 mm calculus in the gallbladder. Small amount of fluid around portion of the right kidney about 1 to 2 mm thick. [id number] Electronically signed by:  Frank Maradiaga MD  8/9/2021 9:42 AM CDT Workstation: [id number]    XR Chest 1 View    Result Date: 8/6/2021  Narrative: PORTABLE CHEST HISTORY: Altered mental status Portable AP upright film of the chest was obtained at 4:38 PM. COMPARISON: February 5, 2021 FINDINGS: The lungs are clear of an acute process. Old granulomatous disease is present. The heart is not enlarged. The pulmonary vasculature is not increased. No pleural effusion. No pneumothorax. No acute osseous abnormality. Degenerative changes are present in the thoracic spine.     Impression: CONCLUSION: No Acute Disease [id number] Electronically signed by:  Waylon Larkin MD  8/6/2021 4:57 PM CDT Workstation: [email or web address]    XR Hip With or Without Pelvis 2 - 3 View Left    Result Date: 8/3/2021  Narrative: THREE-VIEW LEFT HIP HISTORY: hip pain FINDINGS: Three views of the left hip were submitted. There is no fracture or dislocation. Soft tissues appear unremarkable. Regional osseous  structures appear osteopenic.     Impression: 1. No acute osseous abnormality. Electronically signed by:  Greg Kee MD  8/3/2021 9:15 PM CDT Workstation: [id number]      Summary of Hospital Course:  Patient was admitted to the behavioral health unit at [facility] to ensure patient safety.  Patient was provided treatment with the unit milieu, activities, therapies and psychopharmacological management.  Patient was placed on Q15 minute checks and Suicide and Fall precautions.    Hospitalist was consulted for management of medical co-morbidities.  Patient was seen by Hem/Onc and GI on the hospitalist service prior to admission.  Patient's iron was stopped due to elevated iron level despite being anemic.  Hem/Onc and GI referrals for outpatient follow up were made.    Patient was restarted on the following psychiatric medications:   --Lexapro 10mg daily  --Risperdal that she was receiving at the SNF was stopped while on the hospitalist service.  --Abilify was begun on the hospitalist service.    The following medication changes were made during the hospital stay: Abilify was titrated to 15mg daily during psych admission.  Patient had improvement over the course of the hospital stay and tolerated his medications.  Patient had improvement in psychosis and catatonia related behaviors.    Patient lives at a SNF and does having have any firearms access.    Social work and nursing communicated with family and SNF as needed.    Substance abuse issues were not present.    Patients Condition at Discharge:  Patient is stable for discharge and is not an imminent threat to self or others.  The patient's behavior was Appropriate.  Patient reported that mood was Euthymic.  Patient's affect was constricted.  Patient's thought content was as follows:   Suicidal:  Patient denies any suicidal thoughts, plans or intentions.   Homicidal:  Patient denies any homicidal thoughts, plans or intentions.   Hallucinations:   None   Delusion:  None    Discharge Diagnosis:    Schizophrenia (CMS/[facility])      Discharge Medications:      Your medication list      CHANGE how you take these medications      Instructions Last Dose Given Next Dose Due   ARIPiprazole 15 MG tablet  Commonly known as: ABILIFY  What changed:   · medication strength  · how much to take      Take 1 tablet by mouth Daily With Dinner. Indications: Schizophrenia          CONTINUE taking these medications      Instructions Last Dose Given Next Dose Due   BAG BALM EX      Apply 1 application topically Daily. To eloisa feet       Calmoseptine 0.44-20.6 % ointment  Generic drug: Menthol-Zinc Oxide      Apply  topically to the appropriate area as directed Every 12 (Twelve) Hours.       Cholecalciferol 125 MCG (5000 UT) tablet      Take 5,000 Units by mouth Daily.       escitalopram 10 MG tablet  Commonly known as: LEXAPRO      Take 1 tablet by mouth Daily. Indications: Major Depressive Disorder       lactulose 10 GM/15ML solution  Commonly known as: CHRONULAC      Take 45 mL by mouth 3 (Three) Times a Day.       metFORMIN 500 MG tablet  Commonly known as: GLUCOPHAGE      Take 500 mg by mouth Daily.       multivitamin with minerals tablet tablet      Take 1 tablet by mouth Daily.       ondansetron 4 MG tablet  Commonly known as: ZOFRAN      Take 4 mg by mouth Every 8 (Eight) Hours As Needed for Nausea or Vomiting.       pantoprazole 40 MG EC tablet  Commonly known as: PROTONIX      Take 1 tablet by mouth Daily. Indications: Gastroesophageal Reflux Disease          STOP taking these medications    ferrous sulfate 325 (65 FE) MG tablet  Commonly known as: FerrouSul        risperiDONE 3 MG tablet  Commonly known as: risperDAL              Where to Get Your Medications      Information about where to get these medications is not yet available    Ask your nurse or doctor about these medications  · ARIPiprazole 15 MG tablet         Discharge Diet:   Diet Order   Procedures   • Diet  Regular       Activity at Discharge: As tolerated.    Justification for multiple antipsychotic medications at discharge:  Not Applicable.    Medication for smoking cessation: Patient does not smoke and medication is not indicated.    Medication for substance abuse: Patient does not have a substance use diagnosis and medication is not indicated.    Disposition: Patient was discharged to nursing home.    Additional Instructions for the Follow-ups that You Need to Schedule     Ambulatory Referral to Hematology / Oncology   As directed      CBC (No Diff)    Aug 22, 2021 (Approximate)      Release to patient: Immediate           Follow-up Information     Alisia Ramirez, APRN .    Specialties: Nurse Practitioner, Orthopedic Surgery  Contact information:  [address] [facility]    [facility] [id number] [phone]             Terry Bills MD .    Specialties: Hematology and Oncology, Hematology, Oncology  Contact information:  [address] [place] [address]   [facility] [address] [phone]             Ge Gorman MD .    Specialty: Gastroenterology  Contact information:  [address] [place] [address] DR AGUAYO FLKARLEE  [facility] [address] [phone]                   Psychiatric and medical follow up will be with medical director at the SNF.  Referrals for Hem/Onc and GI were made.    Time Spent: More than 30 minutes.  I spent  35  minutes on this discharge activity which included: face-to-face encounter with the patient, reviewing the data in the system, coordination of the care with the nursing staff as well as consultants, documentation, and entering orders.  Time was also spent speaking with family if noted above.      Ellie Raza MD  08/17/21  11:16 CDT

## 2021-08-17 NOTE — SIGNIFICANT NOTE
LCSW met with tx team and discussed plan of care. Pt continues to do well without behavioral issues and appears to be at baseline. Per MD, his goal is for pt to return to SNF today. LCSW called and spoke with Catrachita at [facility] re[facility] and confirmed plan to dc. Catrachita verbalized understanding and requested EMS transport. LCSW arranged for EMS to  at approx 1300. Packet of clinical info prepared to send with pt. RN advised to call report and fax orders when available. LCSW contacted brother and discussed dc plan. Will move forward with dc this afternoon.

## 2021-08-17 NOTE — DISCHARGE PLACEMENT REQUEST
"Natacha Gandhi (71 y.o. Female)     Date of Birth Social Security Number Address Home Phone MRN    1950  [id number] [facility] [address] [phone] [id number]    Holiness Marital Status          Anglican        Admission Date Admission Type Admitting Provider Attending Provider Department, Room/Bed    8/13/21 Elective Tyrel Anderson II, MD Gilley, Ronald Reagan II, MD [facility] SENIOR PSYCH, 668/1    Discharge Date Discharge Disposition Discharge Destination                       Attending Provider: Tyrel Anderson II, MD    Allergies: No Known Allergies    Isolation: None   Infection: None   Code Status: CPR    Ht: 160 cm (62.99\")   Wt: 63.6 kg (140 lb 4.8 oz)    Admission Cmt: None   Principal Problem: None                Active Insurance as of 8/13/2021     Primary Coverage     Payor Plan Insurance Group Employer/Plan Group    MEDICARE MEDICARE A & B      Payor Plan Address Payor Plan Phone Number Payor Plan Fax Number Effective Dates    [address] [phone]  2/1/2015 - None Entered    [facility] [address]       Subscriber Name Subscriber Birth Date Member ID       NATACHA GANDHI 1950 [id number]           Secondary Coverage     Payor Plan Insurance Group Employer/Plan Group    [place]      Payor Plan Address Payor Plan Phone Number Payor Plan Fax Number Effective Dates    [address] [phone]  7/9/2020 - None Entered    [place] [address]       Subscriber Name Subscriber Birth Date Member ID       NATACHA GANDHI 1950 [id number]                 Emergency Contacts      (Rel.) Home Phone Work Phone Mobile Phone    Bahman Pittman (Guardian) [phone] -- [phone]    Connor Gandhi (Son) -- -- [phone]    PRABHJOT DIANA () [phone] -- [phone]    [employer] (Other) [phone] -- [phone]            Emergency Contact Information     " Name Relation Home Work Mobile    Bahman Pittman [phone]    Connor Gandhi Son   [phone]    PRABHJOT DIANA  [phone]    [employer] Other [phone]          Insurance Information                MEDICARE/MEDICARE A & B Phone: [phone]    Subscriber: Natacha Gandhi Subscriber#: [id number]    Group#:  Precert#:         [place] Phone: [phone]    Subscriber: Natacha Gandhi Subscriber#: [id number]    Group#:  Precert#:              History & Physical      Tyrel Anderson II, MD at 08/14/21 1933          Psychiatric & Behavioral Health History & Physical  8/14/2021    --> Source of History: chart review and the patient; staff    --> Chief Complaint: Schizophrenia    History of Present Illness:  Ms. Natacha Gandhi is a 71 y.o. female with a concurrent neuropsychiatric history notable for Schizophrenia.    Presents with psychosis and disorganization. Onset of symptoms was gradual starting several days ago.  Symptoms have been present on an constant basis. Symptoms are associated with medical illness.  Symptoms are aggravated by problems with health.   Symptoms improve with supportive care.  Patient's symptom severity is severe.  Patient's symptoms occur in the context of chronic illness.    Seen on consultation; per Dr. Raza's consult note:  Patient is a 71 y.o. female who presents with altered mental status. Onset of symptoms was abrupt starting 1 week ago.  Symptoms have been present on an intermittent basis. Symptoms are associated with unresponsiveness.  Symptoms are aggravated by none known.   Patient's symptoms occur in the context of schizophrenia and one prior psych admission for psychosis in Sept 2020 on the [facility].     Patient presented for two episodes of unresponsiveness at the SNF in the last week.  The episodes last 10-15 minutes and it is reported that she was  not responsive to sternal rub.  She was sent to the ED by SNF after the second episode.     When I enter the patient's room, she is sitting in bed with mouth open with right hand at mouth as if she is feeding herself.  Her lunch tray is present but there food is covered by a lid.  Patient is responsive, however, and does interact.  She knows her name and that she is in the hosp. She is able to report that she lives at a facility but she could not recall the full name.  She does report being blind.     Patient's brother came in during my interaction and he provided the further history and confirmation of background information.  She notes she had similar episodes of being unresponsive on at least 3 separate occasions around April 2021.  This was prior to placement at the SNF.  He notes that she was taken to the ED on one of these episodes.  He notes that by the time she was picked up by EMS, the episode had ended.  He is not able to recall any episodes like this prior to this past spring.  He does not recall witness any posturing.  He notes she was bed bound and was simply just laying there not responding.    Today she is pleasant but still disorganized.  Denies any issues or concerns.  She has recently been changed from risperidone to Abilify.  She is transferred to [facility] for further stabilization ensure maintenance of improvement given change from high affinity D2 antagonist to low-affinity anti-D2 antagonist in the setting of chronic schizophrenia.        Psychiatric Review Of Systems:  --Positive disorganization    Concurrent Psychiatric History:  --Past neuropsychiatric history:  • Schizophrenia    --Psychiatric Hospitalizations:   Reports one prior hospitalization. Patient's hospitalizations have been for psychotic episodes. Previously hospitalized here on the [facility]    --Suicide Attempts:   Denies any prior suicide attempts.    --Firearm Access: Denied    --Prior Treatment:  --Outpatient: None clear    --Prior  "Medications Trials:  • Lexapro  • Seroquel  • Risperdal    --History of violence or legal issues:   Denies significant history of legal issues.    -Abuse/Trauma/Neglect/Exploitation: none per chart or family      Substance Use:   --No A&D hx      Social History:  --> Lives at [facility] since June 2021.  11th grade education and on disability.   with 1 son.    Social History     Socioeconomic History   • Marital status:      Spouse name: Not on file   • Number of children: Not on file   • Years of education: Not on file   • Highest education level: Not on file   Tobacco Use   • Smoking status: Former Smoker   • Smokeless tobacco: Never Used   • Tobacco comment: \" a little bit a long time ago\"   Substance and Sexual Activity   • Alcohol use: Not Currently   • Drug use: Never   • Sexual activity: Not Currently         Family History:  Family History   Problem Relation Age of Onset   • Dementia Mother    • No Known Problems Father    • No Known Problems Sister    • No Known Problems Brother    • No Known Problems Maternal Aunt    • No Known Problems Paternal Aunt    • No Known Problems Maternal Uncle    • No Known Problems Paternal Uncle    • No Known Problems Maternal Grandfather    • No Known Problems Maternal Grandmother    • No Known Problems Paternal Grandfather    • No Known Problems Paternal Grandmother    • No Known Problems Cousin    • No Known Problems Other    • Suicide Attempts Neg Hx    • ADD / ADHD Neg Hx    • Alcohol abuse Neg Hx    • Anxiety disorder Neg Hx    • Bipolar disorder Neg Hx    • Depression Neg Hx    • Drug abuse Neg Hx    • OCD Neg Hx    • Paranoid behavior Neg Hx    • Schizophrenia Neg Hx    • Seizures Neg Hx    • Self-Injurious Behavior  Neg Hx      -->Further details: Family Suicides: Denied      Past Medical and Surgical History:  Past Medical History:   Diagnosis Date   • Bipolar disorder, unspecified (CMS/HCC)    • Depression    • Diabetes mellitus " (CMS/[facility])    • GERD (gastroesophageal reflux disease)    • Hypertension    • Legal blindness    • Psychiatric illness    • Rheumatoid arthritis (CMS/[facility])    • Schizoaffective disorder (CMS/[facility])      --> Seizure Hx: Denied       Past Surgical History:   Procedure Laterality Date   • COLONOSCOPY N/A 8/29/2020    Procedure: COLONOSCOPY;  Surgeon: Ge Gorman MD;  Location: [facility];  Service: Gastroenterology;  Laterality: N/A;   • ENDOSCOPY N/A 8/28/2020    Procedure: ESOPHAGOGASTRODUODENOSCOPY;  Surgeon: Ge Gorman MD;  Location: [facility];  Service: Gastroenterology;  Laterality: N/A;       Allergies:  Patient has no known allergies.    Medications Prior to Admission   Medication Sig Dispense Refill Last Dose   • Emollient (BAG BALM EX) Apply 1 application topically Daily. To eloisa feet   Past Week at Unknown time   • ondansetron (ZOFRAN) 4 MG tablet Take 4 mg by mouth Every 8 (Eight) Hours As Needed for Nausea or Vomiting.   Past Week at Unknown time   • risperiDONE (risperDAL) 3 MG tablet Take 3 mg by mouth Daily.   Past Week at Unknown time   • ARIPiprazole (ABILIFY) 2 MG tablet Take 6.5 tablets by mouth Daily With Dinner.      • Cholecalciferol 125 MCG (5000 UT) tablet Take 5,000 Units by mouth Daily.      • escitalopram (LEXAPRO) 10 MG tablet Take 1 tablet by mouth Daily. Indications: Major Depressive Disorder 30 tablet 0    • ferrous sulfate (FerrouSul) 325 (65 FE) MG tablet Take 1 tablet by mouth Daily With Breakfast. Indications: Anemia From Inadequate Iron in the Body 30 tablet 0    • lactulose (CHRONULAC) 10 GM/15ML solution Take 45 mL by mouth 3 (Three) Times a Day.      • Menthol-Zinc Oxide (Calmoseptine) 0.44-20.6 % ointment Apply  topically to the appropriate area as directed Every 12 (Twelve) Hours.      • metFORMIN (GLUCOPHAGE) 500 MG tablet Take 500 mg by mouth Daily.      • multivitamin with minerals (MULTIVITAMIN ADULT PO) Take 1 tablet by mouth Daily.      • pantoprazole (PROTONIX)  40 MG EC tablet Take 1 tablet by mouth Daily. Indications: Gastroesophageal Reflux Disease 30 tablet 0      --> Reviewed; per chart      Medical Review Of Systems:  --Unable to meaningfully obtain given disorganization  ROS        Objective   Objective --    Vital Signs:  Temp:  [97.9 °F (36.6 °C)] 97.9 °F (36.6 °C)  Heart Rate:  [79] 79  Resp:  [18] 18  BP: (115)/(57) 115/57    PE:  In no acute distress.  Breathing is unlabored.  No tremor or noted atrophy.  Gait is deferred.     MSE: 71-year-old female appearing older than biological age.  No eye contact but in the context of blindness.  Psychomotor slowing.  Affect remainsblunted.  Thought processing is disorganized and concrete.  Insight and judgment are limited.      Diagnostic Data:  Results source: EMR    --> EKG: I reviewed the EKG, as below:              ECG/EMG Results (all)     Procedure Component Value Units Date/Time    ECG 12 Lead [[id number]] Collected: 08/13/21 1502     Updated: 08/13/21 1530     QT Interval 362 ms      QTC Interval 425 ms     Narrative:      Test Reason : new admission  Blood Pressure :   */*   mmHG  Vent. Rate :  83 BPM     Atrial Rate :  83 BPM     P-R Int : 162 ms          QRS Dur :  62 ms      QT Int : 362 ms       P-R-T Axes :  68 -11  34 degrees     QTc Int : 425 ms    Normal sinus rhythm with sinus arrhythmia  Low voltage QRS  Cannot rule out Anterior infarct , age undetermined  Abnormal ECG  When compared with ECG of 06-FEB-2021 00:24,  QT has shortened    Referred By:            Confirmed By:               -----------------------------------------------------        --> Lab Work  Results source: EMR    Recent Results (from the past 72 hour(s))   POC Glucose Once    Collection Time: 08/11/21  8:12 PM    Specimen: Blood   Result Value Ref Range    Glucose 123 70 - 130 mg/dL   POC Glucose Once    Collection Time: 08/12/21  6:04 AM    Specimen: Blood   Result Value Ref Range    Glucose 102 70 - 130 mg/dL   Ammonia    Collection  Time: 08/12/21  7:16 AM    Specimen: Blood   Result Value Ref Range    Ammonia 48 11 - 51 umol/L   Hepatitis B Core Antibody, IgM    Collection Time: 08/12/21  7:16 AM    Specimen: Blood   Result Value Ref Range    Hep B C IgM Non-Reactive Non-Reactive   Green Top (Gel)    Collection Time: 08/12/21  7:16 AM   Result Value Ref Range    Extra Tube Hold for add-ons.    POC Glucose Once    Collection Time: 08/12/21 10:09 AM    Specimen: Blood   Result Value Ref Range    Glucose 116 70 - 130 mg/dL   POC Glucose Once    Collection Time: 08/12/21  4:32 PM    Specimen: Blood   Result Value Ref Range    Glucose 136 (H) 70 - 130 mg/dL   POC Glucose Once    Collection Time: 08/12/21  7:16 PM    Specimen: Blood   Result Value Ref Range    Glucose 106 70 - 130 mg/dL   Ammonia    Collection Time: 08/13/21  6:01 AM    Specimen: Blood   Result Value Ref Range    Ammonia 32 11 - 51 umol/L   Comprehensive Metabolic Panel    Collection Time: 08/13/21  6:01 AM    Specimen: Blood   Result Value Ref Range    Glucose 101 (H) 65 - 99 mg/dL    BUN 6 (L) 8 - 23 mg/dL    Creatinine 0.77 0.57 - 1.00 mg/dL    Sodium 137 136 - 145 mmol/L    Potassium 3.6 3.5 - 5.2 mmol/L    Chloride 106 98 - 107 mmol/L    CO2 25.0 22.0 - 29.0 mmol/L    Calcium 9.4 8.6 - 10.5 mg/dL    Total Protein 6.3 6.0 - 8.5 g/dL    Albumin 1.90 (L) 3.50 - 5.20 g/dL    ALT (SGPT) 114 (H) 1 - 33 U/L    AST (SGOT) 131 (H) 1 - 32 U/L    Alkaline Phosphatase 81 39 - 117 U/L    Total Bilirubin 0.9 0.0 - 1.2 mg/dL    eGFR  African Amer 90 >60 mL/min/1.73    Globulin 4.4 gm/dL    A/G Ratio 0.4 g/dL    BUN/Creatinine Ratio 7.8 7.0 - 25.0    Anion Gap 6.0 5.0 - 15.0 mmol/L   CBC Auto Differential    Collection Time: 08/13/21  6:01 AM    Specimen: Blood   Result Value Ref Range    WBC 4.40 3.40 - 10.80 10*3/mm3    RBC 2.98 (L) 3.77 - 5.28 10*6/mm3    Hemoglobin 9.8 (L) 12.0 - 15.9 g/dL    Hematocrit 28.7 (L) 34.0 - 46.6 %    MCV 96.3 79.0 - 97.0 fL    MCH 32.9 26.6 - 33.0 pg    MCHC  34.1 31.5 - 35.7 g/dL    RDW 15.9 (H) 12.3 - 15.4 %    RDW-SD 55.2 (H) 37.0 - 54.0 fl    MPV 11.1 6.0 - 12.0 fL    Platelets 52 (L) 140 - 450 10*3/mm3    Neutrophil % 44.1 42.7 - 76.0 %    Lymphocyte % 39.5 19.6 - 45.3 %    Monocyte % 13.0 (H) 5.0 - 12.0 %    Eosinophil % 2.5 0.3 - 6.2 %    Basophil % 0.7 0.0 - 1.5 %    Immature Grans % 0.2 0.0 - 0.5 %    Neutrophils, Absolute 1.94 1.70 - 7.00 10*3/mm3    Lymphocytes, Absolute 1.74 0.70 - 3.10 10*3/mm3    Monocytes, Absolute 0.57 0.10 - 0.90 10*3/mm3    Eosinophils, Absolute 0.11 0.00 - 0.40 10*3/mm3    Basophils, Absolute 0.03 0.00 - 0.20 10*3/mm3    Immature Grans, Absolute 0.01 0.00 - 0.05 10*3/mm3    nRBC 0.0 0.0 - 0.2 /100 WBC   POC Glucose Once    Collection Time: 08/13/21  6:35 AM    Specimen: Blood   Result Value Ref Range    Glucose 96 70 - 130 mg/dL   POC Glucose Once    Collection Time: 08/13/21 10:49 AM    Specimen: Blood   Result Value Ref Range    Glucose 110 70 - 130 mg/dL   ECG 12 Lead    Collection Time: 08/13/21  3:02 PM   Result Value Ref Range    QT Interval 362 ms    QTC Interval 425 ms   Potassium    Collection Time: 08/13/21  3:10 PM    Specimen: Blood   Result Value Ref Range    Potassium 4.2 3.5 - 5.2 mmol/L   Glucose, Fasting    Collection Time: 08/14/21  5:50 AM    Specimen: Blood   Result Value Ref Range    Glucose, Fasting 83 74 - 106 mg/dL   Lipid Panel    Collection Time: 08/14/21  5:50 AM    Specimen: Blood   Result Value Ref Range    Total Cholesterol 105 0 - 200 mg/dL    Triglycerides 59 0 - 150 mg/dL    HDL Cholesterol 30 (L) 40 - 60 mg/dL    LDL Cholesterol  62 0 - 100 mg/dL    VLDL Cholesterol 13 5 - 40 mg/dL    LDL/HDL Ratio 2.11    POC Glucose Once    Collection Time: 08/14/21  5:02 PM    Specimen: Blood   Result Value Ref Range    Glucose 148 (H) 70 - 130 mg/dL       EEG    Result Date: 8/8/2021  Narrative: Reason for referral: 71 y.o.female with altered mental status Technical Summary:  A 19 channel digital EEG was performed  using the international 10-20 placement system, including eye leads and EKG leads. Duration: 37 minutes Video: None Findings: The awake tracing shows diffuse medium amplitude 4-6 Hz intermixed delta and theta activity which is present symmetrically over both hemispheres.  EMG artifact is variably prominent.  A clear posterior rhythm is not seen.  Photic stimulation does not change the background.  As the study proceeds, generalized but frontal dominant triphasic waves are seen.  Definitive epilepsy epileptiform activity and electrographic seizures however are not seen.  Hyperventilation is not performed.  Sleep is not seen.     Impression: EEG background is moderate generalized slow Generalized triphasic waves are present No electrographic seizures are seen Note-triphasic waves on a background of generalized slowing are typically seen in the setting of toxic/metabolic encephalopathies, but may also be seen with cefepime exposure This report is transcribed using the Dragon dictation system.      CT Head Without Contrast    Result Date: 8/6/2021  Narrative: CT Head Without Contrast History: Mental status change Axial scans of the brain were obtained without intravenous contrast.  Coronal and sagital reconstructions were preformed. This exam was performed according to our departmental dose-optimization program, which includes automated exposure control, adjustment of the mA and/or kV according to patient size and/or use of iterative reconstruction technique. DLP: 813.90 Comparison: August 28, 2020 Findings: Bone windows are unremarkable. Minimal fluid left mastoid air cells. No acute intracranial process. Cerebral and cerebellar atrophy. Minimal small vessel disease. No hemorrhage. No mass. No abnormal areas of increased attenuation. No midline shift. No abnormal extra-axial fluid collections.     Impression: CONCLUSION: No acute intracranial process. Cerebral and cerebellar atrophy. Minimal small vessel disease.  Minimal fluid left mastoid air cells. [id number] Electronically signed by:  Waylon Larkin MD  8/6/2021 6:48 PM CDT Workstation: [id number]    US Abdomen Complete    Result Date: 8/9/2021  Narrative: PROCEDURE: Ultrasound abdomen, complete. INDICATION: elevated liver enzymes, thrombocytopenia rule out cirrhosis or splenomegaly, I95.9 Hypotension, unspecified R41.82 Altered mental status, unspecified Z74.09 Other reduced mobility R13.12 Dysphagia, oropharyngeal phase Z74.09 Other reduced mobility Z78.9 Other specified health status COMPARISON: None. FINDINGS: The liver has heterogeneous echotexture with macro lobular margin consistent with cirrhosis. There is a color-flow in the portal and hepatic veins. No focal hepatic nodules or cyst. There is small amount of ascites in the right upper abdomen around the liver and a small amount of fluid 1 to 2 mm thick around the right kidney.. Spleen appears normal in size measuring about 2.6 cm in. There is homogeneous echotexture within the spleen. The pancreas normal in size, shape and echogenicity. Gallbladder is contracted. There is mild diffuse thickening of the gallbladder wall up to about 4.5 mm thick may be secondary to the contracted state of the gallbladder or possibly related to the ascites with edema. There does appear to be acoustically shadowing calculus within the contracted gallbladder about 5 mm. Normal caliber common bile duct 3.9 mm. No pericholecystic fluid. The right kidney measures 10.1 x 4.1 x 5.0 cm. Left kidney 10.6 x 5.6 x 4.6 cm. No hydronephrosis or renal mass on either side. There is small amount of fluid about 1 to 2 mm thick around portion of the right kidney. The proximal mid abdominal aorta and IVC visualized and normal.     Impression: Heterogeneous echotexture within the liver with macro lobular margins consistent with cirrhosis there is color-flow in the portal and hepatic veins. Small amount of ascites around the liver. Contracted gallbladder  with thick wall with no pericholecystic fluid. 5 mm calculus in the gallbladder. Small amount of fluid around portion of the right kidney about 1 to 2 mm thick. [id number] Electronically signed by:  Frank Maradiaga MD  8/9/2021 9:42 AM CDT Workstation: [id number]    XR Chest 1 View    Result Date: 8/6/2021  Narrative: PORTABLE CHEST HISTORY: Altered mental status Portable AP upright film of the chest was obtained at 4:38 PM. COMPARISON: February 5, 2021 FINDINGS: The lungs are clear of an acute process. Old granulomatous disease is present. The heart is not enlarged. The pulmonary vasculature is not increased. No pleural effusion. No pneumothorax. No acute osseous abnormality. Degenerative changes are present in the thoracic spine.     Impression: CONCLUSION: No Acute Disease [id number] Electronically signed by:  Waylon Larkin MD  8/6/2021 4:57 PM CDT Workstation: [email or web address]    XR Hip With or Without Pelvis 2 - 3 View Left    Result Date: 8/3/2021  Narrative: THREE-VIEW LEFT HIP HISTORY: hip pain FINDINGS: Three views of the left hip were submitted. There is no fracture or dislocation. Soft tissues appear unremarkable. Regional osseous structures appear osteopenic.     Impression: 1. No acute osseous abnormality. Electronically signed by:  Greg Kee MD  8/3/2021 9:15 PM CDT Workstation: [id number]      Lab Results   Component Value Date    GLUF 83 08/14/2021        Lab Results   Component Value Date    HGBA1C 7.80 (H) 08/28/2020       Lab Results   Component Value Date    CHOL 105 08/14/2021    TRIG 59 08/14/2021    HDL 30 (L) 08/14/2021    LDL 62 08/14/2021    VLDL 13 08/14/2021    LDLHDL 2.11 08/14/2021        TSH   Date Value Ref Range Status   08/06/2021 1.490 0.270 - 4.200 uIU/mL Final       Lab Results   Component Value Date    YIMS18DN 11.5 (L) 09/05/2020    BYWSGCWQ85 1,716 (H) 08/09/2021    FOLATE 16.80 08/09/2021       Lab Results   Component Value Date    IRON 112 08/09/2021    TIBC 176 (L) 08/09/2021     FERRITIN 351.70 (H) 08/09/2021           Assessment/Plan   --Patient Strengths: ability for insight, supportive family/friends   --Patient Barriers: low self esteem, poor physical health      --Diagnostic Impression: Ms. Gandhi  is a 71 y.o. female admitted for gross psychosis and disorganization with recent medicine changes, necessitating inpatient psychiatric stabilization and treatment.     Diagnostically, schizophrenia.  Recent encephalopathy that is also improved.            Assessment:  --  Schizophrenia (CMS/HCC)      Non-Psychiatric Medical Conditions Include:  --HTN: BP stable; Catpres PRN  --Type 2 DM: FSBS AC and HS with SSI.  Continue Metformin.   --Thrombocytopenia: platelets currently 57.  No signs of bleeding or bruising noted.  Will need outpatient monitoring.  Plan for CBC one week post discharge and PCP follow up.  --GI: Cont PPI  --Hepatic: Cont Lactulose TID         Treatment Plan:  1) Will admit patient to the behavioral health unit at [facility], geriatric behavioral health unit, as a voluntary admission per family to ensure patient safety given  imminent risk status and need for emergent inpatient stabilization and treatment.    2) Patient will be provided treatment with the unit milieu, activities, therapies and psychopharmacological management.    3) Patient placed on  Q15 minute checks and Suicide and Fall precautions.    4) Hospitalist consulted for assistance in management of medical comorbidities.    5) Will order following labs:   --none given recent note    6) Will restart patient on the following psychiatric home meds:   --Abilify & Lexapro.    7) Will make the following medication changes, and proceed with the following treatment planning:   --Abilify to 15mg daily  --Lexapro 10mg daily    8) Will begin discharge planning as appropriate for patient.    9) Psychotherapy provided: supportive for < 15 min.     All questions answered for the patient.  Treatment plan and  medication risks and benefits discussed with: Patient and tx team.      Estimated Length of Stay: 3-5 days  Prognosis: Fair      Tyrel Anderson II, MD  08/14/21 @ 19:33 CDT  Dictated using Dragon.      Electronically signed by Tyrel Anderson II, MD at 08/15/21 1923         Current Facility-Administered Medications   Medication Dose Route Frequency Provider Last Rate Last Admin   • aluminum-magnesium hydroxide-simethicone (MAALOX MAX) 400-400-40 MG/5ML suspension 15 mL  15 mL Oral Q6H PRN Tyrel Anderson II, MD       • ARIPiprazole (ABILIFY) tablet 15 mg  15 mg Oral Daily With Dinner Tyrel Anderson II, MD   15 mg at 08/15/21 1715   • cholecalciferol (VITAMIN D3) tablet 5,000 Units  5,000 Units Oral Daily Tyrel Anderson II, MD   5,000 Units at 08/17/21 0836   • dextrose (D50W) 25 g/ 50mL Intravenous Solution 25 g  25 g Intravenous Q15 Min PRN Tyrel Anderson II, MD       • dextrose (GLUTOSE) oral gel 15 g  15 g Oral Q15 Min PRN Tyrel Anderson II, MD       • escitalopram (LEXAPRO) tablet 10 mg  10 mg Oral Daily Tyrel Anderson II, MD   10 mg at 08/17/21 0837   • ferrous sulfate EC tablet 324 mg  324 mg Oral Daily With Breakfast Greer May APRN   324 mg at 08/17/21 0836   • glucagon (human recombinant) (GLUCAGEN DIAGNOSTIC) injection 1 mg  1 mg Subcutaneous Q15 Min PRN Tyrel Anderson II, MD       • hydrOXYzine pamoate (VISTARIL) capsule 50 mg  50 mg Oral Q6H PRN Tyrel Anderson II, MD       • insulin aspart (novoLOG) injection 0-7 Units  0-7 Units Subcutaneous TID AC Tyrel Anderson II, MD       • lactulose (CHRONULAC) 10 GM/15ML solution 30 g  30 g Oral TID Tyrel Anderson II, MD   30 g at 08/17/21 0837   • magnesium hydroxide (MILK OF MAGNESIA) suspension 2400 mg/10mL 10 mL  10 mL Oral Daily PRN Tyrel Anderson II, MD       • metFORMIN (GLUCOPHAGE) tablet 500 mg  500 mg Oral Daily Greer May APRN   500 mg at 08/17/21 0836   •  multivitamin with minerals 1 tablet  1 tablet Oral Daily Tyrel Anderson II, MD   1 tablet at 08/17/21 0836   • pantoprazole (PROTONIX) EC tablet 40 mg  40 mg Oral Daily Tyrel Anderson II, MD   40 mg at 08/17/21 0837   • traZODone (DESYREL) tablet 50 mg  50 mg Oral Nightly PRN Tyrel Anderson II, MD            Physician Progress Notes (last 72 hours) (Notes from 08/14/21 1024 through 08/17/21 1024)      Elena Dave APRN at 08/16/21 1433     Attestation signed by Ellie Raza MD at 08/16/21 1813    Date of Service: 08/16/21    I saw and examined the patient face to face and was present during the key portion of the E/M service.    Subjective: Patient has not had any issues since change to abilify from risperdal due to catatonia concerns.  She has been compliant with medications.    Mental Status Exam:     No AVH or paranoia or SI/HI voiced.    Assessment:    Schizophrenia (CMS/[facility])      Plan:    --Continue Abilify 15 mg daily  --Continue Lexapro 10mg daily    Ellie Raza MD  08/16/21  18:09 CDT                   8/16/2021    Chief Complaint: psychosis    Subjective:  Patient is a 71 y.o. female that is currently inpatient on the [facility] today she has been up in the [facility] chair. She is quiet today, resting. She does request to stay in her room majority of the day, but when up, she is engaged and interacting with staff and peers.    Pt has had no behaviors, she has made no delusional statements.   Pt is compliant with medications and taking medications as ordered.     Call [facility] for discharge planning, left voicemail     Objective     Vital Signs    Temp:  [97.8 °F (36.6 °C)-98 °F (36.7 °C)] 98 °F (36.7 °C)  Heart Rate:  [80-86] 80  Resp:  [16] 16  BP: (118-136)/(73) 136/73    Physical Exam:   General Appearance: alert, appears stated age and cooperative,  Hygiene:   fair  Gait & Station: Wheelchair  Musculoskeletal: No tremors or abnormal involuntary movements    Mental Status  "Exam:   Cooperation:  Cooperative  Eye Contact:  blind  Psychomotor Behavior:  Slow  Mood: \"Fine\"  Affect:  mood-congruent  Speech:  Minimal  Thought Process:  Poverty of thought  Associations: Circumstantial  Thought Content:     Mood congruent   Suicidal:  None   Homicidal:  None   Hallucinations:  None   Delusion:  None  Cognitive Functioning:   Consciousness: awake and alert  Reliability:  fair  Insight:  improving  Judgement:  Improving  Impulse Control:  Improving     Lab Results (last 24 hours)     Procedure Component Value Units Date/Time    COVID-19, [facility] IN-HOUSE, NP SWAB IN TRANSPORT MEDIA 8-10 HR TAT - Swab, Nasopharynx [[id number]] Collected: 08/16/21 1410    Specimen: Swab from Nasopharynx Updated: 08/16/21 1410    POC Glucose Once [[id number]]  (Normal) Collected: 08/16/21 1137    Specimen: Blood Updated: 08/16/21 1216     Glucose 94 mg/dL      Comment: RN NotifiedOperator: [id number] DREA GRACEMeter ID: [id number]       POC Glucose Once [[id number]]  (Normal) Collected: 08/16/21 0635    Specimen: Blood Updated: 08/16/21 0653     Glucose 110 mg/dL      Comment: RN NotifiedOperator: [id number] ARPIT CONNIEMeter ID: [id number]       POC Glucose Once [[id number]]  (Abnormal) Collected: 08/15/21 1947    Specimen: Blood Updated: 08/15/21 2005     Glucose 150 mg/dL      Comment: RN NotifiedOperator: [id number] ARPIT CONNIEMeter ID: [id number]           Imaging Results (Last 24 Hours)     ** No results found for the last 24 hours. **          Medicine:   Current Facility-Administered Medications:   •  aluminum-magnesium hydroxide-simethicone (MAALOX MAX) 400-400-40 MG/5ML suspension 15 mL, 15 mL, Oral, Q6H PRN, Tyrel Anderson II, MD  •  ARIPiprazole (ABILIFY) tablet 15 mg, 15 mg, Oral, Daily With Dinner, Tyrel Anderson II, MD, 15 mg at 08/15/21 2075  •  cholecalciferol (VITAMIN D3) tablet 5,000 Units, 5,000 Units, Oral, Daily, Tyrel Anderson II, MD, 5,000 Units at 08/16/21 0832  • "  dextrose (D50W) 25 g/ 50mL Intravenous Solution 25 g, 25 g, Intravenous, Q15 Min PRN, Tyrel Anderson II, MD  •  dextrose (GLUTOSE) oral gel 15 g, 15 g, Oral, Q15 Min PRN, Tyrel Anderson II, MD  •  escitalopram (LEXAPRO) tablet 10 mg, 10 mg, Oral, Daily, Tyrel Anderson II, MD, 10 mg at 08/16/21 0852  •  ferrous sulfate EC tablet 324 mg, 324 mg, Oral, Daily With Breakfast, Greer May APRN, 324 mg at 08/16/21 0852  •  glucagon (human recombinant) (GLUCAGEN DIAGNOSTIC) injection 1 mg, 1 mg, Subcutaneous, Q15 Min PRN, Tyrel Anderson II, MD  •  hydrOXYzine pamoate (VISTARIL) capsule 50 mg, 50 mg, Oral, Q6H PRN, Tyrel Anderson II, MD  •  insulin aspart (novoLOG) injection 0-7 Units, 0-7 Units, Subcutaneous, TID AC, Tyrel Anderson II, MD  •  lactulose (CHRONULAC) 10 GM/15ML solution 30 g, 30 g, Oral, TID, Tyrel Anderson II, MD, 30 g at 08/16/21 0852  •  magnesium hydroxide (MILK OF MAGNESIA) suspension 2400 mg/10mL 10 mL, 10 mL, Oral, Daily PRN, Tyrel Anderson II, MD  •  metFORMIN (GLUCOPHAGE) tablet 500 mg, 500 mg, Oral, Daily, Greer May APRN, 500 mg at 08/16/21 0851  •  multivitamin with minerals 1 tablet, 1 tablet, Oral, Daily, Tyrel Anderson II, MD, 1 tablet at 08/16/21 0852  •  pantoprazole (PROTONIX) EC tablet 40 mg, 40 mg, Oral, Daily, Tyrel Anderson II, MD, 40 mg at 08/16/21 0852  •  traZODone (DESYREL) tablet 50 mg, 50 mg, Oral, Nightly PRN, Tyrel Anderson II, MD    Diagnoses/Assessment:     Schizophrenia (CMS/[facility])      Treatment Plan:    1) Will continue care for the patient on the behavioral health unit at [facility] to ensure patient safety.  2) Will continue to provide treatment with the unit milieu, activities, therapies and psychopharmacological management.  3) Patient to be placed on or continued on  Q15 minute checks  and Fall precautions.  4) Pertinent medical issues:   --HTN: BP stable; Catpres  PRN  --Type 2 DM: FSBS AC and HS with SSI.  Continue Metformin.   --Thrombocytopenia: platelets currently 57.  No signs of bleeding or bruising noted.  Will need outpatient monitoring.  Plan for CBC one week post discharge and PCP follow up.  --GI: Cont PPI  --Hepatic: Cont Lactulose TID  5) Will order following labs: none, COVID for placement  6) Will make the following medication changes:   --Continue Abilify 15 mg daily  7) Will continue discharge planning as appropriate for patient.  8) Psychotherapy provided for less than 16 minutes.    Treatment plan and medication risks and benefits discussed with: Patient    Elena Dave, APRN  08/16/21  14:33 CDT        Electronically signed by Ellie Raza MD at 08/16/21 1813     Tyrel Anderson II, MD at 08/15/21 1923          Psychiatry Progress Note   8/15/2021      HD: #2  Legal: Vol per family    Chief Complaint: Schizophrenia      Subjective --  Ms. Natacha Gandhi is a 71 y.o. female who was seen on the Geriatric unit.      Pleasant.  Has remained isolated in her room but is out on the unit at times.  Denies any issues.  Better organized with increased dose of Abilify.        Review of Systems:  --Neuro: Denies headache  --GI: Denies stomachache  --MS: Denies muscle ache  --General: Denies dizziness.  ROS      Objective   Objective --    Vital Signs:  Temp:  [97.9 °F (36.6 °C)-98.2 °F (36.8 °C)] 97.9 °F (36.6 °C)  Heart Rate:  [82-89] 89  Resp:  [18] 18  BP: (106-145)/(55-97) 106/55    Patient Vitals for the past 24 hrs:   BP Temp Temp src Pulse Resp SpO2   08/15/21 0700 106/55 97.9 °F (36.6 °C) Tympanic 89 18 96 %   08/14/21 2100 145/97 98.2 °F (36.8 °C) Tympanic 82 18 98 %            Physical Exam:   -General Appearance:  alert and in NAD  -Hygiene:  Adequate   -Gait & Station:  Deferred, in bed  -Musculoskeletal:  No tremors or abnormal involuntary movements and No atrophy noted  -Pulm: unlaboured    Mental Status Exam:   --Cooperation:   "Cooperative  --Eye Contact:  Non-sustained; legally blind  --Psychomotor Behavior:  Slow and Improving  --Mood:  \"OK\"  --Affect:  blunted and Improving  --Speech:  Slow and Soft  --Thought Process:  Poverty of thought, Dwight, Improving and More Coherent  --Associations: Goal Directed  --Themes:  None overt  --Thought Content:     --Mood congruent   --Suicidal:  Denies    --Homicidal:  Denies   --Hallucinations:  Not appearing to respond to internal stimuli at time of my interview   --Delusion:  None noted/overt  --Cognitive Functioning:  --Consciousness: awake and alert  Attention:  Improving  --Reliability:  fair  --Insight:  Improving  --Judgment:  Improving  --Impulse Control:  Improving      Lab Results (last 24 hours)     Procedure Component Value Units Date/Time    POC Glucose Once [[id number]]  (Abnormal) Collected: 08/15/21 1637    Specimen: Blood Updated: 08/15/21 1656     Glucose 133 mg/dL      Comment: RN NotifiedOperator: [id number] SEIB LISAMeter ID: [id number]       POC Glucose Once [[id number]]  (Normal) Collected: 08/15/21 1130    Specimen: Blood Updated: 08/15/21 1148     Glucose 90 mg/dL      Comment: : [id number] SEIB LISAMeter ID: [id number]       Extra Tubes [[id number]] Collected: 08/15/21 1036    Specimen: Blood, Venous Line Updated: 08/15/21 1145    Narrative:      The following orders were created for panel order Extra Tubes.  Procedure                               Abnormality         Status                     ---------                               -----------         ------                     Green Top (Gel)[[id number]]                                  Final result                 Please view results for these tests on the individual orders.    Green Top (Gel) [[id number]] Collected: 08/15/21 1036    Specimen: Blood Updated: 08/15/21 1145     Extra Tube Hold for add-ons.     Comment: Auto resulted.       Scan Slide [[id number]] Collected: 08/15/21 1023    Specimen: Blood " Updated: 08/15/21 1133     Anisocytosis Slight/1+     Hypochromia Slight/1+     WBC Morphology Normal     Platelet Estimate Decreased    CBC (No Diff) [[id number]]  (Abnormal) Collected: 08/15/21 1023    Specimen: Blood Updated: 08/15/21 1041     WBC 4.54 10*3/mm3      RBC 2.94 10*6/mm3      Hemoglobin 9.6 g/dL      Hematocrit 28.3 %      MCV 96.3 fL      MCH 32.7 pg      MCHC 33.9 g/dL      RDW 16.6 %      RDW-SD 57.1 fl      MPV 10.9 fL      Platelets 57 10*3/mm3     POC Glucose Once [[id number]]  (Normal) Collected: 08/15/21 0633    Specimen: Blood Updated: 08/15/21 0653     Glucose 101 mg/dL      Comment: : [id number] JOSI VITAAILMeter ID: [id number]           Results source: EMR    Imaging Results (Last 24 Hours)     ** No results found for the last 24 hours. **        Results source: EMR    Medications:   Scheduled Meds:ARIPiprazole, 15 mg, Oral, Daily With Dinner  vitamin D3, 5,000 Units, Oral, Daily  escitalopram, 10 mg, Oral, Daily  ferrous sulfate, 324 mg, Oral, Daily With Breakfast  insulin aspart, 0-7 Units, Subcutaneous, TID AC  lactulose, 30 g, Oral, TID  metFORMIN, 500 mg, Oral, Daily  multivitamin with minerals, 1 tablet, Oral, Daily  pantoprazole, 40 mg, Oral, Daily      Continuous Infusions:   PRN Meds:.•  aluminum-magnesium hydroxide-simethicone  •  dextrose  •  dextrose  •  glucagon (human recombinant)  •  hydrOXYzine pamoate  •  magnesium hydroxide  •  traZODone      Assessment & Planning:     Schizophrenia (CMS/HCC)  --HTN: BP stable; Catpres PRN  --Type 2 DM: FSBS AC and HS with SSI.  Continue Metformin.   --Thrombocytopenia: platelets currently 57.  No signs of bleeding or bruising noted.  Will need outpatient monitoring.  Plan for CBC one week post discharge and PCP follow up.  --GI: Cont PPI  --Hepatic: Cont Lactulose TID    --Cont on Q15 minute checks  and Suicide and Fall precautions.    --Schizophrenia: Continue Abilify 15 mg daily.    -COVID testing in the  morning.    -Supportive therapy for less than 15 minutes provided    --> Dispostion Planning: Return to SNF/half-way likely in the next 2 days if doing well.    Treatment plan and medication risks and benefits discussed with: Patient and Treatment Team.    Tyrel Anderson II, MD  08/15/21 @ 19:23 CDT  Dictated using Dragon.      Electronically signed by Tyrel Anderson II, MD at 08/15/21 1929     Greer May APRN at 08/15/21 1310     Attestation signed by Devin Georges MD at 08/15/21 1618    I personally discussed patient in conjunction with CLAIRE Amaya and agree with the assessment, treatment plan, and disposition of the patient as recorded.    Exam:  Alert          This document has been electronically signed by Devin Georges MD on August 15, 2021 16:18 CDT                          [facility] Medicine Services  INPATIENT PROGRESS NOTE    Length of Stay: 2  Date of Admission: 8/13/2021  Primary Care Physician: Alisia Ramirez APRN    Subjective   Chief Complaint: altered mental status   HPI:  71 year old female with past medical history of DM, bipolar disorder, schizoaffective disorder, HTN, GERD who is currently admitted to behavioral health unit related to schizophrenia.  Hospitalist team is consulted for medical assessment and management.  Patient is poor historian due to mental illness but denies complaints during interaction.  She is seen in dining area eating lunch, smiling and speaking with staff.        Review of Systems   Unable to perform ROS: Psychiatric disorder   Cardiovascular: Negative for chest pain.   Gastrointestinal: Negative for abdominal pain.   Musculoskeletal: Negative for back pain and neck pain.   Neurological: Negative for headaches.   Psychiatric/Behavioral: Positive for confusion.        All pertinent negatives and positives are as above. All other systems have been reviewed and are negative unless otherwise stated.     Objective     Temp:  [97.9 °F (36.6 °C)-98.2 °F (36.8 °C)] 97.9 °F (36.6 °C)  Heart Rate:  [82-89] 89  Resp:  [18] 18  BP: (106-145)/(55-97) 106/55    Physical Exam  Vitals and nursing note reviewed.   Constitutional:       General: She is not in acute distress.     Appearance: Normal appearance.   HENT:      Head: Normocephalic and atraumatic.      Right Ear: External ear normal.      Left Ear: External ear normal.      Nose: Nose normal.      Mouth/Throat:      Mouth: Mucous membranes are moist.   Eyes:      General:         Right eye: No discharge.         Left eye: No discharge.      Conjunctiva/sclera: Conjunctivae normal.   Pulmonary:      Effort: Pulmonary effort is normal. No respiratory distress.      Breath sounds: No wheezing.   Abdominal:      General: There is no distension.   Musculoskeletal:         General: No swelling.      Cervical back: Normal range of motion.   Skin:     General: Skin is dry.   Neurological:      General: No focal deficit present.      Mental Status: She is alert.   Psychiatric:         Mood and Affect: Mood normal.             Results Review:  I have reviewed the labs, radiology results, and diagnostic studies.    Laboratory Data:   Results from last 7 days   Lab Units 08/13/21  1510 08/13/21  0601 08/11/21  0702 08/09/21  0604 08/09/21  0604   SODIUM mmol/L  --  137 142  --  140   POTASSIUM mmol/L 4.2 3.6 3.7   < > 4.3   CHLORIDE mmol/L  --  106 111*  --  114*   CO2 mmol/L  --  25.0 25.0  --  21.0*   BUN mg/dL  --  6* 8  --  9   CREATININE mg/dL  --  0.77 0.73  --  0.75   GLUCOSE mg/dL  --  101* 104*  --  114*   CALCIUM mg/dL  --  9.4 9.1  --  9.3   BILIRUBIN mg/dL  --  0.9 1.0  --  0.9   ALK PHOS U/L  --  81 72  --  78   ALT (SGPT) U/L  --  114* 112*  --  114*   AST (SGOT) U/L  --  131* 133*  --  118*   ANION GAP mmol/L  --  6.0 6.0  --  5.0    < > = values in this interval not displayed.     Estimated Creatinine Clearance: 59.6 mL/min (by C-G formula based on SCr of 0.77 mg/dL).           Results from last 7 days   Lab Units 08/15/21  1023 08/13/21  0601 08/11/21  0702 08/10/21  0655 08/09/21  0604   WBC 10*3/mm3 4.54 4.40 4.59 3.97 4.44   HEMOGLOBIN g/dL 9.6* 9.8* 9.9* 10.5* 10.2*   HEMATOCRIT % 28.3* 28.7* 28.2* 31.0* 30.2*   PLATELETS 10*3/mm3 57* 52* 48* 44* 39*     Results from last 7 days   Lab Units 08/11/21  0702 08/10/21  0655 08/09/21  0604   INR  1.70* 1.73* 1.87*       Culture Data:   No results found for: BLOODCX  No results found for: URINECX  No results found for: RESPCX  No results found for: WOUNDCX  No results found for: STOOLCX  No components found for: BODYFLD    Radiology Data:   Imaging Results (Last 24 Hours)     ** No results found for the last 24 hours. **          I have reviewed the patient's current medications.     Assessment/Plan     Active Hospital Problems    Diagnosis    • Schizophrenia (CMS/[facility])        Plan:    1. Schizophrenia: defer management to psychiatry.   2. HTN: BP stable.   3. Type 2 DM: FSBS AC and HS with SSI.  Continue Metformin.   4. Thrombocytopenia: platelets currently 57.  No signs of bleeding or bruising noted.  Will need outpatient monitoring.  Plan for CBC one week post discharge and PCP follow up.    Patient is currently medically stable.  Hospitalist team can see on as needed basis.  Please call if any needs arise.     I confirmed that the patient's Advance Care Plan is present, code status is documented, or surrogate decision maker is listed in the patient's medical record.            This document has been electronically signed by CLAIRE Amaya on August 15, 2021 13:10 CDT            Electronically signed by Devin Georges MD at 08/15/21 9458          Physical Therapy Notes (most recent note)      Case Rosa, PTA at 08/16/21 1252  Version 1 of 1            08/16/21 1252   OTHER   Discipline physical therapy assistant   Rehab Time/Intention   Session Not Performed other (see comments)   Pt soundly asleep and  unarrouseable    Electronically signed by Case Rosa PTA at 08/16/21 1253          Occupational Therapy Notes (most recent note)      Luli Manley, OTR/L at 08/16/21 1011             08/16/21 0735   OTHER   Discipline occupational therapist   Rehab Time/Intention   Session Not Performed other (see comments)     CNA getting vitals and pt is asleep. OT attempted to arouse pt to engage in therapy but OT unable at this time. OT will follow up for tx as pt appropriate.     Electronically signed by Luli Manley, OTR/L at 08/16/21 1011

## 2021-08-17 NOTE — PLAN OF CARE
"LCSW met with pt 1:1 and spoke with brother/guardian via telephone and completed psychosocial assessment. PT presented in her room, lying in bed. Pt is alert and oriented to self. Keeps eyes closed, makes no eye contact, but is reportedly blind. Pt tells me her name and says that she is at the clinic. Pt focused on being at the clinic and has difficulty engaging in any meaningful conversation. PT is able to tell me that she was living at the nursing home prior to being here, says that she came here \"for check ups on my legs.\" Pt unable to tell me what is wrong with her legs. Pt denies any psychiatric concerns. Pt does not make any bizarre or delusional statements, denies AVH, SI/HI. Per notes, pt was experiencing AMS at [facility] and they sent her to the hospital. There was concern for worsening psychiatric illness, which led to hospitalization on BHU. LCSW contacted pt's brother, Bahman, who his her guardian and obtained collateral info. Per Bahman, pt has been at [facility] for approx 2 months \"and loves it there.\" Brother notes pt has been doing well until recently having 2 episodes where she was altered. Pt was initially brought to the ED and treated for a UTi before being sent back to SNF. Per brother, pt became \"unresponsive\" again and was sent back to the hospital. Brother denies any significant behaviors or issues at SNF, but says that MD wants meds adjusted. PT has a long hx of mental illness. Pt has lived with family off and on her whole life, has not been able to work or being completely independent due to mental illness. Pt has reportedly done well on BHU and is likely at baseline. Per brother, he was under the impression that pt may return to SNF today. LCSW to meet with tx team this am and determine plan. LCSW To follow up accordingly.    Mental Status Exam:    Hygiene:   fair  Cooperation:  Guarded  Eye Contact:  Closed  Psychomotor Behavior:  Slow  Affect:  " Blunted  Speech:  Response lag  Thought Progress:  Circum  Thought Content:  Mood congruent  Suicidal:  None  Homicidal:  None  Hallucinations:  None  Delusion:  Unable to demonstrate  Memory:  Deficits  Orientation:  Person  Reliability:  poor  Insight:  Poor  Judgement:  Impaired  Impulse Control:  Impaired      Problem: Adult Behavioral Health Plan of Care  Goal: Patient-Specific Goal (Individualization)  Recent Flowsheet Documentation  Taken 8/17/2021 0800 by Isidro Birmingham LCSW  Patient Personal Strengths:   stable living environment   family/social support  Patient Vulnerabilities:   poor physical health   lacks insight into illness  Goal: Adheres to Safety Considerations for Self and Others  Recent Flowsheet Documentation  Taken 8/17/2021 0845 by Isidro Birmingham LCSW  Safety Measures:   clinical history reviewed   self-directed behavior promoted   suicide assessment completed  Intervention: Develop and Maintain Individualized Safety Plan  Flowsheets (Taken 8/17/2021 0845)  Safety Measures:   clinical history reviewed   self-directed behavior promoted   suicide assessment completed  Goal: Absence of New-Onset Illness or Injury  Intervention: Identify and Manage Fall Risk  Recent Flowsheet Documentation  Taken 8/17/2021 0845 by Isidro Birmingham LCSW  Safety Measures:   clinical history reviewed   self-directed behavior promoted   suicide assessment completed  Goal: Optimized Coping Skills in Response to Life Stressors  Intervention: Promote Effective Coping Strategies  Flowsheets (Taken 8/17/2021 0845)  Supportive Measures:   active listening utilized   positive reinforcement provided   self-responsibility promoted   verbalization of feelings encouraged   problem-solving facilitated   counseling provided   decision-making supported   goal setting facilitated   self-care encouraged   self-reflection promoted  Goal: Develops/Participates in Therapeutic Hope to Support Successful Transition  Intervention: Foster  Therapeutic Joshua  Flowsheets (Taken 8/17/2021 0845)  Trust Relationship/Rapport:   care explained   reassurance provided   choices provided   thoughts/feelings acknowledged   emotional support provided   empathic listening provided   questions answered   questions encouraged  Intervention: Mutually Develop Transition Plan  Flowsheets  Taken 8/17/2021 0845  Transition Support:   community resources reviewed   follow-up care discussed   crisis management plan promoted  Taken 8/17/2021 0800  Outpatient/Agency/Support Group Needs:   outpatient medication management   outpatient psychiatric care (specify)  Transportation Anticipated: agency  Anticipated Discharge Disposition: nursing/skilled nursing care  Current Discharge Risk:   psychiatric illness   cognitively impaired  Concerns to be Addressed:   mental health   medication   decision-making   coping/stress   discharge planning  Readmission Within the Last 30 Days: no previous admission in last 30 days  Patient/Family Anticipated Services at Transition: mental health services  Patient/Family Anticipates Transition to: long-term care facility     Problem: Mood Impairment (Psychotic Signs/Symptoms)  Goal: Improved Mood Symptoms (Psychotic Signs/Symptoms)  Intervention: Optimize Emotion and Mood  Flowsheets (Taken 8/17/2021 0845)  Supportive Measures:   active listening utilized   positive reinforcement provided   self-responsibility promoted   verbalization of feelings encouraged   problem-solving facilitated   counseling provided   decision-making supported   goal setting facilitated   self-care encouraged   self-reflection promoted     Problem: Social, Occupational or Functional Impairment (Psychotic Signs/Symptoms)  Goal: Enhanced Social, Occupational or Functional Skills (Psychotic Signs/Symptoms)  Intervention: Promote Social, Occupational and Functional Ability  Flowsheets (Taken 8/17/2021 0845)  Trust Relationship/Rapport:   care explained   reassurance  provided   choices provided   thoughts/feelings acknowledged   emotional support provided   empathic listening provided   questions answered   questions encouraged  Social Functional Ability Promotion:   social interaction promoted   autonomy promoted   self-expression encouraged   Goal Outcome Evaluation:

## 2021-08-17 NOTE — NURSING NOTE
Patient dc'd from [facility] via stretcher by EMS for discharge to [facility]. Patient is stable with no signs and symptoms of distress. All d/c paperwork and follow up appointments discussed with nursing home. Personal belongings sent with patient. Report given to MAYANK Del Real at [facility].

## 2021-08-17 NOTE — PLAN OF CARE
Goal Outcome Evaluation:  Plan of Care Reviewed With: patient  Patient Agreement with Plan of Care: agrees     Progress: improving  Outcome Summary: Slept 8 hours this evening. No behavioral problems.  Problem: Adult Behavioral Health Plan of Care  Goal: Plan of Care Review  Outcome: Ongoing, Progressing  Flowsheets  Taken 8/17/2021 0437  Progress: improving  Outcome Summary: Slept 8 hours this evening. No behavioral problems.  Taken 8/16/2021 2200  Plan of Care Reviewed With: patient  Patient Agreement with Plan of Care: agrees

## 2021-08-17 NOTE — PLAN OF CARE
Goal Outcome Evaluation:  Plan of Care Reviewed With: patient           Outcome Summary: OT tx this date. Pt alert and with extended time responsed agreed to therapy. However with extended time and redirection pt very slow with movements and never finished a request. Pt attempted to get to the EOB with SBA with slow movements and extended time but did not fully make it before putting herself back in the bed. Pt dependent with donning her socks but then started to initiate supine after telling OT she could not put them on. Pt left with MD in room to rest and RN notified of session. Pt may be d/c to SNF this date.

## 2021-08-17 NOTE — NURSING NOTE
Behavior   Note any precipitants to event or behavior   Describe level and action of any aggressive behavior or speech and associated interventions.     Anxiety: Decreased concentration  Depression: impaired memory  Pain  0  AVH   no  S/I   no  Plan  no  H/I   no  Plan  no    Affect   flat      Note:  Patient resting in own bed, very quiet and does not talk to staff often. Family phoned this evening and informed that pt will be returning to nursing home tomorrow with negative Covid.        Intervention    PRN medication utilized:  no    Instructed in medication usage and effects  Medications administered as ordered  Encouraged to verbalize needs      Response    Verbalized understanding   Did patient take medications as ordered no  Did patient interact with assessment?  no    Plan    Will monitor for safety  Will monitor every 15 minutes as ordered  Will evaluate and promote the plan of care    Last BM:  unknown date  (Please chart in I/O as well)

## 2021-08-24 NOTE — PATIENT INSTRUCTIONS
Hepatic Encephalopathy    Hepatic encephalopathy is a loss of brain function due to advanced liver disease. When the liver is damaged, harmful substances (toxins) can build up in the body. Some of these toxins, such as ammonia, can harm the brain.  The effects of the condition depend on the type of liver damage and how severe it is. In some cases, hepatic encephalopathy can be reversed.  What are the causes?  Certain things can trigger or worsen hepatic encephalopathy, such as:  · Infection.  · Constipation.  · Taking certain medicines, such as benzodiazepines.  · Alcohol use.  · Bleeding into the intestinal tract.  · Imbalances in minerals (electrolytes) in the body.  · Dehydration.  Hepatic encephalopathy can sometimes be reversed if these triggers are resolved.  What increases the risk?  You are at risk of developing this condition if you have advanced liver disease (cirrhosis). Conditions that can cause liver disease include:  · Infections in the liver, such as hepatitis C.  · Infections in the blood.  · Drinking a lot of alcohol over a long period of time.  · Taking certain medicines, including tranquilizers, diuretics, antidepressants, sleeping pills, or acetaminophen.  · Genetic diseases, such as Moe's disease.  What are the signs or symptoms?  Symptoms may develop suddenly. Or, they may develop slowly and get worse gradually. Symptoms can range from mild to severe.  Mild symptoms include:  · Mild confusion.  · Shortened attention span.  · Personality and mood changes.  · Anxiety and agitation.  · Drowsiness.  Symptoms of worsening or severe hepatic encephalopathy include:  · Extreme confusion (disorientation).  · Slowed movement.  · Slurred speech.  · Extreme personality changes.  · Abnormal shaking or flapping of the hands.  · Coma.  How is this diagnosed?  This condition may be diagnosed based on:  · A physical exam.  · Your symptoms and medical history.  · Blood tests. These may be done to check levels  of ammonia in your blood, measure how long it takes your blood to clot, or check for infection.  · Liver function tests. These may be done to check how well your liver is working.  · MRI and CT scans. These may be done to check for a brain disorder and to check for problems with your liver.  · Electroencephalogram (EEG). This test measures the electrical activity in your brain.  How is this treated?  The first step in treatment is to identify and treat the cause of your liver damage or triggering illness, if possible. The next step is taking medicine to lower the level of toxins in your body and prevent ammonia from building up. Treatment will depend on how severe your encephalopathy is, and may include:  · Medicine to lower your ammonia level (lactulose).  · Antibiotic medicine to reduce the amount of ammonia-producing bacteria in your gut.  · Close monitoring of your blood pressure, heart rate, breathing, and oxygen levels.  · Removal of fluid from your abdomen.  · Close monitoring of how you think, feel, and act (mental status).  · Dietary changes.  · Liver transplant, in severe cases.  Follow these instructions at home:  Eating and drinking    · Work with a dietitian or with your health care provider to make sure you are getting the right balance of protein and minerals.  · Drink enough fluids to keep your urine pale yellow.  · Do not drink alcohol or use drugs.  General instructions  · If you were prescribed an antibiotic, take it as told by your health care provider. Do not stop taking the antibiotic even if your condition improves.  · Take other over-the-counter and prescription medicines only as told by your health care provider.  · Do not start taking any new medicines, including over-the-counter medicines, without first checking with your health care provider.  · Keep all follow-up visits as told by your health care provider. This is important.  Contact a health care provider if:  · You develop new  symptoms.  · Your symptoms change or get worse.  · You have a fever.  · You are constipated. Signs of constipation include having:  ? Fewer bowel movements in a week than normal.  ? Trouble having a bowel movement.  ? Stools that are dry, hard, or larger than normal.  · You have persistent nausea, vomiting, or diarrhea.  Get help right away if:  · You become very confused or drowsy.  · You vomit blood or material that looks like coffee grounds.  · Your stool is bloody, black, or looks like tar.  Summary  · Hepatic encephalopathy is a loss of brain function due to advanced liver disease. When the liver is damaged, harmful substances (toxins) can build up in your body. Some of these toxins, such as ammonia, can harm your brain.  · Certain things can trigger or worsen hepatic encephalopathy. Hepatic encephalopathy can sometimes be reversed if these triggers are resolved.  · The first step in treatment is to identify and treat the cause of your liver damage or triggering illness, if possible. The next step is taking medicine to lower the level of toxins in your body and prevent ammonia from building up.  · Your treatment will depend on how severe your hepatic encephalopathy is.  This information is not intended to replace advice given to you by your health care provider. Make sure you discuss any questions you have with your health care provider.  Document Revised: 11/30/2018 Document Reviewed: 09/18/2018  [employer] Patient Education © 2021 [employer]

## 2021-08-24 NOTE — TELEPHONE ENCOUNTER
08/24/2021, 2362 - Telephone call returned to patient's brother/Legal Guardian, Bahman Pittman [phone].  Mr. Pittman inquired what diagnosis patient received today following clinical appointment with Dr. Ge Gorman M.D.  Mr. Pittman made aware patient was diagnosed with Acute Blood Loss Anemia and Cirrhosis of Liver without Ascites, laboratory testing ordered (Ammonia, Protime-INR, CBC, CMP), with zero prescription medications prescribed.  Mr. Pittman made aware all laboratory testing has not resulted at current time, however, once all results available,  and [facility] will be contacted with KARYNA Gorman's plan of action to address diagnoses.  Mr. Pittman verbalized understanding.

## 2021-08-24 NOTE — PROGRESS NOTES
Chief Complaint   Patient presents with   • [facility] Admission 08/06/2021 - 08/13/2021     Hepatic Encephalopathy       Subjective    Natacha Gandhi is a 71 y.o. female.    History of Present Illness  Patient presented to GI clinic for follow-up visit today.  No further rectal bleeding.  Bowel movements are regular.  Gained 1 and half pounds weight since last week per nursing staff.  Hemoglobin 2 weeks ago was 9.6.  Noted to have cirrhosis and thrombocytopenia upon last hospital admission.  Also has high iron saturations.       The following portions of the patient's history were reviewed and updated as appropriate:   Past Medical History:   Diagnosis Date   • Bipolar disorder, unspecified (CMS/HCC)    • Depression    • Diabetes mellitus (CMS/HCC)    • GERD (gastroesophageal reflux disease)    • Hypertension    • Legal blindness    • Psychiatric illness    • Rheumatoid arthritis (CMS/HCC)    • Schizoaffective disorder (CMS/HCC)      Past Surgical History:   Procedure Laterality Date   • COLONOSCOPY N/A 8/29/2020    Procedure: COLONOSCOPY;  Surgeon: Ge Gorman MD;  Location: [facility];  Service: Gastroenterology;  Laterality: N/A;   • ENDOSCOPY N/A 8/28/2020    Procedure: ESOPHAGOGASTRODUODENOSCOPY;  Surgeon: Ge Gorman MD;  Location: [facility];  Service: Gastroenterology;  Laterality: N/A;   • UPPER GASTROINTESTINAL ENDOSCOPY  08/29/2020     Family History   Problem Relation Age of Onset   • Dementia Mother    • No Known Problems Father    • No Known Problems Sister    • No Known Problems Brother    • No Known Problems Maternal Aunt    • No Known Problems Paternal Aunt    • No Known Problems Maternal Uncle    • No Known Problems Paternal Uncle    • No Known Problems Maternal Grandfather    • No Known Problems Maternal Grandmother    • No Known Problems Paternal Grandfather    • No Known Problems Paternal Grandmother    • No Known Problems Cousin    • No Known Problems Other    • Suicide  Attempts Neg Hx    • ADD / ADHD Neg Hx    • Alcohol abuse Neg Hx    • Anxiety disorder Neg Hx    • Bipolar disorder Neg Hx    • Depression Neg Hx    • Drug abuse Neg Hx    • OCD Neg Hx    • Paranoid behavior Neg Hx    • Schizophrenia Neg Hx    • Seizures Neg Hx    • Self-Injurious Behavior  Neg Hx      OB History    No obstetric history on file.       Prior to Admission medications    Medication Sig Start Date End Date Taking? Authorizing Provider   ARIPiprazole (ABILIFY) 15 MG tablet Take 1 tablet by mouth Daily With Dinner. Indications: Schizophrenia 8/17/21  Yes Ellie Raza MD   Cholecalciferol 125 MCG (5000 UT) tablet Take 5,000 Units by mouth Daily.   Yes Terence Rodriguez MD   Emollient (BAG BALM EX) Apply 1 application topically Daily. To eloisa feet   Yes Terence Rodriguez MD   escitalopram (LEXAPRO) 10 MG tablet Take 1 tablet by mouth Daily. Indications: Major Depressive Disorder 9/25/20  Yes Ellie Raza MD   lactulose (CHRONULAC) 10 GM/15ML solution Take 45 mL by mouth 3 (Three) Times a Day. 8/13/21  Yes Sumit Carlisle MD   Menthol-Zinc Oxide (Calmoseptine) 0.44-20.6 % ointment Apply  topically to the appropriate area as directed Every 12 (Twelve) Hours.   Yes Terence Rodriguez MD   metFORMIN (GLUCOPHAGE) 500 MG tablet Take 500 mg by mouth Daily.   Yes Terence Rodriguez MD   multivitamin with minerals (MULTIVITAMIN ADULT PO) Take 1 tablet by mouth Daily.   Yes Terence Rodriguez MD   ondansetron (ZOFRAN) 4 MG tablet Take 4 mg by mouth Every 8 (Eight) Hours As Needed for Nausea or Vomiting.   Yes Terence Rodriguez MD   pantoprazole (PROTONIX) 40 MG EC tablet Take 1 tablet by mouth Daily. Indications: Gastroesophageal Reflux Disease 9/24/20  Yes Ellie Raza MD     No Known Allergies  Social History     Socioeconomic History   • Marital status:      Spouse name: Not on file   • Number of children: Not on file   • Years of education: Not on file   •  "Highest education level: Not on file   Tobacco Use   • Smoking status: Former Smoker   • Smokeless tobacco: Never Used   • Tobacco comment: \" a little bit a long time ago\"   Substance and Sexual Activity   • Alcohol use: Not Currently   • Drug use: Never   • Sexual activity: Not Currently       Review of Systems  Review of Systems   Constitutional: Negative for chills, fatigue, fever and unexpected weight change.   HENT: Negative for congestion, ear discharge, hearing loss, nosebleeds and sore throat.    Eyes: Negative for pain, discharge and redness.   Respiratory: Negative for cough, chest tightness, shortness of breath and wheezing.    Cardiovascular: Negative for chest pain and palpitations.   Gastrointestinal: Negative for abdominal distention, abdominal pain, blood in stool, constipation, diarrhea, nausea and vomiting.   Endocrine: Negative for cold intolerance, polydipsia, polyphagia and polyuria.   Genitourinary: Negative for dysuria, flank pain, frequency, hematuria and urgency.   Musculoskeletal: Negative for arthralgias, back pain, joint swelling and myalgias.   Skin: Negative for color change, pallor and rash.   Neurological: Negative for tremors, seizures, syncope, weakness and headaches.   Hematological: Negative for adenopathy. Does not bruise/bleed easily.   Psychiatric/Behavioral: Negative for behavioral problems, confusion, dysphoric mood, hallucinations and suicidal ideas. The patient is not nervous/anxious.         /77   Pulse 81   Ht 172.7 cm (68\") Comment: Per [facility]  Wt 59 kg (130 lb) Comment: Per [facility]  LMP  (LMP Unknown)   BMI 19.77 kg/m²     Objective    Physical Exam  Constitutional:       Appearance: She is well-developed.   HENT:      Head: Normocephalic and atraumatic.   Eyes:      Conjunctiva/sclera: Conjunctivae normal.      Pupils: Pupils are equal, round, and reactive to light.   Neck:      Thyroid: No " thyromegaly.   Cardiovascular:      Rate and Rhythm: Normal rate and regular rhythm.      Heart sounds: Normal heart sounds. No murmur heard.     Pulmonary:      Effort: Pulmonary effort is normal.      Breath sounds: Normal breath sounds. No wheezing.   Abdominal:      General: Bowel sounds are normal. There is no distension.      Palpations: Abdomen is soft. There is no mass.      Tenderness: There is no abdominal tenderness.      Hernia: No hernia is present.   Genitourinary:     Comments: No lesions noted  Musculoskeletal:         General: No tenderness. Normal range of motion.      Cervical back: Normal range of motion and neck supple.   Lymphadenopathy:      Cervical: No cervical adenopathy.   Skin:     General: Skin is warm and dry.      Findings: No rash.   Neurological:      Mental Status: She is alert and oriented to person, place, and time.      Cranial Nerves: No cranial nerve deficit.   Psychiatric:         Thought Content: Thought content normal.       Admission on 08/13/2021, Discharged on 08/17/2021   Component Date Value Ref Range Status   • QT Interval 08/13/2021 362  ms Preliminary   • QTC Interval 08/13/2021 425  ms Preliminary   • Glucose, Fasting 08/14/2021 83  74 - 106 mg/dL Final   • Total Cholesterol 08/14/2021 105  0 - 200 mg/dL Final   • Triglycerides 08/14/2021 59  0 - 150 mg/dL Final   • HDL Cholesterol 08/14/2021 30* 40 - 60 mg/dL Final   • LDL Cholesterol  08/14/2021 62  0 - 100 mg/dL Final   • VLDL Cholesterol 08/14/2021 13  5 - 40 mg/dL Final   • LDL/HDL Ratio 08/14/2021 2.11   Final   • Glucose 08/14/2021 148* 70 - 130 mg/dL Final    RN NotifiedOperator: [id number] OZIEL LISAMeter ID: [id number]   • Glucose 08/14/2021 143* 70 - 130 mg/dL Final    RN NotifiedOperator: [id number] JOSI VALLECILLOMeter ID: [id number]   • Glucose 08/15/2021 101  70 - 130 mg/dL Final    : [id number] JOSI GORMANAILMeter ID: [id number]   • WBC 08/15/2021 4.54  3.40 - 10.80 10*3/mm3 Final   • RBC  08/15/2021 2.94* 3.77 - 5.28 10*6/mm3 Final   • Hemoglobin 08/15/2021 9.6* 12.0 - 15.9 g/dL Final   • Hematocrit 08/15/2021 28.3* 34.0 - 46.6 % Final   • MCV 08/15/2021 96.3  79.0 - 97.0 fL Final   • MCH 08/15/2021 32.7  26.6 - 33.0 pg Final   • MCHC 08/15/2021 33.9  31.5 - 35.7 g/dL Final   • RDW 08/15/2021 16.6* 12.3 - 15.4 % Final   • RDW-SD 08/15/2021 57.1* 37.0 - 54.0 fl Final   • MPV 08/15/2021 10.9  6.0 - 12.0 fL Final   • Platelets 08/15/2021 57* 140 - 450 10*3/mm3 Final   • Extra Tube 08/15/2021 Hold for add-ons.   Final    Auto resulted.   • Anisocytosis 08/15/2021 Slight/1+  None Seen Final   • Hypochromia 08/15/2021 Slight/1+  None Seen Final   • WBC Morphology 08/15/2021 Normal  Normal Final   • Platelet Estimate 08/15/2021 Decreased  Normal Final   • Glucose 08/15/2021 90  70 - 130 mg/dL Final    : [id number] OZIEL LISAMeter ID: [id number]   • Glucose 08/15/2021 133* 70 - 130 mg/dL Final    RN NotifiedOperator: [id number] IB LISAMeter ID: [id number]   • Glucose 08/15/2021 150* 70 - 130 mg/dL Final    RN NotifiedOperator: [id number] ARPIT COLEIEMeter ID: [id number]   • Glucose 08/16/2021 110  70 - 130 mg/dL Final    RN NotifiedOperator: [id number] ARPIT COLEIEMeter ID: [id number]   • Glucose 08/16/2021 94  70 - 130 mg/dL Final    RN NotifiedOperator: [id number] DREA GRACEMeter ID: [id number]   • COVID19 08/16/2021 Not Detected  Not Detected - Ref. Range Final   • Glucose 08/16/2021 129  70 - 130 mg/dL Final    RN NotifiedOperator: [id number] MADISON Betts ID: [id number]   • Glucose 08/16/2021 104  70 - 130 mg/dL Final    RN NotifiedOperator: [id number] ERIC Reeder ID: [id number]   • Glucose 08/17/2021 93  70 - 130 mg/dL Final    RN NotifiedOperator: [id number] ERIC CHAMBERLAINMetchucky ID: [id number]   • Glucose 08/17/2021 101  70 - 130 mg/dL Final    : [id number] MADISON Betts ID: [id number]     Assessment/Plan      1. Acute blood loss anemia     2. Cirrhosis of liver without ascites, unspecified hepatic cirrhosis type (CMS/HCC)    1.  Cirrhosis, etiology unclear.  Obtain CBC, CMP, ammonia and PT/INR.  Avoid hypotension hepatotoxic medications.  2.  Rectal bleeding, resolved.  Likely hemorrhoidal.  3.  Acute posthemorrhagic anemia, improving.  Repeat CBC today.  4.  High iron saturations, repeat in 1 month.  5.  Colon polyp, repeat colonoscopy in 1 year.      Orders placed during this encounter include:  Orders Placed This Encounter   Procedures   • CBC (No Diff)     Standing Status:   Future     Order Specific Question:   Release to patient     Answer:   Immediate   • Comprehensive Metabolic Panel     Order Specific Question:   Release to patient     Answer:   Immediate   • Ammonia     Order Specific Question:   Release to patient     Answer:   Immediate   • Protime-INR     Order Specific Question:   Release to patient     Answer:   Immediate       * Surgery not found *    Review and/or summary of lab tests, radiology, procedures, medications. Review and summary of old records and obtaining of history. The risks and benefits of my recommendations, as well as other treatment options were discussed with the patient and nursing attendant today. Questions were answered.    No orders of the defined types were placed in this encounter.      Follow-up: Return in about 1 month (around 9/24/2021).               Results for orders placed or performed during the hospital encounter of 08/13/21   Green Top (Gel)   Result Value Ref Range    Extra Tube Hold for add-ons.    Scan Slide    Specimen: Blood   Result Value Ref Range    Anisocytosis Slight/1+ None Seen    Hypochromia Slight/1+ None Seen    WBC Morphology Normal Normal    Platelet Estimate Decreased Normal   COVID-19, [facility] IN-HOUSE, NP SWAB IN TRANSPORT MEDIA 8-10 HR TAT - Swab, Nasopharynx    Specimen: Nasopharynx; Swab   Result Value Ref Range    COVID19 Not Detected Not Detected - Ref. Range   CBC (No  Diff)    Specimen: Blood   Result Value Ref Range    WBC 4.54 3.40 - 10.80 10*3/mm3    RBC 2.94 (L) 3.77 - 5.28 10*6/mm3    Hemoglobin 9.6 (L) 12.0 - 15.9 g/dL    Hematocrit 28.3 (L) 34.0 - 46.6 %    MCV 96.3 79.0 - 97.0 fL    MCH 32.7 26.6 - 33.0 pg    MCHC 33.9 31.5 - 35.7 g/dL    RDW 16.6 (H) 12.3 - 15.4 %    RDW-SD 57.1 (H) 37.0 - 54.0 fl    MPV 10.9 6.0 - 12.0 fL    Platelets 57 (L) 140 - 450 10*3/mm3   POC Glucose Once    Specimen: Blood   Result Value Ref Range    Glucose 101 70 - 130 mg/dL   POC Glucose Once    Specimen: Blood   Result Value Ref Range    Glucose 93 70 - 130 mg/dL   POC Glucose Once    Specimen: Blood   Result Value Ref Range    Glucose 104 70 - 130 mg/dL   POC Glucose Once    Specimen: Blood   Result Value Ref Range    Glucose 129 70 - 130 mg/dL   POC Glucose Once    Specimen: Blood   Result Value Ref Range    Glucose 94 70 - 130 mg/dL   POC Glucose Once    Specimen: Blood   Result Value Ref Range    Glucose 110 70 - 130 mg/dL   POC Glucose Once    Specimen: Blood   Result Value Ref Range    Glucose 150 (H) 70 - 130 mg/dL   POC Glucose Once    Specimen: Blood   Result Value Ref Range    Glucose 133 (H) 70 - 130 mg/dL   POC Glucose Once    Specimen: Blood   Result Value Ref Range    Glucose 90 70 - 130 mg/dL   POC Glucose Once    Specimen: Blood   Result Value Ref Range    Glucose 101 70 - 130 mg/dL   POC Glucose Once    Specimen: Blood   Result Value Ref Range    Glucose 143 (H) 70 - 130 mg/dL   POC Glucose Once    Specimen: Blood   Result Value Ref Range    Glucose 148 (H) 70 - 130 mg/dL   Glucose, Fasting    Specimen: Blood   Result Value Ref Range    Glucose, Fasting 83 74 - 106 mg/dL   Lipid Panel    Specimen: Blood   Result Value Ref Range    Total Cholesterol 105 0 - 200 mg/dL    Triglycerides 59 0 - 150 mg/dL    HDL Cholesterol 30 (L) 40 - 60 mg/dL    LDL Cholesterol  62 0 - 100 mg/dL    VLDL Cholesterol 13 5 - 40 mg/dL    LDL/HDL Ratio 2.11    ECG 12 Lead   Result Value Ref Range     QT Interval 362 ms    QTC Interval 425 ms   Results for orders placed or performed during the hospital encounter of 08/06/21   STAT Lactic Acid, Reflex    Specimen: Arm, Right; Blood   Result Value Ref Range    Lactate 2.5 (C) 0.5 - 2.0 mmol/L   Gold Top - SST   Result Value Ref Range    Extra Tube Hold for add-ons.    Green Top (Gel)   Result Value Ref Range    Extra Tube Hold for add-ons.    Green Top (Gel)   Result Value Ref Range    Extra Tube Hold for add-ons.    Scan Slide    Specimen: Blood   Result Value Ref Range    RBC Morphology Normal Normal    WBC Morphology Normal Normal    Platelet Estimate Decreased Normal   COVID-19 and FLU A/B PCR - Swab, Nasopharynx    Specimen: Nasopharynx; Swab   Result Value Ref Range    COVID19 Not Detected Not Detected - Ref. Range    Influenza A PCR Not Detected Not Detected    Influenza B PCR Not Detected Not Detected   Urinalysis With Culture If Indicated - Urine, Catheter In/Out    Specimen: Urine, Catheter In/Out   Result Value Ref Range    Color, UA Dark Yellow Yellow, Straw, Dark Yellow, Raeann    Appearance, UA Clear Clear    pH, UA 6.0 5.0 - 9.0    Specific Gravity, UA 1.021 1.003 - 1.030    Glucose, UA Negative Negative    Ketones, UA Trace (A) Negative    Bilirubin, UA Small (1+) (A) Negative    Blood, UA Negative Negative    Protein, UA Negative Negative    Leuk Esterase, UA Negative Negative    Nitrite, UA Negative Negative    Urobilinogen, UA >=8.0 E.U./dL (A) 0.2 - 1.0 E.U./dL   Retic With IRF & RET-He    Specimen: Blood   Result Value Ref Range    Immature Reticulocyte Fraction 7.0 3.0 - 15.8 %    Reticulocyte % 1.67 0.70 - 1.90 %    Reticulocyte Absolute 0.0523 0.0200 - 0.1300 10*6/mm3    Reticulocyte Hgb 40.7 (H) 29.8 - 36.1 pg   Immature Platelet Fraction    Specimen: Blood   Result Value Ref Range    IPF 2.00 0.90 - 6.50 %   Procalcitonin    Specimen: Blood   Result Value Ref Range    Procalcitonin 0.21 0.00 - 0.25 ng/mL   HBsAg Confirmation    Specimen:  Blood   Result Value Ref Range    Hepatitis B Surface Ag Confirm Positive (A)    Flow Cytometry, Blood    Specimen: Blood   Result Value Ref Range    Reference Lab Report     CBC Auto Differential    Specimen: Blood   Result Value Ref Range    WBC 4.40 3.40 - 10.80 10*3/mm3    RBC 2.98 (L) 3.77 - 5.28 10*6/mm3    Hemoglobin 9.8 (L) 12.0 - 15.9 g/dL    Hematocrit 28.7 (L) 34.0 - 46.6 %    MCV 96.3 79.0 - 97.0 fL    MCH 32.9 26.6 - 33.0 pg    MCHC 34.1 31.5 - 35.7 g/dL    RDW 15.9 (H) 12.3 - 15.4 %    RDW-SD 55.2 (H) 37.0 - 54.0 fl    MPV 11.1 6.0 - 12.0 fL    Platelets 52 (L) 140 - 450 10*3/mm3    Neutrophil % 44.1 42.7 - 76.0 %    Lymphocyte % 39.5 19.6 - 45.3 %    Monocyte % 13.0 (H) 5.0 - 12.0 %    Eosinophil % 2.5 0.3 - 6.2 %    Basophil % 0.7 0.0 - 1.5 %    Immature Grans % 0.2 0.0 - 0.5 %    Neutrophils, Absolute 1.94 1.70 - 7.00 10*3/mm3    Lymphocytes, Absolute 1.74 0.70 - 3.10 10*3/mm3    Monocytes, Absolute 0.57 0.10 - 0.90 10*3/mm3    Eosinophils, Absolute 0.11 0.00 - 0.40 10*3/mm3    Basophils, Absolute 0.03 0.00 - 0.20 10*3/mm3    Immature Grans, Absolute 0.01 0.00 - 0.05 10*3/mm3    nRBC 0.0 0.0 - 0.2 /100 WBC   CBC Auto Differential    Specimen: Blood   Result Value Ref Range    WBC 4.59 3.40 - 10.80 10*3/mm3    RBC 2.91 (L) 3.77 - 5.28 10*6/mm3    Hemoglobin 9.9 (L) 12.0 - 15.9 g/dL    Hematocrit 28.2 (L) 34.0 - 46.6 %    MCV 96.9 79.0 - 97.0 fL    MCH 34.0 (H) 26.6 - 33.0 pg    MCHC 35.1 31.5 - 35.7 g/dL    RDW 15.8 (H) 12.3 - 15.4 %    RDW-SD 55.5 (H) 37.0 - 54.0 fl    MPV 11.9 6.0 - 12.0 fL    Platelets 48 (C) 140 - 450 10*3/mm3    Neutrophil % 49.6 42.7 - 76.0 %    Lymphocyte % 37.3 19.6 - 45.3 %    Monocyte % 10.5 5.0 - 12.0 %    Eosinophil % 1.7 0.3 - 6.2 %    Basophil % 0.7 0.0 - 1.5 %    Immature Grans % 0.2 0.0 - 0.5 %    Neutrophils, Absolute 2.28 1.70 - 7.00 10*3/mm3    Lymphocytes, Absolute 1.71 0.70 - 3.10 10*3/mm3    Monocytes, Absolute 0.48 0.10 - 0.90 10*3/mm3    Eosinophils, Absolute  0.08 0.00 - 0.40 10*3/mm3    Basophils, Absolute 0.03 0.00 - 0.20 10*3/mm3    Immature Grans, Absolute 0.01 0.00 - 0.05 10*3/mm3    nRBC 0.0 0.0 - 0.2 /100 WBC     *Note: Due to a large number of results and/or encounters for the requested time period, some results have not been displayed. A complete set of results can be found in Results Review.         This document has been electronically signed by Ge Gorman MD on August 24, 2021 11:04 CDT

## 2021-09-01 NOTE — PROGRESS NOTES
"DATE OF VISIT: 9/1/2021      REASON FOR VISIT: Thrombocytopenia, anemia, elevated iron level, elevated liver function test, coagulopathy, cirrhosis of liver      HISTORY OF PRESENT ILLNESS:   71-year-old female with medical problem consisting of diabetes mellitus, hypertension, legal blindness, rheumatoid arthritis, schizoaffective disorder, bipolar disorder who was initially seen in consultation on August 9, 2021 for evaluation of thrombocytopenia and anemia.  Patient was found to have elevated iron level and cirrhosis during work-up.  Patient is here for follow-up appointment today.  Due to her psychiatric illness patient is not able to provide much of a history.  Does not report any bleeding or any new lymph node enlargement.  Denies any new abdominal pain.      Past Medical History, Past Surgical History, Social History, Family History have been reviewed and are without significant changes except as mentioned.    Review of Systems   A comprehensive 14 point review of systems was performed and was negative except as mentioned in HPI    Medications:  The current medication list was reviewed in the EMR    ALLERGIES:  No Known Allergies    Objective      Vitals:    09/01/21 1402   BP: 131/71   Pulse: 96   Resp: 16   Temp: 97.8 °F (36.6 °C)   Weight: 59 kg (130 lb)  Comment: per NH   Height: 160 cm (63\")   PainSc: 0-No pain     Current Status 9/1/2021   ECOG score 0       Physical Exam  Pulmonary:      Breath sounds: Normal breath sounds.   Musculoskeletal:      Comments: No edema.  Decreased range of motion   Neurological:      Mental Status: She is alert.           RECENT LABS:  Glucose   Date Value Ref Range Status   08/24/2021 134 (H) 65 - 99 mg/dL Final     Sodium   Date Value Ref Range Status   08/24/2021 142 136 - 145 mmol/L Final     Potassium   Date Value Ref Range Status   08/24/2021 3.6 3.5 - 5.2 mmol/L Final     CO2   Date Value Ref Range Status   08/24/2021 23.4 22.0 - 29.0 mmol/L Final     Chloride "   Date Value Ref Range Status   08/24/2021 110 (H) 98 - 107 mmol/L Final     Anion Gap   Date Value Ref Range Status   08/24/2021 8.6 5.0 - 15.0 mmol/L Final     Creatinine   Date Value Ref Range Status   08/24/2021 0.86 0.57 - 1.00 mg/dL Final     BUN   Date Value Ref Range Status   08/24/2021 9 8 - 23 mg/dL Final     BUN/Creatinine Ratio   Date Value Ref Range Status   08/24/2021 10.5 7.0 - 25.0 Final     Calcium   Date Value Ref Range Status   08/24/2021 9.4 8.6 - 10.5 mg/dL Final     eGFR Non  Amer   Date Value Ref Range Status   08/28/2020 54 (L) >60 mL/min/1.73 Final     Alkaline Phosphatase   Date Value Ref Range Status   08/24/2021 97 39 - 117 U/L Final     Total Protein   Date Value Ref Range Status   08/24/2021 6.9 6.0 - 8.5 g/dL Final     ALT (SGPT)   Date Value Ref Range Status   08/24/2021 93 (H) 1 - 33 U/L Final     AST (SGOT)   Date Value Ref Range Status   08/24/2021 109 (H) 1 - 32 U/L Final     Total Bilirubin   Date Value Ref Range Status   08/24/2021 1.2 0.0 - 1.2 mg/dL Final     Albumin   Date Value Ref Range Status   08/24/2021 2.10 (L) 3.50 - 5.20 g/dL Final     Globulin   Date Value Ref Range Status   08/24/2021 4.8 gm/dL Final     Lab Results   Component Value Date    WBC 4.77 08/24/2021    HGB 11.1 (L) 08/24/2021    HCT 31.6 (L) 08/24/2021    MCV 94.3 08/24/2021    PLT 60 (L) 08/24/2021     Lab Results   Component Value Date    NEUTROABS 1.94 08/13/2021    IRON 112 08/09/2021    IRON 106 08/06/2021    TIBC 176 (L) 08/09/2021    TIBC 191 (L) 08/06/2021    LABIRON 64 (H) 08/09/2021    LABIRON 56 (H) 08/06/2021    FERRITIN 351.70 (H) 08/09/2021    FERRITIN 342.20 (H) 08/06/2021    VLMZQEBM22 1,716 (H) 08/09/2021    TTEWRTMZ80 1,716 (H) 09/05/2020    FOLATE 16.80 08/09/2021    FOLATE 9.86 09/05/2020     No results found for: , LABCA2, AFPTM, HCGQUANT, , CHROMGRNA, 7NYPT69OPS, CEA, REFLABREPO      HBsAg Confirmation  Order: [id number] - Reflex for Order [id number]  Status:  Final  result   Visible to patient:  No (not released)  Specimen Information: Blood         0 Result Notes  Component 3 wk ago   Hepatitis B Surface Ag Confirm PositiveAbnormal     Comment: Result confirmed by neutralization.   Resulting Agency [facility]      Narrative  Performed by: [facility]  Performed at:  01 - [facility] [address]  [id number]   : Aaron Kan PhD, Phone:  [phone]      Specimen Collected: 08/10/21 06:55 Last Resulted: 08/21/21 10:09               Hepatitis B Surface Antibody [[id number]] (Order [id number])  Order  Date: 8/9/2021 Department: [address] Released By/Authorizing: Terry Bills MD (auto-released)   Reprint Order Requisition    Hepatitis B Surface Antibody (Order #[id number]) on 8/9/21       Hepatitis B Surface Antibody  Order: [id number]  Status:  Final result   Visible to patient:  No (not released) Next appt:  09/24/2021 at 10:15 AM in Gastroenterology (Ge Gorman MD)  Specimen Information: Blood         0 Result Notes  Component   Ref Range & Units 3 wk ago   Hep B S Ab   Non-Reactive Non-Reactive    Resulting Agency [facility]      Narrative  Performed by: [facility]  Results may be falsely decreased if patient taking Biotin.       Specimen Collected: 08/10/21 06:55 Last Resulted: 08/10/21 13:47               Hepatitis B Core Antibody, Total  Order: [id number]  Status:  Final result   Visible to patient:  No (not released) Next appt:  09/24/2021 at 10:15 AM in Gastroenterology (Ge Gorman MD)  Specimen Information: Blood         0 Result Notes  Component   Ref Range & Units 3 wk ago   Hep B Core Total Ab   Negative PositiveAbnormal     Resulting Agency [facility]      Narrative  Performed by: [facility]  Performed at:  01 - [facility] [address]  [id number]   : Aaron Kan PhD, Phone:  [phone]      Specimen Collected: 08/10/21 06:55 Last Resulted: 08/11/21 08:10                  Hepatitis B Core Antibody, IgM [[id number]] (Order [id number])  Order  Date: 8/11/2021 Department: [address] Released By: Doroteo (auto-released) Authorizing: Clara Gupta MD   Reprint Order Requisition    Hepatitis B Core Antibody, IgM (Order #[id number]) on 8/11/21       Hepatitis B Core Antibody, IgM  Order: [id number]  Status:  Final result   Visible to patient:  No (not released) Next appt:  09/24/2021 at 10:15 AM in Gastroenterology (Ge Gorman MD)  Specimen Information: Blood         0 Result Notes  Component   Ref Range & Units 2 wk ago   Hep B C IgM   Non-Reactive Non-Reactive    Resulting Agency [facility]      Narrative  Performed by: [facility]  Results may be falsely decreased if patient taking Biotin.       Specimen Collected: 08/12/21 07:16 Last Resulted: 08/12/21 08:03                   PATHOLOGY:  * Cannot find OR log *         RADIOLOGY DATA :  No radiology results for the last 7 days        Assessment/Plan     1.  Thrombocytopenia:  -Secondary to cirrhosis of liver  -Platelet count done recently is 82,000.  Patient denies any bleeding  -We will continue with clinical monitoring  -Flow cytometry done in August 2021 was negative for any leukemia or lymphoma  -We will ask patient to return to clinic in 2 months with repeat CBC, CMP, iron studies, ferritin, B12 and folate to be done prior to that    2.  Anemia:  -Anemia of chronic disease plus or minus GI bleeding  -Due to elevated iron level ferrous sulfate was discontinued in August 2021  -Most recent hemoglobin is 11.  Will monitor with CBC    3.  Elevated liver function test:  -Secondary to chronic infection with hepatitis B.  -Recommend avoiding hepatotoxic medication  -Recommend continuing following up with gastroenterology clinic for possible treatment for hepatitis B if required    4.  Elevated iron level  -Ferrous sulfate has been discontinued in August 2021  -Recheck iron studies  prior to next clinic visit in 2 months    5.  Coagulopathy:  -Secondary to cirrhosis of liver.    6.  Health maintenance: Patient does not smoke                 PHQ-9 Total Score: 0   -Patient is not homicidal or suicidal.  No acute intervention required.    Natacha Gandhi reports a pain score of 0.  Given her pain assessment as noted, treatment options were discussed and the following options were decided upon as a follow-up plan to address the patient's pain: continuation of current treatment plan for pain.         Terry Bills MD  9/1/2021  14:29 CDT        Part of this note may be an electronic transcription/translation of spoken language to printed text using the Dragon Dictation System.          CC:

## 2021-10-25 NOTE — PROGRESS NOTES
Chief Complaint   Patient presents with   • 1 month f/u       Subjective    Natacha Gandhi is a 71 y.o. female.    History of Present Illness  Patient present rechecking for follow-up visit today.  Feels better currently.  No GI complaints at this time.  Eating better.  Gaining weight.       The following portions of the patient's history were reviewed and updated as appropriate:   Past Medical History:   Diagnosis Date   • Anemia    • Bipolar disorder, unspecified (CMS/HCC)    • Depression    • Diabetes mellitus (CMS/HCC)    • GERD (gastroesophageal reflux disease)    • Hypertension    • Legal blindness    • Psychiatric illness    • Rheumatoid arthritis (CMS/HCC)    • Schizoaffective disorder (CMS/HCC)      Past Surgical History:   Procedure Laterality Date   • COLONOSCOPY N/A 8/29/2020    Procedure: COLONOSCOPY;  Surgeon: Ge Gorman MD;  Location: [facility];  Service: Gastroenterology;  Laterality: N/A;   • ENDOSCOPY N/A 8/28/2020    Procedure: ESOPHAGOGASTRODUODENOSCOPY;  Surgeon: Ge Gorman MD;  Location: [facility];  Service: Gastroenterology;  Laterality: N/A;   • UPPER GASTROINTESTINAL ENDOSCOPY  08/29/2020     Family History   Problem Relation Age of Onset   • Dementia Mother    • No Known Problems Father    • No Known Problems Sister    • No Known Problems Brother    • No Known Problems Maternal Aunt    • No Known Problems Paternal Aunt    • No Known Problems Maternal Uncle    • No Known Problems Paternal Uncle    • No Known Problems Maternal Grandfather    • No Known Problems Maternal Grandmother    • No Known Problems Paternal Grandfather    • No Known Problems Paternal Grandmother    • No Known Problems Cousin    • No Known Problems Other    • Suicide Attempts Neg Hx    • ADD / ADHD Neg Hx    • Alcohol abuse Neg Hx    • Anxiety disorder Neg Hx    • Bipolar disorder Neg Hx    • Depression Neg Hx    • Drug abuse Neg Hx    • OCD Neg Hx    • Paranoid behavior Neg Hx    • Schizophrenia  Neg Hx    • Seizures Neg Hx    • Self-Injurious Behavior  Neg Hx      OB History    No obstetric history on file.       Prior to Admission medications    Medication Sig Start Date End Date Taking? Authorizing Provider   ARIPiprazole (ABILIFY) 15 MG tablet Take 1 tablet by mouth Daily With Dinner. Indications: Schizophrenia 8/17/21  Yes Ellie Raza MD   Ascorbic Acid (Vitamin C) 500 MG chewable tablet Chew 1 tablet Daily. For anemia   Yes Terence Rodriguez MD   Cholecalciferol 125 MCG (5000 UT) tablet Take 5,000 Units by mouth Daily.   Yes Terence Rodriguez MD   Emollient (BAG BALM EX) Apply 1 application topically Daily. To eloisa feet   Yes Terence Rodriguez MD   escitalopram (LEXAPRO) 10 MG tablet Take 1 tablet by mouth Daily. Indications: Major Depressive Disorder 9/25/20  Yes Ellie Raza MD   ferrous sulfate 325 (65 FE) MG tablet Take 325 mg by mouth Daily With Breakfast.   Yes Terence oRdriguez MD   lactulose (CHRONULAC) 10 GM/15ML solution Take 45 mL by mouth 3 (Three) Times a Day. 8/13/21  Yes Sumit Carlisle MD   LORazepam (ATIVAN) 0.5 MG tablet Take 0.5 mg by mouth Every 8 (Eight) Hours As Needed for Anxiety. Give .25 mg by mouth  Daily for anxiety   Yes Terence Rodriguez MD   Menthol-Zinc Oxide (Calmoseptine) 0.44-20.6 % ointment Apply  topically to the appropriate area as directed Every 12 (Twelve) Hours.   Yes Terence Rodriguez MD   metFORMIN (GLUCOPHAGE) 500 MG tablet Take 500 mg by mouth Daily.   Yes Terence Rodriguez MD   multivitamin with minerals (MULTIVITAMIN ADULT PO) Take 1 tablet by mouth Daily.   Yes Terence Rodriguez MD   ondansetron (ZOFRAN) 4 MG tablet Take 4 mg by mouth Every 8 (Eight) Hours As Needed for Nausea or Vomiting.   Yes Terence Rodriguez MD   pantoprazole (PROTONIX) 40 MG EC tablet Take 1 tablet by mouth Daily. Indications: Gastroesophageal Reflux Disease 9/24/20  Yes Ellie Raza MD   tuberculin (Tubersol) 5 UNIT/0.1ML  "injection Inject 5 Units into the appropriate area of the skin as directed by provider 1 (One) Time.   Yes Provider, Historical, MD     No Known Allergies  Social History     Socioeconomic History   • Marital status:    Tobacco Use   • Smoking status: Former Smoker   • Smokeless tobacco: Never Used   • Tobacco comment: \" a little bit a long time ago\"   Vaping Use   • Vaping Use: Unknown   Substance and Sexual Activity   • Alcohol use: Not Currently   • Drug use: Never   • Sexual activity: Not Currently       Review of Systems  Review of Systems   Constitutional: Negative for chills, fatigue, fever and unexpected weight change.   HENT: Negative for congestion, ear discharge, hearing loss, nosebleeds and sore throat.    Eyes: Negative for pain, discharge and redness.   Respiratory: Negative for cough, chest tightness, shortness of breath and wheezing.    Cardiovascular: Negative for chest pain and palpitations.   Gastrointestinal: Negative for abdominal distention, abdominal pain, blood in stool, constipation, diarrhea, nausea and vomiting.   Endocrine: Negative for cold intolerance, polydipsia, polyphagia and polyuria.   Genitourinary: Negative for dysuria, flank pain, frequency, hematuria and urgency.   Musculoskeletal: Negative for arthralgias, back pain, joint swelling and myalgias.   Skin: Negative for color change, pallor and rash.   Neurological: Negative for tremors, seizures, syncope, weakness and headaches.   Hematological: Negative for adenopathy. Does not bruise/bleed easily.   Psychiatric/Behavioral: Negative for behavioral problems, confusion, dysphoric mood, hallucinations and suicidal ideas. The patient is not nervous/anxious.         /82 (BP Location: Left arm)   Pulse 97   Ht 160 cm (63\")   Wt 59 kg (130 lb) Comment: used previous weight  LMP  (LMP Unknown)   BMI 23.03 kg/m²     Objective    Physical Exam  Constitutional:       Appearance: She is well-developed.   HENT:      Head: " Normocephalic and atraumatic.   Eyes:      Conjunctiva/sclera: Conjunctivae normal.      Pupils: Pupils are equal, round, and reactive to light.   Neck:      Thyroid: No thyromegaly.   Cardiovascular:      Rate and Rhythm: Normal rate and regular rhythm.      Heart sounds: Normal heart sounds. No murmur heard.      Pulmonary:      Effort: Pulmonary effort is normal.      Breath sounds: Normal breath sounds. No wheezing.   Abdominal:      General: Bowel sounds are normal. There is no distension.      Palpations: Abdomen is soft. There is no mass.      Tenderness: There is no abdominal tenderness.      Hernia: No hernia is present.   Genitourinary:     Comments: No lesions noted  Musculoskeletal:         General: No tenderness. Normal range of motion.      Cervical back: Normal range of motion and neck supple.   Lymphadenopathy:      Cervical: No cervical adenopathy.   Skin:     General: Skin is warm and dry.      Findings: No rash.   Neurological:      Mental Status: She is alert and oriented to person, place, and time.      Cranial Nerves: No cranial nerve deficit.   Psychiatric:         Thought Content: Thought content normal.       Lab on 08/24/2021   Component Date Value Ref Range Status   • WBC 08/24/2021 4.77  3.40 - 10.80 10*3/mm3 Final   • RBC 08/24/2021 3.35* 3.77 - 5.28 10*6/mm3 Final   • Hemoglobin 08/24/2021 11.1* 12.0 - 15.9 g/dL Final   • Hematocrit 08/24/2021 31.6* 34.0 - 46.6 % Final   • MCV 08/24/2021 94.3  79.0 - 97.0 fL Final   • MCH 08/24/2021 33.1* 26.6 - 33.0 pg Final   • MCHC 08/24/2021 35.1  31.5 - 35.7 g/dL Final   • RDW 08/24/2021 15.2  12.3 - 15.4 % Final   • RDW-SD 08/24/2021 51.2  37.0 - 54.0 fl Final   • MPV 08/24/2021 12.1* 6.0 - 12.0 fL Final   • Platelets 08/24/2021 60* 140 - 450 10*3/mm3 Final     Assessment/Plan      1. Cirrhosis of liver without ascites, unspecified hepatic cirrhosis type (CMS/HCC)    1.  Cirrhosis, etiology unclear.  Obtain CBC, CMP, ammonia and PT/INR.  Avoid  hypotension hepatotoxic medications.  2.  Rectal bleeding, resolved.  Likely hemorrhoidal.  3.  Acute posthemorrhagic anemia, improving.  Repeat CBC today.  4.  High iron saturations, repeat in 1 month.  5.  Colon polyp, repeat colonoscopy in 1 year.      Orders placed during this encounter include:  Orders Placed This Encounter   Procedures   • Protime-INR     Order Specific Question:   Release to patient     Answer:   Immediate       * Surgery not found *    Review and/or summary of lab tests, radiology, procedures, medications. Review and summary of old records and obtaining of history. The risks and benefits of my recommendations, as well as other treatment options were discussed with the patient and nursing attendant today. Questions were answered.    No orders of the defined types were placed in this encounter.      Follow-up: Return in about 1 month (around 10/24/2021).               Results for orders placed or performed in visit on 09/24/21   Protime-INR    Specimen: Blood   Result Value Ref Range    Protime 20.2 (H) 11.1 - 15.3 Seconds    INR 1.76 (H) 0.80 - 1.20   Results for orders placed or performed in visit on 08/24/21   CBC (No Diff)    Specimen: Blood   Result Value Ref Range    WBC 4.77 3.40 - 10.80 10*3/mm3    RBC 3.35 (L) 3.77 - 5.28 10*6/mm3    Hemoglobin 11.1 (L) 12.0 - 15.9 g/dL    Hematocrit 31.6 (L) 34.0 - 46.6 %    MCV 94.3 79.0 - 97.0 fL    MCH 33.1 (H) 26.6 - 33.0 pg    MCHC 35.1 31.5 - 35.7 g/dL    RDW 15.2 12.3 - 15.4 %    RDW-SD 51.2 37.0 - 54.0 fl    MPV 12.1 (H) 6.0 - 12.0 fL    Platelets 60 (L) 140 - 450 10*3/mm3   Results for orders placed or performed in visit on 08/24/21   Protime-INR    Specimen: Blood   Result Value Ref Range    Protime 20.7 (H) 11.1 - 15.3 Seconds    INR 1.82 (H) 0.80 - 1.20   Ammonia    Specimen: Blood   Result Value Ref Range    Ammonia 53 (H) 11 - 51 umol/L   Comprehensive Metabolic Panel    Specimen: Blood   Result Value Ref Range    Glucose 134 (H) 65 -  99 mg/dL    BUN 9 8 - 23 mg/dL    Creatinine 0.86 0.57 - 1.00 mg/dL    Sodium 142 136 - 145 mmol/L    Potassium 3.6 3.5 - 5.2 mmol/L    Chloride 110 (H) 98 - 107 mmol/L    CO2 23.4 22.0 - 29.0 mmol/L    Calcium 9.4 8.6 - 10.5 mg/dL    Total Protein 6.9 6.0 - 8.5 g/dL    Albumin 2.10 (L) 3.50 - 5.20 g/dL    ALT (SGPT) 93 (H) 1 - 33 U/L    AST (SGOT) 109 (H) 1 - 32 U/L    Alkaline Phosphatase 97 39 - 117 U/L    Total Bilirubin 1.2 0.0 - 1.2 mg/dL    eGFR  African Amer 79 >60 mL/min/1.73    Globulin 4.8 gm/dL    A/G Ratio 0.4 g/dL    BUN/Creatinine Ratio 10.5 7.0 - 25.0    Anion Gap 8.6 5.0 - 15.0 mmol/L   Results for orders placed or performed during the hospital encounter of 08/13/21   Green Top (Gel)   Result Value Ref Range    Extra Tube Hold for add-ons.    Scan Slide    Specimen: Blood   Result Value Ref Range    Anisocytosis Slight/1+ None Seen    Hypochromia Slight/1+ None Seen    WBC Morphology Normal Normal    Platelet Estimate Decreased Normal   COVID-19, [facility] IN-HOUSE, NP SWAB IN TRANSPORT MEDIA 8-10 HR TAT - Swab, Nasopharynx    Specimen: Nasopharynx; Swab   Result Value Ref Range    COVID19 Not Detected Not Detected - Ref. Range   CBC (No Diff)    Specimen: Blood   Result Value Ref Range    WBC 4.54 3.40 - 10.80 10*3/mm3    RBC 2.94 (L) 3.77 - 5.28 10*6/mm3    Hemoglobin 9.6 (L) 12.0 - 15.9 g/dL    Hematocrit 28.3 (L) 34.0 - 46.6 %    MCV 96.3 79.0 - 97.0 fL    MCH 32.7 26.6 - 33.0 pg    MCHC 33.9 31.5 - 35.7 g/dL    RDW 16.6 (H) 12.3 - 15.4 %    RDW-SD 57.1 (H) 37.0 - 54.0 fl    MPV 10.9 6.0 - 12.0 fL    Platelets 57 (L) 140 - 450 10*3/mm3   POC Glucose Once    Specimen: Blood   Result Value Ref Range    Glucose 101 70 - 130 mg/dL   POC Glucose Once    Specimen: Blood   Result Value Ref Range    Glucose 93 70 - 130 mg/dL   POC Glucose Once    Specimen: Blood   Result Value Ref Range    Glucose 104 70 - 130 mg/dL   POC Glucose Once    Specimen: Blood   Result Value Ref Range    Glucose 129 70 - 130  mg/dL   POC Glucose Once    Specimen: Blood   Result Value Ref Range    Glucose 94 70 - 130 mg/dL   POC Glucose Once    Specimen: Blood   Result Value Ref Range    Glucose 110 70 - 130 mg/dL   POC Glucose Once    Specimen: Blood   Result Value Ref Range    Glucose 150 (H) 70 - 130 mg/dL   POC Glucose Once    Specimen: Blood   Result Value Ref Range    Glucose 133 (H) 70 - 130 mg/dL   POC Glucose Once    Specimen: Blood   Result Value Ref Range    Glucose 90 70 - 130 mg/dL   POC Glucose Once    Specimen: Blood   Result Value Ref Range    Glucose 101 70 - 130 mg/dL   POC Glucose Once    Specimen: Blood   Result Value Ref Range    Glucose 143 (H) 70 - 130 mg/dL   POC Glucose Once    Specimen: Blood   Result Value Ref Range    Glucose 148 (H) 70 - 130 mg/dL   Glucose, Fasting    Specimen: Blood   Result Value Ref Range    Glucose, Fasting 83 74 - 106 mg/dL   Lipid Panel    Specimen: Blood   Result Value Ref Range    Total Cholesterol 105 0 - 200 mg/dL    Triglycerides 59 0 - 150 mg/dL    HDL Cholesterol 30 (L) 40 - 60 mg/dL    LDL Cholesterol  62 0 - 100 mg/dL    VLDL Cholesterol 13 5 - 40 mg/dL    LDL/HDL Ratio 2.11    ECG 12 Lead   Result Value Ref Range    QT Interval 362 ms    QTC Interval 425 ms   Results for orders placed or performed during the hospital encounter of 08/06/21   STAT Lactic Acid, Reflex    Specimen: Arm, Right; Blood   Result Value Ref Range    Lactate 2.5 (C) 0.5 - 2.0 mmol/L   Gold Top - SST   Result Value Ref Range    Extra Tube Hold for add-ons.    Green Top (Gel)   Result Value Ref Range    Extra Tube Hold for add-ons.    Green Top (Gel)   Result Value Ref Range    Extra Tube Hold for add-ons.    Scan Slide    Specimen: Blood   Result Value Ref Range    RBC Morphology Normal Normal    WBC Morphology Normal Normal    Platelet Estimate Decreased Normal   COVID-19 and FLU A/B PCR - Swab, Nasopharynx    Specimen: Nasopharynx; Swab   Result Value Ref Range    COVID19 Not Detected Not Detected -  Ref. Range    Influenza A PCR Not Detected Not Detected    Influenza B PCR Not Detected Not Detected   Urinalysis With Culture If Indicated - Urine, Catheter In/Out    Specimen: Urine, Catheter In/Out   Result Value Ref Range    Color, UA Dark Yellow Yellow, Straw, Dark Yellow, Raeann    Appearance, UA Clear Clear    pH, UA 6.0 5.0 - 9.0    Specific Gravity, UA 1.021 1.003 - 1.030    Glucose, UA Negative Negative    Ketones, UA Trace (A) Negative    Bilirubin, UA Small (1+) (A) Negative    Blood, UA Negative Negative    Protein, UA Negative Negative    Leuk Esterase, UA Negative Negative    Nitrite, UA Negative Negative    Urobilinogen, UA >=8.0 E.U./dL (A) 0.2 - 1.0 E.U./dL   Retic With IRF & RET-He    Specimen: Blood   Result Value Ref Range    Immature Reticulocyte Fraction 7.0 3.0 - 15.8 %    Reticulocyte % 1.67 0.70 - 1.90 %    Reticulocyte Absolute 0.0523 0.0200 - 0.1300 10*6/mm3    Reticulocyte Hgb 40.7 (H) 29.8 - 36.1 pg   Immature Platelet Fraction    Specimen: Blood   Result Value Ref Range    IPF 2.00 0.90 - 6.50 %     *Note: Due to a large number of results and/or encounters for the requested time period, some results have not been displayed. A complete set of results can be found in Results Review.         This document has been electronically signed by Ge Gorman MD on October 25, 2021 10:27 CDT

## 2023-08-31 NOTE — PROGRESS NOTES
"8/16/2021    Chief Complaint: psychosis    Subjective:  Patient is a 71 y.o. female that is currently inpatient on the [facility] today she has been up in the [facility] chair. She is quiet today, resting. She does request to stay in her room majority of the day, but when up, she is engaged and interacting with staff and peers.    Pt has had no behaviors, she has made no delusional statements.   Pt is compliant with medications and taking medications as ordered.     Call [facility] for discharge planning, left voicemail     Objective     Vital Signs    Temp:  [97.8 °F (36.6 °C)-98 °F (36.7 °C)] 98 °F (36.7 °C)  Heart Rate:  [80-86] 80  Resp:  [16] 16  BP: (118-136)/(73) 136/73    Physical Exam:   General Appearance: alert, appears stated age and cooperative,  Hygiene:   fair  Gait & Station: Wheelchair  Musculoskeletal: No tremors or abnormal involuntary movements    Mental Status Exam:   Cooperation:  Cooperative  Eye Contact:  blind  Psychomotor Behavior:  Slow  Mood: \"Fine\"  Affect:  mood-congruent  Speech:  Minimal  Thought Process:  Poverty of thought  Associations: Circumstantial  Thought Content:     Mood congruent   Suicidal:  None   Homicidal:  None   Hallucinations:  None   Delusion:  None  Cognitive Functioning:   Consciousness: awake and alert  Reliability:  fair  Insight:  improving  Judgement:  Improving  Impulse Control:  Improving     Lab Results (last 24 hours)     Procedure Component Value Units Date/Time    [facility] SWAB IN TRANSPORT MEDIA 8-10 HR TAT - Swab, Nasopharynx [[id number]] Collected: 08/16/21 1410    Specimen: Swab from Nasopharynx Updated: 08/16/21 1410    POC Glucose Once [[id number]]  (Normal) Collected: 08/16/21 1137    Specimen: Blood Updated: 08/16/21 1216     Glucose 94 mg/dL      Comment: RN NotifiedOperator: [id number] DREA GRACEMeter ID: [id number]       POC Glucose Once [[id number]]  (Normal) Collected: 08/16/21 0635    Specimen: Blood Updated: 08/16/21 0653     " ----- Message from Naeem Knight sent at 8/30/2023  3:58 PM EDT -----  Regarding: Refill  Contact: [phone]  Were you able to get my refills in? My cell is [phone]. Marimar Fears ..ty Glucose 110 mg/dL      Comment: RN NotifiedOperator: [id number] ARPIT GOINSMeter ID: [id number]       POC Glucose Once [[id number]]  (Abnormal) Collected: 08/15/21 1947    Specimen: Blood Updated: 08/15/21 2005     Glucose 150 mg/dL      Comment: RN NotifiedOperator: [id number] ARPIT GOINSMeter ID: [id number]           Imaging Results (Last 24 Hours)     ** No results found for the last 24 hours. **          Medicine:   Current Facility-Administered Medications:   •  aluminum-magnesium hydroxide-simethicone (MAALOX MAX) 400-400-40 MG/5ML suspension 15 mL, 15 mL, Oral, Q6H PRN, Tyrel Anderson II, MD  •  ARIPiprazole (ABILIFY) tablet 15 mg, 15 mg, Oral, Daily With Dinner, Tyrel Anderson II, MD, 15 mg at 08/15/21 1715  •  cholecalciferol (VITAMIN D3) tablet 5,000 Units, 5,000 Units, Oral, Daily, Tyrel Anderson II, MD, 5,000 Units at 08/16/21 0852  •  dextrose (D50W) 25 g/ 50mL Intravenous Solution 25 g, 25 g, Intravenous, Q15 Min PRN, Tyrel Anderson II, MD  •  dextrose (GLUTOSE) oral gel 15 g, 15 g, Oral, Q15 Min PRN, Tyrel Anderson II, MD  •  escitalopram (LEXAPRO) tablet 10 mg, 10 mg, Oral, Daily, Tyrel Anderson II, MD, 10 mg at 08/16/21 0852  •  ferrous sulfate EC tablet 324 mg, 324 mg, Oral, Daily With Breakfast, Greer May, APRN, 324 mg at 08/16/21 0852  •  glucagon (human recombinant) (GLUCAGEN DIAGNOSTIC) injection 1 mg, 1 mg, Subcutaneous, Q15 Min PRN, Tyrel Anderson II, MD  •  hydrOXYzine pamoate (VISTARIL) capsule 50 mg, 50 mg, Oral, Q6H PRN, Tyrel Anderson II, MD  •  insulin aspart (novoLOG) injection 0-7 Units, 0-7 Units, Subcutaneous, TID AC, Tyrel Anderson II, MD  •  lactulose (CHRONULAC) 10 GM/15ML solution 30 g, 30 g, Oral, TID, Tyrel Anderson II, MD, 30 g at 08/16/21 0852  •  magnesium hydroxide (MILK OF MAGNESIA) suspension 2400 mg/10mL 10 mL, 10 mL, Oral, Daily PRN, Tyrel Anderson II, MD  •  metFORMIN (GLUCOPHAGE)  tablet 500 mg, 500 mg, Oral, Daily, Greer May APRN, 500 mg at 08/16/21 0851  •  multivitamin with minerals 1 tablet, 1 tablet, Oral, Daily, Tyrel Anderson II, MD, 1 tablet at 08/16/21 0852  •  pantoprazole (PROTONIX) EC tablet 40 mg, 40 mg, Oral, Daily, Tyrel Anderson II, MD, 40 mg at 08/16/21 0852  •  traZODone (DESYREL) tablet 50 mg, 50 mg, Oral, Nightly PRN, Tyrel Anderson II, MD    Diagnoses/Assessment:     Schizophrenia (CMS/[facility])      Treatment Plan:    1) Will continue care for the patient on the behavioral health unit at [facility] to ensure patient safety.  2) Will continue to provide treatment with the unit milieu, activities, therapies and psychopharmacological management.  3) Patient to be placed on or continued on  Q15 minute checks  and Fall precautions.  4) Pertinent medical issues:   --HTN: BP stable; Catpres PRN  --Type 2 DM: FSBS AC and HS with SSI.  Continue Metformin.   --Thrombocytopenia: platelets currently 57.  No signs of bleeding or bruising noted.  Will need outpatient monitoring.  Plan for CBC one week post discharge and PCP follow up.  --GI: Cont PPI  --Hepatic: Cont Lactulose TID  5) Will order following labs: none, COVID for placement  6) Will make the following medication changes:   --Continue Abilify 15 mg daily  7) Will continue discharge planning as appropriate for patient.  8) Psychotherapy provided for less than 16 minutes.    Treatment plan and medication risks and benefits discussed with: Patient    Elena E CLAIRE Dave  08/16/21  14:33 CDT
